# Patient Record
Sex: MALE | Race: WHITE | NOT HISPANIC OR LATINO | Employment: OTHER | URBAN - METROPOLITAN AREA
[De-identification: names, ages, dates, MRNs, and addresses within clinical notes are randomized per-mention and may not be internally consistent; named-entity substitution may affect disease eponyms.]

---

## 2018-10-18 ENCOUNTER — TRANSCRIBE ORDERS (OUTPATIENT)
Dept: ADMINISTRATIVE | Facility: HOSPITAL | Age: 73
End: 2018-10-18

## 2018-10-18 DIAGNOSIS — M86.172 ACUTE OSTEOMYELITIS OF LEFT ANKLE OR FOOT (HCC): Primary | ICD-10-CM

## 2018-10-19 ENCOUNTER — APPOINTMENT (OUTPATIENT)
Dept: CARDIOLOGY | Facility: HOSPITAL | Age: 73
End: 2018-10-19

## 2018-10-19 ENCOUNTER — APPOINTMENT (OUTPATIENT)
Dept: MRI IMAGING | Facility: HOSPITAL | Age: 73
End: 2018-10-19

## 2018-10-19 ENCOUNTER — HOSPITAL ENCOUNTER (INPATIENT)
Facility: HOSPITAL | Age: 73
LOS: 7 days | Discharge: HOME HEALTH | End: 2018-10-26
Attending: EMERGENCY MEDICINE | Admitting: ORTHOPAEDIC SURGERY

## 2018-10-19 ENCOUNTER — APPOINTMENT (OUTPATIENT)
Dept: GENERAL RADIOLOGY | Facility: HOSPITAL | Age: 73
End: 2018-10-19

## 2018-10-19 DIAGNOSIS — L97.524 ULCERATED, FOOT, LEFT, WITH NECROSIS OF BONE (HCC): ICD-10-CM

## 2018-10-19 DIAGNOSIS — L03.116 CELLULITIS OF LEFT FOOT: Primary | ICD-10-CM

## 2018-10-19 DIAGNOSIS — I73.9 PERIPHERAL VASCULAR DISEASE (HCC): ICD-10-CM

## 2018-10-19 DIAGNOSIS — Z74.09 IMPAIRED FUNCTIONAL MOBILITY, BALANCE, GAIT, AND ENDURANCE: ICD-10-CM

## 2018-10-19 PROBLEM — D72.829 LEUKOCYTOSIS: Status: ACTIVE | Noted: 2018-10-19

## 2018-10-19 PROBLEM — N17.9 AKI (ACUTE KIDNEY INJURY): Status: ACTIVE | Noted: 2018-10-19

## 2018-10-19 PROBLEM — I10 ESSENTIAL HYPERTENSION: Status: ACTIVE | Noted: 2018-10-19

## 2018-10-19 LAB
ALBUMIN SERPL-MCNC: 4.58 G/DL (ref 3.2–4.8)
ALBUMIN/GLOB SERPL: 2.3 G/DL (ref 1.5–2.5)
ALP SERPL-CCNC: 78 U/L (ref 25–100)
ALT SERPL W P-5'-P-CCNC: 43 U/L (ref 7–40)
ANION GAP SERPL CALCULATED.3IONS-SCNC: 11 MMOL/L (ref 3–11)
AST SERPL-CCNC: 39 U/L (ref 0–33)
BASOPHILS # BLD AUTO: 0.04 10*3/MM3 (ref 0–0.2)
BASOPHILS NFR BLD AUTO: 0.2 % (ref 0–1)
BILIRUB SERPL-MCNC: 1.2 MG/DL (ref 0.3–1.2)
BNP SERPL-MCNC: 25 PG/ML (ref 0–100)
BUN BLD-MCNC: 27 MG/DL (ref 9–23)
BUN/CREAT SERPL: 16.7 (ref 7–25)
CALCIUM SPEC-SCNC: 9.2 MG/DL (ref 8.7–10.4)
CHLORIDE SERPL-SCNC: 101 MMOL/L (ref 99–109)
CK SERPL-CCNC: 142 U/L (ref 26–174)
CO2 SERPL-SCNC: 24 MMOL/L (ref 20–31)
CREAT BLD-MCNC: 1.62 MG/DL (ref 0.6–1.3)
CRP SERPL-MCNC: 12.16 MG/DL (ref 0–1)
D-LACTATE SERPL-SCNC: 1.5 MMOL/L (ref 0.5–2)
DEPRECATED RDW RBC AUTO: 41.2 FL (ref 37–54)
EOSINOPHIL # BLD AUTO: 0.02 10*3/MM3 (ref 0–0.3)
EOSINOPHIL NFR BLD AUTO: 0.1 % (ref 0–3)
ERYTHROCYTE [DISTWIDTH] IN BLOOD BY AUTOMATED COUNT: 12.5 % (ref 11.3–14.5)
GFR SERPL CREATININE-BSD FRML MDRD: 42 ML/MIN/1.73
GLOBULIN UR ELPH-MCNC: 2 GM/DL
GLUCOSE BLD-MCNC: 103 MG/DL (ref 70–100)
HCT VFR BLD AUTO: 41.5 % (ref 38.9–50.9)
HGB BLD-MCNC: 14.1 G/DL (ref 13.1–17.5)
IMM GRANULOCYTES # BLD: 0.05 10*3/MM3 (ref 0–0.03)
IMM GRANULOCYTES NFR BLD: 0.3 % (ref 0–0.6)
LYMPHOCYTES # BLD AUTO: 1.8 10*3/MM3 (ref 0.6–4.8)
LYMPHOCYTES NFR BLD AUTO: 10.5 % (ref 24–44)
MCH RBC QN AUTO: 31 PG (ref 27–31)
MCHC RBC AUTO-ENTMCNC: 34 G/DL (ref 32–36)
MCV RBC AUTO: 91.2 FL (ref 80–99)
MONOCYTES # BLD AUTO: 1.76 10*3/MM3 (ref 0–1)
MONOCYTES NFR BLD AUTO: 10.3 % (ref 0–12)
NEUTROPHILS # BLD AUTO: 13.51 10*3/MM3 (ref 1.5–8.3)
NEUTROPHILS NFR BLD AUTO: 78.9 % (ref 41–71)
PLATELET # BLD AUTO: 209 10*3/MM3 (ref 150–450)
PMV BLD AUTO: 9.9 FL (ref 6–12)
POTASSIUM BLD-SCNC: 5.6 MMOL/L (ref 3.5–5.5)
PROCALCITONIN SERPL-MCNC: 0.27 NG/ML
PROT SERPL-MCNC: 6.6 G/DL (ref 5.7–8.2)
RBC # BLD AUTO: 4.55 10*6/MM3 (ref 4.2–5.76)
SODIUM BLD-SCNC: 136 MMOL/L (ref 132–146)
TROPONIN I SERPL-MCNC: 0 NG/ML (ref 0–0.07)
WBC NRBC COR # BLD: 17.13 10*3/MM3 (ref 3.5–10.8)

## 2018-10-19 PROCEDURE — 80053 COMPREHEN METABOLIC PANEL: CPT | Performed by: EMERGENCY MEDICINE

## 2018-10-19 PROCEDURE — 25010000002 DAPTOMYCIN PER 1 MG: Performed by: PHYSICIAN ASSISTANT

## 2018-10-19 PROCEDURE — 83605 ASSAY OF LACTIC ACID: CPT | Performed by: EMERGENCY MEDICINE

## 2018-10-19 PROCEDURE — 71045 X-RAY EXAM CHEST 1 VIEW: CPT

## 2018-10-19 PROCEDURE — 86140 C-REACTIVE PROTEIN: CPT | Performed by: EMERGENCY MEDICINE

## 2018-10-19 PROCEDURE — A9270 NON-COVERED ITEM OR SERVICE: HCPCS | Performed by: INTERNAL MEDICINE

## 2018-10-19 PROCEDURE — 84484 ASSAY OF TROPONIN QUANT: CPT

## 2018-10-19 PROCEDURE — 87070 CULTURE OTHR SPECIMN AEROBIC: CPT | Performed by: EMERGENCY MEDICINE

## 2018-10-19 PROCEDURE — 73718 MRI LOWER EXTREMITY W/O DYE: CPT

## 2018-10-19 PROCEDURE — 63710000001 ATORVASTATIN 20 MG TABLET: Performed by: INTERNAL MEDICINE

## 2018-10-19 PROCEDURE — 99223 1ST HOSP IP/OBS HIGH 75: CPT | Performed by: INTERNAL MEDICINE

## 2018-10-19 PROCEDURE — 63710000001 ASPIRIN 81 MG TABLET DELAYED-RELEASE: Performed by: INTERNAL MEDICINE

## 2018-10-19 PROCEDURE — 87040 BLOOD CULTURE FOR BACTERIA: CPT | Performed by: EMERGENCY MEDICINE

## 2018-10-19 PROCEDURE — 82550 ASSAY OF CK (CPK): CPT | Performed by: INTERNAL MEDICINE

## 2018-10-19 PROCEDURE — 25010000002 PIPERACILLIN SOD-TAZOBACTAM PER 1 G: Performed by: INTERNAL MEDICINE

## 2018-10-19 PROCEDURE — 99284 EMERGENCY DEPT VISIT MOD MDM: CPT

## 2018-10-19 PROCEDURE — 87147 CULTURE TYPE IMMUNOLOGIC: CPT | Performed by: EMERGENCY MEDICINE

## 2018-10-19 PROCEDURE — 93005 ELECTROCARDIOGRAM TRACING: CPT | Performed by: EMERGENCY MEDICINE

## 2018-10-19 PROCEDURE — 85025 COMPLETE CBC W/AUTO DIFF WBC: CPT | Performed by: EMERGENCY MEDICINE

## 2018-10-19 PROCEDURE — 93925 LOWER EXTREMITY STUDY: CPT

## 2018-10-19 PROCEDURE — 87186 SC STD MICRODIL/AGAR DIL: CPT | Performed by: EMERGENCY MEDICINE

## 2018-10-19 PROCEDURE — 83880 ASSAY OF NATRIURETIC PEPTIDE: CPT | Performed by: EMERGENCY MEDICINE

## 2018-10-19 PROCEDURE — 87077 CULTURE AEROBIC IDENTIFY: CPT | Performed by: EMERGENCY MEDICINE

## 2018-10-19 PROCEDURE — 87205 SMEAR GRAM STAIN: CPT | Performed by: EMERGENCY MEDICINE

## 2018-10-19 PROCEDURE — 84145 PROCALCITONIN (PCT): CPT | Performed by: INTERNAL MEDICINE

## 2018-10-19 RX ORDER — LISINOPRIL 10 MG/1
10 TABLET ORAL DAILY
COMMUNITY
End: 2018-10-26 | Stop reason: HOSPADM

## 2018-10-19 RX ORDER — SODIUM CHLORIDE 9 MG/ML
100 INJECTION, SOLUTION INTRAVENOUS CONTINUOUS
Status: DISCONTINUED | OUTPATIENT
Start: 2018-10-19 | End: 2018-10-25

## 2018-10-19 RX ORDER — ATORVASTATIN CALCIUM 20 MG/1
20 TABLET, FILM COATED ORAL DAILY
COMMUNITY
End: 2018-11-05 | Stop reason: SDUPTHER

## 2018-10-19 RX ORDER — ASPIRIN 81 MG/1
81 TABLET ORAL DAILY
Status: DISCONTINUED | OUTPATIENT
Start: 2018-10-19 | End: 2018-10-26 | Stop reason: HOSPADM

## 2018-10-19 RX ORDER — SODIUM CHLORIDE 0.9 % (FLUSH) 0.9 %
10 SYRINGE (ML) INJECTION AS NEEDED
Status: DISCONTINUED | OUTPATIENT
Start: 2018-10-19 | End: 2018-10-26 | Stop reason: HOSPADM

## 2018-10-19 RX ORDER — ATORVASTATIN CALCIUM 20 MG/1
20 TABLET, FILM COATED ORAL DAILY
Status: DISCONTINUED | OUTPATIENT
Start: 2018-10-19 | End: 2018-10-26 | Stop reason: HOSPADM

## 2018-10-19 RX ORDER — ASPIRIN 81 MG/1
81 TABLET ORAL DAILY
COMMUNITY
End: 2019-12-09

## 2018-10-19 RX ORDER — SODIUM CHLORIDE 0.9 % (FLUSH) 0.9 %
3 SYRINGE (ML) INJECTION EVERY 12 HOURS SCHEDULED
Status: DISCONTINUED | OUTPATIENT
Start: 2018-10-19 | End: 2018-10-26 | Stop reason: HOSPADM

## 2018-10-19 RX ORDER — QUETIAPINE FUMARATE 25 MG/1
12.5 TABLET, FILM COATED ORAL NIGHTLY
Status: COMPLETED | OUTPATIENT
Start: 2018-10-20 | End: 2018-10-21

## 2018-10-19 RX ORDER — SODIUM CHLORIDE 0.9 % (FLUSH) 0.9 %
3-10 SYRINGE (ML) INJECTION AS NEEDED
Status: DISCONTINUED | OUTPATIENT
Start: 2018-10-19 | End: 2018-10-26 | Stop reason: HOSPADM

## 2018-10-19 RX ADMIN — Medication 3 ML: at 23:55

## 2018-10-19 RX ADMIN — ASPIRIN 81 MG: 81 TABLET, COATED ORAL at 17:35

## 2018-10-19 RX ADMIN — TAZOBACTAM SODIUM AND PIPERACILLIN SODIUM 3.38 G: 375; 3 INJECTION, SOLUTION INTRAVENOUS at 17:28

## 2018-10-19 RX ADMIN — SODIUM CHLORIDE 100 ML/HR: 9 INJECTION, SOLUTION INTRAVENOUS at 17:28

## 2018-10-19 RX ADMIN — DAPTOMYCIN 600 MG: 500 INJECTION, POWDER, LYOPHILIZED, FOR SOLUTION INTRAVENOUS at 17:27

## 2018-10-19 RX ADMIN — QUETIAPINE FUMARATE 12.5 MG: 25 TABLET ORAL at 23:51

## 2018-10-19 RX ADMIN — TAZOBACTAM SODIUM AND PIPERACILLIN SODIUM 3.38 G: 375; 3 INJECTION, SOLUTION INTRAVENOUS at 23:52

## 2018-10-19 RX ADMIN — ATORVASTATIN CALCIUM 20 MG: 20 TABLET, FILM COATED ORAL at 17:35

## 2018-10-20 LAB
ALBUMIN SERPL-MCNC: 4.25 G/DL (ref 3.2–4.8)
ALBUMIN/GLOB SERPL: 1.8 G/DL (ref 1.5–2.5)
ALP SERPL-CCNC: 70 U/L (ref 25–100)
ALT SERPL W P-5'-P-CCNC: 43 U/L (ref 7–40)
ANION GAP SERPL CALCULATED.3IONS-SCNC: 10 MMOL/L (ref 3–11)
AST SERPL-CCNC: 36 U/L (ref 0–33)
BASOPHILS # BLD AUTO: 0.05 10*3/MM3 (ref 0–0.2)
BASOPHILS NFR BLD AUTO: 0.3 % (ref 0–1)
BH CV GRAFT BRACHIAL PRESSURE RIGHT: 138 MMHG
BH CV LEA LEFT ANT TIBIAL A DISTAL PSV: 183 CM/S
BH CV LEA LEFT ANT TIBIAL A MID PSV: 205 CM/S
BH CV LEA LEFT ANT TIBIAL A PROX PSV: 217 CM/S
BH CV LEA LEFT CFA PROX PSV: 156 CM/S
BH CV LEA LEFT DFA PROX PSV: 104 CM/S
BH CV LEA LEFT DPA PRESSURE: 154 MMHG
BH CV LEA LEFT PERONEAL  MID PSV: 55 CM/S
BH CV LEA LEFT POPITEAL A  DISTAL PSV: 137 CM/S
BH CV LEA LEFT POPITEAL A  PROX PSV: 122 CM/S
BH CV LEA LEFT PTA DISTAL PSV: 169 CM/S
BH CV LEA LEFT PTA MID PSV: 136 CM/S
BH CV LEA LEFT PTA PRESSURE: 162 MMHG
BH CV LEA LEFT PTA PROX PSV: 123 CM/S
BH CV LEA LEFT SFA DISTAL PSV: 139 CM/S
BH CV LEA LEFT SFA MID PSV: 141 CM/S
BH CV LEA LEFT SFA PROX PSV: 129 CM/S
BH CV LEA RIGHT ANT TIBIAL A DISTAL PSV: 79.8 CM/S
BH CV LEA RIGHT ANT TIBIAL A MID PSV: 78.6 CM/S
BH CV LEA RIGHT ANT TIBIAL A PROX PSV: 92.4 CM/S
BH CV LEA RIGHT CFA PROX PSV: 132 CM/S
BH CV LEA RIGHT DFA PROX PSV: 94.3 CM/S
BH CV LEA RIGHT DPA PRESSURE: 144 MMHG
BH CV LEA RIGHT PERONEAL  MID PSV: 67.8 CM/S
BH CV LEA RIGHT POPITEAL A  DISTAL PSV: 67.3 CM/S
BH CV LEA RIGHT POPITEAL A  PROX PSV: 83.6 CM/S
BH CV LEA RIGHT PTA DISTAL PSV: 61.9 CM/S
BH CV LEA RIGHT PTA MID PSV: 79.1 CM/S
BH CV LEA RIGHT PTA PRESSURE: 162 MMHG
BH CV LEA RIGHT PTA PROX PSV: 69.7 CM/S
BH CV LEA RIGHT SFA DISTAL PSV: 81.1 CM/S
BH CV LEA RIGHT SFA MID PSV: 110 CM/S
BH CV LEA RIGHT SFA PROX PSV: 120 CM/S
BH CV LOWER ARTERIAL LEFT ABI RATIO: 1.17
BH CV LOWER ARTERIAL RIGHT ABI RATIO: 1.17
BILIRUB SERPL-MCNC: 1.1 MG/DL (ref 0.3–1.2)
BUN BLD-MCNC: 25 MG/DL (ref 9–23)
BUN/CREAT SERPL: 16.8 (ref 7–25)
CALCIUM SPEC-SCNC: 9.2 MG/DL (ref 8.7–10.4)
CHLORIDE SERPL-SCNC: 102 MMOL/L (ref 99–109)
CK SERPL-CCNC: 139 U/L (ref 26–174)
CO2 SERPL-SCNC: 23 MMOL/L (ref 20–31)
CREAT BLD-MCNC: 1.49 MG/DL (ref 0.6–1.3)
CRP SERPL-MCNC: 15.09 MG/DL (ref 0–1)
D-LACTATE SERPL-SCNC: 1.5 MMOL/L (ref 0.5–2)
DEPRECATED RDW RBC AUTO: 43.1 FL (ref 37–54)
EOSINOPHIL # BLD AUTO: 0.12 10*3/MM3 (ref 0–0.3)
EOSINOPHIL NFR BLD AUTO: 0.8 % (ref 0–3)
ERYTHROCYTE [DISTWIDTH] IN BLOOD BY AUTOMATED COUNT: 12.4 % (ref 11.3–14.5)
ERYTHROCYTE [SEDIMENTATION RATE] IN BLOOD: 33 MM/HR (ref 0–20)
GFR SERPL CREATININE-BSD FRML MDRD: 46 ML/MIN/1.73
GLOBULIN UR ELPH-MCNC: 2.4 GM/DL
GLUCOSE BLD-MCNC: 96 MG/DL (ref 70–100)
HCT VFR BLD AUTO: 40.9 % (ref 38.9–50.9)
HGB BLD-MCNC: 13.6 G/DL (ref 13.1–17.5)
IMM GRANULOCYTES # BLD: 0.05 10*3/MM3 (ref 0–0.03)
IMM GRANULOCYTES NFR BLD: 0.3 % (ref 0–0.6)
LYMPHOCYTES # BLD AUTO: 2.78 10*3/MM3 (ref 0.6–4.8)
LYMPHOCYTES NFR BLD AUTO: 18.2 % (ref 24–44)
MCH RBC QN AUTO: 31.3 PG (ref 27–31)
MCHC RBC AUTO-ENTMCNC: 33.3 G/DL (ref 32–36)
MCV RBC AUTO: 94.2 FL (ref 80–99)
MONOCYTES # BLD AUTO: 1.69 10*3/MM3 (ref 0–1)
MONOCYTES NFR BLD AUTO: 11.1 % (ref 0–12)
NEUTROPHILS # BLD AUTO: 10.62 10*3/MM3 (ref 1.5–8.3)
NEUTROPHILS NFR BLD AUTO: 69.6 % (ref 41–71)
PLATELET # BLD AUTO: 182 10*3/MM3 (ref 150–450)
PMV BLD AUTO: 9.8 FL (ref 6–12)
POTASSIUM BLD-SCNC: 4.3 MMOL/L (ref 3.5–5.5)
PROCALCITONIN SERPL-MCNC: 0.3 NG/ML
PROT SERPL-MCNC: 6.6 G/DL (ref 5.7–8.2)
RBC # BLD AUTO: 4.34 10*6/MM3 (ref 4.2–5.76)
SODIUM BLD-SCNC: 135 MMOL/L (ref 132–146)
WBC NRBC COR # BLD: 15.26 10*3/MM3 (ref 3.5–10.8)

## 2018-10-20 PROCEDURE — 82550 ASSAY OF CK (CPK): CPT | Performed by: INTERNAL MEDICINE

## 2018-10-20 PROCEDURE — A9270 NON-COVERED ITEM OR SERVICE: HCPCS | Performed by: INTERNAL MEDICINE

## 2018-10-20 PROCEDURE — 99232 SBSQ HOSP IP/OBS MODERATE 35: CPT | Performed by: HOSPITALIST

## 2018-10-20 PROCEDURE — 86140 C-REACTIVE PROTEIN: CPT | Performed by: PHYSICIAN ASSISTANT

## 2018-10-20 PROCEDURE — 63710000001 ATORVASTATIN 20 MG TABLET: Performed by: INTERNAL MEDICINE

## 2018-10-20 PROCEDURE — 63710000001 QUETIAPINE 25 MG TABLET: Performed by: INTERNAL MEDICINE

## 2018-10-20 PROCEDURE — 63710000001 ACETAMINOPHEN 325 MG TABLET: Performed by: NURSE PRACTITIONER

## 2018-10-20 PROCEDURE — 63710000001 ASPIRIN 81 MG TABLET DELAYED-RELEASE: Performed by: INTERNAL MEDICINE

## 2018-10-20 PROCEDURE — 25010000002 DAPTOMYCIN PER 1 MG: Performed by: PHYSICIAN ASSISTANT

## 2018-10-20 PROCEDURE — 85652 RBC SED RATE AUTOMATED: CPT | Performed by: PHYSICIAN ASSISTANT

## 2018-10-20 PROCEDURE — 84145 PROCALCITONIN (PCT): CPT | Performed by: INTERNAL MEDICINE

## 2018-10-20 PROCEDURE — 85025 COMPLETE CBC W/AUTO DIFF WBC: CPT | Performed by: INTERNAL MEDICINE

## 2018-10-20 PROCEDURE — 83605 ASSAY OF LACTIC ACID: CPT | Performed by: INTERNAL MEDICINE

## 2018-10-20 PROCEDURE — A9270 NON-COVERED ITEM OR SERVICE: HCPCS | Performed by: NURSE PRACTITIONER

## 2018-10-20 PROCEDURE — 80053 COMPREHEN METABOLIC PANEL: CPT | Performed by: INTERNAL MEDICINE

## 2018-10-20 PROCEDURE — 25010000002 PIPERACILLIN SOD-TAZOBACTAM PER 1 G: Performed by: INTERNAL MEDICINE

## 2018-10-20 RX ORDER — ACETAMINOPHEN 325 MG/1
650 TABLET ORAL EVERY 6 HOURS PRN
Status: DISCONTINUED | OUTPATIENT
Start: 2018-10-20 | End: 2018-10-26 | Stop reason: HOSPADM

## 2018-10-20 RX ORDER — ACETAMINOPHEN 650 MG
TABLET, EXTENDED RELEASE ORAL DAILY
Status: DISCONTINUED | OUTPATIENT
Start: 2018-10-20 | End: 2018-10-26 | Stop reason: HOSPADM

## 2018-10-20 RX ORDER — LISINOPRIL 5 MG/1
2.5 TABLET ORAL
Status: DISCONTINUED | OUTPATIENT
Start: 2018-10-21 | End: 2018-10-24

## 2018-10-20 RX ADMIN — Medication 3 ML: at 08:41

## 2018-10-20 RX ADMIN — DAPTOMYCIN 600 MG: 500 INJECTION, POWDER, LYOPHILIZED, FOR SOLUTION INTRAVENOUS at 16:06

## 2018-10-20 RX ADMIN — TAZOBACTAM SODIUM AND PIPERACILLIN SODIUM 3.38 G: 375; 3 INJECTION, SOLUTION INTRAVENOUS at 16:45

## 2018-10-20 RX ADMIN — TAZOBACTAM SODIUM AND PIPERACILLIN SODIUM 3.38 G: 375; 3 INJECTION, SOLUTION INTRAVENOUS at 08:35

## 2018-10-20 RX ADMIN — QUETIAPINE FUMARATE 12.5 MG: 25 TABLET ORAL at 20:38

## 2018-10-20 RX ADMIN — ASPIRIN 81 MG: 81 TABLET, COATED ORAL at 08:39

## 2018-10-20 RX ADMIN — Medication 3 ML: at 20:38

## 2018-10-20 RX ADMIN — ACETAMINOPHEN 650 MG: 325 TABLET ORAL at 00:35

## 2018-10-20 RX ADMIN — SODIUM CHLORIDE 100 ML/HR: 9 INJECTION, SOLUTION INTRAVENOUS at 16:58

## 2018-10-20 RX ADMIN — ATORVASTATIN CALCIUM 20 MG: 20 TABLET, FILM COATED ORAL at 08:39

## 2018-10-20 RX ADMIN — SODIUM CHLORIDE 100 ML/HR: 9 INJECTION, SOLUTION INTRAVENOUS at 06:43

## 2018-10-21 LAB — HBA1C MFR BLD: 6 % (ref 4.8–5.6)

## 2018-10-21 PROCEDURE — 83036 HEMOGLOBIN GLYCOSYLATED A1C: CPT | Performed by: HOSPITALIST

## 2018-10-21 PROCEDURE — 97116 GAIT TRAINING THERAPY: CPT

## 2018-10-21 PROCEDURE — 25010000002 ENOXAPARIN PER 10 MG: Performed by: INTERNAL MEDICINE

## 2018-10-21 PROCEDURE — 97161 PT EVAL LOW COMPLEX 20 MIN: CPT

## 2018-10-21 PROCEDURE — 99232 SBSQ HOSP IP/OBS MODERATE 35: CPT | Performed by: HOSPITALIST

## 2018-10-21 PROCEDURE — 97597 DBRDMT OPN WND 1ST 20 CM/<: CPT

## 2018-10-21 PROCEDURE — A9270 NON-COVERED ITEM OR SERVICE: HCPCS | Performed by: HOSPITALIST

## 2018-10-21 PROCEDURE — 25010000002 PIPERACILLIN SOD-TAZOBACTAM PER 1 G: Performed by: INTERNAL MEDICINE

## 2018-10-21 PROCEDURE — A9270 NON-COVERED ITEM OR SERVICE: HCPCS | Performed by: INTERNAL MEDICINE

## 2018-10-21 PROCEDURE — 63710000001 ATORVASTATIN 20 MG TABLET: Performed by: INTERNAL MEDICINE

## 2018-10-21 PROCEDURE — 25010000002 DAPTOMYCIN PER 1 MG: Performed by: PHYSICIAN ASSISTANT

## 2018-10-21 PROCEDURE — 63710000001 ASPIRIN 81 MG TABLET DELAYED-RELEASE: Performed by: INTERNAL MEDICINE

## 2018-10-21 PROCEDURE — 63710000001 LISINOPRIL 5 MG TABLET: Performed by: HOSPITALIST

## 2018-10-21 PROCEDURE — 63710000001 QUETIAPINE 25 MG TABLET: Performed by: INTERNAL MEDICINE

## 2018-10-21 RX ADMIN — ENOXAPARIN SODIUM 40 MG: 40 INJECTION SUBCUTANEOUS at 21:20

## 2018-10-21 RX ADMIN — ASPIRIN 81 MG: 81 TABLET, COATED ORAL at 08:22

## 2018-10-21 RX ADMIN — LISINOPRIL 2.5 MG: 5 TABLET ORAL at 08:22

## 2018-10-21 RX ADMIN — QUETIAPINE FUMARATE 12.5 MG: 25 TABLET ORAL at 21:20

## 2018-10-21 RX ADMIN — ATORVASTATIN CALCIUM 20 MG: 20 TABLET, FILM COATED ORAL at 08:22

## 2018-10-21 RX ADMIN — TAZOBACTAM SODIUM AND PIPERACILLIN SODIUM 3.38 G: 375; 3 INJECTION, SOLUTION INTRAVENOUS at 16:35

## 2018-10-21 RX ADMIN — Medication 3 ML: at 08:21

## 2018-10-21 RX ADMIN — DAPTOMYCIN 600 MG: 500 INJECTION, POWDER, LYOPHILIZED, FOR SOLUTION INTRAVENOUS at 16:00

## 2018-10-21 RX ADMIN — TAZOBACTAM SODIUM AND PIPERACILLIN SODIUM 3.38 G: 375; 3 INJECTION, SOLUTION INTRAVENOUS at 00:00

## 2018-10-21 RX ADMIN — SODIUM CHLORIDE 100 ML/HR: 9 INJECTION, SOLUTION INTRAVENOUS at 05:37

## 2018-10-21 RX ADMIN — TAZOBACTAM SODIUM AND PIPERACILLIN SODIUM 3.38 G: 375; 3 INJECTION, SOLUTION INTRAVENOUS at 08:21

## 2018-10-22 ENCOUNTER — ANESTHESIA EVENT (OUTPATIENT)
Dept: PERIOP | Facility: HOSPITAL | Age: 73
End: 2018-10-22

## 2018-10-22 ENCOUNTER — APPOINTMENT (OUTPATIENT)
Dept: MRI IMAGING | Facility: HOSPITAL | Age: 73
End: 2018-10-22

## 2018-10-22 ENCOUNTER — APPOINTMENT (OUTPATIENT)
Dept: GENERAL RADIOLOGY | Facility: HOSPITAL | Age: 73
End: 2018-10-22

## 2018-10-22 LAB
ALBUMIN SERPL-MCNC: 3.63 G/DL (ref 3.2–4.8)
ALBUMIN/GLOB SERPL: 1.9 G/DL (ref 1.5–2.5)
ALP SERPL-CCNC: 96 U/L (ref 25–100)
ALT SERPL W P-5'-P-CCNC: 59 U/L (ref 7–40)
ANION GAP SERPL CALCULATED.3IONS-SCNC: 10 MMOL/L (ref 3–11)
AST SERPL-CCNC: 48 U/L (ref 0–33)
BASOPHILS # BLD AUTO: 0.05 10*3/MM3 (ref 0–0.2)
BASOPHILS NFR BLD AUTO: 0.4 % (ref 0–1)
BILIRUB SERPL-MCNC: 1 MG/DL (ref 0.3–1.2)
BUN BLD-MCNC: 17 MG/DL (ref 9–23)
BUN/CREAT SERPL: 13.1 (ref 7–25)
CALCIUM SPEC-SCNC: 8.1 MG/DL (ref 8.7–10.4)
CHLORIDE SERPL-SCNC: 103 MMOL/L (ref 99–109)
CK SERPL-CCNC: 139 U/L (ref 26–174)
CO2 SERPL-SCNC: 24 MMOL/L (ref 20–31)
CREAT BLD-MCNC: 1.3 MG/DL (ref 0.6–1.3)
DEPRECATED RDW RBC AUTO: 42 FL (ref 37–54)
EOSINOPHIL # BLD AUTO: 0.29 10*3/MM3 (ref 0–0.3)
EOSINOPHIL NFR BLD AUTO: 2.3 % (ref 0–3)
ERYTHROCYTE [DISTWIDTH] IN BLOOD BY AUTOMATED COUNT: 12.5 % (ref 11.3–14.5)
GFR SERPL CREATININE-BSD FRML MDRD: 54 ML/MIN/1.73
GLOBULIN UR ELPH-MCNC: 1.9 GM/DL
GLUCOSE BLD-MCNC: 117 MG/DL (ref 70–100)
HCT VFR BLD AUTO: 35.3 % (ref 38.9–50.9)
HGB BLD-MCNC: 11.7 G/DL (ref 13.1–17.5)
IMM GRANULOCYTES # BLD: 0.04 10*3/MM3 (ref 0–0.03)
IMM GRANULOCYTES NFR BLD: 0.3 % (ref 0–0.6)
LYMPHOCYTES # BLD AUTO: 1.61 10*3/MM3 (ref 0.6–4.8)
LYMPHOCYTES NFR BLD AUTO: 12.7 % (ref 24–44)
MCH RBC QN AUTO: 30.5 PG (ref 27–31)
MCHC RBC AUTO-ENTMCNC: 33.1 G/DL (ref 32–36)
MCV RBC AUTO: 92.2 FL (ref 80–99)
MONOCYTES # BLD AUTO: 1.27 10*3/MM3 (ref 0–1)
MONOCYTES NFR BLD AUTO: 10 % (ref 0–12)
NEUTROPHILS # BLD AUTO: 9.44 10*3/MM3 (ref 1.5–8.3)
NEUTROPHILS NFR BLD AUTO: 74.6 % (ref 41–71)
NRBC BLD MANUAL-RTO: 0 /100 WBC (ref 0–0)
PLATELET # BLD AUTO: 196 10*3/MM3 (ref 150–450)
PMV BLD AUTO: 9.3 FL (ref 6–12)
POTASSIUM BLD-SCNC: 4.1 MMOL/L (ref 3.5–5.5)
PROT SERPL-MCNC: 5.5 G/DL (ref 5.7–8.2)
RBC # BLD AUTO: 3.83 10*6/MM3 (ref 4.2–5.76)
SODIUM BLD-SCNC: 137 MMOL/L (ref 132–146)
WBC NRBC COR # BLD: 12.66 10*3/MM3 (ref 3.5–10.8)

## 2018-10-22 PROCEDURE — 25010000002 ENOXAPARIN PER 10 MG: Performed by: INTERNAL MEDICINE

## 2018-10-22 PROCEDURE — 99233 SBSQ HOSP IP/OBS HIGH 50: CPT | Performed by: HOSPITALIST

## 2018-10-22 PROCEDURE — 99222 1ST HOSP IP/OBS MODERATE 55: CPT | Performed by: ORTHOPAEDIC SURGERY

## 2018-10-22 PROCEDURE — 80053 COMPREHEN METABOLIC PANEL: CPT | Performed by: INTERNAL MEDICINE

## 2018-10-22 PROCEDURE — A9270 NON-COVERED ITEM OR SERVICE: HCPCS | Performed by: HOSPITALIST

## 2018-10-22 PROCEDURE — 82550 ASSAY OF CK (CPK): CPT | Performed by: INTERNAL MEDICINE

## 2018-10-22 PROCEDURE — 85025 COMPLETE CBC W/AUTO DIFF WBC: CPT | Performed by: INTERNAL MEDICINE

## 2018-10-22 PROCEDURE — 73630 X-RAY EXAM OF FOOT: CPT

## 2018-10-22 PROCEDURE — 63710000001 ASPIRIN 81 MG TABLET DELAYED-RELEASE: Performed by: INTERNAL MEDICINE

## 2018-10-22 PROCEDURE — 63710000001 LISINOPRIL 5 MG TABLET: Performed by: HOSPITALIST

## 2018-10-22 PROCEDURE — 25010000002 PIPERACILLIN SOD-TAZOBACTAM PER 1 G: Performed by: INTERNAL MEDICINE

## 2018-10-22 PROCEDURE — A9270 NON-COVERED ITEM OR SERVICE: HCPCS | Performed by: INTERNAL MEDICINE

## 2018-10-22 PROCEDURE — 97116 GAIT TRAINING THERAPY: CPT

## 2018-10-22 PROCEDURE — 63710000001 ATORVASTATIN 20 MG TABLET: Performed by: INTERNAL MEDICINE

## 2018-10-22 RX ORDER — QUETIAPINE FUMARATE 25 MG/1
12.5 TABLET, FILM COATED ORAL NIGHTLY
Status: DISCONTINUED | OUTPATIENT
Start: 2018-10-22 | End: 2018-10-26 | Stop reason: HOSPADM

## 2018-10-22 RX ADMIN — SODIUM CHLORIDE 100 ML/HR: 9 INJECTION, SOLUTION INTRAVENOUS at 00:11

## 2018-10-22 RX ADMIN — Medication 3 ML: at 20:02

## 2018-10-22 RX ADMIN — TAZOBACTAM SODIUM AND PIPERACILLIN SODIUM 3.38 G: 375; 3 INJECTION, SOLUTION INTRAVENOUS at 16:24

## 2018-10-22 RX ADMIN — TAZOBACTAM SODIUM AND PIPERACILLIN SODIUM 3.38 G: 375; 3 INJECTION, SOLUTION INTRAVENOUS at 08:15

## 2018-10-22 RX ADMIN — ENOXAPARIN SODIUM 40 MG: 40 INJECTION SUBCUTANEOUS at 20:02

## 2018-10-22 RX ADMIN — TAZOBACTAM SODIUM AND PIPERACILLIN SODIUM 3.38 G: 375; 3 INJECTION, SOLUTION INTRAVENOUS at 00:12

## 2018-10-22 RX ADMIN — LISINOPRIL 2.5 MG: 5 TABLET ORAL at 08:16

## 2018-10-22 RX ADMIN — ASPIRIN 81 MG: 81 TABLET, COATED ORAL at 08:15

## 2018-10-22 RX ADMIN — ATORVASTATIN CALCIUM 20 MG: 20 TABLET, FILM COATED ORAL at 08:15

## 2018-10-22 RX ADMIN — QUETIAPINE FUMARATE 12.5 MG: 25 TABLET ORAL at 22:07

## 2018-10-22 RX ADMIN — Medication 3 ML: at 08:20

## 2018-10-23 ENCOUNTER — APPOINTMENT (OUTPATIENT)
Dept: GENERAL RADIOLOGY | Facility: HOSPITAL | Age: 73
End: 2018-10-23

## 2018-10-23 ENCOUNTER — ANESTHESIA (OUTPATIENT)
Dept: PERIOP | Facility: HOSPITAL | Age: 73
End: 2018-10-23

## 2018-10-23 ENCOUNTER — APPOINTMENT (OUTPATIENT)
Dept: CARDIOLOGY | Facility: HOSPITAL | Age: 73
End: 2018-10-23

## 2018-10-23 ENCOUNTER — APPOINTMENT (OUTPATIENT)
Dept: CARDIOLOGY | Facility: HOSPITAL | Age: 73
End: 2018-10-23
Attending: ORTHOPAEDIC SURGERY

## 2018-10-23 LAB
ALBUMIN SERPL-MCNC: 3.96 G/DL (ref 3.2–4.8)
ALBUMIN/GLOB SERPL: 1.6 G/DL (ref 1.5–2.5)
ALP SERPL-CCNC: 124 U/L (ref 25–100)
ALT SERPL W P-5'-P-CCNC: 83 U/L (ref 7–40)
ANION GAP SERPL CALCULATED.3IONS-SCNC: 9 MMOL/L (ref 3–11)
AST SERPL-CCNC: 59 U/L (ref 0–33)
BACTERIA SPEC AEROBE CULT: ABNORMAL
BILIRUB SERPL-MCNC: 0.8 MG/DL (ref 0.3–1.2)
BUN BLD-MCNC: 15 MG/DL (ref 9–23)
BUN/CREAT SERPL: 12.4 (ref 7–25)
CALCIUM SPEC-SCNC: 8.9 MG/DL (ref 8.7–10.4)
CHLORIDE SERPL-SCNC: 106 MMOL/L (ref 99–109)
CO2 SERPL-SCNC: 23 MMOL/L (ref 20–31)
CREAT BLD-MCNC: 1.21 MG/DL (ref 0.6–1.3)
GFR SERPL CREATININE-BSD FRML MDRD: 59 ML/MIN/1.73
GLOBULIN UR ELPH-MCNC: 2.5 GM/DL
GLUCOSE BLD-MCNC: 109 MG/DL (ref 70–100)
GRAM STN SPEC: ABNORMAL
POTASSIUM BLD-SCNC: 4.1 MMOL/L (ref 3.5–5.5)
PROT SERPL-MCNC: 6.5 G/DL (ref 5.7–8.2)
SODIUM BLD-SCNC: 138 MMOL/L (ref 132–146)
TSH SERPL DL<=0.05 MIU/L-ACNC: 2.38 MIU/ML (ref 0.35–5.35)

## 2018-10-23 PROCEDURE — 76000 FLUOROSCOPY <1 HR PHYS/QHP: CPT

## 2018-10-23 PROCEDURE — 80053 COMPREHEN METABOLIC PANEL: CPT | Performed by: INTERNAL MEDICINE

## 2018-10-23 PROCEDURE — 25010000002 DIGOXIN PER 500 MCG: Performed by: INTERNAL MEDICINE

## 2018-10-23 PROCEDURE — 87206 SMEAR FLUORESCENT/ACID STAI: CPT | Performed by: SURGERY

## 2018-10-23 PROCEDURE — 28003 TREATMENT OF FOOT INFECTION: CPT | Performed by: ORTHOPAEDIC SURGERY

## 2018-10-23 PROCEDURE — 93005 ELECTROCARDIOGRAM TRACING: CPT | Performed by: NURSE PRACTITIONER

## 2018-10-23 PROCEDURE — 25010000002 AMIODARONE IN DEXTROSE 5% 150-4.21 MG/100ML-% SOLUTION: Performed by: INTERNAL MEDICINE

## 2018-10-23 PROCEDURE — 87075 CULTR BACTERIA EXCEPT BLOOD: CPT | Performed by: SURGERY

## 2018-10-23 PROCEDURE — 25010000002 MIDAZOLAM PER 1 MG: Performed by: NURSE ANESTHETIST, CERTIFIED REGISTERED

## 2018-10-23 PROCEDURE — 88304 TISSUE EXAM BY PATHOLOGIST: CPT | Performed by: SURGERY

## 2018-10-23 PROCEDURE — 87176 TISSUE HOMOGENIZATION CULTR: CPT | Performed by: SURGERY

## 2018-10-23 PROCEDURE — 71045 X-RAY EXAM CHEST 1 VIEW: CPT

## 2018-10-23 PROCEDURE — 99222 1ST HOSP IP/OBS MODERATE 55: CPT | Performed by: INTERNAL MEDICINE

## 2018-10-23 PROCEDURE — 87070 CULTURE OTHR SPECIMN AEROBIC: CPT | Performed by: SURGERY

## 2018-10-23 PROCEDURE — 25010000002 SULFUR HEXAFLUORIDE MICROSPH 60.7-25 MG RECONSTITUTED SUSPENSION: Performed by: INTERNAL MEDICINE

## 2018-10-23 PROCEDURE — 25010000002 AMIODARONE IN DEXTROSE 5% 360-4.14 MG/200ML-% SOLUTION: Performed by: INTERNAL MEDICINE

## 2018-10-23 PROCEDURE — 0QBP0ZZ EXCISION OF LEFT METATARSAL, OPEN APPROACH: ICD-10-PCS | Performed by: ORTHOPAEDIC SURGERY

## 2018-10-23 PROCEDURE — 87205 SMEAR GRAM STAIN: CPT | Performed by: SURGERY

## 2018-10-23 PROCEDURE — 25010000002 PROPOFOL 1000 MG/ML EMULSION: Performed by: NURSE ANESTHETIST, CERTIFIED REGISTERED

## 2018-10-23 PROCEDURE — 93010 ELECTROCARDIOGRAM REPORT: CPT | Performed by: INTERNAL MEDICINE

## 2018-10-23 PROCEDURE — 87116 MYCOBACTERIA CULTURE: CPT | Performed by: SURGERY

## 2018-10-23 PROCEDURE — 87102 FUNGUS ISOLATION CULTURE: CPT | Performed by: SURGERY

## 2018-10-23 PROCEDURE — C1751 CATH, INF, PER/CENT/MIDLINE: HCPCS | Performed by: SURGERY

## 2018-10-23 PROCEDURE — 05H633Z INSERTION OF INFUSION DEVICE INTO LEFT SUBCLAVIAN VEIN, PERCUTANEOUS APPROACH: ICD-10-PCS | Performed by: SURGERY

## 2018-10-23 PROCEDURE — 25010000002 PIPERACILLIN SOD-TAZOBACTAM PER 1 G: Performed by: INTERNAL MEDICINE

## 2018-10-23 PROCEDURE — B517ZZA FLUOROSCOPY OF LEFT SUBCLAVIAN VEIN, GUIDANCE: ICD-10-PCS | Performed by: SURGERY

## 2018-10-23 PROCEDURE — 84443 ASSAY THYROID STIM HORMONE: CPT | Performed by: NURSE PRACTITIONER

## 2018-10-23 PROCEDURE — 25010000002 PHENYLEPHRINE PER 1 ML: Performed by: NURSE ANESTHETIST, CERTIFIED REGISTERED

## 2018-10-23 PROCEDURE — 93306 TTE W/DOPPLER COMPLETE: CPT | Performed by: INTERNAL MEDICINE

## 2018-10-23 PROCEDURE — 88311 DECALCIFY TISSUE: CPT | Performed by: SURGERY

## 2018-10-23 PROCEDURE — 25010000002 PROPOFOL 10 MG/ML EMULSION: Performed by: NURSE ANESTHETIST, CERTIFIED REGISTERED

## 2018-10-23 PROCEDURE — 25010000002 ENOXAPARIN PER 10 MG

## 2018-10-23 PROCEDURE — 25010000002 FENTANYL CITRATE (PF) 100 MCG/2ML SOLUTION: Performed by: NURSE ANESTHETIST, CERTIFIED REGISTERED

## 2018-10-23 PROCEDURE — 99233 SBSQ HOSP IP/OBS HIGH 50: CPT | Performed by: NURSE PRACTITIONER

## 2018-10-23 PROCEDURE — 93306 TTE W/DOPPLER COMPLETE: CPT

## 2018-10-23 RX ORDER — OXYCODONE HYDROCHLORIDE AND ACETAMINOPHEN 5; 325 MG/1; MG/1
2 TABLET ORAL EVERY 4 HOURS PRN
Status: DISCONTINUED | OUTPATIENT
Start: 2018-10-23 | End: 2018-10-26 | Stop reason: HOSPADM

## 2018-10-23 RX ORDER — DIGOXIN 0.25 MG/ML
250 INJECTION INTRAMUSCULAR; INTRAVENOUS ONCE
Status: COMPLETED | OUTPATIENT
Start: 2018-10-23 | End: 2018-10-23

## 2018-10-23 RX ORDER — PROMETHAZINE HYDROCHLORIDE 25 MG/1
25 SUPPOSITORY RECTAL ONCE AS NEEDED
Status: DISCONTINUED | OUTPATIENT
Start: 2018-10-23 | End: 2018-10-23

## 2018-10-23 RX ORDER — ESMOLOL HYDROCHLORIDE 10 MG/ML
50-300 INJECTION, SOLUTION INTRAVENOUS
Status: DISCONTINUED | OUTPATIENT
Start: 2018-10-23 | End: 2018-10-23

## 2018-10-23 RX ORDER — SODIUM CHLORIDE 0.9 % (FLUSH) 0.9 %
10 SYRINGE (ML) INJECTION EVERY 12 HOURS SCHEDULED
Status: DISCONTINUED | OUTPATIENT
Start: 2018-10-23 | End: 2018-10-26 | Stop reason: HOSPADM

## 2018-10-23 RX ORDER — FENTANYL CITRATE 50 UG/ML
INJECTION, SOLUTION INTRAMUSCULAR; INTRAVENOUS AS NEEDED
Status: DISCONTINUED | OUTPATIENT
Start: 2018-10-23 | End: 2018-10-23 | Stop reason: SURG

## 2018-10-23 RX ORDER — SODIUM CHLORIDE 0.9 % (FLUSH) 0.9 %
20 SYRINGE (ML) INJECTION AS NEEDED
Status: DISCONTINUED | OUTPATIENT
Start: 2018-10-23 | End: 2018-10-26 | Stop reason: HOSPADM

## 2018-10-23 RX ORDER — LIDOCAINE HYDROCHLORIDE 10 MG/ML
0.5 INJECTION, SOLUTION EPIDURAL; INFILTRATION; INTRACAUDAL; PERINEURAL ONCE AS NEEDED
Status: DISCONTINUED | OUTPATIENT
Start: 2018-10-23 | End: 2018-10-23 | Stop reason: HOSPADM

## 2018-10-23 RX ORDER — ESMOLOL HYDROCHLORIDE 10 MG/ML
500 INJECTION INTRAVENOUS ONCE
Status: COMPLETED | OUTPATIENT
Start: 2018-10-23 | End: 2018-10-23

## 2018-10-23 RX ORDER — LIDOCAINE HYDROCHLORIDE 10 MG/ML
INJECTION, SOLUTION EPIDURAL; INFILTRATION; INTRACAUDAL; PERINEURAL AS NEEDED
Status: DISCONTINUED | OUTPATIENT
Start: 2018-10-23 | End: 2018-10-23 | Stop reason: SURG

## 2018-10-23 RX ORDER — HYDROCODONE BITARTRATE AND ACETAMINOPHEN 7.5; 325 MG/1; MG/1
2 TABLET ORAL EVERY 4 HOURS PRN
Status: DISCONTINUED | OUTPATIENT
Start: 2018-10-23 | End: 2018-10-26 | Stop reason: HOSPADM

## 2018-10-23 RX ORDER — SODIUM CHLORIDE 0.9 % (FLUSH) 0.9 %
1-10 SYRINGE (ML) INJECTION AS NEEDED
Status: DISCONTINUED | OUTPATIENT
Start: 2018-10-23 | End: 2018-10-23 | Stop reason: HOSPADM

## 2018-10-23 RX ORDER — DIPHENOXYLATE HYDROCHLORIDE AND ATROPINE SULFATE 2.5; .025 MG/1; MG/1
1 TABLET ORAL DAILY
Status: DISCONTINUED | OUTPATIENT
Start: 2018-10-23 | End: 2018-10-26 | Stop reason: HOSPADM

## 2018-10-23 RX ORDER — BUPIVACAINE HYDROCHLORIDE AND EPINEPHRINE 5; 5 MG/ML; UG/ML
INJECTION, SOLUTION PERINEURAL AS NEEDED
Status: DISCONTINUED | OUTPATIENT
Start: 2018-10-23 | End: 2018-10-23 | Stop reason: HOSPADM

## 2018-10-23 RX ORDER — PROPOFOL 10 MG/ML
VIAL (ML) INTRAVENOUS AS NEEDED
Status: DISCONTINUED | OUTPATIENT
Start: 2018-10-23 | End: 2018-10-23 | Stop reason: SURG

## 2018-10-23 RX ORDER — SODIUM CHLORIDE 0.9 % (FLUSH) 0.9 %
3 SYRINGE (ML) INJECTION EVERY 12 HOURS SCHEDULED
Status: DISCONTINUED | OUTPATIENT
Start: 2018-10-23 | End: 2018-10-23 | Stop reason: HOSPADM

## 2018-10-23 RX ORDER — PROMETHAZINE HYDROCHLORIDE 25 MG/1
25 TABLET ORAL ONCE AS NEEDED
Status: DISCONTINUED | OUTPATIENT
Start: 2018-10-23 | End: 2018-10-23

## 2018-10-23 RX ORDER — MIDAZOLAM HYDROCHLORIDE 1 MG/ML
INJECTION INTRAMUSCULAR; INTRAVENOUS AS NEEDED
Status: DISCONTINUED | OUTPATIENT
Start: 2018-10-23 | End: 2018-10-23 | Stop reason: SURG

## 2018-10-23 RX ORDER — SODIUM CHLORIDE 0.9 % (FLUSH) 0.9 %
10 SYRINGE (ML) INJECTION AS NEEDED
Status: DISCONTINUED | OUTPATIENT
Start: 2018-10-23 | End: 2018-10-26 | Stop reason: HOSPADM

## 2018-10-23 RX ORDER — SODIUM CHLORIDE 9 MG/ML
INJECTION, SOLUTION INTRAVENOUS CONTINUOUS PRN
Status: DISCONTINUED | OUTPATIENT
Start: 2018-10-23 | End: 2018-10-23 | Stop reason: SURG

## 2018-10-23 RX ORDER — DIGOXIN 0.25 MG/ML
250 INJECTION INTRAMUSCULAR; INTRAVENOUS
Status: DISCONTINUED | OUTPATIENT
Start: 2018-10-23 | End: 2018-10-23

## 2018-10-23 RX ORDER — SODIUM CHLORIDE, SODIUM LACTATE, POTASSIUM CHLORIDE, CALCIUM CHLORIDE 600; 310; 30; 20 MG/100ML; MG/100ML; MG/100ML; MG/100ML
9 INJECTION, SOLUTION INTRAVENOUS CONTINUOUS PRN
Status: DISCONTINUED | OUTPATIENT
Start: 2018-10-23 | End: 2018-10-23 | Stop reason: HOSPADM

## 2018-10-23 RX ORDER — PROMETHAZINE HYDROCHLORIDE 25 MG/ML
6.25 INJECTION, SOLUTION INTRAMUSCULAR; INTRAVENOUS ONCE AS NEEDED
Status: DISCONTINUED | OUTPATIENT
Start: 2018-10-23 | End: 2018-10-23

## 2018-10-23 RX ORDER — FENTANYL CITRATE 50 UG/ML
50 INJECTION, SOLUTION INTRAMUSCULAR; INTRAVENOUS
Status: DISCONTINUED | OUTPATIENT
Start: 2018-10-23 | End: 2018-10-23

## 2018-10-23 RX ORDER — HYDROMORPHONE HYDROCHLORIDE 1 MG/ML
0.5 INJECTION, SOLUTION INTRAMUSCULAR; INTRAVENOUS; SUBCUTANEOUS
Status: DISCONTINUED | OUTPATIENT
Start: 2018-10-23 | End: 2018-10-23

## 2018-10-23 RX ORDER — HYDROCODONE BITARTRATE AND ACETAMINOPHEN 7.5; 325 MG/1; MG/1
1 TABLET ORAL EVERY 4 HOURS PRN
Status: DISCONTINUED | OUTPATIENT
Start: 2018-10-23 | End: 2018-10-26 | Stop reason: HOSPADM

## 2018-10-23 RX ORDER — FAMOTIDINE 20 MG/1
20 TABLET, FILM COATED ORAL
Status: DISCONTINUED | OUTPATIENT
Start: 2018-10-23 | End: 2018-10-23 | Stop reason: HOSPADM

## 2018-10-23 RX ORDER — OXYCODONE HYDROCHLORIDE AND ACETAMINOPHEN 5; 325 MG/1; MG/1
1 TABLET ORAL EVERY 4 HOURS PRN
Status: DISCONTINUED | OUTPATIENT
Start: 2018-10-23 | End: 2018-10-26 | Stop reason: HOSPADM

## 2018-10-23 RX ADMIN — TAZOBACTAM SODIUM AND PIPERACILLIN SODIUM 3.38 G: 375; 3 INJECTION, SOLUTION INTRAVENOUS at 07:58

## 2018-10-23 RX ADMIN — FENTANYL CITRATE 100 MCG: 50 INJECTION, SOLUTION INTRAMUSCULAR; INTRAVENOUS at 14:01

## 2018-10-23 RX ADMIN — PROPOFOL 25 MG: 10 INJECTION, EMULSION INTRAVENOUS at 14:01

## 2018-10-23 RX ADMIN — TAZOBACTAM SODIUM AND PIPERACILLIN SODIUM 3.38 G: 375; 3 INJECTION, SOLUTION INTRAVENOUS at 23:37

## 2018-10-23 RX ADMIN — PHENYLEPHRINE HYDROCHLORIDE 40 MCG: 10 INJECTION INTRAVENOUS at 14:21

## 2018-10-23 RX ADMIN — SODIUM CHLORIDE: 9 INJECTION, SOLUTION INTRAVENOUS at 13:51

## 2018-10-23 RX ADMIN — QUETIAPINE FUMARATE 12.5 MG: 25 TABLET ORAL at 21:41

## 2018-10-23 RX ADMIN — PHENYLEPHRINE HYDROCHLORIDE 80 MCG: 10 INJECTION INTRAVENOUS at 14:36

## 2018-10-23 RX ADMIN — ASPIRIN 81 MG: 81 TABLET, COATED ORAL at 08:03

## 2018-10-23 RX ADMIN — PHENYLEPHRINE HYDROCHLORIDE 160 MCG: 10 INJECTION INTRAVENOUS at 14:44

## 2018-10-23 RX ADMIN — LIDOCAINE HYDROCHLORIDE 50 MG: 10 INJECTION, SOLUTION EPIDURAL; INFILTRATION; INTRACAUDAL; PERINEURAL at 14:01

## 2018-10-23 RX ADMIN — TAZOBACTAM SODIUM AND PIPERACILLIN SODIUM 3.38 G: 375; 3 INJECTION, SOLUTION INTRAVENOUS at 15:56

## 2018-10-23 RX ADMIN — PHENYLEPHRINE HYDROCHLORIDE 80 MCG: 10 INJECTION INTRAVENOUS at 14:41

## 2018-10-23 RX ADMIN — TAZOBACTAM SODIUM AND PIPERACILLIN SODIUM 3.38 G: 375; 3 INJECTION, SOLUTION INTRAVENOUS at 00:38

## 2018-10-23 RX ADMIN — AMIODARONE HYDROCHLORIDE 1 MG/MIN: 1.8 INJECTION, SOLUTION INTRAVENOUS at 11:54

## 2018-10-23 RX ADMIN — Medication 10 ML: at 20:17

## 2018-10-23 RX ADMIN — SULFUR HEXAFLUORIDE 2 ML: KIT at 17:45

## 2018-10-23 RX ADMIN — PROPOFOL 100 MCG/KG/MIN: 10 INJECTION, EMULSION INTRAVENOUS at 14:01

## 2018-10-23 RX ADMIN — Medication 10 ML: at 20:18

## 2018-10-23 RX ADMIN — Medication 3 ML: at 20:18

## 2018-10-23 RX ADMIN — ENOXAPARIN SODIUM 100 MG: 100 INJECTION SUBCUTANEOUS at 18:28

## 2018-10-23 RX ADMIN — AMIODARONE HYDROCHLORIDE 1 MG/MIN: 1.8 INJECTION, SOLUTION INTRAVENOUS at 13:51

## 2018-10-23 RX ADMIN — AMIODARONE HYDROCHLORIDE 1 MG/MIN: 1.8 INJECTION, SOLUTION INTRAVENOUS at 17:31

## 2018-10-23 RX ADMIN — ATORVASTATIN CALCIUM 20 MG: 20 TABLET, FILM COATED ORAL at 08:03

## 2018-10-23 RX ADMIN — PROPOFOL 25 MG: 10 INJECTION, EMULSION INTRAVENOUS at 14:31

## 2018-10-23 RX ADMIN — ESMOLOL HYDROCHLORIDE 50 MCG/KG/MIN: 10 INJECTION INTRAVENOUS at 03:02

## 2018-10-23 RX ADMIN — DIGOXIN 250 MCG: 0.25 INJECTION INTRAMUSCULAR; INTRAVENOUS at 12:49

## 2018-10-23 RX ADMIN — ESMOLOL HYDROCHLORIDE 50 MCG/KG/MIN: 10 INJECTION INTRAVENOUS at 07:58

## 2018-10-23 RX ADMIN — ESMOLOL HYDROCHLORIDE 50.5 MG: 10 INJECTION, SOLUTION INTRAVENOUS at 03:08

## 2018-10-23 RX ADMIN — PHENYLEPHRINE HYDROCHLORIDE 80 MCG: 10 INJECTION INTRAVENOUS at 14:26

## 2018-10-23 RX ADMIN — MIDAZOLAM HYDROCHLORIDE 2 MG: 1 INJECTION, SOLUTION INTRAMUSCULAR; INTRAVENOUS at 14:01

## 2018-10-23 RX ADMIN — ENOXAPARIN SODIUM 100 MG: 100 INJECTION SUBCUTANEOUS at 05:31

## 2018-10-23 RX ADMIN — LISINOPRIL 2.5 MG: 5 TABLET ORAL at 08:03

## 2018-10-23 RX ADMIN — ESMOLOL HYDROCHLORIDE 50 MCG/KG/MIN: 10 INJECTION INTRAVENOUS at 10:37

## 2018-10-23 RX ADMIN — Medication 1 TABLET: at 18:28

## 2018-10-23 RX ADMIN — AMIODARONE HYDROCHLORIDE 150 MG: 1.5 INJECTION, SOLUTION INTRAVENOUS at 11:23

## 2018-10-23 NOTE — ANESTHESIA PREPROCEDURE EVALUATION
Anesthesia Evaluation     Patient summary reviewed and Nursing notes reviewed   NPO Solid Status: > 8 hours  NPO Liquid Status: > 8 hours           Airway   Mallampati: II  TM distance: >3 FB  Neck ROM: full  Possible difficult intubation  Dental - normal exam     Pulmonary    (+) decreased breath sounds,   (-) not a smoker  Cardiovascular     ECG reviewed  Rhythm: irregular  Rate: abnormal    (+) hypertension, dysrhythmias Atrial Fib, hyperlipidemia,     ROS comment: Atrial fibrillation with rapid ventricular response  Nonspecific ST abnormality    Neuro/Psych  GI/Hepatic/Renal/Endo    (+)   renal disease ARF,     Musculoskeletal         ROS comment: Foot cellulitis   Abdominal   (+) obese,     Abdomen: soft.   Substance History      OB/GYN          Other   (+) arthritis     ROS/Med Hx Other: Drive through as on amiodarone infusion   new onset AFib                Anesthesia Plan    ASA 3     general     intravenous induction   Anesthetic plan, all risks, benefits, and alternatives have been provided, discussed and informed consent has been obtained with: patient.    Plan discussed with CRNA.

## 2018-10-23 NOTE — ANESTHESIA POSTPROCEDURE EVALUATION
Patient: Carlos Campa    Procedure Summary     Date:  10/23/18 Room / Location:   [facility] /  [facility]    Anesthesia Start:  1351 Anesthesia Stop:  1507    Procedures:       GROSHONG CATHETER PLACEMENT (N/A )      INCISION AND DRAINAGE LEFT FOOT (Left ) Diagnosis:      Surgeon:  Marquez Simons MD; Idania Osborn MD Provider:  Óscar Nazraio MD    Anesthesia Type:  general ASA Status:  3          Anesthesia Type: general  Last vitals  BP   92/74 (10/23/18 1130)   Temp   98.1 °F (36.7 °C) (10/23/18 1130)   Pulse   (!) 121 (10/23/18 1100)   Resp   18 (10/23/18 1130)     SpO2   96 % (10/23/18 0745)     Post Anesthesia Care and Evaluation    Patient location during evaluation: PACU  Patient participation: complete - patient participated  Level of consciousness: awake and alert  Pain score: 0  Pain management: adequate  Airway patency: patent  Anesthetic complications: No anesthetic complications  PONV Status: none  Cardiovascular status: hemodynamically stable and acceptable  Respiratory status: nonlabored ventilation, acceptable and nasal cannula  Hydration status: acceptable

## 2018-10-24 LAB
ALBUMIN SERPL-MCNC: 3.32 G/DL (ref 3.2–4.8)
ALBUMIN/GLOB SERPL: 1.9 G/DL (ref 1.5–2.5)
ALP SERPL-CCNC: 111 U/L (ref 25–100)
ALT SERPL W P-5'-P-CCNC: 55 U/L (ref 7–40)
ANION GAP SERPL CALCULATED.3IONS-SCNC: 5 MMOL/L (ref 3–11)
AST SERPL-CCNC: 39 U/L (ref 0–33)
BACTERIA SPEC AEROBE CULT: NORMAL
BACTERIA SPEC AEROBE CULT: NORMAL
BASOPHILS # BLD AUTO: 0.07 10*3/MM3 (ref 0–0.2)
BASOPHILS NFR BLD AUTO: 0.6 % (ref 0–1)
BH CV ECHO MEAS - AO ROOT AREA (BSA CORRECTED): 1.2
BH CV ECHO MEAS - AO ROOT AREA: 5.3 CM^2
BH CV ECHO MEAS - AO ROOT DIAM: 2.6 CM
BH CV ECHO MEAS - BSA(HAYCOCK): 2.3 M^2
BH CV ECHO MEAS - BSA: 2.2 M^2
BH CV ECHO MEAS - BZI_BMI: 30.2 KILOGRAMS/M^2
BH CV ECHO MEAS - BZI_METRIC_HEIGHT: 182.9 CM
BH CV ECHO MEAS - BZI_METRIC_WEIGHT: 101.2 KG
BH CV ECHO MEAS - EDV(CUBED): 91.9 ML
BH CV ECHO MEAS - EDV(TEICH): 93 ML
BH CV ECHO MEAS - EF(CUBED): 78.4 %
BH CV ECHO MEAS - EF(TEICH): 70.8 %
BH CV ECHO MEAS - ESV(CUBED): 19.8 ML
BH CV ECHO MEAS - ESV(TEICH): 27.2 ML
BH CV ECHO MEAS - FS: 40 %
BH CV ECHO MEAS - IVS/LVPW: 1
BH CV ECHO MEAS - IVSD: 1.1 CM
BH CV ECHO MEAS - LA DIMENSION: 3.7 CM
BH CV ECHO MEAS - LA/AO: 1.4
BH CV ECHO MEAS - LV MASS(C)D: 172.4 GRAMS
BH CV ECHO MEAS - LV MASS(C)DI: 77.3 GRAMS/M^2
BH CV ECHO MEAS - LVIDD: 4.5 CM
BH CV ECHO MEAS - LVIDS: 2.7 CM
BH CV ECHO MEAS - LVPWD: 1.1 CM
BH CV ECHO MEAS - RAP SYSTOLE: 8 MMHG
BH CV ECHO MEAS - RVSP: 26 MMHG
BH CV ECHO MEAS - SI(CUBED): 32.3 ML/M^2
BH CV ECHO MEAS - SI(TEICH): 29.5 ML/M^2
BH CV ECHO MEAS - SV(CUBED): 72 ML
BH CV ECHO MEAS - SV(TEICH): 65.9 ML
BH CV ECHO MEAS - TAPSE (>1.6): 1.7 CM2
BH CV ECHO MEAS - TR MAX PG: 18 MMHG
BH CV ECHO MEAS - TR MAX VEL: 210.2 CM/SEC
BH CV VAS BP RIGHT ARM: NORMAL MMHG
BH CV XLRA - RV BASE: 3.6 CM
BH CV XLRA - RV LENGTH: 6.7 CM
BH CV XLRA - RV MID: 2.9 CM
BH CV XLRA - TDI S': 15.1 CM/SEC
BILIRUB SERPL-MCNC: 0.4 MG/DL (ref 0.3–1.2)
BUN BLD-MCNC: 12 MG/DL (ref 9–23)
BUN/CREAT SERPL: 10.5 (ref 7–25)
CALCIUM SPEC-SCNC: 7.7 MG/DL (ref 8.7–10.4)
CHLORIDE SERPL-SCNC: 106 MMOL/L (ref 99–109)
CO2 SERPL-SCNC: 26 MMOL/L (ref 20–31)
CREAT BLD-MCNC: 1.14 MG/DL (ref 0.6–1.3)
DEPRECATED RDW RBC AUTO: 42.1 FL (ref 37–54)
EOSINOPHIL # BLD AUTO: 0.23 10*3/MM3 (ref 0–0.3)
EOSINOPHIL NFR BLD AUTO: 2.1 % (ref 0–3)
ERYTHROCYTE [DISTWIDTH] IN BLOOD BY AUTOMATED COUNT: 12.4 % (ref 11.3–14.5)
GFR SERPL CREATININE-BSD FRML MDRD: 63 ML/MIN/1.73
GLOBULIN UR ELPH-MCNC: 1.8 GM/DL
GLUCOSE BLD-MCNC: 111 MG/DL (ref 70–100)
HCT VFR BLD AUTO: 34.3 % (ref 38.9–50.9)
HGB BLD-MCNC: 11.4 G/DL (ref 13.1–17.5)
IMM GRANULOCYTES # BLD: 0.05 10*3/MM3 (ref 0–0.03)
IMM GRANULOCYTES NFR BLD: 0.5 % (ref 0–0.6)
LYMPHOCYTES # BLD AUTO: 2.3 10*3/MM3 (ref 0.6–4.8)
LYMPHOCYTES NFR BLD AUTO: 21 % (ref 24–44)
MAXIMAL PREDICTED HEART RATE: 146 BPM
MCH RBC QN AUTO: 30.5 PG (ref 27–31)
MCHC RBC AUTO-ENTMCNC: 33.2 G/DL (ref 32–36)
MCV RBC AUTO: 91.7 FL (ref 80–99)
MONOCYTES # BLD AUTO: 1.33 10*3/MM3 (ref 0–1)
MONOCYTES NFR BLD AUTO: 12.1 % (ref 0–12)
NEUTROPHILS # BLD AUTO: 7.03 10*3/MM3 (ref 1.5–8.3)
NEUTROPHILS NFR BLD AUTO: 64.2 % (ref 41–71)
NRBC BLD MANUAL-RTO: 0 /100 WBC (ref 0–0)
PLATELET # BLD AUTO: 260 10*3/MM3 (ref 150–450)
PMV BLD AUTO: 9.2 FL (ref 6–12)
POTASSIUM BLD-SCNC: 4 MMOL/L (ref 3.5–5.5)
PROT SERPL-MCNC: 5.1 G/DL (ref 5.7–8.2)
RBC # BLD AUTO: 3.74 10*6/MM3 (ref 4.2–5.76)
SODIUM BLD-SCNC: 137 MMOL/L (ref 132–146)
STRESS TARGET HR: 124 BPM
WBC NRBC COR # BLD: 10.96 10*3/MM3 (ref 3.5–10.8)

## 2018-10-24 PROCEDURE — 97164 PT RE-EVAL EST PLAN CARE: CPT

## 2018-10-24 PROCEDURE — 99232 SBSQ HOSP IP/OBS MODERATE 35: CPT | Performed by: INTERNAL MEDICINE

## 2018-10-24 PROCEDURE — 97597 DBRDMT OPN WND 1ST 20 CM/<: CPT

## 2018-10-24 PROCEDURE — 97605 NEG PRS WND THER DME<=50SQCM: CPT

## 2018-10-24 PROCEDURE — 85025 COMPLETE CBC W/AUTO DIFF WBC: CPT | Performed by: NURSE PRACTITIONER

## 2018-10-24 PROCEDURE — 25010000002 PIPERACILLIN SOD-TAZOBACTAM PER 1 G: Performed by: INTERNAL MEDICINE

## 2018-10-24 PROCEDURE — 80053 COMPREHEN METABOLIC PANEL: CPT | Performed by: NURSE PRACTITIONER

## 2018-10-24 PROCEDURE — 25010000002 AMIODARONE IN DEXTROSE 5% 150-4.21 MG/100ML-% SOLUTION: Performed by: INTERNAL MEDICINE

## 2018-10-24 PROCEDURE — 25010000002 AMIODARONE IN DEXTROSE 5% 360-4.14 MG/200ML-% SOLUTION

## 2018-10-24 PROCEDURE — 99024 POSTOP FOLLOW-UP VISIT: CPT | Performed by: ORTHOPAEDIC SURGERY

## 2018-10-24 PROCEDURE — 99233 SBSQ HOSP IP/OBS HIGH 50: CPT | Performed by: INTERNAL MEDICINE

## 2018-10-24 PROCEDURE — 25010000002 ENOXAPARIN PER 10 MG

## 2018-10-24 RX ORDER — MUPIROCIN CALCIUM 20 MG/G
1 CREAM TOPICAL EVERY 12 HOURS SCHEDULED
Status: DISCONTINUED | OUTPATIENT
Start: 2018-10-24 | End: 2018-10-26 | Stop reason: HOSPADM

## 2018-10-24 RX ADMIN — Medication 10 ML: at 08:30

## 2018-10-24 RX ADMIN — TAZOBACTAM SODIUM AND PIPERACILLIN SODIUM 3.38 G: 375; 3 INJECTION, SOLUTION INTRAVENOUS at 16:18

## 2018-10-24 RX ADMIN — METOPROLOL TARTRATE 12.5 MG: 25 TABLET, FILM COATED ORAL at 08:01

## 2018-10-24 RX ADMIN — AMIODARONE HYDROCHLORIDE 1 MG/MIN: 1.8 INJECTION, SOLUTION INTRAVENOUS at 07:23

## 2018-10-24 RX ADMIN — AMIODARONE HYDROCHLORIDE 150 MG: 1.5 INJECTION, SOLUTION INTRAVENOUS at 08:29

## 2018-10-24 RX ADMIN — ENOXAPARIN SODIUM 100 MG: 100 INJECTION SUBCUTANEOUS at 05:36

## 2018-10-24 RX ADMIN — QUETIAPINE FUMARATE 12.5 MG: 25 TABLET ORAL at 21:23

## 2018-10-24 RX ADMIN — ENOXAPARIN SODIUM 100 MG: 100 INJECTION SUBCUTANEOUS at 17:32

## 2018-10-24 RX ADMIN — TAZOBACTAM SODIUM AND PIPERACILLIN SODIUM 3.38 G: 375; 3 INJECTION, SOLUTION INTRAVENOUS at 08:02

## 2018-10-24 RX ADMIN — Medication 3 ML: at 08:31

## 2018-10-24 RX ADMIN — AMIODARONE HYDROCHLORIDE 1 MG/MIN: 1.8 INJECTION, SOLUTION INTRAVENOUS at 10:48

## 2018-10-24 RX ADMIN — TAZOBACTAM SODIUM AND PIPERACILLIN SODIUM 3.38 G: 375; 3 INJECTION, SOLUTION INTRAVENOUS at 23:56

## 2018-10-24 RX ADMIN — ATORVASTATIN CALCIUM 20 MG: 20 TABLET, FILM COATED ORAL at 08:00

## 2018-10-24 RX ADMIN — SODIUM CHLORIDE 100 ML/HR: 9 INJECTION, SOLUTION INTRAVENOUS at 23:56

## 2018-10-24 RX ADMIN — ASPIRIN 81 MG: 81 TABLET, COATED ORAL at 08:01

## 2018-10-24 RX ADMIN — METOPROLOL TARTRATE 12.5 MG: 25 TABLET, FILM COATED ORAL at 21:23

## 2018-10-24 RX ADMIN — AMIODARONE HYDROCHLORIDE 1 MG/MIN: 1.8 INJECTION, SOLUTION INTRAVENOUS at 17:46

## 2018-10-24 RX ADMIN — AMIODARONE HYDROCHLORIDE 1 MG/MIN: 1.8 INJECTION, SOLUTION INTRAVENOUS at 23:57

## 2018-10-24 RX ADMIN — SODIUM CHLORIDE 100 ML/HR: 9 INJECTION, SOLUTION INTRAVENOUS at 08:04

## 2018-10-24 RX ADMIN — Medication 1 TABLET: at 08:02

## 2018-10-25 PROBLEM — I48.0 PAROXYSMAL ATRIAL FIBRILLATION: Status: ACTIVE | Noted: 2018-10-25

## 2018-10-25 PROBLEM — I48.91 ATRIAL FIBRILLATION WITH RVR: Status: ACTIVE | Noted: 2018-10-25

## 2018-10-25 LAB
ALBUMIN SERPL-MCNC: 3.59 G/DL (ref 3.2–4.8)
ALBUMIN/GLOB SERPL: 1.5 G/DL (ref 1.5–2.5)
ALP SERPL-CCNC: 105 U/L (ref 25–100)
ALT SERPL W P-5'-P-CCNC: 50 U/L (ref 7–40)
ANION GAP SERPL CALCULATED.3IONS-SCNC: 8 MMOL/L (ref 3–11)
AST SERPL-CCNC: 34 U/L (ref 0–33)
BASOPHILS # BLD AUTO: 0.08 10*3/MM3 (ref 0–0.2)
BASOPHILS NFR BLD AUTO: 0.8 % (ref 0–1)
BILIRUB SERPL-MCNC: 0.4 MG/DL (ref 0.3–1.2)
BUN BLD-MCNC: 12 MG/DL (ref 9–23)
BUN/CREAT SERPL: 10.8 (ref 7–25)
CALCIUM SPEC-SCNC: 8.3 MG/DL (ref 8.7–10.4)
CHLORIDE SERPL-SCNC: 106 MMOL/L (ref 99–109)
CK SERPL-CCNC: 126 U/L (ref 26–174)
CO2 SERPL-SCNC: 20 MMOL/L (ref 20–31)
CREAT BLD-MCNC: 1.11 MG/DL (ref 0.6–1.3)
CYTO UR: NORMAL
DEPRECATED RDW RBC AUTO: 42.8 FL (ref 37–54)
EOSINOPHIL # BLD AUTO: 0.26 10*3/MM3 (ref 0–0.3)
EOSINOPHIL NFR BLD AUTO: 2.4 % (ref 0–3)
ERYTHROCYTE [DISTWIDTH] IN BLOOD BY AUTOMATED COUNT: 12.5 % (ref 11.3–14.5)
GFR SERPL CREATININE-BSD FRML MDRD: 65 ML/MIN/1.73
GLOBULIN UR ELPH-MCNC: 2.4 GM/DL
GLUCOSE BLD-MCNC: 107 MG/DL (ref 70–100)
HCT VFR BLD AUTO: 34.9 % (ref 38.9–50.9)
HGB BLD-MCNC: 11.7 G/DL (ref 13.1–17.5)
IMM GRANULOCYTES # BLD: 0.07 10*3/MM3 (ref 0–0.03)
IMM GRANULOCYTES NFR BLD: 0.7 % (ref 0–0.6)
LAB AP CASE REPORT: NORMAL
LAB AP CLINICAL INFORMATION: NORMAL
LYMPHOCYTES # BLD AUTO: 2.13 10*3/MM3 (ref 0.6–4.8)
LYMPHOCYTES NFR BLD AUTO: 20 % (ref 24–44)
MCH RBC QN AUTO: 31.3 PG (ref 27–31)
MCHC RBC AUTO-ENTMCNC: 33.5 G/DL (ref 32–36)
MCV RBC AUTO: 93.3 FL (ref 80–99)
MONOCYTES # BLD AUTO: 1.1 10*3/MM3 (ref 0–1)
MONOCYTES NFR BLD AUTO: 10.3 % (ref 0–12)
NEUTROPHILS # BLD AUTO: 7.01 10*3/MM3 (ref 1.5–8.3)
NEUTROPHILS NFR BLD AUTO: 65.8 % (ref 41–71)
PATH REPORT.FINAL DX SPEC: NORMAL
PATH REPORT.GROSS SPEC: NORMAL
PLATELET # BLD AUTO: 269 10*3/MM3 (ref 150–450)
PMV BLD AUTO: 9.3 FL (ref 6–12)
POTASSIUM BLD-SCNC: 3.7 MMOL/L (ref 3.5–5.5)
PROT SERPL-MCNC: 6 G/DL (ref 5.7–8.2)
RBC # BLD AUTO: 3.74 10*6/MM3 (ref 4.2–5.76)
SODIUM BLD-SCNC: 134 MMOL/L (ref 132–146)
WBC NRBC COR # BLD: 10.65 10*3/MM3 (ref 3.5–10.8)

## 2018-10-25 PROCEDURE — 97110 THERAPEUTIC EXERCISES: CPT

## 2018-10-25 PROCEDURE — 25010000002 AMIODARONE IN DEXTROSE 5% 360-4.14 MG/200ML-% SOLUTION

## 2018-10-25 PROCEDURE — 97116 GAIT TRAINING THERAPY: CPT

## 2018-10-25 PROCEDURE — 97605 NEG PRS WND THER DME<=50SQCM: CPT

## 2018-10-25 PROCEDURE — 25010000002 PIPERACILLIN SOD-TAZOBACTAM PER 1 G: Performed by: INTERNAL MEDICINE

## 2018-10-25 PROCEDURE — 25010000002 ENOXAPARIN PER 10 MG

## 2018-10-25 PROCEDURE — 80053 COMPREHEN METABOLIC PANEL: CPT | Performed by: INTERNAL MEDICINE

## 2018-10-25 PROCEDURE — 97164 PT RE-EVAL EST PLAN CARE: CPT

## 2018-10-25 PROCEDURE — 99232 SBSQ HOSP IP/OBS MODERATE 35: CPT | Performed by: INTERNAL MEDICINE

## 2018-10-25 PROCEDURE — 99024 POSTOP FOLLOW-UP VISIT: CPT | Performed by: ORTHOPAEDIC SURGERY

## 2018-10-25 PROCEDURE — 82550 ASSAY OF CK (CPK): CPT | Performed by: INTERNAL MEDICINE

## 2018-10-25 PROCEDURE — 99232 SBSQ HOSP IP/OBS MODERATE 35: CPT | Performed by: PHYSICIAN ASSISTANT

## 2018-10-25 PROCEDURE — 85025 COMPLETE CBC W/AUTO DIFF WBC: CPT | Performed by: INTERNAL MEDICINE

## 2018-10-25 RX ADMIN — METOPROLOL TARTRATE 25 MG: 25 TABLET ORAL at 20:52

## 2018-10-25 RX ADMIN — RIVAROXABAN 20 MG: 20 TABLET, FILM COATED ORAL at 17:29

## 2018-10-25 RX ADMIN — METOPROLOL TARTRATE 25 MG: 25 TABLET ORAL at 08:30

## 2018-10-25 RX ADMIN — TAZOBACTAM SODIUM AND PIPERACILLIN SODIUM 3.38 G: 375; 3 INJECTION, SOLUTION INTRAVENOUS at 17:29

## 2018-10-25 RX ADMIN — Medication 10 ML: at 20:53

## 2018-10-25 RX ADMIN — Medication 10 ML: at 08:33

## 2018-10-25 RX ADMIN — Medication 1 TABLET: at 08:29

## 2018-10-25 RX ADMIN — ENOXAPARIN SODIUM 100 MG: 100 INJECTION SUBCUTANEOUS at 05:42

## 2018-10-25 RX ADMIN — QUETIAPINE FUMARATE 12.5 MG: 25 TABLET ORAL at 20:53

## 2018-10-25 RX ADMIN — Medication 3 ML: at 08:30

## 2018-10-25 RX ADMIN — AMIODARONE HYDROCHLORIDE 1 MG/MIN: 1.8 INJECTION, SOLUTION INTRAVENOUS at 06:21

## 2018-10-25 RX ADMIN — ASPIRIN 81 MG: 81 TABLET, COATED ORAL at 08:29

## 2018-10-25 RX ADMIN — ATORVASTATIN CALCIUM 20 MG: 20 TABLET, FILM COATED ORAL at 08:30

## 2018-10-25 RX ADMIN — Medication 3 ML: at 20:54

## 2018-10-25 RX ADMIN — TAZOBACTAM SODIUM AND PIPERACILLIN SODIUM 3.38 G: 375; 3 INJECTION, SOLUTION INTRAVENOUS at 08:30

## 2018-10-26 VITALS
OXYGEN SATURATION: 97 % | WEIGHT: 223 LBS | BODY MASS INDEX: 30.2 KG/M2 | HEART RATE: 57 BPM | RESPIRATION RATE: 18 BRPM | SYSTOLIC BLOOD PRESSURE: 135 MMHG | DIASTOLIC BLOOD PRESSURE: 64 MMHG | HEIGHT: 72 IN | TEMPERATURE: 97.6 F

## 2018-10-26 PROBLEM — L08.9 LEFT FOOT INFECTION: Status: ACTIVE | Noted: 2018-10-26

## 2018-10-26 PROBLEM — R74.01 ELEVATED TRANSAMINASE LEVEL: Status: ACTIVE | Noted: 2018-10-26

## 2018-10-26 PROBLEM — G62.9 PERIPHERAL NEUROPATHY: Status: ACTIVE | Noted: 2018-10-26

## 2018-10-26 PROBLEM — N18.2 CKD (CHRONIC KIDNEY DISEASE) STAGE 2, GFR 60-89 ML/MIN: Status: ACTIVE | Noted: 2018-10-26

## 2018-10-26 PROBLEM — L97.419 SKIN ULCER OF RIGHT HEEL: Status: ACTIVE | Noted: 2018-10-26

## 2018-10-26 PROBLEM — M86.9 OSTEOMYELITIS OF LEFT FOOT: Status: ACTIVE | Noted: 2018-10-26

## 2018-10-26 PROBLEM — Z79.01 CHRONIC ANTICOAGULATION: Status: ACTIVE | Noted: 2018-10-26

## 2018-10-26 PROCEDURE — 97605 NEG PRS WND THER DME<=50SQCM: CPT

## 2018-10-26 PROCEDURE — 99239 HOSP IP/OBS DSCHRG MGMT >30: CPT | Performed by: PHYSICIAN ASSISTANT

## 2018-10-26 PROCEDURE — 99232 SBSQ HOSP IP/OBS MODERATE 35: CPT | Performed by: INTERNAL MEDICINE

## 2018-10-26 PROCEDURE — 25010000002 PIPERACILLIN SOD-TAZOBACTAM PER 1 G: Performed by: INTERNAL MEDICINE

## 2018-10-26 PROCEDURE — 97597 DBRDMT OPN WND 1ST 20 CM/<: CPT

## 2018-10-26 RX ORDER — DIPHENOXYLATE HYDROCHLORIDE AND ATROPINE SULFATE 2.5; .025 MG/1; MG/1
1 TABLET ORAL DAILY
Qty: 90 TABLET | Refills: 3 | Status: SHIPPED | OUTPATIENT
Start: 2018-10-27 | End: 2019-12-09

## 2018-10-26 RX ORDER — MUPIROCIN CALCIUM 20 MG/G
CREAM TOPICAL
Qty: 30 G | Refills: 11 | Status: SHIPPED | OUTPATIENT
Start: 2018-10-26 | End: 2019-03-01 | Stop reason: SDUPTHER

## 2018-10-26 RX ADMIN — Medication 10 ML: at 08:20

## 2018-10-26 RX ADMIN — TAZOBACTAM SODIUM AND PIPERACILLIN SODIUM 3.38 G: 375; 3 INJECTION, SOLUTION INTRAVENOUS at 08:17

## 2018-10-26 RX ADMIN — ASPIRIN 81 MG: 81 TABLET, COATED ORAL at 08:17

## 2018-10-26 RX ADMIN — METOPROLOL TARTRATE 25 MG: 25 TABLET ORAL at 08:17

## 2018-10-26 RX ADMIN — Medication 20 ML: at 08:20

## 2018-10-26 RX ADMIN — TAZOBACTAM SODIUM AND PIPERACILLIN SODIUM 3.38 G: 375; 3 INJECTION, SOLUTION INTRAVENOUS at 00:07

## 2018-10-26 RX ADMIN — POVIDONE-IODINE: 10 SOLUTION TOPICAL at 09:30

## 2018-10-26 RX ADMIN — Medication 10 ML: at 08:21

## 2018-10-26 RX ADMIN — MUPIROCIN 1 APPLICATION: 2 CREAM TOPICAL at 08:17

## 2018-10-26 RX ADMIN — ATORVASTATIN CALCIUM 20 MG: 20 TABLET, FILM COATED ORAL at 08:17

## 2018-10-26 RX ADMIN — Medication 3 ML: at 08:18

## 2018-10-26 RX ADMIN — Medication 1 TABLET: at 08:17

## 2018-10-27 ENCOUNTER — READMISSION MANAGEMENT (OUTPATIENT)
Dept: CALL CENTER | Facility: HOSPITAL | Age: 73
End: 2018-10-27

## 2018-10-27 LAB
BACTERIA SPEC AEROBE CULT: NORMAL
GRAM STN SPEC: NORMAL
GRAM STN SPEC: NORMAL

## 2018-10-27 NOTE — OUTREACH NOTE
Prep Survey      Responses   Facility patient discharged from?  [facility]   Is patient eligible?  Yes   Discharge diagnosis  Left Foot Cellulitis and A-fib   Does the patient have one of the following disease processes/diagnoses(primary or secondary)?  Other   Does the patient have Home health ordered?  Yes   What is the Home health agency?   [facility]   Is there a DME ordered?  Yes   What DME was ordered?  Wound Vac and IV antibiotic   Prep survey completed?  Yes          Nolan Ward RN

## 2018-10-29 ENCOUNTER — READMISSION MANAGEMENT (OUTPATIENT)
Dept: CALL CENTER | Facility: HOSPITAL | Age: 73
End: 2018-10-29

## 2018-10-29 NOTE — OUTREACH NOTE
Medical Week 1 Survey      Responses   Facility patient discharged from?  [facility]   Does the patient have one of the following disease processes/diagnoses(primary or secondary)?  Other   Is there a successful TCM telephone encounter documented?  No   Week 1 attempt successful?  Yes   Call start time  1645   Call end time  1649   Discharge diagnosis  Left Foot Cellulitis and A-fib   Is patient permission given to speak with other caregiver?  Yes   List who call center can speak with  Dariana Phan   Meds reviewed with patient/caregiver?  Yes   Is the patient having any side effects they believe may be caused by any medication additions or changes?  No   Does the patient have all medications ordered at discharge?  Yes   Is the patient taking all medications as directed (includes completed medication regime)?  Yes   Does the patient have a primary care provider?   Yes   Does the patient have an appointment with their PCP within 7 days of discharge?  Yes   Has the patient kept scheduled appointments due by today?  Yes   What is the Home health agency?   [facility]   Has home health visited the patient within 72 hours of discharge?  Yes   What DME was ordered?  Wound Vac and IV antibiotic   Did the patient receive a copy of their discharge instructions?  Yes   Nursing interventions  Reviewed instructions with patient   Is the patient/caregiver able to teach back signs and symptoms related to disease process for when to call PCP?  Yes   Is the patient/caregiver able to teach back signs and symptoms related to disease process for when to call 911?  Yes   Is the patient/caregiver able to teach back the hierarchy of who to call/visit for symptoms/problems? PCP, Specialist, Home health nurse, Urgent Care, ED, 911  Yes   Week 1 call completed?  Yes   Wrap up additional comments  HH seeing him for IV Zosyn therapy. HR is regular. Cellulitis improving.           Henrietta Schreiber RN

## 2018-10-30 LAB — BACTERIA SPEC ANAEROBE CULT: NORMAL

## 2018-10-31 ENCOUNTER — LAB REQUISITION (OUTPATIENT)
Dept: LAB | Facility: HOSPITAL | Age: 73
End: 2018-10-31

## 2018-10-31 DIAGNOSIS — L03.116 CELLULITIS OF LEFT LOWER EXTREMITY: ICD-10-CM

## 2018-10-31 LAB
ALBUMIN SERPL-MCNC: 4.02 G/DL (ref 3.2–4.8)
ALBUMIN/GLOB SERPL: 1.8 G/DL (ref 1.5–2.5)
ALP SERPL-CCNC: 87 U/L (ref 25–100)
ALT SERPL W P-5'-P-CCNC: 34 U/L (ref 7–40)
ANION GAP SERPL CALCULATED.3IONS-SCNC: 12 MMOL/L (ref 3–11)
AST SERPL-CCNC: 28 U/L (ref 0–33)
BASOPHILS # BLD AUTO: 0.12 10*3/MM3 (ref 0–0.2)
BASOPHILS NFR BLD AUTO: 1.2 % (ref 0–1)
BILIRUB SERPL-MCNC: 0.4 MG/DL (ref 0.3–1.2)
BUN BLD-MCNC: 23 MG/DL (ref 9–23)
BUN/CREAT SERPL: 16.1 (ref 7–25)
CALCIUM SPEC-SCNC: 9 MG/DL (ref 8.7–10.4)
CHLORIDE SERPL-SCNC: 103 MMOL/L (ref 99–109)
CO2 SERPL-SCNC: 24 MMOL/L (ref 20–31)
CREAT BLD-MCNC: 1.43 MG/DL (ref 0.6–1.3)
CRP SERPL-MCNC: 0.96 MG/DL (ref 0–1)
DEPRECATED RDW RBC AUTO: 45.3 FL (ref 37–54)
EOSINOPHIL # BLD AUTO: 0.39 10*3/MM3 (ref 0–0.3)
EOSINOPHIL NFR BLD AUTO: 3.8 % (ref 0–3)
ERYTHROCYTE [DISTWIDTH] IN BLOOD BY AUTOMATED COUNT: 12.9 % (ref 11.3–14.5)
GFR SERPL CREATININE-BSD FRML MDRD: 48 ML/MIN/1.73
GLOBULIN UR ELPH-MCNC: 2.3 GM/DL
GLUCOSE BLD-MCNC: 75 MG/DL (ref 70–100)
HCT VFR BLD AUTO: 38.7 % (ref 38.9–50.9)
HGB BLD-MCNC: 12.1 G/DL (ref 13.1–17.5)
IMM GRANULOCYTES # BLD: 0.22 10*3/MM3 (ref 0–0.03)
IMM GRANULOCYTES NFR BLD: 2.1 % (ref 0–0.6)
LYMPHOCYTES # BLD AUTO: 2.89 10*3/MM3 (ref 0.6–4.8)
LYMPHOCYTES NFR BLD AUTO: 28 % (ref 24–44)
MCH RBC QN AUTO: 30.3 PG (ref 27–31)
MCHC RBC AUTO-ENTMCNC: 31.3 G/DL (ref 32–36)
MCV RBC AUTO: 97 FL (ref 80–99)
MONOCYTES # BLD AUTO: 0.72 10*3/MM3 (ref 0–1)
MONOCYTES NFR BLD AUTO: 7 % (ref 0–12)
NEUTROPHILS # BLD AUTO: 5.98 10*3/MM3 (ref 1.5–8.3)
NEUTROPHILS NFR BLD AUTO: 57.9 % (ref 41–71)
PLATELET # BLD AUTO: 471 10*3/MM3 (ref 150–450)
PMV BLD AUTO: 10.2 FL (ref 6–12)
POTASSIUM BLD-SCNC: 4.6 MMOL/L (ref 3.5–5.5)
PROT SERPL-MCNC: 6.3 G/DL (ref 5.7–8.2)
RBC # BLD AUTO: 3.99 10*6/MM3 (ref 4.2–5.76)
SODIUM BLD-SCNC: 139 MMOL/L (ref 132–146)
WBC NRBC COR # BLD: 10.32 10*3/MM3 (ref 3.5–10.8)

## 2018-10-31 PROCEDURE — 86140 C-REACTIVE PROTEIN: CPT | Performed by: INTERNAL MEDICINE

## 2018-10-31 PROCEDURE — 85025 COMPLETE CBC W/AUTO DIFF WBC: CPT | Performed by: INTERNAL MEDICINE

## 2018-10-31 PROCEDURE — 80053 COMPREHEN METABOLIC PANEL: CPT | Performed by: INTERNAL MEDICINE

## 2018-11-05 ENCOUNTER — OFFICE VISIT (OUTPATIENT)
Dept: ORTHOPEDIC SURGERY | Facility: CLINIC | Age: 73
End: 2018-11-05

## 2018-11-05 DIAGNOSIS — G60.9 IDIOPATHIC PERIPHERAL NEUROPATHY: ICD-10-CM

## 2018-11-05 DIAGNOSIS — M86.372 CHRONIC MULTIFOCAL OSTEOMYELITIS OF LEFT FOOT (HCC): Primary | ICD-10-CM

## 2018-11-05 PROCEDURE — 99024 POSTOP FOLLOW-UP VISIT: CPT | Performed by: ORTHOPAEDIC SURGERY

## 2018-11-05 RX ORDER — NEOMYCIN SULFATE, POLYMYXIN B SULFATE AND DEXAMETHASONE 3.5; 10000; 1 MG/ML; [USP'U]/ML; MG/ML
SUSPENSION/ DROPS OPHTHALMIC
COMMUNITY
Start: 2018-08-02 | End: 2019-12-09

## 2018-11-05 RX ORDER — ATORVASTATIN CALCIUM 80 MG/1
80 TABLET, FILM COATED ORAL NIGHTLY
Status: ON HOLD | COMMUNITY
Start: 2018-08-10 | End: 2020-12-10 | Stop reason: SDUPTHER

## 2018-11-05 RX ORDER — EPINEPHRINE 0.3 MG/.3ML
INJECTION SUBCUTANEOUS AS NEEDED
COMMUNITY
Start: 2018-10-22 | End: 2020-01-06

## 2018-11-05 RX ORDER — LISINOPRIL AND HYDROCHLOROTHIAZIDE 20; 12.5 MG/1; MG/1
1 TABLET ORAL DAILY
COMMUNITY
Start: 2018-10-04

## 2018-11-05 RX ORDER — SODIUM CHLORIDE 9 MG/ML
INJECTION, SOLUTION INTRAVENOUS
COMMUNITY
Start: 2018-10-22 | End: 2019-12-09

## 2018-11-05 NOTE — PROGRESS NOTES
Post-op (13 days s/p INCISION & DRAINAGE LEFT FOOT; PARTIAL RESECTION 1ST METATARSAL; EXCISION OF SESAMOIDS LEFT FOOT - 10/23/18)      Carlos Campa is 2 weeks status post extensive I&D left foot with excision of bone, 10/23/18. He reports no fever, chills.  He reports pain is well controlled.  They have been taking lovenox for DVT prophylaxis.  They have been non weight bearing.      Past Surgical History:   Procedure Laterality Date   • APPENDECTOMY     • INCISION AND DRAINAGE LEG Left 10/23/2018    Procedure: INCISION AND DRAINAGE LEFT FOOT;  Surgeon: Idania Osborn MD;  Location:  [facility];  Service: Orthopedics   • TOE AMPUTATION     • TONSILLECTOMY     • VENOUS ACCESS DEVICE (PORT) INSERTION N/A 10/23/2018    Procedure: GROSHONG CATHETER PLACEMENT;  Surgeon: Marquez Simons MD;  Location:  [facility];  Service: General       There were no vitals taken for this visit.       large left foot wound looks better than I expected, there is a fair bit of granulation tissue, no new necrosis, no erythema, much less swelling than in the hospital.  The posterior heel ulcer is also improved, it's about the size of a dime, there is no erythema    none    Assessment and Plan:   1. Chronic multifocal osteomyelitis of left foot (CMS/HCC)  The wound looks better than I expected, the wound VAC is helping.  He needs to remain absolutely nonweightbearing, he is at high risk for below-knee amputation, continue the wound VAC.  We put a wet to dry dressing on so he could get home and home health can apply the wound VAC.  I will see him in 3-4 weeks, strict nonweightbearing is incredibly important he is to return sooner if anything gets worse.  This will take many months to heal- if it will heal.  Nonweightbearing x-ray of the foot next visit  2. Idiopathic peripheral neuropathy  Dense neuropathy, he did have a slightly elevated hemoglobin A1c in the hospital

## 2018-11-07 ENCOUNTER — TELEPHONE (OUTPATIENT)
Dept: ORTHOPEDIC SURGERY | Facility: CLINIC | Age: 73
End: 2018-11-07

## 2018-11-09 ENCOUNTER — APPOINTMENT (OUTPATIENT)
Dept: GENERAL RADIOLOGY | Facility: HOSPITAL | Age: 73
End: 2018-11-09

## 2018-11-09 ENCOUNTER — READMISSION MANAGEMENT (OUTPATIENT)
Dept: CALL CENTER | Facility: HOSPITAL | Age: 73
End: 2018-11-09

## 2018-11-09 ENCOUNTER — HOSPITAL ENCOUNTER (EMERGENCY)
Facility: HOSPITAL | Age: 73
Discharge: HOME | End: 2018-11-09
Attending: EMERGENCY MEDICINE | Admitting: EMERGENCY MEDICINE

## 2018-11-09 VITALS
BODY MASS INDEX: 29.8 KG/M2 | DIASTOLIC BLOOD PRESSURE: 72 MMHG | WEIGHT: 220 LBS | HEART RATE: 65 BPM | RESPIRATION RATE: 16 BRPM | SYSTOLIC BLOOD PRESSURE: 165 MMHG | HEIGHT: 72 IN | OXYGEN SATURATION: 99 % | TEMPERATURE: 98.1 F

## 2018-11-09 DIAGNOSIS — T82.598A MECHANICAL COMPLICATION OF VENOUS CATHETER, INITIAL ENCOUNTER (HCC): Primary | ICD-10-CM

## 2018-11-09 PROCEDURE — 99283 EMERGENCY DEPT VISIT LOW MDM: CPT

## 2018-11-09 PROCEDURE — 71045 X-RAY EXAM CHEST 1 VIEW: CPT

## 2018-11-09 NOTE — OUTREACH NOTE
"Medical Week 2 Survey      Responses   Facility patient discharged from?  [place]   Does the patient have one of the following disease processes/diagnoses(primary or secondary)?  Other   Week 2 attempt successful?  Yes   Call start time  1213   Discharge diagnosis  Left Foot Cellulitis and A-fib   Call end time  1218   List who call center can speak with  Emilie, Wife   Person spoke with today (if not patient) and relationship  wife and Pt   Meds reviewed with patient/caregiver?  Yes   Is the patient taking all medications as directed (includes completed medication regime)?  Yes   Has the patient kept scheduled appointments due by today?  Yes   What is the Home health agency?   [facility]   Home health comments  HH still coming   What DME was ordered?  Wound Vac and IV antibiotic   What is the patient's perception of their health status since discharge?  Improving   Additional teach back comments  Pt says \"everything is going smooth\". He uses scooter to get around. no pain. Still has wound vac on.   Week 2 Call Completed?  Yes          Carol Reaves RN  "

## 2018-11-09 NOTE — ED PROVIDER NOTES
Subjective   Carlos Campa is a 73 y.o.male who presents to the ED because of a complication with his port. Mr. Campa is a patient of Dr. Young and recently had a Goshon port in place in his left chest to treat a left foot wound. He receives IV Zosyn at 0600, 1400, and 2200 every day. Today, he was in the bathroom and bent over and caught his catheter on something. He accidentally ripped part of his catheter from his left chest. He clamped it shut shortly after doing this. He talked to a home health nurse and Dr. Young advised him to go to the ED and seek hospital admission. He is on Xarelto. There are no other acute complaints at this time.         History provided by:  Patient  Illness   Location:  Left chest  Quality:  Goshon port pulled out   Severity:  Mild  Onset quality:  Sudden  Duration: Today.  Timing:  Constant  Progression:  Unchanged  Chronicity:  New  Context:  Caught port on something and ripped it out  Relieved by:  None tried  Worsened by:  Nothing  Ineffective treatments:  None tried.       Review of Systems   Musculoskeletal:        Broken catheter.    All other systems reviewed and are negative.      Past Medical History:   Diagnosis Date   • Acute kidney failure (CMS/HCC)    • Hyperlipidemia    • Hypertension    • Renal disorder        Allergies   Allergen Reactions   • Sulfa Antibiotics Unknown (See Comments)     Pt was told as child he had an allergy.  Has not taken and doesn't know the response       Past Surgical History:   Procedure Laterality Date   • APPENDECTOMY     • INCISION AND DRAINAGE LEG Left 10/23/2018    Procedure: INCISION AND DRAINAGE LEFT FOOT;  Surgeon: Idania Osborn MD;  Location:  [facility];  Service: Orthopedics   • TOE AMPUTATION     • TONSILLECTOMY     • VENOUS ACCESS DEVICE (PORT) INSERTION N/A 10/23/2018    Procedure: GROSHONG CATHETER PLACEMENT;  Surgeon: Marquez Simons MD;  Location:  [facility];  Service: General       History reviewed. No  pertinent family history.    Social History     Social History   • Marital status:      Social History Main Topics   • Smoking status: Never Smoker   • Alcohol use Yes      Comment: drinks a fifth a week   • Drug use: No     Other Topics Concern   • Not on file         Objective   Physical Exam   Constitutional: He is oriented to person, place, and time. He appears well-developed and well-nourished.   HENT:   Head: Normocephalic and atraumatic.   Eyes: Conjunctivae are normal. No scleral icterus.   Neck: Neck supple.   Cardiovascular: Normal rate.    Pulmonary/Chest: Effort normal.   Musculoskeletal: Normal range of motion.   Neurological: He is alert and oriented to person, place, and time.   Skin: Skin is warm and dry.   Left upper chest Groshong site appears intact, sutures are intact and do not appear stretched.  Catheter coming out is fractured at the end, several inches from the chest.   Psychiatric: He has a normal mood and affect. His behavior is normal. Thought content normal.   Nursing note and vitals reviewed.      Procedures         ED Course     I spoke with Dr Bains, who came in and repaired the existing Groshong catheter using supplies from a new kit.  CXR confirms line not dislodged internally.  Pt and family happy.                MDM  Number of Diagnoses or Management Options  Mechanical complication of venous catheter, initial encounter (CMS/[facility]):      Amount and/or Complexity of Data Reviewed  Discuss the patient with other providers: yes        Final diagnoses:   Mechanical complication of venous catheter, initial encounter (CMS/[facility])       Documentation assistance provided by brian Real.  Information recorded by the brian was done at my direction and has been verified and validated by me.     Neto Real  11/09/18 1929       Neto Real  11/09/18 1930       Surjit Kaplan MD  11/09/18 1957

## 2018-11-10 NOTE — PROGRESS NOTES
"Patient Name:  Carlos Campa  YOB: 1945  [id number]    Surgery Progress Note    Date of visit: 11/9/2018    Subjective   Subjective: Mr. Paniagua returns to the ER today after his recent discharge from the hospital.  He has a Groshong in the left subclavian vein for IV antibiotics administration.  He had it caught on something at home and fractured the tube.  He was able to apply a clamp to the remaining portion of the skin and presented to the emergency department.  He otherwise is feeling well.  No acute complaints.         Objective     Objective:     BP (!) 183/79 (BP Location: Left arm, Patient Position: Sitting)   Pulse 64   Temp 98.1 °F (36.7 °C) (Oral)   Resp 18   Ht 182.9 cm (72\")   Wt 99.8 kg (220 lb)   SpO2 99%   BMI 29.84 kg/m²   No intake or output data in the 24 hours ending 11/09/18 1908    CV:  Rhythm  regular and rate regular   L:  Clear  to auscultation bilaterally. Groshong still sutured to the skin, it is fractured approximately 15 cm from the skin entrance.  Abd:  Bowel sounds positive , soft,   Ext:  No cyanosis, clubbing, edema    Labs that are back at this time have been reviewed.  Chest x-ray obtained the ER was personally reviewed by me.  The Groshong catheter tips appropriately positioned in the superior vena cava near the atrium.       Assessment/Plan     Assessment/ Plan:    Fractured Groshong catheter - with the tip appropriately positioned, and an adequate length of Groshong catheter remaining, we will repair the Groshong catheter at the bedside today.          Antonio Bains MD  11/9/2018  7:08 PM      "

## 2018-11-10 NOTE — OP NOTE
OPERATIVE NOTE    Patient Name:  Carlos Campa  YOB: 1945  [id number]    Date of Surgery:  11/9/2018      PREOPERATIVE DIAGNOSIS: Osteomyelitis      POSTOPERATIVE DIAGNOSIS: Same        PROCEDURE PERFORMED: Repair of Groshong catheter       SURGEON: Antonio Bains MD       ASSISTANT: None        SPECIMENS: None        ANESTHESIA: None       FINDINGS:   1.  Previously fractured Groshong catheter revised and repaired at the bedside       INDICATIONS: The patient is a 73 y.o. male who presented with fracture of his Groshong catheter.  He presents for repair.  The risks and benefits were discussed length the patient and his family, including the possible risk of infection, and he agreed to proceed.       DESCRIPTION OF PROCEDURE:      After obtaining verbal consent, the patient remained in the ED bay.  He was placed in supine position.  The left anterior chest as well as the remaining Groshong catheter were prepped and draped in standard sterile fashion.  An appropriate transection site proximal to the fracture was chosen on the Groshong catheter, approximately 10 cm distal to its entrance into the skin.  It was transected at this location.  Using a new Groshong kit, the access port was connected to the catheter in standard fashion.  It mary lou blood easily, and was flushed with sterile saline.  It was then dressed in standard sterile fashion.  There were no immediate complications, and all sponge and needle counts were correct ×2 at the completion of the procedure.          Antonio Bains MD  11/9/2018  7:10 PM      Please note that portions of this note were completed with a voice recognition program. Efforts were made to edit the dictations, but occasionally words are mistranscribed.

## 2018-11-16 ENCOUNTER — READMISSION MANAGEMENT (OUTPATIENT)
Dept: CALL CENTER | Facility: HOSPITAL | Age: 73
End: 2018-11-16

## 2018-11-16 NOTE — OUTREACH NOTE
Medical Week 3 Survey      Responses   Facility patient discharged from?  [facility]   Does the patient have one of the following disease processes/diagnoses(primary or secondary)?  Other   Week 3 attempt successful?  Yes   Call start time  1206   Call end time  1208   Person spoke with today (if not patient) and relationship  wife and Pt   Meds reviewed with patient/caregiver?  Yes   Does the patient have all medications ordered at discharge?  Yes   Is the patient taking all medications as directed (includes completed medication regime)?  Yes   Has the patient kept scheduled appointments due by today?  Yes   Psychosocial issues?  No   What is the patient's perception of their health status since discharge?  Improving   Week 3 Call Completed?  Yes          Kaylen Scott RN

## 2018-11-21 ENCOUNTER — TELEPHONE (OUTPATIENT)
Dept: ORTHOPEDIC SURGERY | Facility: CLINIC | Age: 73
End: 2018-11-21

## 2018-11-21 NOTE — TELEPHONE ENCOUNTER
I called Megan and told her this, She said she thinks he still has a little ways to go with the wound vac so she will keep watching it and if it needs to be removed before the 3rd she will do the wet to dry dressings from that point on.  Fiorella

## 2018-11-21 NOTE — TELEPHONE ENCOUNTER
Dr. Osborn  I called and spoke with Megan, she said granulation is coming along well, he is not scheduled to see you until 12/3/18 which is 2 weeks away. it is 6 cm long by 3 wide and is filling in nicely but she wanted to know if you wanted him to come in before the 3rd, she wasn't for sure if it still needed to be on that long. Please advise thank you!  Fiorella

## 2018-11-26 ENCOUNTER — TELEPHONE (OUTPATIENT)
Dept: ORTHOPEDIC SURGERY | Facility: CLINIC | Age: 73
End: 2018-11-26

## 2018-11-26 NOTE — TELEPHONE ENCOUNTER
Megan from  home care is calling to let Dr. Osborn know that she found a new blister on his L 4th toe on the posterior side. It is opened and draining slight amounts of fluid. She covered it with a foam pad to catch the drainage. She will check it again on Wednesday this week.  She can be reached at [phone].

## 2018-11-27 NOTE — TELEPHONE ENCOUNTER
Called Megan back and let her know that Dr Osborn was fine with her treatment of this at this time. I also told her to make sure to let us know if anything progresses.     Silvia

## 2018-12-03 ENCOUNTER — OFFICE VISIT (OUTPATIENT)
Dept: ORTHOPEDIC SURGERY | Facility: CLINIC | Age: 73
End: 2018-12-03

## 2018-12-03 DIAGNOSIS — Z47.89 AFTERCARE FOLLOWING SURGERY OF THE MUSCULOSKELETAL SYSTEM: Primary | ICD-10-CM

## 2018-12-03 DIAGNOSIS — G62.9 NEUROPATHY: ICD-10-CM

## 2018-12-03 DIAGNOSIS — M86.372 CHRONIC MULTIFOCAL OSTEOMYELITIS OF LEFT FOOT (HCC): ICD-10-CM

## 2018-12-03 PROCEDURE — 99024 POSTOP FOLLOW-UP VISIT: CPT | Performed by: ORTHOPAEDIC SURGERY

## 2018-12-03 NOTE — PROGRESS NOTES
Post-op Follow-up (4 week f/u; 6 weeks s/p INCISION & DRAINAGE LEFT FOOT; PARTIAL RESECTION 1ST METATARSAL; EXCISION OF SESAMOIDS LEFT FOOT - 10/23/18)      Carlos Campa is 6 weeks status post extensive I&D left foot, resect partial first metatarsal excision of sesamoids, 10/23/18. He reports no fever, chills.  He reports pain is well controlled.  They have been taking aspirin for DVT prophylaxis.  They have been non weight bearing.      Past Surgical History:   Procedure Laterality Date   • APPENDECTOMY     • TOE AMPUTATION     • TONSILLECTOMY         There were no vitals taken for this visit.     Left foot wound remains quite large, but it is much more shallow, the granulation tissue has come up to the level of the skin, no exposed bone.  His home health nurse had called about an ulcer on the fourth toe, it turns out it was some dried skin, it looks like it's healed there.  No significant erythema around the large medial left foot wound.  The areas that had suture are healed.      ordered and reviewed x-rays today    Assessment and Plan:   1.  Chronic multifocal osteomyelitis of left foot (CMS/HCC)  The wound is improving steadily, again better than I expected.  He asked me if he could walk on it about 5% of the day.  He has an elevated shoe at home.  I very strongly recommended that he not do that.  I explained that one single step sets healing back 24 hours.  Any pressure on the wound kills tissue, and will significantly increase the likelihood that he will have a below-knee amputation.  Now that the wound is shallow, we can discontinue the wound VAC and go to twice daily wet-to-dry dressings.  I will see him in 3-4 weeks or sooner with any problems.  - XR Foot 2 View Left    2. Neuropathy  Very dense neuropathy

## 2018-12-04 ENCOUNTER — TELEPHONE (OUTPATIENT)
Dept: ORTHOPEDIC SURGERY | Facility: CLINIC | Age: 73
End: 2018-12-04

## 2018-12-04 LAB
FUNGUS WND CULT: NORMAL
MYCOBACTERIUM SPEC CULT: NORMAL
NIGHT BLUE STAIN TISS: NORMAL

## 2018-12-04 NOTE — TELEPHONE ENCOUNTER
IZZY FROM  HOME CARE CALLED TO LET DR MCCARTNEY KNOW THAT THEY PLAN ON DISCHARGING HIM NEXT WEEK. HE IS ABLE TO DO WET TO DRY 2X A DAY FOR HIS WOUND CARE AT THIS TIME. HE IS NOT HOME BOUND - GOING OUT WITH FRIENDS, BUT NOT PUTTING WEIGHT ON HIS FOOT AT THIS TIME.     FOR ANY QUESTIONS, PLEASE CALL [phone].

## 2018-12-10 ENCOUNTER — TELEPHONE (OUTPATIENT)
Dept: ORTHOPEDIC SURGERY | Facility: CLINIC | Age: 73
End: 2018-12-10

## 2018-12-31 ENCOUNTER — OFFICE VISIT (OUTPATIENT)
Dept: ORTHOPEDIC SURGERY | Facility: CLINIC | Age: 73
End: 2018-12-31

## 2018-12-31 DIAGNOSIS — M86.372 CHRONIC MULTIFOCAL OSTEOMYELITIS OF LEFT FOOT (HCC): Primary | ICD-10-CM

## 2018-12-31 DIAGNOSIS — G62.9 NEUROPATHY: ICD-10-CM

## 2018-12-31 PROCEDURE — 99024 POSTOP FOLLOW-UP VISIT: CPT | Performed by: ORTHOPAEDIC SURGERY

## 2018-12-31 NOTE — PROGRESS NOTES
"Post-op (4 weeks- s/p INCISION & DRAINAGE LEFT FOOT; PARTIAL RESECTION 1ST METATARSAL; EXCISION OF SESAMOIDS LEFT FOOT - 10/23/18)      Carlos Campa is 9 weeks status post I&D , partial resect left 1st metatarsal, 10/23/18. He reports no fever, chills.  He reports pain is well controlled.  They have been taking aspirin for DVT prophylaxis.  They have been non weight bearing.      Past Surgical History:   Procedure Laterality Date   • APPENDECTOMY     • INCISION AND DRAINAGE LEG Left 10/23/2018    Procedure: INCISION AND DRAINAGE LEFT FOOT;  Surgeon: Idania Osborn MD;  Location:  [facility];  Service: Orthopedics   • TOE AMPUTATION     • TONSILLECTOMY     • VENOUS ACCESS DEVICE (PORT) INSERTION N/A 10/23/2018    Procedure: GROSHONG CATHETER PLACEMENT;  Surgeon: Marquez Simons MD;  Location:  [facility];  Service: General       There were no vitals taken for this visit.        No erythema, no drainage, no sign of infection, normal post op swelling. No change in dense neuropathy                none    Assessment and Plan:   1. Chronic multifocal osteomyelitis of left foot (CMS/HCC)  Slowly improving, actually doing better than I expected.  He has been compliant.  We discussed neuropathic foot care- he is wearing a thin, non-supportive loafer on the other foot, he should wear an athletic shoe or \"diabetic\" shoe.  The left foot will need a custom orthotic and diabetic or athletic shoe when it heals.  I removed some of the loose eschar.  I will see him in 4 weeks, continue wet to dry and STRICT NON-WT BEARING    2. Neuropathy  No change          "

## 2019-01-30 ENCOUNTER — OFFICE VISIT (OUTPATIENT)
Dept: ORTHOPEDIC SURGERY | Facility: CLINIC | Age: 74
End: 2019-01-30

## 2019-01-30 VITALS — HEART RATE: 60 BPM | OXYGEN SATURATION: 100 % | HEIGHT: 72 IN | WEIGHT: 220 LBS | BODY MASS INDEX: 29.8 KG/M2

## 2019-01-30 DIAGNOSIS — Z47.89 AFTERCARE FOLLOWING SURGERY OF THE MUSCULOSKELETAL SYSTEM: ICD-10-CM

## 2019-01-30 DIAGNOSIS — G62.9 NEUROPATHY: Primary | ICD-10-CM

## 2019-01-30 PROCEDURE — 99212 OFFICE O/P EST SF 10 MIN: CPT | Performed by: ORTHOPAEDIC SURGERY

## 2019-01-30 NOTE — PATIENT INSTRUCTIONS
"Education     Neuropathic Foot Care    SKIN  · Remove shoes and socks and look at the feet every morning and night.         · Blisters - clean it with peroxide or alcohol.  DO NOT put a shoe back on until it is healed.  DO NOT WALK ON THE FOOT.  If it is red or looks infected, call your doctor.    · Callus - sand calluses daily, it works better on dry skin.  A PED EGG is the best tool, or file or a pumice stone (available at [facility]) using a back and forth motion over the callus.    · Ingrown nail - soak it in soapy water and call your doctor.    · Ulcers or sores on the foot - DO NOT PUT A SHOE ON, DO NOT WALK ON THE FOOT, go to the doctor who looks at your feet.    · Moisturize the feet every night.  An easy method: The foot with a thin layer of Vaseline or Bag Mitchell (you can find this at [employer], any feed store) and then a sock    NAILS  · Trim or file your nails straight across and leave them a little long.  If you have thick fungal nails, a nail  tool or dremel tool works well.    · If you have an ingrown nail with reddened skin, soak it (as mentioned above and call your doctor).    SHOES  · Keep your shoes in good repair and wear any special inserts or diabetic shoes as prescribed.    · If you have a problem with a special diabetic shoe and/or insert, such as rubbing, go back to where you got the diabetic shoe/insert as soon as possible.    · If you are in \"regular\" shoes, make sure they fit.  Shoes with soft, padded soles, rounded toes, and good support are best.    THINGS NOT TO DO  · DO NOT go barefoot    · DO NOT soak feet in very hot water.    · DO NOT soak feet in anything other than water with soap or Epsom Salts (NO bleach, turpentine, gasoline, etc.).          "

## 2019-01-30 NOTE — PROGRESS NOTES
ESTABLISHED PATIENT    Patient: Carlos Campa  : 1945    Primary Care Provider: Marcus Cheng MD    Requesting Provider: As above    Follow-up (4 week - s/p INCISION & DRAINAGE LEFT FOOT; PARTIAL RESECTION 1ST METATARSAL; EXCISION OF SESAMOIDS LEFT FOOT - 10/23/18)      History    Chief Complaint: Neuropathic foot ulcer    History of Present Illness: He is now 12 weeks status post irrigation debridement with resection first metatarsal on sesamoids left foot for severe infection, 10/23/18.  He is doing wet to dry dressings on the residual ulcer.  It is healing slowly.  No signs of infection.  He reports he has been good about being nonweightbearing.  He is anxious for this to heal so he can get back to doing the things he wants to do.    Current Outpatient Medications on File Prior to Visit   Medication Sig Dispense Refill   • aspirin 81 MG EC tablet Take 81 mg by mouth Daily.     • atorvastatin (LIPITOR) 80 MG tablet Take 80 mg by mouth.     • EPINEPHrine (EPIPEN) 0.3 MG/0.3ML solution auto-injector injection      • lisinopril-hydrochlorothiazide (PRINZIDE,ZESTORETIC) 20-12.5 MG per tablet Take 1 tablet by mouth.     • metoprolol tartrate (LOPRESSOR) 25 MG tablet Take 1 tablet by mouth Every 12 (Twelve) Hours. 60 tablet 11   • multivitamin (THERAGRAN) tablet tablet Take 1 tablet by mouth Daily. 90 tablet 3   • mupirocin (BACTROBAN) 2 % cream Apply to left heel ulcer once per day with telfa, gauze and ace - and whenever wound vac is changed 30 g 11   • neomycin-polymyxin-dexamethasone (MAXITROL) 3.5-86997-2.1 ophthalmic suspension      • rivaroxaban (XARELTO) 20 MG tablet Take 1 tablet by mouth Daily With Dinner. 30 tablet 11   • sodium chloride 0.9 % solution        No current facility-administered medications on file prior to visit.       Allergies   Allergen Reactions   • Sulfa Antibiotics Unknown (See Comments)     Pt was told as child he had an allergy.  Has not taken and doesn't know the  response      Past Medical History:   Diagnosis Date   • Acute kidney failure (CMS/HCC)    • Hyperlipidemia    • Hypertension    • Renal disorder      Past Surgical History:   Procedure Laterality Date   • APPENDECTOMY     • INCISION AND DRAINAGE LEG Left 10/23/2018    Procedure: INCISION AND DRAINAGE LEFT FOOT;  Surgeon: Idania Osborn MD;  Location: [facility];  Service: Orthopedics   • TOE AMPUTATION     • TONSILLECTOMY     • VENOUS ACCESS DEVICE (PORT) INSERTION N/A 10/23/2018    Procedure: GROSHONG CATHETER PLACEMENT;  Surgeon: Marquez Smions MD;  Location: [facility] OR;  Service: General     History reviewed. No pertinent family history.   Social History     Socioeconomic History   • Marital status:      Spouse name: Not on file   • Number of children: Not on file   • Years of education: Not on file   • Highest education level: Not on file   Social Needs   • Financial resource strain: Not on file   • Food insecurity - worry: Not on file   • Food insecurity - inability: Not on file   • Transportation needs - medical: Not on file   • Transportation needs - non-medical: Not on file   Occupational History   • Not on file   Tobacco Use   • Smoking status: Never Smoker   Substance and Sexual Activity   • Alcohol use: Yes     Comment: drinks a fifth a week   • Drug use: No   • Sexual activity: Not on file   Other Topics Concern   • Not on file   Social History Narrative   • Not on file        Review of Systems   Constitutional: Negative.    HENT: Negative.    Eyes: Negative.    Respiratory: Negative.    Cardiovascular: Negative.    Gastrointestinal: Negative.    Endocrine: Negative.    Genitourinary: Negative.    Musculoskeletal: Positive for arthralgias.   Skin: Negative.    Allergic/Immunologic: Negative.    Neurological: Positive for numbness (feet).   Hematological: Negative.    Psychiatric/Behavioral: Negative.        The following portions of the patient's history were reviewed and updated as  "appropriate: allergies, current medications, past family history, past medical history, past social history, past surgical history and problem list.    Physical Exam:   Pulse 60   Ht 182.9 cm (72.01\")   Wt 99.8 kg (220 lb)   SpO2 100%   BMI 29.83 kg/m²   GENERAL: Body habitus: overweight    Lower extremity edema: Left: none; Right: none    Gait: Using a scooter     Mental Status:  awake and alert; oriented to person, place, and time  MSK:          Foot: Left:  Continuing to heal, slowly but making progress.  No signs of infection. it is an irregular 3 cm long by half centimeter wide area of granulation tissue on the medial aspect of the residual foot            NEURO Sensation:  globally diminished,       Medical Decision Making    Data Review:   none    Assessment/Plan/Diagnosis/Treatment Options:   1. Aftercare following surgery of the musculoskeletal system  He is continuing to heal, although slowly.  I encouraged him to continue to be compliant with nonweightbearing, and reminded him that one step sets healing back 24 hours.  We also discussed what he will and will not be able to do if and when this heals.  I explained that he is going to need a diabetic shoe with custom insert for ever.  He was quite disappointed with this, he likes to wear \"nice shoes\", but I advised him that if he does not wear a shoe with custom insert, I can guarantee that he will get another wound problem and end up with an amputation below the knee.  The foot is absolutely not an over-the-counter foot, it cannot go in an over-the-counter shoe.  He also is a farmer, and wants to wear boots.  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  He is going to have to be absolutely diligent about protecting this foot, in order to avoid a below-knee amputation.  I explained this to him very frankly.  He is " "going to need to look at the foot several times a day, he is going to need to take care of any calluses, and if he does get an ulcer on the foot, he must be absolutely nonweightbearing.  His neuropathy is dense, he continued to ignore and infection until it was significant gangrene.  He reports that \"I really didn't want to hear this\" but I explained that it's absolutely imperative that he pay attention to what I'm telling him, and that he practice diligent neuropathic foot care.  He is at very high risk to end up with a below-knee amputation.  He will return in 4 weeks to see MsEunice Zacarias, he will continue wet to dry dressings.  If the wound is healed at that time, he may be given a prescription for diabetic shoes and inserts.(Diabetic being used as same as neuropathic) I will then see him in 6-8 weeks after he has obtained the shoes and started to wear them, he is not to simply put them on and wear them all day we discussed this in detail.  He needs to wean into the shoe.    2. Neuropathy  Dense neuropathy high risk for amputation                            "

## 2019-03-01 ENCOUNTER — OFFICE VISIT (OUTPATIENT)
Dept: ORTHOPEDIC SURGERY | Facility: CLINIC | Age: 74
End: 2019-03-01

## 2019-03-01 VITALS — BODY MASS INDEX: 30.28 KG/M2 | HEART RATE: 75 BPM | WEIGHT: 223.55 LBS | OXYGEN SATURATION: 98 % | HEIGHT: 72 IN

## 2019-03-01 DIAGNOSIS — L98.491 NEUROPATHIC ULCER, LIMITED TO BREAKDOWN OF SKIN (HCC): ICD-10-CM

## 2019-03-01 DIAGNOSIS — Z47.89 AFTERCARE FOLLOWING SURGERY OF THE MUSCULOSKELETAL SYSTEM: ICD-10-CM

## 2019-03-01 DIAGNOSIS — G62.9 NEUROPATHY: Primary | ICD-10-CM

## 2019-03-01 PROCEDURE — 99214 OFFICE O/P EST MOD 30 MIN: CPT | Performed by: PHYSICIAN ASSISTANT

## 2019-03-01 RX ORDER — MUPIROCIN CALCIUM 20 MG/G
CREAM TOPICAL
Qty: 30 G | Refills: 11 | Status: SHIPPED | OUTPATIENT
Start: 2019-03-01 | End: 2019-12-09

## 2019-03-01 NOTE — PROGRESS NOTES
[facility] [place] Orthopaedic Surgery Clinic Note    Subjective     Patient: Carlos Campa  : 1945    Primary Care Provider: Marcus Cheng MD    Requesting Provider: As above    Follow-up (4 week f/u INCISION & DRAINAGE LEFT FOOT; PARTIAL RESECTION 1ST METATARSAL; EXCISION OF SESAMOIDS LEFT FOOT - 10/23/18)      History    Chief Complaint: Follow-up I&D left foot    History of Present Illness: This is a very pleasant patient of Dr. Magana, new to me, returning for routine follow-up of I&D of left foot and partial resection of first metatarsal at 5 well as excision of sesamoids in 2018.  Patient was to be nonweightbearing until his return today.  He comes in today in a regular shoe.  He reports he put himself back in a regular shoe approximately 5 days ago because the wound was completely healed.    Current Outpatient Medications on File Prior to Visit   Medication Sig Dispense Refill   • aspirin 81 MG EC tablet Take 81 mg by mouth Daily.     • atorvastatin (LIPITOR) 80 MG tablet Take 80 mg by mouth.     • EPINEPHrine (EPIPEN) 0.3 MG/0.3ML solution auto-injector injection      • lisinopril-hydrochlorothiazide (PRINZIDE,ZESTORETIC) 20-12.5 MG per tablet Take 1 tablet by mouth.     • metoprolol tartrate (LOPRESSOR) 25 MG tablet Take 1 tablet by mouth Every 12 (Twelve) Hours. 60 tablet 11   • multivitamin (THERAGRAN) tablet tablet Take 1 tablet by mouth Daily. 90 tablet 3   • neomycin-polymyxin-dexamethasone (MAXITROL) 3.5-31620-9.1 ophthalmic suspension      • rivaroxaban (XARELTO) 20 MG tablet Take 1 tablet by mouth Daily With Dinner. 30 tablet 11   • sodium chloride 0.9 % solution      • [DISCONTINUED] mupirocin (BACTROBAN) 2 % cream Apply to left heel ulcer once per day with telfa, gauze and ace - and whenever wound vac is changed 30 g 11     No current facility-administered medications on file prior to visit.       Allergies   Allergen Reactions   • Sulfa Antibiotics Unknown (See  Comments)     Pt was told as child he had an allergy.  Has not taken and doesn't know the response      Past Medical History:   Diagnosis Date   • Acute kidney failure (CMS/HCC)    • Hyperlipidemia    • Hypertension    • Renal disorder      Past Surgical History:   Procedure Laterality Date   • APPENDECTOMY     • INCISION AND DRAINAGE LEG Left 10/23/2018    Procedure: INCISION AND DRAINAGE LEFT FOOT;  Surgeon: Idania Osborn MD;  Location:  [facility] OR;  Service: Orthopedics   • TOE AMPUTATION     • TONSILLECTOMY     • VENOUS ACCESS DEVICE (PORT) INSERTION N/A 10/23/2018    Procedure: GROSHONG CATHETER PLACEMENT;  Surgeon: Marquez Simons MD;  Location:  [facility] OR;  Service: General     History reviewed. No pertinent family history.   Social History     Socioeconomic History   • Marital status:      Spouse name: Not on file   • Number of children: Not on file   • Years of education: Not on file   • Highest education level: Not on file   Social Needs   • Financial resource strain: Not on file   • Food insecurity - worry: Not on file   • Food insecurity - inability: Not on file   • Transportation needs - medical: Not on file   • Transportation needs - non-medical: Not on file   Occupational History   • Not on file   Tobacco Use   • Smoking status: Never Smoker   Substance and Sexual Activity   • Alcohol use: Yes     Comment: drinks a fifth a week   • Drug use: No   • Sexual activity: Not on file   Other Topics Concern   • Not on file   Social History Narrative   • Not on file        Review of Systems   Constitutional: Negative.    HENT: Negative.    Eyes: Negative.    Respiratory: Negative.    Cardiovascular: Negative.    Gastrointestinal: Negative.    Endocrine: Negative.    Genitourinary: Negative.    Musculoskeletal: Positive for joint swelling.   Skin: Negative.    Allergic/Immunologic: Negative.    Neurological: Negative.    Hematological: Negative.    Psychiatric/Behavioral: Negative.        The  "following portions of the patient's history were reviewed and updated as appropriate: allergies, current medications, past family history, past medical history, past social history, past surgical history and problem list.      Objective      Physical Exam  Pulse 75   Ht 182.9 cm (72.01\")   Wt 101 kg (223 lb 8.7 oz)   SpO2 98%   BMI 30.31 kg/m²     Body mass index is 30.31 kg/m².    Patient is well developed, well nourished and in no acute distress.  Alert and oriented x 3.    Ortho Exam  Left foot exam:  Upon return, patient has thick callused tissue over the medial wound.  Using a scalpel, I removed the dead skin and unroofed an ulcer beneath.  It is superficial measuring approximately 4 cm x 1 cm.  No purulence no erythema no other ulcers or pre-ulcerative lesions on the foot          Medical Decision Making    Data Review:   none    Assessment:  1. Neuropathy    2. Aftercare following surgery of the musculoskeletal system    3. Neuropathic ulcer, limited to breakdown of skin (CMS/[facility])        Plan:  Left neuropathic ulcer.  Unfortunately, the patient put himself back in his shoe proximally 5 days ago before returning to the office.  After unroofing the thickened skin over the wound, there is a superficial ulcer beneath.  I again reviewed with the patient that he needs to be completely nonweightbearing and use his knee walker.  We again discussed that every step adds 24-hour to the healing process.  He is at risk for amputation if this does not heal.  I have given him a prescription for Bactroban cream he should do daily dressing changes.  I will see him back in 3 weeks with repeat exam or sooner if needed.    Using a scalpel and pickups, I used sterile technique to extensively debride the ulcer and callus        Henrietta Zacarias PA-C  03/01/19  2:05 PM    "

## 2019-03-22 ENCOUNTER — OFFICE VISIT (OUTPATIENT)
Dept: ORTHOPEDIC SURGERY | Facility: CLINIC | Age: 74
End: 2019-03-22

## 2019-03-22 VITALS — HEIGHT: 72 IN | BODY MASS INDEX: 30.16 KG/M2 | OXYGEN SATURATION: 98 % | WEIGHT: 222.66 LBS | HEART RATE: 76 BPM

## 2019-03-22 DIAGNOSIS — L98.491 NEUROPATHIC ULCER, LIMITED TO BREAKDOWN OF SKIN (HCC): Primary | ICD-10-CM

## 2019-03-22 DIAGNOSIS — G62.9 NEUROPATHY: ICD-10-CM

## 2019-03-22 DIAGNOSIS — Z47.89 AFTERCARE FOLLOWING SURGERY OF THE MUSCULOSKELETAL SYSTEM: ICD-10-CM

## 2019-03-22 PROCEDURE — 99212 OFFICE O/P EST SF 10 MIN: CPT | Performed by: PHYSICIAN ASSISTANT

## 2019-03-22 NOTE — PROGRESS NOTES
[facility] [place] [facility] Surgery Clinic Note    Subjective     Patient: Carlos Campa  : 1945    Primary Care Provider: Marcus Cheng MD    Requesting Provider: As above    Follow-up (3 WEEK - /u INCISION & DRAINAGE LEFT FOOT; PARTIAL RESECTION 1ST METATARSAL; EXCISION OF SESAMOIDS LEFT FOOT - 10/23/18)      History  Chief Complaint. Left foot    History of Present Illness: Patient returns for his left foot.  When I saw him last 3/1/19 I removed thick callus revealing a superficial ulcer underneath.  I instructed him to remain nonweightbearing until he returns to see me.  He returns today in a regular shoe.  No new symptoms.    Current Outpatient Medications on File Prior to Visit   Medication Sig Dispense Refill   • aspirin 81 MG EC tablet Take 81 mg by mouth Daily.     • atorvastatin (LIPITOR) 80 MG tablet Take 80 mg by mouth.     • EPINEPHrine (EPIPEN) 0.3 MG/0.3ML solution auto-injector injection      • lisinopril-hydrochlorothiazide (PRINZIDE,ZESTORETIC) 20-12.5 MG per tablet Take 1 tablet by mouth.     • metoprolol tartrate (LOPRESSOR) 25 MG tablet Take 1 tablet by mouth Every 12 (Twelve) Hours. 60 tablet 11   • multivitamin (THERAGRAN) tablet tablet Take 1 tablet by mouth Daily. 90 tablet 3   • mupirocin (BACTROBAN) 2 % cream Apply to left heel ulcer once per day with telfa, gauze and ace - and whenever wound vac is changed 30 g 11   • neomycin-polymyxin-dexamethasone (MAXITROL) 3.5-52285-4.1 ophthalmic suspension      • rivaroxaban (XARELTO) 20 MG tablet Take 1 tablet by mouth Daily With Dinner. 30 tablet 11   • sodium chloride 0.9 % solution        No current facility-administered medications on file prior to visit.       Allergies   Allergen Reactions   • Sulfa Antibiotics Unknown (See Comments)     Pt was told as child he had an allergy.  Has not taken and doesn't know the response      Past Medical History:   Diagnosis Date   • Acute kidney failure (CMS/HCC)    • Hyperlipidemia    •  "Hypertension    • Renal disorder      Past Surgical History:   Procedure Laterality Date   • APPENDECTOMY     • INCISION AND DRAINAGE LEG Left 10/23/2018    Procedure: INCISION AND DRAINAGE LEFT FOOT;  Surgeon: Idania Osborn MD;  Location: [facility] OR;  Service: Orthopedics   • TOE AMPUTATION     • TONSILLECTOMY     • VENOUS ACCESS DEVICE (PORT) INSERTION N/A 10/23/2018    Procedure: GROSHONG CATHETER PLACEMENT;  Surgeon: Marquez Simons MD;  Location: [facility] OR;  Service: General     History reviewed. No pertinent family history.   Social History     Socioeconomic History   • Marital status:      Spouse name: Not on file   • Number of children: Not on file   • Years of education: Not on file   • Highest education level: Not on file   Tobacco Use   • Smoking status: Never Smoker   Substance and Sexual Activity   • Alcohol use: Yes     Comment: drinks a fifth a week   • Drug use: No        Review of Systems   Constitutional: Negative.    HENT: Negative.    Eyes: Negative.    Respiratory: Negative.    Cardiovascular: Negative.    Gastrointestinal: Negative.    Endocrine: Negative.    Genitourinary: Negative.    Musculoskeletal: Positive for arthralgias.   Skin: Negative.    Allergic/Immunologic: Negative.    Neurological: Negative.    Hematological: Negative.    Psychiatric/Behavioral: Negative.        The following portions of the patient's history were reviewed and updated as appropriate: allergies, current medications, past family history, past medical history, past social history, past surgical history and problem list.      Objective      Physical Exam  Pulse 76   Ht 182.9 cm (72.01\")   Wt 101 kg (222 lb 10.6 oz)   SpO2 98%   BMI 30.19 kg/m²     Body mass index is 30.19 kg/m².    Patient is well developed, well nourished and in no acute distress.  Alert and oriented x 3.    Ortho Exam  Left foot exam:  Wound has thick eschar as it did at prior visit.  No warmth or erythema.  No change in " dense neuropathy.    Medical Decision Making    Data Review:   none    Assessment:  1. Neuropathic ulcer, limited to breakdown of skin (CMS/HCC)    2. Neuropathy    3. Aftercare following surgery of the musculoskeletal system        Plan:  Neuropathic ulcer.  Patient returns today in a regular shoe and I told him to be nonweightbearing until he return to see me.  There is again thick dead skin over the wound.  I did not debride it today.  I have given him a prescription for diabetic shoes and inserts.  I will have him return in the second week of April on a Wednesday or Friday while I am here with Dr. Osborn.  He will return sooner if needed.      Henrietta Zacarias PA-C  03/22/19  10:49 AM

## 2019-04-12 ENCOUNTER — OFFICE VISIT (OUTPATIENT)
Dept: ORTHOPEDIC SURGERY | Facility: CLINIC | Age: 74
End: 2019-04-12

## 2019-04-12 VITALS — BODY MASS INDEX: 30.16 KG/M2 | OXYGEN SATURATION: 99 % | HEART RATE: 76 BPM | WEIGHT: 222.66 LBS | HEIGHT: 72 IN

## 2019-04-12 DIAGNOSIS — L98.491 NEUROPATHIC ULCER, LIMITED TO BREAKDOWN OF SKIN (HCC): Primary | ICD-10-CM

## 2019-04-12 PROCEDURE — 99213 OFFICE O/P EST LOW 20 MIN: CPT | Performed by: PHYSICIAN ASSISTANT

## 2019-04-12 NOTE — PROGRESS NOTES
[place] Orthopaedic Surgery Clinic Note    Subjective     Patient: Carlos Campa  : 1945    Primary Care Provider: Marcus Cheng MD    Requesting Provider: As above    Follow-up (3 WEEK FOLLOW UP - 6 MONTHS STATUS POST INCISION & DRAINAGE LEFT FOOT; PARTIAL RESECTION 1ST METATARSAL; EXCISION OF SESAMOIDS LEFT FOOT - 10/23/18)      History    Chief Complaint: f/up left foot ulcer    History of Present Illness: Patient returns for his left foot ulcer.  He has been wearing a shoe.  He is s/p I&D left foot in 10/18 with LTD.    Current Outpatient Medications on File Prior to Visit   Medication Sig Dispense Refill   • aspirin 81 MG EC tablet Take 81 mg by mouth Daily.     • atorvastatin (LIPITOR) 80 MG tablet Take 80 mg by mouth.     • EPINEPHrine (EPIPEN) 0.3 MG/0.3ML solution auto-injector injection      • lisinopril-hydrochlorothiazide (PRINZIDE,ZESTORETIC) 20-12.5 MG per tablet Take 1 tablet by mouth.     • metoprolol tartrate (LOPRESSOR) 25 MG tablet Take 1 tablet by mouth Every 12 (Twelve) Hours. 60 tablet 11   • multivitamin (THERAGRAN) tablet tablet Take 1 tablet by mouth Daily. 90 tablet 3   • mupirocin (BACTROBAN) 2 % cream Apply to left heel ulcer once per day with telfa, gauze and ace - and whenever wound vac is changed 30 g 11   • neomycin-polymyxin-dexamethasone (MAXITROL) 3.5-35310-2.1 ophthalmic suspension      • rivaroxaban (XARELTO) 20 MG tablet Take 1 tablet by mouth Daily With Dinner. 30 tablet 11   • sodium chloride 0.9 % solution        No current facility-administered medications on file prior to visit.       Allergies   Allergen Reactions   • Sulfa Antibiotics Unknown (See Comments)     Pt was told as child he had an allergy.  Has not taken and doesn't know the response      Past Medical History:   Diagnosis Date   • Acute kidney failure (CMS/HCC)    • Hyperlipidemia    • Hypertension    • Renal disorder      Past Surgical History:   Procedure Laterality Date   •  "APPENDECTOMY     • INCISION AND DRAINAGE LEG Left 10/23/2018    Procedure: INCISION AND DRAINAGE LEFT FOOT;  Surgeon: Idania Osborn MD;  Location: [facility] OR;  Service: Orthopedics   • TOE AMPUTATION     • TONSILLECTOMY     • VENOUS ACCESS DEVICE (PORT) INSERTION N/A 10/23/2018    Procedure: GROSHONG CATHETER PLACEMENT;  Surgeon: Marquez Simons MD;  Location: [facility] OR;  Service: General     History reviewed. No pertinent family history.   Social History     Socioeconomic History   • Marital status:      Spouse name: Not on file   • Number of children: Not on file   • Years of education: Not on file   • Highest education level: Not on file   Tobacco Use   • Smoking status: Never Smoker   Substance and Sexual Activity   • Alcohol use: Yes     Comment: drinks a fifth a week   • Drug use: No        Review of Systems   Constitutional: Negative.    HENT: Negative.    Eyes: Negative.    Respiratory: Negative.    Cardiovascular: Negative.    Gastrointestinal: Negative.    Endocrine: Negative.    Genitourinary: Negative.    Musculoskeletal: Negative.         INCISION & DRAINAGE LEFT FOOT; PARTIAL RESECTION 1ST METATARSAL; EXCISION OF SESAMOIDS LEFT FOOT    Skin: Negative.    Allergic/Immunologic: Negative.    Neurological: Negative.    Hematological: Negative.    Psychiatric/Behavioral: Negative.        The following portions of the patient's history were reviewed and updated as appropriate: allergies, current medications, past family history, past medical history, past social history, past surgical history and problem list.      Objective      Physical Exam  Pulse 76   Ht 182.9 cm (72.01\")   Wt 101 kg (222 lb 10.6 oz)   SpO2 99%   BMI 30.19 kg/m²     Body mass index is 30.19 kg/m².    Patient is well developed, well nourished and in no acute distress.  Alert and oriented x 3.    Ortho Exam  Left foot exam:  Medial aspect of foot with superficial ulcer after sharp debridement of the callus.  No sign " of infection.  No other ulcers or pre-ulcerous lesions.  No change in neuropathy      Medical Decision Making    Data Review:   none    Assessment:  1. Neuropathic ulcer, limited to breakdown of skin (CMS/[facility])        Plan:  Left foot neuropathic ulcer.  Patient has been weightbearing in a shoe.  He has a superificial ulcer under the callus.  The callus was debrided using a scalpel.  We again discussed and emphasized the importance of complete NWB.  If this ulcer continues, he is putting himself at increased risk for amputation.  Plan today is that he must stay NWB with a knee walker.  I have given him a prescription for bactroban ointment as he states the cream is too expensive.  He will return in 3 to 4 weeks or sooner if needed.      Henrietta Zacarias PA-C  04/12/19  11:33 AM

## 2019-05-08 ENCOUNTER — OFFICE VISIT (OUTPATIENT)
Dept: ORTHOPEDIC SURGERY | Facility: CLINIC | Age: 74
End: 2019-05-08

## 2019-05-08 VITALS — BODY MASS INDEX: 30.16 KG/M2 | HEIGHT: 72 IN | WEIGHT: 222.66 LBS | OXYGEN SATURATION: 98 % | HEART RATE: 79 BPM

## 2019-05-08 DIAGNOSIS — M24.575 CONTRACTURE OF JOINT OF FOOT, LEFT: ICD-10-CM

## 2019-05-08 DIAGNOSIS — G62.9 NEUROPATHY: Primary | ICD-10-CM

## 2019-05-08 PROCEDURE — 99212 OFFICE O/P EST SF 10 MIN: CPT | Performed by: ORTHOPAEDIC SURGERY

## 2019-05-08 NOTE — PROGRESS NOTES
ESTABLISHED PATIENT    Patient: Carlos Campa  : 1945    Primary Care Provider: Marcus Cheng MD    Requesting Provider: As above    Follow-up (3.5 WEEK FOLLOW UP - 7 MONTHS STATUS POST INCISION & DRAINAGE LEFT FOOT; PARTIAL RESECTION 1ST METATARSAL; EXCISION OF SESAMOIDS LEFT FOOT - 10/23/18))      History    Chief Complaint: Here for follow-up neuropathic foot status post partial resection, 10/23/2018    History of Present Illness: He returns with the wound finally healed on the left foot.  He has his custom orthotic, it fits well.  He notices that the second toe tends to rub in his shoe, I showed him how to use a silicone sleeve to protect it.  He needs to be very careful with this foot, he has a significant chance of needing a below-knee amputation or further surgery if he gets more breakdown    Current Outpatient Medications on File Prior to Visit   Medication Sig Dispense Refill   • aspirin 81 MG EC tablet Take 81 mg by mouth Daily.     • atorvastatin (LIPITOR) 80 MG tablet Take 80 mg by mouth.     • EPINEPHrine (EPIPEN) 0.3 MG/0.3ML solution auto-injector injection      • lisinopril-hydrochlorothiazide (PRINZIDE,ZESTORETIC) 20-12.5 MG per tablet Take 1 tablet by mouth.     • metoprolol tartrate (LOPRESSOR) 25 MG tablet Take 1 tablet by mouth Every 12 (Twelve) Hours. 60 tablet 11   • multivitamin (THERAGRAN) tablet tablet Take 1 tablet by mouth Daily. 90 tablet 3   • mupirocin (BACTROBAN) 2 % cream Apply to left heel ulcer once per day with telfa, gauze and ace - and whenever wound vac is changed 30 g 11   • mupirocin (BACTROBAN) 2 % ointment Apply  topically to the appropriate area as directed 3 (Three) Times a Day. 30 g 2   • neomycin-polymyxin-dexamethasone (MAXITROL) 3.5-43754-6.1 ophthalmic suspension      • rivaroxaban (XARELTO) 20 MG tablet Take 1 tablet by mouth Daily With Dinner. 30 tablet 11   • sodium chloride 0.9 % solution        No current facility-administered medications on  "file prior to visit.       Allergies   Allergen Reactions   • Sulfa Antibiotics Unknown (See Comments)     Pt was told as child he had an allergy.  Has not taken and doesn't know the response      Past Medical History:   Diagnosis Date   • Acute kidney failure (CMS/HCC)    • Hyperlipidemia    • Hypertension    • Renal disorder      Past Surgical History:   Procedure Laterality Date   • APPENDECTOMY     • INCISION AND DRAINAGE LEG Left 10/23/2018    Procedure: INCISION AND DRAINAGE LEFT FOOT;  Surgeon: Idania Osborn MD;  Location:  [facility];  Service: Orthopedics   • TOE AMPUTATION     • TONSILLECTOMY     • VENOUS ACCESS DEVICE (PORT) INSERTION N/A 10/23/2018    Procedure: GROSHONG CATHETER PLACEMENT;  Surgeon: Marquez Simons MD;  Location:  [facility];  Service: General     History reviewed. No pertinent family history.   Social History     Socioeconomic History   • Marital status:      Spouse name: Not on file   • Number of children: Not on file   • Years of education: Not on file   • Highest education level: Not on file   Tobacco Use   • Smoking status: Never Smoker   Substance and Sexual Activity   • Alcohol use: Yes     Comment: drinks a fifth a week   • Drug use: No        Review of Systems   Constitutional: Negative.    HENT: Negative.    Eyes: Negative.    Respiratory: Negative.    Cardiovascular: Negative.    Gastrointestinal: Negative.    Endocrine: Negative.    Genitourinary: Negative.    Musculoskeletal: Positive for arthralgias.   Skin: Negative.    Allergic/Immunologic: Negative.    Neurological: Negative.    Hematological: Negative.    Psychiatric/Behavioral: Negative.        The following portions of the patient's history were reviewed and updated as appropriate: allergies, current medications, past family history, past medical history, past social history, past surgical history and problem list.    Physical Exam:   Pulse 79   Ht 182.9 cm (72.01\")   Wt 101 kg (222 lb 10.6 oz)   " SpO2 98%   BMI 30.19 kg/m²   GENERAL: Body habitus: overweight    Lower extremity edema: Left: trace; Right: trace    Gait: normal and Surprisingly normal with the partial foot amputation when he has a shoe on     Mental Status:  awake and alert; oriented to person, place, and time  MSK:  Tibia:  Right:  non tender; Left:  non tender        Ankle:  Right: non tender; Left:  non tender        Foot:   Left:  Leave incision is finally fully healed.  No change in dense neuropathy.  No signs of new Charcot.  No pre-ulcerous lesions but I am concerned about the second hammertoe.  It is rigid, he tends to get some pressure on both the tip of the toe and the PIP joint.  I showed him how to use a silicone sleeve to prevent breakdown, there is some thickened scar tissue on the incision, he should keep Bactroban on that until it softens    NEURO Sensation:  globally diminished,       Medical Decision Making    Data Review:   none    Assessment/Plan/Diagnosis/Treatment Options:   1. Neuropathy  He has now had 2 episodes of infection requiring surgery on the left foot.  He is at very high risk for further problems.  We discussed neuropathic foot care again in great detail.  I will be happy to see him anytime    2. Contracture of joint of foot, left  As above I showed him how to use a silicone sleeve

## 2019-09-03 ENCOUNTER — OFFICE VISIT (OUTPATIENT)
Dept: ORTHOPEDIC SURGERY | Facility: CLINIC | Age: 74
End: 2019-09-03

## 2019-09-03 VITALS — WEIGHT: 220 LBS | BODY MASS INDEX: 29.8 KG/M2 | HEART RATE: 87 BPM | OXYGEN SATURATION: 98 % | HEIGHT: 72 IN

## 2019-09-03 DIAGNOSIS — L98.491 NEUROPATHIC ULCER, LIMITED TO BREAKDOWN OF SKIN (HCC): ICD-10-CM

## 2019-09-03 DIAGNOSIS — M24.575 CONTRACTURE OF JOINT OF FOOT, LEFT: Primary | ICD-10-CM

## 2019-09-03 PROCEDURE — 99213 OFFICE O/P EST LOW 20 MIN: CPT | Performed by: PHYSICIAN ASSISTANT

## 2019-09-03 NOTE — PROGRESS NOTES
[facility] [place] [facility] [place]    Subjective     Patient: Carlos Campa  : 1945    Primary Care Provider: Marcus Cheng MD    Requesting Provider: As above    Follow-up (( 4 month FOLLOW UP - 11 MONTHS STATUS POST INCISION & DRAINAGE LEFT FOOT; PARTIAL RESECTION 1ST METATARSAL; EXCISION OF SESAMOIDS LEFT FOOT - 10/23/18)))      History    Chief Complaint: Left toe ulcer    History of Present Illness: Patient returns today with a new ulcer.  He has a superficial ulcer at the PIP joint of his second left toe.  He reports he is always had a callus there but noticed that this scab had come off and revealing an ulcer yesterday.  He reports increased redness in that toe as well.  No drainage, no warmth no fever chills.  He is continued wearing his regular tennis shoe.    Current Outpatient Medications on File Prior to Visit   Medication Sig Dispense Refill   • aspirin 81 MG EC tablet Take 81 mg by mouth Daily.     • atorvastatin (LIPITOR) 80 MG tablet Take 80 mg by mouth.     • EPINEPHrine (EPIPEN) 0.3 MG/0.3ML solution auto-injector injection      • lisinopril-hydrochlorothiazide (PRINZIDE,ZESTORETIC) 20-12.5 MG per tablet Take 1 tablet by mouth.     • metoprolol tartrate (LOPRESSOR) 25 MG tablet Take 1 tablet by mouth Every 12 (Twelve) Hours. 60 tablet 11   • multivitamin (THERAGRAN) tablet tablet Take 1 tablet by mouth Daily. 90 tablet 3   • mupirocin (BACTROBAN) 2 % cream Apply to left heel ulcer once per day with telfa, gauze and ace - and whenever wound vac is changed 30 g 11   • mupirocin (BACTROBAN) 2 % ointment Apply  topically to the appropriate area as directed 3 (Three) Times a Day. 30 g 2   • neomycin-polymyxin-dexamethasone (MAXITROL) 3.5-58769-0.1 ophthalmic suspension      • rivaroxaban (XARELTO) 20 MG tablet Take 1 tablet by mouth Daily With Dinner. 30 tablet 11   • sodium chloride 0.9 % solution        No current facility-administered medications on file prior to visit.  "      Allergies   Allergen Reactions   • Sulfa Antibiotics Unknown (See Comments)     Pt was told as child he had an allergy.  Has not taken and doesn't know the response      Past Medical History:   Diagnosis Date   • Acute kidney failure (CMS/HCC)    • Hyperlipidemia    • Hypertension    • Renal disorder      Past Surgical History:   Procedure Laterality Date   • APPENDECTOMY     • INCISION AND DRAINAGE LEG Left 10/23/2018    Procedure: INCISION AND DRAINAGE LEFT FOOT;  Surgeon: Idania Osborn MD;  Location:  [facility];  Service: Orthopedics   • TOE AMPUTATION     • TONSILLECTOMY     • VENOUS ACCESS DEVICE (PORT) INSERTION N/A 10/23/2018    Procedure: GROSHONG CATHETER PLACEMENT;  Surgeon: Marquez Simons MD;  Location:  [facility];  Service: General     History reviewed. No pertinent family history.   Social History     Socioeconomic History   • Marital status:      Spouse name: Not on file   • Number of children: Not on file   • Years of education: Not on file   • Highest education level: Not on file   Tobacco Use   • Smoking status: Never Smoker   Substance and Sexual Activity   • Alcohol use: Yes     Comment: drinks a fifth a week   • Drug use: No        Review of Systems   Constitutional: Negative.    HENT: Negative.    Eyes: Negative.    Respiratory: Negative.    Cardiovascular: Negative.    Gastrointestinal: Negative.    Endocrine: Negative.    Genitourinary: Negative.    Musculoskeletal: Positive for arthralgias.   Skin: Negative.    Allergic/Immunologic: Negative.    Neurological: Negative.    Hematological: Negative.    Psychiatric/Behavioral: Negative.        The following portions of the patient's history were reviewed and updated as appropriate: allergies, current medications, past family history, past medical history, past social history, past surgical history and problem list.      Objective      Physical Exam  Pulse 87   Ht 182.9 cm (72.01\")   Wt 99.8 kg (220 lb)   SpO2 98%   BMI " 29.83 kg/m²      Body mass index is 29.83 kg/m².    Patient is well developed, well nourished and in no acute distress.  Alert and oriented x 3.    Ortho Exam  V:  Dorsalis Pedis:   Left:2+    Posterior Tibial:Left:2+  MSK:  Tibia:   Left:  non tender      Ankle:   Left:  non tender      Foot:   Left:  superficial ulcer at the dorsal PIPJ of the 2nd claw toe.  Mild erythema that completely resolves with elevation. Medial incision is well healed.  No change in dense neuropathy.        Medical Decision Making    Data Review:   none    Assessment:  1. Contracture of joint of foot, left    2. Neuropathic ulcer, limited to breakdown of skin (CMS/[facility])        Plan:  Left foot neuropathic ulcer.  Patient has a superficial ulcer on his left second toe.  I do not see any evidence of infection.  The redness completely resolves with elevation of the foot.  Recommendation today is that he go into a postop shoe that we will cut out so that is not hitting the toe.  I told him he needs to have no pressure over the top of that toe in order for it to heal.  Any increased pressure on the toe will delay healing significantly.  I will see him back in 3 weeks to see how he is progressed or sooner if needed.      Henrietta Zacarias PA-C  09/04/19  11:55 AM

## 2019-09-24 ENCOUNTER — OFFICE VISIT (OUTPATIENT)
Dept: ORTHOPEDIC SURGERY | Facility: CLINIC | Age: 74
End: 2019-09-24

## 2019-09-24 VITALS — BODY MASS INDEX: 29.8 KG/M2 | WEIGHT: 220.02 LBS | HEART RATE: 77 BPM | OXYGEN SATURATION: 97 % | HEIGHT: 72 IN

## 2019-09-24 DIAGNOSIS — L98.491 NEUROPATHIC ULCER, LIMITED TO BREAKDOWN OF SKIN (HCC): Primary | ICD-10-CM

## 2019-09-24 PROCEDURE — 99212 OFFICE O/P EST SF 10 MIN: CPT | Performed by: PHYSICIAN ASSISTANT

## 2019-09-24 NOTE — PROGRESS NOTES
[facility] [place] [facility] [place]    Subjective     Patient: Carlos Campa  : 1945    Primary Care Provider: Marcus Cheng MD    Requesting Provider: As above    Follow-up ((( 3 week FOLLOW UP -  STATUS POST INCISION & DRAINAGE LEFT FOOT; PARTIAL RESECTION 1ST METATARSAL; EXCISION OF SESAMOIDS LEFT FOOT - 10/23/18))))      History    Chief Complaint: Follow-up left toe neuropathic ulcer    History of Present Illness: Patient returns for follow-up of his neuropathic ulcer on the dorsum of his left toe.  He has been in a postop shoe as instructed.  He has been using a silicone sleeve over the toe which he believes is actually keeping it too moist.  No drainage no new symptoms.    Current Outpatient Medications on File Prior to Visit   Medication Sig Dispense Refill   • aspirin 81 MG EC tablet Take 81 mg by mouth Daily.     • atorvastatin (LIPITOR) 80 MG tablet Take 80 mg by mouth.     • EPINEPHrine (EPIPEN) 0.3 MG/0.3ML solution auto-injector injection      • lisinopril-hydrochlorothiazide (PRINZIDE,ZESTORETIC) 20-12.5 MG per tablet Take 1 tablet by mouth.     • metoprolol tartrate (LOPRESSOR) 25 MG tablet Take 1 tablet by mouth Every 12 (Twelve) Hours. 60 tablet 11   • multivitamin (THERAGRAN) tablet tablet Take 1 tablet by mouth Daily. 90 tablet 3   • mupirocin (BACTROBAN) 2 % cream Apply to left heel ulcer once per day with telfa, gauze and ace - and whenever wound vac is changed 30 g 11   • mupirocin (BACTROBAN) 2 % ointment Apply  topically to the appropriate area as directed 3 (Three) Times a Day. 30 g 2   • neomycin-polymyxin-dexamethasone (MAXITROL) 3.5-44612-7.1 ophthalmic suspension      • rivaroxaban (XARELTO) 20 MG tablet Take 1 tablet by mouth Daily With Dinner. 30 tablet 11   • sodium chloride 0.9 % solution        No current facility-administered medications on file prior to visit.       Allergies   Allergen Reactions   • Sulfa Antibiotics Unknown (See Comments)     Pt  "was told as child he had an allergy.  Has not taken and doesn't know the response      Past Medical History:   Diagnosis Date   • Acute kidney failure (CMS/HCC)    • Hyperlipidemia    • Hypertension    • Renal disorder      Past Surgical History:   Procedure Laterality Date   • APPENDECTOMY     • INCISION AND DRAINAGE LEG Left 10/23/2018    Procedure: INCISION AND DRAINAGE LEFT FOOT;  Surgeon: Idania Osborn MD;  Location:  [facility];  Service: Orthopedics   • TOE AMPUTATION     • TONSILLECTOMY     • VENOUS ACCESS DEVICE (PORT) INSERTION N/A 10/23/2018    Procedure: GROSHONG CATHETER PLACEMENT;  Surgeon: Marquez Simons MD;  Location:  [facility];  Service: General     History reviewed. No pertinent family history.   Social History     Socioeconomic History   • Marital status:      Spouse name: Not on file   • Number of children: Not on file   • Years of education: Not on file   • Highest education level: Not on file   Tobacco Use   • Smoking status: Never Smoker   • Smokeless tobacco: Never Used   Substance and Sexual Activity   • Alcohol use: Yes     Comment: drinks a fifth a week   • Drug use: No        Review of Systems   Constitutional: Negative.    HENT: Negative.    Eyes: Negative.    Respiratory: Negative.    Cardiovascular: Negative.    Gastrointestinal: Negative.    Endocrine: Negative.    Genitourinary: Negative.    Musculoskeletal: Positive for arthralgias.   Skin: Negative.    Allergic/Immunologic: Negative.    Neurological: Negative.    Hematological: Negative.    Psychiatric/Behavioral: Negative.        The following portions of the patient's history were reviewed and updated as appropriate: allergies, current medications, past family history, past medical history, past social history, past surgical history and problem list.      Objective      Physical Exam  Pulse 77   Ht 182.9 cm (72.01\")   Wt 99.8 kg (220 lb 0.3 oz)   SpO2 97%   BMI 29.83 kg/m²     Body mass index is 29.83 " kg/m².    Patient is well developed, well nourished and in no acute distress.  Alert and oriented x 3.    Ortho Exam  Left foot exam:  Superficial ulcer on the PIPJ of the 2nd toe.  No sign of infection, no drainage.  No other ulcers or pre-ulcerous lesions.  No change in neuropathy    Medical Decision Making    Data Review:   none    Assessment:  1. Neuropathic ulcer, limited to breakdown of skin (CMS/HCC)        Plan:  Left 2nd toe neuropathic ulcer.  The ulcer is slowing healing.  Patient will continue with the post op shoe.  He will keep the ulcer covered with a band-aid and no longer use the silicone sleeve until it is completely healed.      Henrietta Zacarias PA-C  09/24/19  1:13 PM

## 2019-10-15 ENCOUNTER — OFFICE VISIT (OUTPATIENT)
Dept: ORTHOPEDIC SURGERY | Facility: CLINIC | Age: 74
End: 2019-10-15

## 2019-10-15 VITALS — BODY MASS INDEX: 29.8 KG/M2 | OXYGEN SATURATION: 99 % | HEART RATE: 80 BPM | WEIGHT: 220.02 LBS | HEIGHT: 72 IN

## 2019-10-15 DIAGNOSIS — L98.491 NEUROPATHIC ULCER, LIMITED TO BREAKDOWN OF SKIN (HCC): Primary | ICD-10-CM

## 2019-10-15 PROCEDURE — 99212 OFFICE O/P EST SF 10 MIN: CPT | Performed by: PHYSICIAN ASSISTANT

## 2019-10-15 NOTE — PROGRESS NOTES
[facility] [place] [facility] [place]    Subjective     Patient: Carlos Campa  : 1945    Primary Care Provider: Marcus Cheng MD    Requesting Provider: As above    Follow-up (3 week f/u; Left 2nd toe neuropathic ulcer)      History    Chief Complaint: Follow-up left neuropathic ulcer    History of Present Illness: Patient returns today follow-up of his left neuropathic ulcer on his second toe.  The ulcer has healed so he took himself out of the postop shoe and into regular shoe.  He try the silicone sleeves but they tend to roll down so he is just used in the Band-Aid over the PIP joint.  No new symptoms.    Current Outpatient Medications on File Prior to Visit   Medication Sig Dispense Refill   • aspirin 81 MG EC tablet Take 81 mg by mouth Daily.     • atorvastatin (LIPITOR) 80 MG tablet Take 80 mg by mouth.     • EPINEPHrine (EPIPEN) 0.3 MG/0.3ML solution auto-injector injection      • lisinopril-hydrochlorothiazide (PRINZIDE,ZESTORETIC) 20-12.5 MG per tablet Take 1 tablet by mouth.     • metoprolol tartrate (LOPRESSOR) 25 MG tablet Take 1 tablet by mouth Every 12 (Twelve) Hours. 60 tablet 11   • multivitamin (THERAGRAN) tablet tablet Take 1 tablet by mouth Daily. 90 tablet 3   • mupirocin (BACTROBAN) 2 % cream Apply to left heel ulcer once per day with telfa, gauze and ace - and whenever wound vac is changed 30 g 11   • mupirocin (BACTROBAN) 2 % ointment Apply  topically to the appropriate area as directed 3 (Three) Times a Day. 30 g 2   • neomycin-polymyxin-dexamethasone (MAXITROL) 3.5-59342-1.1 ophthalmic suspension      • rivaroxaban (XARELTO) 20 MG tablet Take 1 tablet by mouth Daily With Dinner. 30 tablet 11   • sodium chloride 0.9 % solution        No current facility-administered medications on file prior to visit.       Allergies   Allergen Reactions   • Sulfa Antibiotics Unknown (See Comments)     Pt was told as child he had an allergy.  Has not taken and doesn't know the  "response      Past Medical History:   Diagnosis Date   • Acute kidney failure (CMS/HCC)    • Hyperlipidemia    • Hypertension    • Renal disorder      Past Surgical History:   Procedure Laterality Date   • APPENDECTOMY     • INCISION AND DRAINAGE LEG Left 10/23/2018    Procedure: INCISION AND DRAINAGE LEFT FOOT;  Surgeon: Idania Osborn MD;  Location: [facility];  Service: Orthopedics   • TOE AMPUTATION     • TONSILLECTOMY     • VENOUS ACCESS DEVICE (PORT) INSERTION N/A 10/23/2018    Procedure: GROSHONG CATHETER PLACEMENT;  Surgeon: Marquez Simons MD;  Location: [facility] beds at the [facility] OR;  Service: General     History reviewed. No pertinent family history.   Social History     Socioeconomic History   • Marital status:      Spouse name: Not on file   • Number of children: Not on file   • Years of education: Not on file   • Highest education level: Not on file   Tobacco Use   • Smoking status: Never Smoker   • Smokeless tobacco: Never Used   Substance and Sexual Activity   • Alcohol use: Yes     Comment: drinks a fifth a week   • Drug use: No        Review of Systems   Constitutional: Negative.    HENT: Negative.    Eyes: Negative.    Respiratory: Negative.    Cardiovascular: Negative.    Gastrointestinal: Negative.    Endocrine: Negative.    Genitourinary: Negative.    Musculoskeletal: Negative.         Patient is not having any pain in the 2nd toe   Skin: Negative.    Allergic/Immunologic: Negative.    Neurological: Negative.    Hematological: Negative.    Psychiatric/Behavioral: Negative.        The following portions of the patient's history were reviewed and updated as appropriate: allergies, current medications, past family history, past medical history, past social history, past surgical history and problem list.      Objective      Physical Exam  Pulse 80   Ht 182.9 cm (72.01\")   Wt 99.8 kg (220 lb 0.3 oz)   SpO2 99%   BMI 29.83 kg/m²     Body mass index is 29.83 kg/m².    Patient is well developed, well " nourished and in no acute distress.  Alert and oriented x 3.    Ortho Exam  Left foot exam:  Ulcer healed over the PIP joint of the second claw toe.  No other ulcers or pre-ulcerous lesions.    Medical Decision Making    Data Review:   none    Assessment:  1. Neuropathic ulcer, limited to breakdown of skin (CMS/[facility])        Plan:  Left second toe neuropathic ulcer healed.  I reviewed with the patient the importance of checking his feet daily.  He must continue to keep that toe covered as well in a regular shoe to avoid breakdown again.  Should he continue to have recurrent ulcers at that joint, he may want to consider surgical treatment.  He will return to see us as needed.      Henrietta Zacarias PA-C  10/15/19  11:51 AM

## 2019-12-06 PROBLEM — R06.09 DOE (DYSPNEA ON EXERTION): Status: ACTIVE | Noted: 2019-12-06

## 2019-12-06 NOTE — PROGRESS NOTES
OFFICE VISIT  NOTE  [facility] CARDIOLOGY      Name: Carlos Campa    Date: 2019  MRN:  [id number]  :  1945      REFERRING/PRIMARY PROVIDER:  No ref. provider found    Chief Complaint   Patient presents with   • Atrial Fibrillation       HPI: Carlos Campa is a 74 y.o. male who presents today for new consultation for atrial fibrillation.  Patient has associated history of hypertension.  He was diagnosed with paroxysmal atrial fibrillation in 2018 during hospital stay for a foot infection.  He was started on metoprolol and Xarelto at that time.  He was managed with a rate control strategy at that time.  It is recent PCP visit 2019 with Dr. Cheng, he was noted to be in A. fib.  He reports good overall health, no shortness of breath or chest pain.  He is back to working out 2 to 3 days/week with no exertional symptoms.  He denies palpitations.  No previous history of easy bleeding or internal bleeding.  He Tolerated Xarelto in 2018 without difficulty.    Past Medical History:   Diagnosis Date   • Acute kidney failure (CMS/HCC)    • Hyperlipidemia    • Hypertension    • Renal disorder        Past Surgical History:   Procedure Laterality Date   • APPENDECTOMY     • INCISION AND DRAINAGE LEG Left 10/23/2018    Procedure: INCISION AND DRAINAGE LEFT FOOT;  Surgeon: Idania Osborn MD;  Location: [facility];  Service: Orthopedics   • TOE AMPUTATION     • TONSILLECTOMY     • VENOUS ACCESS DEVICE (PORT) INSERTION N/A 10/23/2018    Procedure: GROSHONG CATHETER PLACEMENT;  Surgeon: Marquez Simons MD;  Location: [facility];  Service: General       Social History     Socioeconomic History   • Marital status:      Spouse name: Not on file   • Number of children: Not on file   • Years of education: Not on file   • Highest education level: Not on file   Tobacco Use   • Smoking status: Never Smoker   • Smokeless tobacco: Never Used   Substance and Sexual  "Activity   • Alcohol use: Yes     Comment: drinks a fifth a week   • Drug use: No       History reviewed. No pertinent family history.     ROS:   Constitutional no fever,  no weight loss   Skin no rash, no subcutaneous nodules   Otolaryngeal no difficulty swallowing   Cardiovascular See HPI   Pulmonary no cough, no sputum production   Gastrointestinal no constipation, no diarrhea   Genitourinary no dysuria, no hematuria   Hematologic no easy bruisability, no abnormal bleeding   Musculoskeletal no muscle pain   Neurologic no dizziness, no falls         Allergies   Allergen Reactions   • Sulfa Antibiotics Unknown (See Comments)     Pt was told as child he had an allergy.  Has not taken and doesn't know the response         Current Outpatient Medications:   •  atorvastatin (LIPITOR) 80 MG tablet, Take 80 mg by mouth., Disp: , Rfl:   •  EPINEPHrine (EPIPEN) 0.3 MG/0.3ML solution auto-injector injection, As Needed., Disp: , Rfl:   •  lisinopril-hydrochlorothiazide (PRINZIDE,ZESTORETIC) 20-12.5 MG per tablet, Take 1 tablet by mouth., Disp: , Rfl:   •  apixaban (ELIQUIS) 5 MG tablet tablet, Take 1 tablet by mouth 2 (Two) Times a Day., Disp: 180 tablet, Rfl: 3  •  metoprolol tartrate (LOPRESSOR) 25 MG tablet, Take 0.5 tablets by mouth 2 (Two) Times a Day., Disp: 90 tablet, Rfl: 3    Vitals:    12/09/19 1420   BP: 140/72   BP Location: Right arm   Patient Position: Sitting   Pulse: 116   SpO2: 98%   Weight: 90.7 kg (200 lb)   Height: 182.9 cm (72\")     Body mass index is 27.12 kg/m².    PHYSICAL EXAM:    General Appearance:   · well developed  · well nourished  HENT:   · oropharynx moist  · lips not cyanotic  Neck:  · thyroid not enlarged  · supple  Respiratory:  · no respiratory distress  · normal breath sounds  · no rales  Cardiovascular:  · no jugular venous distention  · Irregular irregular rhythm  · apical impulse normal  · S1 normal, S2 normal  · no S3, no S4   · no murmur  · no rub, no thrill  · carotid pulses normal; " no bruit  · pedal pulses normal  · lower extremity edema: none    Gastrointestinal:   · bowel sounds normal  · non-tender  · no hepatomegaly, no splenomegaly  Musculoskeletal:  · no clubbing of fingers.   · normocephalic, head atraumatic  Skin:   · warm, dry  Psychiatric:  · judgement and insight appropriate  · normal mood and affect    RESULTS:     ECG 12 Lead  Date/Time: 12/9/2019 3:40 PM  Performed by: Hang Anderson MD  Authorized by: Hang Anderson MD   Comparison: compared with previous ECG from 10/1/2018  Similar to previous ECG  Rhythm: atrial fibrillation  Rate: tachycardic  BPM: 114  QRS axis: normal    Clinical impression: abnormal EKG            Results for orders placed during the hospital encounter of 10/19/18   Adult Transthoracic Echo Complete W/ Cont if Necessary Per Protocol    Narrative · Mild tricuspid valve regurgitation is present.  · Calculated right ventricular systolic pressure from tricuspid   regurgitation is 26 mmHg.  · Mild mitral valve regurgitation is present  · Lumason contrast shows normal overall wall motion and systolic function   with an EF 70%            Labs:  Lab Results   Component Value Date    AST 28 10/31/2018    ALT 34 10/31/2018     Lab Results   Component Value Date    HGBA1C 6.00 (H) 10/21/2018     No components found for: CREATINININE  eGFR Non  Amer   Date Value Ref Range Status   10/31/2018 48 (L) >60 mL/min/1.73 Final   10/25/2018 65 >60 mL/min/1.73 Final   10/24/2018 63 >60 mL/min/1.73 Final         ASSESSMENT:  Problem List Items Addressed This Visit        Cardiovascular and Mediastinum    Essential hypertension    Relevant Medications    metoprolol tartrate (LOPRESSOR) 25 MG tablet    Atrial fibrillation with RVR (CMS/HCC)    Overview     12/05/19 EKG   · Atrial fibrillation with RVR   · Rate 123         Relevant Medications    metoprolol tartrate (LOPRESSOR) 25 MG tablet       Respiratory    MARTINEZ (dyspnea on exertion)    Overview     10/23/18  Echo  · Mild tricuspid valve regurgitation is present.  · EF 70%  · Calculated right ventricular systolic pressure from tricuspid regurgitation is 26 mmHg.  · Mild mitral valve regurgitation is present             Other Visit Diagnoses     Paroxysmal atrial fibrillation (CMS/HCC)    -  Primary    Relevant Medications    metoprolol tartrate (LOPRESSOR) 25 MG tablet    Other Relevant Orders    ECG 12 Lead          PLAN:    1.  Paroxysmal atrial fibrillation:  Patient is currently asymptomatic, will pursue rate control strategy  Start metoprolol 12.5 mg twice daily  Chads vasc score is 3, I recommend starting Eliquis 5 mg twice daily  After discussing the risk benefits and alternatives the patient is agreeable and will start this medication.    2.  Hypertension:  Currently well controlled on lisinopril/HCTZ  Long-term goal blood pressure less than 140/90.    3.  Hyperlipidemia:  Currently tolerating atorvastatin 80 mg daily  Continue for now.    Return to clinic in 3 months.    Thank you for the opportunity to share in the care of your patient; please do not hesitate to call me with any questions.     Hang Anderson MD, [facility]  Office: [phone] [address] [place] [address]

## 2019-12-09 ENCOUNTER — OFFICE VISIT (OUTPATIENT)
Dept: CARDIOLOGY | Facility: CLINIC | Age: 74
End: 2019-12-09

## 2019-12-09 VITALS
HEART RATE: 116 BPM | HEIGHT: 72 IN | BODY MASS INDEX: 27.09 KG/M2 | SYSTOLIC BLOOD PRESSURE: 140 MMHG | WEIGHT: 200 LBS | DIASTOLIC BLOOD PRESSURE: 72 MMHG | OXYGEN SATURATION: 98 %

## 2019-12-09 DIAGNOSIS — I48.0 PAROXYSMAL ATRIAL FIBRILLATION (HCC): Primary | ICD-10-CM

## 2019-12-09 DIAGNOSIS — I10 ESSENTIAL HYPERTENSION: ICD-10-CM

## 2019-12-09 DIAGNOSIS — R06.09 DOE (DYSPNEA ON EXERTION): ICD-10-CM

## 2019-12-09 DIAGNOSIS — I48.91 ATRIAL FIBRILLATION WITH RVR (HCC): ICD-10-CM

## 2019-12-09 PROCEDURE — 93000 ELECTROCARDIOGRAM COMPLETE: CPT | Performed by: INTERNAL MEDICINE

## 2019-12-09 PROCEDURE — 99204 OFFICE O/P NEW MOD 45 MIN: CPT | Performed by: INTERNAL MEDICINE

## 2020-01-03 ENCOUNTER — TELEPHONE (OUTPATIENT)
Dept: ORTHOPEDIC SURGERY | Facility: CLINIC | Age: 75
End: 2020-01-03

## 2020-01-03 PROBLEM — L98.491: Status: ACTIVE | Noted: 2020-01-03

## 2020-01-03 RX ORDER — CEPHALEXIN 500 MG/1
500 CAPSULE ORAL 4 TIMES DAILY
Qty: 40 CAPSULE | Refills: 0 | Status: SHIPPED | OUTPATIENT
Start: 2020-01-03 | End: 2020-01-13

## 2020-01-03 NOTE — TELEPHONE ENCOUNTER
I called him, he states his hammertoe has a place on the top and it keeps getting worse. He looked at it this morning and it looks like the wound is getting deeper. He doesn't think it is infected he had a boot that we had cut so it didn't rub on it, he is using that but it isn't getting better. He is going to send me a picture which I will show Dr. Osborn.   Fiorella

## 2020-01-03 NOTE — TELEPHONE ENCOUNTER
IS WORRIED ABOUT HAVING HIS LEFT FOOT THAT HAD SURGERY ON AND HAS TURNED INTO A HAMMER TOE GETTING INFECTED AND WANTS A CALL BACK TO DISCUSS HIS SYMPTOMS. REQUESTED TO SPEAK TO FILIPPO

## 2020-01-06 ENCOUNTER — TELEPHONE (OUTPATIENT)
Dept: CARDIOLOGY | Facility: CLINIC | Age: 75
End: 2020-01-06

## 2020-01-06 ENCOUNTER — APPOINTMENT (OUTPATIENT)
Dept: PREADMISSION TESTING | Facility: HOSPITAL | Age: 75
End: 2020-01-06

## 2020-01-06 ENCOUNTER — OFFICE VISIT (OUTPATIENT)
Dept: ORTHOPEDIC SURGERY | Facility: CLINIC | Age: 75
End: 2020-01-06

## 2020-01-06 VITALS — HEART RATE: 84 BPM | BODY MASS INDEX: 27.08 KG/M2 | HEIGHT: 72 IN | OXYGEN SATURATION: 96 % | WEIGHT: 199.96 LBS

## 2020-01-06 VITALS — WEIGHT: 222.22 LBS | BODY MASS INDEX: 30.1 KG/M2 | HEIGHT: 72 IN

## 2020-01-06 DIAGNOSIS — M86.9 OSTEOMYELITIS OF SECOND TOE OF LEFT FOOT (HCC): ICD-10-CM

## 2020-01-06 DIAGNOSIS — L98.491 NEUROPATHIC ULCER, LIMITED TO BREAKDOWN OF SKIN (HCC): ICD-10-CM

## 2020-01-06 DIAGNOSIS — M86.372 CHRONIC MULTIFOCAL OSTEOMYELITIS OF LEFT FOOT (HCC): ICD-10-CM

## 2020-01-06 DIAGNOSIS — M79.672 LEFT FOOT PAIN: Primary | ICD-10-CM

## 2020-01-06 DIAGNOSIS — L98.491 NEUROPATHIC ULCER, LIMITED TO BREAKDOWN OF SKIN (HCC): Primary | ICD-10-CM

## 2020-01-06 DIAGNOSIS — G62.9 NEUROPATHY: ICD-10-CM

## 2020-01-06 LAB
ANION GAP SERPL CALCULATED.3IONS-SCNC: 12 MMOL/L (ref 5–15)
BASOPHILS # BLD AUTO: 0.11 10*3/MM3 (ref 0–0.2)
BASOPHILS NFR BLD AUTO: 1.2 % (ref 0–1.5)
BUN BLD-MCNC: 25 MG/DL (ref 8–23)
BUN/CREAT SERPL: 22.3 (ref 7–25)
CALCIUM SPEC-SCNC: 10 MG/DL (ref 8.6–10.5)
CHLORIDE SERPL-SCNC: 104 MMOL/L (ref 98–107)
CO2 SERPL-SCNC: 25 MMOL/L (ref 22–29)
CREAT BLD-MCNC: 1.12 MG/DL (ref 0.76–1.27)
CRP SERPL-MCNC: 0.43 MG/DL (ref 0–0.5)
DEPRECATED RDW RBC AUTO: 41.7 FL (ref 37–54)
EOSINOPHIL # BLD AUTO: 0.13 10*3/MM3 (ref 0–0.4)
EOSINOPHIL NFR BLD AUTO: 1.4 % (ref 0.3–6.2)
ERYTHROCYTE [DISTWIDTH] IN BLOOD BY AUTOMATED COUNT: 12 % (ref 12.3–15.4)
ERYTHROCYTE [SEDIMENTATION RATE] IN BLOOD: 20 MM/HR (ref 0–20)
GFR SERPL CREATININE-BSD FRML MDRD: 64 ML/MIN/1.73
GLUCOSE BLD-MCNC: 113 MG/DL (ref 65–99)
HBA1C MFR BLD: 6.1 % (ref 4.8–5.6)
HCT VFR BLD AUTO: 46.7 % (ref 37.5–51)
HGB BLD-MCNC: 15.5 G/DL (ref 13–17.7)
IMM GRANULOCYTES # BLD AUTO: 0.03 10*3/MM3 (ref 0–0.05)
IMM GRANULOCYTES NFR BLD AUTO: 0.3 % (ref 0–0.5)
LYMPHOCYTES # BLD AUTO: 2.24 10*3/MM3 (ref 0.7–3.1)
LYMPHOCYTES NFR BLD AUTO: 24.5 % (ref 19.6–45.3)
MCH RBC QN AUTO: 31.4 PG (ref 26.6–33)
MCHC RBC AUTO-ENTMCNC: 33.2 G/DL (ref 31.5–35.7)
MCV RBC AUTO: 94.7 FL (ref 79–97)
MONOCYTES # BLD AUTO: 0.85 10*3/MM3 (ref 0.1–0.9)
MONOCYTES NFR BLD AUTO: 9.3 % (ref 5–12)
NEUTROPHILS # BLD AUTO: 5.77 10*3/MM3 (ref 1.7–7)
NEUTROPHILS NFR BLD AUTO: 63.3 % (ref 42.7–76)
NRBC BLD AUTO-RTO: 0 /100 WBC (ref 0–0.2)
PLATELET # BLD AUTO: 213 10*3/MM3 (ref 140–450)
PMV BLD AUTO: 9.3 FL (ref 6–12)
POTASSIUM BLD-SCNC: 5.1 MMOL/L (ref 3.5–5.2)
RBC # BLD AUTO: 4.93 10*6/MM3 (ref 4.14–5.8)
SODIUM BLD-SCNC: 141 MMOL/L (ref 136–145)
WBC NRBC COR # BLD: 9.13 10*3/MM3 (ref 3.4–10.8)

## 2020-01-06 PROCEDURE — 99214 OFFICE O/P EST MOD 30 MIN: CPT | Performed by: ORTHOPAEDIC SURGERY

## 2020-01-06 PROCEDURE — 80048 BASIC METABOLIC PNL TOTAL CA: CPT | Performed by: ORTHOPAEDIC SURGERY

## 2020-01-06 PROCEDURE — 85652 RBC SED RATE AUTOMATED: CPT | Performed by: ORTHOPAEDIC SURGERY

## 2020-01-06 PROCEDURE — 36415 COLL VENOUS BLD VENIPUNCTURE: CPT

## 2020-01-06 PROCEDURE — 86140 C-REACTIVE PROTEIN: CPT | Performed by: ORTHOPAEDIC SURGERY

## 2020-01-06 PROCEDURE — 85025 COMPLETE CBC W/AUTO DIFF WBC: CPT | Performed by: ORTHOPAEDIC SURGERY

## 2020-01-06 PROCEDURE — 83036 HEMOGLOBIN GLYCOSYLATED A1C: CPT | Performed by: ORTHOPAEDIC SURGERY

## 2020-01-06 RX ORDER — OXYCODONE HYDROCHLORIDE AND ACETAMINOPHEN 5; 325 MG/1; MG/1
1-2 TABLET ORAL EVERY 6 HOURS PRN
Qty: 60 TABLET | Refills: 0 | Status: SHIPPED | OUTPATIENT
Start: 2020-01-06 | End: 2020-01-07 | Stop reason: HOSPADM

## 2020-01-06 RX ORDER — HYDROCODONE BITARTRATE AND ACETAMINOPHEN 7.5; 325 MG/1; MG/1
1-2 TABLET ORAL EVERY 6 HOURS PRN
Qty: 60 TABLET | Refills: 0 | Status: SHIPPED | OUTPATIENT
Start: 2020-01-06 | End: 2020-11-25

## 2020-01-06 RX ORDER — ONDANSETRON 4 MG/1
4 TABLET, FILM COATED ORAL EVERY 6 HOURS PRN
Qty: 30 TABLET | Refills: 0 | Status: SHIPPED | OUTPATIENT
Start: 2020-01-06 | End: 2020-11-25

## 2020-01-06 RX ORDER — DOXYCYCLINE HYCLATE 100 MG
100 TABLET ORAL 2 TIMES DAILY
Qty: 10 TABLET | Refills: 0 | Status: SHIPPED | OUTPATIENT
Start: 2020-01-06 | End: 2020-01-11

## 2020-01-06 NOTE — TELEPHONE ENCOUNTER
Yes he can proceed with the procedure, okay to hold Eliquis temporarily.  Patient should take metoprolol morning of surgery.

## 2020-01-06 NOTE — PROGRESS NOTES
"ESTABLISHED PATIENT    Patient: Carlos Campa  : 1945    Primary Care Provider: Marcus Cheng MD    Requesting Provider: As above    Follow-up of the Left Foot (Ulcer on toe)      History    Chief Complaint: Left second toe ulcer    History of Present Illness: Mr. Paniagua called the office on Friday saying that he had an ulcer on his second toe on the left foot, he had been \"doctoring it\".  He advised our staff that he did not think it was infected, but he texted a picture and the toe definitely was infected.  We called in Keflex and I brought him in today to evaluated in person.  He has exposed bone at the PIP joint of the second toe.  He has not had any fever nor chills.  He does not really monitor his glucose.  He has dense neuropathy.  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  I debrided it, remove the sesamoids and distal three quarters of the metatarsal.  He had a Groshong catheter and IV antibiotics.  He required a wound VAC to ultimately heal.  It was very slow to heal but ultimately did.  He was seen in 2019 by Ms. Zacarias for an ulcer on the second toe, she very correctly advised him to be out of his shoe when the ulcer healed.  He went back to wearing a shoe and has been \"trying to take care of the foot since then\".  Unfortunately he now has exposed bone in the toe, and the x-rays show about 50% destruction of the middle phalanx of the toe.  The erythema has improved over the weekend taking Keflex.    Current Outpatient Medications on File Prior to Visit   Medication Sig Dispense Refill   • apixaban (ELIQUIS) 5 MG tablet tablet Take 1 tablet by mouth 2 (Two) Times a Day. (Patient taking differently: Take 5 mg by mouth 2 (Two) Times a Day. ON HOLD AS OF 2020 AM BEING THE LAST DOSE.  DR. EARL NOTIFIED AND AWAITING APPROVAL) 180 tablet 3   • atorvastatin (LIPITOR) " 80 MG tablet Take 80 mg by mouth Every Night.     • cephalexin (KEFLEX) 500 MG capsule Take 1 capsule by mouth 4 (Four) Times a Day for 10 days. 40 capsule 0   • lisinopril-hydrochlorothiazide (PRINZIDE,ZESTORETIC) 20-12.5 MG per tablet Take 1 tablet by mouth Daily.     • metoprolol tartrate (LOPRESSOR) 25 MG tablet Take 0.5 tablets by mouth 2 (Two) Times a Day. 90 tablet 3   • [DISCONTINUED] EPINEPHrine (EPIPEN) 0.3 MG/0.3ML solution auto-injector injection As Needed.       No current facility-administered medications on file prior to visit.       Allergies   Allergen Reactions   • Sulfa Antibiotics Unknown (See Comments)     Pt was told as child he had an allergy.  Has not taken and doesn't know the response      Past Medical History:   Diagnosis Date   • A-fib (CMS/[facility])    • Acute kidney failure (CMS/[facility])    • Elevated cholesterol    • Hard of hearing    • Hyperlipidemia    • Hypertension    • Renal disorder    • Wears hearing aid in both ears    • Wears reading eyeglasses      Past Surgical History:   Procedure Laterality Date   • APPENDECTOMY     • COLONOSCOPY  2006   • INCISION AND DRAINAGE LEG Left 10/23/2018    Procedure: INCISION AND DRAINAGE LEFT FOOT;  Surgeon: Idania Osborn MD;  Location: [facility] OR;  Service: Orthopedics   • TOE AMPUTATION     • TONSILLECTOMY     • VENOUS ACCESS DEVICE (PORT) INSERTION N/A 10/23/2018    Procedure: GROSHONG CATHETER PLACEMENT;  Surgeon: Marquez Simons MD;  Location: [facility] OR;  Service: General   • VENOUS ACCESS DEVICE (PORT) REMOVAL       History reviewed. No pertinent family history.   Social History     Socioeconomic History   • Marital status:      Spouse name: Not on file   • Number of children: Not on file   • Years of education: Not on file   • Highest education level: Not on file   Tobacco Use   • Smoking status: Never Smoker   • Smokeless tobacco: Never Used   Substance and Sexual Activity   • Alcohol use: Yes     Alcohol/week: 3.0 - 4.0  "standard drinks     Types: 3 - 4 Shots of liquor per week     Comment: drinks a fifth a week DRINKS 3-5 DRINKS DAILY    • Drug use: No   • Sexual activity: Defer        Review of Systems   Constitutional: Negative.    HENT: Negative.    Eyes: Negative.    Respiratory: Negative.    Cardiovascular: Negative.    Gastrointestinal: Negative.    Endocrine: Negative.    Genitourinary: Negative.    Musculoskeletal: Positive for arthralgias.   Skin: Negative.    Allergic/Immunologic: Negative.    Neurological: Negative.    Hematological: Negative.    Psychiatric/Behavioral: Negative.        The following portions of the patient's history were reviewed and updated as appropriate: allergies, current medications, past family history, past medical history, past social history, past surgical history and problem list.    Physical Exam:   Pulse 84   Ht 182.9 cm (72.01\")   Wt 90.7 kg (199 lb 15.3 oz)   SpO2 96%   BMI 27.11 kg/m²   GENERAL: Body habitus: overweight    Lower extremity edema: Left: trace; Right: trace    Gait: normal     Mental Status:  awake and alert; oriented to person, place, and time  MSK:  Tibia:  Right:  non tender; Left:  non tender        Ankle:  Right: non tender; Left:  non tender        Foot:  Right:  non tender and No ulcers; Left:  Second toe has exposed bone, the toe is enlarged, faint erythema, improved compared to the picture he texted on Friday               NEURO Sensation:  absent    Medical Decision Making    Data Review:   ordered and reviewed x-rays today    Assessment/Plan/Diagnosis/Treatment Options:   1. Osteomyelitis of second toe of left foot (CMS/HCC)  He has exposed bone in the second toe.  This is clearly osteomyelitis both clinically and radiographically.  I recommend that we amputate the toe.  It is unclear how long it is been open and this infected but I suspect quite a while.  The radiographs show more than half of the middle phalanx is eroded, that does not happen overnight.  He " asked me how he would walk with a toe amputation, frankly I do not think it will be much different than now because he has such dense neuropathy he is already walking on a partial foot amputation with a dislocated second toe.  If anything he might walk a little bit better not having to worry about that toe rubbing and getting infected.  However I very stringently cautioned him that he is at significant risk to go on to a below-knee amputation.  Statistically given his multiple problems over the past several years he has a greater than 50% risk of below-knee amputation in the span of 2 years.  He must do better foot care, he must pay more attention to his feet and he has to except that he is not going to be able to do all of the activities that he did previously.  He reports he finds this very difficult to except.  I do not know any other way to convey to him how to take care of his feet other than the multiple times I explained that very simply in the past.  I am very afraid he is going to go on to higher amputation.  We can do this as an outpatient under local with sedation.  I do not think he needs long-term IV antibiotics because we will be removing the entire source.  I briefly discussed this with infectious disease who agree.  We will send him out on 5 days of doxycycline and 7 to 10 days of Keflex.  I recommend we do this tomorrow so that he does not have progression of infection.  We discussed the risks including but not limited to: death, infection, neurovascular damage, strokes, heart attacks, blood clots, chronic pain, deformity, stiffness, need for  further surgery, higher amputation, etc.  Questions asked and answered in detail.          2. Neuropathy  Very dense neuropathy high risk for wounds and amputation

## 2020-01-06 NOTE — H&P (VIEW-ONLY)
"ESTABLISHED PATIENT    Patient: Carlos Campa  : 1945    Primary Care Provider: Marcus Cheng MD    Requesting Provider: As above    Follow-up of the Left Foot (Ulcer on toe)      History    Chief Complaint: Left second toe ulcer    History of Present Illness: Mr. Paniagua called the office on Friday saying that he had an ulcer on his second toe on the left foot, he had been \"doctoring it\".  He advised our staff that he did not think it was infected, but he texted a picture and the toe definitely was infected.  We called in Keflex and I brought him in today to evaluated in person.  He has exposed bone at the PIP joint of the second toe.  He has not had any fever nor chills.  He does not really monitor his glucose.  He has dense neuropathy.  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  I debrided it, remove the sesamoids and distal three quarters of the metatarsal.  He had a Groshong catheter and IV antibiotics.  He required a wound VAC to ultimately heal.  It was very slow to heal but ultimately did.  He was seen in 2019 by Ms. Zacarias for an ulcer on the second toe, she very correctly advised him to be out of his shoe when the ulcer healed.  He went back to wearing a shoe and has been \"trying to take care of the foot since then\".  Unfortunately he now has exposed bone in the toe, and the x-rays show about 50% destruction of the middle phalanx of the toe.  The erythema has improved over the weekend taking Keflex.    Current Outpatient Medications on File Prior to Visit   Medication Sig Dispense Refill   • apixaban (ELIQUIS) 5 MG tablet tablet Take 1 tablet by mouth 2 (Two) Times a Day. (Patient taking differently: Take 5 mg by mouth 2 (Two) Times a Day. ON HOLD AS OF 2020 AM BEING THE LAST DOSE.  DR. EARL NOTIFIED AND AWAITING APPROVAL) 180 tablet 3   • atorvastatin (LIPITOR) " 80 MG tablet Take 80 mg by mouth Every Night.     • cephalexin (KEFLEX) 500 MG capsule Take 1 capsule by mouth 4 (Four) Times a Day for 10 days. 40 capsule 0   • lisinopril-hydrochlorothiazide (PRINZIDE,ZESTORETIC) 20-12.5 MG per tablet Take 1 tablet by mouth Daily.     • metoprolol tartrate (LOPRESSOR) 25 MG tablet Take 0.5 tablets by mouth 2 (Two) Times a Day. 90 tablet 3   • [DISCONTINUED] EPINEPHrine (EPIPEN) 0.3 MG/0.3ML solution auto-injector injection As Needed.       No current facility-administered medications on file prior to visit.       Allergies   Allergen Reactions   • Sulfa Antibiotics Unknown (See Comments)     Pt was told as child he had an allergy.  Has not taken and doesn't know the response      Past Medical History:   Diagnosis Date   • A-fib (CMS/[facility])    • Acute kidney failure (CMS/[facility])    • Elevated cholesterol    • Hard of hearing    • Hyperlipidemia    • Hypertension    • Renal disorder    • Wears hearing aid in both ears    • Wears reading eyeglasses      Past Surgical History:   Procedure Laterality Date   • APPENDECTOMY     • COLONOSCOPY  2006   • INCISION AND DRAINAGE LEG Left 10/23/2018    Procedure: INCISION AND DRAINAGE LEFT FOOT;  Surgeon: Idania Osborn MD;  [facility]  Service: Orthopedics   • TOE AMPUTATION     • TONSILLECTOMY     • VENOUS ACCESS DEVICE (PORT) INSERTION N/A 10/23/2018    Procedure: GROSHONG CATHETER PLACEMENT;  Surgeon: Marquez Simons MD;  [facility];  Service: General   • VENOUS ACCESS DEVICE (PORT) REMOVAL       History reviewed. No pertinent family history.   Social History     Socioeconomic History   • Marital status:      Spouse name: Not on file   • Number of children: Not on file   • Years of education: Not on file   • Highest education level: Not on file   Tobacco Use   • Smoking status: Never Smoker   • Smokeless tobacco: Never Used   Substance and Sexual Activity   • Alcohol use: Yes     Alcohol/week: 3.0 - 4.0  "standard drinks     Types: 3 - 4 Shots of liquor per week     Comment: drinks a fifth a week DRINKS 3-5 DRINKS DAILY    • Drug use: No   • Sexual activity: Defer        Review of Systems   Constitutional: Negative.    HENT: Negative.    Eyes: Negative.    Respiratory: Negative.    Cardiovascular: Negative.    Gastrointestinal: Negative.    Endocrine: Negative.    Genitourinary: Negative.    Musculoskeletal: Positive for arthralgias.   Skin: Negative.    Allergic/Immunologic: Negative.    Neurological: Negative.    Hematological: Negative.    Psychiatric/Behavioral: Negative.        The following portions of the patient's history were reviewed and updated as appropriate: allergies, current medications, past family history, past medical history, past social history, past surgical history and problem list.    Physical Exam:   Pulse 84   Ht 182.9 cm (72.01\")   Wt 90.7 kg (199 lb 15.3 oz)   SpO2 96%   BMI 27.11 kg/m²   GENERAL: Body habitus: overweight    Lower extremity edema: Left: trace; Right: trace    Gait: normal     Mental Status:  awake and alert; oriented to person, place, and time  MSK:  Tibia:  Right:  non tender; Left:  non tender        Ankle:  Right: non tender; Left:  non tender        Foot:  Right:  non tender and No ulcers; Left:  Second toe has exposed bone, the toe is enlarged, faint erythema, improved compared to the picture he texted on Friday               NEURO Sensation:  absent    Medical Decision Making    Data Review:   ordered and reviewed x-rays today    Assessment/Plan/Diagnosis/Treatment Options:   1. Osteomyelitis of second toe of left foot (CMS/HCC)  He has exposed bone in the second toe.  This is clearly osteomyelitis both clinically and radiographically.  I recommend that we amputate the toe.  It is unclear how long it is been open and this infected but I suspect quite a while.  The radiographs show more than half of the middle phalanx is eroded, that does not happen overnight.  He " asked me how he would walk with a toe amputation, frankly I do not think it will be much different than now because he has such dense neuropathy he is already walking on a partial foot amputation with a dislocated second toe.  If anything he might walk a little bit better not having to worry about that toe rubbing and getting infected.  However I very stringently cautioned him that he is at significant risk to go on to a below-knee amputation.  Statistically given his multiple problems over the past several years he has a greater than 50% risk of below-knee amputation in the span of 2 years.  He must do better foot care, he must pay more attention to his feet and he has to except that he is not going to be able to do all of the activities that he did previously.  He reports he finds this very difficult to except.  I do not know any other way to convey to him how to take care of his feet other than the multiple times I explained that very simply in the past.  I am very afraid he is going to go on to higher amputation.  We can do this as an outpatient under local with sedation.  I do not think he needs long-term IV antibiotics because we will be removing the entire source.  I briefly discussed this with infectious disease who agree.  We will send him out on 5 days of doxycycline and 7 to 10 days of Keflex.  I recommend we do this tomorrow so that he does not have progression of infection.  We discussed the risks including but not limited to: death, infection, neurovascular damage, strokes, heart attacks, blood clots, chronic pain, deformity, stiffness, need for  further surgery, higher amputation, etc.  Questions asked and answered in detail.          2. Neuropathy  Very dense neuropathy high risk for wounds and amputation

## 2020-01-06 NOTE — TELEPHONE ENCOUNTER
PIOTR is requesting cardiac clearance for an upcoming left second toe amputation with Dr. Osborn scheduled tomorrow. He did take Eliquis this AM. Is he OK to clear and hold Eliquis this evening and tomorrow?

## 2020-01-07 ENCOUNTER — HOSPITAL ENCOUNTER (OUTPATIENT)
Facility: HOSPITAL | Age: 75
Setting detail: SURGERY ADMIT
Discharge: HOME | End: 2020-01-07
Attending: ORTHOPAEDIC SURGERY | Admitting: ORTHOPAEDIC SURGERY

## 2020-01-07 ENCOUNTER — ANESTHESIA EVENT (OUTPATIENT)
Dept: PERIOP | Facility: HOSPITAL | Age: 75
End: 2020-01-07

## 2020-01-07 ENCOUNTER — ANESTHESIA (OUTPATIENT)
Dept: PERIOP | Facility: HOSPITAL | Age: 75
End: 2020-01-07

## 2020-01-07 VITALS
TEMPERATURE: 98.1 F | RESPIRATION RATE: 16 BRPM | SYSTOLIC BLOOD PRESSURE: 129 MMHG | HEART RATE: 108 BPM | DIASTOLIC BLOOD PRESSURE: 119 MMHG | OXYGEN SATURATION: 93 %

## 2020-01-07 DIAGNOSIS — L98.491 NEUROPATHIC ULCER, LIMITED TO BREAKDOWN OF SKIN (HCC): ICD-10-CM

## 2020-01-07 PROCEDURE — 87147 CULTURE TYPE IMMUNOLOGIC: CPT | Performed by: ORTHOPAEDIC SURGERY

## 2020-01-07 PROCEDURE — 28820 AMPUTATION OF TOE: CPT | Performed by: ORTHOPAEDIC SURGERY

## 2020-01-07 PROCEDURE — 87176 TISSUE HOMOGENIZATION CULTR: CPT | Performed by: ORTHOPAEDIC SURGERY

## 2020-01-07 PROCEDURE — 87206 SMEAR FLUORESCENT/ACID STAI: CPT | Performed by: ORTHOPAEDIC SURGERY

## 2020-01-07 PROCEDURE — 87102 FUNGUS ISOLATION CULTURE: CPT | Performed by: ORTHOPAEDIC SURGERY

## 2020-01-07 PROCEDURE — 87075 CULTR BACTERIA EXCEPT BLOOD: CPT | Performed by: ORTHOPAEDIC SURGERY

## 2020-01-07 PROCEDURE — 87116 MYCOBACTERIA CULTURE: CPT | Performed by: ORTHOPAEDIC SURGERY

## 2020-01-07 PROCEDURE — 25010000002 PROPOFOL 10 MG/ML EMULSION: Performed by: NURSE ANESTHETIST, CERTIFIED REGISTERED

## 2020-01-07 PROCEDURE — 25010000002 VANCOMYCIN: Performed by: ORTHOPAEDIC SURGERY

## 2020-01-07 PROCEDURE — 88305 TISSUE EXAM BY PATHOLOGIST: CPT | Performed by: ORTHOPAEDIC SURGERY

## 2020-01-07 PROCEDURE — 87186 SC STD MICRODIL/AGAR DIL: CPT | Performed by: ORTHOPAEDIC SURGERY

## 2020-01-07 PROCEDURE — 88311 DECALCIFY TISSUE: CPT | Performed by: ORTHOPAEDIC SURGERY

## 2020-01-07 PROCEDURE — 25010000002 PHENYLEPHRINE PER 1 ML: Performed by: NURSE ANESTHETIST, CERTIFIED REGISTERED

## 2020-01-07 PROCEDURE — 87070 CULTURE OTHR SPECIMN AEROBIC: CPT | Performed by: ORTHOPAEDIC SURGERY

## 2020-01-07 PROCEDURE — 87205 SMEAR GRAM STAIN: CPT | Performed by: ORTHOPAEDIC SURGERY

## 2020-01-07 RX ORDER — MAGNESIUM HYDROXIDE 1200 MG/15ML
LIQUID ORAL AS NEEDED
Status: DISCONTINUED | OUTPATIENT
Start: 2020-01-07 | End: 2020-01-07 | Stop reason: HOSPADM

## 2020-01-07 RX ORDER — PROPOFOL 10 MG/ML
VIAL (ML) INTRAVENOUS AS NEEDED
Status: DISCONTINUED | OUTPATIENT
Start: 2020-01-07 | End: 2020-01-07 | Stop reason: SURG

## 2020-01-07 RX ORDER — FENTANYL CITRATE 50 UG/ML
50 INJECTION, SOLUTION INTRAMUSCULAR; INTRAVENOUS
Status: DISCONTINUED | OUTPATIENT
Start: 2020-01-07 | End: 2020-01-07 | Stop reason: HOSPADM

## 2020-01-07 RX ORDER — BACITRACIN 50000 [IU]/1
INJECTION, POWDER, FOR SOLUTION INTRAMUSCULAR AS NEEDED
Status: DISCONTINUED | OUTPATIENT
Start: 2020-01-07 | End: 2020-01-07 | Stop reason: HOSPADM

## 2020-01-07 RX ORDER — FAMOTIDINE 10 MG/ML
20 INJECTION, SOLUTION INTRAVENOUS ONCE
Status: CANCELLED | OUTPATIENT
Start: 2020-01-07 | End: 2020-01-07

## 2020-01-07 RX ORDER — HYDROMORPHONE HYDROCHLORIDE 1 MG/ML
0.5 INJECTION, SOLUTION INTRAMUSCULAR; INTRAVENOUS; SUBCUTANEOUS
Status: DISCONTINUED | OUTPATIENT
Start: 2020-01-07 | End: 2020-01-07 | Stop reason: HOSPADM

## 2020-01-07 RX ORDER — SODIUM CHLORIDE 0.9 % (FLUSH) 0.9 %
10 SYRINGE (ML) INJECTION EVERY 12 HOURS SCHEDULED
Status: DISCONTINUED | OUTPATIENT
Start: 2020-01-07 | End: 2020-01-07 | Stop reason: HOSPADM

## 2020-01-07 RX ORDER — FAMOTIDINE 20 MG/1
20 TABLET, FILM COATED ORAL ONCE
Status: COMPLETED | OUTPATIENT
Start: 2020-01-07 | End: 2020-01-07

## 2020-01-07 RX ORDER — SODIUM CHLORIDE 0.9 % (FLUSH) 0.9 %
10 SYRINGE (ML) INJECTION AS NEEDED
Status: DISCONTINUED | OUTPATIENT
Start: 2020-01-07 | End: 2020-01-07 | Stop reason: HOSPADM

## 2020-01-07 RX ORDER — SODIUM CHLORIDE, SODIUM LACTATE, POTASSIUM CHLORIDE, CALCIUM CHLORIDE 600; 310; 30; 20 MG/100ML; MG/100ML; MG/100ML; MG/100ML
9 INJECTION, SOLUTION INTRAVENOUS CONTINUOUS
Status: DISCONTINUED | OUTPATIENT
Start: 2020-01-07 | End: 2020-01-07 | Stop reason: HOSPADM

## 2020-01-07 RX ORDER — LIDOCAINE HYDROCHLORIDE 10 MG/ML
0.5 INJECTION, SOLUTION EPIDURAL; INFILTRATION; INTRACAUDAL; PERINEURAL ONCE AS NEEDED
Status: COMPLETED | OUTPATIENT
Start: 2020-01-07 | End: 2020-01-07

## 2020-01-07 RX ADMIN — VANCOMYCIN HYDROCHLORIDE 1500 MG: 10 INJECTION, POWDER, LYOPHILIZED, FOR SOLUTION INTRAVENOUS at 08:41

## 2020-01-07 RX ADMIN — PROPOFOL 10 MG: 10 INJECTION, EMULSION INTRAVENOUS at 09:19

## 2020-01-07 RX ADMIN — PROPOFOL 100 MCG/KG/MIN: 10 INJECTION, EMULSION INTRAVENOUS at 09:18

## 2020-01-07 RX ADMIN — LIDOCAINE HYDROCHLORIDE 0.3 ML: 10 INJECTION, SOLUTION EPIDURAL; INFILTRATION; INTRACAUDAL; PERINEURAL at 08:08

## 2020-01-07 RX ADMIN — FAMOTIDINE 20 MG: 20 TABLET ORAL at 08:08

## 2020-01-07 RX ADMIN — SODIUM CHLORIDE, POTASSIUM CHLORIDE, SODIUM LACTATE AND CALCIUM CHLORIDE 9 ML/HR: 600; 310; 30; 20 INJECTION, SOLUTION INTRAVENOUS at 08:08

## 2020-01-07 RX ADMIN — PHENYLEPHRINE HYDROCHLORIDE 100 MCG: 10 INJECTION INTRAVENOUS at 09:36

## 2020-01-07 NOTE — INTERVAL H&P NOTE
Pre-Op H&P (See Recent Office Note Attached for Full H&P)      Review of Systems:  General ROS:  no fever, chills, rashes, No change since last office visit  Cardiovascular ROS: no chest pain or dyspnea on exertion.  New diagnosis of afib 2 weeks ago.  Seen by Dr. Earl, see epic note  Respiratory ROS: no cough, shortness of breath, or wheezing    Meds:    No current facility-administered medications on file prior to encounter.      Current Outpatient Medications on File Prior to Encounter   Medication Sig Dispense Refill   • apixaban (ELIQUIS) 5 MG tablet tablet Take 1 tablet by mouth 2 (Two) Times a Day. (Patient taking differently: Take 5 mg by mouth 2 (Two) Times a Day. ON HOLD AS OF 1-6-2020 AM BEING THE LAST DOSE.  DR. EARL NOTIFIED AND AWAITING APPROVAL) 180 tablet 3   • atorvastatin (LIPITOR) 80 MG tablet Take 80 mg by mouth Every Night.     • cephalexin (KEFLEX) 500 MG capsule Take 1 capsule by mouth 4 (Four) Times a Day for 10 days. 40 capsule 0   • lisinopril-hydrochlorothiazide (PRINZIDE,ZESTORETIC) 20-12.5 MG per tablet Take 1 tablet by mouth Daily.     • metoprolol tartrate (LOPRESSOR) 25 MG tablet Take 0.5 tablets by mouth 2 (Two) Times a Day. 90 tablet 3       Vital Signs:  /97 (BP Location: Right arm, Patient Position: Lying)   Pulse 100   Temp 96.8 °F (36 °C) (Tympanic)   Resp 14   SpO2 98%     Physical Exam:    CV:  S1S2 irregular rate and rhythm, no murmur               Resp:  Clear to auscultation; respirations regular, even and unlabored    Results Review:     Lab Results   Component Value Date    WBC 9.13 01/06/2020    HGB 15.5 01/06/2020    HCT 46.7 01/06/2020    MCV 94.7 01/06/2020     01/06/2020    NEUTROABS 5.77 01/06/2020    GLUCOSE 113 (H) 01/06/2020    BUN 25 (H) 01/06/2020    CREATININE 1.12 01/06/2020    EGFRIFNONA 64 01/06/2020     01/06/2020    K 5.1 01/06/2020     01/06/2020    CO2 25.0 01/06/2020    PHOS 2.5 10/19/2015    CALCIUM 10.0 01/06/2020     ALBUMIN 4.02 10/31/2018    AST 28 10/31/2018    ALT 34 10/31/2018    BILITOT 0.4 10/31/2018        I reviewed the patient's new clinical results.    Cancer Staging (if applicable)  Cancer Patient: __ yes _x_no __unknown; If yes, clinical stage T:__ N:__M:__, stage group or __N/A    Assessment/Plan:    1. Osteomyelitis of second toe of left foot (CMS/HCC)  He has exposed bone in the second toe.  This is clearly osteomyelitis both clinically and radiographically.  I recommend that we amputate the toe.  It is unclear how long it is been open and this infected but I suspect quite a while.  The radiographs show more than half of the middle phalanx is eroded, that does not happen overnight.  He asked me how he would walk with a toe amputation, frankly I do not think it will be much different than now because he has such dense neuropathy he is already walking on a partial foot amputation with a dislocated second toe.  If anything he might walk a little bit better not having to worry about that toe rubbing and getting infected.  However I very stringently cautioned him that he is at significant risk to go on to a below-knee amputation.  Statistically given his multiple problems over the past several years he has a greater than 50% risk of below-knee amputation in the span of 2 years.  He must do better foot care, he must pay more attention to his feet and he has to except that he is not going to be able to do all of the activities that he did previously.  He reports he finds this very difficult to except.  I do not know any other way to convey to him how to take care of his feet other than the multiple times I explained that very simply in the past.  I am very afraid he is going to go on to higher amputation.  We can do this as an outpatient under local with sedation.  I do not think he needs long-term IV antibiotics because we will be removing the entire source.  I briefly discussed this with infectious disease who agree.   We will send him out on 5 days of doxycycline and 7 to 10 days of Keflex.  I recommend we do this today so that he does not have progression of infection.  We discussed the risks including but not limited to: death, infection, neurovascular damage, strokes, heart attacks, blood clots, chronic pain, deformity, stiffness, need for  further surgery, higher amputation, etc.  Questions asked and answered in detail.      Esther Yoder, APRN  1/7/2020   8:46 AM

## 2020-01-07 NOTE — ANESTHESIA POSTPROCEDURE EVALUATION
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) [L98.491])    Surgeon:  Idania Osborn MD Provider:  Anthony Callejas MD    Anesthesia Type:  general ASA Status:  3          Anesthesia Type: general    Vitals  Vitals Value Taken Time   BP 98/56 1/7/2020 10:01 AM   Temp 98.1 °F (36.7 °C) 1/7/2020 10:01 AM   Pulse 97 1/7/2020 10:01 AM   Resp 17 1/7/2020 10:01 AM   SpO2 98 % 1/7/2020 10:01 AM           Post Anesthesia Care and Evaluation    Patient location during evaluation: PACU  Patient participation: complete - patient participated  Level of consciousness: awake and alert  Pain score: 0  Pain management: adequate  Airway patency: patent  Anesthetic complications: No anesthetic complications  PONV Status: none  Cardiovascular status: hemodynamically stable and acceptable  Respiratory status: nonlabored ventilation, acceptable and nasal cannula  Hydration status: acceptable

## 2020-01-07 NOTE — OP NOTE
"Operative Report    01/07/20  9:53 AM    Preoperative diagnosis: Chronic osteomyelitis with exposed bone at the PIP joint and erosion of the middle phalanx left second toe    Postoperative diagnosis: Same    Anesthesia: Digital block with sedation    Surgeon: Idania Osborn M.D.      Operative procedure: Amputate left second toe at metatarsal phalangeal joint    Operative indications: This is a 74-year-old gentleman with severe neuropathy status post first a left great toe amputation and then a left first ray amputation for severe infections.  He has had an ulcer on the dorsal aspect of the left second toe.  The toe is chronically dislocated.  The ulcer had healed in the past but broke down again, the patient reports that he \"doctored it\" on his own.  He came into the office yesterday with the middle phalanx sticking out of the ulcer on the dorsal aspect of the PIP joint.  The x-ray shows more than 50% destruction of the middle phalanx by infection.  He had called about this ulcer on Friday 3 days ago, he send us a picture where the toe was bright red and obviously infected.  He reported that he did not think it was infected.  We started him on oral Keflex and I saw him in the office yesterday.  The erythema was much improved, but the bone was exposed and sticking out of the ulcer on the toe.  I recommended amputation.  The goal is to remove the infected deformed toe to prevent spread of the infection to the remainder of the foot.  Given his dense neuropathy and 2 prior partial amputations and now this amputation, he has a greater than 50% risk of going on to below-knee amputation in the span of 2 years.  He has very dense neuropathy and has been  noncompliant with caring for his feet.    Operative procedure: The patient was taken to the operating room where he was given sedation and antibiotics.  The first timeout was called and I placed a digital block using 10 cc of half percent ropivacaine and 1% lidocaine.  The " left leg was then prepped and draped in the usual sterile fashion.  The appropriate timeout was called.  I made a fishmouth shaped incision over the proximal phalanx.  This was carried down through soft tissue sharply.  Hemostasis was obtained with electrocautery, the nerves were placed on stretch transected sharply and allowed to retract proximally.  The toe was removed through the metatarsal phalangeal joint.  It was opened on the back table and deep cultures were obtained per routine.  Those instruments were not reused.  The wound was then irrigated copiously with antibiotic solution using pulsatile lavage.  It was closed with nylon.  Incision was dressed sterilely.  he was placed in a postop shoe.  he was awakened and transferred to recovery room stable condition.  Postop plan will be discharge home on oral antibiotics (keflex and doxycycline).  Weightbearing on the heel in the postop shoe.      Estimated blood loss: 5cc    Specimens: cultures and permanant    Drains: none    Complications: none    Idania Osborn MD  01/07/20  9:53 AM

## 2020-01-07 NOTE — ANESTHESIA PREPROCEDURE EVALUATION
Anesthesia Evaluation                  Airway   Mallampati: II  TM distance: >3 FB  Neck ROM: full  Anterior and Possible difficult intubation  Dental - normal exam     Pulmonary - normal exam   (+) shortness of breath,   Cardiovascular - normal exam    (+) hypertension, dysrhythmias Atrial Fib, MARTINEZ, hyperlipidemia,       Neuro/Psych  GI/Hepatic/Renal/Endo    (+) obesity,   renal disease ARF and CRI,     Musculoskeletal     Abdominal  - normal exam    Bowel sounds: normal.   Substance History      OB/GYN          Other          Other Comment: HEARING LOSS                Anesthesia Plan    ASA 3     general   (PROPOFOL)  intravenous induction     Anesthetic plan, all risks, benefits, and alternatives have been provided, discussed and informed consent has been obtained with: patient.    Plan discussed with CRNA.

## 2020-01-07 NOTE — PATIENT INSTRUCTIONS
"Education    Diabetic Foot Care / Neuropathic Foot Care    SKIN  · Remove shoes and socks and look at the feet every morning and night.         · Blisters - clean it with peroxide or alcohol.  DO NOT put a shoe back on until it is healed.  DO NOT WALK ON THE FOOT.  If it is red or looks infected, call your doctor.    · Callus - sand calluses daily, it works better on dry skin.  A PED EGG is the best tool, or file or a pumice stone ([facility]) using a back and forth motion over the callus.    · Ingrown nail - soak it in soapy water and call your doctor.    · Ulcers or sores on the foot - DO NOT PUT A SHOE ON, DO NOT WALK ON THE FOOT, go to the doctor who looks at your feet.    · Moisturize the feet every night.  An easy method: The foot with a thin layer of Vaseline or Bag [place] (you can find this at [employer], any feed store) and then a sock    NAILS  · Trim or file your nails straight across and leave them a little long.  If you have thick fungal nails, a nail  tool or dremel tool works well.    · If you have an ingrown nail with reddened skin, soak it (as mentioned above and call your doctor).    SHOES  · Keep your shoes in good repair and wear any special inserts or diabetic shoes as prescribed.    · If you have a problem with a special diabetic shoe and/or insert, such as rubbing, go back to where you got the diabetic shoe/insert as soon as possible.    · If you are in \"regular\" shoes, make sure they fit.  Shoes with soft, padded soles, rounded toes, and good support are best.    THINGS NOT TO DO  · DO NOT go barefoot    · DO NOT soak feet in very hot water.    · DO NOT soak feet in anything other than water with soap or Epsom Salts (NO bleach, turpentine, gasoline, etc.).          "

## 2020-01-07 NOTE — BRIEF OP NOTE
Orthopedics amputate left 2nd toe  Brief Op Note    Carlos Campa  1/7/2020    Pre-op Diagnosis:   Exposed bone, chronic osteomyelitis left 2nd toe    Post-op Diagnosis:  same       Post-Op Diagnosis Codes:     * Neuropathic ulcer, limited to breakdown of skin (CMS/[facility]) [L98.491]    Procedure(s):  amputate left 2nd toe    Surgeon(s):  Idania Osborn MD    Anesthesia:  General with Block    Staff:   Circulator: Tad Moyer RN  Scrub Person: Jade Li    Estimated Blood Loss:5cc      Specimens: cultures and permanent      Drains:  none    Complications:  None    Tourniquet:: none    Dressing:soft    Disposition:rr stable    Idania Osborn MD     Date: 1/7/2020  Time: 9:52 AM

## 2020-01-07 NOTE — DISCHARGE INSTRUCTIONS
1. Wt bear on heel in post op shoe  2. Elevate operated foot over heart  3. Change the dressing every 4 days, thin layer Bactroban cream (in chart) gauze and ace bandage, do not get the foot wet.    4. Call the office if any problems: [phone]  5. Take the Zofran with the pain meds if you have nausea/vomiting  6. Take elequis to help prevent blood clots

## 2020-01-08 LAB
CYTO UR: NORMAL
LAB AP CASE REPORT: NORMAL
LAB AP CLINICAL INFORMATION: NORMAL
PATH REPORT.FINAL DX SPEC: NORMAL
PATH REPORT.GROSS SPEC: NORMAL

## 2020-01-10 LAB
BACTERIA SPEC AEROBE CULT: ABNORMAL
GRAM STN SPEC: ABNORMAL
GRAM STN SPEC: ABNORMAL

## 2020-01-12 LAB — BACTERIA SPEC ANAEROBE CULT: NORMAL

## 2020-01-22 ENCOUNTER — OFFICE VISIT (OUTPATIENT)
Dept: ORTHOPEDIC SURGERY | Facility: CLINIC | Age: 75
End: 2020-01-22

## 2020-01-22 DIAGNOSIS — M86.9 OSTEOMYELITIS OF SECOND TOE OF LEFT FOOT (HCC): Primary | ICD-10-CM

## 2020-01-22 DIAGNOSIS — G62.9 NEUROPATHY: ICD-10-CM

## 2020-01-22 PROCEDURE — 99024 POSTOP FOLLOW-UP VISIT: CPT | Performed by: ORTHOPAEDIC SURGERY

## 2020-01-22 NOTE — PROGRESS NOTES
Post-op (2 weeks post Amputate left second toe at metatarsal phalangeal joint 1/7/20)      Carlos Campa is 2 weeks status post amputate left 2nd toe for osteomyelitis, 1/7/2020. He reports no fever, chills.  He reports pain is not present, due to the dense neuropathy.  They have been taking Eliquis for DVT prophylaxis.  They have been weight bearing on heel in post op shoe.      Past Surgical History:   Procedure Laterality Date   • AMPUTATION DIGIT Left 1/7/2020    Procedure: AMPUTATION LEFT 2ND TOE;  Surgeon: Idania Osborn MD;  Location:  [facility] OR;  Service: Orthopedics   • APPENDECTOMY     • COLONOSCOPY  2006   • INCISION AND DRAINAGE LEG Left 10/23/2018    Procedure: INCISION AND DRAINAGE LEFT FOOT;  Surgeon: Idania Osborn MD;  Location:  [facility] OR;  Service: Orthopedics   • TOE AMPUTATION     • TONSILLECTOMY     • VENOUS ACCESS DEVICE (PORT) INSERTION N/A 10/23/2018    Procedure: GROSHONG CATHETER PLACEMENT;  Surgeon: Marquez Simons MD;  Location:  [facility] OR;  Service: General   • VENOUS ACCESS DEVICE (PORT) REMOVAL         There were no vitals taken for this visit.       He has been up and around too much, the foot is swollen, but no erythema, there is one small 4mm area of granulation tissue dorsally, no change in dense neuropatht, left foot    reviewed prior lab results- path showed osteomyelitis, cultures grew a fairly sensitive staph a    Assessment and Plan:   1. Osteomyelitis of second toe of left foot (CMS/HCC)  He is doing too much , walking too much.  He does not remember me telling him to stay off the foot and do limited activity, although I told him this pre and post op.  No sign of infection but it is too swollen.  He is at very high risk for higher amputation, and I cannot think of any other way to convey this to him.  I have explained this in the bluntest and simplest terms.  His foot is very fragile, and he must be diligent in following instructions in order to have a  chance of keeping it.  We removed every other suture, re-dressed. VERY LIMITED ACTIVITY, elevate it, dressing change every few days.  I will see him in 10-14 days to likely remove other sutures.      2. Neuropathy  No change

## 2020-02-03 ENCOUNTER — OFFICE VISIT (OUTPATIENT)
Dept: ORTHOPEDIC SURGERY | Facility: CLINIC | Age: 75
End: 2020-02-03

## 2020-02-03 DIAGNOSIS — M86.9 OSTEOMYELITIS OF SECOND TOE OF LEFT FOOT (HCC): Primary | ICD-10-CM

## 2020-02-03 PROCEDURE — 99024 POSTOP FOLLOW-UP VISIT: CPT | Performed by: ORTHOPAEDIC SURGERY

## 2020-02-03 NOTE — PROGRESS NOTES
Post-op (1 week recheck (3 weeks post Amputate left second toe at metatarsal phalangeal joint 1/7/20))      Carlos Campa is 3 weeks status post amputate left second toe at [facility], 1/7/2020. He reports no fever, chills.  He reports pain is well controlled.  They have been taking aspirin for DVT prophylaxis.  They have been weight bearing on heel in post op shoe.      Past Surgical History:   Procedure Laterality Date   • AMPUTATION DIGIT Left 1/7/2020    Procedure: AMPUTATION LEFT 2ND TOE;  Surgeon: Idania Osborn MD;  Location:  [facility] OR;  Service: Orthopedics   • APPENDECTOMY     • COLONOSCOPY  2006   • INCISION AND DRAINAGE LEG Left 10/23/2018    Procedure: INCISION AND DRAINAGE LEFT FOOT;  Surgeon: Idania Osborn MD;  Location:  [facility] OR;  Service: Orthopedics   • TOE AMPUTATION     • TONSILLECTOMY     • VENOUS ACCESS DEVICE (PORT) INSERTION N/A 10/23/2018    Procedure: GROSHONG CATHETER PLACEMENT;  Surgeon: Marquez Simons MD;  Location:  [facility] OR;  Service: General   • VENOUS ACCESS DEVICE (PORT) REMOVAL         There were no vitals taken for this visit.        Good alignment, no erythema, no drainage, no sign of infection, normal post op swelling left foot second amputation site, first amputation site is well-healed    none    Assessment and Plan:   1. Osteomyelitis of second toe of left foot (CMS/HCC)  Slowly healing, we remove the remaining sutures.  He may take a shower but not soak it.  He must stay in the postop shoe.  It is not healed enough to go into a shoe.  He will definitely need new orthotic.  He will return to see Ms. Zacarias in 3 to 4 weeks.  If he is fully healed he may go into a shoe with a new orthotic and return as needed.  We again discussed neuropathic foot care.

## 2020-02-03 NOTE — PATIENT INSTRUCTIONS
"Education    Diabetic Foot Care / Neuropathic Foot Care    SKIN  · Remove shoes and socks and look at the feet every morning and night.         · Blisters - clean it with peroxide or alcohol.  DO NOT put a shoe back on until it is healed.  DO NOT WALK ON THE FOOT.  If it is red or looks infected, call your doctor.    · Callus - sand calluses daily, it works better on dry skin.  A PED EGG is the best tool, or file or a pumice stone (available [facility]) using a back and forth motion over the callus.    · Ingrown nail - soak it in soapy water and call your doctor.    · Ulcers or sores on the foot - DO NOT PUT A SHOE ON, DO NOT WALK ON THE FOOT, go to the doctor who looks at your feet.    · Moisturize the feet every night.  An easy method: The foot with a thin layer of Vaseline or Bag [place] (you can find this at [employer], any feed store) and then a sock    NAILS  · Trim or file your nails straight across and leave them a little long.  If you have thick fungal nails, a nail  tool or dremel tool works well.    · If you have an ingrown nail with reddened skin, soak it (as mentioned above and call your doctor).    SHOES  · Keep your shoes in good repair and wear any special inserts or diabetic shoes as prescribed.    · If you have a problem with a special diabetic shoe and/or insert, such as rubbing, go back to where you got the diabetic shoe/insert as soon as possible.    · If you are in \"regular\" shoes, make sure they fit.  Shoes with soft, padded soles, rounded toes, and good support are best.    THINGS NOT TO DO  · DO NOT go barefoot    · DO NOT soak feet in very hot water.    · DO NOT soak feet in anything other than water with soap or Epsom Salts (NO bleach, turpentine, gasoline, etc.).          "

## 2020-02-18 LAB
FUNGUS WND CULT: NORMAL
MYCOBACTERIUM SPEC CULT: NORMAL
NIGHT BLUE STAIN TISS: NORMAL

## 2020-03-05 PROBLEM — E78.5 HYPERLIPIDEMIA LDL GOAL <100: Status: ACTIVE | Noted: 2020-03-05

## 2020-03-05 PROBLEM — I48.19 PERSISTENT ATRIAL FIBRILLATION: Status: ACTIVE | Noted: 2020-03-05

## 2020-03-05 PROBLEM — I48.91 A-FIB: Status: ACTIVE | Noted: 2020-03-05

## 2020-11-11 ENCOUNTER — OFFICE VISIT (OUTPATIENT)
Dept: ORTHOPEDIC SURGERY | Facility: CLINIC | Age: 75
End: 2020-11-11

## 2020-11-11 VITALS — WEIGHT: 210.4 LBS | HEIGHT: 72 IN | BODY MASS INDEX: 28.5 KG/M2 | OXYGEN SATURATION: 99 % | HEART RATE: 99 BPM

## 2020-11-11 DIAGNOSIS — L98.499 NEUROPATHIC ULCER, UNSPECIFIED ULCER STAGE (HCC): Primary | ICD-10-CM

## 2020-11-11 PROCEDURE — 87186 SC STD MICRODIL/AGAR DIL: CPT | Performed by: PHYSICIAN ASSISTANT

## 2020-11-11 PROCEDURE — 87075 CULTR BACTERIA EXCEPT BLOOD: CPT | Performed by: PHYSICIAN ASSISTANT

## 2020-11-11 PROCEDURE — 87070 CULTURE OTHR SPECIMN AEROBIC: CPT | Performed by: PHYSICIAN ASSISTANT

## 2020-11-11 PROCEDURE — 99214 OFFICE O/P EST MOD 30 MIN: CPT | Performed by: PHYSICIAN ASSISTANT

## 2020-11-11 PROCEDURE — 87205 SMEAR GRAM STAIN: CPT | Performed by: PHYSICIAN ASSISTANT

## 2020-11-11 PROCEDURE — 87147 CULTURE TYPE IMMUNOLOGIC: CPT | Performed by: PHYSICIAN ASSISTANT

## 2020-11-11 RX ORDER — LEVOFLOXACIN 500 MG/1
500 TABLET, FILM COATED ORAL DAILY
Qty: 14 TABLET | Refills: 0 | Status: SHIPPED | OUTPATIENT
Start: 2020-11-11 | End: 2020-11-23 | Stop reason: SDUPTHER

## 2020-11-12 ENCOUNTER — TELEPHONE (OUTPATIENT)
Dept: ORTHOPEDIC SURGERY | Facility: CLINIC | Age: 75
End: 2020-11-12

## 2020-11-12 NOTE — TELEPHONE ENCOUNTER
PATIENT CALLED REQUESTING BACTROBAN OINTMENT TO BE SENT TO [facility] IN [place]. PATIENT CAN BE REACHED [phone] WITH ANY QUESTIONS.

## 2020-11-14 LAB
BACTERIA SPEC AEROBE CULT: ABNORMAL
BACTERIA SPEC AEROBE CULT: ABNORMAL
GRAM STN SPEC: ABNORMAL

## 2020-11-16 ENCOUNTER — HOSPITAL ENCOUNTER (OUTPATIENT)
Dept: MRI IMAGING | Facility: HOSPITAL | Age: 75
Discharge: HOME | End: 2020-11-16
Admitting: PHYSICIAN ASSISTANT

## 2020-11-16 DIAGNOSIS — L98.499 NEUROPATHIC ULCER, UNSPECIFIED ULCER STAGE (HCC): ICD-10-CM

## 2020-11-16 LAB — BACTERIA SPEC ANAEROBE CULT: NORMAL

## 2020-11-16 PROCEDURE — 73718 MRI LOWER EXTREMITY W/O DYE: CPT

## 2020-11-18 ENCOUNTER — TRANSCRIBE ORDERS (OUTPATIENT)
Dept: CARDIOLOGY | Facility: HOSPITAL | Age: 75
End: 2020-11-18

## 2020-11-18 ENCOUNTER — HOSPITAL ENCOUNTER (OUTPATIENT)
Dept: CARDIOLOGY | Facility: HOSPITAL | Age: 75
Discharge: HOME | End: 2020-11-18
Admitting: INTERNAL MEDICINE

## 2020-11-18 ENCOUNTER — OFFICE VISIT (OUTPATIENT)
Dept: ORTHOPEDIC SURGERY | Facility: CLINIC | Age: 75
End: 2020-11-18

## 2020-11-18 VITALS — OXYGEN SATURATION: 99 % | HEIGHT: 72 IN | BODY MASS INDEX: 29.12 KG/M2 | HEART RATE: 77 BPM | WEIGHT: 215 LBS

## 2020-11-18 DIAGNOSIS — M86.172 ACUTE OSTEOMYELITIS OF LEFT ANKLE OR FOOT (HCC): Primary | ICD-10-CM

## 2020-11-18 DIAGNOSIS — M86.172 OSTEOMYELITIS OF ANKLE OR FOOT, LEFT, ACUTE (HCC): Primary | ICD-10-CM

## 2020-11-18 DIAGNOSIS — M86.172 ACUTE OSTEOMYELITIS OF LEFT ANKLE OR FOOT (HCC): ICD-10-CM

## 2020-11-18 LAB
QT INTERVAL: 326 MS
QTC INTERVAL: 468 MS

## 2020-11-18 PROCEDURE — 93005 ELECTROCARDIOGRAM TRACING: CPT | Performed by: INTERNAL MEDICINE

## 2020-11-18 PROCEDURE — 99213 OFFICE O/P EST LOW 20 MIN: CPT | Performed by: PHYSICIAN ASSISTANT

## 2020-11-18 PROCEDURE — 93010 ELECTROCARDIOGRAM REPORT: CPT | Performed by: INTERNAL MEDICINE

## 2020-11-18 NOTE — PROGRESS NOTES
[facility] [facility] Note    Subjective     Patient: Carlos Culver  : 1945    Primary Care Provider: Marcus Cheng MD    Requesting Provider: As above    Follow-up of the Left Foot (1 week f/u, Post MRI 20)      History    Chief Complaint: f/up left foot MRI    History of Present Illness: Patient returns today for f/up left foot MRI.  He states he has been compliant with the half shoe.  He is taking the antibiotics as instructed.  Denies any fever/chills.  Patient was hunting this weekend and was climbing up into deer stands.    Current Outpatient Medications on File Prior to Visit   Medication Sig Dispense Refill   • apixaban (ELIQUIS) 5 MG tablet tablet Take 1 tablet by mouth 2 (Two) Times a Day. (Patient taking differently: Take 5 mg by mouth 2 (Two) Times a Day. ON HOLD AS OF 2020 AM BEING THE LAST DOSE.  DR. EARL NOTIFIED AND AWAITING APPROVAL) 180 tablet 3   • atorvastatin (LIPITOR) 80 MG tablet Take 80 mg by mouth Every Night.     • HYDROcodone-acetaminophen (NORCO) 7.5-325 MG per tablet Take 1-2 tablets by mouth Every 6 (Six) Hours As Needed for Moderate Pain  (as needed for post surgical pain). 60 tablet 0   • levoFLOXacin (Levaquin) 500 MG tablet Take 1 tablet by mouth Daily. 14 tablet 0   • lisinopril-hydrochlorothiazide (PRINZIDE,ZESTORETIC) 20-12.5 MG per tablet Take 1 tablet by mouth Daily.     • metoprolol tartrate (LOPRESSOR) 25 MG tablet Take 0.5 tablets by mouth 2 (Two) Times a Day. 90 tablet 3   • mupirocin (Bactroban) 2 % ointment Apply  topically to the appropriate area as directed Daily. 30 g 0   • ondansetron (ZOFRAN) 4 MG tablet Take 1 tablet by mouth Every 6 (Six) Hours As Needed for Nausea or Vomiting. 30 tablet 0     Current Facility-Administered Medications on File Prior to Visit   Medication Dose Route Frequency Provider Last Rate Last Admin   • mupirocin (BACTROBAN) 2 % ointment   Topical Q12H Henrietta Zacarias PA-C           Allergies   Allergen Reactions   • Sulfa Antibiotics Unknown (See Comments)     Pt was told as child he had an allergy.  Has not taken and doesn't know the response      Past Medical History:   Diagnosis Date   • A-fib (CMS/[facility])    • Acute kidney failure (CMS/[facility])    • Elevated cholesterol    • Hard of hearing    • Hyperlipidemia    • Hypertension    • Renal disorder    • Wears hearing aid in both ears    • Wears reading eyeglasses      Past Surgical History:   Procedure Laterality Date   • AMPUTATION DIGIT Left 1/7/2020    Procedure: AMPUTATION LEFT 2ND TOE;  Surgeon: Idania Osborn MD;  Location:  [facility] OR;  Service: Orthopedics   • APPENDECTOMY     • COLONOSCOPY  2006   • INCISION AND DRAINAGE LEG Left 10/23/2018    Procedure: INCISION AND DRAINAGE LEFT FOOT;  Surgeon: Idania Osborn MD;  Location:  [facility] OR;  Service: Orthopedics   • TOE AMPUTATION     • TONSILLECTOMY     • VENOUS ACCESS DEVICE (PORT) INSERTION N/A 10/23/2018    Procedure: GROSHONG CATHETER PLACEMENT;  Surgeon: Marquez Simons MD;  Location:  [facility] OR;  Service: General   • VENOUS ACCESS DEVICE (PORT) REMOVAL       History reviewed. No pertinent family history.   Social History     Socioeconomic History   • Marital status:      Spouse name: Not on file   • Number of children: Not on file   • Years of education: Not on file   • Highest education level: Not on file   Tobacco Use   • Smoking status: Never Smoker   • Smokeless tobacco: Never Used   Substance and Sexual Activity   • Alcohol use: Yes     Alcohol/week: 3.0 - 4.0 standard drinks     Types: 3 - 4 Shots of liquor per week     Comment: drinks a fifth a week DRINKS 3-5 DRINKS DAILY    • Drug use: No   • Sexual activity: Defer        Review of Systems   Constitutional: Negative.    HENT: Negative.    Eyes: Negative.    Respiratory: Negative.    Cardiovascular: Negative.    Gastrointestinal: Negative.    Endocrine: Negative.    Genitourinary: Negative.   "  Musculoskeletal: Positive for arthralgias.   Skin: Negative.    Allergic/Immunologic: Negative.    Neurological: Negative.    Hematological: Negative.    Psychiatric/Behavioral: Negative.        The following portions of the patient's history were reviewed and updated as appropriate: allergies, current medications, past family history, past medical history, past social history, past surgical history and problem list.      Objective      Physical Exam  Pulse 77   Ht 182.9 cm (72.01\")   Wt 97.5 kg (215 lb)   SpO2 99%   BMI 29.15 kg/m²     Body mass index is 29.15 kg/m².    Patient is well developed, well nourished and in no acute distress.  Alert and oriented x 3.    Ortho Exam  Left foot exam: Ulcer under the 2nd and 3rd MT head.  No active drainage. Erythema over the dorsum of the foot.  No change in dense neuropathy.  No other ulcers or pre-ulcerous lesions    Medical Decision Making    Data Review:   reviewed radiology images    Assessment:  1. Osteomyelitis of ankle or foot, left, acute (CMS/[facility])        Plan:  Left foot osteomyelitis.  We reviewed MRI from 11/16/2020 and clinical findings with the patient.  MRI shows osteomyelitis involving the 2nd MT.  We had a long discussion with the patient regarding further treatment.  This would best be treated with a transmetatarsal amputation followed by IV antibiotics.  Recovery would be NWB for 3 months or more to allow for the incisions to heal.  We told him multiple times that he is at risk for amputation.   We discussed either 2nd opinion with Dr. Lynn at  and/or getting him into see Dr. Young at [facility] to discuss other options.  His prior course of antibiotics caused kidney injury so he is concerned to do IV abx again.  He would like to see if there are options for treating this without surgery. Either way, the patient was told he will need IV antibiotics.  Plan today is to have him see Dr. Houser at 12:30.  We have dressed the foot with a Bactroban " dressing and he will continue with the half-shoe.  We will plan for further treatment after he see Dr. Young today.    Patient history, diagnosis and treatment plan discussed with Dr. Osborn.    I was present and examined and had a long discussion with the patient and Ms. Zacarias.  The risk of not treating this is a below-knee amputation.  I explained why I would do a transmetatarsal amputation rather than amputate the second ray.  I think if I just remove the second metatarsal it would leave him with an even more precarious foot.  The transmetatarsal amputation is not perfect and he will still need to be diligent with foot care.  He reported that he does not remember ever discussing foot care.  I have discussed this with him numerous times over the past 2 years and printed out the instructions multiple times.  He is very high risk to go on to future amputations because of his desire tostay so active and not taking care of his foot.  MD Henrietta Bernal PA-C  11/18/20  15:03 EST

## 2020-11-18 NOTE — PATIENT INSTRUCTIONS
"Education    Diabetic Foot Care / Neuropathic Foot Care    SKIN  · Remove shoes and socks and look at the feet every morning and night.         · Blisters - clean it with peroxide or alcohol.  DO NOT put a shoe back on until it is healed.  DO NOT WALK ON THE FOOT.  If it is red or looks infected, call your doctor.    · Callus - sand calluses daily, it works better on dry skin.  A PED EGG is the best tool, or file or a pumice stone (available at [facility]) using a back and forth motion over the callus.    · Ingrown nail - soak it in soapy water and call your doctor.    · Ulcers or sores on the foot - DO NOT PUT A SHOE ON, DO NOT WALK ON THE FOOT, go to the doctor who looks at your feet.    · Moisturize the feet every night.  An easy method: The foot with a thin layer of Vaseline or Bag Arcadia (you can find this at [employer], any feed store) and then a sock    NAILS  · Trim or file your nails straight across and leave them a little long.  If you have thick fungal nails, a nail  tool or dremel tool works well.    · If you have an ingrown nail with reddened skin, soak it (as mentioned above and call your doctor).    SHOES  · Keep your shoes in good repair and wear any special inserts or diabetic shoes as prescribed.    · If you have a problem with a special diabetic shoe and/or insert, such as rubbing, go back to where you got the diabetic shoe/insert as soon as possible.    · If you are in \"regular\" shoes, make sure they fit.  Shoes with soft, padded soles, rounded toes, and good support are best.    THINGS NOT TO DO  · DO NOT go barefoot    · DO NOT soak feet in very hot water.    · DO NOT soak feet in anything other than water with soap or Epsom Salts (NO bleach, turpentine, gasoline, etc.).          "

## 2020-11-23 ENCOUNTER — TELEPHONE (OUTPATIENT)
Dept: ORTHOPEDIC SURGERY | Facility: CLINIC | Age: 75
End: 2020-11-23

## 2020-11-23 ENCOUNTER — PREP FOR SURGERY (OUTPATIENT)
Dept: OTHER | Facility: HOSPITAL | Age: 75
End: 2020-11-23

## 2020-11-23 DIAGNOSIS — M86.372 CHRONIC MULTIFOCAL OSTEOMYELITIS OF LEFT FOOT (HCC): Primary | ICD-10-CM

## 2020-11-23 RX ORDER — LEVOFLOXACIN 500 MG/1
500 TABLET, FILM COATED ORAL DAILY
Qty: 14 TABLET | Refills: 0 | Status: SHIPPED | OUTPATIENT
Start: 2020-11-23 | End: 2020-11-23 | Stop reason: SDUPTHER

## 2020-11-23 RX ORDER — LEVOFLOXACIN 500 MG/1
500 TABLET, FILM COATED ORAL DAILY
Qty: 14 TABLET | Refills: 0 | Status: SHIPPED | OUTPATIENT
Start: 2020-11-23 | End: 2020-11-25

## 2020-11-23 NOTE — TELEPHONE ENCOUNTER
Henrietta,   Dr. Osborn replied..... Ok to send refill on meds, bring him in wed to see henrietta and we can schedule surgery for 12/1 ..... I will call patient to let him know that you will submit the refill .I will get him scheduled to see you. I called patient and he will be here on Wednesday to see you .

## 2020-11-23 NOTE — TELEPHONE ENCOUNTER
PATIENT CALLED REQUESTING A REFILL ON LEVOFLOXACIN 500 MG. PATIENT ALSO STATED HE IS READY TO SCHEDULE LEFT FOOT SURGERY. [facility] [place]. PATIENT WOULD LIKE A CALL BACK [phone].

## 2020-11-25 ENCOUNTER — APPOINTMENT (OUTPATIENT)
Dept: PREADMISSION TESTING | Facility: HOSPITAL | Age: 75
End: 2020-11-25

## 2020-11-25 ENCOUNTER — OFFICE VISIT (OUTPATIENT)
Dept: ORTHOPEDIC SURGERY | Facility: CLINIC | Age: 75
End: 2020-11-25

## 2020-11-25 VITALS — OXYGEN SATURATION: 99 % | HEIGHT: 72 IN | BODY MASS INDEX: 29.12 KG/M2 | HEART RATE: 71 BPM | WEIGHT: 215 LBS

## 2020-11-25 DIAGNOSIS — M86.372 CHRONIC MULTIFOCAL OSTEOMYELITIS OF LEFT FOOT (HCC): ICD-10-CM

## 2020-11-25 DIAGNOSIS — M86.172 OSTEOMYELITIS OF ANKLE OR FOOT, LEFT, ACUTE (HCC): Primary | ICD-10-CM

## 2020-11-25 LAB
ANION GAP SERPL CALCULATED.3IONS-SCNC: 12 MMOL/L (ref 5–15)
BASOPHILS # BLD AUTO: 0.09 10*3/MM3 (ref 0–0.2)
BASOPHILS NFR BLD AUTO: 0.9 % (ref 0–1.5)
BUN SERPL-MCNC: 28 MG/DL (ref 8–23)
BUN/CREAT SERPL: 20.6 (ref 7–25)
CALCIUM SPEC-SCNC: 10.2 MG/DL (ref 8.6–10.5)
CHLORIDE SERPL-SCNC: 100 MMOL/L (ref 98–107)
CO2 SERPL-SCNC: 26 MMOL/L (ref 22–29)
CREAT SERPL-MCNC: 1.36 MG/DL (ref 0.76–1.27)
CRP SERPL-MCNC: 0.23 MG/DL (ref 0–0.5)
DEPRECATED RDW RBC AUTO: 43 FL (ref 37–54)
EOSINOPHIL # BLD AUTO: 1.08 10*3/MM3 (ref 0–0.4)
EOSINOPHIL NFR BLD AUTO: 10.4 % (ref 0.3–6.2)
ERYTHROCYTE [DISTWIDTH] IN BLOOD BY AUTOMATED COUNT: 12.5 % (ref 12.3–15.4)
ERYTHROCYTE [SEDIMENTATION RATE] IN BLOOD: 8 MM/HR (ref 0–20)
GFR SERPL CREATININE-BSD FRML MDRD: 51 ML/MIN/1.73
GLUCOSE SERPL-MCNC: 104 MG/DL (ref 65–99)
HBA1C MFR BLD: 5.8 % (ref 4.8–5.6)
HCT VFR BLD AUTO: 48.1 % (ref 37.5–51)
HGB BLD-MCNC: 16.3 G/DL (ref 13–17.7)
IMM GRANULOCYTES # BLD AUTO: 0.05 10*3/MM3 (ref 0–0.05)
IMM GRANULOCYTES NFR BLD AUTO: 0.5 % (ref 0–0.5)
LYMPHOCYTES # BLD AUTO: 2.32 10*3/MM3 (ref 0.7–3.1)
LYMPHOCYTES NFR BLD AUTO: 22.4 % (ref 19.6–45.3)
MCH RBC QN AUTO: 31.7 PG (ref 26.6–33)
MCHC RBC AUTO-ENTMCNC: 33.9 G/DL (ref 31.5–35.7)
MCV RBC AUTO: 93.4 FL (ref 79–97)
MONOCYTES # BLD AUTO: 0.94 10*3/MM3 (ref 0.1–0.9)
MONOCYTES NFR BLD AUTO: 9.1 % (ref 5–12)
NEUTROPHILS NFR BLD AUTO: 5.87 10*3/MM3 (ref 1.7–7)
NEUTROPHILS NFR BLD AUTO: 56.7 % (ref 42.7–76)
NRBC BLD AUTO-RTO: 0 /100 WBC (ref 0–0.2)
PLATELET # BLD AUTO: 175 10*3/MM3 (ref 140–450)
PMV BLD AUTO: 9.4 FL (ref 6–12)
POTASSIUM SERPL-SCNC: 4.7 MMOL/L (ref 3.5–5.2)
RBC # BLD AUTO: 5.15 10*6/MM3 (ref 4.14–5.8)
SODIUM SERPL-SCNC: 138 MMOL/L (ref 136–145)
WBC # BLD AUTO: 10.35 10*3/MM3 (ref 3.4–10.8)

## 2020-11-25 PROCEDURE — 83036 HEMOGLOBIN GLYCOSYLATED A1C: CPT

## 2020-11-25 PROCEDURE — 99214 OFFICE O/P EST MOD 30 MIN: CPT | Performed by: PHYSICIAN ASSISTANT

## 2020-11-25 PROCEDURE — 80048 BASIC METABOLIC PNL TOTAL CA: CPT

## 2020-11-25 PROCEDURE — 36415 COLL VENOUS BLD VENIPUNCTURE: CPT

## 2020-11-25 PROCEDURE — 86140 C-REACTIVE PROTEIN: CPT

## 2020-11-25 PROCEDURE — 85652 RBC SED RATE AUTOMATED: CPT

## 2020-11-25 PROCEDURE — 85025 COMPLETE CBC W/AUTO DIFF WBC: CPT

## 2020-11-25 NOTE — PROGRESS NOTES
[facility] [place] [place] [facility] [place]    Subjective     Patient: Carlos Culver  : 1945    Primary Care Provider: Marcus Cheng MD    Requesting Provider: As above    Follow-up (1 weeks follow up for Osteomyelitis of ankle or foot, left, acute)      History    Chief Complaint: Follow-up left foot    History of Present Illness: Patient returns today for follow-up of his left foot osteomyelitis and neuropathic ulcer.  He had a visit with Dr. Young and has decided to proceed with transmet amputation with Dr. Osborn.  He denies any fever chills or constitutional symptoms.  He has been in a half shoe as instructed.  No new symptoms.    Current Outpatient Medications on File Prior to Visit   Medication Sig Dispense Refill   • apixaban (ELIQUIS) 5 MG tablet tablet Take 1 tablet by mouth 2 (Two) Times a Day. (Patient taking differently: Take 5 mg by mouth 2 (Two) Times a Day. ON HOLD AS OF 2020 AM BEING THE LAST DOSE.  DR. EARL NOTIFIED AND AWAITING APPROVAL) 180 tablet 3   • atorvastatin (LIPITOR) 80 MG tablet Take 80 mg by mouth Every Night.     • levoFLOXacin (Levaquin) 500 MG tablet Take 1 tablet by mouth Daily. 14 tablet 0   • lisinopril-hydrochlorothiazide (PRINZIDE,ZESTORETIC) 20-12.5 MG per tablet Take 1 tablet by mouth Daily.     • metoprolol tartrate (LOPRESSOR) 25 MG tablet Take 0.5 tablets by mouth 2 (Two) Times a Day. 90 tablet 3   • mupirocin (Bactroban) 2 % ointment Apply  topically to the appropriate area as directed Daily. 30 g 0   • HYDROcodone-acetaminophen (NORCO) 7.5-325 MG per tablet Take 1-2 tablets by mouth Every 6 (Six) Hours As Needed for Moderate Pain  (as needed for post surgical pain). 60 tablet 0   • ondansetron (ZOFRAN) 4 MG tablet Take 1 tablet by mouth Every 6 (Six) Hours As Needed for Nausea or Vomiting. 30 tablet 0     Current Facility-Administered Medications on File Prior to Visit   Medication Dose Route Frequency Provider Last Rate Last Admin   •  mupirocin (BACTROBAN) 2 % ointment   Topical Q12H Henrietta Zacarias PA-C          Allergies   Allergen Reactions   • Sulfa Antibiotics Unknown (See Comments)     Pt was told as child he had an allergy.  Has not taken and doesn't know the response      Past Medical History:   Diagnosis Date   • A-fib (CMS/[facility])    • Acute kidney failure (CMS/[facility])    • Elevated cholesterol    • Hard of hearing    • Hyperlipidemia    • Hypertension    • Renal disorder    • Wears hearing aid in both ears    • Wears reading eyeglasses      Past Surgical History:   Procedure Laterality Date   • AMPUTATION DIGIT Left 1/7/2020    Procedure: AMPUTATION LEFT 2ND TOE;  Surgeon: Idania Osborn MD;  Location:  [facility] OR;  Service: Orthopedics   • APPENDECTOMY     • COLONOSCOPY  2006   • INCISION AND DRAINAGE LEG Left 10/23/2018    Procedure: INCISION AND DRAINAGE LEFT FOOT;  Surgeon: Idania Osborn MD;  Location:  [facility] OR;  Service: Orthopedics   • TOE AMPUTATION     • TONSILLECTOMY     • VENOUS ACCESS DEVICE (PORT) INSERTION N/A 10/23/2018    Procedure: GROSHONG CATHETER PLACEMENT;  Surgeon: Marquez Simons MD;  Location:  [facility] OR;  Service: General   • VENOUS ACCESS DEVICE (PORT) REMOVAL       No family history on file.   Social History     Socioeconomic History   • Marital status:      Spouse name: Not on file   • Number of children: Not on file   • Years of education: Not on file   • Highest education level: Not on file   Tobacco Use   • Smoking status: Never Smoker   • Smokeless tobacco: Never Used   Substance and Sexual Activity   • Alcohol use: Yes     Alcohol/week: 3.0 - 4.0 standard drinks     Types: 3 - 4 Shots of liquor per week     Comment: drinks a fifth a week DRINKS 3-5 DRINKS DAILY    • Drug use: No   • Sexual activity: Defer        Review of Systems   Constitutional: Negative.    HENT: Negative.    Eyes: Negative.    Respiratory: Negative.    Cardiovascular: Negative.    Gastrointestinal: Negative.   "  Endocrine: Negative.    Genitourinary: Negative.    Musculoskeletal: Positive for arthralgias.   Skin: Negative.    Allergic/Immunologic: Negative.    Neurological: Negative.    Hematological: Negative.    Psychiatric/Behavioral: Negative.        The following portions of the patient's history were reviewed and updated as appropriate: allergies, current medications, past family history, past medical history, past social history, past surgical history and problem list.      Objective      Physical Exam  Pulse 71   Ht 182.9 cm (72.01\")   Wt 97.5 kg (215 lb)   SpO2 99%   BMI 29.15 kg/m²     Body mass index is 29.15 kg/m².    Patient is well developed, well nourished and in no acute distress.  Alert and oriented x 3.    Ortho Exam  Left foot exam:  Ulcer under the 3rd MT head with swelling.  No drainage.  Necrotic tissue along the edges of the ulcer no change in dense neuropathy.  Medical Decision Making    Data Review:   none    Assessment:  1. Osteomyelitis of ankle or foot, left, acute (CMS/[facility])        Plan:  Left foot osteomyelitis.  Patient has been on oral Levaquin.  He has been using a half shoe as instructed.  He had multiple questions which were answered.  We explained to him that he must have IV antibiotics.  He understands where the amputation will be done on the foot.  Hopefully, Dr. Osborn will be able to close the incision and he will not need a wound VAC.  He will have home health following.  Plan today is to get him scheduled for left transmetatarsal amputation at [facility] on Tuesday, December first with Dr. Osborn.    Patient history, diagnosis and treatment plan discussed with Dr. Osborn.     ADDENDUM: I discussed the plan with the patient and Ms. Zacarias, I was present with the exam.  We had a lengthy discussion about the rationale for surgery.  My concern with simply resecting the second metatarsal is that it would leave him with a partial foot that has only 3 metatarsal and " toes.  He was unable to take care of the foot when he had only the great toe amputated.  He has been unable to take care of the foot now that the great toe and second toe are absent.  If I remove the second metatarsal it will give him a thin partial foot and I do not think he will be able to take care of it, and I think we will be back to this point with another ulceration and another osteomyelitic metatarsal.  I think the most sensible procedure is a transmetatarsal amputation.  He is understandably worried about having to have the antibiotics, but the only way to avoid that would be to completely remove all focus of infection and that would involve a below-knee amputation.  Understandably we would like to prevent or delay a below-knee amputation.  Therefore after lengthy discussion he understands the need for the postop antibiotics.  We also again discussed treating this only with antibiotics, I do not think it would be successful.  I think it would increase the likelihood of needing surgery with a second 6 weeks of antibiotics.  We again discussed neuropathic foot care.  We also discussed obtaining a second opinion.  I am more than happy to facilitate this for him if he wishes.  We discussed the risks including but not limited to: death, infection, neurovascular damage, strokes, heart attacks, blood clots, chronic pain, deformity, stiffness, need for  further surgery,  amputation, etc.  Questions asked and answered in detail.  JANY Osborn MD}      Henrietta Zacarias PA-C  11/25/20  14:32 EST

## 2020-11-29 ENCOUNTER — APPOINTMENT (OUTPATIENT)
Dept: PREADMISSION TESTING | Facility: HOSPITAL | Age: 75
End: 2020-11-29

## 2020-11-29 PROCEDURE — U0004 COV-19 TEST NON-CDC HGH THRU: HCPCS

## 2020-11-29 PROCEDURE — C9803 HOPD COVID-19 SPEC COLLECT: HCPCS

## 2020-11-30 ENCOUNTER — APPOINTMENT (OUTPATIENT)
Dept: PREADMISSION TESTING | Facility: HOSPITAL | Age: 75
End: 2020-11-30

## 2020-11-30 DIAGNOSIS — M86.172 OSTEOMYELITIS OF ANKLE OR FOOT, LEFT, ACUTE (HCC): Primary | ICD-10-CM

## 2020-11-30 LAB — SARS-COV-2 RNA RESP QL NAA+PROBE: NOT DETECTED

## 2020-11-30 RX ORDER — FAMOTIDINE 10 MG/ML
20 INJECTION, SOLUTION INTRAVENOUS ONCE
Status: CANCELLED | OUTPATIENT
Start: 2020-11-30 | End: 2020-11-30

## 2020-11-30 RX ORDER — HYDROCODONE BITARTRATE AND ACETAMINOPHEN 7.5; 325 MG/1; MG/1
1-2 TABLET ORAL EVERY 6 HOURS PRN
Qty: 25 TABLET | Refills: 0 | Status: SHIPPED | OUTPATIENT
Start: 2020-11-30 | End: 2021-01-11

## 2020-11-30 RX ORDER — OXYCODONE HYDROCHLORIDE AND ACETAMINOPHEN 5; 325 MG/1; MG/1
1-2 TABLET ORAL EVERY 6 HOURS PRN
Qty: 25 TABLET | Refills: 0 | Status: SHIPPED | OUTPATIENT
Start: 2020-11-30 | End: 2021-01-11

## 2020-11-30 RX ORDER — ONDANSETRON 4 MG/1
4 TABLET, FILM COATED ORAL EVERY 6 HOURS PRN
Qty: 30 TABLET | Refills: 0 | Status: SHIPPED | OUTPATIENT
Start: 2020-11-30 | End: 2021-01-11

## 2020-12-01 ENCOUNTER — APPOINTMENT (OUTPATIENT)
Dept: CARDIOLOGY | Facility: HOSPITAL | Age: 75
End: 2020-12-01

## 2020-12-01 ENCOUNTER — HOSPITAL ENCOUNTER (OUTPATIENT)
Facility: HOSPITAL | Age: 75
Setting detail: SURGERY ADMIT
Discharge: HOME | End: 2020-12-01
Attending: ORTHOPAEDIC SURGERY | Admitting: ORTHOPAEDIC SURGERY

## 2020-12-01 VITALS
WEIGHT: 215 LBS | TEMPERATURE: 96.9 F | RESPIRATION RATE: 14 BRPM | HEART RATE: 83 BPM | HEIGHT: 72 IN | DIASTOLIC BLOOD PRESSURE: 66 MMHG | OXYGEN SATURATION: 99 % | BODY MASS INDEX: 29.12 KG/M2 | SYSTOLIC BLOOD PRESSURE: 122 MMHG

## 2020-12-01 DIAGNOSIS — M86.372 CHRONIC MULTIFOCAL OSTEOMYELITIS OF LEFT FOOT (HCC): Primary | ICD-10-CM

## 2020-12-01 DIAGNOSIS — I48.91 ATRIAL FIBRILLATION WITH RVR (HCC): ICD-10-CM

## 2020-12-01 LAB
BH CV ECHO MEAS - AO MAX PG (FULL): 2.3 MMHG
BH CV ECHO MEAS - AO MAX PG: 4.4 MMHG
BH CV ECHO MEAS - AO MEAN PG (FULL): 1 MMHG
BH CV ECHO MEAS - AO MEAN PG: 2.1 MMHG
BH CV ECHO MEAS - AO ROOT AREA (BSA CORRECTED): 1.4
BH CV ECHO MEAS - AO ROOT AREA: 7 CM^2
BH CV ECHO MEAS - AO ROOT DIAM: 3 CM
BH CV ECHO MEAS - AO V2 MAX: 104.7 CM/SEC
BH CV ECHO MEAS - AO V2 MEAN: 68.2 CM/SEC
BH CV ECHO MEAS - AO V2 VTI: 19 CM
BH CV ECHO MEAS - ASC AORTA: 3.7 CM
BH CV ECHO MEAS - AVA(I,A): 2.5 CM^2
BH CV ECHO MEAS - AVA(I,D): 2.5 CM^2
BH CV ECHO MEAS - AVA(V,A): 2.3 CM^2
BH CV ECHO MEAS - AVA(V,D): 2.3 CM^2
BH CV ECHO MEAS - BSA(HAYCOCK): 2.2 M^2
BH CV ECHO MEAS - BSA: 2.2 M^2
BH CV ECHO MEAS - BZI_BMI: 29.2 KILOGRAMS/M^2
BH CV ECHO MEAS - BZI_METRIC_HEIGHT: 182.9 CM
BH CV ECHO MEAS - BZI_METRIC_WEIGHT: 97.5 KG
BH CV ECHO MEAS - EDV(CUBED): 107.9 ML
BH CV ECHO MEAS - EDV(MOD-SP2): 116 ML
BH CV ECHO MEAS - EDV(MOD-SP4): 114 ML
BH CV ECHO MEAS - EDV(TEICH): 105.5 ML
BH CV ECHO MEAS - EF(CUBED): 57.5 %
BH CV ECHO MEAS - EF(MOD-BP): 52 %
BH CV ECHO MEAS - EF(MOD-SP2): 45.7 %
BH CV ECHO MEAS - EF(MOD-SP4): 51.8 %
BH CV ECHO MEAS - EF(TEICH): 49.1 %
BH CV ECHO MEAS - ESV(CUBED): 45.8 ML
BH CV ECHO MEAS - ESV(MOD-SP2): 63 ML
BH CV ECHO MEAS - ESV(MOD-SP4): 55 ML
BH CV ECHO MEAS - ESV(TEICH): 53.7 ML
BH CV ECHO MEAS - FS: 24.8 %
BH CV ECHO MEAS - IVS/LVPW: 0.94
BH CV ECHO MEAS - IVSD: 1 CM
BH CV ECHO MEAS - LA DIMENSION: 4.2 CM
BH CV ECHO MEAS - LA/AO: 1.4
BH CV ECHO MEAS - LAD MAJOR: 6.7 CM
BH CV ECHO MEAS - LAT PEAK E' VEL: 7.7 CM/SEC
BH CV ECHO MEAS - LATERAL E/E' RATIO: 12.1
BH CV ECHO MEAS - LV DIASTOLIC VOL/BSA (35-75): 51.9 ML/M^2
BH CV ECHO MEAS - LV MASS(C)D: 184.7 GRAMS
BH CV ECHO MEAS - LV MASS(C)DI: 84.1 GRAMS/M^2
BH CV ECHO MEAS - LV MAX PG: 2.1 MMHG
BH CV ECHO MEAS - LV MEAN PG: 1.1 MMHG
BH CV ECHO MEAS - LV SYSTOLIC VOL/BSA (12-30): 25 ML/M^2
BH CV ECHO MEAS - LV V1 MAX: 72.6 CM/SEC
BH CV ECHO MEAS - LV V1 MEAN: 47.5 CM/SEC
BH CV ECHO MEAS - LV V1 VTI: 14.4 CM
BH CV ECHO MEAS - LVIDD: 4.8 CM
BH CV ECHO MEAS - LVIDS: 3.6 CM
BH CV ECHO MEAS - LVLD AP2: 8.1 CM
BH CV ECHO MEAS - LVLD AP4: 8.1 CM
BH CV ECHO MEAS - LVLS AP2: 7.6 CM
BH CV ECHO MEAS - LVLS AP4: 6.7 CM
BH CV ECHO MEAS - LVOT AREA (M): 3.1 CM^2
BH CV ECHO MEAS - LVOT AREA: 3.3 CM^2
BH CV ECHO MEAS - LVOT DIAM: 2 CM
BH CV ECHO MEAS - LVPWD: 1.1 CM
BH CV ECHO MEAS - MED PEAK E' VEL: 10.9 CM/SEC
BH CV ECHO MEAS - MEDIAL E/E' RATIO: 8.6
BH CV ECHO MEAS - MR MAX PG: 97 MMHG
BH CV ECHO MEAS - MR MAX VEL: 491.2 CM/SEC
BH CV ECHO MEAS - MR MEAN PG: 72.8 MMHG
BH CV ECHO MEAS - MR MEAN VEL: 404 CM/SEC
BH CV ECHO MEAS - MR VTI: 150.8 CM
BH CV ECHO MEAS - MV DEC SLOPE: 312 CM/SEC^2
BH CV ECHO MEAS - MV DEC TIME: 0.14 SEC
BH CV ECHO MEAS - MV E MAX VEL: 94.8 CM/SEC
BH CV ECHO MEAS - MV P1/2T MAX VEL: 86.9 CM/SEC
BH CV ECHO MEAS - MV P1/2T: 81.6 MSEC
BH CV ECHO MEAS - MVA P1/2T LCG: 2.5 CM^2
BH CV ECHO MEAS - MVA(P1/2T): 2.7 CM^2
BH CV ECHO MEAS - PA ACC SLOPE: 1185 CM/SEC^2
BH CV ECHO MEAS - PA ACC TIME: 0.08 SEC
BH CV ECHO MEAS - PA MAX PG: 4.7 MMHG
BH CV ECHO MEAS - PA PR(ACCEL): 43.3 MMHG
BH CV ECHO MEAS - PA V2 MAX: 108.6 CM/SEC
BH CV ECHO MEAS - RAP SYSTOLE: 15 MMHG
BH CV ECHO MEAS - RVSP: 41 MMHG
BH CV ECHO MEAS - SI(AO): 60.2 ML/M^2
BH CV ECHO MEAS - SI(CUBED): 28.3 ML/M^2
BH CV ECHO MEAS - SI(LVOT): 21.4 ML/M^2
BH CV ECHO MEAS - SI(MOD-SP2): 24.1 ML/M^2
BH CV ECHO MEAS - SI(MOD-SP4): 26.9 ML/M^2
BH CV ECHO MEAS - SI(TEICH): 23.6 ML/M^2
BH CV ECHO MEAS - SV(AO): 132.2 ML
BH CV ECHO MEAS - SV(CUBED): 62.1 ML
BH CV ECHO MEAS - SV(LVOT): 47.1 ML
BH CV ECHO MEAS - SV(MOD-SP2): 53 ML
BH CV ECHO MEAS - SV(MOD-SP4): 59 ML
BH CV ECHO MEAS - SV(TEICH): 51.8 ML
BH CV ECHO MEAS - TAPSE (>1.6): 1.5 CM
BH CV ECHO MEAS - TR MAX PG: 26 MMHG
BH CV ECHO MEAS - TR MAX VEL: 257 CM/SEC
BH CV ECHO MEASUREMENTS AVERAGE E/E' RATIO: 10.19
BH CV VAS BP LEFT ARM: NORMAL MMHG
BH CV XLRA - RV BASE: 6 CM
BH CV XLRA - RV LENGTH: 7.4 CM
BH CV XLRA - RV MID: 4 CM
BH CV XLRA - TDI S': 6.3 CM/SEC
LEFT ATRIUM VOLUME INDEX: 49.2 ML/M^2
LEFT ATRIUM VOLUME: 108 ML
POTASSIUM SERPL-SCNC: 4.8 MMOL/L (ref 3.5–5.2)

## 2020-12-01 PROCEDURE — 93306 TTE W/DOPPLER COMPLETE: CPT

## 2020-12-01 PROCEDURE — 99222 1ST HOSP IP/OBS MODERATE 55: CPT | Performed by: INTERNAL MEDICINE

## 2020-12-01 PROCEDURE — S0260 H&P FOR SURGERY: HCPCS | Performed by: ORTHOPAEDIC SURGERY

## 2020-12-01 PROCEDURE — 84132 ASSAY OF SERUM POTASSIUM: CPT | Performed by: ANESTHESIOLOGY

## 2020-12-01 PROCEDURE — 25010000002 SULFUR HEXAFLUORIDE MICROSPH 60.7-25 MG RECONSTITUTED SUSPENSION: Performed by: ANESTHESIOLOGY

## 2020-12-01 PROCEDURE — 93306 TTE W/DOPPLER COMPLETE: CPT | Performed by: INTERNAL MEDICINE

## 2020-12-01 PROCEDURE — G0463 HOSPITAL OUTPT CLINIC VISIT: HCPCS | Performed by: ORTHOPAEDIC SURGERY

## 2020-12-01 RX ORDER — FAMOTIDINE 20 MG/1
20 TABLET, FILM COATED ORAL ONCE
Status: COMPLETED | OUTPATIENT
Start: 2020-12-01 | End: 2020-12-01

## 2020-12-01 RX ORDER — SODIUM CHLORIDE 0.9 % (FLUSH) 0.9 %
10 SYRINGE (ML) INJECTION AS NEEDED
Status: DISCONTINUED | OUTPATIENT
Start: 2020-12-01 | End: 2020-12-01 | Stop reason: HOSPADM

## 2020-12-01 RX ORDER — LIDOCAINE HYDROCHLORIDE 10 MG/ML
0.5 INJECTION, SOLUTION EPIDURAL; INFILTRATION; INTRACAUDAL; PERINEURAL ONCE AS NEEDED
Status: COMPLETED | OUTPATIENT
Start: 2020-12-01 | End: 2020-12-01

## 2020-12-01 RX ORDER — SODIUM CHLORIDE, SODIUM LACTATE, POTASSIUM CHLORIDE, CALCIUM CHLORIDE 600; 310; 30; 20 MG/100ML; MG/100ML; MG/100ML; MG/100ML
9 INJECTION, SOLUTION INTRAVENOUS CONTINUOUS
Status: DISCONTINUED | OUTPATIENT
Start: 2020-12-01 | End: 2020-12-01

## 2020-12-01 RX ORDER — SODIUM CHLORIDE 0.9 % (FLUSH) 0.9 %
10 SYRINGE (ML) INJECTION EVERY 12 HOURS SCHEDULED
Status: DISCONTINUED | OUTPATIENT
Start: 2020-12-01 | End: 2020-12-01 | Stop reason: HOSPADM

## 2020-12-01 RX ORDER — SODIUM CHLORIDE 9 MG/ML
9 INJECTION, SOLUTION INTRAVENOUS CONTINUOUS
Status: DISCONTINUED | OUTPATIENT
Start: 2020-12-01 | End: 2020-12-01 | Stop reason: HOSPADM

## 2020-12-01 RX ADMIN — SODIUM CHLORIDE 9 ML/HR: 9 INJECTION, SOLUTION INTRAVENOUS at 08:14

## 2020-12-01 RX ADMIN — SULFUR HEXAFLUORIDE 2 ML: KIT at 10:24

## 2020-12-01 RX ADMIN — FAMOTIDINE 20 MG: 20 TABLET, FILM COATED ORAL at 08:11

## 2020-12-01 RX ADMIN — LIDOCAINE HYDROCHLORIDE 0.2 ML: 10 INJECTION, SOLUTION EPIDURAL; INFILTRATION; INTRACAUDAL; PERINEURAL at 08:12

## 2020-12-01 RX ADMIN — METOPROLOL TARTRATE 25 MG: 25 TABLET ORAL at 09:44

## 2020-12-01 NOTE — H&P (VIEW-ONLY)
Pre-Op H&P  Carlos Culver  [id number]  1945      Chief complaint: Left foot osteomyelitis      Subjective:  Patient is a 75 y.o.male presents for scheduled surgery by Dr. Osborn.  He anticipates a left transmetatarsal amptuation today.  He has been followed by Dr. Osborn, but was unfortunately unable to heal his wound despite antibiotic therapy.  Antibiotics managed by Dr. Young.    Of note, has history of hypertension, hyperlipidemia and atrial fibrillation.  He reports being noncompliant with his Lopressor.  He denies seeing a cardiologist.  He denies shortness of breath, palpitations, chest pain or lightheaded/dizziness.  Will have echocardiogram prior to surgery.    Review of Systems:  Constitutional-- No fever, chills or sweats. No fatigue.  CV-- No chest pain, palpitation or syncope.  + HTN, HLD, A. fib  Resp-- No SOB, cough, hemoptysis  Skin--No rashes or lesions      Allergies:   Allergies   Allergen Reactions   • Sulfa Antibiotics Unknown (See Comments)     Pt was told as child he had an allergy.  Has not taken and doesn't know the response         Home Meds:  Facility-Administered Medications Prior to Admission   Medication Dose Route Frequency Provider Last Rate Last Admin   • mupirocin (BACTROBAN) 2 % ointment   Topical Q12H Henrietta Zacarias PA-C         Medications Prior to Admission   Medication Sig Dispense Refill Last Dose   • atorvastatin (LIPITOR) 80 MG tablet Take 80 mg by mouth Every Night.   11/30/2020 at 0800   • lisinopril-hydrochlorothiazide (PRINZIDE,ZESTORETIC) 20-12.5 MG per tablet Take 1 tablet by mouth Daily.   11/30/2020 at 0800   • HYDROcodone-acetaminophen (NORCO) 7.5-325 MG per tablet Take 1-2 tablets by mouth Every 6 (Six) Hours As Needed for Moderate Pain  (as needed for pain). 25 tablet 0    • metoprolol tartrate (LOPRESSOR) 25 MG tablet Take 0.5 tablets by mouth 2 (Two) Times a Day. (Patient taking differently: Take 25 mg by mouth Daily.) 90 tablet 3    •  mupirocin (Bactroban) 2 % ointment Apply  topically to the appropriate area as directed Daily. 30 g 0    • ondansetron (Zofran) 4 MG tablet Take 1 tablet by mouth Every 6 (Six) Hours As Needed for Nausea or Vomiting. 30 tablet 0    • oxyCODONE-acetaminophen (Percocet) 5-325 MG per tablet Take 1-2 tablets by mouth Every 6 (Six) Hours As Needed for Severe Pain  (as needed for severe pain). 25 tablet 0          PMH:   Past Medical History:   Diagnosis Date   • A-fib (CMS/[facility])    • Acute kidney failure (CMS/[facility])    • Elevated cholesterol    • Hard of hearing    • Hyperlipidemia    • Hypertension    • Renal disorder    • Wears hearing aid in both ears    • Wears reading eyeglasses      PSH:    Past Surgical History:   Procedure Laterality Date   • AMPUTATION DIGIT Left 1/7/2020    Procedure: AMPUTATION LEFT 2ND TOE;  Surgeon: Idania Osborn MD;  Location:  [facility];  Service: Orthopedics   • APPENDECTOMY     • COLONOSCOPY  2006   • INCISION AND DRAINAGE LEG Left 10/23/2018    Procedure: INCISION AND DRAINAGE LEFT FOOT;  Surgeon: Idania Osborn MD;  Location:  [facility];  Service: Orthopedics   • TOE AMPUTATION     • TONSILLECTOMY     • VENOUS ACCESS DEVICE (PORT) INSERTION N/A 10/23/2018    Procedure: GROSHONG CATHETER PLACEMENT;  Surgeon: Marquez Simons MD;  Location:  [facility];  Service: General   • VENOUS ACCESS DEVICE (PORT) REMOVAL         Immunization History:  Influenza: 2020  Pneumococcal: Yes  Tetanus: No      Social History:   Tobacco:   Social History     Tobacco Use   Smoking Status Never Smoker   Smokeless Tobacco Never Used      Alcohol:     Social History     Substance and Sexual Activity   Alcohol Use Yes   • Alcohol/week: 3.0 - 4.0 standard drinks   • Types: 3 - 4 Shots of liquor per week    Comment: drinks a fifth a week DRINKS 3-5 DRINKS DAILY          Physical Exam:/94 (BP Location: Right arm, Patient Position: Lying)   Pulse 120   Temp 96.9 °F (36.1 °C) (Temporal)   Resp 14    "Ht 182.9 cm (72.01\")   Wt 97.5 kg (215 lb)   SpO2 99%   BMI 29.15 kg/m²       General Appearance:    Alert, cooperative, no distress, appears stated age   Head:    Normocephalic, without obvious abnormality, atraumatic   Lungs:     Clear to auscultation bilaterally, respirations unlabored    Heart:   Regular rate and rhythm, S1 and S2 normal, no murmur, rub    or gallop    Abdomen:    Soft without tenderness   Breast Exam:    deferred   Genitalia:    deferred   Extremities:   Extremities normal, atraumatic, no cyanosis or edema   Skin:   Skin color, texture, turgor normal, no rashes or lesions   Neurologic:   Grossly intact     Results Review:     LABS:  Lab Results   Component Value Date    WBC 10.35 11/25/2020    HGB 16.3 11/25/2020    HCT 48.1 11/25/2020    MCV 93.4 11/25/2020     11/25/2020    NEUTROABS 5.87 11/25/2020    GLUCOSE 104 (H) 11/25/2020    BUN 28 (H) 11/25/2020    CREATININE 1.36 (H) 11/25/2020    EGFRIFNONA 51 (L) 11/25/2020     11/25/2020    K 4.8 12/01/2020     11/25/2020    CO2 26.0 11/25/2020    PHOS 2.5 10/19/2015    CALCIUM 10.2 11/25/2020    ALBUMIN 4.02 10/31/2018    AST 28 10/31/2018    ALT 34 10/31/2018    BILITOT 0.4 10/31/2018     SARS-CoV-2 ANKITA   Not Detected Not Detected        RADIOLOGY:  Study Result    EXAMINATION: MRI FOOT LEFT WO CONTRAST- 11/16/2020     INDICATION: L98.499-Non-pressure chronic ulcer of skin of other sites  with unspecified severity      TECHNIQUE: Multiplanar MRI of the foot without intravenous contrast     COMPARISON: NONE     FINDINGS: Status post first ray transmetatarsal amputation along with  amputation of the second ray at the MTP joint with expected postsurgical  changes. Soft tissue edema within the plantar surface of the foot  greatest medially with area of ulceration and concern overlying the  second and third metatarsal heads which have irregularity involving the  second metatarsal head distal plantar margin series 9 image 7 and " series  5 image 18 of low T1 signal and T2 hyperintense signal with adjacent  edema and fluid signal concerning for osteomyelitis given irregularity  of heterogeneous signal involvement of low T1 and T2 hyperintense signal  with confluent edema however no focal fluid collection to suggest  definitive abscess. Osseous structures of the more lateral foot  unremarkable for acute osseous findings otherwise with minimal edema.  More proximal osseous structures in the hindfoot and midfoot  unremarkable with grossly unremarkable tendon findings.     IMPRESSION:  Post surgical changes as detailed above with abnormal  cortical irregularity involving the plantar surface second metatarsal  head adjacent to the amputation site of the second ray at the MTP joint  concerning for osteomyelitis with adjacent confluent edema and overlying  plantar surface soft tissue ulceration. No definitive abscess is  identified.       I reviewed the patient's new clinical results.    Cancer Staging (if applicable)  Cancer Patient: __ yes __no __unknown; If yes, clinical stage T:__ N:__M:__, stage group or __N/A      Impression: Chronic multifocal osteomyelitis of the left foot      Plan: left transmetatarsal amptuation      Silvia ROBIN Loyola   12/1/2020   08:44 EST           Addendum: In preop the patient was found to have atrial fibrillation with a rapid heart rate.  He reported to Dr. Moncada of anesthesia that he had been noncompliant with his anticoagulation for over a year, he has been noncompliant with his cardiac medications.  Because of the increased risk cardiology will be consulted today and we will delay the surgery for 1 week.  This increases the risk of spread of infection but at a relatively low level- the risk of cardiac event, death or stroke are higher.  However, on examining him in pre-op I found that he is developing pressure areas from the half shoe.  He must stop using the half shoe and use his scooter, be absolutely non-wt  bearing on the foot.  If he develops full thickness wounds his only option will be a below knee amputation. I discussed this very fully with him.   JANY Osborn MD

## 2020-12-01 NOTE — CONSULTS
"[facility] Consult Note      Referring Provider: Idania Osborn MD  Primary Provider:  Marcus Cheng MD  Reason for Consultation: EKG changes; Afib; hypertension    Problem list:    1. Persistent atrial fibrillation  1. Diagnosed 2018 during admission for foot infection.   2. Started on metoprolol, 2018.  3. Echocardiogram 10/19/18: EF 70%, Mild TR, RVSP 26 mmHg, mild MR.   4. Incidentally noted to be in Afib during PCP visit, 12/6/2019.  5. CHADSVASC = 3, prescribed Eliquis 12/9/2019.   2. Prediabetes  3. Hypertension  4. Hyperlipidemia  5. Bilateral hearing loss  1. Wears hearing aids, bilaterally  6. Osteomyelitis, left lower extremity  1. S/p amputation first transmetatarsal amputation  7. Peripheral vascular disease- data deficit    Allergies:  Sulfa antibiotics    Home/Current Medications:      Current Facility-Administered Medications:   •  sodium chloride 0.9 % flush 10 mL, 10 mL, Intravenous, Q12H, Soni Pacheco MD  •  sodium chloride 0.9 % flush 10 mL, 10 mL, Intravenous, PRN, Soni Pacheco MD  •  sodium chloride 0.9 % infusion, 9 mL/hr, Intravenous, Continuous, Soni Pacheco MD, Last Rate: 9 mL/hr at 12/01/20 0814, 9 mL/hr at 12/01/20 0814      History of present illness:  Mr. Paniagua is a 74 y/o male with the above noted pmhx who presented to [facility] today for left lower extremity amputation. He was noted to be in Afib with RVR by tele this AM. He reported non compliance with home medications. Therefore the decision was made to cancel surgery and consult cardiology for PAF, HTN and EKG changes. .     He was diagnosed with Afib in 2018. It sounds like he has chronic Afib. He is asymptomatic. He was evaluated by Dr. Anderson December 2019 at which time he was prescribed NOAC but decided not to take it in favor of \"drinking alcohol instead\". He also decided not to take prescribed metoprolol- stating that he does not have HTN or \"fast heart rates\". He denies CP, SOB, PND, " "orthopnea, CAESAR, palpitations.       Social History:  Social History     Socioeconomic History   • Marital status:      Spouse name: Not on file   • Number of children: Not on file   • Years of education: Not on file   • Highest education level: Not on file   Tobacco Use   • Smoking status: Never Smoker   • Smokeless tobacco: Never Used   Substance and Sexual Activity   • Alcohol use: Yes     Alcohol/week: 3.0 - 4.0 standard drinks     Types: 3 - 4 Shots of liquor per week     Comment: drinks a fifth a week DRINKS 3-5 DRINKS DAILY    • Drug use: No   • Sexual activity: Defer       Family History:  History reviewed. No pertinent family history.    Review of Systems  Pertinent positives are listed in the HPI.  All other systems reviewed are negative.     Objective     Vital Sign Min/Max for last 24 hours  Temp  Min: 96.9 °F (36.1 °C)  Max: 96.9 °F (36.1 °C)   BP  Min: 117/94  Max: 117/94   Pulse  Min: 120  Max: 120   Resp  Min: 14  Max: 14   SpO2  Min: 99 %  Max: 99 %   No data recorded   Weight  Min: 97.5 kg (215 lb)  Max: 97.5 kg (215 lb)     Flowsheet Rows      First Filed Value   Admission Height  182.9 cm (72.01\") Documented at 12/01/2020 0800   Admission Weight  97.5 kg (215 lb) Documented at 12/01/2020 0800          Physical Exam:  GENERAL: This is a well-developed, well-nourished, male who is in no acute distress. Alert and oriented x3. Normal mood and affect.   SKIN: Pink and warm without rash or abnormality noted.   HEENT: Head is normocephalic and atraumatic. Pupils are equal and reactive to light bilaterally. Mucous membranes are pink and moist.   NECK: Supple without lymphadenopathy or thyromegaly. There is no jugular venous distention at 30°.  LUNGS: Clear to auscultation bilaterally without wheezing, rhonchi, or rales noted.   CARDIOVASCULAR: The heart has an irregularly irregular rhythm, tachycardic, normal S1 and S2. There is no murmur, gallop, rub, or click appreciated. The PMI is nondisplaced. " Carotid upstrokes are 2+ and symmetrical without bruits.   ABDOMEN: Soft and nondistended with positive bowel sounds x4. The patient denies tenderness of palpitation.   MUSCULOSKELETAL: There are no obvious bony abnormalities. Normal range of tenderness to palpation.   NEUROLOGICAL: Nonfocal.   PERIPHERAL VASCULAR: Femoral pulses are 2+ and symmetrical without bruits. Posterior tibial and dorsalis pedis pulses are 2+ and symmetrical. There is no peripheral edema.      EKG:    Tele: Afib 100-140's    Labs:      Lab Results   Component Value Date    WBC 10.35 11/25/2020    HGB 16.3 11/25/2020    HCT 48.1 11/25/2020    MCV 93.4 11/25/2020     11/25/2020     Lab Results   Component Value Date    GLUCOSE 104 (H) 11/25/2020    BUN 28 (H) 11/25/2020    CREATININE 1.36 (H) 11/25/2020    EGFRIFNONA 51 (L) 11/25/2020    BCR 20.6 11/25/2020    K 4.8 12/01/2020    CO2 26.0 11/25/2020    CALCIUM 10.2 11/25/2020    ALBUMIN 4.02 10/31/2018    AST 28 10/31/2018    ALT 34 10/31/2018     Lab Results   Component Value Date    CKTOTAL 126 10/25/2018    CKMB 0 10/19/2015     No results found for: INR, PROTIME  No results found for: CHOL, CHLPL, TRIG, HDL, LDL, LDLDIRECT     Ejection Fraction:      Results Review:  I reviewed the patients new clinical results.      Assessment:    1) Persistent atrial fibrillation  - Poorly rate controlled- non compliant with metoprolol.   - CHADSVASC = 3  Preliminary bedside echocardiogram images reviewed, EF 50-55%.    2) HTN     3) Left lower extremity ulceration, osteomyelitis   - Planned partial amputation of left foot.     Plan:  - Echocardiogram being performed at bedside. Will notify patient of results.   - Will send Rx for Eliquis and metoprolol 25 mg tid.   Discussed importance of decreased alcohol intake, less than 1-2 drinks per day.  - Outpatient follow up with Dr. Anderson in 3 weeks.  - With no known history of CAD and no anginal complaints, patient would be low risk (0.5% 30-day risk  of MI, CVA, death) for surgery from a cardiovascular standpoint. Can hold Eliquis for 48 hours prior to surgery and resume per surgeons recs.   - Will give metoprolol 25 mg po now and if HR sustaining </=110 bpm, may be discharged home.       I discussed the patients findings and my recommendations with patient.    Scribed for Hang Anderson MD by PORFIRIO Muhammad, APRN. 12/1/2020  09:03 EST    I,Hang Anderson M.D., personally performed the services described in this documentation as scribed by the above named individual in my presence, and it is both accurate and complete.    Hang Anderson MD, [facility]  12/01/20  14:41 EST

## 2020-12-01 NOTE — H&P
Pre-Op H&P  Carlos Culver  [id number]  1945      Chief complaint: Left foot osteomyelitis      Subjective:  Patient is a 75 y.o.male presents for scheduled surgery by Dr. Osborn.  He anticipates a left transmetatarsal amptuation today.  He has been followed by Dr. Osborn, but was unfortunately unable to heal his wound despite antibiotic therapy.  Antibiotics managed by Dr. Young.    Of note, has history of hypertension, hyperlipidemia and atrial fibrillation.  He reports being noncompliant with his Lopressor.  He denies seeing a cardiologist.  He denies shortness of breath, palpitations, chest pain or lightheaded/dizziness.  Will have echocardiogram prior to surgery.    Review of Systems:  Constitutional-- No fever, chills or sweats. No fatigue.  CV-- No chest pain, palpitation or syncope.  + HTN, HLD, A. fib  Resp-- No SOB, cough, hemoptysis  Skin--No rashes or lesions      Allergies:   Allergies   Allergen Reactions   • Sulfa Antibiotics Unknown (See Comments)     Pt was told as child he had an allergy.  Has not taken and doesn't know the response         Home Meds:  Facility-Administered Medications Prior to Admission   Medication Dose Route Frequency Provider Last Rate Last Admin   • mupirocin (BACTROBAN) 2 % ointment   Topical Q12H Henrietta Zacarias PA-C         Medications Prior to Admission   Medication Sig Dispense Refill Last Dose   • atorvastatin (LIPITOR) 80 MG tablet Take 80 mg by mouth Every Night.   11/30/2020 at 0800   • lisinopril-hydrochlorothiazide (PRINZIDE,ZESTORETIC) 20-12.5 MG per tablet Take 1 tablet by mouth Daily.   11/30/2020 at 0800   • HYDROcodone-acetaminophen (NORCO) 7.5-325 MG per tablet Take 1-2 tablets by mouth Every 6 (Six) Hours As Needed for Moderate Pain  (as needed for pain). 25 tablet 0    • metoprolol tartrate (LOPRESSOR) 25 MG tablet Take 0.5 tablets by mouth 2 (Two) Times a Day. (Patient taking differently: Take 25 mg by mouth Daily.) 90 tablet 3    •  mupirocin (Bactroban) 2 % ointment Apply  topically to the appropriate area as directed Daily. 30 g 0    • ondansetron (Zofran) 4 MG tablet Take 1 tablet by mouth Every 6 (Six) Hours As Needed for Nausea or Vomiting. 30 tablet 0    • oxyCODONE-acetaminophen (Percocet) 5-325 MG per tablet Take 1-2 tablets by mouth Every 6 (Six) Hours As Needed for Severe Pain  (as needed for severe pain). 25 tablet 0          PMH:   Past Medical History:   Diagnosis Date   • A-fib (CMS/[facility])    • Acute kidney failure (CMS/[facility])    • Elevated cholesterol    • Hard of hearing    • Hyperlipidemia    • Hypertension    • Renal disorder    • Wears hearing aid in both ears    • Wears reading eyeglasses      PSH:    Past Surgical History:   Procedure Laterality Date   • AMPUTATION DIGIT Left 1/7/2020    Procedure: AMPUTATION LEFT 2ND TOE;  Surgeon: Idania Osborn MD;  Location:  [facility];  Service: Orthopedics   • APPENDECTOMY     • COLONOSCOPY  2006   • INCISION AND DRAINAGE LEG Left 10/23/2018    Procedure: INCISION AND DRAINAGE LEFT FOOT;  Surgeon: Idania Osborn MD;  Location:  [facility];  Service: Orthopedics   • TOE AMPUTATION     • TONSILLECTOMY     • VENOUS ACCESS DEVICE (PORT) INSERTION N/A 10/23/2018    Procedure: GROSHONG CATHETER PLACEMENT;  Surgeon: Marquez Simons MD;  Location:  [facility];  Service: General   • VENOUS ACCESS DEVICE (PORT) REMOVAL         Immunization History:  Influenza: 2020  Pneumococcal: Yes  Tetanus: No      Social History:   Tobacco:   Social History     Tobacco Use   Smoking Status Never Smoker   Smokeless Tobacco Never Used      Alcohol:     Social History     Substance and Sexual Activity   Alcohol Use Yes   • Alcohol/week: 3.0 - 4.0 standard drinks   • Types: 3 - 4 Shots of liquor per week    Comment: drinks a fifth a week DRINKS 3-5 DRINKS DAILY          Physical Exam:/94 (BP Location: Right arm, Patient Position: Lying)   Pulse 120   Temp 96.9 °F (36.1 °C) (Temporal)   Resp 14    "Ht 182.9 cm (72.01\")   Wt 97.5 kg (215 lb)   SpO2 99%   BMI 29.15 kg/m²       General Appearance:    Alert, cooperative, no distress, appears stated age   Head:    Normocephalic, without obvious abnormality, atraumatic   Lungs:     Clear to auscultation bilaterally, respirations unlabored    Heart:   Regular rate and rhythm, S1 and S2 normal, no murmur, rub    or gallop    Abdomen:    Soft without tenderness   Breast Exam:    deferred   Genitalia:    deferred   Extremities:   Extremities normal, atraumatic, no cyanosis or edema   Skin:   Skin color, texture, turgor normal, no rashes or lesions   Neurologic:   Grossly intact     Results Review:     LABS:  Lab Results   Component Value Date    WBC 10.35 11/25/2020    HGB 16.3 11/25/2020    HCT 48.1 11/25/2020    MCV 93.4 11/25/2020     11/25/2020    NEUTROABS 5.87 11/25/2020    GLUCOSE 104 (H) 11/25/2020    BUN 28 (H) 11/25/2020    CREATININE 1.36 (H) 11/25/2020    EGFRIFNONA 51 (L) 11/25/2020     11/25/2020    K 4.8 12/01/2020     11/25/2020    CO2 26.0 11/25/2020    PHOS 2.5 10/19/2015    CALCIUM 10.2 11/25/2020    ALBUMIN 4.02 10/31/2018    AST 28 10/31/2018    ALT 34 10/31/2018    BILITOT 0.4 10/31/2018     SARS-CoV-2 ANKITA   Not Detected Not Detected        RADIOLOGY:  Study Result    EXAMINATION: MRI FOOT LEFT WO CONTRAST- 11/16/2020     INDICATION: L98.499-Non-pressure chronic ulcer of skin of other sites  with unspecified severity      TECHNIQUE: Multiplanar MRI of the foot without intravenous contrast     COMPARISON: NONE     FINDINGS: Status post first ray transmetatarsal amputation along with  amputation of the second ray at the MTP joint with expected postsurgical  changes. Soft tissue edema within the plantar surface of the foot  greatest medially with area of ulceration and concern overlying the  second and third metatarsal heads which have irregularity involving the  second metatarsal head distal plantar margin series 9 image 7 and " series  5 image 18 of low T1 signal and T2 hyperintense signal with adjacent  edema and fluid signal concerning for osteomyelitis given irregularity  of heterogeneous signal involvement of low T1 and T2 hyperintense signal  with confluent edema however no focal fluid collection to suggest  definitive abscess. Osseous structures of the more lateral foot  unremarkable for acute osseous findings otherwise with minimal edema.  More proximal osseous structures in the hindfoot and midfoot  unremarkable with grossly unremarkable tendon findings.     IMPRESSION:  Post surgical changes as detailed above with abnormal  cortical irregularity involving the plantar surface second metatarsal  head adjacent to the amputation site of the second ray at the MTP joint  concerning for osteomyelitis with adjacent confluent edema and overlying  plantar surface soft tissue ulceration. No definitive abscess is  identified.       I reviewed the patient's new clinical results.    Cancer Staging (if applicable)  Cancer Patient: __ yes __no __unknown; If yes, clinical stage T:__ N:__M:__, stage group or __N/A      Impression: Chronic multifocal osteomyelitis of the left foot      Plan: left transmetatarsal amptuation      Silvia ROBIN Loyola   12/1/2020   08:44 EST           Addendum: In preop the patient was found to have atrial fibrillation with a rapid heart rate.  He reported to Dr. Moncada of anesthesia that he had been noncompliant with his anticoagulation for over a year, he has been noncompliant with his cardiac medications.  Because of the increased risk cardiology will be consulted today and we will delay the surgery for 1 week.  This increases the risk of spread of infection but at a relatively low level- the risk of cardiac event, death or stroke are higher.  However, on examining him in pre-op I found that he is developing pressure areas from the half shoe.  He must stop using the half shoe and use his scooter, be absolutely non-wt  bearing on the foot.  If he develops full thickness wounds his only option will be a below knee amputation. I discussed this very fully with him.   JANY Osborn MD

## 2020-12-06 ENCOUNTER — APPOINTMENT (OUTPATIENT)
Dept: PREADMISSION TESTING | Facility: HOSPITAL | Age: 75
End: 2020-12-06

## 2020-12-06 PROCEDURE — U0004 COV-19 TEST NON-CDC HGH THRU: HCPCS

## 2020-12-06 PROCEDURE — C9803 HOPD COVID-19 SPEC COLLECT: HCPCS

## 2020-12-07 LAB — SARS-COV-2 RNA RESP QL NAA+PROBE: NOT DETECTED

## 2020-12-08 ENCOUNTER — HOSPITAL ENCOUNTER (INPATIENT)
Facility: HOSPITAL | Age: 75
LOS: 2 days | Discharge: HOME | End: 2020-12-10
Attending: ORTHOPAEDIC SURGERY | Admitting: ORTHOPAEDIC SURGERY

## 2020-12-08 ENCOUNTER — ANESTHESIA EVENT (OUTPATIENT)
Dept: PERIOP | Facility: HOSPITAL | Age: 75
End: 2020-12-08

## 2020-12-08 ENCOUNTER — ANESTHESIA (OUTPATIENT)
Dept: PERIOP | Facility: HOSPITAL | Age: 75
End: 2020-12-08

## 2020-12-08 DIAGNOSIS — Z89.432 S/P TRANSMETATARSAL AMPUTATION OF FOOT, LEFT (HCC): Primary | ICD-10-CM

## 2020-12-08 DIAGNOSIS — I48.91 ATRIAL FIBRILLATION WITH RVR (HCC): ICD-10-CM

## 2020-12-08 DIAGNOSIS — M86.372 CHRONIC MULTIFOCAL OSTEOMYELITIS OF LEFT FOOT (HCC): ICD-10-CM

## 2020-12-08 LAB
CK SERPL-CCNC: 108 U/L (ref 20–200)
POTASSIUM SERPL-SCNC: 4.4 MMOL/L (ref 3.5–5.2)

## 2020-12-08 PROCEDURE — 0Y6N0ZB DETACHMENT AT LEFT FOOT, PARTIAL 2ND RAY, OPEN APPROACH: ICD-10-PCS | Performed by: ORTHOPAEDIC SURGERY

## 2020-12-08 PROCEDURE — 25010000002 PROPOFOL 10 MG/ML EMULSION: Performed by: NURSE ANESTHETIST, CERTIFIED REGISTERED

## 2020-12-08 PROCEDURE — 25010000002 VANCOMYCIN 10 G RECONSTITUTED SOLUTION: Performed by: ORTHOPAEDIC SURGERY

## 2020-12-08 PROCEDURE — 87070 CULTURE OTHR SPECIMN AEROBIC: CPT | Performed by: ORTHOPAEDIC SURGERY

## 2020-12-08 PROCEDURE — 97116 GAIT TRAINING THERAPY: CPT

## 2020-12-08 PROCEDURE — 25010000002 DEXAMETHASONE PER 1 MG: Performed by: NURSE ANESTHETIST, CERTIFIED REGISTERED

## 2020-12-08 PROCEDURE — 88305 TISSUE EXAM BY PATHOLOGIST: CPT | Performed by: ORTHOPAEDIC SURGERY

## 2020-12-08 PROCEDURE — 25010000002 CEFTRIAXONE PER 250 MG: Performed by: ORTHOPAEDIC SURGERY

## 2020-12-08 PROCEDURE — 25010000002 PHENYLEPHRINE PER 1 ML: Performed by: NURSE ANESTHETIST, CERTIFIED REGISTERED

## 2020-12-08 PROCEDURE — 25010000002 ONDANSETRON PER 1 MG: Performed by: NURSE ANESTHETIST, CERTIFIED REGISTERED

## 2020-12-08 PROCEDURE — 0Y6N0ZD DETACHMENT AT LEFT FOOT, PARTIAL 4TH RAY, OPEN APPROACH: ICD-10-PCS | Performed by: ORTHOPAEDIC SURGERY

## 2020-12-08 PROCEDURE — 87116 MYCOBACTERIA CULTURE: CPT | Performed by: ORTHOPAEDIC SURGERY

## 2020-12-08 PROCEDURE — 63710000001 DIPHENHYDRAMINE PER 50 MG: Performed by: ORTHOPAEDIC SURGERY

## 2020-12-08 PROCEDURE — 87206 SMEAR FLUORESCENT/ACID STAI: CPT | Performed by: ORTHOPAEDIC SURGERY

## 2020-12-08 PROCEDURE — 87102 FUNGUS ISOLATION CULTURE: CPT | Performed by: ORTHOPAEDIC SURGERY

## 2020-12-08 PROCEDURE — 0Y6N0ZF DETACHMENT AT LEFT FOOT, PARTIAL 5TH RAY, OPEN APPROACH: ICD-10-PCS | Performed by: ORTHOPAEDIC SURGERY

## 2020-12-08 PROCEDURE — 25010000002 CEFEPIME PER 500 MG: Performed by: INTERNAL MEDICINE

## 2020-12-08 PROCEDURE — 97161 PT EVAL LOW COMPLEX 20 MIN: CPT

## 2020-12-08 PROCEDURE — 82550 ASSAY OF CK (CPK): CPT | Performed by: INTERNAL MEDICINE

## 2020-12-08 PROCEDURE — 88311 DECALCIFY TISSUE: CPT | Performed by: ORTHOPAEDIC SURGERY

## 2020-12-08 PROCEDURE — 87205 SMEAR GRAM STAIN: CPT | Performed by: ORTHOPAEDIC SURGERY

## 2020-12-08 PROCEDURE — 87176 TISSUE HOMOGENIZATION CULTR: CPT | Performed by: ORTHOPAEDIC SURGERY

## 2020-12-08 PROCEDURE — 25010000002 FENTANYL CITRATE (PF) 100 MCG/2ML SOLUTION: Performed by: NURSE ANESTHETIST, CERTIFIED REGISTERED

## 2020-12-08 PROCEDURE — 0Y6N0ZC DETACHMENT AT LEFT FOOT, PARTIAL 3RD RAY, OPEN APPROACH: ICD-10-PCS | Performed by: ORTHOPAEDIC SURGERY

## 2020-12-08 PROCEDURE — 25010000002 DAPTOMYCIN PER 1 MG: Performed by: INTERNAL MEDICINE

## 2020-12-08 PROCEDURE — 84132 ASSAY OF SERUM POTASSIUM: CPT | Performed by: ANESTHESIOLOGY

## 2020-12-08 PROCEDURE — 87075 CULTR BACTERIA EXCEPT BLOOD: CPT | Performed by: ORTHOPAEDIC SURGERY

## 2020-12-08 PROCEDURE — 28805 AMPUTATION THRU METATARSAL: CPT | Performed by: ORTHOPAEDIC SURGERY

## 2020-12-08 PROCEDURE — 0Y6N0Z9 DETACHMENT AT LEFT FOOT, PARTIAL 1ST RAY, OPEN APPROACH: ICD-10-PCS | Performed by: ORTHOPAEDIC SURGERY

## 2020-12-08 RX ORDER — SODIUM CHLORIDE 9 MG/ML
9 INJECTION, SOLUTION INTRAVENOUS ONCE
Status: COMPLETED | OUTPATIENT
Start: 2020-12-08 | End: 2020-12-08

## 2020-12-08 RX ORDER — OXYCODONE HYDROCHLORIDE AND ACETAMINOPHEN 5; 325 MG/1; MG/1
1 TABLET ORAL EVERY 4 HOURS PRN
Status: DISCONTINUED | OUTPATIENT
Start: 2020-12-08 | End: 2020-12-10 | Stop reason: HOSPADM

## 2020-12-08 RX ORDER — FENTANYL CITRATE 50 UG/ML
50 INJECTION, SOLUTION INTRAMUSCULAR; INTRAVENOUS
Status: DISCONTINUED | OUTPATIENT
Start: 2020-12-08 | End: 2020-12-08 | Stop reason: HOSPADM

## 2020-12-08 RX ORDER — DIPHENHYDRAMINE HCL 25 MG
25 CAPSULE ORAL NIGHTLY PRN
Status: DISCONTINUED | OUTPATIENT
Start: 2020-12-08 | End: 2020-12-10 | Stop reason: HOSPADM

## 2020-12-08 RX ORDER — ATORVASTATIN CALCIUM 40 MG/1
80 TABLET, FILM COATED ORAL NIGHTLY
Status: DISCONTINUED | OUTPATIENT
Start: 2020-12-08 | End: 2020-12-10 | Stop reason: HOSPADM

## 2020-12-08 RX ORDER — PROMETHAZINE HYDROCHLORIDE 12.5 MG/1
12.5 TABLET ORAL EVERY 4 HOURS PRN
Status: DISCONTINUED | OUTPATIENT
Start: 2020-12-08 | End: 2020-12-10 | Stop reason: HOSPADM

## 2020-12-08 RX ORDER — ONDANSETRON 2 MG/ML
INJECTION INTRAMUSCULAR; INTRAVENOUS AS NEEDED
Status: DISCONTINUED | OUTPATIENT
Start: 2020-12-08 | End: 2020-12-08 | Stop reason: SURG

## 2020-12-08 RX ORDER — LIDOCAINE HYDROCHLORIDE 10 MG/ML
INJECTION, SOLUTION EPIDURAL; INFILTRATION; INTRACAUDAL; PERINEURAL AS NEEDED
Status: DISCONTINUED | OUTPATIENT
Start: 2020-12-08 | End: 2020-12-08 | Stop reason: SURG

## 2020-12-08 RX ORDER — ONDANSETRON 2 MG/ML
4 INJECTION INTRAMUSCULAR; INTRAVENOUS ONCE AS NEEDED
Status: DISCONTINUED | OUTPATIENT
Start: 2020-12-08 | End: 2020-12-08 | Stop reason: HOSPADM

## 2020-12-08 RX ORDER — NALOXONE HCL 0.4 MG/ML
0.4 VIAL (ML) INJECTION
Status: DISCONTINUED | OUTPATIENT
Start: 2020-12-08 | End: 2020-12-10 | Stop reason: HOSPADM

## 2020-12-08 RX ORDER — MAGNESIUM HYDROXIDE 1200 MG/15ML
LIQUID ORAL AS NEEDED
Status: DISCONTINUED | OUTPATIENT
Start: 2020-12-08 | End: 2020-12-08 | Stop reason: HOSPADM

## 2020-12-08 RX ORDER — DEXAMETHASONE SODIUM PHOSPHATE 4 MG/ML
INJECTION, SOLUTION INTRA-ARTICULAR; INTRALESIONAL; INTRAMUSCULAR; INTRAVENOUS; SOFT TISSUE AS NEEDED
Status: DISCONTINUED | OUTPATIENT
Start: 2020-12-08 | End: 2020-12-08 | Stop reason: SURG

## 2020-12-08 RX ORDER — HYDROCODONE BITARTRATE AND ACETAMINOPHEN 7.5; 325 MG/1; MG/1
1 TABLET ORAL EVERY 4 HOURS PRN
Status: DISCONTINUED | OUTPATIENT
Start: 2020-12-08 | End: 2020-12-10 | Stop reason: HOSPADM

## 2020-12-08 RX ORDER — SODIUM CHLORIDE 0.9 % (FLUSH) 0.9 %
10 SYRINGE (ML) INJECTION AS NEEDED
Status: DISCONTINUED | OUTPATIENT
Start: 2020-12-08 | End: 2020-12-08 | Stop reason: HOSPADM

## 2020-12-08 RX ORDER — NICOTINE POLACRILEX 4 MG
15 LOZENGE BUCCAL
Status: DISCONTINUED | OUTPATIENT
Start: 2020-12-08 | End: 2020-12-10 | Stop reason: HOSPADM

## 2020-12-08 RX ORDER — DEXTROSE, SODIUM CHLORIDE, AND POTASSIUM CHLORIDE 5; .45; .15 G/100ML; G/100ML; G/100ML
50 INJECTION INTRAVENOUS CONTINUOUS
Status: DISCONTINUED | OUTPATIENT
Start: 2020-12-08 | End: 2020-12-10 | Stop reason: HOSPADM

## 2020-12-08 RX ORDER — LABETALOL HYDROCHLORIDE 5 MG/ML
10 INJECTION, SOLUTION INTRAVENOUS EVERY 4 HOURS PRN
Status: DISCONTINUED | OUTPATIENT
Start: 2020-12-08 | End: 2020-12-10 | Stop reason: HOSPADM

## 2020-12-08 RX ORDER — SODIUM CHLORIDE, SODIUM LACTATE, POTASSIUM CHLORIDE, CALCIUM CHLORIDE 600; 310; 30; 20 MG/100ML; MG/100ML; MG/100ML; MG/100ML
9 INJECTION, SOLUTION INTRAVENOUS CONTINUOUS
Status: DISCONTINUED | OUTPATIENT
Start: 2020-12-08 | End: 2020-12-08

## 2020-12-08 RX ORDER — DIPHENOXYLATE HYDROCHLORIDE AND ATROPINE SULFATE 2.5; .025 MG/1; MG/1
1 TABLET ORAL DAILY
Status: DISCONTINUED | OUTPATIENT
Start: 2020-12-08 | End: 2020-12-10 | Stop reason: HOSPADM

## 2020-12-08 RX ORDER — FOLIC ACID 1 MG/1
1 TABLET ORAL DAILY
Status: DISCONTINUED | OUTPATIENT
Start: 2020-12-09 | End: 2020-12-10 | Stop reason: HOSPADM

## 2020-12-08 RX ORDER — FENTANYL CITRATE 50 UG/ML
INJECTION, SOLUTION INTRAMUSCULAR; INTRAVENOUS AS NEEDED
Status: DISCONTINUED | OUTPATIENT
Start: 2020-12-08 | End: 2020-12-08 | Stop reason: SURG

## 2020-12-08 RX ORDER — BISACODYL 10 MG
10 SUPPOSITORY, RECTAL RECTAL DAILY PRN
Status: DISCONTINUED | OUTPATIENT
Start: 2020-12-08 | End: 2020-12-10 | Stop reason: HOSPADM

## 2020-12-08 RX ORDER — HYDROCODONE BITARTRATE AND ACETAMINOPHEN 7.5; 325 MG/1; MG/1
2 TABLET ORAL EVERY 4 HOURS PRN
Status: DISCONTINUED | OUTPATIENT
Start: 2020-12-08 | End: 2020-12-10 | Stop reason: HOSPADM

## 2020-12-08 RX ORDER — SODIUM CHLORIDE 0.9 % (FLUSH) 0.9 %
10 SYRINGE (ML) INJECTION EVERY 12 HOURS SCHEDULED
Status: DISCONTINUED | OUTPATIENT
Start: 2020-12-08 | End: 2020-12-08 | Stop reason: HOSPADM

## 2020-12-08 RX ORDER — FAMOTIDINE 20 MG/1
20 TABLET, FILM COATED ORAL ONCE
Status: COMPLETED | OUTPATIENT
Start: 2020-12-08 | End: 2020-12-08

## 2020-12-08 RX ORDER — ONDANSETRON 2 MG/ML
4 INJECTION INTRAMUSCULAR; INTRAVENOUS EVERY 6 HOURS PRN
Status: DISCONTINUED | OUTPATIENT
Start: 2020-12-08 | End: 2020-12-10 | Stop reason: HOSPADM

## 2020-12-08 RX ORDER — LIDOCAINE HYDROCHLORIDE 10 MG/ML
0.5 INJECTION, SOLUTION EPIDURAL; INFILTRATION; INTRACAUDAL; PERINEURAL ONCE AS NEEDED
Status: COMPLETED | OUTPATIENT
Start: 2020-12-08 | End: 2020-12-08

## 2020-12-08 RX ORDER — HYDROMORPHONE HYDROCHLORIDE 1 MG/ML
0.5 INJECTION, SOLUTION INTRAMUSCULAR; INTRAVENOUS; SUBCUTANEOUS
Status: DISCONTINUED | OUTPATIENT
Start: 2020-12-08 | End: 2020-12-08 | Stop reason: HOSPADM

## 2020-12-08 RX ORDER — PROPOFOL 10 MG/ML
VIAL (ML) INTRAVENOUS AS NEEDED
Status: DISCONTINUED | OUTPATIENT
Start: 2020-12-08 | End: 2020-12-08 | Stop reason: SURG

## 2020-12-08 RX ORDER — OXYCODONE HYDROCHLORIDE AND ACETAMINOPHEN 5; 325 MG/1; MG/1
2 TABLET ORAL EVERY 4 HOURS PRN
Status: DISCONTINUED | OUTPATIENT
Start: 2020-12-08 | End: 2020-12-10 | Stop reason: HOSPADM

## 2020-12-08 RX ORDER — DEXTROSE MONOHYDRATE 25 G/50ML
25 INJECTION, SOLUTION INTRAVENOUS
Status: DISCONTINUED | OUTPATIENT
Start: 2020-12-08 | End: 2020-12-10 | Stop reason: HOSPADM

## 2020-12-08 RX ORDER — PROMETHAZINE HYDROCHLORIDE 12.5 MG/1
12.5 SUPPOSITORY RECTAL EVERY 4 HOURS PRN
Status: DISCONTINUED | OUTPATIENT
Start: 2020-12-08 | End: 2020-12-10 | Stop reason: HOSPADM

## 2020-12-08 RX ORDER — THIAMINE MONONITRATE (VIT B1) 100 MG
100 TABLET ORAL DAILY
Status: DISCONTINUED | OUTPATIENT
Start: 2020-12-09 | End: 2020-12-10 | Stop reason: HOSPADM

## 2020-12-08 RX ORDER — BACITRACIN 50000 [IU]/1
INJECTION, POWDER, FOR SOLUTION INTRAMUSCULAR AS NEEDED
Status: DISCONTINUED | OUTPATIENT
Start: 2020-12-08 | End: 2020-12-08 | Stop reason: HOSPADM

## 2020-12-08 RX ORDER — SODIUM CHLORIDE 9 MG/ML
INJECTION, SOLUTION INTRAVENOUS CONTINUOUS PRN
Status: DISCONTINUED | OUTPATIENT
Start: 2020-12-08 | End: 2020-12-08 | Stop reason: SURG

## 2020-12-08 RX ORDER — FAMOTIDINE 10 MG/ML
20 INJECTION, SOLUTION INTRAVENOUS ONCE
Status: CANCELLED | OUTPATIENT
Start: 2020-12-08 | End: 2020-12-08

## 2020-12-08 RX ORDER — DIPHENHYDRAMINE HYDROCHLORIDE 50 MG/ML
25 INJECTION INTRAMUSCULAR; INTRAVENOUS NIGHTLY PRN
Status: DISCONTINUED | OUTPATIENT
Start: 2020-12-08 | End: 2020-12-10 | Stop reason: HOSPADM

## 2020-12-08 RX ADMIN — FAMOTIDINE 20 MG: 20 TABLET, FILM COATED ORAL at 11:08

## 2020-12-08 RX ADMIN — FENTANYL CITRATE 50 MCG: 50 INJECTION, SOLUTION INTRAMUSCULAR; INTRAVENOUS at 11:38

## 2020-12-08 RX ADMIN — PHENYLEPHRINE HYDROCHLORIDE 100 MCG: 10 INJECTION INTRAVENOUS at 12:14

## 2020-12-08 RX ADMIN — LIDOCAINE HYDROCHLORIDE 0.2 ML: 10 INJECTION, SOLUTION EPIDURAL; INFILTRATION; INTRACAUDAL; PERINEURAL at 11:10

## 2020-12-08 RX ADMIN — SODIUM CHLORIDE: 9 INJECTION, SOLUTION INTRAVENOUS at 12:18

## 2020-12-08 RX ADMIN — SODIUM CHLORIDE, POTASSIUM CHLORIDE, SODIUM LACTATE AND CALCIUM CHLORIDE 9 ML/HR: 600; 310; 30; 20 INJECTION, SOLUTION INTRAVENOUS at 11:10

## 2020-12-08 RX ADMIN — METOPROLOL TARTRATE 25 MG: 25 TABLET, FILM COATED ORAL at 16:15

## 2020-12-08 RX ADMIN — FENTANYL CITRATE 50 MCG: 50 INJECTION, SOLUTION INTRAMUSCULAR; INTRAVENOUS at 11:52

## 2020-12-08 RX ADMIN — MULTIVITAMIN TABLET 1 TABLET: TABLET at 18:31

## 2020-12-08 RX ADMIN — DEXAMETHASONE SODIUM PHOSPHATE 4 MG: 4 INJECTION, SOLUTION INTRAMUSCULAR; INTRAVENOUS at 11:51

## 2020-12-08 RX ADMIN — CEFTRIAXONE 1 G: 1 INJECTION, POWDER, FOR SOLUTION INTRAMUSCULAR; INTRAVENOUS at 11:51

## 2020-12-08 RX ADMIN — POTASSIUM CHLORIDE, DEXTROSE MONOHYDRATE AND SODIUM CHLORIDE 50 ML/HR: 150; 5; 450 INJECTION, SOLUTION INTRAVENOUS at 16:28

## 2020-12-08 RX ADMIN — ONDANSETRON 4 MG: 2 INJECTION INTRAMUSCULAR; INTRAVENOUS at 11:53

## 2020-12-08 RX ADMIN — DAPTOMYCIN 513.6 MG: 500 INJECTION, POWDER, LYOPHILIZED, FOR SOLUTION INTRAVENOUS at 18:30

## 2020-12-08 RX ADMIN — VANCOMYCIN HYDROCHLORIDE 1500 MG: 100 INJECTION, POWDER, LYOPHILIZED, FOR SOLUTION INTRAVENOUS at 13:09

## 2020-12-08 RX ADMIN — DIPHENHYDRAMINE HYDROCHLORIDE 25 MG: 25 CAPSULE ORAL at 23:14

## 2020-12-08 RX ADMIN — SODIUM CHLORIDE: 9 INJECTION, SOLUTION INTRAVENOUS at 11:33

## 2020-12-08 RX ADMIN — METOPROLOL TARTRATE 25 MG: 25 TABLET, FILM COATED ORAL at 21:27

## 2020-12-08 RX ADMIN — SODIUM CHLORIDE 9 ML/HR: 9 INJECTION, SOLUTION INTRAVENOUS at 11:10

## 2020-12-08 RX ADMIN — CEFEPIME HYDROCHLORIDE 2 G: 2 INJECTION, POWDER, FOR SOLUTION INTRAVENOUS at 18:31

## 2020-12-08 RX ADMIN — LIDOCAINE HYDROCHLORIDE 100 MG: 10 INJECTION, SOLUTION EPIDURAL; INFILTRATION; INTRACAUDAL; PERINEURAL at 11:38

## 2020-12-08 RX ADMIN — EPHEDRINE SULFATE 20 MG: 50 INJECTION INTRAMUSCULAR; INTRAVENOUS; SUBCUTANEOUS at 11:43

## 2020-12-08 RX ADMIN — ATORVASTATIN CALCIUM 80 MG: 40 TABLET, FILM COATED ORAL at 21:27

## 2020-12-08 RX ADMIN — PHENYLEPHRINE HYDROCHLORIDE 100 MCG: 10 INJECTION INTRAVENOUS at 12:09

## 2020-12-08 RX ADMIN — PROPOFOL 200 MG: 10 INJECTION, EMULSION INTRAVENOUS at 11:38

## 2020-12-08 NOTE — OP NOTE
Operative Report    12/08/20  12:20 EST    Preoperative diagnosis: Osteomyelitis left foot second metatarsal with severe deformity, multiple diabetic pressure ulcers    Postoperative diagnosis: Same    Anesthesia: General with blocks for postop pain control    Surgeon: Idania Osborn M.D.    Assistant: Shayna GRANADOS, present for the entire procedure including prepping, draping, retraction, closure, dressing.    Operative procedure: Left transmetatarsal amputation    Operative indications: This is a 75-year-old gentleman with severe neuropathy with severe left foot deformity status post several procedures.  He has been very noncompliant with neuropathic foot care and nonweightbearing.  He has had a great toe amputated, first metatarsal amputated, second toe amputated.  He has developed an ulcer under the second metatarsal head that goes down to bone, MRI shows a large erosion in the second metatarsal head consistent with osteomyelitis.  After many extensive discussions of treatment the patient decided to proceed with a transmetatarsal amputation.  In the past several weeks however he has been on the foot so much in his half shoe that he has created necrotic wounds on the dorsal and lateral plantar aspect of the foot.  This is despite instructions to be absolutely nonweightbearing.  Surgery was planned for 1 week ago but had to be delayed because he had atrial fibrillation.  Treatment including anticoagulation and medication for rate control had been recommended approximately a year ago, but the patient chose not to take the medication because it would interfere with his ability to drink alcohol.  Last week he was placed on medication for rate control and anticoagulation.  His rate is currently controlled enough today to proceed with surgery.  He is at very high risk for below-knee amputation.  There is a significant chance this transmetatarsal amputation will not heal.  At the time of surgery I removed much of  the necrotic tissue on the lateral foot.  However the edges although there is some bleeding, they are not entirely normal.  He will have to be absolutely nonweightbearing to heal this to avoid a below-knee amputation.  Antibiotics were given after cultures were obtained.    Operative procedure: The patient was taken to the operating room where general anesthesia was induced without difficulty.  Antibiotics were held until we obtain deep cultures.  The left leg was prepped and draped in the usual sterile fashion with a well-padded tourniquet on the thigh.  The appropriate timeout was called, the leg was elevated and the tourniquet inflated to 350 mmHg tourniquet time was 8 minutes.  I made a fishmouth shaped incision beginning at the lateral fifth metatarsal extending distally and then across the dorsum of the foot and medially.  Care is taken to create flaps with as much soft tissue as possibly available that was viable.  I also ellipsed out the large plantar ulcer.  The bones were dissected subperiosteally.  There was clearly osteomyelitis in the second metatarsal head.  There was an abscess around this.  Cultures were obtained.  The metatarsals were transected in a stairstep fashion with the second longer than the third than the fourth and the fifth.  I also had to remove the lateral aspect of the fifth metatarsal in order to obtain closure after debriding necrotic tissue.  As much necrotic tissue was debrided from the plantar lateral ulceration as possible.  The tourniquet was released, and all of the wound was carefully inspected.  The edges had sluggish bleeding.  The incision was then irrigated copiously with antibiotic solution using pulsatile lavage.  The incision was closed loosely with nylon.  It was dressed sterilely.  He was awakened extubated and transferred to recovery room in stable condition.  Postop plan will be admission for elevation and IV antibiotics.  He must be absolutely  nonweightbearing.    Estimated blood loss: 20 cc    Specimens: Cultures and permanent    Drains: None    Complications: None    Idania Osborn MD  12/08/20  12:20 EST

## 2020-12-08 NOTE — THERAPY EVALUATION
Patient Name: Carlos Culver  : 1945    MRN: [id number]                              Today's Date: 2020       Admit Date: 2020    Visit Dx:     ICD-10-CM ICD-9-CM   1. Chronic multifocal osteomyelitis of left foot (CMS/HCC)  M86.372 730.17   2. Atrial fibrillation with RVR (CMS/[facility])  I48.91 427.31     Patient Active Problem List   Diagnosis   • Cellulitis of left foot   • GUCCI (acute kidney injury) (CMS/[facility])   • Essential hypertension   • Leukocytosis   • Atrial fibrillation with RVR (CMS/HCC)   • Osteomyelitis of left foot (CMS/HCC)   • Chronic anticoagulation   • Neuropathy   • Elevated transaminase level   • CKD (chronic kidney disease) stage 2, GFR 60-89 ml/min   • Skin ulcer of right heel (CMS/HCC)   • Chronic multifocal osteomyelitis of left foot (CMS/HCC)   • Contracture of joint of foot, left   • MARTINEZ (dyspnea on exertion)   • Neuropathic ulcer, limited to breakdown of skin (CMS/[facility])   • Hyperlipidemia LDL goal <100   • A-fib (CMS/[facility])     Past Medical History:   Diagnosis Date   • A-fib (CMS/[facility])    • Acute kidney failure (CMS/[facility])    • Elevated cholesterol    • Hard of hearing    • Hyperlipidemia    • Hypertension    • Renal disorder    • Wears hearing aid in both ears    • Wears reading eyeglasses      Past Surgical History:   Procedure Laterality Date   • AMPUTATION DIGIT Left 2020    Procedure: AMPUTATION LEFT 2ND TOE;  Surgeon: Idania Osborn MD;  Location:  [facility];  Service: Orthopedics   • APPENDECTOMY     • COLONOSCOPY  2006   • INCISION AND DRAINAGE LEG Left 10/23/2018    Procedure: INCISION AND DRAINAGE LEFT FOOT;  Surgeon: Idania Osborn MD;  Location:  [facility] OR;  Service: Orthopedics   • TOE AMPUTATION     • TONSILLECTOMY     • VENOUS ACCESS DEVICE (PORT) INSERTION N/A 10/23/2018    Procedure: GROSHONG CATHETER PLACEMENT;  Surgeon: Marquez Simons MD;  Location:  [facility] OR;  Service: General   • VENOUS ACCESS DEVICE (PORT) REMOVAL       General Information      Row Name 12/08/20 1505          Physical Therapy Time and Intention    Document Type  evaluation  -LUCIUS     Mode of Treatment  individual therapy;physical therapy  -     Row Name 12/08/20 1505          General Information    Patient Profile Reviewed  yes  -LUCIUS     Prior Level of Function  independent:;bed mobility;all household mobility;min assist:;ADL's;transfer  -LUCIUS     Existing Precautions/Restrictions  (S) fall;non-weight bearing Strict NWB L LE  -LUCIUS     Barriers to Rehab  none identified  -     Row Name 12/08/20 1505          Living Environment    Lives With  spouse  -     Row Name 12/08/20 1505          Home Main Entrance    Number of Stairs, Main Entrance  none  -LUCIUS     Row Name 12/08/20 1505          Stairs Within Home, Primary    Stairs, Within Home, Primary  does not need to navigate initially  -     Row Name 12/08/20 1505          Cognition    Orientation Status (Cognition)  oriented x 4  -     Row Name 12/08/20 1505          Safety Issues, Functional Mobility    Safety Issues Affecting Function (Mobility)  safety precautions follow-through/compliance;safety precaution awareness  -LUCIUS     Impairments Affecting Function (Mobility)  endurance/activity tolerance;strength  -LUCIUS       User Key  (r) = Recorded By, (t) = Taken By, (c) = Cosigned By    Initials Name Provider Type    LUCIUS Keenan Felder PT Physical Therapist        Mobility     Row Name 12/08/20 1505          Bed Mobility    Bed Mobility  scooting/bridging;supine-sit  -LUCIUS     Scooting/Bridging [place] (Bed Mobility)  supervision;verbal cues  -LUCIUS     Supine-Sit [place] (Bed Mobility)  supervision;verbal cues  -LUCIUS     Assistive Device (Bed Mobility)  bed rails;head of bed elevated  -LUCIUS     Comment (Bed Mobility)  Verbal cues for LE sequencing off of EOB and use of UEs to push off of bed to sit  -LUCIUS     Row Name 12/08/20 1505          Transfers    Comment (Transfers)  Verbal cues for safe hand placement during standing/sitting and  maintaining strict NWB on L LE; pt assisted to bathroom requiring CGA for toilet transfer  -     Row Name 12/08/20 1505          Sit-Stand Transfer    Sit-Stand [place] (Transfers)  verbal cues;contact guard;2 person assist  -LUCIUS     Assistive Device (Sit-Stand Transfers)  walker, front-wheeled  -LUCIUS     Row Name 12/08/20 1505          Gait/Stairs (Locomotion)    [place] Level (Gait)  verbal cues;contact guard;2 person assist  -LUCIUS     Assistive Device (Gait)  walker, front-wheeled  -LUCIUS     Distance in Feet (Gait)  15 feet  -LUCIUS     Deviations/Abnormal Patterns (Gait)  gait speed decreased;stride length decreased;david decreased  -LUCIUS     Bilateral Gait Deviations  forward flexed posture  -LUCIUS     [place] Level (Stairs)  not tested  -LUCIUS     Comment (Gait/Stairs)  Pt ambulated with swing to pattern and decreased speed. Verbal cues for maintaining upright posture, body within walker, increase R LE step length, and WB through UEs. Pt able to maintain NWB through L LE. Gait limited by fatigue.  -     Row Name 12/08/20 1505          Mobility    Extremity Weight-bearing Status  left lower extremity  -     Left Lower Extremity (Weight-bearing Status)  (S) non weight-bearing (NWB)  -       User Key  (r) = Recorded By, (t) = Taken By, (c) = Cosigned By    Initials Name Provider Type    Keenan Mtoa PT Physical Therapist        Obj/Interventions     Row Name 12/08/20 1505          Range of Motion Comprehensive    General Range of Motion  lower extremity range of motion deficits identified  -     Comment, General Range of Motion  R LE AROM WFL; L foot wrapped  -     Row Name 12/08/20 1505          Strength Comprehensive (MMT)    General Manual Muscle Testing (MMT) Assessment  lower extremity strength deficits identified  -     Comment, General Manual Muscle Testing (MMT) Assessment  R LE functionally 4+/5; NWB on L LE  -     Row Name 12/08/20 1505          Sensory Assessment (Somatosensory)     Sensory Assessment (Somatosensory)  LE sensation intact  -LUCIUS       User Key  (r) = Recorded By, (t) = Taken By, (c) = Cosigned By    Initials Name Provider Type    Keenan Mota, PT Physical Therapist        Goals/Plan     Row Name 12/08/20 1505          Bed Mobility Goal 1 (PT)    Activity/Assistive Device (Bed Mobility Goal 1, PT)  sit to supine/supine to sit  -LUCIUS     [place] Level/Cues Needed (Bed Mobility Goal 1, PT)  modified independence  -LUCIUS     Time Frame (Bed Mobility Goal 1, PT)  long term goal (LTG);5 days  -LUCIUS     Row Name 12/08/20 1505          Transfer Goal 1 (PT)    Activity/Assistive Device (Transfer Goal 1, PT)  sit-to-stand/stand-to-sit;walker, rolling  -LUCIUS     [place] Level/Cues Needed (Transfer Goal 1, PT)  modified independence  -LUCIUS     Time Frame (Transfer Goal 1, PT)  long term goal (LTG);5 days  -LUCIUS     Row Name 12/08/20 1505          Gait Training Goal 1 (PT)    Activity/Assistive Device (Gait Training Goal 1, PT)  gait (walking locomotion);other (see comments) knee scooter  -LUCIUS     [place] Level (Gait Training Goal 1, PT)  contact guard assist  -LUCIUS     Distance (Gait Training Goal 1, PT)  150 feet  -LUCIUS     Time Frame (Gait Training Goal 1, PT)  long term goal (LTG);5 days  -LUCIUS       User Key  (r) = Recorded By, (t) = Taken By, (c) = Cosigned By    Initials Name Provider Type    Keenan Mota, PT Physical Therapist        Clinical Impression     Row Name 12/08/20 1509          Pain    Additional Documentation  Pain Scale: Numbers Pre/Post-Treatment (Group)  -LUCIUS     Row Name 12/08/20 1505          Pain Scale: Numbers Pre/Post-Treatment    Pretreatment Pain Rating  0/10 - no pain  -LUCIUS     Posttreatment Pain Rating  0/10 - no pain  -LUCIUS     Row Name 12/08/20 1505          Therapy Assessment/Plan (PT)    Patient/Family Therapy Goals Statement (PT)  To return home  -LUCIUS     Rehab Potential (PT)  good, to achieve stated therapy goals  -LUCIUS     Criteria for Skilled Interventions Met  (PT)  yes;meets criteria;skilled treatment is necessary  -LUCIUS     Row Name 12/08/20 1505          Positioning and Restraints    Pre-Treatment Position  in bed  -LUCIUS     Post Treatment Position  chair  -LUCIUS     In Chair  notified nsg;reclined;with nsg;legs elevated;call light within reach;encouraged to call for assist;exit alarm on  -       User Key  (r) = Recorded By, (t) = Taken By, (c) = Cosigned By    Initials Name Provider Type    Keenan Mota, DORINDA Physical Therapist        Outcome Measures     Row Name 12/08/20 1505          How much help from another person do you currently need...    Turning from your back to your side while in flat bed without using bedrails?  4  -LUCIUS     Moving from lying on back to sitting on the side of a flat bed without bedrails?  4  -LUCIUS     Moving to and from a bed to a chair (including a wheelchair)?  3  -LUCIUS     Standing up from a chair using your arms (e.g., wheelchair, bedside chair)?  3  -LUCIUS     Climbing 3-5 steps with a railing?  1  -LUCIUS     To walk in hospital room?  3  -LUCIUS     AM-PAC 6 Clicks Score (PT)  18  -     Row Name 12/08/20 1505          Functional Assessment    Outcome Measure Options  AM-PAC 6 Clicks Basic Mobility (PT)  -       User Key  (r) = Recorded By, (t) = Taken By, (c) = Cosigned By    Initials Name Provider Type    Keenan Mota, DORINDA Physical Therapist        Physical Therapy Education                 Title: PT OT SLP Therapies (Done)     Topic: Physical Therapy (Done)     Point: Mobility training (Done)     Learning Progress Summary           Patient Acceptance, E,D, VU by LUCIUS at 12/8/2020 1505    Comment: Educated on safe sequencing with bed mobility, ambulatory/toilet transfers, and gait training. Reviewed strict NWB order for L LE.                   Point: Home exercise program (Done)     Learning Progress Summary           Patient Acceptance, E,D, VU by LUCIUS at 12/8/2020 1505    Comment: Educated on safe sequencing with bed mobility, ambulatory/toilet  transfers, and gait training. Reviewed strict NWB order for L LE.                   Point: Body mechanics (Done)     Learning Progress Summary           Patient Acceptance, E,D, VU by  at 12/8/2020 1505    Comment: Educated on safe sequencing with bed mobility, ambulatory/toilet transfers, and gait training. Reviewed strict NWB order for L LE.                   Point: Precautions (Done)     Learning Progress Summary           Patient Acceptance, E,D, VU by  at 12/8/2020 1505    Comment: Educated on safe sequencing with bed mobility, ambulatory/toilet transfers, and gait training. Reviewed strict NWB order for L LE.                               User Key     Initials Effective Dates Name Provider Type Discipline     09/10/19 -  Keenan Felder, PT Physical Therapist PT              PT Recommendation and Plan  Planned Therapy Interventions (PT): balance training, bed mobility training, gait training, home exercise program, patient/family education, strengthening, stair training, ROM (range of motion)  Plan of Care Reviewed With: patient  Progress: improving  Outcome Summary: PT eval complete. Pt ambulated 15 feet using RW and CGA x2 for safety. Pt able to maintaining NWB on L LE, with gait limited by fatigue. Bed mobility performed with supervision, STS and toilet transfers with CGA x2. Reviewed strict NWB order placed by Dr. Osborn. Pt bleeding through dressing at surgical site, RN notified and present. Recommend pt d/c home with assist when appropriate.     Time Calculation:   PT Charges     Row Name 12/08/20 1505             Time Calculation    Start Time  1505  -      PT Received On  12/08/20  -      PT Goal Re-Cert Due Date  12/18/20  -         Time Calculation- PT    Total Timed Code Minutes- PT  14 minute(s)  -         Timed Charges    61904 - Gait Training Minutes   8  -      90612 - PT Therapeutic Activity Minutes  6  -LUCIUS        User Key  (r) = Recorded By, (t) = Taken By, (c) = Cosigned By     Initials Name Provider Type    LUCIUS Keenan Felder, PT Physical Therapist        Therapy Charges for Today     Code Description Service Date Service Provider Modifiers Qty    98294525536 HC GAIT TRAINING EA 15 MIN 12/8/2020 Keenan Felder, PT GP 1    82881500307 HC PT EVAL LOW COMPLEXITY 3 12/8/2020 Keenan Felder, PT GP 1    03090568588 HC PT THER SUPP EA 15 MIN 12/8/2020 Keenan Felder, PT GP 2          PT G-Codes  Outcome Measure Options: AM-PAC 6 Clicks Basic Mobility (PT)  AM-PAC 6 Clicks Score (PT): 18    Keenan Felder, PT  12/8/2020

## 2020-12-08 NOTE — CONSULTS
INFECTIOUS DISEASE CONSULT/INITIAL HOSPITAL VISIT    Carlos Culver  1945  [id number]    Date of consult: 12/8/2020    Admit date: 12/8/2020    Requesting Provider: Idania Osborn MD  Evaluating physician: Jeffery Young MD  Reason for Consultation: Left second toe osteomyelitis, metatarsal  Chief Complaint: Above      Subjective   History of present illness:  Patient is a 75 y.o.  Yr old male with peripheral neuropathy, chronic kidney disease stage II, atrial fibrillation, hyperlipidemia, hard of hearing, essential hypertension, previous toe amputation, who developed a new left second plantar metatarsal ulcer with positive culture for MSSA with persistence.  MRI on 11/11/2020 was consistent with left second metatarsal osteomyelitis.  The patient has had a difficult time with compliance and nonweightbearing on his left foot.  His ulcer worsened.  He was admitted to [facility] on 12/8/2020 and underwent left second metatarsal resection by Dr. Idania Osborn.  The patient has no other localizing signs or symptoms of infection.  I was consulted on 12/8/2020 for further evaluation and treatment.    Past Medical History:   Diagnosis Date   • A-fib (CMS/[facility])    • Acute kidney failure (CMS/[facility])    • Elevated cholesterol    • Hard of hearing    • Hyperlipidemia    • Hypertension    • Renal disorder    • Wears hearing aid in both ears    • Wears reading eyeglasses        Past Surgical History:   Procedure Laterality Date   • AMPUTATION DIGIT Left 1/7/2020    Procedure: AMPUTATION LEFT 2ND TOE;  Surgeon: Idania Osborn MD;  Location: [facility] OR;  Service: Orthopedics   • APPENDECTOMY     • COLONOSCOPY  2006   • INCISION AND DRAINAGE LEG Left 10/23/2018    Procedure: INCISION AND DRAINAGE LEFT FOOT;  Surgeon: Idania Osborn MD;  Location: [facility];  Service: Orthopedics   • TOE AMPUTATION     • TONSILLECTOMY     • VENOUS ACCESS DEVICE (PORT) INSERTION N/A 10/23/2018    Procedure: GROSHONG  CATHETER PLACEMENT;  Surgeon: Marquez Simons MD;  Location: [facility];  Service: General   • VENOUS ACCESS DEVICE (PORT) REMOVAL         Pediatric History   Patient Parents   • Not on file     Other Topics Concern   • Not on file   Social History Narrative   • Not on file   Father  of unknown cancer, mother  of old age and cardiac disease.  He is retired from the [employer] business , 3 children.    family history is not on file.    Allergies   Allergen Reactions   • Sulfa Antibiotics Unknown (See Comments)     Pt was told as child he had an allergy.  Has not taken and doesn't know the response         There is no immunization history on file for this patient.    Medication:    Current Facility-Administered Medications:   •  atorvastatin (LIPITOR) tablet 80 mg, 80 mg, Oral, Nightly, Jose Antonio Coleman MD  •  bisacodyl (DULCOLAX) suppository 10 mg, 10 mg, Rectal, Daily PRN, Idania Osborn MD  •  dextrose 5 % and sodium chloride 0.45 % with KCl 20 mEq/L infusion, 50 mL/hr, Intravenous, Continuous, Idania Osborn MD  •  diphenhydrAMINE (BENADRYL) capsule 25 mg, 25 mg, Oral, Nightly PRN **OR** diphenhydrAMINE (BENADRYL) injection 25 mg, 25 mg, Intravenous, Nightly PRN, Idania Osborn MD  •  [START ON 2020] enoxaparin (LOVENOX) syringe 40 mg, 40 mg, Subcutaneous, Daily, Idania Osborn MD  •  HYDROcodone-acetaminophen (NORCO) 7.5-325 MG per tablet 1 tablet, 1 tablet, Oral, Q4H PRN, Idania Osborn MD  •  HYDROcodone-acetaminophen (NORCO) 7.5-325 MG per tablet 2 tablet, 2 tablet, Oral, Q4H PRN, Idania Osborn MD  •  HYDROmorphone (DILAUDID) injection 1 mg, 1 mg, Intravenous, Q2H PRN **AND** naloxone (NARCAN) injection 0.4 mg, 0.4 mg, Intravenous, Q5 Min PRN, Idania Osborn MD  •  labetalol (NORMODYNE,TRANDATE) injection 10 mg, 10 mg, Intravenous, Q4H PRN, Jose Antonio Coleman MD  •  [START ON 2020] lisinopril (PRINIVIL,ZESTRIL) 20 mg, hydroCHLOROthiazide (HYDRODIURIL) 12.5 mg for  ZESTORETIC 20-12.5, , Oral, Q24H, Jose Antonio Coleman MD  •  magnesium hydroxide (MILK OF MAGNESIA) suspension 2400 mg/10mL 10 mL, 10 mL, Oral, Daily PRN, Idania Osborn MD  •  metoprolol tartrate (LOPRESSOR) tablet 25 mg, 25 mg, Oral, TID, Jose Antonio Coleman MD  •  multivitamin (THERAGRAN) tablet 1 tablet, 1 tablet, Oral, Daily, Idania Osborn MD  •  ondansetron (ZOFRAN) injection 4 mg, 4 mg, Intravenous, Q6H PRN, Idania Osborn MD  •  oxyCODONE-acetaminophen (PERCOCET) 5-325 MG per tablet 1 tablet, 1 tablet, Oral, Q4H PRN, Idania Osborn MD  •  oxyCODONE-acetaminophen (PERCOCET) 5-325 MG per tablet 2 tablet, 2 tablet, Oral, Q4H PRN, Idania Osborn MD  •  promethazine (PHENERGAN) tablet 12.5 mg, 12.5 mg, Oral, Q4H PRN **OR** promethazine (PHENERGAN) suppository 12.5 mg, 12.5 mg, Rectal, Q4H PRN, Idania Osborn MD  •  promethazine (PHENERGAN) tablet 12.5 mg, 12.5 mg, Oral, Q4H PRN, Idania Osborn MD  •  sodium chloride 0.9 % bolus 500 mL, 500 mL, Intravenous, TID PRN, Jose Antonio Coleman MD    Please refer to the medical record for a full medication list    Review of Systems:    Constitutional-- No Fever, chills or sweats.  Appetite good, and no malaise. No fatigue.  HEENT-- No new vision, hearing or throat complaints.  No epistaxis or oral sores.  Denies odynophagia or dysphagia.  No odynophagia or dysphagia. No headache, photophobia or neck stiffness.  CV-- No chest pain, palpitation or syncope  Resp-- No SOB/cough/Hemoptysis  GI- No nausea, vomiting, or diarrhea.  No hematochezia, melena, or hematemesis. Denies jaundice or chronic liver disease.  -- No dysuria, hematuria, or flank pain.  Denies hesitancy, urgency or flank pain.  Lymph- no swollen lymph nodes in neck/axilla or groin.   Heme- No active bruising or bleeding; no Hx of DVT or PE.  MS-- no swelling or pain in the bones or joints of arms/legs.  No new back pain.  Neuro-- No acute focal weakness or numbness in the arms or legs.  No  seizures.  Skin--No rashes or lesions, except as per HPI    Physical Exam:   Vital Signs   Temp:  [97.2 °F (36.2 °C)-97.5 °F (36.4 °C)] 97.2 °F (36.2 °C)  Heart Rate:  [65-85] 65  Resp:  [16-18] 18  BP: (107-126)/(76-94) 110/76    Temp  Min: 97.2 °F (36.2 °C)  Max: 97.5 °F (36.4 °C)  BP  Min: 107/93  Max: 126/78  Pulse  Min: 65  Max: 85  Resp  Min: 16  Max: 18  SpO2  Min: 90 %  Max: 100 %    Blood pressure 110/76, pulse 65, temperature 97.2 °F (36.2 °C), resp. rate 18, SpO2 100 %.  GENERAL: Awake and alert, in minimal distress. Appears older than stated age.  HEENT:  Normocephalic, atraumatic.  Oropharynx without thrush. Dentition in good repair. No cervical adenopathy. No neck masses.  Ears externally normal, Nose externally normal.  EYES: PERRL. No conjunctival injection. No icterus. EOM full.  LYMPHATICS: No lymphadenopathy of the neck or axillary or inguinal regions.   HEART: No murmur, gallop, or pericardial friction rub. Reg rate rhythm, No JVD at 45 degrees.  LUNGS: Clear to auscultation and percussion. No respiratory distress, no use of accessory muscles.  ABDOMEN: Soft, nontender, nondistended. No appreciable HSM.  Bowel sounds normal.  Obese.  GENITAL: No external lesions, breasts without masses, back straight, no CVAT, rectal external without lesions.   SKIN: Warm and dry without cutaneous eruptions.  No nodules.   Left foot surgical dressing in place.  PSYCHIATRIC: Mental status lucid. No confusion.  EXT:  No cellulitic change.  NEURO: Oriented to name, CN 2 to 12 intact, DTR 1 + and symmetric, sensory intact to LT upper and lower extremitiy, motor 5/5 upper and lower extremity, cerebellar and gait not tested.      Results Review:   I reviewed the patient's new clinical results.  I reviewed the patient's new imaging results and agree with the interpretation.  I reviewed the patient's other test results and agree with the interpretation        Results from last 7 days   Lab Units 12/08/20  1124    POTASSIUM mmol/L 4.4   Laboratories reviewed sodium 141, potassium 4.2, BUN 18, creatinine 1.2, alkaline phosphatase 80, ALT 40, AST 34, bilirubin 0.6, WBC 7.4, hemoglobin 13 with recommendations made for with normal differential, CRP 0.3, ESR 47, hemoglobin A1c 6.0.  Laboratories from approximately 11/26/2018.  11/25/2020 labs sodium 138, potassium 4.7, creatinine 1.36, BUN 28, hemoglobin A1c 5.80, CRP 0.23, ESR 8, WBC 10.3, hemoglobin 16, platelets 175.                      Estimated Creatinine Clearance: 55.9 mL/min (A) (by C-G formula based on SCr of 1.36 mg/dL (H)).    Microbiology:  Microbiology Results Abnormal     Procedure Component Value - Date/Time    Tissue / Bone Culture - Tissue, Toe, Left [[id number]] Collected: 12/08/20 1208    Lab Status: Preliminary result Specimen: Tissue from Toe, Left Updated: 12/08/20 1506     Gram Stain No WBCs or organisms seen          Radiology:  Imaging Results (Last 72 Hours)     ** No results found for the last 72 hours. **          IMPRESSION:     1.  Left second acute on chronic metatarsal osteomyelitis by MRI 11/11/2020.  Some noncompliance with continued ambulation on his plantar ulcer.  Status post left second metatarsal amputation on 12/8/2020.  Usual organisms include Staphylococcus, Streptococcus, Pseudomonas.  Partially antibiotic modified.   2.  Peripheral neuropathy related to his development of osteomyelitis.  3.  Chronic kidney disease stage II/III, most recent GFR 51.  4.  Obesity, BMI 29.15.  5.  Noncompliance.    RECOMMENDATIONS:    1.  Diagnostically, continue to follow patient's physical exam, CBC, CMP, CRP, CPK, and cultures obtained at the time of surgery on 12/8/2020.  2.  Therapeutically, consider using daptomycin 700 mg IV daily, cefepime 2 g IV every 12 hours pending further culture data.  Duration atelectasis until 1/12/2021 and reassess.  3.  Nonweightbearing status on foot.  4.  Long-term venous access.  Groshong catheter may be preferable  given his renal function.  I discussed this with Dr. Antonio Bains to provide service.    I discussed the patients findings and my recommendations with patient, nursing staff and consulting provider    Thank you for asking me to see Carlos Culver.  Our group would be pleased to follow this patient over the course of their hospitalization and assist with outpatient antimicrobial therapy, as indicated.  Further recommendations depend on the results of the cultures and clinical course.    Case management orders: Please arrange for home antibiotics with daptomycin 700 mg IV daily, cefepime 2 g IV every 12 hours until 1/12/2021 for 6 weeks, treatment of left foot osteomyelitis.  Check CBC, CMP, CRP, C. difficile, antibiotics.  Fax orders to [phone], call [phone] with final arrangements.  Arrange for follow-up with me in 1 week post discharge.    Jeffery Young MD  12/8/2020

## 2020-12-08 NOTE — ANESTHESIA PROCEDURE NOTES
Airway  Urgency: elective    Date/Time: 12/8/2020 11:39 AM  Airway not difficult    General Information and Staff    Patient location during procedure: OR  CRNA: Kacy Lemus CRNA    Indications and Patient Condition  Indications for airway management: airway protection    Preoxygenated: yes  Mask difficulty assessment: 1 - vent by mask    Final Airway Details  Final airway type: supraglottic airway      Successful airway: I-gel  Size 4    Number of attempts at approach: 1  Assessment: lips, teeth, and gum same as pre-op    Additional Comments  LMA placed without difficulty, ventilation with assist, equal breath sounds and symmetric chest rise and fall

## 2020-12-08 NOTE — INTERVAL H&P NOTE
[facility] Pre-op    Full history and physical note from office is attached.    VS: /93 HR 73 RR 18 T 97.5 Sat 98% RA    Of note, he has cardiac clearance per Dr. Anderson.    Immunizations:  Influenza:  2020  Pneumococcal:  Yes  Tetanus:  No    LAB Results:  Lab Results   Component Value Date    WBC 10.35 11/25/2020    HGB 16.3 11/25/2020    HCT 48.1 11/25/2020    MCV 93.4 11/25/2020     11/25/2020    NEUTROABS 5.87 11/25/2020    GLUCOSE 104 (H) 11/25/2020    BUN 28 (H) 11/25/2020    CREATININE 1.36 (H) 11/25/2020    EGFRIFNONA 51 (L) 11/25/2020     11/25/2020    K 4.8 12/01/2020     11/25/2020    CO2 26.0 11/25/2020    PHOS 2.5 10/19/2015    CALCIUM 10.2 11/25/2020    ALBUMIN 4.02 10/31/2018    AST 28 10/31/2018    ALT 34 10/31/2018    BILITOT 0.4 10/31/2018     Interpretation Summary  12/1/2020:  · Left ventricular ejection fraction appears to be 51 - 55%. Left ventricular systolic function is low normal.  · Left ventricular wall thickness is consistent with mild concentric hypertrophy.  · Left atrial volume is moderately increased.  · The right atrial cavity is moderate to severely dilated.  · The right ventricular cavity is mildly dilated.  · Severe tricuspid valve regurgitation is present.  · Estimated right ventricular systolic pressure from tricuspid regurgitation is mildly elevated (35-45 mmHg). Calculated right ventricular systolic pressure from tricuspid regurgitation is 41 mmHg.  · Mild to moderate mitral regurgitation.       Cancer Staging (if applicable)  Cancer Patient: __ yes __no __unknown__N/A; If yes, clinical stage T:__ N:__M:__, stage group or __N/A      Impression: Chronic multifocal osteomyelitis of the left foot      Plan: left transmetatarsal amptuation      ROBIN Walters   12/8/2020   10:48 EST

## 2020-12-08 NOTE — CONSULTS
Patient Name:  Carlos Culver  YOB: 1945  [id number]       Patient Care Team:  Marcus Cheng MD as PCP - General  Marcus Cheng MD as PCP - Family Medicine  Jeffery Young MD as Consulting Physician (Infectious Diseases)  Hang Anderson MD as Consulting Physician (Cardiology)      General Surgery Consult Note     Date of Consultation: 12/08/20    Consulting Physician - Idania Osborn MD    Reason for Consult - long-term central venous access, unable to obtain PICC line    Subjective     I have been asked to see  Carlos Culver , a 75 y.o. male in consultation for Groshong catheter.     Mr. Carlos Paniagua is a 75-year-old man past medical history significant for osteomyelitis status post multiple debridements and amputations of his left foot, most recently on 12/8/2020 he had a left transmetatarsal amputation.  His past medical history is also significant for A. 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  General surgery was consulted for a Groshong catheter.  Patient has had a tunneled catheter in the past.  He denies fevers, chills, nausea, vomiting, chest pain, shortness of breath,        Allergy:   Allergies   Allergen Reactions   • Sulfa Antibiotics Unknown (See Comments)     Pt was told as child he had an allergy.  Has not taken and doesn't know the response       Medications:  atorvastatin, 80 mg, Oral, Nightly  cefepime, 2 g, Intravenous, Once  [START ON 12/9/2020] cefepime, 2 g, Intravenous, Q12H  DAPTOmycin, 6 mg/kg (Adjusted), Intravenous, Q24H  [START ON 12/9/2020] enoxaparin, 40 mg, Subcutaneous, Daily  [START ON 12/9/2020] lisinopril-hydroCHLOROthiazide (ZESTORETIC) 20-12.5 mg combo dose, , Oral, Q24H  metoprolol tartrate, 25 mg, Oral, TID  multivitamin, 1 tablet, Oral, Daily      dextrose 5 % and sodium chloride 0.45 % with KCl  20 mEq/L, 50 mL/hr, Last Rate: 50 mL/hr (20 0618)      No current facility-administered medications on file prior to encounter.      Current Outpatient Medications on File Prior to Encounter   Medication Sig   • lisinopril-hydrochlorothiazide (PRINZIDE,ZESTORETIC) 20-12.5 MG per tablet Take 1 tablet by mouth Daily.   • metoprolol tartrate (LOPRESSOR) 25 MG tablet Take 1 tablet by mouth 3 (Three) Times a Day.   • apixaban (Eliquis) 5 MG tablet tablet Take 1 tablet by mouth 2 (Two) Times a Day.   • atorvastatin (LIPITOR) 80 MG tablet Take 80 mg by mouth Every Night.   • HYDROcodone-acetaminophen (NORCO) 7.5-325 MG per tablet Take 1-2 tablets by mouth Every 6 (Six) Hours As Needed for Moderate Pain  (as needed for pain).   • mupirocin (Bactroban) 2 % ointment Apply  topically to the appropriate area as directed Daily.   • ondansetron (Zofran) 4 MG tablet Take 1 tablet by mouth Every 6 (Six) Hours As Needed for Nausea or Vomiting.   • oxyCODONE-acetaminophen (Percocet) 5-325 MG per tablet Take 1-2 tablets by mouth Every 6 (Six) Hours As Needed for Severe Pain  (as needed for severe pain).       PMHx:   Past Medical History:   Diagnosis Date   • A-fib (CMS/[facility])    • Acute kidney failure (CMS/[facility])    • Elevated cholesterol    • Hard of hearing    • Hyperlipidemia    • Hypertension    • Renal disorder    • Wears hearing aid in both ears    • Wears reading eyeglasses        Past Surgical History:  Transmetatarsal amputation of the left foot 2020.     Family History: Father was  of lung cancer mother  of coronary artery disease complications     Social History: Pt lives in [facility].    Tobacco use: None     EtOH use : 2-3 drinks of whiskey/vodka per day   Illicit drug use: None      Review of Systems   Pertinent items are noted in HPI     Constitutional: No fevers, chills or malaise   Eyes: Denies visual changes    Cardiovascular: Denies chest pain, palpitations   Pulmonary: Denies cough or  shortness of breath   Abdominal/ GI: See HPI    Genitourinary: Denies dysuria or hematuria   Musculoskeletal: Denies any but chronic joint aches, pains or deformities   Psychiatric: No recent mood changes   Neurologic: No paresthesias or loss of function          Objective     Physical Exam:      Vital Signs  /76 (BP Location: Right arm, Patient Position: Lying)   Pulse 64   Temp 97.8 °F (36.6 °C) (Oral)   Resp 18   SpO2 96%     Intake/Output Summary (Last 24 hours) at 12/8/2020 1751  Last data filed at 12/8/2020 1708  Gross per 24 hour   Intake 2540 ml   Output 20 ml   Net 2520 ml         Physical Exam:    Head: Normocephalic, atraumatic.   Eyes: Pupils equal, round, react to light and accommodation.   Mouth: Oral mucosa without lesions,   Neck: No masses, lymphadenopathy or carotid bruits bilaterally   CV: Rhythm irregular and rate   Lungs: Clear  to auscultation bilaterally   Abdomen: Bowel sounds present , soft, nontender,no peritonitis  Groin : No obvious hernias bilaterally   Extremities:  No cyanosis, clubbing or edema of the right leg, left leg wrapped in Ace  Lymphatics: No abnormal lymphadenopathy appreciated   Neurologic: No gross deficits, alert and oriented x3      Results Review: I have personally reviewed all of the lab and imaging results available at this time. Most recently patient has normal potassium, creatinine is 1.36 BUN is 28 this is improved from previous creatinines in the past.  Patient has no leukocytosis hemoglobin is 16.3       Assessment/Plan     Assessment and Plan:  Mr. Carlos Paniagua is a 75-year-old man with a past medical history significant for osteomyelitis for which general surgery was consulted for long-term central venous access for antibiotic therapy.  Patient is appropriate for Groshong catheter placement and we will add the patient onto the operating room schedule for tomorrow.  Please make patient n.p.o. after midnight, maintenance IV fluids, continue to hold  therapeutic anticoagulation and obtain consent.  Patient understands the risk benefits and alternatives including bleeding infection damage to surrounding structures, risk of pneumothorax depending on technique of venous cannulation.  He is agreeable to proceed to the operating room.        S/P transmetatarsal amputation of foot, left (CMS/HCC)    Essential hypertension    Atrial fibrillation with RVR (CMS/HCC)    Osteomyelitis of left foot (CMS/HCC)    Chronic anticoagulation    CKD (chronic kidney disease) stage 2, GFR 60-89 ml/min    Chronic multifocal osteomyelitis of left foot (CMS/HCC)    Neuropathic ulcer, limited to breakdown of skin (CMS/HCC)    Hyperlipidemia LDL goal <100    A-fib (CMS/HCC)              I discussed the patient's findings and my recommendations with the patient and/or family, as well as the primary team.  I have discussed the patient's history and physical exam with Dr. Antonio Bains MD.    Hang Jones MD  12/08/20  17:51 EST      I have personally performed the key portions of the history and physical exam, and I have edited the above note to better reflect my complete history and physical exam.    Agree with above.  Pleasant gentleman with a history of neuropathic foot ulcers managed nonoperatively for as long as possible, who recently underwent left transmetatarsal amputation.  In discussion with Dr. Young, he will need long-term IV access, and is inappropriate for PICC line placement due to his chronic renal insufficiency.    CV:  Rhythm  regular and rate regular   L:  Clear  to auscultation bilaterally   Abd:  Bowel sounds positive , soft,   Ext:  No cyanosis, clubbing, edema. Dressings on L foot c/d/i    Labs: Labs reviewed       Assessment/ Plan:    We will plan for Groshong placement tomorrow.  He has had prior central lines, and understands completely the risks and benefits as I explained to him.    Problem List Items Addressed This Visit        Cardiovascular and  Mediastinum    Atrial fibrillation with RVR (CMS/HCC)    Relevant Orders    Anaerobic Culture - Tissue, Toe, Left    Fungus Culture - Tissue, Toe, Left    Tissue / Bone Culture - Tissue, Toe, Left (Completed)    Tissue Pathology Exam    AFB Culture - Tissue, Toe, Left       Musculoskeletal and Integument    Chronic multifocal osteomyelitis of left foot (CMS/HCC)    Relevant Orders    Anaerobic Culture - Tissue, Toe, Left    Fungus Culture - Tissue, Toe, Left    Tissue / Bone Culture - Tissue, Toe, Left (Completed)    Tissue Pathology Exam    AFB Culture - Tissue, Toe, Left              Antonio Bains MD  18:13 EST

## 2020-12-08 NOTE — PROGRESS NOTES
Orthopedic Foot/Ankle Progress Note    Subjective     Post-Op:Day of Surgery  Procedure(s):  left transmetatarsal amputation  Laterality:Left      Systemic or Specific Complaints: sleepy, no pain  Objective     Vital signs in last 24 hours:  Temp:  [97.2 °F (36.2 °C)-97.5 °F (36.4 °C)] 97.2 °F (36.2 °C)  Heart Rate:  [65-85] 65  Resp:  [16-18] 18  BP: (107-126)/(76-94) 110/76    Neurovascular: no change in dense neurpathy               Wound: left foot dressing dry    Data Review  Lab Results (last 24 hours)     Procedure Component Value Units Date/Time    Anaerobic Culture - Tissue, Toe, Left [[id number]] Collected: 12/08/20 1208    Specimen: Tissue from Toe, Left Updated: 12/08/20 1347    Tissue / Bone Culture - Tissue, Toe, Left [[id number]] Collected: 12/08/20 1208    Specimen: Tissue from Toe, Left Updated: 12/08/20 1347    Fungus Culture - Tissue, Toe, Left [[id number]] Collected: 12/08/20 1208    Specimen: Tissue from Toe, Left Updated: 12/08/20 1347    AFB Culture - Tissue, Toe, Left [[id number]] Collected: 12/08/20 1208    Specimen: Tissue from Toe, Left Updated: 12/08/20 1347    Tissue Pathology Exam [[id number]] Collected: 12/08/20 1219    Specimen: Tissue from Toe, Left Updated: 12/08/20 1340    Potassium [[id number]]  (Normal) Collected: 12/08/20 1126    Specimen: Blood Updated: 12/08/20 1136     Potassium 4.4 mmol/L      Comment: Slight hemolysis detected by analyzer. Results may be affected.               Assessment/Plan     Status post- left trans-met amputation  -high risk for BKA  -must be absolutely NON WEIGHT BEARING and completely compliant to try to avoid BKA           Idania Osborn MD    Date: 12/8/2020  Time: 14:06 EST

## 2020-12-08 NOTE — ANESTHESIA POSTPROCEDURE EVALUATION
Patient: Carlos Culver    Procedure Summary     Date: 12/08/20 Room / Location:  [facility]  /  [facility]    Anesthesia Start: 1133 Anesthesia Stop: 1310    Procedure: left transmetatarsal amputation (Left Foot) Diagnosis:       Chronic multifocal osteomyelitis of left foot (CMS/HCC)      Atrial fibrillation with RVR (CMS/HCC)      (Chronic multifocal osteomyelitis of left foot (CMS/HCC) [M86.372])      (Atrial fibrillation with RVR (CMS/HCC) [I48.91])    Surgeon: Idania Osbron MD Provider: Mao Lew MD    Anesthesia Type: general ASA Status: 3          Anesthesia Type: general    Vitals  Vitals Value Taken Time   /78 12/08/20 1310   Temp 97.4 °F (36.3 °C) 12/08/20 1310   Pulse 73 12/08/20 1311   Resp 16 12/08/20 1310   SpO2 93 % 12/08/20 1311   Vitals shown include unvalidated device data.        Post Anesthesia Care and Evaluation    Patient location during evaluation: PACU  Patient participation: complete - patient participated  Level of consciousness: awake and alert  Pain management: adequate  Airway patency: patent  Anesthetic complications: No anesthetic complications  PONV Status: none  Cardiovascular status: acceptable  Respiratory status: acceptable  Hydration status: acceptable

## 2020-12-08 NOTE — H&P
Patient Name: Carlos Culver  MRN: [id number]  : 1945  DOS: 2020    Attending: Idania Fernández MD    Primary Care Provider: Marcus Cheng MD      Chief complaint: Left foot osteomyelitis, multiple diabetic pressure ulcers    Subjective   Patient is a pleasant 75 y.o. male presented for scheduled surgery with Dr. Fernández.  He has previously had toe amputations on the left side.    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  He has been very noncompliant with neuropathic foot care and nonweightbearing.  He has had a great toe amputated, first metatarsal amputated, second toe amputated.  He has developed an ulcer under the second metatarsal head that goes down to bone, MRI shows a large erosion in the second metatarsal head consistent with osteomyelitis.  After many extensive discussions of treatment the patient decided to proceed with a transmetatarsal amputation.  In the past several weeks however he has been on the foot so much in his half shoe that he has created necrotic wounds on the dorsal and lateral plantar aspect of the foot.  This is despite instructions to be absolutely nonweightbearing.  Surgery was planned for 1 week ago but had to be delayed because he had atrial fibrillation.  Treatment including anticoagulation and medication for rate control had been recommended approximately a year ago, but the patient chose not to take the medication because it would interfere with his ability to drink alcohol.  Last week he was placed on medication for rate control and anticoagulation.  His rate is currently controlled enough today to proceed with surgery.  He is at very high risk for below-knee amputation.  There is a significant chance this transmetatarsal amputation will not heal.  At the time of surgery I removed much of the necrotic tissue on the lateral foot.  However the edges although there is  some bleeding, they are not entirely normal.  He will have to be absolutely nonweightbearing to heal this to avoid a below-knee amputation.  Antibiotics were given after cultures were obtained.)    Patient underwent left foot transmetatarsal amputation under general anesthesia, tolerated surgery well, was admitted for further management.  Seen in his room postoperatively, doing fairly well, good pain control, no complains of nausea, vomiting, or shortness of breath.    As noted above he is on beta-blocker for rate control, is on a DOAC for anticoagulation, this was stopped and expectation of surgery.    He has been drinking alcohol daily since the pandemic.  Last alcoholic drink was night before last.      Allergies   Allergen Reactions   • Sulfa Antibiotics Unknown (See Comments)     Pt was told as child he had an allergy.  Has not taken and doesn't know the response       Meds:  Facility-Administered Medications Prior to Admission   Medication Dose Route Frequency Provider Last Rate Last Admin   • mupirocin (BACTROBAN) 2 % ointment   Topical Q12H Henrietta Zacarias PA-C         Medications Prior to Admission   Medication Sig Dispense Refill Last Dose   • lisinopril-hydrochlorothiazide (PRINZIDE,ZESTORETIC) 20-12.5 MG per tablet Take 1 tablet by mouth Daily.   12/7/2020 at 0700   • metoprolol tartrate (LOPRESSOR) 25 MG tablet Take 1 tablet by mouth 3 (Three) Times a Day. 90 tablet 11 12/8/2020 at 0700   • apixaban (Eliquis) 5 MG tablet tablet Take 1 tablet by mouth 2 (Two) Times a Day. 60 tablet 11 12/6/2020 at 1600   • atorvastatin (LIPITOR) 80 MG tablet Take 80 mg by mouth Every Night.   12/6/2020   • HYDROcodone-acetaminophen (NORCO) 7.5-325 MG per tablet Take 1-2 tablets by mouth Every 6 (Six) Hours As Needed for Moderate Pain  (as needed for pain). 25 tablet 0    • mupirocin (Bactroban) 2 % ointment Apply  topically to the appropriate area as directed Daily. 30 g 0    • ondansetron (Zofran) 4 MG tablet Take  1 tablet by mouth Every 6 (Six) Hours As Needed for Nausea or Vomiting. 30 tablet 0    • oxyCODONE-acetaminophen (Percocet) 5-325 MG per tablet Take 1-2 tablets by mouth Every 6 (Six) Hours As Needed for Severe Pain  (as needed for severe pain). 25 tablet 0          Past Medical History:   Diagnosis Date   • A-fib (CMS/HCC)    • Acute kidney failure (CMS/[facility])    • Elevated cholesterol    • Hard of hearing    • Hyperlipidemia    • Hypertension    • Renal disorder    • Wears hearing aid in both ears    • Wears reading eyeglasses      Past Surgical History:   Procedure Laterality Date   • AMPUTATION DIGIT Left 1/7/2020    Procedure: AMPUTATION LEFT 2ND TOE;  Surgeon: Idania Osborn MD;  Location:  [facility];  Service: Orthopedics   • APPENDECTOMY     • COLONOSCOPY  2006   • INCISION AND DRAINAGE LEG Left 10/23/2018    Procedure: INCISION AND DRAINAGE LEFT FOOT;  Surgeon: Idania Osborn MD;  Location:  [facility];  Service: Orthopedics   • TOE AMPUTATION     • TONSILLECTOMY     • VENOUS ACCESS DEVICE (PORT) INSERTION N/A 10/23/2018    Procedure: GROSHONG CATHETER PLACEMENT;  Surgeon: Marquez Simons MD;  Location:  [facility];  Service: General   • VENOUS ACCESS DEVICE (PORT) REMOVAL       History reviewed. No pertinent family history.  Social History     Tobacco Use   • Smoking status: Never Smoker   • Smokeless tobacco: Never Used   Substance Use Topics   • Alcohol use: Yes     Alcohol/week: 3.0 - 4.0 standard drinks     Types: 3 - 4 Shots of liquor per week     Comment: drinks a fifth a week DRINKS 3-5 DRINKS DAILY    • Drug use: No   .  Retired.  Has grown children.    Review of Systems  Pertinent items are noted in HPI, all other systems reviewed and negative    Vital Signs  /76 (BP Location: Right arm, Patient Position: Lying)   Pulse 64   Temp 97.8 °F (36.6 °C) (Oral)   Resp 18   SpO2 96%     Physical Exam:    General Appearance:    Alert, cooperative, in no acute distress   Head:     Normocephalic, without obvious abnormality, atraumatic   Eyes:            Lids and lashes normal, conjunctivae and sclerae normal, no   icterus, no pallor, corneas clear,    Ears:    Ears appear intact with no abnormalities noted   Throat:   No oral lesions, no thrush, oral mucosa moist   Neck:   No adenopathy, supple, trachea midline, no thyromegaly         Lungs:     Clear to auscultation,respirations regular, even and                   unlabored    Heart:    Regular rhythm and normal rate, normal S1 and S2, 2/6 murmur, no gallop   Abdomen:     Normal bowel sounds, no masses, no organomegaly, soft        non-tender, non-distended, no guarding, no rebound                 tenderness   Genitalia:    Deferred   Extremities:  Left foot postop dressing intact.  Bloody drainage under the heel..   Pulses:   Pulses palpable and equal bilaterally   Skin:   No bleeding, bruising or rash   Neurologic:   Cranial nerves 2 - 12 grossly intact,       I reviewed the patient's new clinical results.             Invalid input(s): NEUTOPHILPCT,  EOSPCT  Results from last 7 days   Lab Units 12/08/20  1126   POTASSIUM mmol/L 4.4     Lab Results   Component Value Date    HGBA1C 5.80 (H) 11/25/2020     Results for SHANA GONZALEZ (MRN [id number]) as of 12/8/2020 17:21   Ref. Range 11/25/2020 14:44   Glucose Latest Ref Range: 65 - 99 mg/dL 104 (H)   Sodium Latest Ref Range: 136 - 145 mmol/L 138   Potassium Latest Ref Range: 3.5 - 5.2 mmol/L 4.7   CO2 Latest Ref Range: 22.0 - 29.0 mmol/L 26.0   Chloride Latest Ref Range: 98 - 107 mmol/L 100   Anion Gap Latest Ref Range: 5.0 - 15.0 mmol/L 12.0   Creatinine Latest Ref Range: 0.76 - 1.27 mg/dL 1.36 (H)   BUN Latest Ref Range: 8 - 23 mg/dL 28 (H)   BUN/Creatinine Ratio Latest Ref Range: 7.0 - 25.0  20.6   Calcium Latest Ref Range: 8.6 - 10.5 mg/dL 10.2   eGFR Non  Am Latest Ref Range: >60 mL/min/1.73 51 (L)   Hemoglobin A1C Latest Ref Range: 4.80 - 5.60 % 5.80 (H)   C-Reactive  Protein Latest Ref Range: 0.00 - 0.50 mg/dL 0.23   WBC Latest Ref Range: 3.40 - 10.80 10*3/mm3 10.35   RBC Latest Ref Range: 4.14 - 5.80 10*6/mm3 5.15   Hemoglobin Latest Ref Range: 13.0 - 17.7 g/dL 16.3   Hematocrit Latest Ref Range: 37.5 - 51.0 % 48.1   RDW Latest Ref Range: 12.3 - 15.4 % 12.5   MCV Latest Ref Range: 79.0 - 97.0 fL 93.4   MCH Latest Ref Range: 26.6 - 33.0 pg 31.7   MCHC Latest Ref Range: 31.5 - 35.7 g/dL 33.9   MPV Latest Ref Range: 6.0 - 12.0 fL 9.4   Platelets Latest Ref Range: 140 - 450 10*3/mm3 175         Assessment and Plan:       S/P transmetatarsal amputation of foot, left (CMS/HCC)    Essential hypertension    Atrial fibrillation with RVR (CMS/HCC)    Osteomyelitis of left foot (CMS/HCC)    Chronic anticoagulation    CKD (chronic kidney disease) stage 2, GFR 60-89 ml/min    Chronic multifocal osteomyelitis of left foot (CMS/HCC)    Neuropathic ulcer, 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 PT/OT, nonweightbearing left lower extremity.  2. Pain control-prns   3. IS-encourage  4. DVT proph- mechanicals, subcutaneous Lovenox tomorrow to be changed to Eliquis if okay with Dr. Fernández.  5. Bowel regimen  6. Resume home medications as appropriate  7. Monitor post-op labs  8. DC planning      - Hypertension:  Resume home medications as appropriate, formulary substitution when indicated.  Holding parameters.  Prn medications for elevated blood pressure.    -Atrial fibrillation:  Continue beta-blocker.  Resume Eliquis when okay with surgery.    -Osteomyelitis:  Dr. Young was consulted.  He will oversee patient's antibiotic regimen.  Groshong is planned for tomorrow by Dr. Bains.    -CKD:  Monitor renal function closely, maintain adequate blood pressure, avoid nephrotoxic agents.    -Daily alcohol:  Watch for alcohol withdrawal signs and symptoms/CIWA protocol.  Thiamine, folate, and multivitamin daily.    Discussed  with patient postop management plan, expressed understanding and agreement.    Dragon disclaimer:  Part of this encounter note is an electronic transcription/translation of spoken language to printed text. The electronic translation of spoken language may permit erroneous, or at times, nonsensical words or phrases to be inadvertently transcribed; Although I have reviewed the note for such errors, some may still exist.    Jose Antonio Coleman MD  12/08/20  17:23 EST

## 2020-12-08 NOTE — ANESTHESIA PREPROCEDURE EVALUATION
" Anesthesia Evaluation     Patient summary reviewed and Nursing notes reviewed                Airway   Mallampati: II  TM distance: >3 FB  Neck ROM: full  No difficulty expected  Dental - normal exam     Pulmonary - negative pulmonary ROS and normal exam   Cardiovascular     Rhythm: irregular  Rate: normal    (+) hypertension, dysrhythmias (poor medical compliance with a fib; procedure cancelled 12/1; seen by cardiology and started on rate control) Atrial Fib, hyperlipidemia,     ROS comment: Echo 12/1/20: EF 50-55%, severe TR (RVSP 41), mild-mod MR    \"low risk from cardiac standpoint\"    Neuro/Psych- negative ROS  GI/Hepatic/Renal/Endo    (+)   renal disease CRI,     Musculoskeletal (-) negative ROS        ROS comment: Osteomyelitis left foot  Abdominal  - normal exam    Bowel sounds: normal.   Substance History - negative use     OB/GYN negative ob/gyn ROS         Other                        Anesthesia Plan    ASA 3     general     intravenous induction     Anesthetic plan, all risks, benefits, and alternatives have been provided, discussed and informed consent has been obtained with: patient.    Plan discussed with CRNA.      "

## 2020-12-08 NOTE — DISCHARGE INSTRUCTIONS
1. ABSOLUTELY NO WEIGHT ON LEFT FOOT  2. Elevate operated foot over heart  3. Change the dressing every 4 days, thin layer Bactroban cream (in chart) gauze and ace bandage, do not get the foot wet.    4. Call the office if any problems: [phone]  5. Take the Zofran with the pain meds if you have nausea/vomiting  6. Take ELEQUIS to help prevent blood clots

## 2020-12-08 NOTE — BRIEF OP NOTE
Orthopedics left transmetatarsal amputation  Brief Op Note    Carlos Culver  12/8/2020    Pre-op Diagnosis:   Chronic multifocal osteomyelitis of left foot (CMS/HCC) [M86.372]  Atrial fibrillation with RVR (CMS/HCC) [I48.91]  Osteomyelitis 2nd metatarsal, severe foot deformity, pressure ulcers    Post-op Diagnosis:  same       Post-Op Diagnosis Codes:     * Chronic multifocal osteomyelitis of left foot (CMS/HCC) [M86.372]     * Atrial fibrillation with RVR (CMS/HCC) [I48.91]    Procedure(s):  left transmetatarsal amputation    Surgeon(s):  Idania Osborn MD    Circulator: Laine Bergman RN  Scrub Person: My Porter  Assistant: Shayna Angela PA-C     Assistant: Shayna Angela PA-C  was responsible for performing the following activities: Retraction, Suction, Irrigation, Suturing, Closing and Placing Dressing and their skilled assistance was necessary for the success of this case.    Anesthesia:  General with Block    Staff:   Circulator: Laine Bergman RN  Scrub Person: My Porter  Assistant: Shayna Angela PA-C    Estimated Blood Loss:20cc      Specimens: culture and permanent      Drains:  none    Complications:  None    Tourniquet:: 8min    Dressing:soft    Disposition:rr stable    Idania Osborn MD     Date: 12/8/2020  Time: 12:18 EST

## 2020-12-09 ENCOUNTER — APPOINTMENT (OUTPATIENT)
Dept: GENERAL RADIOLOGY | Facility: HOSPITAL | Age: 75
End: 2020-12-09

## 2020-12-09 ENCOUNTER — ANESTHESIA EVENT (OUTPATIENT)
Dept: PERIOP | Facility: HOSPITAL | Age: 75
End: 2020-12-09

## 2020-12-09 ENCOUNTER — ANESTHESIA (OUTPATIENT)
Dept: PERIOP | Facility: HOSPITAL | Age: 75
End: 2020-12-09

## 2020-12-09 PROBLEM — G89.18 ACUTE POSTOPERATIVE PAIN: Status: ACTIVE | Noted: 2020-12-09

## 2020-12-09 PROBLEM — D72.829 LEUKOCYTOSIS: Status: ACTIVE | Noted: 2020-12-09

## 2020-12-09 LAB
ALBUMIN SERPL-MCNC: 3.7 G/DL (ref 3.5–5.2)
ALBUMIN/GLOB SERPL: 1.4 G/DL
ALP SERPL-CCNC: 67 U/L (ref 39–117)
ALT SERPL W P-5'-P-CCNC: 25 U/L (ref 1–41)
ANION GAP SERPL CALCULATED.3IONS-SCNC: 9 MMOL/L (ref 5–15)
APTT PPP: 27.2 SECONDS (ref 24–37)
AST SERPL-CCNC: 20 U/L (ref 1–40)
BASOPHILS # BLD AUTO: 0.03 10*3/MM3 (ref 0–0.2)
BASOPHILS NFR BLD AUTO: 0.2 % (ref 0–1.5)
BILIRUB SERPL-MCNC: 0.7 MG/DL (ref 0–1.2)
BUN SERPL-MCNC: 14 MG/DL (ref 8–23)
BUN/CREAT SERPL: 14 (ref 7–25)
CALCIUM SPEC-SCNC: 8.8 MG/DL (ref 8.6–10.5)
CHLORIDE SERPL-SCNC: 106 MMOL/L (ref 98–107)
CK SERPL-CCNC: 76 U/L (ref 20–200)
CO2 SERPL-SCNC: 24 MMOL/L (ref 22–29)
CREAT SERPL-MCNC: 1 MG/DL (ref 0.76–1.27)
D-LACTATE SERPL-SCNC: 1.5 MMOL/L (ref 0.5–2)
DEPRECATED RDW RBC AUTO: 41.6 FL (ref 37–54)
EOSINOPHIL # BLD AUTO: 0.2 10*3/MM3 (ref 0–0.4)
EOSINOPHIL NFR BLD AUTO: 1.1 % (ref 0.3–6.2)
ERYTHROCYTE [DISTWIDTH] IN BLOOD BY AUTOMATED COUNT: 12 % (ref 12.3–15.4)
GFR SERPL CREATININE-BSD FRML MDRD: 73 ML/MIN/1.73
GLOBULIN UR ELPH-MCNC: 2.6 GM/DL
GLUCOSE BLDC GLUCOMTR-MCNC: 123 MG/DL (ref 70–130)
GLUCOSE BLDC GLUCOMTR-MCNC: 127 MG/DL (ref 70–130)
GLUCOSE BLDC GLUCOMTR-MCNC: 83 MG/DL (ref 70–130)
GLUCOSE BLDC GLUCOMTR-MCNC: 99 MG/DL (ref 70–130)
GLUCOSE SERPL-MCNC: 103 MG/DL (ref 65–99)
HCT VFR BLD AUTO: 40.9 % (ref 37.5–51)
HGB BLD-MCNC: 13.5 G/DL (ref 13–17.7)
IMM GRANULOCYTES # BLD AUTO: 0.15 10*3/MM3 (ref 0–0.05)
IMM GRANULOCYTES NFR BLD AUTO: 0.8 % (ref 0–0.5)
INR PPP: 1.14 (ref 0.85–1.16)
LYMPHOCYTES # BLD AUTO: 1.56 10*3/MM3 (ref 0.7–3.1)
LYMPHOCYTES NFR BLD AUTO: 8.4 % (ref 19.6–45.3)
MCH RBC QN AUTO: 31.3 PG (ref 26.6–33)
MCHC RBC AUTO-ENTMCNC: 33 G/DL (ref 31.5–35.7)
MCV RBC AUTO: 94.7 FL (ref 79–97)
MONOCYTES # BLD AUTO: 1.21 10*3/MM3 (ref 0.1–0.9)
MONOCYTES NFR BLD AUTO: 6.5 % (ref 5–12)
NEUTROPHILS NFR BLD AUTO: 15.42 10*3/MM3 (ref 1.7–7)
NEUTROPHILS NFR BLD AUTO: 83 % (ref 42.7–76)
NRBC BLD AUTO-RTO: 0 /100 WBC (ref 0–0.2)
PLATELET # BLD AUTO: 162 10*3/MM3 (ref 140–450)
PMV BLD AUTO: 10.4 FL (ref 6–12)
POTASSIUM SERPL-SCNC: 4.6 MMOL/L (ref 3.5–5.2)
PROT SERPL-MCNC: 6.3 G/DL (ref 6–8.5)
PROTHROMBIN TIME: 14.3 SECONDS (ref 11.5–14)
RBC # BLD AUTO: 4.32 10*6/MM3 (ref 4.14–5.8)
SODIUM SERPL-SCNC: 139 MMOL/L (ref 136–145)
WBC # BLD AUTO: 18.57 10*3/MM3 (ref 3.4–10.8)

## 2020-12-09 PROCEDURE — 0JH63XZ INSERTION OF TUNNELED VASCULAR ACCESS DEVICE INTO CHEST SUBCUTANEOUS TISSUE AND FASCIA, PERCUTANEOUS APPROACH: ICD-10-PCS | Performed by: SURGERY

## 2020-12-09 PROCEDURE — 85730 THROMBOPLASTIN TIME PARTIAL: CPT | Performed by: SURGERY

## 2020-12-09 PROCEDURE — 97116 GAIT TRAINING THERAPY: CPT

## 2020-12-09 PROCEDURE — 82962 GLUCOSE BLOOD TEST: CPT

## 2020-12-09 PROCEDURE — 80053 COMPREHEN METABOLIC PANEL: CPT | Performed by: INTERNAL MEDICINE

## 2020-12-09 PROCEDURE — 83605 ASSAY OF LACTIC ACID: CPT | Performed by: INTERNAL MEDICINE

## 2020-12-09 PROCEDURE — 25010000002 PROPOFOL 10 MG/ML EMULSION: Performed by: NURSE ANESTHETIST, CERTIFIED REGISTERED

## 2020-12-09 PROCEDURE — 85610 PROTHROMBIN TIME: CPT | Performed by: SURGERY

## 2020-12-09 PROCEDURE — 25010000002 HEPARIN (PORCINE) PER 1000 UNITS: Performed by: SURGERY

## 2020-12-09 PROCEDURE — 63710000001 DIPHENHYDRAMINE PER 50 MG: Performed by: SURGERY

## 2020-12-09 PROCEDURE — 25010000002 DAPTOMYCIN PER 1 MG: Performed by: SURGERY

## 2020-12-09 PROCEDURE — B518ZZA FLUOROSCOPY OF SUPERIOR VENA CAVA, GUIDANCE: ICD-10-PCS | Performed by: SURGERY

## 2020-12-09 PROCEDURE — C1751 CATH, INF, PER/CENT/MIDLINE: HCPCS | Performed by: SURGERY

## 2020-12-09 PROCEDURE — 25010000002 CEFEPIME PER 500 MG: Performed by: INTERNAL MEDICINE

## 2020-12-09 PROCEDURE — 25010000002 CEFEPIME PER 500 MG: Performed by: SURGERY

## 2020-12-09 PROCEDURE — 71045 X-RAY EXAM CHEST 1 VIEW: CPT

## 2020-12-09 PROCEDURE — 99024 POSTOP FOLLOW-UP VISIT: CPT | Performed by: ORTHOPAEDIC SURGERY

## 2020-12-09 PROCEDURE — 25010000002 FENTANYL CITRATE (PF) 100 MCG/2ML SOLUTION: Performed by: NURSE ANESTHETIST, CERTIFIED REGISTERED

## 2020-12-09 PROCEDURE — 76000 FLUOROSCOPY <1 HR PHYS/QHP: CPT

## 2020-12-09 PROCEDURE — 02HV33Z INSERTION OF INFUSION DEVICE INTO SUPERIOR VENA CAVA, PERCUTANEOUS APPROACH: ICD-10-PCS | Performed by: SURGERY

## 2020-12-09 PROCEDURE — 82550 ASSAY OF CK (CPK): CPT | Performed by: INTERNAL MEDICINE

## 2020-12-09 PROCEDURE — 85025 COMPLETE CBC W/AUTO DIFF WBC: CPT | Performed by: INTERNAL MEDICINE

## 2020-12-09 DEVICE — GROSHONG 8F SINGLE-LUMEN CV CATHETER WITH SURECUFF TISSUE INGROWTH CUFF INTERMEDIATE TRAY
Type: IMPLANTABLE DEVICE | Site: NECK | Status: FUNCTIONAL
Brand: GROSHONG

## 2020-12-09 RX ORDER — ONDANSETRON 2 MG/ML
4 INJECTION INTRAMUSCULAR; INTRAVENOUS ONCE AS NEEDED
Status: DISCONTINUED | OUTPATIENT
Start: 2020-12-09 | End: 2020-12-09 | Stop reason: HOSPADM

## 2020-12-09 RX ORDER — SODIUM CHLORIDE 0.9 % (FLUSH) 0.9 %
10 SYRINGE (ML) INJECTION AS NEEDED
Status: DISCONTINUED | OUTPATIENT
Start: 2020-12-09 | End: 2020-12-09 | Stop reason: HOSPADM

## 2020-12-09 RX ORDER — SODIUM CHLORIDE 0.9 % (FLUSH) 0.9 %
10 SYRINGE (ML) INJECTION EVERY 12 HOURS SCHEDULED
Status: DISCONTINUED | OUTPATIENT
Start: 2020-12-09 | End: 2020-12-09 | Stop reason: HOSPADM

## 2020-12-09 RX ORDER — ACETAMINOPHEN 500 MG
1000 TABLET ORAL EVERY 6 HOURS PRN
Status: DISCONTINUED | OUTPATIENT
Start: 2020-12-09 | End: 2020-12-10 | Stop reason: HOSPADM

## 2020-12-09 RX ORDER — FAMOTIDINE 10 MG/ML
20 INJECTION, SOLUTION INTRAVENOUS ONCE
Status: CANCELLED | OUTPATIENT
Start: 2020-12-09 | End: 2020-12-09

## 2020-12-09 RX ORDER — LIDOCAINE HYDROCHLORIDE 10 MG/ML
INJECTION, SOLUTION EPIDURAL; INFILTRATION; INTRACAUDAL; PERINEURAL AS NEEDED
Status: DISCONTINUED | OUTPATIENT
Start: 2020-12-09 | End: 2020-12-09 | Stop reason: SURG

## 2020-12-09 RX ORDER — FAMOTIDINE 20 MG/1
20 TABLET, FILM COATED ORAL ONCE
Status: COMPLETED | OUTPATIENT
Start: 2020-12-09 | End: 2020-12-09

## 2020-12-09 RX ORDER — IPRATROPIUM BROMIDE AND ALBUTEROL SULFATE 2.5; .5 MG/3ML; MG/3ML
3 SOLUTION RESPIRATORY (INHALATION) ONCE AS NEEDED
Status: DISCONTINUED | OUTPATIENT
Start: 2020-12-09 | End: 2020-12-09 | Stop reason: HOSPADM

## 2020-12-09 RX ORDER — FENTANYL CITRATE 50 UG/ML
INJECTION, SOLUTION INTRAMUSCULAR; INTRAVENOUS AS NEEDED
Status: DISCONTINUED | OUTPATIENT
Start: 2020-12-09 | End: 2020-12-09 | Stop reason: SURG

## 2020-12-09 RX ORDER — BUPIVACAINE HYDROCHLORIDE AND EPINEPHRINE 2.5; 5 MG/ML; UG/ML
INJECTION, SOLUTION EPIDURAL; INFILTRATION; INTRACAUDAL; PERINEURAL AS NEEDED
Status: DISCONTINUED | OUTPATIENT
Start: 2020-12-09 | End: 2020-12-09 | Stop reason: HOSPADM

## 2020-12-09 RX ORDER — SODIUM CHLORIDE, SODIUM LACTATE, POTASSIUM CHLORIDE, CALCIUM CHLORIDE 600; 310; 30; 20 MG/100ML; MG/100ML; MG/100ML; MG/100ML
9 INJECTION, SOLUTION INTRAVENOUS CONTINUOUS
Status: DISCONTINUED | OUTPATIENT
Start: 2020-12-09 | End: 2020-12-10 | Stop reason: HOSPADM

## 2020-12-09 RX ORDER — LIDOCAINE HYDROCHLORIDE 10 MG/ML
0.5 INJECTION, SOLUTION EPIDURAL; INFILTRATION; INTRACAUDAL; PERINEURAL ONCE AS NEEDED
Status: CANCELLED | OUTPATIENT
Start: 2020-12-09

## 2020-12-09 RX ADMIN — ACETAMINOPHEN 1000 MG: 500 TABLET ORAL at 22:52

## 2020-12-09 RX ADMIN — METOPROLOL TARTRATE 25 MG: 25 TABLET, FILM COATED ORAL at 20:47

## 2020-12-09 RX ADMIN — LIDOCAINE HYDROCHLORIDE 50 MG: 10 INJECTION, SOLUTION EPIDURAL; INFILTRATION; INTRACAUDAL; PERINEURAL at 15:01

## 2020-12-09 RX ADMIN — HYDROCHLOROTHIAZIDE: 12.5 CAPSULE ORAL at 08:19

## 2020-12-09 RX ADMIN — FOLIC ACID 1 MG: 1 TABLET ORAL at 08:20

## 2020-12-09 RX ADMIN — DAPTOMYCIN 513.6 MG: 500 INJECTION, POWDER, LYOPHILIZED, FOR SOLUTION INTRAVENOUS at 16:21

## 2020-12-09 RX ADMIN — THIAMINE HCL TAB 100 MG 100 MG: 100 TAB at 08:19

## 2020-12-09 RX ADMIN — SODIUM CHLORIDE, POTASSIUM CHLORIDE, SODIUM LACTATE AND CALCIUM CHLORIDE 9 ML/HR: 600; 310; 30; 20 INJECTION, SOLUTION INTRAVENOUS at 13:02

## 2020-12-09 RX ADMIN — CEFEPIME HYDROCHLORIDE 2 G: 2 INJECTION, POWDER, FOR SOLUTION INTRAVENOUS at 05:40

## 2020-12-09 RX ADMIN — CEFEPIME HYDROCHLORIDE 2 G: 2 INJECTION, POWDER, FOR SOLUTION INTRAVENOUS at 17:35

## 2020-12-09 RX ADMIN — PROPOFOL 150 MCG/KG/MIN: 10 INJECTION, EMULSION INTRAVENOUS at 15:01

## 2020-12-09 RX ADMIN — FENTANYL CITRATE 100 MCG: 50 INJECTION, SOLUTION INTRAMUSCULAR; INTRAVENOUS at 15:01

## 2020-12-09 RX ADMIN — MULTIVITAMIN TABLET 1 TABLET: TABLET at 08:19

## 2020-12-09 RX ADMIN — FAMOTIDINE 20 MG: 20 TABLET, FILM COATED ORAL at 13:06

## 2020-12-09 RX ADMIN — METOPROLOL TARTRATE 25 MG: 25 TABLET, FILM COATED ORAL at 08:20

## 2020-12-09 RX ADMIN — METOPROLOL TARTRATE 25 MG: 25 TABLET, FILM COATED ORAL at 16:21

## 2020-12-09 RX ADMIN — ATORVASTATIN CALCIUM 80 MG: 40 TABLET, FILM COATED ORAL at 20:47

## 2020-12-09 RX ADMIN — SODIUM CHLORIDE, PRESERVATIVE FREE 10 ML: 5 INJECTION INTRAVENOUS at 13:02

## 2020-12-09 RX ADMIN — POTASSIUM CHLORIDE, DEXTROSE MONOHYDRATE AND SODIUM CHLORIDE 50 ML/HR: 150; 5; 450 INJECTION, SOLUTION INTRAVENOUS at 05:40

## 2020-12-09 RX ADMIN — DIPHENHYDRAMINE HYDROCHLORIDE 25 MG: 25 CAPSULE ORAL at 22:12

## 2020-12-09 NOTE — PROGRESS NOTES
Patient Name:  Carlos Culver  YOB: 1945  [id number]    Surgery Progress Note    Date of visit: 12/9/2020    Subjective   Subjective: Feels OK, but slept poorly last night.         Objective     Objective:     /64 (BP Location: Left arm, Patient Position: Lying)   Pulse 80   Temp 97.9 °F (36.6 °C) (Oral)   Resp 15   SpO2 97%     Intake/Output Summary (Last 24 hours) at 12/9/2020 0733  Last data filed at 12/9/2020 0540  Gross per 24 hour   Intake 3638 ml   Output 1620 ml   Net 2018 ml       CV:  Rhythm  regular and rate regular   L:  Clear  to auscultation bilaterally   Abd:  Bowel sounds positive , soft,   Ext:  No cyanosis, clubbing, edema    Recent labs that are back at this time have been reviewed.        Assessment/Plan     Assessment/ Plan:    Hospital Problem List     * (Principal) S/P transmetatarsal amputation of foot, left (CMS/HCC)- For groshong placement today.      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  · Lumason contrast shows normal overall wall motion and systolic function with an EF 70%                   Antonio Bains MD  12/9/2020  07:33 EST

## 2020-12-09 NOTE — PROGRESS NOTES
IM progress note      Carlos Culver  [id number]  1945     LOS: 1 day     Attending: Idania Osborn MD    Primary Care Provider: Marcus Cheng MD      Chief Complaint/Reason for visit:  Left foot osteomyelitis, multiple diabetic pressure ulcers    Subjective   Doing fairly well. Pain well controlled. Denies f/c/n/v/sob/cp.    Objective     Vital Signs  Visit Vitals  /79 (BP Location: Left arm, Patient Position: Lying)   Pulse 74   Temp 97.8 °F (36.6 °C) (Oral)   Resp 16   SpO2 97%     Temp (24hrs), Av.7 °F (36.5 °C), Min:97.2 °F (36.2 °C), Max:98.1 °F (36.7 °C)      Nutrition: NPO for groshong placement today    Respiratory: RA    Physical Therapy: PT eval complete. Pt ambulated 15 feet using RW and CGA x2 for safety. Pt able to maintaining NWB on L LE, with gait limited by fatigue. Bed mobility performed with supervision, STS and toilet transfers with CGA x2. Reviewed strict NWB order placed by Dr. Osborn. Pt bleeding through dressing at surgical site, RN notified and present. Recommend pt d/c home with assist when appropriate.    Physical Exam:     General Appearance:    Alert, cooperative, in no acute distress   Head:    Normocephalic, without obvious abnormality, atraumatic    Lungs:     Normal effort, symmetric chest rise, no crepitus, clear to      auscultation bilaterally             Heart:    Regular rhythm and normal rate, normal S1 and S2   Abdomen:     Normal bowel sounds, no masses, no organomegaly, soft        non-tender, non-distended, no guarding, no rebound                tenderness   Extremities:   Left foot surgical dressing CDI.    Pulses:   Pulses palpable and equal bilaterally   Skin:   No bleeding, bruising or rash   Neurologic:    Cranial nerves 2 - 12 grossly intact     Results Review:     I reviewed the patient's new clinical results.   Results from last 7 days   Lab Units 20  0930   WBC 10*3/mm3 18.57*   HEMOGLOBIN g/dL 13.5   HEMATOCRIT % 40.9    PLATELETS 10*3/mm3 162     Results from last 7 days   Lab Units 12/08/20  1126   POTASSIUM mmol/L 4.4     I reviewed the patient's new imaging including images and reports.    All medications reviewed.   atorvastatin, 80 mg, Oral, Nightly  cefepime, 2 g, Intravenous, Q12H  DAPTOmycin, 6 mg/kg (Adjusted), Intravenous, Q24H  enoxaparin, 40 mg, Subcutaneous, Daily  folic acid, 1 mg, Oral, Daily  insulin lispro, 0-7 Units, Subcutaneous, TID AC  lisinopril-hydroCHLOROthiazide (ZESTORETIC) 20-12.5 mg combo dose, , Oral, Q24H  metoprolol tartrate, 25 mg, Oral, TID  multivitamin, 1 tablet, Oral, Daily  thiamine, 100 mg, Oral, Daily        Assessment/Plan     S/P transmetatarsal amputation of foot, left (CMS/HCC)    Essential hypertension    Atrial fibrillation with RVR (CMS/HCC)    Osteomyelitis of left foot (CMS/HCC)    Chronic anticoagulation    CKD (chronic kidney disease) stage 2, GFR 60-89 ml/min    Chronic multifocal osteomyelitis of left foot (CMS/HCC)    Neuropathic ulcer, 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 PT/OT- NWB LLE  2. Pain control-prns   3. IS-encouraged  4. DVT proph- mechs. Resume Lovenox after groshong placement. Resume eliquis when ok with Dr. Fernández  5. Bowel regimen  6. Monitor post-op labs  7. DC planning for home when abx arrangements complete    OR today per Dr. Bains for groshong placement.    - Hypertension:  Resume home medications as appropriate, formulary substitution when indicated.  Holding parameters.  Prn medications for elevated blood pressure.     -Atrial fibrillation:  Continue beta-blocker.  Resume Eliquis when okay with surgery.     -Osteomyelitis:  Dr. Young was consulted.  He will oversee patient's antibiotic regimen.       -CKD:  Monitor renal function closely, maintain adequate blood pressure, avoid nephrotoxic agents.     -Daily alcohol:  Watch for alcohol withdrawal signs and symptoms/CIWA  protocol.  Thiamine, folate, and multivitamin daily.      Silvia Loyola, ROBIN  12/09/20  11:11 EST

## 2020-12-09 NOTE — PLAN OF CARE
Goal Outcome Evaluation:  Plan of Care Reviewed With: patient  Progress: improving     Patient is alert and oriented X 4. VSS. Denied pain. Voiding spontaneously. Slept intermittently throughout the night . Currently NPO for procedure later today.

## 2020-12-09 NOTE — ANESTHESIA PREPROCEDURE EVALUATION
Anesthesia Evaluation                  Airway   Mallampati: II  TM distance: >3 FB  Neck ROM: full  Possible difficult intubation  Dental      Pulmonary    (+) shortness of breath,   (-) COPD, asthma, sleep apnea, not a smoker  Cardiovascular     (+) hypertension, dysrhythmias Atrial Fib, MARTINEZ, hyperlipidemia,   (-) past MI, angina, cardiac stents      Neuro/Psych  (-) seizures, CVA  GI/Hepatic/Renal/Endo    (+) obesity,   renal disease CRI,   (-) liver disease, diabetes, no thyroid disorder    Musculoskeletal     Abdominal    Substance History      OB/GYN          Other        ROS/Med Hx Other: Hard of hearing   Several foot procedures recently                   Anesthesia Plan    ASA 3     MAC and general   (PFL)  intravenous induction     Anesthetic plan, all risks, benefits, and alternatives have been provided, discussed and informed consent has been obtained with: patient.    Plan discussed with CRNA.

## 2020-12-09 NOTE — OP NOTE
atient Name:  Carlos Culver  YOB: 1945  [id number]    12/9/2020      PREOPERATIVE DIAGNOSIS: Osteomyelitis      POSTOPERATIVE DIAGNOSIS: Same       PROCEDURE PERFORMED: Right internal jugular Groshong placement with fluoroscopy       SURGEON: Antonio Bains MD        SPECIMENS: Chau Jones MD       ANESTHESIA: Local       FINDINGS:   1.  8 [place] single-lumen Groshong placed in the right internal jugular vein       INDICATIONS: The patient is a 75 y.o. male who presented with osteomyelitis. They are in need of central venous access. The risks and benefits of central line placement were discussed at length with the patient and their family, and they agreed to proceed.       DESCRIPTION OF PROCEDURE:      After obtaining informed consent, the patient was taken to the operating room and were placed in the Trendelenburg position.  After appropriate DVT and antibiotic prophylaxis they were sedated.  The neck and chest were prepped and draped in standard sterile fashion and after.  The skin with local anesthetic a 22-gauge needle was advanced without difficulty into the right internal jugular vein.  This was removed and an 18-gauge needle advanced with difficulty into the vein.  A guidewire was placed through the needle to the level of the cavoatrial junction.  This was confirmed with fluoroscopy. The needle was removed over the guidewire.    At this point, an appropriate site for Groshong placement was determined on the right chest.  The area around the wire was infiltrated with local anesthetic, a transverse 7 mm skin incision was made.  Under fluoroscopic guidance, a dilator introducer was advanced over the wire to the level of the cavoatrial junction.  The dilator and the wire were removed and through the introducer the catheter was placed, with the tip at the cavoatrial junction.  The introducer was then removed. After infiltrating with local anesthetic, a tunneler was then used to bring the  catheter out to the right chest at the previously marked portion.  The tunneler was then removed from the catheter, and the catheter and applied. The Groshong was sewn to the skin in standard fashion.    The Groshong was accessed, mary lou blood easily, was flushed with heparinized saline.  Fluoroscopy was again performed, confirming excellent placement of the catheter, with no kinking in the neck.     The skin over the neck stick site was then closed  using absorbable suture, and the skin over the neck stick site was also covered using absorbable suture.  The incisions were dressed in standard sterile fashion and covered with dry sterile dressings.  There were no  immediate complications.    All sponge and needle counts were correct times two at the completion of the procedure.  A postprocedure chest x-ray is pending at the time of this dictation.        Antonio Bains MD  12/9/2020  15:30 EST

## 2020-12-09 NOTE — PLAN OF CARE
Problem: Adult Inpatient Plan of Care  Goal: Plan of Care Review  Recent Flowsheet Documentation  Taken 12/9/2020 1110 by Cee Viveros, PT  Progress: improving  Plan of Care Reviewed With: patient  Outcome Summary: Patient ambulated 350 feet with rolling knee walker with CGA for safety, limited by fatigue. Patient modified independent with bed mobility. Required cues for NWB on L LE with t/f. Will continue to progress as able.   Goal Outcome Evaluation:  Plan of Care Reviewed With: patient  Progress: improving  Outcome Summary: Patient ambulated 350 feet with rolling knee walker with CGA for safety, limited by fatigue. Patient modified independent with bed mobility. Required cues for NWB on L LE with t/f. Will continue to progress as able.

## 2020-12-09 NOTE — PROGRESS NOTES
"Patient Name:  Carlos Culver  YOB: 1945  [id number]    Surgery Post - Operative Note    Date of visit: 12/9/2020    Subjective   Subjective: Feels OK, minimal pain       Objective     Objective:    /69   Pulse 78   Temp 97.9 °F (36.6 °C) (Oral)   Resp 16   Ht 182.9 cm (72\")   Wt 97.5 kg (215 lb)   SpO2 96%   BMI 29.16 kg/m²     CV:  Rate  regular and rhythm  regular  L:  Clear  to auscultation bilaterally . Line c/d/i  ABD:  Soft, nontender  EXT:  No cyanosis, clubbing or edema    CXR shows excellent line placement without PTX       Assessment/Plan     Assessment/ Plan: Doing well after Groshong placement. Will sign off. Please call with questions. He does not need follow up with me until such time as the line is ready for removal.    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  · Lumason contrast shows normal overall wall motion and systolic function with an EF 70%         Leukocytosis, likely reactive    Acute postoperative pain              Antonio Bains MD  12/9/2020  18:00 EST    "

## 2020-12-09 NOTE — BRIEF OP NOTE
INSERTION VENOUS ACCESS DEVICE  Progress Note    Carlos Culver  12/9/2020    Pre-op Diagnosis:   Osteomyelitis       Post-Op Diagnosis Codes:  Same    Procedure/CPT® Codes:        Procedure(s):  GROSHONG INSERTION    Surgeon(s):  Antonio Bains MD    Anesthesia: Monitored Anesthesia Care    Staff:   Circulator: Ivoen Sena RN  Radiology Technologist: Lizzy Fountain RT  Scrub Person: Hilda Melvin; Sarah Figueroa RN         Estimated Blood Loss: minimal    Urine Voided: * No values recorded between 12/9/2020  2:52 PM and 12/9/2020  3:29 PM *    Specimens:                None          Drains: * No LDAs found *    Findings: 8 Citizen of [place] single-lumen Groshong placed in the right internal jugular vein without difficulty    Complications: None          Antonio Bains MD     Date: 12/9/2020  Time: 15:29 EST

## 2020-12-09 NOTE — PROGRESS NOTES
Orthopedic  Progress Note    Subjective     Post-Operative Day: 1 Day Post-Op  Procedure(s):  TRANSMETATARSAL AMPUTATION LEFT  Laterality:Left      Systemic or Specific Complaints: no pain  Objective     Vital signs in last 24 hours:  Temp:  [97.2 °F (36.2 °C)-98.1 °F (36.7 °C)] 97.9 °F (36.6 °C)  Heart Rate:  [64-85] 75  Resp:  [15-18] 16  BP: (105-128)/(64-94) 117/79    Neurovascular: left foot dressing dry, no change in denseneuropathy               Wound: left foot dressing dry    Data Review  Lab Results (last 24 hours)     Procedure Component Value Units Date/Time    CK [[id number]]  (Normal) Collected: 12/09/20 0930    Specimen: Blood Updated: 12/09/20 1154     Creatine Kinase 76 U/L     Comprehensive Metabolic Panel [[id number]]  (Abnormal) Collected: 12/09/20 0930    Specimen: Blood Updated: 12/09/20 1154     Glucose 103 mg/dL      BUN 14 mg/dL      Creatinine 1.00 mg/dL      Sodium 139 mmol/L      Potassium 4.6 mmol/L      Comment: Slight hemolysis detected by analyzer. Results may be affected.        Chloride 106 mmol/L      CO2 24.0 mmol/L      Calcium 8.8 mg/dL      Total Protein 6.3 g/dL      Albumin 3.70 g/dL      ALT (SGPT) 25 U/L      AST (SGOT) 20 U/L      Alkaline Phosphatase 67 U/L      Total Bilirubin 0.7 mg/dL      eGFR Non African Amer 73 mL/min/1.73      Globulin 2.6 gm/dL      A/G Ratio 1.4 g/dL      BUN/Creatinine Ratio 14.0     Anion Gap 9.0 mmol/L     Narrative:      GFR Normal >60  Chronic Kidney Disease <60  Kidney Failure <15      POC Glucose Once [[id number]]  (Normal) Collected: 12/09/20 1128    Specimen: Blood Updated: 12/09/20 1133     Glucose 123 mg/dL     Lactic Acid, Plasma [[id number]]  (Normal) Collected: 12/09/20 0930    Specimen: Blood Updated: 12/09/20 1132     Lactate 1.5 mmol/L      Comment: Falsely depressed results may occur on samples drawn from patients receiving N-Acetylcysteine (NAC) or Metamizole.       AFB Culture - Tissue, Toe, Left [[id number]] Collected: 12/08/20  1208    Specimen: Tissue from Toe, Left Updated: 12/09/20 1117     AFB Stain No acid fast bacilli seen on concentrated smear    aPTT [[id number]]  (Normal) Collected: 12/09/20 0930    Specimen: Blood Updated: 12/09/20 1055     PTT 27.2 seconds     Narrative:      PTT = The equivalent PTT values for the therapeutic range of heparin levels at 0.3 to 0.5 U/ml are 55 to 70 seconds.    Protime-INR [[id number]]  (Abnormal) Collected: 12/09/20 0930    Specimen: Blood Updated: 12/09/20 1055     Protime 14.3 Seconds      INR 1.14    CBC & Differential [[id number]]  (Abnormal) Collected: 12/09/20 0930    Specimen: Blood Updated: 12/09/20 1039    Narrative:      The following orders were created for panel order CBC & Differential.  Procedure                               Abnormality         Status                     ---------                               -----------         ------                     CBC Auto Differential[[id number]]        Abnormal            Final result                 Please view results for these tests on the individual orders.    CBC Auto Differential [[id number]]  (Abnormal) Collected: 12/09/20 0930    Specimen: Blood Updated: 12/09/20 1039     WBC 18.57 10*3/mm3      RBC 4.32 10*6/mm3      Hemoglobin 13.5 g/dL      Hematocrit 40.9 %      MCV 94.7 fL      MCH 31.3 pg      MCHC 33.0 g/dL      RDW 12.0 %      RDW-SD 41.6 fl      MPV 10.4 fL      Platelets 162 10*3/mm3      Neutrophil % 83.0 %      Lymphocyte % 8.4 %      Monocyte % 6.5 %      Eosinophil % 1.1 %      Basophil % 0.2 %      Immature Grans % 0.8 %      Neutrophils, Absolute 15.42 10*3/mm3      Lymphocytes, Absolute 1.56 10*3/mm3      Monocytes, Absolute 1.21 10*3/mm3      Eosinophils, Absolute 0.20 10*3/mm3      Basophils, Absolute 0.03 10*3/mm3      Immature Grans, Absolute 0.15 10*3/mm3      nRBC 0.0 /100 WBC     POC Glucose Once [[id number]]  (Normal) Collected: 12/09/20 0734    Specimen: Blood Updated: 12/09/20 0736     Glucose 83 mg/dL      Tissue / Bone Culture - Tissue, Toe, Left [[id number]] Collected: 12/08/20 1208    Specimen: Tissue from Toe, Left Updated: 12/09/20 0610     Tissue Culture No growth     Gram Stain No WBCs or organisms seen    CK [[id number]]  (Normal) Collected: 12/08/20 1126    Specimen: Blood Updated: 12/08/20 1759     Creatine Kinase 108 U/L     Anaerobic Culture - Tissue, Toe, Left [[id number]] Collected: 12/08/20 1208    Specimen: Tissue from Toe, Left Updated: 12/08/20 1347    Fungus Culture - Tissue, Toe, Left [[id number]] Collected: 12/08/20 1208    Specimen: Tissue from Toe, Left Updated: 12/08/20 1347    Tissue Pathology Exam [[id number]] Collected: 12/08/20 1219    Specimen: Tissue from Toe, Left Updated: 12/08/20 1340        Microbiology Results (last 10 days)     Procedure Component Value - Date/Time    Tissue / Bone Culture - Tissue, Toe, Left [[id number]] Collected: 12/08/20 1208    Lab Status: Preliminary result Specimen: Tissue from Toe, Left Updated: 12/09/20 0610     Tissue Culture No growth     Gram Stain No WBCs or organisms seen    AFB Culture - Tissue, Toe, Left [[id number]] Collected: 12/08/20 1208    Lab Status: Preliminary result Specimen: Tissue from Toe, Left Updated: 12/09/20 1117     AFB Stain No acid fast bacilli seen on concentrated smear    COVID PRE-OP / PRE-PROCEDURE SCREENING ORDER (NO ISOLATION) - Swab, Nasopharynx [[id number]] Collected: 12/06/20 1442    Lab Status: Final result Specimen: Swab from Nasopharynx Updated: 12/07/20 1155    Narrative:      The following orders were created for panel order COVID PRE-OP / PRE-PROCEDURE SCREENING ORDER (NO ISOLATION) - Swab, Nasopharynx.  Procedure                               Abnormality         Status                     ---------                               -----------         ------                     COVID-19 PCR, [facility]...[[id number]]                      Final result                 Please view results for these tests on the individual orders.     COVID-19 PCR, [facility], NP SWAB IN [facility]AL TRANSPORT MEDIA 24-30 HR TAT - Swab, Nasopharynx [[id number]] Collected: 12/06/20 1442    Lab Status: Final result Specimen: Swab from Nasopharynx Updated: 12/07/20 1155     SARS-CoV-2 ANKITA Not Detected    COVID PRE-OP / PRE-PROCEDURE SCREENING ORDER (NO ISOLATION) - Swab, Nasopharynx [[id number]] Collected: 11/29/20 1241    Lab Status: Final result Specimen: Swab from Nasopharynx Updated: 11/30/20 1404    Narrative:      The following orders were created for panel order COVID PRE-OP / PRE-PROCEDURE SCREENING ORDER (NO ISOLATION) - Swab, Nasopharynx.  Procedure                               Abnormality         Status                     ---------                               -----------         ------                     COVID-19 PCR, [facility]...[[id number]]                      Final result                 Please view results for these tests on the individual orders.    COVID-19 PCR, [facility], NP SWAB IN [facility] VIRAL TRANSPORT MEDIA 24-30 HR TAT - Swab, Nasopharynx [[id number]] Collected: 11/29/20 1241    Lab Status: Final result Specimen: Swab from Nasopharynx Updated: 11/30/20 1404     SARS-CoV-2 ANKITA Not Detected              Assessment/Plan     Status post- left trans met amp  -very high risk for BKA- the necrotic wounds he developed over the past several weeks from walking on the foot are very concerning as to whether this trans met will heal.  He is very active and wants to stay active (he has expressed this since I first saw him) but he has had difficulty complying with relatively short term low activity to allow for healing with long term gain.  I have discussed this multiple times over the past 2 year.    -long discussion about compliance, risk of BKA, the things he can do to prevent BKA  -Groshong today  - I will change dressing tomorrow         Idania Osborn MD  Date: 12/9/2020  Time: 12:18 EST

## 2020-12-09 NOTE — PROGRESS NOTES
INFECTIOUS DISEASE Progress Note    Carlos Culver  1945  [id number]    Consult: 12/8/20  Admit date: 12/8/2020    Requesting Provider: Idania Osborn MD  Evaluating physician: Jeffery Young MD  Reason for Consultation: Left second toe osteomyelitis, metatarsal  Chief Complaint: Above      Subjective   History of present illness:  Patient is a 75 y.o.  Yr old male with peripheral neuropathy, chronic kidney disease stage II, atrial fibrillation, hyperlipidemia, hard of hearing, essential hypertension, previous toe amputation, who developed a new left second plantar metatarsal ulcer with positive culture for MSSA with persistence.  MRI on 11/11/2020 was consistent with left second metatarsal osteomyelitis.  The patient has had a difficult time with compliance and nonweightbearing on his left foot.  His ulcer worsened.  He was admitted to [facility] on 12/8/2020 and underwent left second metatarsal resection by Dr. Idania Osborn.  The patient has no other localizing signs or symptoms of infection.  I was consulted on 12/8/2020 for further evaluation and treatment.    12/9/2020 history reviewed.  Tolerating daptomycin and cefepime.  Awaits Groshong catheter.  Duration of antibiotics until 1/12/2021 for left second metatarsal osteomyelitis.  Status post resection 12/8/2020.  Cultures negative to date.    Past Medical History:   Diagnosis Date   • A-fib (CMS/[facility])    • Acute kidney failure (CMS/[facility])    • Elevated cholesterol    • Hard of hearing    • Hyperlipidemia    • Hypertension    • Renal disorder    • Wears hearing aid in both ears    • Wears reading eyeglasses        Past Surgical History:   Procedure Laterality Date   • AMPUTATION DIGIT Left 1/7/2020    Procedure: AMPUTATION LEFT 2ND TOE;  Surgeon: Idania Osborn MD;  Location: [facility];  Service: Orthopedics   • AMPUTATION DIGIT Left 12/8/2020    Procedure: TRANSMETATARSAL AMPUTATION LEFT;  Surgeon: Idania Osborn MD;  Location:   [facility];  Service: Orthopedics;  Laterality: Left;   • APPENDECTOMY     • COLONOSCOPY     • INCISION AND DRAINAGE LEG Left 10/23/2018    Procedure: INCISION AND DRAINAGE LEFT FOOT;  Surgeon: Idania Osborn MD;  Location:  [facility] OR;  Service: Orthopedics   • TOE AMPUTATION     • TONSILLECTOMY     • VENOUS ACCESS DEVICE (PORT) INSERTION N/A 10/23/2018    Procedure: GROSHONG CATHETER PLACEMENT;  Surgeon: Marquez Simons MD;  Location: [facility] of [facility] OR;  Service: General   • VENOUS ACCESS DEVICE (PORT) REMOVAL         Pediatric History   Patient Parents   • Not on file     Other Topics Concern   • Not on file   Social History Narrative   • Not on file   Father  of unknown cancer, mother  of old age and cardiac disease.  He is retired from the [employer] business , 3 children.    family history is not on file.    Allergies   Allergen Reactions   • Sulfa Antibiotics Unknown (See Comments)     Pt was told as child he had an allergy.  Has not taken and doesn't know the response         There is no immunization history on file for this patient.    Medication:    Current Facility-Administered Medications:   •  atorvastatin (LIPITOR) tablet 80 mg, 80 mg, Oral, Nightly, Jose Antonio Coleman MD, 80 mg at 20 2127  •  bisacodyl (DULCOLAX) suppository 10 mg, 10 mg, Rectal, Daily PRN, Idania Osborn MD  •  cefepime (MAXIPIME) 2 g/100 mL 0.9% NS (mbp), 2 g, Intravenous, Q12H, Jeffery Young MD, 2 g at 20 0540  •  DAPTOmycin (CUBICIN) 500 mg/50 mL in sodium chloride, 6 mg/kg (Adjusted), Intravenous, Q24H, Jeffery Young MD, 513.6 mg at 20 1830  •  dextrose (D50W) 25 g/ 50mL Intravenous Solution 25 g, 25 g, Intravenous, Q15 Min PRN, Jose Antonio Coleman MD  •  dextrose (GLUTOSE) oral gel 15 g, 15 g, Oral, Q15 Min PRN, Jose Antonio Coleman MD  •  dextrose 5 % and sodium chloride 0.45 % with KCl 20 mEq/L infusion, 50 mL/hr, Intravenous, Continuous, DeGnore, Idania T, MD, Last Rate: 50 mL/hr  at 12/09/20 0540, 50 mL/hr at 12/09/20 0540  •  diphenhydrAMINE (BENADRYL) capsule 25 mg, 25 mg, Oral, Nightly PRN, 25 mg at 12/08/20 2314 **OR** diphenhydrAMINE (BENADRYL) injection 25 mg, 25 mg, Intravenous, Nightly PRN, Idania Osborn MD  •  enoxaparin (LOVENOX) syringe 40 mg, 40 mg, Subcutaneous, Daily, Idania Osborn MD  •  folic acid (FOLVITE) tablet 1 mg, 1 mg, Oral, Daily, Jose Antonio Coleman MD, 1 mg at 12/09/20 0820  •  glucagon (human recombinant) (GLUCAGEN DIAGNOSTIC) injection 1 mg, 1 mg, Subcutaneous, Q15 Min PRN, Jose Antonio Coleman MD  •  HYDROcodone-acetaminophen (NORCO) 7.5-325 MG per tablet 1 tablet, 1 tablet, Oral, Q4H PRN, Idania Osborn MD  •  HYDROcodone-acetaminophen (NORCO) 7.5-325 MG per tablet 2 tablet, 2 tablet, Oral, Q4H PRN, Idania Osborn MD  •  HYDROmorphone (DILAUDID) injection 1 mg, 1 mg, Intravenous, Q2H PRN **AND** naloxone (NARCAN) injection 0.4 mg, 0.4 mg, Intravenous, Q5 Min PRN, Idania Osborn MD  •  insulin lispro (humaLOG) injection 0-7 Units, 0-7 Units, Subcutaneous, TID AC, Jose Antonio Coleman MD  •  labetalol (NORMODYNE,TRANDATE) injection 10 mg, 10 mg, Intravenous, Q4H PRN, Jose Antonio Coleman MD  •  lisinopril (PRINIVIL,ZESTRIL) 20 mg, hydroCHLOROthiazide (HYDRODIURIL) 12.5 mg for ZESTORETIC 20-12.5, , Oral, Q24H, Jose Antonio Coleman MD, Given at 12/09/20 0819  •  magnesium hydroxide (MILK OF MAGNESIA) suspension 2400 mg/10mL 10 mL, 10 mL, Oral, Daily PRN, Idania Osborn MD  •  metoprolol tartrate (LOPRESSOR) tablet 25 mg, 25 mg, Oral, TID, Jose Antonio Coleman MD, 25 mg at 12/09/20 0820  •  multivitamin (THERAGRAN) tablet 1 tablet, 1 tablet, Oral, Daily, Idania Osborn MD, 1 tablet at 12/09/20 0819  •  ondansetron (ZOFRAN) injection 4 mg, 4 mg, Intravenous, Q6H PRN, Idania Osborn MD  •  oxyCODONE-acetaminophen (PERCOCET) 5-325 MG per tablet 1 tablet, 1 tablet, Oral, Q4H PRN, dIania Osborn MD  •  oxyCODONE-acetaminophen (PERCOCET) 5-325 MG per  tablet 2 tablet, 2 tablet, Oral, Q4H PRN, Idania Osborn MD  •  promethazine (PHENERGAN) tablet 12.5 mg, 12.5 mg, Oral, Q4H PRN **OR** promethazine (PHENERGAN) suppository 12.5 mg, 12.5 mg, Rectal, Q4H PRN, Idania Osborn MD  •  promethazine (PHENERGAN) tablet 12.5 mg, 12.5 mg, Oral, Q4H PRN, Idania Osborn MD  •  sodium chloride 0.9 % bolus 500 mL, 500 mL, Intravenous, TID PRN, Jose Antonio Coleman MD  •  thiamine (VITAMIN B-1) tablet 100 mg, 100 mg, Oral, Daily, Jose Antonio Coleman MD, 100 mg at 12/09/20 0819    Please refer to the medical record for a full medication list    Review of Systems:    Constitutional-- No Fever, chills or sweats.  Appetite good, and no malaise. No fatigue.  HEENT-- No new vision, hearing or throat complaints.  No epistaxis or oral sores.  Denies odynophagia or dysphagia.  No odynophagia or dysphagia. No headache, photophobia or neck stiffness.  CV-- No chest pain, palpitation or syncope  Resp-- No SOB/cough/Hemoptysis  GI- No nausea, vomiting, or diarrhea.  No hematochezia, melena, or hematemesis. Denies jaundice or chronic liver disease.  -- No dysuria, hematuria, or flank pain.  Denies hesitancy, urgency or flank pain.  Lymph- no swollen lymph nodes in neck/axilla or groin.   Heme- No active bruising or bleeding; no Hx of DVT or PE.  MS-- no swelling or pain in the bones or joints of arms/legs.  No new back pain.  Neuro-- No acute focal weakness or numbness in the arms or legs.  No seizures.  Skin--No rashes or lesions, except as per HPI, no nodules    Physical Exam:   Vital Signs   Temp:  [97.2 °F (36.2 °C)-98.1 °F (36.7 °C)] 97.8 °F (36.6 °C)  Heart Rate:  [64-85] 74  Resp:  [15-18] 16  BP: (105-128)/(64-94) 117/79    Temp  Min: 97.2 °F (36.2 °C)  Max: 98.1 °F (36.7 °C)  BP  Min: 105/75  Max: 128/90  Pulse  Min: 64  Max: 85  Resp  Min: 15  Max: 18  SpO2  Min: 90 %  Max: 100 %    Blood pressure 117/79, pulse 74, temperature 97.8 °F (36.6 °C), temperature source Oral, resp.  rate 16, SpO2 97 %.  GENERAL: Awake and alert, in minor distress. Appears older than stated age.  HEENT:  Normocephalic, atraumatic.  Oropharynx without thrush. Dentition in good repair. No cervical adenopathy. No neck masses.  Ears externally normal, Nose externally normal.  EYES:No conjunctival injection. No icterus. EOM full.  LYMPHATICS: No lymphadenopathy of the neck or axillary or inguinal regions.   HEART: No murmur, gallop, or pericardial friction rub. Reg rate rhythm, No JVD at 45 degrees.  LUNGS: Clear to auscultation and percussion. No respiratory distress, no use of accessory muscles.  ABDOMEN: Soft, nontender, nondistended. No appreciable HSM.  Bowel sounds normal.  Obese.  GENITAL: No external lesions, breasts without masses, back straight, no CVAT, rectal external without lesions.   SKIN: Warm and dry without cutaneous eruptions.  No nodules.   Left foot surgical dressing in place.  PSYCHIATRIC: Mental status lucid. No confusion.  EXT:  No cellulitic change.  NEURO: Oriented to name, nonfocal    Results Review:   I reviewed the patient's new clinical results.  I reviewed the patient's new imaging results and agree with the interpretation.  I reviewed the patient's other test results and agree with the interpretation        Results from last 7 days   Lab Units 12/08/20  1126   POTASSIUM mmol/L 4.4   Laboratories reviewed sodium 141, potassium 4.2, BUN 18, creatinine 1.2, alkaline phosphatase 80, ALT 40, AST 34, bilirubin 0.6, WBC 7.4, hemoglobin 13 with recommendations made for with normal differential, CRP 0.3, ESR 47, hemoglobin A1c 6.0.  Laboratories from approximately 11/26/2018.  11/25/2020 labs sodium 138, potassium 4.7, creatinine 1.36, BUN 28, hemoglobin A1c 5.80, CRP 0.23, ESR 8, WBC 10.3, hemoglobin 16, platelets 175.                      Estimated Creatinine Clearance: 55.9 mL/min (A) (by C-G formula based on SCr of 1.36 mg/dL (H)).    Microbiology:  Microbiology Results Abnormal      Procedure Component Value - Date/Time    Tissue / Bone Culture - Tissue, Toe, Left [[id number]] Collected: 12/08/20 1208    Lab Status: Preliminary result Specimen: Tissue from Toe, Left Updated: 12/09/20 0610     Tissue Culture No growth     Gram Stain No WBCs or organisms seen          Radiology:  Imaging Results (Last 72 Hours)     ** No results found for the last 72 hours. **          IMPRESSION:     1.  Left second acute on chronic metatarsal osteomyelitis by MRI 11/11/2020.  Some noncompliance with continued ambulation on his plantar ulcer.  Status post left second metatarsal amputation on 12/8/2020.  Usual organisms include Staphylococcus, Streptococcus, Pseudomonas.  Partially antibiotic modified.   2.  Peripheral neuropathy related to his development of osteomyelitis.  3.  Chronic kidney disease stage 3, most recent GFR 51.  4.  Obesity, BMI 29.15.  5.  Noncompliance.    Plan:    1.  Diagnostically, continue to follow patient's physical exam, CBC, CMP, CRP, CPK, and cultures obtained at the time of surgery on 12/8/2020.  2.  Therapeutically, continue using daptomycin 700 mg IV daily, cefepime 2 g IV every 12 hours pending further culture data.  Duration antibiotics until 1/12/2021 and reassess.  3.  Nonweightbearing status on foot.  4.  Long-term venous access.  Groshong catheter may be preferable given his renal function.  I discussed this with Dr. Antonio Bains to provide Groshong, scheduled today.    I discussed the patients findings and my recommendations with patient, nursing staff and consulting provider    Our group would be pleased to follow this patient over the course of their hospitalization and assist with outpatient antimicrobial therapy, as indicated.  Further recommendations depend on the results of the cultures and clinical course.  Side effects discussed with the patient.    Case management orders: Please arrange for home antibiotics with daptomycin 700 mg IV daily, cefepime 2 g IV every 12  hours until 1/12/2021 for 6 weeks, treatment of left foot osteomyelitis.  Check CBC, CMP, CRP, C. difficile, antibiotics.  Fax orders to [phone], call [phone] with final arrangements.  Arrange for follow-up with me in 1 week post discharge.    Jeffery Young MD  12/9/2020

## 2020-12-09 NOTE — THERAPY TREATMENT NOTE
Patient Name: Carlos Culver  : 1945    MRN: [id number]                              Today's Date: 2020       Admit Date: 2020    Visit Dx:     ICD-10-CM ICD-9-CM   1. Chronic multifocal osteomyelitis of left foot (CMS/Formerly [facility])  M86.372 730.17   2. Atrial fibrillation with RVR (CMS/Formerly [facility])  I48.91 427.31     Patient Active Problem List   Diagnosis   • Cellulitis of left foot   • GUCCI (acute kidney injury) (CMS/Formerly [facility])   • Essential hypertension   • Leukocytosis   • Atrial fibrillation with RVR (CMS/Formerly [facility])   • Osteomyelitis of left foot (CMS/Formerly [facility])   • Chronic anticoagulation   • Neuropathy   • Elevated transaminase level   • CKD (chronic kidney disease) stage 2, GFR 60-89 ml/min   • Skin ulcer of right heel (CMS/Formerly [facility])   • Chronic multifocal osteomyelitis of left foot (CMS/Formerly [facility])   • Contracture of joint of foot, left   • MARTINEZ (dyspnea on exertion)   • Neuropathic ulcer, limited to breakdown of skin (CMS/Formerly [facility])   • Hyperlipidemia LDL goal <100   • A-fib (CMS/Formerly [facility])   • S/P transmetatarsal amputation of foot, left (CMS/Formerly [facility])   • Leukocytosis, likely reactive   • Acute postoperative pain     Past Medical History:   Diagnosis Date   • A-fib (CMS/Formerly [facility])    • Acute kidney failure (CMS/Formerly [facility])    • Elevated cholesterol    • Hard of hearing    • Hyperlipidemia    • Hypertension    • Renal disorder    • Wears hearing aid in both ears    • Wears reading eyeglasses      Past Surgical History:   Procedure Laterality Date   • AMPUTATION DIGIT Left 2020    Procedure: AMPUTATION LEFT 2ND TOE;  Surgeon: Idania Osborn MD;  Location:  [facility];  Service: Orthopedics   • AMPUTATION DIGIT Left 2020    Procedure: TRANSMETATARSAL AMPUTATION LEFT;  Surgeon: Idania Osborn MD;  Location:  [facility];  Service: Orthopedics;  Laterality: Left;   • APPENDECTOMY     • COLONOSCOPY     • INCISION AND DRAINAGE LEG Left 10/23/2018    Procedure: INCISION AND DRAINAGE LEFT FOOT;  Surgeon: Idania Osborn MD;  Location:  [facility];   Service: Orthopedics   • TOE AMPUTATION     • TONSILLECTOMY     • VENOUS ACCESS DEVICE (PORT) INSERTION N/A 10/23/2018    Procedure: GROSHONG CATHETER PLACEMENT;  Surgeon: Marquez Simons MD;  Location: [facility];  Service: General   • VENOUS ACCESS DEVICE (PORT) REMOVAL       General Information     Row Name 12/09/20 1110          Physical Therapy Time and Intention    Document Type  therapy note (daily note)  -LR     Mode of Treatment  physical therapy;individual therapy  -LR     Row Name 12/09/20 1110          General Information    Patient Profile Reviewed  yes  -LR     Existing Precautions/Restrictions  (S) fall;non-weight bearing;left strict NWB L LE  -LR     Barriers to Rehab  none identified  -LR     Row Name 12/09/20 1110          Cognition    Orientation Status (Cognition)  oriented x 4  -LR     Row Name 12/09/20 1110          Safety Issues, Functional Mobility    Safety Issues Affecting Function (Mobility)  other (see comments) none  -LR     Impairments Affecting Function (Mobility)  other (see comments) none  -LR       User Key  (r) = Recorded By, (t) = Taken By, (c) = Cosigned By    Initials Name Provider Type    LR Cee Viveros, PT Physical Therapist        Mobility     Row Name 12/09/20 1110          Bed Mobility    Bed Mobility  supine-sit;sit-supine  -LR     Supine-Sit [place] (Bed Mobility)  verbal cues;modified independence  -LR     Sit-Supine [place] (Bed Mobility)  verbal cues;modified independence  -LR     Assistive Device (Bed Mobility)  head of bed elevated;bed rails  -LR     Comment (Bed Mobility)  Verbal cues for correct sequencing to t/f in and out of bed. No assist required. Denied dizziness upon sitting up.  -LR     Row Name 12/09/20 1110          Transfers    Comment (Transfers)  Verbal cues for correct hand placement with t/f and correct technique for t/f on and off rolling knee walker. Verbal cues to lock brakes on rolling knee walker prior to t/f for  safety. Verbal cues required for NWB on L LE during t/f.  -LR     Row Name 12/09/20 1110          Sit-Stand Transfer    Sit-Stand [place] (Transfers)  verbal cues;standby assist  -LR     Assistive Device (Sit-Stand Transfers)  walker, knee scooter  -LR     Row Name 12/09/20 1110          Gait/Stairs (Locomotion)    [place] Level (Gait)  verbal cues;contact guard  -LR     Assistive Device (Gait)  walker, knee scooter  -LR     Distance in Feet (Gait)  350  -LR     Deviations/Abnormal Patterns (Gait)  bilateral deviations;david decreased;gait speed decreased;stride length decreased  -LR     Comment (Gait/Stairs)  Patient ambulate with swing through gait pattern on rolling knee walker. No LOB or unsteadiness noted. Demonstrated correct and safe technique for turns. Gait limited by fatigue. Patient reports he has ramp access to home and therefore does not have to climb any stairs.  -LR     Row Name 12/09/20 1110          Mobility    Extremity Weight-bearing Status  left lower extremity  -LR     Left Lower Extremity (Weight-bearing Status)  (S) non weight-bearing (NWB)  -LR       User Key  (r) = Recorded By, (t) = Taken By, (c) = Cosigned By    Initials Name Provider Type    LR Cee Viveros, PT Physical Therapist        Obj/Interventions     Row Name 12/09/20 1110          Balance    Balance Assessment  sitting static balance;sitting dynamic balance;standing static balance;standing dynamic balance  -LR     Static Sitting Balance  WFL;unsupported;sitting, edge of bed  -LR     Dynamic Sitting Balance  WFL;unsupported;sitting, edge of bed  -LR     Static Standing Balance  WFL;standing;unsupported  -LR     Dynamic Standing Balance  WFL;unsupported;standing  -LR       User Key  (r) = Recorded By, (t) = Taken By, (c) = Cosigned By    Initials Name Provider Type    Cee Chong, PT Physical Therapist        Goals/Plan     Row Name 12/09/20 1110          Bed Mobility Goal 1 (PT)     Activity/Assistive Device (Bed Mobility Goal 1, PT)  sit to supine/supine to sit  -LR     [place] Level/Cues Needed (Bed Mobility Goal 1, PT)  independent  -LR     Time Frame (Bed Mobility Goal 1, PT)  long term goal (LTG);5 days  -LR     Progress/Outcomes (Bed Mobility Goal 1, PT)  goal met;good progress toward goal;goal revised this date;goal ongoing  -LR     Row Name 12/09/20 1110          Transfer Goal 1 (PT)    Activity/Assistive Device (Transfer Goal 1, PT)  sit-to-stand/stand-to-sit;walker, rolling;other (see comments) rolling knee walker  -LR     [place] Level/Cues Needed (Transfer Goal 1, PT)  modified independence  -LR     Time Frame (Transfer Goal 1, PT)  long term goal (LTG);5 days  -LR     Progress/Outcome (Transfer Goal 1, PT)  good progress toward goal;goal ongoing  -LR     Row Name 12/09/20 1110          Gait Training Goal 1 (PT)    Activity/Assistive Device (Gait Training Goal 1, PT)  gait (walking locomotion);other (see comments) rolling knee walker  -LR     [place] Level (Gait Training Goal 1, PT)  modified independence  -LR     Distance (Gait Training Goal 1, PT)  500 feet  -LR     Time Frame (Gait Training Goal 1, PT)  long term goal (LTG);5 days  -LR     Progress/Outcome (Gait Training Goal 1, PT)  goal met;goal revised this date;good progress toward goal;goal ongoing  -LR       User Key  (r) = Recorded By, (t) = Taken By, (c) = Cosigned By    Initials Name Provider Type    LR Cee Viveros, PT Physical Therapist        Clinical Impression     Row Name 12/09/20 1110          Pain    Additional Documentation  Pain Scale: Numbers Pre/Post-Treatment (Group)  -LR     Row Name 12/09/20 1110          Pain Scale: Numbers Pre/Post-Treatment    Pretreatment Pain Rating  0/10 - no pain  -LR     Posttreatment Pain Rating  0/10 - no pain  -LR     Pain Location - Side  Left  -LR     Pain Location  foot  -LR     Pain Intervention(s)  Ambulation/increased activity;Repositioned;Elevated   -LR     Row Name 12/09/20 1110          Plan of Care Review    Plan of Care Reviewed With  patient  -LR     Progress  improving  -LR     Outcome Summary  Patient ambulated 350 feet with rolling knee walker with CGA for safety, limited by fatigue. Patient modified independent with bed mobility. Required cues for NWB on L LE with t/f. Will continue to progress as able.  -LR     Row Name 12/09/20 1110          Therapy Assessment/Plan (PT)    Rehab Potential (PT)  good, to achieve stated therapy goals  -LR     Criteria for Skilled Interventions Met (PT)  yes;meets criteria;skilled treatment is necessary  -LR     Row Name 12/09/20 1110          Positioning and Restraints    Pre-Treatment Position  in bed  -LR     Post Treatment Position  bed  -LR     In Bed  notified nsg;supine;call light within reach;encouraged to call for assist;exit alarm on;side rails up x2;LLE elevated  -LR       User Key  (r) = Recorded By, (t) = Taken By, (c) = Cosigned By    Initials Name Provider Type    Cee Chong, PT Physical Therapist        Outcome Measures     Row Name 12/09/20 1110          How much help from another person do you currently need...    Turning from your back to your side while in flat bed without using bedrails?  4  -LR     Moving from lying on back to sitting on the side of a flat bed without bedrails?  4  -LR     Moving to and from a bed to a chair (including a wheelchair)?  3  -LR     Standing up from a chair using your arms (e.g., wheelchair, bedside chair)?  3  -LR     Climbing 3-5 steps with a railing?  1  -LR     To walk in hospital room?  3  -LR     AM-PAC 6 Clicks Score (PT)  18  -LR     Row Name 12/09/20 1110          Functional Assessment    Outcome Measure Options  AM-PAC 6 Clicks Basic Mobility (PT)  -LR       User Key  (r) = Recorded By, (t) = Taken By, (c) = Cosigned By    Initials Name Provider Type    Cee Chong, PT Physical Therapist        Physical Therapy Education                  Title: PT OT SLP Therapies (Done)     Topic: Physical Therapy (Done)     Point: Mobility training (Done)     Learning Progress Summary           Patient Acceptance, E,D, VU,NR by LR at 12/9/2020 1110    Comment: Educated on strict NWB status of L LE, correct supine<->sit t/f technique, correct sit<->stand t/f technique, correct gait mechanics, safety with mobility, and progression of POC.    Acceptance, E,D, VU by LUCIUS at 12/8/2020 1505    Comment: Educated on safe sequencing with bed mobility, ambulatory/toilet transfers, and gait training. Reviewed strict NWB order for L LE.                   Point: Home exercise program (Done)     Learning Progress Summary           Patient Acceptance, E,D, VU,NR by LR at 12/9/2020 1110    Comment: Educated on strict NWB status of L LE, correct supine<->sit t/f technique, correct sit<->stand t/f technique, correct gait mechanics, safety with mobility, and progression of POC.    Acceptance, E,D, VU by LUCIUS at 12/8/2020 1505    Comment: Educated on safe sequencing with bed mobility, ambulatory/toilet transfers, and gait training. Reviewed strict NWB order for L LE.                   Point: Body mechanics (Done)     Learning Progress Summary           Patient Acceptance, E,D, VU,NR by LR at 12/9/2020 1110    Comment: Educated on strict NWB status of L LE, correct supine<->sit t/f technique, correct sit<->stand t/f technique, correct gait mechanics, safety with mobility, and progression of POC.    Acceptance, E,D, VU by LUCIUS at 12/8/2020 1505    Comment: Educated on safe sequencing with bed mobility, ambulatory/toilet transfers, and gait training. Reviewed strict NWB order for L LE.                   Point: Precautions (Done)     Learning Progress Summary           Patient Acceptance, E,D, VU,NR by LR at 12/9/2020 1110    Comment: Educated on strict NWB status of L LE, correct supine<->sit t/f technique, correct sit<->stand t/f technique, correct gait mechanics, safety with  mobility, and progression of POC.    Acceptance, E,D, VU by LUCIUS at 12/8/2020 1505    Comment: Educated on safe sequencing with bed mobility, ambulatory/toilet transfers, and gait training. Reviewed strict NWB order for L LE.                               User Key     Initials Effective Dates Name Provider Type Discipline    LR 06/19/15 -  Cee Viveros, PT Physical Therapist PT    LUCIUS 09/10/19 -  Keenan Felder, PT Physical Therapist PT              PT Recommendation and Plan     Plan of Care Reviewed With: patient  Progress: improving  Outcome Summary: Patient ambulated 350 feet with rolling knee walker with CGA for safety, limited by fatigue. Patient modified independent with bed mobility. Required cues for NWB on L LE with t/f. Will continue to progress as able.     Time Calculation:   PT Charges     Row Name 12/09/20 1110             Time Calculation    Start Time  1110  -LR      PT Received On  12/09/20  -LR      PT Goal Re-Cert Due Date  12/18/20  -LR         Time Calculation- PT    Total Timed Code Minutes- PT  10 minute(s)  -LR         Timed Charges    80919 - Gait Training Minutes   10  -LR        User Key  (r) = Recorded By, (t) = Taken By, (c) = Cosigned By    Initials Name Provider Type    LR Cee Viveros, PT Physical Therapist        Therapy Charges for Today     Code Description Service Date Service Provider Modifiers Qty    69377723778 HC GAIT TRAINING EA 15 MIN 12/9/2020 Cee Viveros, PT GP 1          PT G-Codes  Outcome Measure Options: AM-PAC 6 Clicks Basic Mobility (PT)  AM-PAC 6 Clicks Score (PT): 18    Cee Viveros, PT  12/9/2020

## 2020-12-09 NOTE — ANESTHESIA POSTPROCEDURE EVALUATION
Patient: Carlos Culver    Procedure Summary     Date: 12/09/20 Room / Location:  [facility] 04 /  [facility]    Anesthesia Start: 1452 Anesthesia Stop: 1537    Procedure: GROSHONG INSERTION (N/A ) Diagnosis:     Surgeon: Antonio Bains MD Provider: Nolan Allred MD    Anesthesia Type: MAC, general ASA Status: 3          Anesthesia Type: MAC, general    Vitals  Vitals Value Taken Time   /64 12/09/20 1536   Temp 97.8 °F (36.6 °C) 12/09/20 1536   Pulse 77 12/09/20 1537   Resp 16 12/09/20 1536   SpO2 95 % 12/09/20 1537   Vitals shown include unvalidated device data.        Post Anesthesia Care and Evaluation    Patient location during evaluation: PACU  Patient participation: complete - patient participated  Level of consciousness: awake and alert  Pain management: adequate  Airway patency: patent  Anesthetic complications: No anesthetic complications  PONV Status: none  Cardiovascular status: acceptable  Respiratory status: acceptable  Hydration status: acceptable

## 2020-12-09 NOTE — PROGRESS NOTES
Discharge Planning Assessment  [facility]     Patient Name: Carlos Culver  MRN: [id number]  Today's Date: 12/9/2020    Admit Date: 12/8/2020    Discharge Needs Assessment     Row Name 12/09/20 1429       Living Environment    Lives With  spouse    Current Living Arrangements  home/apartment/condo    Provides Primary Care For  no one    Family Caregiver if Needed  spouse    Able to Return to Prior Arrangements  yes       Transition Planning    Patient/Family Anticipates Transition to  home    Transportation Anticipated  family or friend will provide       Discharge Needs Assessment    Equipment Currently Used at Home  walker, rolling;other (see comments) knee scooter        Discharge Plan     Row Name 12/09/20 1431       Plan    Plan  Home    Patient/Family in Agreement with Plan  yes    Plan Comments  Spoke w/ patient at bedside. He lives w/ his wife in a one story in [facility] [place] he was independent w/ ADLs and used a knee scooter for mobility assist. He also has a RW. Plan is for patient to return home on IV abx. Spoke w/ Horacio at [facility] and he is running the cost for the medication to be infused at home. Plan pending final recs per ID and cost of meds. CM following.    Final Discharge Disposition Code  01 - home or self-care        Continued Care and Services - Admitted Since 12/8/2020    Coordination has not been started for this encounter.         Demographic Summary     Row Name 12/09/20 1429       General Information    Arrived From  home        Functional Status     Row Name 12/09/20 1429       Functional Status    Usual Activity Tolerance  moderate    Current Activity Tolerance  moderate        Psychosocial    No documentation.       Abuse/Neglect    No documentation.       Legal    No documentation.       Substance Abuse    No documentation.       Patient Forms    No documentation.           Maame Serra RN

## 2020-12-10 VITALS
RESPIRATION RATE: 16 BRPM | SYSTOLIC BLOOD PRESSURE: 133 MMHG | DIASTOLIC BLOOD PRESSURE: 84 MMHG | BODY MASS INDEX: 29.12 KG/M2 | OXYGEN SATURATION: 97 % | TEMPERATURE: 97.9 F | HEART RATE: 91 BPM | WEIGHT: 215 LBS | HEIGHT: 72 IN

## 2020-12-10 LAB
ALBUMIN SERPL-MCNC: 3.5 G/DL (ref 3.5–5.2)
ALBUMIN/GLOB SERPL: 1.3 G/DL
ALP SERPL-CCNC: 67 U/L (ref 39–117)
ALT SERPL W P-5'-P-CCNC: 28 U/L (ref 1–41)
ANION GAP SERPL CALCULATED.3IONS-SCNC: 10 MMOL/L (ref 5–15)
AST SERPL-CCNC: 24 U/L (ref 1–40)
BASOPHILS # BLD AUTO: 0.08 10*3/MM3 (ref 0–0.2)
BASOPHILS NFR BLD AUTO: 0.5 % (ref 0–1.5)
BILIRUB SERPL-MCNC: 0.6 MG/DL (ref 0–1.2)
BUN SERPL-MCNC: 16 MG/DL (ref 8–23)
BUN/CREAT SERPL: 16.8 (ref 7–25)
CALCIUM SPEC-SCNC: 8.9 MG/DL (ref 8.6–10.5)
CHLORIDE SERPL-SCNC: 105 MMOL/L (ref 98–107)
CK SERPL-CCNC: 78 U/L (ref 20–200)
CO2 SERPL-SCNC: 21 MMOL/L (ref 22–29)
CREAT SERPL-MCNC: 0.95 MG/DL (ref 0.76–1.27)
D-LACTATE SERPL-SCNC: 1.6 MMOL/L (ref 0.5–2)
DEPRECATED RDW RBC AUTO: 45 FL (ref 37–54)
EOSINOPHIL # BLD AUTO: 0.11 10*3/MM3 (ref 0–0.4)
EOSINOPHIL NFR BLD AUTO: 0.7 % (ref 0.3–6.2)
ERYTHROCYTE [DISTWIDTH] IN BLOOD BY AUTOMATED COUNT: 12.5 % (ref 12.3–15.4)
GFR SERPL CREATININE-BSD FRML MDRD: 77 ML/MIN/1.73
GLOBULIN UR ELPH-MCNC: 2.8 GM/DL
GLUCOSE BLDC GLUCOMTR-MCNC: 107 MG/DL (ref 70–130)
GLUCOSE BLDC GLUCOMTR-MCNC: 91 MG/DL (ref 70–130)
GLUCOSE SERPL-MCNC: 96 MG/DL (ref 65–99)
HCT VFR BLD AUTO: 43 % (ref 37.5–51)
HGB BLD-MCNC: 13.7 G/DL (ref 13–17.7)
IMM GRANULOCYTES # BLD AUTO: 0.06 10*3/MM3 (ref 0–0.05)
IMM GRANULOCYTES NFR BLD AUTO: 0.4 % (ref 0–0.5)
LYMPHOCYTES # BLD AUTO: 3.13 10*3/MM3 (ref 0.7–3.1)
LYMPHOCYTES NFR BLD AUTO: 20.6 % (ref 19.6–45.3)
MCH RBC QN AUTO: 31 PG (ref 26.6–33)
MCHC RBC AUTO-ENTMCNC: 31.9 G/DL (ref 31.5–35.7)
MCV RBC AUTO: 97.3 FL (ref 79–97)
MONOCYTES # BLD AUTO: 1.21 10*3/MM3 (ref 0.1–0.9)
MONOCYTES NFR BLD AUTO: 8 % (ref 5–12)
NEUTROPHILS NFR BLD AUTO: 10.6 10*3/MM3 (ref 1.7–7)
NEUTROPHILS NFR BLD AUTO: 69.8 % (ref 42.7–76)
NRBC BLD AUTO-RTO: 0 /100 WBC (ref 0–0.2)
PLATELET # BLD AUTO: 145 10*3/MM3 (ref 140–450)
PMV BLD AUTO: 10.1 FL (ref 6–12)
POTASSIUM SERPL-SCNC: 4.2 MMOL/L (ref 3.5–5.2)
PROT SERPL-MCNC: 6.3 G/DL (ref 6–8.5)
RBC # BLD AUTO: 4.42 10*6/MM3 (ref 4.14–5.8)
SODIUM SERPL-SCNC: 136 MMOL/L (ref 136–145)
WBC # BLD AUTO: 15.19 10*3/MM3 (ref 3.4–10.8)

## 2020-12-10 PROCEDURE — 94799 UNLISTED PULMONARY SVC/PX: CPT

## 2020-12-10 PROCEDURE — 80053 COMPREHEN METABOLIC PANEL: CPT | Performed by: SURGERY

## 2020-12-10 PROCEDURE — 82550 ASSAY OF CK (CPK): CPT | Performed by: SURGERY

## 2020-12-10 PROCEDURE — 25010000002 CEFEPIME PER 500 MG: Performed by: SURGERY

## 2020-12-10 PROCEDURE — 85025 COMPLETE CBC W/AUTO DIFF WBC: CPT | Performed by: SURGERY

## 2020-12-10 PROCEDURE — 25010000002 ENOXAPARIN PER 10 MG: Performed by: SURGERY

## 2020-12-10 PROCEDURE — 99024 POSTOP FOLLOW-UP VISIT: CPT | Performed by: ORTHOPAEDIC SURGERY

## 2020-12-10 PROCEDURE — 82962 GLUCOSE BLOOD TEST: CPT

## 2020-12-10 PROCEDURE — 97530 THERAPEUTIC ACTIVITIES: CPT

## 2020-12-10 PROCEDURE — 25010000002 ERTAPENEM PER 500 MG: Performed by: INTERNAL MEDICINE

## 2020-12-10 PROCEDURE — 83605 ASSAY OF LACTIC ACID: CPT | Performed by: SURGERY

## 2020-12-10 RX ORDER — DOCUSATE SODIUM 100 MG/1
100 CAPSULE, LIQUID FILLED ORAL 2 TIMES DAILY
Qty: 40 CAPSULE | Refills: 0 | Status: SHIPPED | OUTPATIENT
Start: 2020-12-10 | End: 2021-01-11

## 2020-12-10 RX ORDER — ATORVASTATIN CALCIUM 80 MG/1
80 TABLET, FILM COATED ORAL NIGHTLY
Start: 2020-12-10 | End: 2022-03-14 | Stop reason: SDUPTHER

## 2020-12-10 RX ADMIN — ENOXAPARIN SODIUM 40 MG: 40 INJECTION SUBCUTANEOUS at 08:08

## 2020-12-10 RX ADMIN — HYDROCHLOROTHIAZIDE: 12.5 CAPSULE ORAL at 08:06

## 2020-12-10 RX ADMIN — FOLIC ACID 1 MG: 1 TABLET ORAL at 08:07

## 2020-12-10 RX ADMIN — ERTAPENEM SODIUM 1 G: 1 INJECTION, POWDER, LYOPHILIZED, FOR SOLUTION INTRAMUSCULAR; INTRAVENOUS at 12:12

## 2020-12-10 RX ADMIN — METOPROLOL TARTRATE 25 MG: 25 TABLET, FILM COATED ORAL at 08:07

## 2020-12-10 RX ADMIN — THIAMINE HCL TAB 100 MG 100 MG: 100 TAB at 08:07

## 2020-12-10 RX ADMIN — MULTIVITAMIN TABLET 1 TABLET: TABLET at 08:07

## 2020-12-10 RX ADMIN — CEFEPIME HYDROCHLORIDE 2 G: 2 INJECTION, POWDER, FOR SOLUTION INTRAVENOUS at 06:03

## 2020-12-10 NOTE — PROGRESS NOTES
Orthopedic  Progress Note    Subjective     Post-Operative Day: 1 Day Post-Op  Procedure(s):  GROSHONG INSERTION  Laterality:N/A      Systemic or Specific Complaints: wants to go home  Objective     Vital signs in last 24 hours:  Temp:  [97.5 °F (36.4 °C)-98.6 °F (37 °C)] 97.7 °F (36.5 °C)  Heart Rate:  [58-90] 65  Resp:  [14-20] 15  BP: ()/(60-90) 129/90    Neurovascular: no change in dense neuropathy               Wound: dressing changed left foot, no change in the worrisome areas laterally, no erythema, old blood on dressing, no purulence    Data Review  Lab Results (last 24 hours)     Procedure Component Value Units Date/Time    Comprehensive Metabolic Panel [[id number]]  (Abnormal) Collected: 12/10/20 0823    Specimen: Blood Updated: 12/10/20 1010     Glucose 96 mg/dL      BUN 16 mg/dL      Creatinine 0.95 mg/dL      Sodium 136 mmol/L      Potassium 4.2 mmol/L      Comment: Slight hemolysis detected by analyzer. Results may be affected.        Chloride 105 mmol/L      CO2 21.0 mmol/L      Calcium 8.9 mg/dL      Total Protein 6.3 g/dL      Albumin 3.50 g/dL      ALT (SGPT) 28 U/L      AST (SGOT) 24 U/L      Alkaline Phosphatase 67 U/L      Total Bilirubin 0.6 mg/dL      eGFR Non African Amer 77 mL/min/1.73      Globulin 2.8 gm/dL      A/G Ratio 1.3 g/dL      BUN/Creatinine Ratio 16.8     Anion Gap 10.0 mmol/L     Narrative:      GFR Normal >60  Chronic Kidney Disease <60  Kidney Failure <15      CK [[id number]]  (Normal) Collected: 12/10/20 0823    Specimen: Blood Updated: 12/10/20 1007     Creatine Kinase 78 U/L     Lactic Acid, Plasma [[id number]]  (Normal) Collected: 12/10/20 0823    Specimen: Blood Updated: 12/10/20 0957     Lactate 1.6 mmol/L      Comment: Falsely depressed results may occur on samples drawn from patients receiving N-Acetylcysteine (NAC) or Metamizole.       CBC & Differential [[id number]]  (Abnormal) Collected: 12/10/20 0823    Specimen: Blood Updated: 12/10/20 0907    Narrative:       The following orders were created for panel order CBC & Differential.  Procedure                               Abnormality         Status                     ---------                               -----------         ------                     CBC Auto Differential[[id number]]        Abnormal            Final result                 Please view results for these tests on the individual orders.    CBC Auto Differential [[id number]]  (Abnormal) Collected: 12/10/20 0823    Specimen: Blood Updated: 12/10/20 0907     WBC 15.19 10*3/mm3      RBC 4.42 10*6/mm3      Hemoglobin 13.7 g/dL      Hematocrit 43.0 %      MCV 97.3 fL      MCH 31.0 pg      MCHC 31.9 g/dL      RDW 12.5 %      RDW-SD 45.0 fl      MPV 10.1 fL      Platelets 145 10*3/mm3      Neutrophil % 69.8 %      Lymphocyte % 20.6 %      Monocyte % 8.0 %      Eosinophil % 0.7 %      Basophil % 0.5 %      Immature Grans % 0.4 %      Neutrophils, Absolute 10.60 10*3/mm3      Lymphocytes, Absolute 3.13 10*3/mm3      Monocytes, Absolute 1.21 10*3/mm3      Eosinophils, Absolute 0.11 10*3/mm3      Basophils, Absolute 0.08 10*3/mm3      Immature Grans, Absolute 0.06 10*3/mm3      nRBC 0.0 /100 WBC     POC Glucose Once [[id number]]  (Normal) Collected: 12/10/20 0746    Specimen: Blood Updated: 12/10/20 0748     Glucose 91 mg/dL     Tissue / Bone Culture - Tissue, Toe, Left [[id number]] Collected: 12/08/20 1208    Specimen: Tissue from Toe, Left Updated: 12/10/20 0608     Tissue Culture No growth at 2 days     Gram Stain No WBCs or organisms seen    POC Glucose Once [[id number]]  (Normal) Collected: 12/09/20 2026    Specimen: Blood Updated: 12/09/20 2027     Glucose 99 mg/dL     POC Glucose Once [[id number]]  (Normal) Collected: 12/09/20 1613    Specimen: Blood Updated: 12/09/20 1617     Glucose 127 mg/dL     CK [[id number]]  (Normal) Collected: 12/09/20 0930    Specimen: Blood Updated: 12/09/20 1154     Creatine Kinase 76 U/L     Comprehensive Metabolic Panel [[id number]]   (Abnormal) Collected: 12/09/20 0930    Specimen: Blood Updated: 12/09/20 1154     Glucose 103 mg/dL      BUN 14 mg/dL      Creatinine 1.00 mg/dL      Sodium 139 mmol/L      Potassium 4.6 mmol/L      Comment: Slight hemolysis detected by analyzer. Results may be affected.        Chloride 106 mmol/L      CO2 24.0 mmol/L      Calcium 8.8 mg/dL      Total Protein 6.3 g/dL      Albumin 3.70 g/dL      ALT (SGPT) 25 U/L      AST (SGOT) 20 U/L      Alkaline Phosphatase 67 U/L      Total Bilirubin 0.7 mg/dL      eGFR Non African Amer 73 mL/min/1.73      Globulin 2.6 gm/dL      A/G Ratio 1.4 g/dL      BUN/Creatinine Ratio 14.0     Anion Gap 9.0 mmol/L     Narrative:      GFR Normal >60  Chronic Kidney Disease <60  Kidney Failure <15      POC Glucose Once [[id number]]  (Normal) Collected: 12/09/20 1128    Specimen: Blood Updated: 12/09/20 1133     Glucose 123 mg/dL     Lactic Acid, Plasma [[id number]]  (Normal) Collected: 12/09/20 0930    Specimen: Blood Updated: 12/09/20 1132     Lactate 1.5 mmol/L      Comment: Falsely depressed results may occur on samples drawn from patients receiving N-Acetylcysteine (NAC) or Metamizole.           Microbiology Results (last 10 days)     Procedure Component Value - Date/Time    Tissue / Bone Culture - Tissue, Toe, Left [[id number]] Collected: 12/08/20 1208    Lab Status: Preliminary result Specimen: Tissue from Toe, Left Updated: 12/10/20 0608     Tissue Culture No growth at 2 days     Gram Stain No WBCs or organisms seen    AFB Culture - Tissue, Toe, Left [[id number]] Collected: 12/08/20 1208    Lab Status: Preliminary result Specimen: Tissue from Toe, Left Updated: 12/09/20 1117     AFB Stain No acid fast bacilli seen on concentrated smear    COVID PRE-OP / PRE-PROCEDURE SCREENING ORDER (NO ISOLATION) - Swab, Nasopharynx [[id number]] Collected: 12/06/20 1442    Lab Status: Final result Specimen: Swab from Nasopharynx Updated: 12/07/20 1155    Narrative:      The following orders were created  for panel order COVID PRE-OP / PRE-PROCEDURE SCREENING ORDER (NO ISOLATION) - Swab, Nasopharynx.  Procedure                               Abnormality         Status                     ---------                               -----------         ------                     COVID-19 PCR, [facility]...[[id number]]                      Final result                 Please view results for these tests on the individual orders.    COVID-19 PCR, [facility], NP SWAB IN [facility] VIRAL TRANSPORT MEDIA 24-30 HR TAT - Swab, Nasopharynx [[id number]] Collected: 12/06/20 1442    Lab Status: Final result Specimen: Swab from Nasopharynx Updated: 12/07/20 1155     SARS-CoV-2 ANKITA Not Detected              Assessment/Plan     Status post- left trans-met amputation  -high risk for BKA, needs to be absolutely non-wt bearing  -ok to go home if antibiotics are arranged  -long and blunt discussion with patient about what he needs to do         Idania Osborn MD  Date: 12/10/2020  Time: 11:26 EST

## 2020-12-10 NOTE — PROGRESS NOTES
Continued Stay Note  [facility]     Patient Name: Carlos Culver  MRN: [id number]  Today's Date: 12/10/2020    Admit Date: 12/8/2020    Discharge Plan     Row Name 12/10/20 1429       Plan    Plan  Home w/ outpatient services    Patient/Family in Agreement with Plan  yes    Plan Comments  CM notified to arrange HH for wound care. Unfortunately patient does not qualify for HH since he will be receiving daily outpatient infusions. A referral has been made to [facility] at the [address]. They will contact patient with the date and time of his first appt. Their contact info has been added to the AVS. Patient's first infusion at [facility] will be tomorrow at 0845, and his f/u w/ Dr. Young will be Wed, 12/16, at 1330. Both appts have been added to the AVS. No other needs noted. CM following.    Final Discharge Disposition Code  01 - home or self-care        Discharge Codes    No documentation.       Expected Discharge Date and Time     Expected Discharge Date Expected Discharge Time    Dec 10, 2020             Maame Serra RN

## 2020-12-10 NOTE — PLAN OF CARE
Goal Outcome Evaluation:  Plan of Care Reviewed With: patient  Progress: improving  Outcome Summary: Vitals WNL. Pt reports only baseline neuropathy in bilateral feet. He denies pain. Pt maintains non-weight bearing status and keeps his left foot elevated while in the bed or chair. He tolerates ambulation in room with assist X 1 and a walker. Voids spontaneously. This afternoon after ambulating to the bathroom, a copious amount of drainage was noted on his dressing. Dr. Fernández was notified. She instructed for the incision to be cleaned with peroxide and for the dressing to be changed. No further drainage noted. IV antibiotic administered via groshong. Pt instructed in proper care prior to D/C home. Groshong dressing was changed this AM. No SSI required per order parameters.CIWA 0 this shift. Pt was D/C'd home with his first appointment for outpatient infusion scheduled and with contact information for [facility] PT wound care.

## 2020-12-10 NOTE — PLAN OF CARE
Goal Outcome Evaluation:  Plan of Care Reviewed With: patient  Progress: improving     Patient is alert and oriented . Slept throughout the night. Pain controlled with PO meds. Possible d/c later today.

## 2020-12-10 NOTE — DISCHARGE INSTR - ACTIVITY
Do NOT bear any weight on your left foot.     Keep your left foot elevated.     [facility] physical therapy will provide your dressing changes. They should contact you to set up your first appointment. If you do not hear from them, please call them at [phone].

## 2020-12-10 NOTE — THERAPY TREATMENT NOTE
Patient Name: Carlos Culver  : 1945    MRN: [id number]                              Today's Date: 12/10/2020       Admit Date: 2020    Visit Dx:     ICD-10-CM ICD-9-CM   1. Chronic multifocal osteomyelitis of left foot (CMS/[facility])  M86.372 730.17   2. Atrial fibrillation with RVR (CMS/[facility])  I48.91 427.31     Patient Active Problem List   Diagnosis   • Cellulitis of left foot   • GUCCI (acute kidney injury) (CMS/[facility])   • Essential hypertension   • Leukocytosis   • Atrial fibrillation with RVR (CMS/[facility])   • Osteomyelitis of left foot (CMS/[facility])   • Chronic anticoagulation   • Neuropathy   • Elevated transaminase level   • CKD (chronic kidney disease) stage 2, GFR 60-89 ml/min   • Skin ulcer of right heel (CMS/[facility])   • Chronic multifocal osteomyelitis of left foot (CMS/[facility])   • Contracture of joint of foot, left   • MARTINEZ (dyspnea on exertion)   • Neuropathic ulcer, limited to breakdown of skin (CMS/[facility])   • Hyperlipidemia LDL goal <100   • A-fib (CMS/[facility])   • S/P transmetatarsal amputation of foot, left (CMS/[facility])   • Leukocytosis, likely reactive   • Acute postoperative pain     Past Medical History:   Diagnosis Date   • A-fib (CMS/[facility])    • Acute kidney failure (CMS/[facility])    • Elevated cholesterol    • Hard of hearing    • Hyperlipidemia    • Hypertension    • Renal disorder    • Wears hearing aid in both ears    • Wears reading eyeglasses      Past Surgical History:   Procedure Laterality Date   • AMPUTATION DIGIT Left 2020    Procedure: AMPUTATION LEFT 2ND TOE;  Surgeon: Idania Osborn MD;  Location:  [facility];  Service: Orthopedics   • AMPUTATION DIGIT Left 2020    Procedure: TRANSMETATARSAL AMPUTATION LEFT;  Surgeon: Idania Osborn MD;  Location:  [facility];  Service: Orthopedics;  Laterality: Left;   • APPENDECTOMY     • COLONOSCOPY     • INCISION AND DRAINAGE LEG Left 10/23/2018    Procedure: INCISION AND DRAINAGE LEFT FOOT;  Surgeon: Idania Osborn MD;  Location:  [facility];   Service: Orthopedics   • TOE AMPUTATION     • TONSILLECTOMY     • VENOUS ACCESS DEVICE (PORT) INSERTION N/A 10/23/2018    Procedure: GROSHONG CATHETER PLACEMENT;  Surgeon: Marquez Simons MD;  Location:  [facility];  Service: General   • VENOUS ACCESS DEVICE (PORT) INSERTION N/A 12/9/2020    Procedure: GROSHONG INSERTION;  Surgeon: Antonio Bains MD;  Location:  [facility];  Service: General;  Laterality: N/A;   • VENOUS ACCESS DEVICE (PORT) REMOVAL       General Information     [facility] Name 12/10/20 [facility]0          Physical Therapy Time and Intention    Document Type  therapy note (daily note)  -     Mode of Treatment  physical therapy  -[facility] Name 12/10/20 1120          General Information    Patient Profile Reviewed  yes  -     Existing Precautions/Restrictions  (S) fall;non-weight bearing;left  -[facility] Name 12/10/20 1120          Cognition    Orientation Status (Cognition)  oriented x 4  -[facility] Name 12/10/20 1120          Safety Issues, Functional Mobility    Safety Issues Affecting Function (Mobility)  sequencing abilities  -       User Key  (r) = Recorded By, (t) = Taken By, (c) = Cosigned By    Initials Name Provider Type     Jose Armando Bell PT Physical Therapist        Mobility     Row Name 12/10/20 1120          Bed Mobility    Bed Mobility  supine-sit;sit-supine  -     Scooting/Bridging [place] (Bed Mobility)  supervision;verbal cues  [facility]     Supine-Sit [place] (Bed Mobility)  verbal cues;modified independence  -     Sit-Supine [place] (Bed Mobility)  verbal cues;modified independence  -     Assistive Device (Bed Mobility)  head of bed elevated;bed rails  -     Comment (Bed Mobility)  Verbal cues provided for efficient sequencing of transfers out of bed.  -     Row Name 12/10/20 1120          Transfers    Comment (Transfers)  Verbal cues for hand placement onto RW for sit <> stand transfer.  -[facility] Name 12/10/20 1120          Sit-Stand Transfer     Sit-Stand [place] (Transfers)  verbal cues;standby assist  -     Assistive Device (Sit-Stand Transfers)  walker, knee scooter  -     Row Name 12/10/20 1120          Gait/Stairs (Locomotion)    [place] Level (Gait)  verbal cues;contact guard  -     Assistive Device (Gait)  walker, front-wheeled  -     Distance in Feet (Gait)  30  -     Deviations/Abnormal Patterns (Gait)  bilateral deviations;david decreased;gait speed decreased;stride length decreased  -     Bilateral Gait Deviations  forward flexed posture  -     [place] Level (Stairs)  not tested  -     Comment (Gait/Stairs)  Pt ambulated with RW and swing to pattern with good safety awareness throughout. Verbal cues provided for positioning of RW to improve safety awareness.  -     Row Name 12/10/20 1120          Mobility    Extremity Weight-bearing Status  left lower extremity  -     Left Lower Extremity (Weight-bearing Status)  (S) non weight-bearing (NWB)  -       User Key  (r) = Recorded By, (t) = Taken By, (c) = Cosigned By    Initials Name Provider Type     Jose Armando Bell, PT Physical Therapist        Obj/Interventions     Row Name 12/10/20 1124          Balance    Balance Assessment  sitting static balance;sitting dynamic balance;standing static balance;standing dynamic balance  -     Static Sitting Balance  WFL;unsupported;sitting, edge of bed  -     Dynamic Sitting Balance  WFL;unsupported;sitting, edge of bed  -     Static Standing Balance  WFL;supported;standing  -     Dynamic Standing Balance  WFL;standing;supported  -     Balance Interventions  sitting;standing;sit to stand;supported;static;dynamic;minimal challenge;weight shifting activity;single limb stance  -     Comment, Balance  Challenged pt's balance with single leg balance with use of RW and verbal cues for positioning and maintaining NWB status on L LE. Good balance throughout and no LOB noted.  -       User Key  (r) = Recorded By, (t) =  Taken By, (c) = Cosigned By    Initials Name Provider Type     Jose Armando Bell, PT Physical Therapist        Goals/Plan    No documentation.       Clinical Impression     [facility] Name 12/10/20 1125          Pain    Additional Documentation  Pain Scale: Numbers Pre/Post-Treatment (Group)  -[facility] Name 12/10/20 1125          Pain Scale: Numbers Pre/Post-Treatment    Pretreatment Pain Rating  0/10 - no pain  -     Posttreatment Pain Rating  0/10 - no pain  -     Pain Intervention(s)  Repositioned;Ambulation/increased activity  -[facility] Name 12/10/20 1125          Plan of Care Review    Plan of Care Reviewed With  patient  -     Progress  improving  -     Outcome Summary  Pt is able to perform bed mobility with supervision and ambulated with RW for 30 feet. Good ability to maintain NWB status on L LE during sit <> stand transfers and during gait.  -[facility] Name 12/10/20 1125          Therapy Assessment/Plan (PT)    Rehab Potential (PT)  good, to achieve stated therapy goals  -     Criteria for Skilled Interventions Met (PT)  yes;meets criteria;skilled treatment is necessary  -[facility] Name 12/10/20 1125          Positioning and Restraints    Pre-Treatment Position  in bed  -     Post Treatment Position  bed  -     In Bed  supine;call light within reach;encouraged to call for assist;exit alarm on;with other staff  -       User Key  (r) = Recorded By, (t) = Taken By, (c) = Cosigned By    Initials Name Provider Type     Jose Armando Bell, PT Physical Therapist        Outcome Measures     [facility] Name 12/10/20 1128          How much help from another person do you currently need...    Turning from your back to your side while in flat bed without using bedrails?  4  -[facility]     Moving from lying on back to sitting on the side of a flat bed without bedrails?  4  -[facility]     Moving to and from a bed to a chair (including a wheelchair)?  3  -[facility]     Standing up from a chair using your arms (e.g., wheelchair, bedside  chair)?  3  -     Climbing 3-5 steps with a railing?  1  -     To walk in hospital room?  3  -     AM-PAC 6 Clicks Score (PT)  18  -     Row Name 12/10/20 1128          Functional Assessment    Outcome Measure Options  AM-PAC 6 Clicks Basic Mobility (PT)  -       User Key  (r) = Recorded By, (t) = Taken By, (c) = Cosigned By    Initials Name Provider Type    Jose Armando Patel, PT Physical Therapist        Physical Therapy Education                 Title: PT OT SLP Therapies (Done)     Topic: Physical Therapy (Done)     Point: Mobility training (Done)     Learning Progress Summary           Patient Acceptance, E, VU by  at 12/10/2020 1128    Comment: edu on safety awareness and maintaing NWB status on L LE    Acceptance, E,D, VU,NR by LR at 12/9/2020 1110    Comment: Educated on strict NWB status of L LE, correct supine<->sit t/f technique, correct sit<->stand t/f technique, correct gait mechanics, safety with mobility, and progression of POC.    Acceptance, E,D, VU by LUCIUS at 12/8/2020 1505    Comment: Educated on safe sequencing with bed mobility, ambulatory/toilet transfers, and gait training. Reviewed strict NWB order for L LE.                   Point: Home exercise program (Done)     Learning Progress Summary           Patient Acceptance, E, VU by  at 12/10/2020 1128    Comment: edu on safety awareness and maintaing NWB status on L LE    Acceptance, E,D, VU,NR by LR at 12/9/2020 1110    Comment: Educated on strict NWB status of L LE, correct supine<->sit t/f technique, correct sit<->stand t/f technique, correct gait mechanics, safety with mobility, and progression of POC.    Acceptance, E,D, VU by LUCIUS at 12/8/2020 1505    Comment: Educated on safe sequencing with bed mobility, ambulatory/toilet transfers, and gait training. Reviewed strict NWB order for L LE.                   Point: Body mechanics (Done)     Learning Progress Summary           Patient Acceptance, E, VU by  at 12/10/2020 1128     Comment: edu on safety awareness and maintaing NWB status on L LE    Acceptance, E,D, VU,NR by LR at 12/9/2020 1110    Comment: Educated on strict NWB status of L LE, correct supine<->sit t/f technique, correct sit<->stand t/f technique, correct gait mechanics, safety with mobility, and progression of POC.    Acceptance, E,D, VU by LUCIUS at 12/8/2020 1505    Comment: Educated on safe sequencing with bed mobility, ambulatory/toilet transfers, and gait training. Reviewed strict NWB order for L LE.                   Point: Precautions (Done)     Learning Progress Summary           Patient Acceptance, E, VU by  at 12/10/2020 1128    Comment: edu on safety awareness and maintaing NWB status on L LE    Acceptance, E,D, VU,NR by LR at 12/9/2020 1110    Comment: Educated on strict NWB status of L LE, correct supine<->sit t/f technique, correct sit<->stand t/f technique, correct gait mechanics, safety with mobility, and progression of POC.    Acceptance, E,D, VU by LUCIUS at 12/8/2020 1505    Comment: Educated on safe sequencing with bed mobility, ambulatory/toilet transfers, and gait training. Reviewed strict NWB order for L LE.                               User Key     Initials Effective Dates Name Provider Type Discipline     06/19/15 -  Cee Viveros, PT Physical Therapist PT     09/10/19 -  Keenan Felder, PT Physical Therapist PT     09/22/20 -  Jose Armando Bell, PT Physical Therapist PT              PT Recommendation and Plan     Plan of Care Reviewed With: patient  Progress: improving  Outcome Summary: Pt is able to perform bed mobility with supervision and ambulated with RW for 30 feet. Good ability to maintain NWB status on L LE during sit <> stand transfers and during gait.     Time Calculation:   PT Charges     Row Name 12/10/20 1130             Time Calculation    Start Time  1105  -      PT Received On  12/10/20  -      PT Goal Re-Cert Due Date  12/18/20  -         Time Calculation- PT    Total Timed  Code Minutes- PT  10 minute(s)  -KAVON         Timed Charges    29369 - PT Therapeutic Activity Minutes  10  -        User Key  (r) = Recorded By, (t) = Taken By, (c) = Cosigned By    Initials Name Provider Type    Jose Armando Patel, PT Physical Therapist        Therapy Charges for Today     Code Description Service Date Service Provider Modifiers Qty    [id number] HC PT THERAPEUTIC ACT EA 15 MIN 12/10/2020 Jose Armando Bell, PT GP 1          PT G-Codes  Outcome Measure Options: AM-PAC 6 Clicks Basic Mobility (PT)  AM-PAC 6 Clicks Score (PT): 18    Jose Armando Bell PT  12/10/2020

## 2020-12-10 NOTE — DISCHARGE SUMMARY
Patient Name: Carlos Culver  MRN: [id number]  : 1945  DOS: 12/10/2020    Attending: Idania Fernández MD    Primary Care Provider: Marcus Cheng MD    Date of Admission:.2020  9:57 AM    Date of Discharge:  12/10/2020    Discharge Diagnosis:     S/P transmetatarsal amputation of foot, left (CMS/HCC)    Essential hypertension    Atrial fibrillation with RVR (CMS/HCC)    Osteomyelitis of left foot (CMS/HCC)    Chronic anticoagulation    CKD (chronic kidney disease) stage 2, GFR 60-89 ml/min    Chronic multifocal osteomyelitis of left foot (CMS/HCC)    Neuropathic ulcer, limited to breakdown of skin (CMS/HCC)    Hyperlipidemia LDL goal <100    A-fib (CMS/HCC)    Leukocytosis, likely reactive    Acute postoperative pain    Consults: [facility], Dr.Daniel Young,   Surgery consult: Antonio Bains MD  Hospital Course    At admit:  Patient is a pleasant 75 y.o. male presented for scheduled surgery with Dr. Fernández.  He has previously had toe amputations on the left side.     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  He has been very noncompliant with neuropathic foot care and nonweightbearing.  He has had a great toe amputated, first metatarsal amputated, second toe amputated.  He has developed an ulcer under the second metatarsal head that goes down to bone, MRI shows a large erosion in the second metatarsal head consistent with osteomyelitis.  After many extensive discussions of treatment the patient decided to proceed with a transmetatarsal amputation.  In the past several weeks however he has been on the foot so much in his half shoe that he has created necrotic wounds on the dorsal and lateral plantar aspect of the foot.  This is despite instructions to be absolutely nonweightbearing.  Surgery was planned for 1 week ago but had to be delayed because he had atrial fibrillation.  Treatment including  anticoagulation and medication for rate control had been recommended approximately a year ago, but the patient chose not to take the medication because it would interfere with his ability to drink alcohol.  Last week he was placed on medication for rate control and anticoagulation.  His rate is currently controlled enough today to proceed with surgery.  He is at very high risk for below-knee amputation.  There is a significant chance this transmetatarsal amputation will not heal.  At the time of surgery I removed much of the necrotic tissue on the lateral foot.  However the edges although there is some bleeding, they are not entirely normal.  He will have to be absolutely nonweightbearing to heal this to avoid a below-knee amputation.  Antibiotics were given after cultures were obtained.)     Patient underwent left foot transmetatarsal amputation under general anesthesia, tolerated surgery well, was admitted for further management.  Seen in his room postoperatively, doing fairly well, good pain control, no complains of nausea, vomiting, or shortness of breath.     As noted above he is on beta-blocker for rate control, is on a DOAC for anticoagulation, this was stopped and expectation of surgery.     He has been drinking alcohol daily since the pandemic.  Last alcoholic drink was night before last.     After admit:    Patient was provided pain medications as needed for pain control .    Adjustments were made to pain medications to optimize postop pain management. Risks and benefits of opiate medications discussed with patient.    He was seen by PT and OT and has progressed well over his stay.  Patient was placed on antibiotics under the direction of Dr. Jeffery Young.  Plans for further antibiotics per his orders: (Case management orders: Please arrange for home antibiotics with daptomycin 700 mg IV daily, ertapenem 1 g IV daily until 1/12/2021 for 6 weeks, treatment of left foot osteomyelitis.  Check CBC, CMP,  CRP, C. difficile, antibiotics.  Fax orders to [phone], call [phone] with final arrangements.  Arrange for follow-up with me in 1 week post discharge.)      He used an IS for atelectasis prophylaxis and Eliquis was resumed along with mechanicals for DVT prophylaxis.    Home medications were resumed as appropriate, and labs were monitored and remained fairly stable.   CIWA screening was ordered, no issues with alcohol withdrawal.    With the progress he has made, patricia Gil is ready for DC home today.         Discussed with patient regarding plan and he shows understanding and agreement.       Procedures Performed  Procedure(s): By Dr. Antonio TATE INSERTION     By Dr. Idania Fernández    Operative procedure: Left transmetatarsal amputation      Pertinent Test Results:    I reviewed the patient's new clinical results.   Results from last 7 days   Lab Units 12/10/20  0823 12/09/20  0930   WBC 10*3/mm3 15.19* 18.57*   HEMOGLOBIN g/dL 13.7 13.5   HEMATOCRIT % 43.0 40.9   PLATELETS 10*3/mm3 145 162     Results from last 7 days   Lab Units 12/10/20  0823 12/09/20  0930 12/08/20  1126   SODIUM mmol/L 136 139  --    POTASSIUM mmol/L 4.2 4.6 4.4   CHLORIDE mmol/L 105 106  --    CO2 mmol/L 21.0* 24.0  --    BUN mg/dL 16 14  --    CREATININE mg/dL 0.95 1.00  --    CALCIUM mg/dL 8.9 8.8  --    BILIRUBIN mg/dL 0.6 0.7  --    ALK PHOS U/L 67 67  --    ALT (SGPT) U/L 28 25  --    AST (SGOT) U/L 24 20  --    GLUCOSE mg/dL 96 103*  --      I reviewed the patient's new imaging including images and reports.      Physical therapy  Taken 12/10/2020 1125 by Jose Armando Bell PT  Progress: improving  Plan of Care Reviewed With: patient  Outcome Summary: Pt is able to perform bed mobility with supervision and ambulated with RW for 30 feet. Good ability to maintain NWB status on L LE during sit <> stand transfers and during gait. Recommend d/c home with assist.  Discharge Assessment:      Visit Vitals  /62 (BP Location:  "Left arm, Patient Position: Lying)   Pulse 75   Temp 97.7 °F (36.5 °C) (Oral)   Resp 16   Ht 182.9 cm (72\")   Wt 97.5 kg (215 lb)   SpO2 96%   BMI 29.16 kg/m²     Temp (24hrs), Av.9 °F (36.6 °C), Min:97.5 °F (36.4 °C), Max:98.6 °F (37 °C)      General Appearance:    Alert, cooperative, in no acute distress   Lungs:     Clear to auscultation,respirations regular, even and                   unlabored    Heart:    Irregular rhythm and normal rate, normal S1 and S2   Abdomen:     Normal bowel sounds, no masses, no organomegaly, soft        non-tender, non-distended, no guarding, no rebound                 tenderness   Extremities:  Dressing left foot clean dry and intact   Pulses:   Pulses palpable and equal bilaterally   Skin:   No bleeding, bruising or rash            Discharge Disposition: Home        Discharge Medications      New Medications      Instructions Start Date   daptomycin  IVPB  Commonly known as: CUBICIN   500 mg, Intravenous, Every 24 Hours      docusate sodium 100 MG capsule  Commonly known as: Colace   100 mg, Oral, 2 Times Daily      ertapenem 1 gm/100ml solution IV  Commonly known as: INVanz   1 g, Intravenous, Every 24 Hours   Start Date: 2020        Changes to Medications      Instructions Start Date   atorvastatin 80 MG tablet  Commonly known as: LIPITOR  What changed: additional instructions   80 mg, Oral, Nightly, Hold while on Daptomycin         Continue These Medications      Instructions Start Date   apixaban 5 MG tablet tablet  Commonly known as: Eliquis   5 mg, Oral, 2 Times Daily      HYDROcodone-acetaminophen 7.5-325 MG per tablet  Commonly known as: NORCO   1-2 tablets, Oral, Every 6 Hours PRN      lisinopril-hydrochlorothiazide 20-12.5 MG per tablet  Commonly known as: PRINZIDE,ZESTORETIC   1 tablet, Oral, Daily      metoprolol tartrate 25 MG tablet  Commonly known as: LOPRESSOR   25 mg, Oral, 3 Times Daily      ondansetron 4 MG tablet  Commonly known as: Zofran   4 " mg, Oral, Every 6 Hours PRN      oxyCODONE-acetaminophen 5-325 MG per tablet  Commonly known as: Percocet   1-2 tablets, Oral, Every 6 Hours PRN         Stop These Medications    mupirocin 2 % ointment  Commonly known as: Bactroban            Discharge Diet:   Diet Instructions     You may resume a regular diet.                Activity at Discharge:   Activity Instructions     Do NOT bear any weight on your left foot.     Keep your left foot elevated.                Follow-up Appointments  [facility], Jeffery Young MD  Orthopedic surgery, Idania Santamaria MD.    Discharge took over 30 min      Dragon disclaimer:  Part of this encounter note is an electronic transcription/translation of spoken language to printed text. The electronic translation of spoken language may permit erroneous, or at times, nonsensical words or phrases to be inadvertently transcribed; Although I have reviewed the note for such errors, some may still exist.       Jose Antonio Coleman MD  12/10/20  12:32 EST    CC.  Casey Gacria MD.

## 2020-12-10 NOTE — PLAN OF CARE
Problem: Adult Inpatient Plan of Care  Goal: Plan of Care Review  Recent Flowsheet Documentation  Taken 12/10/2020 1125 by Jose Armando Bell PT  Progress: improving  Plan of Care Reviewed With: patient  Outcome Summary: Pt is able to perform bed mobility with supervision and ambulated with RW for 30 feet. Good ability to maintain NWB status on L LE during sit <> stand transfers and during gait. Recommend d/c home with assist.

## 2020-12-10 NOTE — PROGRESS NOTES
Continued Stay Note  [facility]     Patient Name: Carlos Culver  MRN: [id number]  Today's Date: 12/10/2020    Admit Date: 12/8/2020    Discharge Plan     Row Name 12/10/20 1058       Plan    Plan  Home w/ outpatient abx    Patient/Family in Agreement with Plan  yes    Plan Comments  Per Horacio w/ [facility], the copay cost for the first 7 days of  home infusion, at the current prescribed IV abx, is $1350. D/w patient and he is unable to cover that cost. Spoke w/ Aziza at [facility]. One of patient's abx is every 12 hours. That antibiotic will be changed and will be once a day. The cost for infusing in the office is covered a hundred precent by patient's insurance and will be $0. Patient is agreeable to infusing daily at [facility]. He will receive weekly line care and labs at [facility]. Patient denies any other needs at this time. CM will update w/ appt times per ID recs once dc is finalized. CM following.    Final Discharge Disposition Code  01 - home or self-care        Discharge Codes    No documentation.             Maame Serra RN

## 2020-12-10 NOTE — PROGRESS NOTES
INFECTIOUS DISEASE Progress Note    Carlos Culver  1945  [id number]    Consult: 12/8/20  Admit date: 12/8/2020    Requesting Provider: dIania Osborn MD  Evaluating physician: Jeffery Young MD  Reason for Consultation: Left second toe osteomyelitis, metatarsal  Chief Complaint: Above      Subjective   History of present illness:  Patient is a 75 y.o.  Yr old male with peripheral neuropathy, chronic kidney disease stage II, atrial fibrillation, hyperlipidemia, hard of hearing, essential hypertension, previous toe amputation, who developed a new left second plantar metatarsal ulcer with positive culture for MSSA with persistence.  MRI on 11/11/2020 was consistent with left second metatarsal osteomyelitis.  The patient has had a difficult time with compliance and nonweightbearing on his left foot.  His ulcer worsened.  He was admitted to [facility] on 12/8/2020 and underwent left second metatarsal resection by Dr. Idania Osborn.  The patient has no other localizing signs or symptoms of infection.  I was consulted on 12/8/2020 for further evaluation and treatment.    12/9/2020 history reviewed.  Tolerating daptomycin and cefepime.  Awaits Groshong catheter.  Duration of antibiotics until 1/12/2021 for left second metatarsal osteomyelitis.  Status post resection 12/8/2020.  Cultures negative to date.    12/10/20 hx rev.  On dapto and cefepime, sp Groshon 12/9, duration abx till 1/12/21.  SP left 2nd MT resection 12/8/20.  Changing to dapto and ertapenem so can get in the office since unable to afford at home.    Past Medical History:   Diagnosis Date   • A-fib (CMS/HCC)    • Acute kidney failure (CMS/HCC)    • Elevated cholesterol    • Hard of hearing    • Hyperlipidemia    • Hypertension    • Renal disorder    • Wears hearing aid in both ears    • Wears reading eyeglasses        Past Surgical History:   Procedure Laterality Date   • AMPUTATION DIGIT Left 1/7/2020    Procedure: AMPUTATION  LEFT 2ND TOE;  Surgeon: Idania Osborn MD;  Location:  [facility];  Service: Orthopedics   • AMPUTATION DIGIT Left 2020    Procedure: TRANSMETATARSAL AMPUTATION LEFT;  Surgeon: Idania Osborn MD;  Location:  [facility];  Service: Orthopedics;  Laterality: Left;   • APPENDECTOMY     • COLONOSCOPY  2006   • INCISION AND DRAINAGE LEG Left 10/23/2018    Procedure: INCISION AND DRAINAGE LEFT FOOT;  Surgeon: Idania Osbonr MD;  Location:  [facility];  Service: Orthopedics   • TOE AMPUTATION     • TONSILLECTOMY     • VENOUS ACCESS DEVICE (PORT) INSERTION N/A 10/23/2018    Procedure: GROSHONG CATHETER PLACEMENT;  Surgeon: Marquez Simons MD;  Location:  [facility];  Service: General   • VENOUS ACCESS DEVICE (PORT) INSERTION N/A 2020    Procedure: GROSHONG INSERTION;  Surgeon: Antonio Bains MD;  Location:  [facility];  Service: General;  Laterality: N/A;   • VENOUS ACCESS DEVICE (PORT) REMOVAL         Pediatric History   Patient Parents   • Not on file     Other Topics Concern   • Not on file   Social History Narrative   • Not on file   Father  of unknown cancer, mother  of old age and cardiac disease.  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    family history is not on file.    Allergies   Allergen Reactions   • Sulfa Antibiotics Unknown (See Comments)     Pt was told as child he had an allergy.  Has not taken and doesn't know the response         There is no immunization history on file for this patient.    Medication:    Current Facility-Administered Medications:   •  acetaminophen (TYLENOL) tablet 1,000 mg, 1,000 mg, Oral, Q6H PRN, Jose Antonio Coleman MD, 1,000 mg at 20  •  atorvastatin (LIPITOR) tablet 80 mg, 80 mg, Oral, Nightly, Antonio Bains MD, 80 mg at 20  •  bisacodyl (DULCOLAX) suppository 10 mg, 10 mg, Rectal, Daily PRN, Antonio Bains MD  •  cefepime (MAXIPIME) 2 g/100 mL 0.9% NS (mbp), 2 g, Intravenous, Q12H, Antonio Bains MD, 2  g at 12/10/20 0603  •  DAPTOmycin (CUBICIN) 500 mg/50 mL in sodium chloride, 6 mg/kg (Adjusted), Intravenous, Q24H, Antonio Bains MD, 513.6 mg at 12/09/20 1621  •  dextrose (D50W) 25 g/ 50mL Intravenous Solution 25 g, 25 g, Intravenous, Q15 Min PRN, Antonio Bains MD  •  dextrose (GLUTOSE) oral gel 15 g, 15 g, Oral, Q15 Min PRN, Antonio Bians MD  •  dextrose 5 % and sodium chloride 0.45 % with KCl 20 mEq/L infusion, 50 mL/hr, Intravenous, Continuous, Antonio Bains MD, Last Rate: 50 mL/hr at 12/09/20 0540, 50 mL/hr at 12/09/20 0540  •  diphenhydrAMINE (BENADRYL) capsule 25 mg, 25 mg, Oral, Nightly PRN, 25 mg at 12/09/20 2212 **OR** diphenhydrAMINE (BENADRYL) injection 25 mg, 25 mg, Intravenous, Nightly PRN, Antonio Bains MD  •  enoxaparin (LOVENOX) syringe 40 mg, 40 mg, Subcutaneous, Daily, Antonio Bains MD, 40 mg at 12/10/20 0808  •  folic acid (FOLVITE) tablet 1 mg, 1 mg, Oral, Daily, Antonio Bains MD, 1 mg at 12/10/20 0807  •  glucagon (human recombinant) (GLUCAGEN DIAGNOSTIC) injection 1 mg, 1 mg, Subcutaneous, Q15 Min PRN, Antonio Bains MD  •  HYDROcodone-acetaminophen (NORCO) 7.5-325 MG per tablet 1 tablet, 1 tablet, Oral, Q4H PRN, Antonio Bains MD  •  HYDROcodone-acetaminophen (NORCO) 7.5-325 MG per tablet 2 tablet, 2 tablet, Oral, Q4H PRN, Antonio Bains MD  •  HYDROmorphone (DILAUDID) injection 1 mg, 1 mg, Intravenous, Q2H PRN **AND** naloxone (NARCAN) injection 0.4 mg, 0.4 mg, Intravenous, Q5 Min PRN, Antonio Bains MD  •  insulin lispro (humaLOG) injection 0-7 Units, 0-7 Units, Subcutaneous, TID AC, Antonio Bains MD  •  labetalol (NORMODYNE,TRANDATE) injection 10 mg, 10 mg, Intravenous, Q4H PRN, Antonio Bains MD  •  lactated ringers infusion, 9 mL/hr, Intravenous, Continuous, Antonio Bains MD, Last Rate: 9 mL/hr at 12/09/20 1302, 9 mL/hr at 12/09/20 1302  •  lisinopril (PRINIVIL,ZESTRIL) 20 mg, hydroCHLOROthiazide (HYDRODIURIL) 12.5 mg for  ZESTORETIC 20-12.5, , Oral, Q24H, Antonio Bains MD, Given at 12/10/20 0806  •  magnesium hydroxide (MILK OF MAGNESIA) suspension 2400 mg/10mL 10 mL, 10 mL, Oral, Daily PRN, Antonio Bains MD  •  metoprolol tartrate (LOPRESSOR) tablet 25 mg, 25 mg, Oral, TID, Antonio Bains MD, 25 mg at 12/10/20 0807  •  multivitamin (THERAGRAN) tablet 1 tablet, 1 tablet, Oral, Daily, Antonio Bains MD, 1 tablet at 12/10/20 0807  •  mupirocin (BACTROBAN) 2 % ointment, , Topical, Once, Antonio Bains MD  •  ondansetron (ZOFRAN) injection 4 mg, 4 mg, Intravenous, Q6H PRN, Antonio Bains MD  •  oxyCODONE-acetaminophen (PERCOCET) 5-325 MG per tablet 1 tablet, 1 tablet, Oral, Q4H PRN, Antonio Bains MD  •  oxyCODONE-acetaminophen (PERCOCET) 5-325 MG per tablet 2 tablet, 2 tablet, Oral, Q4H PRN, Antonio Bains MD  •  promethazine (PHENERGAN) tablet 12.5 mg, 12.5 mg, Oral, Q4H PRN **OR** promethazine (PHENERGAN) suppository 12.5 mg, 12.5 mg, Rectal, Q4H PRN, Antonio Bains MD  •  promethazine (PHENERGAN) tablet 12.5 mg, 12.5 mg, Oral, Q4H PRN, Antonio Bains MD  •  sodium chloride 0.9 % bolus 500 mL, 500 mL, Intravenous, TID PRN, Antonio Bains MD  •  thiamine (VITAMIN B-1) tablet 100 mg, 100 mg, Oral, Daily, Antonio Bains MD, 100 mg at 12/10/20 0807    Please refer to the medical record for a full medication list    Review of Systems:    Constitutional-- No Fever, chills or sweats.  Appetite good, and no malaise. No fatigue.  HEENT-- No new vision, hearing or throat complaints.  No epistaxis or oral sores.  Denies odynophagia or dysphagia.  No odynophagia or dysphagia. No headache, photophobia or neck stiffness.  CV-- No chest pain, palpitation or syncope  Resp-- No SOB/cough/Hemoptysis  GI- No nausea, vomiting, or diarrhea.  No hematochezia, melena, or hematemesis. Denies jaundice or chronic liver disease.  -- No dysuria, hematuria, or flank pain.  Denies hesitancy, urgency or flank  "pain.  Lymph- no swollen lymph nodes in neck/axilla or groin.   Heme- No active bruising or bleeding; no Hx of DVT or PE.  MS-- no swelling or pain in the bones or joints of arms/legs.  No new back pain.  Neuro-- No acute focal weakness or numbness in the arms or legs.  No seizures.  Skin--No rashes or lesions, except as per HPI    Physical Exam:   Vital Signs   Temp:  [97.5 °F (36.4 °C)-98.6 °F (37 °C)] 97.7 °F (36.5 °C)  Heart Rate:  [58-90] 65  Resp:  [14-20] 15  BP: ()/(60-90) 129/90    Temp  Min: 97.5 °F (36.4 °C)  Max: 98.6 °F (37 °C)  BP  Min: 95/60  Max: 129/90  Pulse  Min: 58  Max: 90  Resp  Min: 14  Max: 20  SpO2  Min: 94 %  Max: 97 %    Blood pressure 129/90, pulse 65, temperature 97.7 °F (36.5 °C), temperature source Oral, resp. rate 15, height 182.9 cm (72\"), weight 97.5 kg (215 lb), SpO2 97 %.  GENERAL: Awake and alert, in minor distress. Appears older than stated age.  HEENT:  Normocephalic, atraumatic.  Oropharynx without thrush. Dentition in good repair. No cervical adenopathy. No neck masses.  Ears externally normal, Nose externally normal.  EYES:No conjunctival injection. No icterus. EOM full.  LYMPHATICS: No lymphadenopathy of the neck or axillary or inguinal regions.   HEART: No murmur, gallop, or pericardial friction rub. Reg rate rhythm, No JVD at 45 degrees.  LUNGS: Clear to auscultation and percussion. No respiratory distress, no use of accessory muscles.  ABDOMEN: Soft, nontender, nondistended. No appreciable HSM.  Bowel sounds normal.  Obese.  SKIN: Warm and dry without cutaneous eruptions.  No nodules.   Left foot surgical dressing in place.  PSYCHIATRIC: Mental status lucid. No confusion.  EXT:  No cellulitic change.  NEURO: Oriented to name, nonfocal    Results Review:   I reviewed the patient's new clinical results.  I reviewed the patient's new imaging results and agree with the interpretation.  I reviewed the patient's other test results and agree with the " interpretation    Results from last 7 days   Lab Units 12/10/20  0823 12/09/20  0930   WBC 10*3/mm3 15.19* 18.57*   HEMOGLOBIN g/dL 13.7 13.5   HEMATOCRIT % 43.0 40.9   PLATELETS 10*3/mm3 145 162     Results from last 7 days   Lab Units 12/10/20  0823   SODIUM mmol/L 136   POTASSIUM mmol/L 4.2   CHLORIDE mmol/L 105   CO2 mmol/L 21.0*   BUN mg/dL 16   CREATININE mg/dL 0.95   GLUCOSE mg/dL 96   CALCIUM mg/dL 8.9     Laboratories reviewed sodium 141, potassium 4.2, BUN 18, creatinine 1.2, alkaline phosphatase 80, ALT 40, AST 34, bilirubin 0.6, WBC 7.4, hemoglobin 13 with recommendations made for with normal differential, CRP 0.3, ESR 47, hemoglobin A1c 6.0.  Laboratories from approximately 11/26/2018.  11/25/2020 labs sodium 138, potassium 4.7, creatinine 1.36, BUN 28, hemoglobin A1c 5.80, CRP 0.23, ESR 8, WBC 10.3, hemoglobin 16, platelets 175.    Results from last 7 days   Lab Units 12/10/20  0823   ALK PHOS U/L 67   BILIRUBIN mg/dL 0.6   ALT (SGPT) U/L 28   AST (SGOT) U/L 24                 Results from last 7 days   Lab Units 12/10/20  0823   LACTATE mmol/L 1.6     Estimated Creatinine Clearance: 80.1 mL/min (by C-G formula based on SCr of 0.95 mg/dL).    Microbiology:  Microbiology Results Abnormal     Procedure Component Value - Date/Time    Tissue / Bone Culture - Tissue, Toe, Left [[id number]] Collected: 12/08/20 1208    Lab Status: Preliminary result Specimen: Tissue from Toe, Left Updated: 12/10/20 0608     Tissue Culture No growth at 2 days     Gram Stain No WBCs or organisms seen    AFB Culture - Tissue, Toe, Left [[id number]] Collected: 12/08/20 1208    Lab Status: Preliminary result Specimen: Tissue from Toe, Left Updated: 12/09/20 1117     AFB Stain No acid fast bacilli seen on concentrated smear          Radiology:  Imaging Results (Last 72 Hours)     Procedure Component Value Units Date/Time    XR Chest 1 View [[id number]] Collected: 12/10/20 0814     Updated: 12/10/20 0818    Narrative:      EXAMINATION:  XR CHEST 1 VW-      INDICATION: post line placement; M86.372-Chronic multifocal  osteomyelitis, left ankle and foot; I48.91-Unspecified atrial  fibrillation      COMPARISON: 11/9/2018     FINDINGS: A right-sided central line terminates in the SVC. No distinct  pneumothorax. No focal airspace consolidation. No significant pleural  effusion. Normal heart and mediastinal contours.       Impression:      A right-sided central line terminates in the SVC. No  distinct pneumothorax. No focal airspace consolidation. No significant  pleural effusion. Normal heart and mediastinal contours.     This report was finalized on 12/10/2020 8:15 AM by Germain Pereira.       FL C Arm During Surgery [[id number]] Collected: 12/09/20 1632     Updated: 12/09/20 1635    Narrative:      EXAMINATION: FL C ARM DURING SURGERY-      INDICATION: Groshong; M86.372-Chronic multifocal osteomyelitis, left  ankle and foot; I48.91-Unspecified atrial fibrillation      COMPARISON: NONE     FINDINGS: 12 seconds of fluoroscopy time provided with 1 imaged saved  during Groshong placement.       Impression:      12 seconds of fluoroscopy time provided with 1 imaged saved  during Groshong placement.     This report was finalized on 12/9/2020 4:32 PM by Germain Pereira.             IMPRESSION:     1.  Left second acute on chronic metatarsal osteomyelitis by MRI 11/11/2020.  Some noncompliance with continued ambulation on his plantar ulcer.  Status post left second metatarsal amputation on 12/8/2020.  Usual organisms include Staphylococcus, Streptococcus, Pseudomonas.  Partially antibiotic modified.   2.  Peripheral neuropathy related to his development of osteomyelitis.  3.  Chronic kidney disease stage 3, most recent GFR 51.  4.  Obesity, BMI 29.15.  5.  Noncompliance.    Plan:    1.  Diagnostically, continue to follow patient's physical exam, CBC, CMP, CRP, CPK, and cultures obtained at the time of surgery on 12/8/2020.  2.  Therapeutically, continue using  daptomycin 700 mg IV daily, change cefepime to ertapenem because of insurance issues.  Duration antibiotics until 1/12/2021 and reassess.  3.  Nonweightbearing status on foot.  4.  Long-term venous access.  Groshong catheter may be preferable given his renal function.  I discussed this with Dr. Antonio Bains to provide Groshong, scheduled today.    I discussed the patients findings and my recommendations with patient, nursing staff and consulting provider    Our group would be pleased to follow this patient over the course of their hospitalization and assist with outpatient antimicrobial therapy, as indicated.  Further recommendations depend on the results of the cultures and clinical course.  Side effects discussed with the patient.    Case management orders: Please arrange for home antibiotics with daptomycin 700 mg IV daily, ertapenem 1 g IV daily until 1/12/2021 for 6 weeks, treatment of left foot osteomyelitis.  Check CBC, CMP, CRP, C. difficile, antibiotics.  Fax orders to [phone], call [phone] with final arrangements.  Arrange for follow-up with me in 1 week post discharge.    Jeffery Young MD  12/10/2020

## 2020-12-11 LAB
BACTERIA SPEC AEROBE CULT: NORMAL
GRAM STN SPEC: NORMAL

## 2020-12-13 LAB — BACTERIA SPEC ANAEROBE CULT: NORMAL

## 2020-12-16 ENCOUNTER — TELEPHONE (OUTPATIENT)
Dept: ORTHOPEDIC SURGERY | Facility: CLINIC | Age: 75
End: 2020-12-16

## 2020-12-16 ENCOUNTER — HOSPITAL ENCOUNTER (OUTPATIENT)
Dept: PHYSICAL THERAPY | Facility: HOSPITAL | Age: 75
Setting detail: THERAPIES SERIES
Discharge: HOME | End: 2020-12-16

## 2020-12-16 DIAGNOSIS — T81.89XA NON-HEALING SURGICAL WOUND, INITIAL ENCOUNTER: Primary | ICD-10-CM

## 2020-12-16 PROCEDURE — 97161 PT EVAL LOW COMPLEX 20 MIN: CPT

## 2020-12-16 PROCEDURE — 97597 DBRDMT OPN WND 1ST 20 CM/<: CPT

## 2020-12-16 NOTE — THERAPY EVALUATION
Outpatient Rehabilitation - Wound/Debridement Initial Eval   [facility]     Patient Name: Carlos Culver  : 1945  MRN: [id number]  Today's Date: 2020              Medial      Plantar      Lateral      Admit Date: 2020    Visit Dx:    ICD-10-CM ICD-9-CM   1. Non-healing surgical wound, initial encounter  T81.89XA 998.83       Patient Active Problem List   Diagnosis   • Cellulitis of left foot   • GUCCI (acute kidney injury) (CMS/[facility])   • Essential hypertension   • Leukocytosis   • Atrial fibrillation with RVR (CMS/[facility])   • Osteomyelitis of left foot (CMS/[facility])   • Chronic anticoagulation   • Neuropathy   • Elevated transaminase level   • CKD (chronic kidney disease) stage 2, GFR 60-89 ml/min   • Skin ulcer of right heel (CMS/[facility])   • Chronic multifocal osteomyelitis of left foot (CMS/[facility])   • Contracture of joint of foot, left   • MARTINEZ (dyspnea on exertion)   • Neuropathic ulcer, limited to breakdown of skin (CMS/[facility])   • Hyperlipidemia LDL goal <100   • A-fib (CMS/[facility])   • S/P transmetatarsal amputation of foot, left (CMS/[facility])   • Leukocytosis, likely reactive   • Acute postoperative pain        Past Medical History:   Diagnosis Date   • A-fib (CMS/[facility])    • Acute kidney failure (CMS/[facility])    • Elevated cholesterol    • Hard of hearing    • Hyperlipidemia    • Hypertension    • Renal disorder    • Wears hearing aid in both ears    • Wears reading eyeglasses         Past Surgical History:   Procedure Laterality Date   • AMPUTATION DIGIT Left 2020    Procedure: AMPUTATION LEFT 2ND TOE;  Surgeon: Idania Osborn MD;  Location:  [facility];  Service: Orthopedics   • AMPUTATION DIGIT Left 2020    Procedure: TRANSMETATARSAL AMPUTATION LEFT;  Surgeon: Idania Osborn MD;  Location:  [facility];  Service: Orthopedics;  Laterality: Left;   • APPENDECTOMY     • COLONOSCOPY     • INCISION AND DRAINAGE LEG Left 10/23/2018    Procedure: INCISION AND DRAINAGE LEFT FOOT;  Surgeon: Idania Osborn  MD JAIME;  Location:  [facility];  Service: Orthopedics   • TOE AMPUTATION     • TONSILLECTOMY     • VENOUS ACCESS DEVICE (PORT) INSERTION N/A 10/23/2018    Procedure: GROSHONG CATHETER PLACEMENT;  Surgeon: Marquez Simons MD;  Location:  [facility];  Service: General   • VENOUS ACCESS DEVICE (PORT) INSERTION N/A 12/9/2020    Procedure: GROSHONG INSERTION;  Surgeon: Antonio Bains MD;  Location:  [facility];  Service: General;  Laterality: N/A;   • VENOUS ACCESS DEVICE (PORT) REMOVAL         Patient History     Row Name 12/16/20 6372             History    Chief Complaint  Ulcer, wound or other skin conditions  -      Brief Description of Current Complaint  Pt is s/p transmetatarsal amputation to the L foot. Pt presents for wound care follow-up. Pt is currently receiving IV abx and does not qualify for home health care.   -      Patient/Caregiver Goals  Heal wound  -      Patient's Rating of General Health  Good  -      Occupation/sports/leisure activities  Retired  -      Patient seeing anyone else for problem(s)?  Dr. Osborn, orthopedist. [facility] for abx/infection management.  -      How has patient tried to help current problem?  Pt has been attempting to keep the area clean and dressed.  -      Related/Recent Hospitalizations  Yes  -         Pain     Pain Location  Foot  -      Pain at Present  0  -      Pain at Best  0  -      Pain at Worst  0  -      Pain Comments  neuropathy  -         Services    Are you currently receiving Home Health services  No  -      Do you plan to receive Home Health services in the near future  No  -         Daily Activities    Primary Language  English  -      How does patient learn best?  Listening;Demonstration  -      Teaching needs identified  Management of Condition  -      Patient is concerned about/has problems with  Other (comment) wound mgmt  -      Does patient have problems with the following?  None  -      Barriers to learning   None  -      Pt Participated in POC and Goals  Yes  -         Safety    Are you being hurt, hit, or frightened by anyone at home or in your life?  No  -MC      Are you being neglected by a caregiver  No  -MC        User Key  (r) = Recorded By, (t) = Taken By, (c) = Cosigned By    Initials Name Provider Type    Melisa Lr PT Physical Therapist          EVALUATION  PT Ortho     Row Name 12/16/20 1430       Subjective Pain    Able to rate subjective pain?  yes  -MC    Pre-Treatment Pain Level  0  -MC    Post-Treatment Pain Level  0  -MC       Transfers    Sit-Stand [place] (Transfers)  modified independence  -MC    Stand-Sit [place] (Transfers)  modified independence  -    Maintains Weight-bearing Status (Transfers)  verbal cues to maintain  -MC    Comment (Transfers)  long sitting on stretcher for tx. Cues to avoid LLE WB with transfers.  -       Gait/Stairs (Locomotion)    [place] Level (Gait)  modified independence  -    Assistive Device (Gait)  walker, knee scooter  -      User Key  (r) = Recorded By, (t) = Taken By, (c) = Cosigned By    Initials Name Provider Type    Melisa Lr PT Physical Therapist        LDA Wound     Row Name 12/16/20 1430             Wound 12/08/20 1200 Left anterior foot Incision    Wound - Properties Group Placement Date: 12/08/20  -CS Placement Time: 1200  -CS Present on Hospital Admission: N  -CS Side: Left  -CS Orientation: anterior  -CS Location: foot  -CS Primary Wound Type: Incision  -CS    Wound Image  Images linked: 3  -MC      Dressing Appearance  intact;moist drainage temp dressing from [facility]IDC  -      Closure  Sutures  -      Base  moist;pink only pinpoint areas visible, remainder of incision intact  -      Periwound  moist;pink;redness;macerated;pale white some maceration medially  -      Periwound Temperature  warm  -      Periwound Skin Turgor  firm  -      Edges  irregular;jagged  -      Drainage  Characteristics/Odor  serosanguineous  -      Drainage Amount  small  -MC      Care, Wound  cleansed with;wound cleanser;debrided  -      Dressing Care  dressing applied;gauze;other (see comments) 4x4s, kerlix, ace wrap  -MC      Periwound Care  cleansed with pH balanced cleanser;dry periwound area maintained  -MC      Retired Wound - Properties Group Date first assessed: 12/08/20  -CS Time first assessed: 1200  -CS Present on Hospital Admission: N  -CS Side: Left  -CS Location: foot  -CS Primary Wound Type: Incision  -CS      User Key  (r) = Recorded By, (t) = Taken By, (c) = Cosigned By    Initials Name Provider Type    Melisa Lr, PT Physical Therapist    Laine Aldridge RN Registered Nurse            WOUND DEBRIDEMENT  Total area of Debridement: 10 cm2  Debridement Site 1  Location- Site 1: L foot incision  Selective Debridement- Site 1: Wound Surface <20cmsq  Instruments- Site 1: tweezers, scissors  Excised Tissue Description- Site 1: maximum, other (comment), scant, slough(max crust, dried blood, lifted callus around incision)  Bleeding- Site 1: scant, held pressure, 1 minute             Therapy Education     Row Name 12/16/20 8008             Therapy Education    Education Details  Change dressing every 2-3 days with theraworx foam and gauze to clean, bactroban per Dr. Osborn's referral, 4x4s, kerlix, and ace wrap. Reiterated importance of STRICT NWB to the L foot to promote healing. Even putting weight through the foot for transfers will delay healing. Pt reports he does not currently have bactroban. PT will contact MD office to ask about bactroban Rx.  -MC      Given  Symptoms/condition management;Bandaging/dressing change  -      Program  New  -MC      How Provided  Verbal;Demonstration;Written  -MC      Provided to  Patient  -MC      Level of Understanding  Teach back education performed;Verbalized  -MC        User Key  (r) = Recorded By, (t) = Taken By, (c) = Cosigned By     Initials Name Provider Type    Melisa Lr PT Physical Therapist          Recommendation and Plan  PT Assessment/Plan     Row Name 12/16/20 1430          PT Assessment    Functional Limitations  Impaired gait;Impaired locomotion;Decreased safety during functional activities;Limitations in functional capacity and performance;Other (comment) wound mgmt  -     Impairments  Integumentary integrity  -     Assessment Comments  Pt presents with incision to the L foot s/p transmetatarsal amputation. Pt with extensive crusting, lifted calluses, and scant slough that required debridement today. Pt is independent with home dressing changes, but does not currently have the prescription abx ointment specified in the MD order. Called MD office to clarify potential need for new Rx. As pt is independent with home dressing changes and does not appear to require additional debridement, pt likely does not require skilled services. However, pt will only be tentatively d/c today, as he is high risk for skin breakdown and complications that may require skilled services.  -     Patient/caregiver participated in establishment of treatment plan and goals  Yes  -     Patient would benefit from skilled therapy intervention  Yes  -        PT Plan    PT Frequency  Other (comment) prn within 30 days  -     Planned CPT's?  PT EVAL LOW COMPLEXITY: 04083;PT SELF CARE/MGMT/TRAIN 15 MIN: 77066;PT NONSELECT DEBRIDE 15 MIN: 19056;PT MARILYN DEBRIDE OPEN WOUND UP TO 20 CM: 62231;PT NLFU MIST: 65942;PT THER SUPP EA 15 MIN  -     Physical Therapy Interventions (Optional Details)  wound care;patient/family education  -     PT Plan Comments  tentative d/c  -       User Key  (r) = Recorded By, (t) = Taken By, (c) = Cosigned By    Initials Name Provider Type    Melisa Lr PT Physical Therapist            Goals  PT OP Goals     Row Name 12/16/20 1430          Time Calculation    PT Goal Re-Cert Due Date  03/16/21  -        User Key  (r) = Recorded By, (t) = Taken By, (c) = Cosigned By    Initials Name Provider Type     Melisa Dolan, PT Physical Therapist          Time Calculation: Start Time: 1430  Therapy Charges for Today     Code Description Service Date Service Provider Modifiers Qty    [id number]  PT EVAL LOW COMPLEXITY 4 12/16/2020 Melisa Dolan, PT GP 1    [id number] HC MARILYN DEBRIDE OPEN WOUND UP TO 20CM 12/16/2020 Melisa Dolan, PT GP 1                Melisa Dolan, PT  12/16/2020

## 2020-12-17 NOTE — TELEPHONE ENCOUNTER
Dr. Osborn's response:       I sent the bactroban and Please remind the patient to be absolutely non-wt bearing.       
Dr. Osborn,    Mr. Culver's Therapist called and stated he is very independent with his dressing changes but never got any bactroban to put on it. Does he need it? Also, therapist says he does not need to really be seen by them he is doing good. She said she was putting some pictures in his chart that she took today if you want to look at them. Please advise about bactroban, pharmacy is correct in the system.  Fiorella  
I called patient and told him what Dr. Fernández said and that she sent in the script to his pharmacy. He will call with any further questions he may have.  Fiorella  
PHYSICAL THERAPIST CALLED AND WANTED TO KNOW CLARIFICATION ABOUT A PRESCRIPTION. SHE MAY BE REACHED -[phone].  
Acute metabolic encephalopathy

## 2020-12-23 ENCOUNTER — OFFICE VISIT (OUTPATIENT)
Dept: ORTHOPEDIC SURGERY | Facility: CLINIC | Age: 75
End: 2020-12-23

## 2020-12-23 DIAGNOSIS — M86.172 OSTEOMYELITIS OF ANKLE OR FOOT, LEFT, ACUTE (HCC): Primary | ICD-10-CM

## 2020-12-23 PROCEDURE — 99024 POSTOP FOLLOW-UP VISIT: CPT | Performed by: ORTHOPAEDIC SURGERY

## 2020-12-23 NOTE — PROGRESS NOTES
Post-op (2 weeks s/p (L) transmetatarsal amputation 12/09/2020)      Carlos Culver is 2 weeks status post left transmet amputation. He reports no fever, chills.  He reports pain is well controlled.  They have been taking Eliquis for DVT prophylaxis.  They have been non weight bearing.      Past Surgical History:   Procedure Laterality Date   • AMPUTATION DIGIT Left 1/7/2020    Procedure: AMPUTATION LEFT 2ND TOE;  Surgeon: Idania Osborn MD;  Location:  [facility];  Service: Orthopedics   • AMPUTATION DIGIT Left 12/8/2020    Procedure: TRANSMETATARSAL AMPUTATION LEFT;  Surgeon: Idania Osborn MD;  Location:  [facility];  Service: Orthopedics;  Laterality: Left;   • APPENDECTOMY     • COLONOSCOPY  2006   • INCISION AND DRAINAGE LEG Left 10/23/2018    Procedure: INCISION AND DRAINAGE LEFT FOOT;  Surgeon: Idania Osborn MD;  Location:  [facility];  Service: Orthopedics   • TOE AMPUTATION     • TONSILLECTOMY     • VENOUS ACCESS DEVICE (PORT) INSERTION N/A 10/23/2018    Procedure: GROSHONG CATHETER PLACEMENT;  Surgeon: Marquez Simons MD;  Location:  [facility];  Service: General   • VENOUS ACCESS DEVICE (PORT) INSERTION N/A 12/9/2020    Procedure: GROSHONG INSERTION;  Surgeon: Antonio Bains MD;  Location:  [facility];  Service: General;  Laterality: N/A;   • VENOUS ACCESS DEVICE (PORT) REMOVAL         There were no vitals taken for this visit.       He still has some bloody oozing from the left transmet, due to being anticoagulated, no obvious signs of infection, normal swelling, the lateral necrosis that he had from walking previously he is stable    none    Assessment and Plan:   1. Osteomyelitis of ankle or foot, left, acute (CMS/HCC)  Continue strict nonweightbearing, continue IV antibiotics, he needs to elevate more.  Continue dressing changes.  I will see him in 2 weeks for repeat exam, definitely not time to remove stitches as yet          Idania Osborn MD

## 2020-12-28 ENCOUNTER — LAB (OUTPATIENT)
Dept: LAB | Facility: HOSPITAL | Age: 75
End: 2020-12-28

## 2020-12-28 ENCOUNTER — TRANSCRIBE ORDERS (OUTPATIENT)
Dept: LAB | Facility: HOSPITAL | Age: 75
End: 2020-12-28

## 2020-12-28 DIAGNOSIS — T87.44 INFECTION OF LEFT BELOW KNEE AMPUTATION (HCC): ICD-10-CM

## 2020-12-28 DIAGNOSIS — L03.032 ABSCESS OF FIFTH TOENAIL OF LEFT FOOT: ICD-10-CM

## 2020-12-28 DIAGNOSIS — M86.672 CHRONIC OSTEOMYELITIS OF HINDFOOT, LEFT (HCC): ICD-10-CM

## 2020-12-28 DIAGNOSIS — T87.44 INFECTION OF LEFT BELOW KNEE AMPUTATION (HCC): Primary | ICD-10-CM

## 2020-12-28 DIAGNOSIS — M86.172 ACUTE OSTEOMYELITIS OF LEFT ANKLE OR FOOT (HCC): ICD-10-CM

## 2020-12-28 DIAGNOSIS — L03.116 CELLULITIS OF LEFT FOOT: ICD-10-CM

## 2020-12-28 LAB
ALBUMIN SERPL-MCNC: 3.8 G/DL (ref 3.5–5.2)
ALBUMIN/GLOB SERPL: 1.1 G/DL
ALP SERPL-CCNC: 129 U/L (ref 39–117)
ALT SERPL W P-5'-P-CCNC: 32 U/L (ref 1–41)
ANION GAP SERPL CALCULATED.3IONS-SCNC: 10 MMOL/L (ref 5–15)
AST SERPL-CCNC: 29 U/L (ref 1–40)
BASOPHILS # BLD AUTO: 0.17 10*3/MM3 (ref 0–0.2)
BASOPHILS NFR BLD AUTO: 1.5 % (ref 0–1.5)
BILIRUB SERPL-MCNC: 0.5 MG/DL (ref 0–1.2)
BUN SERPL-MCNC: 18 MG/DL (ref 8–23)
BUN/CREAT SERPL: 15.4 (ref 7–25)
CALCIUM SPEC-SCNC: 9.4 MG/DL (ref 8.6–10.5)
CHLORIDE SERPL-SCNC: 104 MMOL/L (ref 98–107)
CK SERPL-CCNC: 78 U/L (ref 20–200)
CO2 SERPL-SCNC: 26 MMOL/L (ref 22–29)
CREAT SERPL-MCNC: 1.17 MG/DL (ref 0.76–1.27)
CRP SERPL-MCNC: 0.97 MG/DL (ref 0–0.5)
DEPRECATED RDW RBC AUTO: 42.9 FL (ref 37–54)
EOSINOPHIL # BLD AUTO: 0.87 10*3/MM3 (ref 0–0.4)
EOSINOPHIL NFR BLD AUTO: 7.8 % (ref 0.3–6.2)
ERYTHROCYTE [DISTWIDTH] IN BLOOD BY AUTOMATED COUNT: 12.5 % (ref 12.3–15.4)
ERYTHROCYTE [SEDIMENTATION RATE] IN BLOOD: 36 MM/HR (ref 0–20)
GFR SERPL CREATININE-BSD FRML MDRD: 61 ML/MIN/1.73
GLOBULIN UR ELPH-MCNC: 3.5 GM/DL
GLUCOSE SERPL-MCNC: 90 MG/DL (ref 65–99)
HCT VFR BLD AUTO: 44.3 % (ref 37.5–51)
HGB BLD-MCNC: 14.4 G/DL (ref 13–17.7)
IMM GRANULOCYTES # BLD AUTO: 0.05 10*3/MM3 (ref 0–0.05)
IMM GRANULOCYTES NFR BLD AUTO: 0.5 % (ref 0–0.5)
LYMPHOCYTES # BLD AUTO: 2.82 10*3/MM3 (ref 0.7–3.1)
LYMPHOCYTES NFR BLD AUTO: 25.4 % (ref 19.6–45.3)
MCH RBC QN AUTO: 30.8 PG (ref 26.6–33)
MCHC RBC AUTO-ENTMCNC: 32.5 G/DL (ref 31.5–35.7)
MCV RBC AUTO: 94.9 FL (ref 79–97)
MONOCYTES # BLD AUTO: 0.94 10*3/MM3 (ref 0.1–0.9)
MONOCYTES NFR BLD AUTO: 8.5 % (ref 5–12)
NEUTROPHILS NFR BLD AUTO: 56.3 % (ref 42.7–76)
NEUTROPHILS NFR BLD AUTO: 6.25 10*3/MM3 (ref 1.7–7)
NRBC BLD AUTO-RTO: 0 /100 WBC (ref 0–0.2)
PLATELET # BLD AUTO: 256 10*3/MM3 (ref 140–450)
PMV BLD AUTO: 9.4 FL (ref 6–12)
POTASSIUM SERPL-SCNC: 5.3 MMOL/L (ref 3.5–5.2)
PROT SERPL-MCNC: 7.3 G/DL (ref 6–8.5)
RBC # BLD AUTO: 4.67 10*6/MM3 (ref 4.14–5.8)
SODIUM SERPL-SCNC: 140 MMOL/L (ref 136–145)
WBC # BLD AUTO: 11.1 10*3/MM3 (ref 3.4–10.8)

## 2020-12-28 PROCEDURE — 86140 C-REACTIVE PROTEIN: CPT

## 2020-12-28 PROCEDURE — 85652 RBC SED RATE AUTOMATED: CPT

## 2020-12-28 PROCEDURE — 36415 COLL VENOUS BLD VENIPUNCTURE: CPT

## 2020-12-28 PROCEDURE — 82550 ASSAY OF CK (CPK): CPT

## 2020-12-28 PROCEDURE — 80053 COMPREHEN METABOLIC PANEL: CPT

## 2020-12-28 PROCEDURE — 85025 COMPLETE CBC W/AUTO DIFF WBC: CPT

## 2021-01-06 ENCOUNTER — OFFICE VISIT (OUTPATIENT)
Dept: ORTHOPEDIC SURGERY | Facility: CLINIC | Age: 76
End: 2021-01-06

## 2021-01-06 DIAGNOSIS — Z47.89 AFTERCARE FOLLOWING SURGERY OF THE MUSCULOSKELETAL SYSTEM: Primary | ICD-10-CM

## 2021-01-06 PROCEDURE — 99024 POSTOP FOLLOW-UP VISIT: CPT | Performed by: ORTHOPAEDIC SURGERY

## 2021-01-06 NOTE — PROGRESS NOTES
Post-op Follow-up (2 week follow up - 4 weeks s/p (L) transmetatarsal amputation 12/09/2020)      Carlos Culver is 4 weeks status post left transmet amputation, 12/9/2020. He reports no fever, chills.  He reports pain is well controlled.  They have been taking Eliquis for DVT prophylaxis.  They have been non weight bearing.      Past Surgical History:   Procedure Laterality Date   • AMPUTATION DIGIT Left 1/7/2020    Procedure: AMPUTATION LEFT 2ND TOE;  Surgeon: Idania Osborn MD;  Location:  [facility];  Service: Orthopedics   • AMPUTATION DIGIT Left 12/8/2020    Procedure: TRANSMETATARSAL AMPUTATION LEFT;  Surgeon: Idania Osborn MD;  Location:  [facility];  Service: Orthopedics;  Laterality: Left;   • APPENDECTOMY     • COLONOSCOPY  2006   • INCISION AND DRAINAGE LEG Left 10/23/2018    Procedure: INCISION AND DRAINAGE LEFT FOOT;  Surgeon: Idania Osborn MD;  Location:  [facility];  Service: Orthopedics   • TOE AMPUTATION     • TONSILLECTOMY     • VENOUS ACCESS DEVICE (PORT) INSERTION N/A 10/23/2018    Procedure: GROSHONG CATHETER PLACEMENT;  Surgeon: Marquez Simons MD;  Location:  [facility];  Service: General   • VENOUS ACCESS DEVICE (PORT) INSERTION N/A 12/9/2020    Procedure: GROSHONG INSERTION;  Surgeon: Antonio Bains MD;  Location:  [facility];  Service: General;  Laterality: N/A;   • VENOUS ACCESS DEVICE (PORT) REMOVAL         There were no vitals taken for this visit.        left trans met incision is healing very slowly, swelling is less than last visit, no drainage, no erthtyema, no change in the necrosis laterally,  Some of the eschar along the incision is loose and I removed the loose areas and some of the sutures in the medial side which is a little more healed    none    Assessment and Plan:   1. Aftercare following surgery of the musculoskeletal system  As above, I debrided the loose eschars, removed some sutures.  He remains very high risk for BKA.  One bright aspect is he has much  less swelling today, is improving in his elevation.  He MUST be non-wt bearing.  Return 2 weeks or sooner if any problems.  Continue Bactroban dressing daily          Idania Osborn MD

## 2021-01-08 NOTE — PROGRESS NOTES
OFFICE VISIT  NOTE  [facility] CARDIOLOGY      Name: Carlos Culver    Date: 2021  MRN:  [id number]  :  1945      REFERRING/PRIMARY PROVIDER:  Marcus Cheng MD    Chief Complaint   Patient presents with   • PAF       HPI: Carlos Culver is a 75 y.o. male who presents today for hospital follow-up for PAF.  Associated history of excess alcohol use, hypertension, hyperlipidemia, osteomyelitis left foot status post transmetatarsal amputation 2020 by Dr. Fernández.  Originally diagnosed in , prescribed metoprolol and Xarelto but he did not take blood thinners due to concomitant alcohol use.  Patient seen prior to surgery 2020, A. fib with RVR, surgery canceled, started on metoprolol and Eliquis.  Successful surgery later in December, denies palpitation shortness of breath or chest pain.  Heart rates upper 50s to low 60s, he is taking metoprolol twice daily and Eliquis twice daily with no bleeding complications.  Drinks 2 alcoholic beverages daily.    Past Medical History:   Diagnosis Date   • A-fib (CMS/[facility])    • Acute kidney failure (CMS/[facility])    • Elevated cholesterol    • Hard of hearing    • Hyperlipidemia    • Hypertension    • Renal disorder    • Wears hearing aid in both ears    • Wears reading eyeglasses        Past Surgical History:   Procedure Laterality Date   • AMPUTATION DIGIT Left 2020    Procedure: AMPUTATION LEFT 2ND TOE;  Surgeon: Idania Fernández MD;  Location: [facility];  Service: Orthopedics   • AMPUTATION DIGIT Left 2020    Procedure: TRANSMETATARSAL AMPUTATION LEFT;  Surgeon: Idania Fernández MD;  Location: [facility] OR;  Service: Orthopedics;  Laterality: Left;   • APPENDECTOMY     • COLONOSCOPY     • INCISION AND DRAINAGE LEG Left 10/23/2018    Procedure: INCISION AND DRAINAGE LEFT FOOT;  Surgeon: Idania Fernández MD;  Location: [facility] OR;  Service: Orthopedics   • TOE AMPUTATION     • TONSILLECTOMY     • VENOUS ACCESS  DEVICE (PORT) INSERTION N/A 10/23/2018    Procedure: GROSHONG CATHETER PLACEMENT;  Surgeon: Marquez Simons MD;  Location:  [facility];  Service: General   • VENOUS ACCESS DEVICE (PORT) INSERTION N/A 12/9/2020    Procedure: GROSHONG INSERTION;  Surgeon: Antonio Bains MD;  Location:  [facility];  Service: General;  Laterality: N/A;   • VENOUS ACCESS DEVICE (PORT) REMOVAL         Social History     Socioeconomic History   • Marital status:      Spouse name: Not on file   • Number of children: Not on file   • Years of education: Not on file   • Highest education level: Not on file   Tobacco Use   • Smoking status: Never Smoker   • Smokeless tobacco: Never Used   Substance and Sexual Activity   • Alcohol use: Yes     Alcohol/week: 3.0 - 4.0 standard drinks     Types: 3 - 4 Shots of liquor per week     Comment: drinks a fifth a week DRINKS 3-5 DRINKS DAILY    • Drug use: No   • Sexual activity: Defer       History reviewed. No pertinent family history.     ROS:   Constitutional no fever,  no weight loss   Skin no rash, no subcutaneous nodules   Otolaryngeal no difficulty swallowing   Cardiovascular See HPI   Pulmonary no cough, no sputum production   Gastrointestinal no constipation, no diarrhea   Genitourinary no dysuria, no hematuria   Hematologic no easy bruisability, no abnormal bleeding   Musculoskeletal no muscle pain   Neurologic no dizziness, no falls         Allergies   Allergen Reactions   • Sulfa Antibiotics Unknown (See Comments)     Pt was told as child he had an allergy.  Has not taken and doesn't know the response         Current Outpatient Medications:   •  apixaban (Eliquis) 5 MG tablet tablet, Take 1 tablet by mouth Every 12 (Twelve) Hours., Disp: 180 tablet, Rfl: 3  •  lisinopril-hydrochlorothiazide (PRINZIDE,ZESTORETIC) 20-12.5 MG per tablet, Take 1 tablet by mouth Daily., Disp: , Rfl:   •  metoprolol tartrate (LOPRESSOR) 25 MG tablet, Take 1 tablet by mouth 2 (Two) Times a Day.,  "Disp: 180 tablet, Rfl: 3  •  mupirocin (Bactroban) 2 % ointment, Apply  topically to the appropriate area as directed Every 4 (Four) Days. Apply to incision every 4 days with dressing change, Disp: 30 g, Rfl: 5  •  atorvastatin (LIPITOR) 80 MG tablet, Take 1 tablet by mouth Every Night. Hold while on Daptomycin, Disp: , Rfl:     Vitals:    01/11/21 1125   BP: 108/68   BP Location: Left arm   Patient Position: Sitting   Pulse: 76   Temp: 96.4 °F (35.8 °C)   SpO2: 98%   Weight: 95.3 kg (210 lb)   Height: 182.9 cm (72\")     Body mass index is 28.48 kg/m².    PHYSICAL EXAM:    General Appearance:   · well developed  · well nourished  HENT:   · oropharynx moist  · lips not cyanotic  Neck:  · thyroid not enlarged  · supple  Respiratory:  · no respiratory distress  · normal breath sounds  · no rales  Cardiovascular:  · no jugular venous distention  · regular rhythm  · apical impulse normal  · S1 normal, S2 normal  · no S3, no S4   · no murmur  · no rub, no thrill  · carotid pulses normal; no bruit  · pedal pulses normal  · lower extremity edema: none    Gastrointestinal:   · bowel sounds normal  · non-tender  · no hepatomegaly, no splenomegaly  Musculoskeletal:  · no clubbing of fingers.   · normocephalic, head atraumatic  Skin:   · warm, dry  Psychiatric:  · judgement and insight appropriate  · normal mood and affect    RESULTS:   Procedures    Results for orders placed during the hospital encounter of 12/01/20   Adult Transthoracic Echo Complete W/ Cont if Necessary Per Protocol    Narrative · Left ventricular ejection fraction appears to be 51 - 55%. Left   ventricular systolic function is low normal.  · Left ventricular wall thickness is consistent with mild concentric   hypertrophy.  · Left atrial volume is moderately increased.  · The right atrial cavity is moderate to severely dilated.  · The right ventricular cavity is mildly dilated.  · Severe tricuspid valve regurgitation is present.  · Estimated right " ventricular systolic pressure from tricuspid   regurgitation is mildly elevated (35-45 mmHg). Calculated right   ventricular systolic pressure from tricuspid regurgitation is 41 mmHg.  · Mild to moderate mitral regurgitation.            Labs:  Lab Results   Component Value Date    AST 29 12/28/2020    ALT 32 12/28/2020     Lab Results   Component Value Date    HGBA1C 5.80 (H) 11/25/2020     No components found for: CREATINININE  eGFR Non  Amer   Date Value Ref Range Status   12/28/2020 61 >60 mL/min/1.73 Final   12/10/2020 77 >60 mL/min/1.73 Final   12/09/2020 73 >60 mL/min/1.73 Final         ASSESSMENT:  Problem List Items Addressed This Visit        Other    Essential hypertension    Relevant Medications    metoprolol tartrate (LOPRESSOR) 25 MG tablet    Hyperlipidemia LDL goal <100    A-fib (CMS/HCC) - Primary    Overview     10/23/18 Echo  · Mild tricuspid valve regurgitation is present.  · Lumason contrast shows normal overall wall motion and systolic function with an EF 70%         Relevant Medications    metoprolol tartrate (LOPRESSOR) 25 MG tablet          PLAN:    1.  Persistent atrial fibrillation:  I discussed possibility of rhythm versus rate control, echo shows moderate left atrial margin with EF of 50-55% with moderate to severe TR, moderate elevated RVSP.  Given somewhat low likelihood of maintaining sinus rhythm with concomitant alcohol use, he agrees for rate control at this time.    New prescription for metoprolol 25 mg twice daily sent  Prescription for Eliquis 5 mg twice daily sent    Decrease alcohol    2.  Hypertension:  Low normal at this time  If less than 100 systolic consistently or symptomatic, discontinue Zestoretic    3.  Osteomyelitis left foot:  Per surgery notes, at high risk of left AKA in the future  Status post left transmetatarsal foot amputation 12/8/2020    Return to clinic in 6 months, or sooner as needed.    Thank you for the opportunity to share in the care of your  patient; please do not hesitate to call me with any questions.     Hang Anderson MD, [facility]  Office: [phone] [address] [place] [address]    01/11/21

## 2021-01-11 ENCOUNTER — TRANSCRIBE ORDERS (OUTPATIENT)
Dept: LAB | Facility: HOSPITAL | Age: 76
End: 2021-01-11

## 2021-01-11 ENCOUNTER — OFFICE VISIT (OUTPATIENT)
Dept: CARDIOLOGY | Facility: CLINIC | Age: 76
End: 2021-01-11

## 2021-01-11 ENCOUNTER — LAB (OUTPATIENT)
Dept: LAB | Facility: HOSPITAL | Age: 76
End: 2021-01-11

## 2021-01-11 VITALS
SYSTOLIC BLOOD PRESSURE: 108 MMHG | HEART RATE: 76 BPM | WEIGHT: 210 LBS | HEIGHT: 72 IN | DIASTOLIC BLOOD PRESSURE: 68 MMHG | BODY MASS INDEX: 28.44 KG/M2 | OXYGEN SATURATION: 98 % | TEMPERATURE: 96.4 F

## 2021-01-11 DIAGNOSIS — L03.116 CELLULITIS OF LEFT FOOT: ICD-10-CM

## 2021-01-11 DIAGNOSIS — M86.672 CHRONIC OSTEOMYELITIS OF HINDFOOT, LEFT (HCC): ICD-10-CM

## 2021-01-11 DIAGNOSIS — I48.0 PAROXYSMAL ATRIAL FIBRILLATION (HCC): Primary | ICD-10-CM

## 2021-01-11 DIAGNOSIS — M86.172 ACUTE OSTEOMYELITIS OF LEFT ANKLE OR FOOT (HCC): ICD-10-CM

## 2021-01-11 DIAGNOSIS — T87.44 INFECTION OF LEFT BELOW KNEE AMPUTATION (HCC): Primary | ICD-10-CM

## 2021-01-11 DIAGNOSIS — L03.032 ABSCESS OF FIFTH TOENAIL OF LEFT FOOT: ICD-10-CM

## 2021-01-11 DIAGNOSIS — E78.5 HYPERLIPIDEMIA LDL GOAL <100: ICD-10-CM

## 2021-01-11 DIAGNOSIS — T87.44 INFECTION OF LEFT BELOW KNEE AMPUTATION (HCC): ICD-10-CM

## 2021-01-11 DIAGNOSIS — I10 ESSENTIAL HYPERTENSION: ICD-10-CM

## 2021-01-11 LAB
ALBUMIN SERPL-MCNC: 4.1 G/DL (ref 3.5–5.2)
ALBUMIN/GLOB SERPL: 1.1 G/DL
ALP SERPL-CCNC: 132 U/L (ref 39–117)
ALT SERPL W P-5'-P-CCNC: 24 U/L (ref 1–41)
ANION GAP SERPL CALCULATED.3IONS-SCNC: 9 MMOL/L (ref 5–15)
AST SERPL-CCNC: 27 U/L (ref 1–40)
BASOPHILS # BLD AUTO: 0.12 10*3/MM3 (ref 0–0.2)
BASOPHILS NFR BLD AUTO: 1.2 % (ref 0–1.5)
BILIRUB SERPL-MCNC: 0.5 MG/DL (ref 0–1.2)
BUN SERPL-MCNC: 22 MG/DL (ref 8–23)
BUN/CREAT SERPL: 18.5 (ref 7–25)
CALCIUM SPEC-SCNC: 10.1 MG/DL (ref 8.6–10.5)
CHLORIDE SERPL-SCNC: 100 MMOL/L (ref 98–107)
CK SERPL-CCNC: 88 U/L (ref 20–200)
CO2 SERPL-SCNC: 29 MMOL/L (ref 22–29)
CREAT SERPL-MCNC: 1.19 MG/DL (ref 0.76–1.27)
CRP SERPL-MCNC: 1.02 MG/DL (ref 0–0.5)
DEPRECATED RDW RBC AUTO: 44.5 FL (ref 37–54)
EOSINOPHIL # BLD AUTO: 0.52 10*3/MM3 (ref 0–0.4)
EOSINOPHIL NFR BLD AUTO: 5.1 % (ref 0.3–6.2)
ERYTHROCYTE [DISTWIDTH] IN BLOOD BY AUTOMATED COUNT: 13.2 % (ref 12.3–15.4)
ERYTHROCYTE [SEDIMENTATION RATE] IN BLOOD: 16 MM/HR (ref 0–20)
GFR SERPL CREATININE-BSD FRML MDRD: 59 ML/MIN/1.73
GLOBULIN UR ELPH-MCNC: 3.6 GM/DL
GLUCOSE SERPL-MCNC: 89 MG/DL (ref 65–99)
HCT VFR BLD AUTO: 45.7 % (ref 37.5–51)
HGB BLD-MCNC: 15 G/DL (ref 13–17.7)
IMM GRANULOCYTES # BLD AUTO: 0.07 10*3/MM3 (ref 0–0.05)
IMM GRANULOCYTES NFR BLD AUTO: 0.7 % (ref 0–0.5)
LYMPHOCYTES # BLD AUTO: 2.88 10*3/MM3 (ref 0.7–3.1)
LYMPHOCYTES NFR BLD AUTO: 28.4 % (ref 19.6–45.3)
MCH RBC QN AUTO: 30.7 PG (ref 26.6–33)
MCHC RBC AUTO-ENTMCNC: 32.8 G/DL (ref 31.5–35.7)
MCV RBC AUTO: 93.5 FL (ref 79–97)
MONOCYTES # BLD AUTO: 1.02 10*3/MM3 (ref 0.1–0.9)
MONOCYTES NFR BLD AUTO: 10.1 % (ref 5–12)
NEUTROPHILS NFR BLD AUTO: 5.53 10*3/MM3 (ref 1.7–7)
NEUTROPHILS NFR BLD AUTO: 54.5 % (ref 42.7–76)
NRBC BLD AUTO-RTO: 0 /100 WBC (ref 0–0.2)
PLATELET # BLD AUTO: 195 10*3/MM3 (ref 140–450)
PMV BLD AUTO: 9.5 FL (ref 6–12)
POTASSIUM SERPL-SCNC: 5 MMOL/L (ref 3.5–5.2)
PROT SERPL-MCNC: 7.7 G/DL (ref 6–8.5)
RBC # BLD AUTO: 4.89 10*6/MM3 (ref 4.14–5.8)
SODIUM SERPL-SCNC: 138 MMOL/L (ref 136–145)
WBC # BLD AUTO: 10.14 10*3/MM3 (ref 3.4–10.8)

## 2021-01-11 PROCEDURE — 80053 COMPREHEN METABOLIC PANEL: CPT

## 2021-01-11 PROCEDURE — 82550 ASSAY OF CK (CPK): CPT

## 2021-01-11 PROCEDURE — 86140 C-REACTIVE PROTEIN: CPT

## 2021-01-11 PROCEDURE — 85652 RBC SED RATE AUTOMATED: CPT

## 2021-01-11 PROCEDURE — 85025 COMPLETE CBC W/AUTO DIFF WBC: CPT

## 2021-01-11 PROCEDURE — 99214 OFFICE O/P EST MOD 30 MIN: CPT | Performed by: INTERNAL MEDICINE

## 2021-01-11 PROCEDURE — 36415 COLL VENOUS BLD VENIPUNCTURE: CPT

## 2021-01-18 ENCOUNTER — TRANSCRIBE ORDERS (OUTPATIENT)
Dept: LAB | Facility: HOSPITAL | Age: 76
End: 2021-01-18

## 2021-01-18 ENCOUNTER — DOCUMENTATION (OUTPATIENT)
Dept: PHYSICAL THERAPY | Facility: HOSPITAL | Age: 76
End: 2021-01-18

## 2021-01-18 ENCOUNTER — LAB (OUTPATIENT)
Dept: LAB | Facility: HOSPITAL | Age: 76
End: 2021-01-18

## 2021-01-18 DIAGNOSIS — L03.032 ABSCESS OF FIFTH TOENAIL OF LEFT FOOT: ICD-10-CM

## 2021-01-18 DIAGNOSIS — M86.672 CHRONIC OSTEOMYELITIS OF HINDFOOT, LEFT (HCC): ICD-10-CM

## 2021-01-18 DIAGNOSIS — T87.44 INFECTION OF LEFT BELOW KNEE AMPUTATION (HCC): ICD-10-CM

## 2021-01-18 DIAGNOSIS — M86.172 ACUTE OSTEOMYELITIS OF LEFT ANKLE OR FOOT (HCC): ICD-10-CM

## 2021-01-18 DIAGNOSIS — L03.116 CELLULITIS OF LEFT FOOT: ICD-10-CM

## 2021-01-18 DIAGNOSIS — T87.44 INFECTION OF LEFT BELOW KNEE AMPUTATION (HCC): Primary | ICD-10-CM

## 2021-01-18 LAB
ALBUMIN SERPL-MCNC: 3.9 G/DL (ref 3.5–5.2)
ALBUMIN/GLOB SERPL: 1.1 G/DL
ALP SERPL-CCNC: 119 U/L (ref 39–117)
ALT SERPL W P-5'-P-CCNC: 19 U/L (ref 1–41)
ANION GAP SERPL CALCULATED.3IONS-SCNC: 11 MMOL/L (ref 5–15)
AST SERPL-CCNC: 23 U/L (ref 1–40)
BASOPHILS # BLD AUTO: 0.14 10*3/MM3 (ref 0–0.2)
BASOPHILS NFR BLD AUTO: 1.2 % (ref 0–1.5)
BILIRUB SERPL-MCNC: 0.5 MG/DL (ref 0–1.2)
BUN SERPL-MCNC: 20 MG/DL (ref 8–23)
BUN/CREAT SERPL: 16.5 (ref 7–25)
CALCIUM SPEC-SCNC: 9.6 MG/DL (ref 8.6–10.5)
CHLORIDE SERPL-SCNC: 103 MMOL/L (ref 98–107)
CK SERPL-CCNC: 92 U/L (ref 20–200)
CO2 SERPL-SCNC: 27 MMOL/L (ref 22–29)
CREAT SERPL-MCNC: 1.21 MG/DL (ref 0.76–1.27)
CRP SERPL-MCNC: 1.18 MG/DL (ref 0–0.5)
DEPRECATED RDW RBC AUTO: 46.3 FL (ref 37–54)
EOSINOPHIL # BLD AUTO: 0.37 10*3/MM3 (ref 0–0.4)
EOSINOPHIL NFR BLD AUTO: 3.3 % (ref 0.3–6.2)
ERYTHROCYTE [DISTWIDTH] IN BLOOD BY AUTOMATED COUNT: 13.5 % (ref 12.3–15.4)
ERYTHROCYTE [SEDIMENTATION RATE] IN BLOOD: 30 MM/HR (ref 0–20)
GFR SERPL CREATININE-BSD FRML MDRD: 58 ML/MIN/1.73
GLOBULIN UR ELPH-MCNC: 3.5 GM/DL
GLUCOSE SERPL-MCNC: 94 MG/DL (ref 65–99)
HCT VFR BLD AUTO: 46.5 % (ref 37.5–51)
HGB BLD-MCNC: 15.1 G/DL (ref 13–17.7)
IMM GRANULOCYTES # BLD AUTO: 0.06 10*3/MM3 (ref 0–0.05)
IMM GRANULOCYTES NFR BLD AUTO: 0.5 % (ref 0–0.5)
LYMPHOCYTES # BLD AUTO: 2.38 10*3/MM3 (ref 0.7–3.1)
LYMPHOCYTES NFR BLD AUTO: 21 % (ref 19.6–45.3)
MCH RBC QN AUTO: 30.8 PG (ref 26.6–33)
MCHC RBC AUTO-ENTMCNC: 32.5 G/DL (ref 31.5–35.7)
MCV RBC AUTO: 94.7 FL (ref 79–97)
MONOCYTES # BLD AUTO: 1 10*3/MM3 (ref 0.1–0.9)
MONOCYTES NFR BLD AUTO: 8.8 % (ref 5–12)
NEUTROPHILS NFR BLD AUTO: 65.2 % (ref 42.7–76)
NEUTROPHILS NFR BLD AUTO: 7.36 10*3/MM3 (ref 1.7–7)
NRBC BLD AUTO-RTO: 0 /100 WBC (ref 0–0.2)
PLATELET # BLD AUTO: 215 10*3/MM3 (ref 140–450)
PMV BLD AUTO: 9.4 FL (ref 6–12)
POTASSIUM SERPL-SCNC: 4.3 MMOL/L (ref 3.5–5.2)
PROT SERPL-MCNC: 7.4 G/DL (ref 6–8.5)
RBC # BLD AUTO: 4.91 10*6/MM3 (ref 4.14–5.8)
SODIUM SERPL-SCNC: 141 MMOL/L (ref 136–145)
WBC # BLD AUTO: 11.31 10*3/MM3 (ref 3.4–10.8)

## 2021-01-18 PROCEDURE — 82550 ASSAY OF CK (CPK): CPT

## 2021-01-18 PROCEDURE — 85652 RBC SED RATE AUTOMATED: CPT

## 2021-01-18 PROCEDURE — 85025 COMPLETE CBC W/AUTO DIFF WBC: CPT

## 2021-01-18 PROCEDURE — 80053 COMPREHEN METABOLIC PANEL: CPT

## 2021-01-18 PROCEDURE — 86140 C-REACTIVE PROTEIN: CPT

## 2021-01-18 PROCEDURE — 36415 COLL VENOUS BLD VENIPUNCTURE: CPT

## 2021-01-18 NOTE — THERAPY DISCHARGE NOTE
Outpatient Rehabilitation   Discharge Summary       Patient Name: Carlos Culver  : 1945  MRN: [id number]  Today's Date: 2021                 Admit Date: (Not on file)    Visit Dx:  No diagnosis found.    Patient Active Problem List   Diagnosis   • Cellulitis of left foot   • GUCCI (acute kidney injury) (CMS/[facility])   • Essential hypertension   • Leukocytosis   • Atrial fibrillation with RVR (CMS/[facility])   • Osteomyelitis of left foot (CMS/[facility])   • Chronic anticoagulation   • Neuropathy   • Elevated transaminase level   • CKD (chronic kidney disease) stage 2, GFR 60-89 ml/min   • Skin ulcer of right heel (CMS/[facility])   • Chronic multifocal osteomyelitis of left foot (CMS/[facility])   • Contracture of joint of foot, left   • MARTINEZ (dyspnea on exertion)   • Neuropathic ulcer, limited to breakdown of skin (CMS/[facility])   • Hyperlipidemia LDL goal <100   • A-fib (CMS/[facility])   • S/P transmetatarsal amputation of foot, left (CMS/[facility])   • Leukocytosis, likely reactive   • Acute postoperative pain        Past Medical History:   Diagnosis Date   • A-fib (CMS/[facility])    • Acute kidney failure (CMS/[facility])    • Elevated cholesterol    • Hard of hearing    • Hyperlipidemia    • Hypertension    • Renal disorder    • Wears hearing aid in both ears    • Wears reading eyeglasses         Past Surgical History:   Procedure Laterality Date   • AMPUTATION DIGIT Left 2020    Procedure: AMPUTATION LEFT 2ND TOE;  Surgeon: Idania Osborn MD;  Location:  [facility] OR;  Service: Orthopedics   • AMPUTATION DIGIT Left 2020    Procedure: TRANSMETATARSAL AMPUTATION LEFT;  Surgeon: Idania Osborn MD;  Location:  [facility];  Service: Orthopedics;  Laterality: Left;   • APPENDECTOMY     • COLONOSCOPY     • INCISION AND DRAINAGE LEG Left 10/23/2018    Procedure: INCISION AND DRAINAGE LEFT FOOT;  Surgeon: Idania Osborn MD;  Location:  [facility];  Service: Orthopedics   • TOE AMPUTATION     • TONSILLECTOMY     • VENOUS ACCESS DEVICE (PORT)  INSERTION N/A 10/23/2018    Procedure: GROSHONG CATHETER PLACEMENT;  Surgeon: Marquez Simons MD;  Location:  [facility];  Service: General   • VENOUS ACCESS DEVICE (PORT) INSERTION N/A 12/9/2020    Procedure: GROSHONG INSERTION;  Surgeon: Antonio Bains MD;  Location:  [facility];  Service: General;  Laterality: N/A;   • VENOUS ACCESS DEVICE (PORT) REMOVAL             EVALUATION              WOUND DEBRIDEMENT                       Recommendation and Plan      Goals      Time Calculation:                OP Discharge Summary     Row Name 01/18/21 1141             OP PT Discharge Summary    Date of Discharge  01/18/21  -MP      Reason for Discharge  other (comment) no follow up for 30 days.  -MP      Discharge Destination  Unknown  -MP        User Key  (r) = Recorded By, (t) = Taken By, (c) = Cosigned By    Initials Name Provider Type    Ralf Villeda, PT Physical Therapist          Ralf Jaramillo, DORINDA  1/18/2021

## 2021-01-19 LAB
FUNGUS WND CULT: NORMAL
MYCOBACTERIUM SPEC CULT: NORMAL
NIGHT BLUE STAIN TISS: NORMAL

## 2021-01-20 ENCOUNTER — OFFICE VISIT (OUTPATIENT)
Dept: ORTHOPEDIC SURGERY | Facility: CLINIC | Age: 76
End: 2021-01-20

## 2021-01-20 DIAGNOSIS — Z47.89 AFTERCARE FOLLOWING SURGERY OF THE MUSCULOSKELETAL SYSTEM: Primary | ICD-10-CM

## 2021-01-20 PROCEDURE — 99024 POSTOP FOLLOW-UP VISIT: CPT | Performed by: ORTHOPAEDIC SURGERY

## 2021-01-20 NOTE — PROGRESS NOTES
Follow-up (2 week follow up (6 weeks s/p (L) transmetatarsal amputation 12/09/2020))      Carlos Culver is 6 weeks status post left trans-met amp, 12/9/2020. He reports no fever, chills.  He reports pain is well controlled.  They have been taking aspirin for DVT prophylaxis.  He is supposed to be nonweightbearing, but he has been using a half shoe.  He has been strapping it so tightly he has a preulcerous lesion over his anterior ankle    Past Surgical History:   Procedure Laterality Date   • AMPUTATION DIGIT Left 1/7/2020    Procedure: AMPUTATION LEFT 2ND TOE;  Surgeon: Idania Osborn MD;  Location:  [facility];  Service: Orthopedics   • AMPUTATION DIGIT Left 12/8/2020    Procedure: TRANSMETATARSAL AMPUTATION LEFT;  Surgeon: Idania Osborn MD;  Location:  [facility] OR;  Service: Orthopedics;  Laterality: Left;   • APPENDECTOMY     • COLONOSCOPY  2006   • INCISION AND DRAINAGE LEG Left 10/23/2018    Procedure: INCISION AND DRAINAGE LEFT FOOT;  Surgeon: Idania Osborn MD;  Location:  [facility] OR;  Service: Orthopedics   • TOE AMPUTATION     • TONSILLECTOMY     • VENOUS ACCESS DEVICE (PORT) INSERTION N/A 10/23/2018    Procedure: GROSHONG CATHETER PLACEMENT;  Surgeon: Marquez Simons MD;  Location:  [facility] OR;  Service: General   • VENOUS ACCESS DEVICE (PORT) INSERTION N/A 12/9/2020    Procedure: GROSHONG INSERTION;  Surgeon: Antonio Bains MD;  Location:  [facility];  Service: General;  Laterality: N/A;   • VENOUS ACCESS DEVICE (PORT) REMOVAL         There were no vitals taken for this visit.       Very slowly healing transplant amputation, eschars all along the incision edges, not ready to have the sutures removed, no drainage, no signs of infection, all the eschars are densely adherent, I removed a few small edges that are loose.  Preulcerous lesion over the anterior tibial tendon from strapping the half shoe too tightly    phone conversation with physician- discussed with Dr Hector Moser and  Plan:   1. Aftercare following surgery of the musculoskeletal system  Unfortunately he is continuing to be noncompliant.  I absolutely positively told him very bluntly that he must not to put the foot on the floor, not in the half shoe, not at all.  He is at very high risk for a below-knee amputation. He has trouble seeing the foot, so I took pictures of the foot for him on his personal phone so that he could see my concerns regarding the necrotic edges of the incisions. Dressing change every 4-5 days, the most important part of treatment to decrease the risk of higher amputation is to be ABSOLUTELY NON-WEIGHT BEARING          Idania Osborn MD

## 2021-01-25 ENCOUNTER — LAB (OUTPATIENT)
Dept: LAB | Facility: HOSPITAL | Age: 76
End: 2021-01-25

## 2021-01-25 ENCOUNTER — TRANSCRIBE ORDERS (OUTPATIENT)
Dept: LAB | Facility: HOSPITAL | Age: 76
End: 2021-01-25

## 2021-01-25 DIAGNOSIS — L03.032 CELLULITIS OF TOE, LEFT: ICD-10-CM

## 2021-01-25 DIAGNOSIS — T87.44 INFECTION OF AMPUTATION STUMP OF LEFT LOWER EXTREMITY (HCC): ICD-10-CM

## 2021-01-25 DIAGNOSIS — M86.672 CHRONIC OSTEOMYELITIS OF LEFT FOOT (HCC): Primary | ICD-10-CM

## 2021-01-25 DIAGNOSIS — L03.116 CELLULITIS OF LEFT FOOT: ICD-10-CM

## 2021-01-25 LAB
ALBUMIN SERPL-MCNC: 4 G/DL (ref 3.5–5.2)
ALBUMIN/GLOB SERPL: 1.1 G/DL
ALP SERPL-CCNC: 121 U/L (ref 39–117)
ALT SERPL W P-5'-P-CCNC: 17 U/L (ref 1–41)
ANION GAP SERPL CALCULATED.3IONS-SCNC: 11 MMOL/L (ref 5–15)
AST SERPL-CCNC: 25 U/L (ref 1–40)
BASOPHILS # BLD AUTO: 0.13 10*3/MM3 (ref 0–0.2)
BASOPHILS NFR BLD AUTO: 1.3 % (ref 0–1.5)
BILIRUB SERPL-MCNC: 0.6 MG/DL (ref 0–1.2)
BUN SERPL-MCNC: 24 MG/DL (ref 8–23)
BUN/CREAT SERPL: 19.4 (ref 7–25)
CALCIUM SPEC-SCNC: 9.9 MG/DL (ref 8.6–10.5)
CHLORIDE SERPL-SCNC: 100 MMOL/L (ref 98–107)
CK SERPL-CCNC: 108 U/L (ref 20–200)
CO2 SERPL-SCNC: 28 MMOL/L (ref 22–29)
CREAT SERPL-MCNC: 1.24 MG/DL (ref 0.76–1.27)
CRP SERPL-MCNC: 1.11 MG/DL (ref 0–0.5)
DEPRECATED RDW RBC AUTO: 45.1 FL (ref 37–54)
EOSINOPHIL # BLD AUTO: 0.37 10*3/MM3 (ref 0–0.4)
EOSINOPHIL NFR BLD AUTO: 3.6 % (ref 0.3–6.2)
ERYTHROCYTE [DISTWIDTH] IN BLOOD BY AUTOMATED COUNT: 13.1 % (ref 12.3–15.4)
ERYTHROCYTE [SEDIMENTATION RATE] IN BLOOD: 27 MM/HR (ref 0–20)
GFR SERPL CREATININE-BSD FRML MDRD: 57 ML/MIN/1.73
GLOBULIN UR ELPH-MCNC: 3.5 GM/DL
GLUCOSE SERPL-MCNC: 99 MG/DL (ref 65–99)
HCT VFR BLD AUTO: 46.3 % (ref 37.5–51)
HGB BLD-MCNC: 15.3 G/DL (ref 13–17.7)
IMM GRANULOCYTES # BLD AUTO: 0.05 10*3/MM3 (ref 0–0.05)
IMM GRANULOCYTES NFR BLD AUTO: 0.5 % (ref 0–0.5)
LYMPHOCYTES # BLD AUTO: 2.96 10*3/MM3 (ref 0.7–3.1)
LYMPHOCYTES NFR BLD AUTO: 28.7 % (ref 19.6–45.3)
MCH RBC QN AUTO: 31 PG (ref 26.6–33)
MCHC RBC AUTO-ENTMCNC: 33 G/DL (ref 31.5–35.7)
MCV RBC AUTO: 93.7 FL (ref 79–97)
MONOCYTES # BLD AUTO: 1.03 10*3/MM3 (ref 0.1–0.9)
MONOCYTES NFR BLD AUTO: 10 % (ref 5–12)
NEUTROPHILS NFR BLD AUTO: 5.78 10*3/MM3 (ref 1.7–7)
NEUTROPHILS NFR BLD AUTO: 55.9 % (ref 42.7–76)
NRBC BLD AUTO-RTO: 0 /100 WBC (ref 0–0.2)
PLATELET # BLD AUTO: 243 10*3/MM3 (ref 140–450)
PMV BLD AUTO: 9.4 FL (ref 6–12)
POTASSIUM SERPL-SCNC: 5 MMOL/L (ref 3.5–5.2)
PROT SERPL-MCNC: 7.5 G/DL (ref 6–8.5)
RBC # BLD AUTO: 4.94 10*6/MM3 (ref 4.14–5.8)
SODIUM SERPL-SCNC: 139 MMOL/L (ref 136–145)
WBC # BLD AUTO: 10.32 10*3/MM3 (ref 3.4–10.8)

## 2021-01-25 PROCEDURE — 85025 COMPLETE CBC W/AUTO DIFF WBC: CPT | Performed by: INTERNAL MEDICINE

## 2021-01-25 PROCEDURE — 85652 RBC SED RATE AUTOMATED: CPT | Performed by: INTERNAL MEDICINE

## 2021-01-25 PROCEDURE — 86140 C-REACTIVE PROTEIN: CPT | Performed by: INTERNAL MEDICINE

## 2021-01-25 PROCEDURE — 36415 COLL VENOUS BLD VENIPUNCTURE: CPT | Performed by: INTERNAL MEDICINE

## 2021-01-25 PROCEDURE — 80053 COMPREHEN METABOLIC PANEL: CPT | Performed by: INTERNAL MEDICINE

## 2021-01-25 PROCEDURE — 82550 ASSAY OF CK (CPK): CPT | Performed by: INTERNAL MEDICINE

## 2021-02-03 ENCOUNTER — OFFICE VISIT (OUTPATIENT)
Dept: ORTHOPEDIC SURGERY | Facility: CLINIC | Age: 76
End: 2021-02-03

## 2021-02-03 DIAGNOSIS — Z47.89 AFTERCARE FOLLOWING SURGERY OF THE MUSCULOSKELETAL SYSTEM: Primary | ICD-10-CM

## 2021-02-03 PROCEDURE — 99024 POSTOP FOLLOW-UP VISIT: CPT | Performed by: ORTHOPAEDIC SURGERY

## 2021-02-03 RX ORDER — DOXYCYCLINE 100 MG/1
CAPSULE ORAL
COMMUNITY
Start: 2021-01-25 | End: 2022-03-14

## 2021-02-03 NOTE — PROGRESS NOTES
Follow-up (2 week follow up; 8 weeks s/p (L) transmetatarsal amputation 12/09/2020)      Carlos Culver is 8 weeks status post left transmet amputation, 12/9/2020. He reports no fever, chills. He is healing very slowly.  He reports that he has not been putting weight on the foot, but I think it is hanging down too much, and I think he is getting too much pressure on the lateral aspect, laying on the left side.  I advised him absolutely not to lay on that side of the foot    Past Surgical History:   Procedure Laterality Date   • AMPUTATION DIGIT Left 1/7/2020    Procedure: AMPUTATION LEFT 2ND TOE;  Surgeon: Idania Osborn MD;  Location:  [facility];  Service: Orthopedics   • AMPUTATION DIGIT Left 12/8/2020    Procedure: TRANSMETATARSAL AMPUTATION LEFT;  Surgeon: Idania Osborn MD;  Location:  [facility];  Service: Orthopedics;  Laterality: Left;   • APPENDECTOMY     • COLONOSCOPY  2006   • FOOT SURGERY Left 12/09/2020    (L) transmetatarsal amputation 12/09/2020 - Dr. Idania Osborn   • INCISION AND DRAINAGE LEG Left 10/23/2018    Procedure: INCISION AND DRAINAGE LEFT FOOT;  Surgeon: Idania Osborn MD;  Location:  [facility];  Service: Orthopedics   • TOE AMPUTATION     • TONSILLECTOMY     • VENOUS ACCESS DEVICE (PORT) INSERTION N/A 10/23/2018    Procedure: GROSHONG CATHETER PLACEMENT;  Surgeon: Marquez Simons MD;  Location:  [facility];  Service: General   • VENOUS ACCESS DEVICE (PORT) INSERTION N/A 12/9/2020    Procedure: GROSHONG INSERTION;  Surgeon: Antonio Bains MD;  Location:  [facility];  Service: General;  Laterality: N/A;   • VENOUS ACCESS DEVICE (PORT) REMOVAL         There were no vitals taken for this visit.       Left foot: I debrided a lot of the necrotic tissue that was loose.  There is no active infection that I can see, but the wounds are not healed.  They are superficial, but my concern is he is going to end up with a below-knee amputation            none    Assessment and Plan:   1.  Aftercare following surgery of the musculoskeletal system  Healing very slowly.  I debrided necrotic tissue that was loose.  I am very concerned he is going to end up with a below-knee amputation.  As above we talked about not lying on the left side.  Continue Bactroban dressing.  I will see him in 2 weeks.  Absolutely nonweightbearing.          Idania Osborn MD

## 2021-02-15 ENCOUNTER — OFFICE VISIT (OUTPATIENT)
Dept: ORTHOPEDIC SURGERY | Facility: CLINIC | Age: 76
End: 2021-02-15

## 2021-02-15 VITALS — WEIGHT: 210 LBS | BODY MASS INDEX: 28.44 KG/M2 | HEIGHT: 72 IN

## 2021-02-15 DIAGNOSIS — Z47.89 AFTERCARE FOLLOWING SURGERY OF THE MUSCULOSKELETAL SYSTEM: Primary | ICD-10-CM

## 2021-02-15 PROCEDURE — 99024 POSTOP FOLLOW-UP VISIT: CPT | Performed by: ORTHOPAEDIC SURGERY

## 2021-02-15 NOTE — PROGRESS NOTES
"Post-op Follow-up ((2 week follow up; 10 weeks s/p (L) transmetatarsal amputation 12/09/2020)      Carlos Culver is 10 weeks status post left trans met amputation, 12/9/2020. He reports no fever, chills.  He reports pain is well controlled.  He is wearing a post op shoe on the left foot- he is NOT supposed to have anything that can put pressure on the foot.  He reports he is trying to avoid sleeping on the left side.    Past Surgical History:   Procedure Laterality Date   • AMPUTATION DIGIT Left 1/7/2020    Procedure: AMPUTATION LEFT 2ND TOE;  Surgeon: Idania Osborn MD;  Location:  [facility];  Service: Orthopedics   • AMPUTATION DIGIT Left 12/8/2020    Procedure: TRANSMETATARSAL AMPUTATION LEFT;  Surgeon: Idania Osborn MD;  Location:  [facility];  Service: Orthopedics;  Laterality: Left;   • APPENDECTOMY     • COLONOSCOPY  2006   • FOOT SURGERY Left 12/09/2020    (L) transmetatarsal amputation 12/09/2020 - Dr. Idania Osborn   • INCISION AND DRAINAGE LEG Left 10/23/2018    Procedure: INCISION AND DRAINAGE LEFT FOOT;  Surgeon: Idania Osborn MD;  Location:  [facility];  Service: Orthopedics   • TOE AMPUTATION     • TONSILLECTOMY     • VENOUS ACCESS DEVICE (PORT) INSERTION N/A 10/23/2018    Procedure: GROSHONG CATHETER PLACEMENT;  Surgeon: Marquez Simons MD;  Location:  [facility];  Service: General   • VENOUS ACCESS DEVICE (PORT) INSERTION N/A 12/9/2020    Procedure: GROSHONG INSERTION;  Surgeon: Antonio Bains MD;  Location:  [facility];  Service: General;  Laterality: N/A;   • VENOUS ACCESS DEVICE (PORT) REMOVAL         Ht 182.9 cm (72.01\")   Wt 95.3 kg (210 lb)   BMI 28.47 kg/m²      Unfortunately, the lateral necrotic area is worse, there is a new dark area of skin that indicates pressure, no sign of infection, I debrided the necrotic tissue, no bone exposed- left foot        Assessment and Plan:   1. Aftercare following surgery of the musculoskeletal system  As above, he should have nothing " on the foot but the dressing- we again discussed pressure points/neuropathy, he is very high risk to go on to a BKA.  I took a picture of the wound on his camera for him.  I will see him 2 weeks.  Discussed with Dr Lm Osborn MD

## 2021-03-03 ENCOUNTER — OFFICE VISIT (OUTPATIENT)
Dept: ORTHOPEDIC SURGERY | Facility: CLINIC | Age: 76
End: 2021-03-03

## 2021-03-03 ENCOUNTER — TELEPHONE (OUTPATIENT)
Dept: ORTHOPEDIC SURGERY | Facility: CLINIC | Age: 76
End: 2021-03-03

## 2021-03-03 DIAGNOSIS — Z47.89 AFTERCARE FOLLOWING SURGERY OF THE MUSCULOSKELETAL SYSTEM: Primary | ICD-10-CM

## 2021-03-03 DIAGNOSIS — T81.89XD NON-HEALING SURGICAL WOUND, SUBSEQUENT ENCOUNTER: Primary | ICD-10-CM

## 2021-03-03 PROCEDURE — 99024 POSTOP FOLLOW-UP VISIT: CPT | Performed by: ORTHOPAEDIC SURGERY

## 2021-03-03 RX ORDER — BECAPLERMIN 0.01 %
GEL (GRAM) TOPICAL EVERY MORNING
Qty: 30 G | Refills: 5 | Status: SHIPPED | OUTPATIENT
Start: 2021-03-03 | End: 2021-03-03

## 2021-03-03 RX ORDER — BECAPLERMIN 0.01 %
GEL (GRAM) TOPICAL
Qty: 30 G | Refills: 5 | Status: SHIPPED | OUTPATIENT
Start: 2021-03-03 | End: 2022-03-14

## 2021-03-03 NOTE — TELEPHONE ENCOUNTER
Patient called stating that provider gave patient rx for Regranex but patient's cost is $1,000, he googled different things cheaper. They suggested Iodosorb. Patient wanted to know if he could try that. Patient would like a call back at [phone]

## 2021-03-03 NOTE — PROGRESS NOTES
"Post-op Follow-up (2 week follow up; 12 weeks s/p (L) transmetatarsal amputation 12/09/2020)      Carlos Culver is 3 months status post left trans met amp, 12/9/2020. He reports no fever, chills.  He notes that he has been putting weight on the foot, \"as little as possible\".  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  He does the same thing at home.  And sometimes he puts weight on it taking care of his wife.  I advised him that he absolutely must be nonweightbearing.  I reminded him not to lay on his left side.  I very strongly told him that he is going to end up with a below-knee amputation.  The wound is worse.  It clearly is getting too much pressure.    Past Surgical History:   Procedure Laterality Date   • AMPUTATION DIGIT Left 1/7/2020    Procedure: AMPUTATION LEFT 2ND TOE;  Surgeon: Idania Osborn MD;  Location:  [facility];  Service: Orthopedics   • AMPUTATION DIGIT Left 12/8/2020    Procedure: TRANSMETATARSAL AMPUTATION LEFT;  Surgeon: Idania Osborn MD;  Location:  [facility];  Service: Orthopedics;  Laterality: Left;   • APPENDECTOMY     • COLONOSCOPY  2006   • FOOT SURGERY Left 12/09/2020    (L) transmetatarsal amputation 12/09/2020 - Dr. Idania Osborn   • INCISION AND DRAINAGE LEG Left 10/23/2018    Procedure: INCISION AND DRAINAGE LEFT FOOT;  Surgeon: Idania Osborn MD;  Location:  [facility];  Service: Orthopedics   • TOE AMPUTATION     • TONSILLECTOMY     • VENOUS ACCESS DEVICE (PORT) INSERTION N/A 10/23/2018    Procedure: GROSHONG CATHETER PLACEMENT;  Surgeon: Marquez Simons MD;  Location:  [facility];  Service: General   • VENOUS ACCESS DEVICE (PORT) INSERTION N/A 12/9/2020    Procedure: GROSHONG INSERTION;  Surgeon: Antonio Bains MD;  " Location: [facility] OR;  Service: General;  Laterality: N/A;   • VENOUS ACCESS DEVICE (PORT) REMOVAL         There were no vitals taken for this visit.               Assessment and Plan:   1. Aftercare following surgery of the musculoskeletal system  Unfortunately his wound is even worse.  There is more necrosis.  He is putting too much weight on it.  There is no signs of infection, however I definitely think he is going to end up with a below-knee amputation.  I discussed this very frankly with him.  I do not know how else to impress upon him that he has to be completely nonweightbearing.  He needs somebody to drive him, he needs help from his family.  He asked me if there were any other medications to use.  We can try reGranix, but again the most important thing is pressure on the foot.  We discussed having him go to wound care but he had not been impressed with his initial visits there before I saw him as a patient.  We talked about another opinion at , he declined.  He asked about skin grafting I explained it will not work in this area, because pressure will breakdown the graft.  He reports he does not want a below-knee amputation, and we once again discussed an extremely clear wording how to prevent that.  I reminded him not to lay on his left side, and again he should absolutely not be putting the foot on the ground.  I will see him in 3 to 4 weeks or sooner if anything is worse.  I am very pessimistic about the likelihood of salvaging this foot.          Idania Osborn MD

## 2021-03-04 NOTE — TELEPHONE ENCOUNTER
Dr. Osborn,    I called him and told him about the no go for the other medication. I offered him a wound care referral or the [facility] referral. He would like to try [facility].  shona

## 2021-03-11 ENCOUNTER — TELEPHONE (OUTPATIENT)
Dept: ORTHOPEDIC SURGERY | Facility: CLINIC | Age: 76
End: 2021-03-11

## 2021-03-11 NOTE — TELEPHONE ENCOUNTER
I called [facility] back, they did not have him on file yet. I spoke with Jud, she made him an appt for Monday at 2:00pm with Dr Jones. I called patient and informed him of the appt. He understood.     Silvia

## 2021-03-11 NOTE — TELEPHONE ENCOUNTER
Called patient and gave him information concerning appointment tomorrow at  [facility]. I gave him the number and address, and let him know that their office should be reaching out to him today to confirm appointment. He understood.     Silvia

## 2021-03-11 NOTE — TELEPHONE ENCOUNTER
Pt would like to speak with Silvia In regards to an appt with .  practice is stating that they don't have an appt scheduled but he stated that Silvia stated he was supposed to have one tomorrow. Please advise.

## 2021-07-14 ENCOUNTER — TELEPHONE (OUTPATIENT)
Dept: CARDIOLOGY | Facility: CLINIC | Age: 76
End: 2021-07-14

## 2021-07-14 NOTE — TELEPHONE ENCOUNTER
"Pt calling today to request a sooner appointment for increased frequent dizzy episode with low BP. He reports chronic dizziness in the past but has noticed the frequency of these episodes increase over the last 1 to 2 months. He reports BP during dizzy spells is around 75/45 HR 70's- baseline BP stays around 110//80. 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 Pt denies chest pain/tightness, SOB, palpitations, and swelling. He states he is \"not drinking too much caffeine\" but doesn't keep track of water intake. The pt considered dehydration. He has recently started doxycycline 100 mg BID for a foot infection prescribed by Dr. Jones at . He has been taking this for 1.5 months. He does report some loose, more frequent bowel movements but says it is not diarrhea and he is eating a probiotic yogurt. He is still taking all medications as prescribed in Epic. Thoughts?  "

## 2021-07-14 NOTE — TELEPHONE ENCOUNTER
Likely related to dehydration, increase water intake at least 90 to 100 ounces water daily especially in light of loose stools.  Okay to add on for Friday or Monday clinic

## 2021-07-15 NOTE — TELEPHONE ENCOUNTER
DELLAM for pt with RDS recommendations above. Will await pt return call if he would like to schedule an apt.

## 2021-09-01 ENCOUNTER — LAB (OUTPATIENT)
Dept: LAB | Facility: HOSPITAL | Age: 76
End: 2021-09-01

## 2021-09-01 ENCOUNTER — TRANSCRIBE ORDERS (OUTPATIENT)
Dept: LAB | Facility: HOSPITAL | Age: 76
End: 2021-09-01

## 2021-09-01 DIAGNOSIS — T87.44 INFECTION OF LEFT BELOW KNEE AMPUTATION (HCC): ICD-10-CM

## 2021-09-01 DIAGNOSIS — R94.4 NONSPECIFIC ABNORMAL RESULTS OF KIDNEY FUNCTION STUDY: Primary | ICD-10-CM

## 2021-09-01 DIAGNOSIS — M86.172 ACUTE OSTEOMYELITIS OF LEFT ANKLE OR FOOT (HCC): ICD-10-CM

## 2021-09-01 DIAGNOSIS — R94.4 NONSPECIFIC ABNORMAL RESULTS OF KIDNEY FUNCTION STUDY: ICD-10-CM

## 2021-09-01 DIAGNOSIS — E66.09 EXOGENOUS OBESITY: ICD-10-CM

## 2021-09-01 PROCEDURE — 87186 SC STD MICRODIL/AGAR DIL: CPT

## 2021-09-01 PROCEDURE — 87077 CULTURE AEROBIC IDENTIFY: CPT

## 2021-09-01 PROCEDURE — 87070 CULTURE OTHR SPECIMN AEROBIC: CPT

## 2021-09-01 PROCEDURE — 87205 SMEAR GRAM STAIN: CPT

## 2021-09-03 LAB
BACTERIA SPEC AEROBE CULT: ABNORMAL
GRAM STN SPEC: ABNORMAL

## 2021-09-13 ENCOUNTER — LAB (OUTPATIENT)
Dept: LAB | Facility: HOSPITAL | Age: 76
End: 2021-09-13

## 2021-09-13 ENCOUNTER — TRANSCRIBE ORDERS (OUTPATIENT)
Dept: LAB | Facility: HOSPITAL | Age: 76
End: 2021-09-13

## 2021-09-13 ENCOUNTER — TRANSCRIBE ORDERS (OUTPATIENT)
Dept: ADMINISTRATIVE | Facility: HOSPITAL | Age: 76
End: 2021-09-13

## 2021-09-13 DIAGNOSIS — M86.172 ACUTE OSTEOMYELITIS OF LEFT FOOT (HCC): Primary | ICD-10-CM

## 2021-09-13 DIAGNOSIS — G62.9 PERIPHERAL NERVE DISORDER: ICD-10-CM

## 2021-09-13 DIAGNOSIS — G62.9 PERIPHERAL NERVE DISORDER: Primary | ICD-10-CM

## 2021-09-13 LAB
ALBUMIN SERPL-MCNC: 3.9 G/DL (ref 3.5–5.2)
ALBUMIN/GLOB SERPL: 1.2 G/DL
ALP SERPL-CCNC: 112 U/L (ref 39–117)
ALT SERPL W P-5'-P-CCNC: 18 U/L (ref 1–41)
ANION GAP SERPL CALCULATED.3IONS-SCNC: 12 MMOL/L (ref 5–15)
AST SERPL-CCNC: 26 U/L (ref 1–40)
BILIRUB SERPL-MCNC: 0.6 MG/DL (ref 0–1.2)
BUN SERPL-MCNC: 21 MG/DL (ref 8–23)
BUN/CREAT SERPL: 15.1 (ref 7–25)
CALCIUM SPEC-SCNC: 9.9 MG/DL (ref 8.6–10.5)
CHLORIDE SERPL-SCNC: 103 MMOL/L (ref 98–107)
CO2 SERPL-SCNC: 24 MMOL/L (ref 22–29)
CREAT SERPL-MCNC: 1.39 MG/DL (ref 0.76–1.27)
CRP SERPL-MCNC: 3.55 MG/DL (ref 0–0.5)
DEPRECATED RDW RBC AUTO: 43.2 FL (ref 37–54)
ERYTHROCYTE [DISTWIDTH] IN BLOOD BY AUTOMATED COUNT: 12.4 % (ref 12.3–15.4)
ERYTHROCYTE [SEDIMENTATION RATE] IN BLOOD: 33 MM/HR (ref 0–20)
GFR SERPL CREATININE-BSD FRML MDRD: 50 ML/MIN/1.73
GLOBULIN UR ELPH-MCNC: 3.3 GM/DL
GLUCOSE SERPL-MCNC: 136 MG/DL (ref 65–99)
HCT VFR BLD AUTO: 44.3 % (ref 37.5–51)
HGB BLD-MCNC: 15.1 G/DL (ref 13–17.7)
MCH RBC QN AUTO: 32.1 PG (ref 26.6–33)
MCHC RBC AUTO-ENTMCNC: 34.1 G/DL (ref 31.5–35.7)
MCV RBC AUTO: 94.3 FL (ref 79–97)
PLATELET # BLD AUTO: 197 10*3/MM3 (ref 140–450)
PMV BLD AUTO: 9.2 FL (ref 6–12)
POTASSIUM SERPL-SCNC: 4.7 MMOL/L (ref 3.5–5.2)
PROT SERPL-MCNC: 7.2 G/DL (ref 6–8.5)
RBC # BLD AUTO: 4.7 10*6/MM3 (ref 4.14–5.8)
SODIUM SERPL-SCNC: 139 MMOL/L (ref 136–145)
WBC # BLD AUTO: 10.45 10*3/MM3 (ref 3.4–10.8)

## 2021-09-13 PROCEDURE — 80053 COMPREHEN METABOLIC PANEL: CPT | Performed by: INTERNAL MEDICINE

## 2021-09-13 PROCEDURE — 86140 C-REACTIVE PROTEIN: CPT

## 2021-09-13 PROCEDURE — 85652 RBC SED RATE AUTOMATED: CPT | Performed by: INTERNAL MEDICINE

## 2021-09-13 PROCEDURE — 36415 COLL VENOUS BLD VENIPUNCTURE: CPT | Performed by: INTERNAL MEDICINE

## 2021-09-13 PROCEDURE — 85027 COMPLETE CBC AUTOMATED: CPT

## 2021-09-14 ENCOUNTER — HOSPITAL ENCOUNTER (OUTPATIENT)
Dept: GENERAL RADIOLOGY | Facility: HOSPITAL | Age: 76
Discharge: HOME | End: 2021-09-14

## 2021-09-14 ENCOUNTER — HOSPITAL ENCOUNTER (OUTPATIENT)
Dept: INFUSION THERAPY | Facility: HOSPITAL | Age: 76
Discharge: HOME | End: 2021-09-14

## 2021-09-14 VITALS
HEART RATE: 73 BPM | OXYGEN SATURATION: 98 % | SYSTOLIC BLOOD PRESSURE: 92 MMHG | DIASTOLIC BLOOD PRESSURE: 60 MMHG | RESPIRATION RATE: 18 BRPM | TEMPERATURE: 96.8 F

## 2021-09-14 DIAGNOSIS — M86.172 ACUTE OSTEOMYELITIS OF LEFT FOOT (HCC): ICD-10-CM

## 2021-09-14 PROCEDURE — C1894 INTRO/SHEATH, NON-LASER: HCPCS

## 2021-09-14 PROCEDURE — 71045 X-RAY EXAM CHEST 1 VIEW: CPT

## 2021-09-14 PROCEDURE — C1751 CATH, INF, PER/CENT/MIDLINE: HCPCS

## 2021-09-14 RX ORDER — SODIUM CHLORIDE 9 MG/ML
9 INJECTION, SOLUTION INTRAVENOUS CONTINUOUS
Status: DISCONTINUED | OUTPATIENT
Start: 2021-09-14 | End: 2021-09-16 | Stop reason: HOSPADM

## 2021-09-14 RX ORDER — SODIUM CHLORIDE 0.9 % (FLUSH) 0.9 %
10 SYRINGE (ML) INJECTION EVERY 12 HOURS SCHEDULED
Status: DISCONTINUED | OUTPATIENT
Start: 2021-09-14 | End: 2021-09-16 | Stop reason: HOSPADM

## 2021-09-14 RX ORDER — SODIUM CHLORIDE 0.9 % (FLUSH) 0.9 %
20 SYRINGE (ML) INJECTION AS NEEDED
Status: DISCONTINUED | OUTPATIENT
Start: 2021-09-14 | End: 2021-09-16 | Stop reason: HOSPADM

## 2021-09-14 RX ORDER — SODIUM CHLORIDE 0.9 % (FLUSH) 0.9 %
10 SYRINGE (ML) INJECTION AS NEEDED
Status: DISCONTINUED | OUTPATIENT
Start: 2021-09-14 | End: 2021-09-16 | Stop reason: HOSPADM

## 2021-09-14 NOTE — PROGRESS NOTES
BETTY ULRICH PICC placed by JANY Flood RN, verified via chest xray due to a.fib.  42cm total length, 1cm exposed

## 2022-01-03 RX ORDER — APIXABAN 5 MG/1
TABLET, FILM COATED ORAL
Qty: 180 TABLET | Refills: 0 | Status: SHIPPED | OUTPATIENT
Start: 2022-01-03 | End: 2022-03-14 | Stop reason: SDUPTHER

## 2022-03-11 NOTE — PROGRESS NOTES
OFFICE VISIT  NOTE  [facility] CARDIOLOGY      Name: Carlos Culver    Date: 3/14/2022  MRN:  [id number]  :  1945      REFERRING/PRIMARY PROVIDER:  Marcus Cheng MD    Chief Complaint   Patient presents with   • Atrial Fibrillation       HPI: Carlos Culver is a 76 y.o. male who presents today for follow-up for PAF.  Associated history of excess alcohol use, hypertension, hyperlipidemia, osteomyelitis left foot status post transmetatarsal amputation 2020 by Dr. Fernández.  Originally diagnosed in 2018, prescribed metoprolol and Xarelto but he did not take blood thinners due to concomitant alcohol use.  Patient seen prior to surgery 2020, A. fib with RVR, surgery canceled, started on metoprolol and Eliquis.  Successful surgery later in December.  Episode of dizziness in July resolved after drinking more water.  Denies palpitations chest pain or shortness of breath.  Drinking 3-4 alcoholic beverages per day.    Past Medical History:   Diagnosis Date   • A-fib (HCC)    • Acute kidney failure (HCC)    • Elevated cholesterol    • Hard of hearing    • Hyperlipidemia    • Hypertension    • Renal disorder    • Wears hearing aid in both ears    • Wears reading eyeglasses        Past Surgical History:   Procedure Laterality Date   • AMPUTATION DIGIT Left 2020    Procedure: AMPUTATION LEFT 2ND TOE;  Surgeon: Idania Fernández MD;  Location:  [facility];  Service: Orthopedics   • AMPUTATION DIGIT Left 2020    Procedure: TRANSMETATARSAL AMPUTATION LEFT;  Surgeon: Idania Fernández MD;  Location:  [facility];  Service: Orthopedics;  Laterality: Left;   • APPENDECTOMY     • COLONOSCOPY  2006   • FOOT SURGERY Left 2020    (L) transmetatarsal amputation 2020 - Dr. Idania Fernández   • INCISION AND DRAINAGE LEG Left 10/23/2018    Procedure: INCISION AND DRAINAGE LEFT FOOT;  Surgeon: Idania Fernández MD;  Location:  [facility];  Service: Orthopedics   • TOE AMPUTATION     •  TONSILLECTOMY     • VENOUS ACCESS DEVICE (PORT) INSERTION N/A 10/23/2018    Procedure: GROSHONG CATHETER PLACEMENT;  Surgeon: Marquez Simons MD;  Location:  [facility];  Service: General   • VENOUS ACCESS DEVICE (PORT) INSERTION N/A 12/9/2020    Procedure: GROSHONG INSERTION;  Surgeon: Antonio Bains MD;  Location:  [facility];  Service: General;  Laterality: N/A;   • VENOUS ACCESS DEVICE (PORT) REMOVAL         Social History     Socioeconomic History   • Marital status:    Tobacco Use   • Smoking status: Never Smoker   • Smokeless tobacco: Never Used   Vaping Use   • Vaping Use: Never used   Substance and Sexual Activity   • Alcohol use: Yes     Alcohol/week: 3.0 - 4.0 standard drinks     Types: 3 - 4 Shots of liquor per week     Comment: drinks a fifth a week DRINKS 3-5 DRINKS DAILY    • Drug use: No   • Sexual activity: Defer       History reviewed. No pertinent family history.     ROS:   Constitutional no fever,  no weight loss   Skin no rash, no subcutaneous nodules   Otolaryngeal no difficulty swallowing   Cardiovascular See HPI   Pulmonary no cough, no sputum production   Gastrointestinal no constipation, no diarrhea   Genitourinary no dysuria, no hematuria   Hematologic no easy bruisability, no abnormal bleeding   Musculoskeletal no muscle pain   Neurologic no dizziness, no falls         Allergies   Allergen Reactions   • Sulfa Antibiotics Unknown (See Comments)     Pt was told as child he had an allergy.  Has not taken and doesn't know the response         Current Outpatient Medications:   •  apixaban (Eliquis) 5 MG tablet tablet, Take 1 tablet by mouth 2 (Two) Times a Day., Disp: 180 tablet, Rfl: 3  •  atorvastatin (LIPITOR) 80 MG tablet, Take 1 tablet by mouth Every Night. Hold while on Daptomycin, Disp: 90 tablet, Rfl: 3  •  lisinopril-hydrochlorothiazide (PRINZIDE,ZESTORETIC) 20-12.5 MG per tablet, Take 1 tablet by mouth Daily., Disp: , Rfl:   •  metoprolol tartrate (LOPRESSOR) 25 MG  "tablet, Take 1 tablet by mouth 2 (Two) Times a Day., Disp: 180 tablet, Rfl: 3    Vitals:    03/14/22 0913   BP: 112/70   BP Location: Right arm   Patient Position: Sitting   Pulse: 85   SpO2: 100%   Weight: 95.3 kg (210 lb)   Height: 182.9 cm (72\")     Body mass index is 28.48 kg/m².    PHYSICAL EXAM:    General Appearance:   · well developed  · well nourished  HENT:   · oropharynx moist  · lips not cyanotic  Neck:  · thyroid not enlarged  · supple  Respiratory:  · no respiratory distress  · normal breath sounds  · no rales  Cardiovascular:  · no jugular venous distention  · Irregular irregular rhythm  · apical impulse normal  · S1 normal, S2 normal  · no S3, no S4   · no murmur  · no rub, no thrill  · carotid pulses normal; no bruit  · pedal pulses normal  · lower extremity edema: none    Gastrointestinal:   · bowel sounds normal  · non-tender  · no hepatomegaly, no splenomegaly  Musculoskeletal:  · no clubbing of fingers.   · normocephalic, head atraumatic  Skin:   · warm, dry  Psychiatric:  · judgement and insight appropriate  · normal mood and affect    RESULTS:   Procedures    Results for orders placed during the hospital encounter of 12/01/20    Adult Transthoracic Echo Complete W/ Cont if Necessary Per Protocol    Interpretation Summary  · Left ventricular ejection fraction appears to be 51 - 55%. Left ventricular systolic function is low normal.  · Left ventricular wall thickness is consistent with mild concentric hypertrophy.  · Left atrial volume is moderately increased.  · The right atrial cavity is moderate to severely dilated.  · The right ventricular cavity is mildly dilated.  · Severe tricuspid valve regurgitation is present.  · Estimated right ventricular systolic pressure from tricuspid regurgitation is mildly elevated (35-45 mmHg). Calculated right ventricular systolic pressure from tricuspid regurgitation is 41 mmHg.  · Mild to moderate mitral regurgitation.        Labs:  Lab Results   Component " Value Date    AST 26 09/13/2021    ALT 18 09/13/2021     Lab Results   Component Value Date    HGBA1C 5.80 (H) 11/25/2020     No components found for: CREATINININE  eGFR Non  Amer   Date Value Ref Range Status   09/13/2021 50 (L) >60 mL/min/1.73 Final   01/25/2021 57 (L) >60 mL/min/1.73 Final   01/18/2021 58 (L) >60 mL/min/1.73 Final         ASSESSMENT:  Problem List Items Addressed This Visit        Cardiac and Vasculature    Essential hypertension    Relevant Medications    metoprolol tartrate (LOPRESSOR) 25 MG tablet    MARTINEZ (dyspnea on exertion)    Overview     10/23/18 Echo  · Mild tricuspid valve regurgitation is present.  · EF 70%  · Calculated right ventricular systolic pressure from tricuspid regurgitation is 26 mmHg.  · Mild mitral valve regurgitation is present             Hyperlipidemia LDL goal <100    Relevant Medications    atorvastatin (LIPITOR) 80 MG tablet    A-fib (HCC) - Primary    Overview     10/23/18 Echo  · Mild tricuspid valve regurgitation is present.  · Lumason contrast shows normal overall wall motion and systolic function with an EF 70%           Relevant Medications    metoprolol tartrate (LOPRESSOR) 25 MG tablet       Coag and Thromboembolic    Chronic anticoagulation       Genitourinary and Reproductive     CKD (chronic kidney disease) stage 2, GFR 60-89 ml/min          PLAN:    1.  Persistent atrial fibrillation:  I discussed possibility of rhythm versus rate control, echo shows moderate left atrial margin with EF of 50-55% with moderate to severe TR, moderate elevated RVSP.  Given somewhat low likelihood of maintaining sinus rhythm with concomitant alcohol use, he agrees for rate control at this time.    New prescription for metoprolol 25 mg twice daily sent  Prescription for Eliquis 5 mg twice daily sent, 1 year supply    Decrease alcohol, complete abstinence would be preferred but he will try to cut down to less than 2/day    2.  Hypertension:  Low normal at this time  If  less than 100 systolic consistently or symptomatic, discontinue Zestoretic    3.  Alcohol abuse  I recommend complete alcohol abstinence with weaning off, he will think about it, but at least we will try to cut down the 1 to 2/day    Return to clinic in 9 months, or sooner as needed.    Thank you for the opportunity to share in the care of your patient; please do not hesitate to call me with any questions.     Hang Anderson MD, [facility]  Office: [phone] [address] [place] [address]    03/14/22

## 2022-03-14 ENCOUNTER — OFFICE VISIT (OUTPATIENT)
Dept: CARDIOLOGY | Facility: CLINIC | Age: 77
End: 2022-03-14

## 2022-03-14 VITALS
SYSTOLIC BLOOD PRESSURE: 112 MMHG | HEIGHT: 72 IN | HEART RATE: 85 BPM | OXYGEN SATURATION: 100 % | WEIGHT: 210 LBS | BODY MASS INDEX: 28.44 KG/M2 | DIASTOLIC BLOOD PRESSURE: 70 MMHG

## 2022-03-14 DIAGNOSIS — I10 ESSENTIAL HYPERTENSION: ICD-10-CM

## 2022-03-14 DIAGNOSIS — I48.0 PAROXYSMAL ATRIAL FIBRILLATION: Primary | ICD-10-CM

## 2022-03-14 DIAGNOSIS — E78.5 HYPERLIPIDEMIA LDL GOAL <100: ICD-10-CM

## 2022-03-14 DIAGNOSIS — R06.09 DOE (DYSPNEA ON EXERTION): ICD-10-CM

## 2022-03-14 DIAGNOSIS — Z79.01 CHRONIC ANTICOAGULATION: ICD-10-CM

## 2022-03-14 DIAGNOSIS — N18.2 CKD (CHRONIC KIDNEY DISEASE) STAGE 2, GFR 60-89 ML/MIN: ICD-10-CM

## 2022-03-14 PROCEDURE — 99214 OFFICE O/P EST MOD 30 MIN: CPT | Performed by: INTERNAL MEDICINE

## 2022-03-14 RX ORDER — ATORVASTATIN CALCIUM 80 MG/1
80 TABLET, FILM COATED ORAL NIGHTLY
Qty: 90 TABLET | Refills: 3
Start: 2022-03-14

## 2023-01-06 PROBLEM — F10.10 ALCOHOL ABUSE: Status: ACTIVE | Noted: 2023-01-06

## 2023-01-06 NOTE — PROGRESS NOTES
OFFICE VISIT  NOTE  [facility] CARDIOLOGY [place]      Name: Carlos Culver    Date: 2023  MRN:  [id number]  :  1945      REFERRING/PRIMARY PROVIDER:  Marcus Cheng MD    Chief Complaint   Patient presents with   • PAF       HPI: Carlos Culver is a 77 y.o. male who presents today for follow-up for PAF.  Associated history of excess alcohol use, hypertension, hyperlipidemia, osteomyelitis left foot status post transmetatarsal amputation 2020 by Dr. Fernández.  Originally diagnosed in 2018, prescribed metoprolol and Xarelto but he did not take blood thinners due to concomitant alcohol use.  Patient seen prior to surgery 2020, A. fib with RVR, surgery canceled, started on metoprolol and Eliquis.  Successful surgery later in 2020.  Denies palpitations chest pain or shortness of breath.  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  Still drinking 3-4 alcoholic beverages per night.  No interest in quitting.    Past Medical History:   Diagnosis Date   • A-fib (HCC)    • Acute kidney failure (HCC)    • Elevated cholesterol    • Hard of hearing    • Hyperlipidemia    • Hypertension    • Renal disorder    • Wears hearing aid in both ears    • Wears reading eyeglasses        Past Surgical History:   Procedure Laterality Date   • AMPUTATION DIGIT Left 2020    Procedure: AMPUTATION LEFT 2ND TOE;  Surgeon: Idania Fernández MD;  Location: [facility];  Service: Orthopedics   • AMPUTATION DIGIT Left 2020    Procedure: TRANSMETATARSAL AMPUTATION LEFT;  Surgeon: Idania Fernández MD;  Location: [facility];  Service: Orthopedics;  Laterality: Left;   • APPENDECTOMY     • COLONOSCOPY  2006   • FOOT SURGERY Left 2020    (L) transmetatarsal amputation 2020 - Dr. Idania Fernández   • INCISION AND DRAINAGE LEG Left 10/23/2018    Procedure: INCISION AND DRAINAGE LEFT FOOT;  Surgeon: Idania Fernández MD;   Location:  [facility];  Service: Orthopedics   • TOE AMPUTATION     • TONSILLECTOMY     • VENOUS ACCESS DEVICE (PORT) INSERTION N/A 10/23/2018    Procedure: GROSHONG CATHETER PLACEMENT;  Surgeon: Marquez Simons MD;  Location:  [facility];  Service: General   • VENOUS ACCESS DEVICE (PORT) INSERTION N/A 12/9/2020    Procedure: GROSHONG INSERTION;  Surgeon: Antonio Bains MD;  Location:  [facility];  Service: General;  Laterality: N/A;   • VENOUS ACCESS DEVICE (PORT) REMOVAL         Social History     Socioeconomic History   • Marital status:    Tobacco Use   • Smoking status: Never   • Smokeless tobacco: Never   Vaping Use   • Vaping Use: Never used   Substance and Sexual Activity   • Alcohol use: Yes     Alcohol/week: 3.0 - 4.0 standard drinks     Types: 3 - 4 Shots of liquor per week     Comment: drinks a fifth a week DRINKS 3-5 DRINKS DAILY    • Drug use: No   • Sexual activity: Defer       History reviewed. No pertinent family history.     ROS:   Constitutional no fever,  no weight loss   Skin no rash, no subcutaneous nodules   Otolaryngeal no difficulty swallowing   Cardiovascular See HPI   Pulmonary no cough, no sputum production   Gastrointestinal no constipation, no diarrhea   Genitourinary no dysuria, no hematuria   Hematologic no easy bruisability, no abnormal bleeding   Musculoskeletal no muscle pain   Neurologic no dizziness, no falls         Allergies   Allergen Reactions   • Sulfa Antibiotics Unknown (See Comments)     Pt was told as child he had an allergy.  Has not taken and doesn't know the response         Current Outpatient Medications:   •  apixaban (Eliquis) 5 MG tablet tablet, Take 1 tablet by mouth 2 (Two) Times a Day., Disp: 180 tablet, Rfl: 3  •  atorvastatin (LIPITOR) 80 MG tablet, Take 1 tablet by mouth Every Night. Hold while on Daptomycin, Disp: 90 tablet, Rfl: 3  •  lisinopril-hydrochlorothiazide (PRINZIDE,ZESTORETIC) 20-12.5 MG per tablet, Take 1 tablet by mouth Daily.,  Disp: , Rfl:   •  metoprolol tartrate (LOPRESSOR) 25 MG tablet, Take 1 tablet by mouth 2 (Two) Times a Day., Disp: 180 tablet, Rfl: 3    Vitals:    01/09/23 1046   BP: 118/68   BP Location: Left arm   Patient Position: Sitting   Pulse: 86   SpO2: 99%   Weight: 99.8 kg (220 lb)   Height: 181.6 cm (71.5\")     Body mass index is 30.26 kg/m².    PHYSICAL EXAM:    General Appearance:   · well developed  · well nourished  HENT:   · oropharynx moist  · lips not cyanotic  Neck:  · thyroid not enlarged  · supple  Respiratory:  · no respiratory distress  · normal breath sounds  · no rales  Cardiovascular:  · no jugular venous distention  · Irregular irregular rhythm  · apical impulse normal  · S1 normal, S2 normal  · no S3, no S4   · no murmur  · no rub, no thrill  · carotid pulses normal; no bruit  · pedal pulses normal  · lower extremity edema: none    Gastrointestinal:   · bowel sounds normal  · non-tender  · no hepatomegaly, no splenomegaly  Musculoskeletal:  · no clubbing of fingers.   · normocephalic, head atraumatic  Skin:   · warm, dry  Psychiatric:  · judgement and insight appropriate  · normal mood and affect    RESULTS:   Procedures    Results for orders placed during the hospital encounter of 12/01/20    Adult Transthoracic Echo Complete W/ Cont if Necessary Per Protocol    Interpretation Summary  · Left ventricular ejection fraction appears to be 51 - 55%. Left ventricular systolic function is low normal.  · Left ventricular wall thickness is consistent with mild concentric hypertrophy.  · Left atrial volume is moderately increased.  · The right atrial cavity is moderate to severely dilated.  · The right ventricular cavity is mildly dilated.  · Severe tricuspid valve regurgitation is present.  · Estimated right ventricular systolic pressure from tricuspid regurgitation is mildly elevated (35-45 mmHg). Calculated right ventricular systolic pressure from tricuspid regurgitation is 41 mmHg.  · Mild to moderate  mitral regurgitation.        Labs:  Lab Results   Component Value Date    AST 26 09/13/2021    ALT 18 09/13/2021     Lab Results   Component Value Date    HGBA1C 5.80 (H) 11/25/2020     No components found for: CREATINININE  eGFR Non  Am   Date Value Ref Range Status   09/10/2021 >60 >60 mL/min/1.73m*2 Final     Comment:     eGFR = estimated GFR; eGFR units = mL/min/1.73 sq meters Chronic Kidney Disease is considered if eGFR <60 mL/min/1.73 sq meters Kidney failure is considered if eGFR is <15 mL/min/1.73 sq meters. eGFR assumes steady state plasma creatinine concentration; not applicable if renal function is rapidly changing or patient is on dialysis.   08/23/2021 >60 >60 mL/min/1.73m*2 Final     Comment:     eGFR = estimated GFR; eGFR units = mL/min/1.73 sq meters Chronic Kidney Disease is considered if eGFR <60 mL/min/1.73 sq meters Kidney failure is considered if eGFR is <15 mL/min/1.73 sq meters. eGFR assumes steady state plasma creatinine concentration; not applicable if renal function is rapidly changing or patient is on dialysis.     eGFR Non  Amer   Date Value Ref Range Status   09/13/2021 50 (L) >60 mL/min/1.73 Final   01/25/2021 57 (L) >60 mL/min/1.73 Final   01/18/2021 58 (L) >60 mL/min/1.73 Final         ASSESSMENT:  Problem List Items Addressed This Visit        Cardiac and Vasculature    Essential hypertension    MARTINEZ (dyspnea on exertion)    Overview     10/23/18 Echo  · Mild tricuspid valve regurgitation is present.  · EF 70%  · Calculated right ventricular systolic pressure from tricuspid regurgitation is 26 mmHg.  · Mild mitral valve regurgitation is present           Hyperlipidemia LDL goal <100    A-fib (HCC) - Primary    Overview     10/23/18 Echo  · Mild tricuspid valve regurgitation is present.  · Lumason contrast shows normal overall wall motion and systolic function with an EF 70%            Coag and Thromboembolic    Chronic anticoagulation       Genitourinary and Reproductive      CKD (chronic kidney disease) stage 2, GFR 60-89 ml/min       Mental Health    Alcohol abuse       PLAN:    1.  Persistent atrial fibrillation:  I discussed possibility of rhythm versus rate control, echo shows moderate left atrial enlargement with EF of 50-55% with moderate to severe TR, moderate elevated RVSP.  Given low likelihood of maintaining sinus rhythm with concomitant alcohol use, we will continue rate control strategy at this time.    Continue metoprolol 25 mg twice daily sent  Continue Eliquis 5 mg twice daily, I discussed the risk of bleeding due to concomitant alcohol use, he understands this risk but wishes to continue anticoagulant therapy.    Decrease alcohol, complete abstinence would be preferred    2.  Hypertension:  Low normal at this time  If less than 100 systolic consistently or symptomatic, discontinue Zestoretic    3.  Alcohol abuse  I recommend complete alcohol abstinence with weaning off, he will think about it, but at least we will try to cut down the 1 to 2/day    Return to clinic in 12 months, or sooner as needed.    Thank you for the opportunity to share in the care of your patient; please do not hesitate to call me with any questions.     Hang Anderson MD, [facility]  Office: [phone] [address] [place] [address]    01/09/23

## 2023-01-09 ENCOUNTER — OFFICE VISIT (OUTPATIENT)
Dept: CARDIOLOGY | Facility: CLINIC | Age: 78
End: 2023-01-09
Payer: MEDICARE

## 2023-01-09 VITALS
SYSTOLIC BLOOD PRESSURE: 118 MMHG | BODY MASS INDEX: 29.8 KG/M2 | DIASTOLIC BLOOD PRESSURE: 68 MMHG | OXYGEN SATURATION: 99 % | WEIGHT: 220 LBS | HEIGHT: 72 IN | HEART RATE: 86 BPM

## 2023-01-09 DIAGNOSIS — E78.5 HYPERLIPIDEMIA LDL GOAL <100: ICD-10-CM

## 2023-01-09 DIAGNOSIS — R06.09 DOE (DYSPNEA ON EXERTION): ICD-10-CM

## 2023-01-09 DIAGNOSIS — N18.2 CKD (CHRONIC KIDNEY DISEASE) STAGE 2, GFR 60-89 ML/MIN: ICD-10-CM

## 2023-01-09 DIAGNOSIS — I10 ESSENTIAL HYPERTENSION: ICD-10-CM

## 2023-01-09 DIAGNOSIS — F10.10 ALCOHOL ABUSE: ICD-10-CM

## 2023-01-09 DIAGNOSIS — I48.0 PAROXYSMAL ATRIAL FIBRILLATION: Primary | ICD-10-CM

## 2023-01-09 DIAGNOSIS — Z79.01 CHRONIC ANTICOAGULATION: ICD-10-CM

## 2023-01-09 PROCEDURE — 99214 OFFICE O/P EST MOD 30 MIN: CPT | Performed by: INTERNAL MEDICINE

## 2023-03-29 RX ORDER — APIXABAN 5 MG/1
TABLET, FILM COATED ORAL
Qty: 180 TABLET | Refills: 3 | Status: SHIPPED | OUTPATIENT
Start: 2023-03-29

## 2023-07-19 ENCOUNTER — APPOINTMENT (OUTPATIENT)
Dept: GENERAL RADIOLOGY | Facility: HOSPITAL | Age: 78
DRG: 854 | End: 2023-07-19
Payer: MEDICARE

## 2023-07-19 ENCOUNTER — APPOINTMENT (OUTPATIENT)
Dept: CT IMAGING | Facility: HOSPITAL | Age: 78
DRG: 854 | End: 2023-07-19
Payer: MEDICARE

## 2023-07-19 ENCOUNTER — HOSPITAL ENCOUNTER (INPATIENT)
Facility: HOSPITAL | Age: 78
LOS: 7 days | Discharge: NURSING, REHAB OR LONG-TERM CARE | DRG: 854 | End: 2023-07-26
Attending: EMERGENCY MEDICINE | Admitting: STUDENT IN AN ORGANIZED HEALTH CARE EDUCATION/TRAINING PROGRAM
Payer: MEDICARE

## 2023-07-19 ENCOUNTER — APPOINTMENT (OUTPATIENT)
Dept: CARDIOLOGY | Facility: HOSPITAL | Age: 78
DRG: 854 | End: 2023-07-19
Payer: MEDICARE

## 2023-07-19 ENCOUNTER — APPOINTMENT (OUTPATIENT)
Dept: MRI IMAGING | Facility: HOSPITAL | Age: 78
DRG: 854 | End: 2023-07-19
Payer: MEDICARE

## 2023-07-19 DIAGNOSIS — I10 ELEVATED BLOOD PRESSURE READING WITH DIAGNOSIS OF HYPERTENSION: ICD-10-CM

## 2023-07-19 DIAGNOSIS — G89.18 ACUTE POSTOPERATIVE PAIN: ICD-10-CM

## 2023-07-19 DIAGNOSIS — R78.81 BACTEREMIA: Primary | ICD-10-CM

## 2023-07-19 DIAGNOSIS — Z74.09 IMPAIRED FUNCTIONAL MOBILITY, BALANCE, GAIT, AND ENDURANCE: ICD-10-CM

## 2023-07-19 DIAGNOSIS — M86.172 ACUTE OSTEOMYELITIS OF LEFT ANKLE OR FOOT: ICD-10-CM

## 2023-07-19 PROBLEM — A41.9 SEPSIS: Status: ACTIVE | Noted: 2023-07-19

## 2023-07-19 PROBLEM — B95.61 STAPHYLOCOCCUS AUREUS BACTEREMIA: Status: ACTIVE | Noted: 2023-07-19

## 2023-07-19 LAB
ALBUMIN SERPL-MCNC: 2.8 G/DL (ref 3.5–5.2)
ALBUMIN/GLOB SERPL: 0.6 G/DL
ALP SERPL-CCNC: 196 U/L (ref 39–117)
ALT SERPL W P-5'-P-CCNC: 24 U/L (ref 1–41)
ANION GAP SERPL CALCULATED.3IONS-SCNC: 16 MMOL/L (ref 5–15)
AST SERPL-CCNC: 33 U/L (ref 1–40)
BASOPHILS # BLD AUTO: 0.07 10*3/MM3 (ref 0–0.2)
BASOPHILS NFR BLD AUTO: 0.4 % (ref 0–1.5)
BH CV ECHO MEAS - AO MAX PG: 10.9 MMHG
BH CV ECHO MEAS - AO MEAN PG: 6 MMHG
BH CV ECHO MEAS - AO ROOT DIAM: 3.1 CM
BH CV ECHO MEAS - AO V2 MAX: 165 CM/SEC
BH CV ECHO MEAS - AO V2 VTI: 27 CM
BH CV ECHO MEAS - AVA(I,D): 1.51 CM2
BH CV ECHO MEAS - EDV(CUBED): 97.3 ML
BH CV ECHO MEAS - EDV(MOD-SP2): 101 ML
BH CV ECHO MEAS - EDV(MOD-SP4): 81.3 ML
BH CV ECHO MEAS - EF(MOD-BP): 60 %
BH CV ECHO MEAS - EF(MOD-SP2): 57.6 %
BH CV ECHO MEAS - EF(MOD-SP4): 66.3 %
BH CV ECHO MEAS - ESV(CUBED): 35.9 ML
BH CV ECHO MEAS - ESV(MOD-SP2): 42.8 ML
BH CV ECHO MEAS - ESV(MOD-SP4): 27.4 ML
BH CV ECHO MEAS - FS: 28.3 %
BH CV ECHO MEAS - IVS/LVPW: 0.91 CM
BH CV ECHO MEAS - IVSD: 1 CM
BH CV ECHO MEAS - LA DIMENSION: 4.1 CM
BH CV ECHO MEAS - LAT PEAK E' VEL: 13.5 CM/SEC
BH CV ECHO MEAS - LV MASS(C)D: 169.9 GRAMS
BH CV ECHO MEAS - LV MAX PG: 3.1 MMHG
BH CV ECHO MEAS - LV MEAN PG: 2 MMHG
BH CV ECHO MEAS - LV V1 MAX: 88.6 CM/SEC
BH CV ECHO MEAS - LV V1 VTI: 13 CM
BH CV ECHO MEAS - LVIDD: 4.6 CM
BH CV ECHO MEAS - LVIDS: 3.3 CM
BH CV ECHO MEAS - LVOT AREA: 3.1 CM2
BH CV ECHO MEAS - LVOT DIAM: 2 CM
BH CV ECHO MEAS - LVPWD: 1.1 CM
BH CV ECHO MEAS - MED PEAK E' VEL: 11.1 CM/SEC
BH CV ECHO MEAS - MV DEC TIME: 0.18 MSEC
BH CV ECHO MEAS - MV E MAX VEL: 85.9 CM/SEC
BH CV ECHO MEAS - PA ACC TIME: 0.09 SEC
BH CV ECHO MEAS - PA V2 MAX: 148 CM/SEC
BH CV ECHO MEAS - RAP SYSTOLE: 3 MMHG
BH CV ECHO MEAS - RVSP: 43 MMHG
BH CV ECHO MEAS - SV(LVOT): 40.8 ML
BH CV ECHO MEAS - SV(MOD-SP2): 58.2 ML
BH CV ECHO MEAS - SV(MOD-SP4): 53.9 ML
BH CV ECHO MEAS - TAPSE (>1.6): 1.9 CM
BH CV ECHO MEAS - TR MAX PG: 39.6 MMHG
BH CV ECHO MEAS - TR MAX VEL: 314 CM/SEC
BH CV ECHO MEASUREMENTS AVERAGE E/E' RATIO: 6.98
BH CV ECHO SHUNT ASSESSMENT PERFORMED (HIDDEN SCRIPTING): 1
BH CV VAS BP LEFT ARM: NORMAL MMHG
BH CV XLRA - RV BASE: 3.8 CM
BH CV XLRA - RV LENGTH: 6 CM
BH CV XLRA - RV MID: 2.7 CM
BH CV XLRA - TDI S': 27.6 CM/SEC
BILIRUB SERPL-MCNC: 1.5 MG/DL (ref 0–1.2)
BILIRUB UR QL STRIP: NEGATIVE
BUN SERPL-MCNC: 23 MG/DL (ref 8–23)
BUN/CREAT SERPL: 19.5 (ref 7–25)
CALCIUM SPEC-SCNC: 10.2 MG/DL (ref 8.6–10.5)
CHLORIDE SERPL-SCNC: 100 MMOL/L (ref 98–107)
CLARITY UR: CLEAR
CO2 SERPL-SCNC: 22 MMOL/L (ref 22–29)
COLOR UR: ABNORMAL
CREAT SERPL-MCNC: 1.18 MG/DL (ref 0.76–1.27)
D-LACTATE SERPL-SCNC: 3.4 MMOL/L (ref 0.5–2)
DEPRECATED RDW RBC AUTO: 43.5 FL (ref 37–54)
EGFRCR SERPLBLD CKD-EPI 2021: 63.2 ML/MIN/1.73
EOSINOPHIL # BLD AUTO: 0.05 10*3/MM3 (ref 0–0.4)
EOSINOPHIL NFR BLD AUTO: 0.3 % (ref 0.3–6.2)
ERYTHROCYTE [DISTWIDTH] IN BLOOD BY AUTOMATED COUNT: 12.7 % (ref 12.3–15.4)
GLOBULIN UR ELPH-MCNC: 4.7 GM/DL
GLUCOSE SERPL-MCNC: 138 MG/DL (ref 65–99)
GLUCOSE UR STRIP-MCNC: NEGATIVE MG/DL
HCT VFR BLD AUTO: 39.2 % (ref 37.5–51)
HGB BLD-MCNC: 12.7 G/DL (ref 13–17.7)
HGB UR QL STRIP.AUTO: NEGATIVE
IMM GRANULOCYTES # BLD AUTO: 0.11 10*3/MM3 (ref 0–0.05)
IMM GRANULOCYTES NFR BLD AUTO: 0.7 % (ref 0–0.5)
KETONES UR QL STRIP: NEGATIVE
LEFT ATRIUM VOLUME INDEX: 42.5 ML/M2
LEUKOCYTE ESTERASE UR QL STRIP.AUTO: NEGATIVE
LIPASE SERPL-CCNC: 110 U/L (ref 13–60)
LYMPHOCYTES # BLD AUTO: 1.41 10*3/MM3 (ref 0.7–3.1)
LYMPHOCYTES NFR BLD AUTO: 8.8 % (ref 19.6–45.3)
MCH RBC QN AUTO: 30.4 PG (ref 26.6–33)
MCHC RBC AUTO-ENTMCNC: 32.4 G/DL (ref 31.5–35.7)
MCV RBC AUTO: 93.8 FL (ref 79–97)
MONOCYTES # BLD AUTO: 1.28 10*3/MM3 (ref 0.1–0.9)
MONOCYTES NFR BLD AUTO: 8 % (ref 5–12)
NEUTROPHILS NFR BLD AUTO: 13.03 10*3/MM3 (ref 1.7–7)
NEUTROPHILS NFR BLD AUTO: 81.8 % (ref 42.7–76)
NITRITE UR QL STRIP: NEGATIVE
NRBC BLD AUTO-RTO: 0 /100 WBC (ref 0–0.2)
PH UR STRIP.AUTO: 5.5 [PH] (ref 5–8)
PLATELET # BLD AUTO: 325 10*3/MM3 (ref 140–450)
PMV BLD AUTO: 9 FL (ref 6–12)
POTASSIUM SERPL-SCNC: 4.4 MMOL/L (ref 3.5–5.2)
PROCALCITONIN SERPL-MCNC: 0.75 NG/ML (ref 0–0.25)
PROT SERPL-MCNC: 7.5 G/DL (ref 6–8.5)
PROT UR QL STRIP: ABNORMAL
QT INTERVAL: 336 MS
QTC INTERVAL: 500 MS
RBC # BLD AUTO: 4.18 10*6/MM3 (ref 4.14–5.8)
SODIUM SERPL-SCNC: 138 MMOL/L (ref 136–145)
SP GR UR STRIP: 1.02 (ref 1–1.03)
UROBILINOGEN UR QL STRIP: ABNORMAL
WBC NRBC COR # BLD: 15.95 10*3/MM3 (ref 3.4–10.8)

## 2023-07-19 PROCEDURE — 73718 MRI LOWER EXTREMITY W/O DYE: CPT

## 2023-07-19 PROCEDURE — 0 DIATRIZOATE MEGLUMINE & SODIUM PER 1 ML

## 2023-07-19 PROCEDURE — 0 DIATRIZOATE MEGLUMINE & SODIUM PER 1 ML: Performed by: INTERNAL MEDICINE

## 2023-07-19 PROCEDURE — 74176 CT ABD & PELVIS W/O CONTRAST: CPT

## 2023-07-19 PROCEDURE — 93306 TTE W/DOPPLER COMPLETE: CPT

## 2023-07-19 PROCEDURE — 99285 EMERGENCY DEPT VISIT HI MDM: CPT

## 2023-07-19 PROCEDURE — 83690 ASSAY OF LIPASE: CPT | Performed by: EMERGENCY MEDICINE

## 2023-07-19 PROCEDURE — 71045 X-RAY EXAM CHEST 1 VIEW: CPT

## 2023-07-19 PROCEDURE — 80053 COMPREHEN METABOLIC PANEL: CPT | Performed by: EMERGENCY MEDICINE

## 2023-07-19 PROCEDURE — 25010000002 SULFUR HEXAFLUORIDE MICROSPH 60.7-25 MG RECONSTITUTED SUSPENSION: Performed by: INTERNAL MEDICINE

## 2023-07-19 PROCEDURE — 99222 1ST HOSP IP/OBS MODERATE 55: CPT | Performed by: INTERNAL MEDICINE

## 2023-07-19 PROCEDURE — 81003 URINALYSIS AUTO W/O SCOPE: CPT | Performed by: EMERGENCY MEDICINE

## 2023-07-19 PROCEDURE — 83605 ASSAY OF LACTIC ACID: CPT | Performed by: EMERGENCY MEDICINE

## 2023-07-19 PROCEDURE — 84145 PROCALCITONIN (PCT): CPT | Performed by: EMERGENCY MEDICINE

## 2023-07-19 PROCEDURE — 73630 X-RAY EXAM OF FOOT: CPT

## 2023-07-19 PROCEDURE — 87147 CULTURE TYPE IMMUNOLOGIC: CPT | Performed by: EMERGENCY MEDICINE

## 2023-07-19 PROCEDURE — 36415 COLL VENOUS BLD VENIPUNCTURE: CPT

## 2023-07-19 PROCEDURE — 93306 TTE W/DOPPLER COMPLETE: CPT | Performed by: INTERNAL MEDICINE

## 2023-07-19 PROCEDURE — 93005 ELECTROCARDIOGRAM TRACING: CPT | Performed by: EMERGENCY MEDICINE

## 2023-07-19 PROCEDURE — 85025 COMPLETE CBC W/AUTO DIFF WBC: CPT | Performed by: EMERGENCY MEDICINE

## 2023-07-19 PROCEDURE — 25010000002 ENOXAPARIN PER 10 MG: Performed by: INTERNAL MEDICINE

## 2023-07-19 PROCEDURE — 87040 BLOOD CULTURE FOR BACTERIA: CPT | Performed by: EMERGENCY MEDICINE

## 2023-07-19 RX ORDER — MORPHINE SULFATE 2 MG/ML
2 INJECTION, SOLUTION INTRAMUSCULAR; INTRAVENOUS
Status: DISCONTINUED | OUTPATIENT
Start: 2023-07-19 | End: 2023-07-26 | Stop reason: HOSPADM

## 2023-07-19 RX ORDER — NALOXONE HCL 0.4 MG/ML
0.4 VIAL (ML) INJECTION
Status: DISCONTINUED | OUTPATIENT
Start: 2023-07-19 | End: 2023-07-26 | Stop reason: HOSPADM

## 2023-07-19 RX ORDER — SODIUM CHLORIDE 0.9 % (FLUSH) 0.9 %
10 SYRINGE (ML) INJECTION AS NEEDED
Status: DISCONTINUED | OUTPATIENT
Start: 2023-07-19 | End: 2023-07-24 | Stop reason: SDUPTHER

## 2023-07-19 RX ORDER — SODIUM CHLORIDE, SODIUM LACTATE, POTASSIUM CHLORIDE, CALCIUM CHLORIDE 600; 310; 30; 20 MG/100ML; MG/100ML; MG/100ML; MG/100ML
100 INJECTION, SOLUTION INTRAVENOUS CONTINUOUS
Status: DISCONTINUED | OUTPATIENT
Start: 2023-07-19 | End: 2023-07-21 | Stop reason: SDUPTHER

## 2023-07-19 RX ORDER — SODIUM CHLORIDE 0.9 % (FLUSH) 0.9 %
10 SYRINGE (ML) INJECTION EVERY 12 HOURS SCHEDULED
Status: DISCONTINUED | OUTPATIENT
Start: 2023-07-19 | End: 2023-07-24 | Stop reason: SDUPTHER

## 2023-07-19 RX ORDER — BISACODYL 10 MG
10 SUPPOSITORY, RECTAL RECTAL DAILY PRN
Status: DISCONTINUED | OUTPATIENT
Start: 2023-07-19 | End: 2023-07-26 | Stop reason: HOSPADM

## 2023-07-19 RX ORDER — ACETAMINOPHEN 650 MG/1
650 SUPPOSITORY RECTAL EVERY 4 HOURS PRN
Status: DISCONTINUED | OUTPATIENT
Start: 2023-07-19 | End: 2023-07-21 | Stop reason: SDUPTHER

## 2023-07-19 RX ORDER — AMOXICILLIN 250 MG
2 CAPSULE ORAL 2 TIMES DAILY
Status: DISCONTINUED | OUTPATIENT
Start: 2023-07-19 | End: 2023-07-26 | Stop reason: HOSPADM

## 2023-07-19 RX ORDER — ATORVASTATIN CALCIUM 40 MG/1
80 TABLET, FILM COATED ORAL NIGHTLY
Status: DISCONTINUED | OUTPATIENT
Start: 2023-07-19 | End: 2023-07-26 | Stop reason: HOSPADM

## 2023-07-19 RX ORDER — ENOXAPARIN SODIUM 100 MG/ML
40 INJECTION SUBCUTANEOUS EVERY 24 HOURS
Status: DISCONTINUED | OUTPATIENT
Start: 2023-07-19 | End: 2023-07-26 | Stop reason: HOSPADM

## 2023-07-19 RX ORDER — ACETAMINOPHEN 160 MG/5ML
650 SOLUTION ORAL EVERY 4 HOURS PRN
Status: DISCONTINUED | OUTPATIENT
Start: 2023-07-19 | End: 2023-07-21 | Stop reason: SDUPTHER

## 2023-07-19 RX ORDER — CHOLECALCIFEROL (VITAMIN D3) 125 MCG
5 CAPSULE ORAL NIGHTLY PRN
Status: DISCONTINUED | OUTPATIENT
Start: 2023-07-19 | End: 2023-07-26 | Stop reason: HOSPADM

## 2023-07-19 RX ORDER — SODIUM CHLORIDE 9 MG/ML
40 INJECTION, SOLUTION INTRAVENOUS AS NEEDED
Status: DISCONTINUED | OUTPATIENT
Start: 2023-07-19 | End: 2023-07-26 | Stop reason: HOSPADM

## 2023-07-19 RX ORDER — CEFAZOLIN SODIUM 2 G/100ML
2000 INJECTION, SOLUTION INTRAVENOUS EVERY 8 HOURS
Status: DISCONTINUED | OUTPATIENT
Start: 2023-07-19 | End: 2023-07-26 | Stop reason: HOSPADM

## 2023-07-19 RX ORDER — HYDROCODONE BITARTRATE AND ACETAMINOPHEN 5; 325 MG/1; MG/1
1 TABLET ORAL EVERY 4 HOURS PRN
Status: DISCONTINUED | OUTPATIENT
Start: 2023-07-19 | End: 2023-07-21 | Stop reason: SDUPTHER

## 2023-07-19 RX ORDER — SODIUM CHLORIDE 0.9 % (FLUSH) 0.9 %
10 SYRINGE (ML) INJECTION AS NEEDED
Status: DISCONTINUED | OUTPATIENT
Start: 2023-07-19 | End: 2023-07-26 | Stop reason: HOSPADM

## 2023-07-19 RX ORDER — ACETAMINOPHEN 325 MG/1
650 TABLET ORAL EVERY 4 HOURS PRN
Status: DISCONTINUED | OUTPATIENT
Start: 2023-07-19 | End: 2023-07-21 | Stop reason: SDUPTHER

## 2023-07-19 RX ORDER — ONDANSETRON 4 MG/1
4 TABLET, FILM COATED ORAL EVERY 6 HOURS PRN
Status: DISCONTINUED | OUTPATIENT
Start: 2023-07-19 | End: 2023-07-26 | Stop reason: HOSPADM

## 2023-07-19 RX ORDER — ONDANSETRON 2 MG/ML
4 INJECTION INTRAMUSCULAR; INTRAVENOUS EVERY 6 HOURS PRN
Status: DISCONTINUED | OUTPATIENT
Start: 2023-07-19 | End: 2023-07-26 | Stop reason: HOSPADM

## 2023-07-19 RX ORDER — POLYETHYLENE GLYCOL 3350 17 G/17G
17 POWDER, FOR SOLUTION ORAL DAILY PRN
Status: DISCONTINUED | OUTPATIENT
Start: 2023-07-19 | End: 2023-07-26 | Stop reason: HOSPADM

## 2023-07-19 RX ORDER — BISACODYL 5 MG/1
5 TABLET, DELAYED RELEASE ORAL DAILY PRN
Status: DISCONTINUED | OUTPATIENT
Start: 2023-07-19 | End: 2023-07-26 | Stop reason: HOSPADM

## 2023-07-19 RX ADMIN — ATORVASTATIN CALCIUM 80 MG: 40 TABLET, FILM COATED ORAL at 20:34

## 2023-07-19 RX ADMIN — SULFUR HEXAFLUORIDE 3 ML: KIT at 13:25

## 2023-07-19 RX ADMIN — SODIUM CHLORIDE 1000 ML: 9 INJECTION, SOLUTION INTRAVENOUS at 16:22

## 2023-07-19 RX ADMIN — SODIUM CHLORIDE, POTASSIUM CHLORIDE, SODIUM LACTATE AND CALCIUM CHLORIDE 100 ML/HR: 600; 310; 30; 20 INJECTION, SOLUTION INTRAVENOUS at 14:12

## 2023-07-19 RX ADMIN — Medication 10 ML: at 20:40

## 2023-07-19 RX ADMIN — ENOXAPARIN SODIUM 40 MG: 100 INJECTION SUBCUTANEOUS at 14:13

## 2023-07-19 RX ADMIN — METOPROLOL TARTRATE 25 MG: 25 TABLET, FILM COATED ORAL at 14:10

## 2023-07-19 RX ADMIN — METOPROLOL TARTRATE 25 MG: 25 TABLET, FILM COATED ORAL at 20:38

## 2023-07-19 RX ADMIN — Medication 10 ML: at 13:59

## 2023-07-19 RX ADMIN — Medication 2000 MG: at 20:34

## 2023-07-19 RX ADMIN — Medication 2000 MG: at 12:37

## 2023-07-19 RX ADMIN — Medication 5 MG: at 22:10

## 2023-07-19 RX ADMIN — DIATRIZOATE MEGLUMINE AND DIATRIZOATE SODIUM 15 ML: 660; 100 LIQUID ORAL; RECTAL at 17:03

## 2023-07-19 NOTE — ED PROVIDER NOTES
Subjective   History of Present Illness  77 year old male presents to the ER today per instruction from his Infectious Disease physician Dr Cleary due to abnormal labs.  He was told he has an infection in his blood.  For the past several weeks he has been experiencing lightheadedness, chills, low grade fevers, loss of appetite and shortness of breath.  He is still experiencing these today but denies nausea, vomiting, headache, and diarrhea.  He has a history of atrial fibrillation which he is on Eliquis for but he has not taken that yesterday or today because he forgot to.  He also has not had his beta blocker for those times.  He has a history of pressure wounds on his left foot but denies any open wounds at this time.    History provided by:  Patient and medical records    Review of Systems    Past Medical History:   Diagnosis Date    A-fib     Acute kidney failure     Elevated cholesterol     Hard of hearing     Hyperlipidemia     Hypertension     Renal disorder     Wears hearing aid in both ears     Wears reading eyeglasses        Allergies   Allergen Reactions    Sulfa Antibiotics Unknown (See Comments)     Pt was told as child he had an allergy.  Has not taken and doesn't know the response       Past Surgical History:   Procedure Laterality Date    AMPUTATION DIGIT Left 1/7/2020    Procedure: AMPUTATION LEFT 2ND TOE;  Surgeon: Idania Osborn MD;  Location:  [facility];  Service: Orthopedics    AMPUTATION DIGIT Left 12/8/2020    Procedure: TRANSMETATARSAL AMPUTATION LEFT;  Surgeon: Idania Osborn MD;  Location:  [facility];  Service: Orthopedics;  Laterality: Left;    APPENDECTOMY      COLONOSCOPY  2006    FOOT SURGERY Left 12/09/2020    (L) transmetatarsal amputation 12/09/2020 - Dr. Idania Osborn    INCISION AND DRAINAGE LEG Left 10/23/2018    Procedure: INCISION AND DRAINAGE LEFT FOOT;  Surgeon: Idania Osborn MD;  Location:  [facility];  Service: Orthopedics    TOE AMPUTATION      TONSILLECTOMY      VENOUS  ACCESS DEVICE (PORT) INSERTION N/A 10/23/2018    Procedure: GROSHONG CATHETER PLACEMENT;  Surgeon: Marquez Simons MD;  Location:  [facility];  Service: General    VENOUS ACCESS DEVICE (PORT) INSERTION N/A 12/9/2020    Procedure: GROSHONG INSERTION;  Surgeon: Antonio Bains MD;  Location:  [facility];  Service: General;  Laterality: N/A;    VENOUS ACCESS DEVICE (PORT) REMOVAL         History reviewed. No pertinent family history.    Social History     Socioeconomic History    Marital status:    Tobacco Use    Smoking status: Never    Smokeless tobacco: Never   Vaping Use    Vaping Use: Never used   Substance and Sexual Activity    Alcohol use: Yes     Alcohol/week: 3.0 - 4.0 standard drinks     Types: 3 - 4 Shots of liquor per week     Comment: drinks a fifth a week DRINKS 3-5 DRINKS DAILY     Drug use: No    Sexual activity: Defer           Objective   Physical Exam  Vitals and nursing note reviewed.   HENT:      Head: Normocephalic.   Eyes:      Extraocular Movements: Extraocular movements intact.      Pupils: Pupils are equal, round, and reactive to light.   Cardiovascular:      Rate and Rhythm: Tachycardia present.      Pulses: Normal pulses.      Heart sounds: Normal heart sounds.   Pulmonary:      Breath sounds: Normal breath sounds.   Abdominal:      General: Bowel sounds are normal.      Palpations: Abdomen is soft.   Musculoskeletal:        Feet:    Feet:      Left foot:      Skin integrity: Dry skin present.      Comments: Closed and healing wound on the bottom of the left foot.  All toes amputated on left foot.  Skin:     General: Skin is warm and dry.   Neurological:      General: No focal deficit present.      Mental Status: He is alert.   Psychiatric:         Mood and Affect: Mood normal.         Behavior: Behavior normal.       Procedures           ED Course  ED Course as of 07/19/23 1734 Wed Jul 19, 2023   1008 Chart review demonstrates the patient had lab work performed yesterday  demonstrating positive inflammatory changes within the urinalysis as well as a leukocytosis with significant elevation of his ESR and CRP.  See those lab results for details.  Unfortunately, there is no note accompanying this for context.  Blood cultures are still pending. [RS]   1132 Procalcitonin(!)  Elevated procalcitonin noted. [RS]      ED Course User Index  [RS] Juan Bosch MD                                           Medical Decision Making  Problems Addressed:  Bacteremia: complicated acute illness or injury  Elevated blood pressure reading with diagnosis of hypertension: complicated acute illness or injury    Amount and/or Complexity of Data Reviewed  External Data Reviewed: labs and notes.  Labs: ordered. Decision-making details documented in ED Course.  Radiology: ordered.  ECG/medicine tests: ordered.    Risk  Prescription drug management.  Decision regarding hospitalization.        Final diagnoses:   Bacteremia   Elevated blood pressure reading with diagnosis of hypertension       ED Disposition  ED Disposition       ED Disposition   Decision to Admit    Condition   --    Comment   Level of Care: Telemetry [5]   Diagnosis: Sepsis [[id number]]   Certification: I Certify That Inpatient Hospital Services Are Medically Necessary For Greater Than 2 Midnights                 No follow-up provider specified.       Medication List      No changes were made to your prescriptions during this visit.            Juan Bosch MD  07/19/23 6851

## 2023-07-19 NOTE — H&P
"    Psychiatric Medicine Services  HISTORY AND PHYSICAL    Patient Name: Carlos Culver  : 1945  MRN: [id number]  Primary Care Physician: Marcus Cheng MD  Date of admission: 2023      Subjective   Subjective     Chief Complaint: feeling poorly     HPI:  Carlos Culver is a 77 y.o. male sent to ED at direction of Dr. Young for sepsis. Patient has history of prior left foot osteomyelitis s/p amputation. Has been dealing with ulcer on plantar aspect of left foot for about 3 months managed by Dr. Jones at [facility]. Had felt wound was healing well and by all appearances from outside that does look like the case. The patient however has felt poorly for several weeks with \"low grade fevers\", chills, poor appetite. He was seen by Dr. Young  w/ labs at that time showing marked leukocytosis. Blood cultures from that visit have already grown MSSA.    Review of Systems   Gen- No fevers, chills  CV- No chest pain, palpitations  Resp- No cough, dyspnea  GI- No N/V/D, abd pain    Personal History     Past Medical History:   Diagnosis Date    A-fib     Acute kidney failure     Elevated cholesterol     Hard of hearing     Hyperlipidemia     Hypertension     Renal disorder     Wears hearing aid in both ears     Wears reading eyeglasses              Past Surgical History:   Procedure Laterality Date    AMPUTATION DIGIT Left 2020    Procedure: AMPUTATION LEFT 2ND TOE;  Surgeon: Idania Osborn MD;  Location: [facility];  Service: Orthopedics    AMPUTATION DIGIT Left 2020    Procedure: TRANSMETATARSAL AMPUTATION LEFT;  Surgeon: Idania Osborn MD;  Location: [facility];  Service: Orthopedics;  Laterality: Left;    APPENDECTOMY      COLONOSCOPY  2006    FOOT SURGERY Left 2020    (L) transmetatarsal amputation 2020 - Dr. Idania Osborn    INCISION AND DRAINAGE LEG Left 10/23/2018    Procedure: INCISION AND DRAINAGE LEFT FOOT;  Surgeon: Idania Osborn MD;  " Location:  [facility] OR;  Service: Orthopedics    TOE AMPUTATION      TONSILLECTOMY      VENOUS ACCESS DEVICE (PORT) INSERTION N/A 10/23/2018    Procedure: GROSHONG CATHETER PLACEMENT;  Surgeon: Marquez Simons MD;  Location:  [facility] OR;  Service: General    VENOUS ACCESS DEVICE (PORT) INSERTION N/A 12/9/2020    Procedure: GROSHONG INSERTION;  Surgeon: Antonio Bains MD;  Location:  [facility] OR;  Service: General;  Laterality: N/A;    VENOUS ACCESS DEVICE (PORT) REMOVAL         Family History: family history is not on file.     Social History:  reports that he has never smoked. He has never used smokeless tobacco. He reports current alcohol use of about 3.0 - 4.0 standard drinks per week. He reports that he does not use drugs.  Social History     Social History Narrative    Not on file       Medications:  Available home medication information reviewed.  (Not in a hospital admission)      Allergies   Allergen Reactions    Sulfa Antibiotics Unknown (See Comments)     Pt was told as child he had an allergy.  Has not taken and doesn't know the response       Objective   Objective     Vital Signs:   Temp:  [98.1 °F (36.7 °C)] 98.1 °F (36.7 °C)  Heart Rate:  [98] 98  Resp:  [16] 16  BP: (159)/(77) 159/77       Physical Exam   Constitutional: Awake, alert, appears to feel poorly  Eyes: PERRLA, sclerae anicteric, no conjunctival injection  HENT: NCAT, mucous membranes moist  Neck: Supple, no thyromegaly, no lymphadenopathy, trachea midline  Respiratory: Clear to auscultation bilaterally, nonlabored respirations   Cardiovascular: Tachy, irr, no murmur  Gastrointestinal: Positive bowel sounds, soft, nontender, nondistended  Musculoskeletal: LLE 1+ edema w/ some redness, left foot w/ prior amputation noted, left plantar ulceration appears healed  Psychiatric: Appropriate affect, cooperative  Neurologic: Oriented x 3, strength symmetric in all extremities, Cranial Nerves grossly intact to confrontation, speech clear  Skin:  No rashes     Result Review:  I have personally reviewed the results from the time of this admission to 7/19/2023 11:56 EDT and agree with these findings:  []  Laboratory list / accordion  []  Microbiology  []  Radiology  []  EKG/Telemetry   []  Cardiology/Vascular   []  Pathology  []  Old records  []  Other:  Most notable findings include:         LAB RESULTS:      Lab 07/19/23  1034 07/18/23  1205   WBC 15.95* 17.82*   HEMOGLOBIN 12.7* 13.0   HEMATOCRIT 39.2 41.5   PLATELETS 325 340   NEUTROS ABS 13.03* 14.66*   IMMATURE GRANS (ABS) 0.11* 0.11*   LYMPHS ABS 1.41 1.45   MONOS ABS 1.28* 1.51*   EOS ABS 0.05 0.03   MCV 93.8 94.5   SED RATE  --  118*   CRP  --  27.88*   PROCALCITONIN 0.75*  --    LACTATE 3.4*  --          Lab 07/19/23  1034 07/18/23  1205   SODIUM 138 133*   POTASSIUM 4.4 4.7   CHLORIDE 100 98   CO2 22.0 22.0   ANION GAP 16.0* 13.0   BUN 23 19   CREATININE 1.18 1.19   EGFR 63.2 62.5   GLUCOSE 138* 107*   CALCIUM 10.2 10.0         Lab 07/19/23  1034 07/18/23  1205   TOTAL PROTEIN 7.5 7.9   ALBUMIN 2.8* 3.3*   GLOBULIN 4.7 4.6   ALT (SGPT) 24 33   AST (SGOT) 33 45*   BILIRUBIN 1.5* 1.6*   ALK PHOS 196* 236*   LIPASE 110*  --                      UA          7/18/2023    12:05 7/19/2023    09:55   Urinalysis   Squamous Epithelial Cells, UA 3-6     Specific Gravity, UA 1.025  1.017    Ketones, UA Trace  Negative    Blood, UA Negative  Negative    Leukocytes, UA Trace  Negative    Nitrite, UA Positive  Negative    RBC, UA 0-2     WBC, UA 6-12     Bacteria, UA Trace         Microbiology Results (last 10 days)       Procedure Component Value - Date/Time    Wound Culture - Wound, Foot, Left [[id number]] Collected: 07/18/23 1235    Lab Status: Preliminary result Specimen: Wound from Foot, Left Updated: 07/19/23 0806     Wound Culture Culture in progress     Gram Stain No WBCs or organisms seen    Urine Culture - Urine, Urine, Clean Catch [[id number]]  (Normal) Collected: 07/18/23 1205    Lab Status: Preliminary  result Specimen: Urine, Clean Catch Updated: 07/19/23 1019     Urine Culture No growth    Blood Culture - Blood, Arm, Right [[id number]]  (Abnormal) Collected: 07/18/23 1205    Lab Status: Preliminary result Specimen: Blood from Arm, Right Updated: 07/19/23 1122     Blood Culture Abnormal Stain     Gram Stain Aerobic Bottle Gram positive cocci in pairs and clusters      Anaerobic Bottle Gram positive cocci in pairs and clusters    Blood Culture - Blood, Arm, Left [[id number]]  (Abnormal) Collected: 07/18/23 1205    Lab Status: Preliminary result Specimen: Blood from Arm, Left Updated: 07/19/23 1123     Blood Culture Abnormal Stain     Gram Stain Aerobic Bottle Gram positive cocci in pairs and clusters      Anaerobic Bottle Gram positive cocci in pairs and clusters    Blood Culture ID, PCR - Blood, Arm, Right [[id number]]  (Abnormal) Collected: 07/18/23 1205    Lab Status: Final result Specimen: Blood from Arm, Right Updated: 07/19/23 1122     BCID, PCR Staph aureus. mecA/C and MREJ (methicillin resistance gene) NOT detected. Identification by BCID2 PCR    Narrative:      Infectious disease consultation is highly recommended to rule out distant foci of infection.            XR Chest 1 View    Result Date: 7/19/2023  XR CHEST 1 VW Date of Exam: 7/19/2023 10:16 AM EDT Indication: +blood culture Comparison: 7/19/2023 . Findings: Heart and pulmonary vessels are normal. Lung fields are normally inflated and clear. There are no effusions.     Impression: Impression: No acute process. Electronically Signed: Tina Weinberg MD  7/19/2023 10:33 AM EDT  Workstation ID: [id number]    XR Chest PA & Lateral    Result Date: 7/18/2023  XR CHEST PA AND LATERAL Date of Exam: 7/18/2023 12:47 PM EDT Indication: FATIGU&MALAIDE Comparison 9/14/2021. Findings: Heart and pulmonary vessels appear within normal limits. Lung fields appear normally inflated and clear of acute infiltrates or effusions.     Impression: Impression: No acute process.  Electronically Signed: Tina Weinberg MD  7/18/2023 1:02 PM EDT  Workstation ID: [id number]     Results for orders placed during the hospital encounter of 12/01/20    Adult Transthoracic Echo Complete W/ Cont if Necessary Per Protocol    Interpretation Summary  · Left ventricular ejection fraction appears to be 51 - 55%. Left ventricular systolic function is low normal.  · Left ventricular wall thickness is consistent with mild concentric hypertrophy.  · Left atrial volume is moderately increased.  · The right atrial cavity is moderate to severely dilated.  · The right ventricular cavity is mildly dilated.  · Severe tricuspid valve regurgitation is present.  · Estimated right ventricular systolic pressure from tricuspid regurgitation is mildly elevated (35-45 mmHg). Calculated right ventricular systolic pressure from tricuspid regurgitation is 41 mmHg.  · Mild to moderate mitral regurgitation.      Assessment & Plan   Assessment & Plan     Active Hospital Problems    Diagnosis  POA    **Sepsis [A41.9]  Yes    Staphylococcus aureus bacteremia [R78.81, B95.61]  Yes    S/P transmetatarsal amputation of foot, left [Z89.432]  Not Applicable    Neuropathy [G62.9]  Yes    CKD (chronic kidney disease) stage 2, GFR 60-89 ml/min [N18.2]  Yes    Atrial fibrillation with RVR [I48.91]  Yes     12/05/19 EKG   Atrial fibrillation with RVR   Rate 123       78 y/o male with peripheral neuropathy and hx of osteomyelitis requiring amputation of left foot here with sepsis/bacteremia.    Sepsis w/ MSSA bacteremia  Concern for left foot osteomyelitis  --ID has seen. Has ordered ancef IV. CT A/P, MRI LLE, Echo pending.  --Start IVF.  --Pain control as needed.  --Reg diet. NPO MN.    A fib rvr  --In part due to above but patient has also felt poorly and has not taken his metoprolol or Eliquis for past 48 hours. Will resume his metoprolol and start IVF. Consider addition of IV dilt if metoprolol not sufficient.  --Hold eliquis pending MRI  completion - may need operative intervention.    CKD II    DVT prophylaxis:  Lovenox    CODE STATUS:    Code Status and Medical Interventions:   Ordered at: 07/19/23 1154     Code Status (Patient has no pulse and is not breathing):    CPR (Attempt to Resuscitate)     Medical Interventions (Patient has pulse or is breathing):    Full Support     Release to patient:    Routine Release       Expected Discharge   Expected discharge date/ time has not been documented.     Henrietta Zhang II, DO  07/19/23

## 2023-07-19 NOTE — PLAN OF CARE
"Goal Outcome Evaluation:   Patient transfer from ED. BPs soft. Patient arrived on unit in Afib with RVR, patient A&O and states he \"doesn't feel well\". MD ordered po Metoprolol dose and 1L NaCl bolus, rate control varies. Scheduled for CT abdomen/pelvis with po contrast. Will continue with patient's plan of care.                      "

## 2023-07-19 NOTE — CONSULTS
[facility]    INFECTIOUS DISEASE CONSULT/INITIAL HOSPITAL VISIT    Carlos Culver  1945  [id number]    Date of consult: 7/19/2023    Admit date: 7/19/2023    Requesting Provider: No ref. provider found  Evaluating physician: Jeffery Young MD  Reason for Consultation: MSSA sepsis with bacteremia with 4 out of 4 blood cultures positive by PCR from 7/18/2023, probable left foot osteomyelitis  Chief Complaint: Above      Subjective   History of present illness:  Patient is a  77 y.o.  Yr old male with a history of peripheral neuropathy, hard of hearing, hyperlipidemia, essential hypertension, previous left transmetatarsal amputation for osteomyelitis, who developed a left plantar ulcer in April 2023 after working out on an elliptical exercise machine.  His ulceration has persisted off and on and he was treated briefly with oral antibiotics by Dr. Rajat [email or web address] at [facility].  Patient still had persistence of his left plantar wound.  Also complained of some unmeasured fevers and fatigue over the past few weeks.  He was evaluated in clinic and had blood cultures obtained and laboratories.  He had no other localizing signs or symptoms of infection.  Blood cultures drawn on 7/18 were positive by PCR for methicillin sensitive Staph aureus in 4 out of 4 bottles.  White blood cell count was 17.  Patient was directed to be admitted to [facility] on 7/19/2023.  I was consulted on 7/19/2023 for further evaluation and treatment.    Past Medical History:   Diagnosis Date    A-fib     Acute kidney failure     Elevated cholesterol     Hard of hearing     Hyperlipidemia     Hypertension     Renal disorder     Wears hearing aid in both ears     Wears reading eyeglasses        Past Surgical History:   Procedure Laterality Date    AMPUTATION DIGIT Left 1/7/2020    Procedure: AMPUTATION LEFT 2ND TOE;  Surgeon: Idania Osborn MD;  Location: [facility];  Service:  Orthopedics    AMPUTATION DIGIT Left 2020    Procedure: TRANSMETATARSAL AMPUTATION LEFT;  Surgeon: Idania Osborn MD;  Location:  [facility];  Service: Orthopedics;  Laterality: Left;    APPENDECTOMY      COLONOSCOPY  2006    FOOT SURGERY Left 2020    (L) transmetatarsal amputation 2020 - Dr. Idania Osborn    INCISION AND DRAINAGE LEG Left 10/23/2018    Procedure: INCISION AND DRAINAGE LEFT FOOT;  Surgeon: Idania Osborn MD;  Location:  [facility];  Service: Orthopedics    TOE AMPUTATION      TONSILLECTOMY      VENOUS ACCESS DEVICE (PORT) INSERTION N/A 10/23/2018    Procedure: GROSHONG CATHETER PLACEMENT;  Surgeon: Marquez Simons MD;  Location:  [facility];  Service: General    VENOUS ACCESS DEVICE (PORT) INSERTION N/A 2020    Procedure: GROSHONG INSERTION;  Surgeon: Antonio Bains MD;  Location:  [facility];  Service: General;  Laterality: N/A;    VENOUS ACCESS DEVICE (PORT) REMOVAL         Pediatric History   Patient Parents    Not on file     Other Topics Concern    Not on file   Social History Narrative    Not on file   , 3 children, no pets, 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  Father  of cancer, mother  of old age and cardiac disease, 1 brother, mother also had hypothyroidism  Allergies   Allergen Reactions    Sulfa Antibiotics Unknown (See Comments)     Pt was told as child he had an allergy.  Has not taken and doesn't know the response       Immunization History   Administered Date(s) Administered    COVID-19 (PFIZER) BIVALENT 12+YRS 10/19/2022    COVID-19 (PFIZER) Purple Cap Monovalent 2021, 2021, 10/02/2021    Covid-19 (Pfizer) Gray Cap Monovalent 2022       Medication:    Current Facility-Administered Medications:     acetaminophen (TYLENOL) tablet 650 mg, 650 mg, Oral, Q4H PRN **OR** acetaminophen (TYLENOL) 160 MG/5ML solution 650 mg, 650 mg, Oral, Q4H PRN **OR** acetaminophen (TYLENOL) suppository 650 mg, 650  mg, Rectal, Q4H PRN, Henrietta Zhang II, DO    atorvastatin (LIPITOR) tablet 80 mg, 80 mg, Oral, Nightly, Henrietta Zhang II, DO    sennosides-docusate (PERICOLACE) 8.6-50 MG per tablet 2 tablet, 2 tablet, Oral, BID **AND** polyethylene glycol (MIRALAX) packet 17 g, 17 g, Oral, Daily PRN **AND** bisacodyl (DULCOLAX) EC tablet 5 mg, 5 mg, Oral, Daily PRN **AND** bisacodyl (DULCOLAX) suppository 10 mg, 10 mg, Rectal, Daily PRN, Henrietta Zhang II, DO    Calcium Replacement - Follow Nurse / BPA Driven Protocol, , Does not apply, PRN, Henrietta Zhang II, DO    ceFAZolin in dextrose (ANCEF) IVPB solution 2,000 mg, 2,000 mg, Intravenous, Q8H, Jeffery Young MD, 2,000 mg at 07/19/23 1237    Enoxaparin Sodium (LOVENOX) syringe 40 mg, 40 mg, Subcutaneous, Q24H, Henrietta Zhang II, DO, 40 mg at 07/19/23 1413    HYDROcodone-acetaminophen (NORCO) 5-325 MG per tablet 1 tablet, 1 tablet, Oral, Q4H PRN, Henrietta Zhang II, DO    lactated ringers infusion, 100 mL/hr, Intravenous, Continuous, Henrietta Zhang II, DO, Last Rate: 100 mL/hr at 07/19/23 1412, 100 mL/hr at 07/19/23 1412    Magnesium Standard Dose Replacement - Follow Nurse / BPA Driven Protocol, , Does not apply, PRN, Henrietta Zhang II, DO    metoprolol tartrate (LOPRESSOR) tablet 25 mg, 25 mg, Oral, BID, Henrietta Zhang II, DO, 25 mg at 07/19/23 1410    morphine injection 2 mg, 2 mg, Intravenous, Q2H PRN **AND** naloxone (NARCAN) injection 0.4 mg, 0.4 mg, Intravenous, Q5 Min PRN, Henrietta Zhang II, DO    ondansetron (ZOFRAN) tablet 4 mg, 4 mg, Oral, Q6H PRN **OR** ondansetron (ZOFRAN) injection 4 mg, 4 mg, Intravenous, Q6H PRN, Henrietta Zhang II, DO    Phosphorus Replacement - Follow Nurse / BPA Driven Protocol, , Does not apply, PRN, Henrietta Zhang II, DO    Potassium Replacement - Follow Nurse / BPA Driven Protocol, , Does not apply, PRN, Henrietta Zhang II, DO    sodium chloride 0.9 % bolus 1,000 mL, 1,000 mL, Intravenous, Once, Henrietta Zhang  II, DO, Last Rate: 1,000 mL/hr at 07/19/23 1622, 1,000 mL at 07/19/23 1622    [COMPLETED] Insert Peripheral IV, , , Once **AND** sodium chloride 0.9 % flush 10 mL, 10 mL, Intravenous, PRN, Juan Bosch MD    sodium chloride 0.9 % flush 10 mL, 10 mL, Intravenous, Q12H, LeathaHenrietta II, DO, 10 mL at 07/19/23 1359    sodium chloride 0.9 % flush 10 mL, 10 mL, Intravenous, PRN, Leatha, Henrietta M II, DO    sodium chloride 0.9 % infusion 40 mL, 40 mL, Intravenous, PRN, Leatha Henrietta M II, DO    Please refer to the medical record for a full medication list    Review of Systems:    Constitutional--some low-grade fever, no chills or sweats.  Appetite good, and no malaise.  Some fatigue.  HEENT-- No new vision, hearing or throat complaints.  No epistaxis or oral sores.  Denies odynophagia or dysphagia.  No odynophagia or dysphagia. No headache, photophobia or neck stiffness.  CV-- No chest pain, palpitation or syncope  Resp-- No SOB/cough/Hemoptysis  GI- No nausea, vomiting, or diarrhea.  No hematochezia, melena, or hematemesis. Denies jaundice or chronic liver disease.  -- No dysuria, hematuria, or flank pain.  Denies hesitancy, urgency or flank pain.  Lymph- no swollen lymph nodes in neck/axilla or groin.   Heme- No active bruising or bleeding; no Hx of DVT or PE.  MS-- no swelling or pain in the bones or joints of arms/legs.  No new back pain.  Neuro-- No acute focal weakness or numbness in the arms or legs.  No seizures.  Skin--No rashes or lesions, except left foot plantar    Physical Exam:   Vital Signs   Temp:  [98.1 °F (36.7 °C)] 98.1 °F (36.7 °C)  Heart Rate:  [] 107  Resp:  [16] 16  BP: (100-159)/(77-90) 108/84    Temp  Min: 98.1 °F (36.7 °C)  Max: 98.1 °F (36.7 °C)  BP  Min: 100/82  Max: 159/77  Pulse  Min: 98  Max: 154  Resp  Min: 16  Max: 16  SpO2  Min: 94 %  Max: 99 %    Blood pressure 108/84, pulse 107, temperature 98.1 °F (36.7 °C), temperature source Oral, resp. rate 16, height 181.6 cm  "(71.5\"), weight 99.8 kg (220 lb), SpO2 97 %.  GENERAL: Awake and alert, in mild to moderate distress. Appears older than stated age.  HEENT:  Normocephalic, atraumatic.  Oropharynx without thrush. Dentition in good repair. No cervical adenopathy. No neck masses.  Ears externally normal, Nose externally normal.  EYES: PERRL. No conjunctival injection. No icterus. EOM full.  LYMPHATICS: No lymphadenopathy of the neck or axillary or inguinal regions.   HEART: No murmur, gallop, or pericardial friction rub. Reg rate rhythm, No JVD at 45 degrees.  LUNGS: Clear to auscultation and percussion. No respiratory distress, no use of accessory muscles.  ABDOMEN: Soft, nontender, nondistended. No appreciable HSM.  Bowel sounds normal.  Slightly obese.  GENITAL: No external lesions, breasts without masses, back straight, no CVAT, rectal external without lesions.   SKIN: Warm and dry without cutaneous eruptions.  No nodules.  Left foot with some plantar fourth metatarsal callus with some minimal serous drainage but no surrounding crepitus or bullae or foul smell  PSYCHIATRIC: Mental status lucid. No confusion.  EXT:  No cellulitic change.  NEURO: Oriented to name, CN 2 to 12 intact, DTR bilateral absent lower extremity, sensory decreased to LT lower extremitiy, motor 5/5 upper and lower extremity, cerebellar and gait not tested.      Results Review:   I reviewed the patient's new clinical results.  I reviewed the patient's new imaging results and agree with the interpretation.  I reviewed the patient's other test results and agree with the interpretation    Results from last 7 days   Lab Units 07/19/23  1034 07/18/23  1205   WBC 10*3/mm3 15.95* 17.82*   HEMOGLOBIN g/dL 12.7* 13.0   HEMATOCRIT % 39.2 41.5   PLATELETS 10*3/mm3 325 340     Results from last 7 days   Lab Units 07/19/23  1034   SODIUM mmol/L 138   POTASSIUM mmol/L 4.4   CHLORIDE mmol/L 100   CO2 mmol/L 22.0   BUN mg/dL 23   CREATININE mg/dL 1.18   GLUCOSE mg/dL 138* "   CALCIUM mg/dL 10.2     Results from last 7 days   Lab Units 07/19/23  1034   ALK PHOS U/L 196*   BILIRUBIN mg/dL 1.5*   ALT (SGPT) U/L 24   AST (SGOT) U/L 33     Results from last 7 days   Lab Units 07/18/23  1205   SED RATE mm/hr 118*     Results from last 7 days   Lab Units 07/18/23  1205   CRP mg/dL 27.88*         Results from last 7 days   Lab Units 07/19/23  1034   LACTATE mmol/L 3.4*     Estimated Creatinine Clearance: 62.6 mL/min (by C-G formula based on SCr of 1.18 mg/dL).  CPK          7/18/2023    12:05   Common Labsle   Creatine Kinase 35       Procalitonin Results:      Lab 07/19/23  1034   PROCALCITONIN 0.75*      Brief Urine Lab Results  (Last result in the past 365 days)        Color   Clarity   Blood   Leuk Est   Nitrite   Protein   CREAT   Urine HCG        07/19/23 0955 Orange   Clear   Negative   Negative   Negative   Trace                  No results found for: SITE, ALLENTEST, PHART, KOF2PBX, PO2ART, SZH7UME, BASEEXCESS, P4PNKRLS, HGBBG, HCTABG, OXYHEMOGLOBI, METHHGBN, CARBOXYHGB, CO2CT, BAROMETRIC, MODALITY, FIO2     Microbiology:  No results found for: ACANTHNAEG, AFBCX, BPERTUSSISCX, BLOODCX  No results found for: BCIDPCR, CXREFLEX, CSFCX, CULTURETIS  No results found for: CULTURES, HSVCX, URCX  No results found for: EYECULTURE, GCCX, HSVCULTURE, LABHSV  No results found for: LEGIONELLA, MRSACX, MUMPSCX, MYCOPLASCX  No results found for: NOCARDIACX, STOOLCX  Urine Culture   Date Value Ref Range Status   07/18/2023 No growth  Preliminary     Wound Culture   Date Value Ref Range Status   07/18/2023 Culture in progress  Preliminary        Urine Culture   Date Value Ref Range Status   07/18/2023 No growth  Preliminary     Wound Culture   Date Value Ref Range Status   07/18/2023 Culture in progress  Preliminary   (    Blood cultures 7/18/2023 reported positive in 4 out of 4 bottles by PCR for MSSA    Radiology:  Imaging Results (Last 72 Hours)       Procedure Component Value Units Date/Time    XR  Foot 3+ View Left [[id number]] Collected: 07/19/23 1442     Updated: 07/19/23 1454    Narrative:      XR FOOT 3+ VW LEFT    Date of Exam: 7/19/2023 2:09 PM EDT    Indication: om    Comparison: MRI performed the same day. Radiographs January 6, 2020    Findings:  Status post transmetatarsal amputation at the proximal metatarsals. There is diffuse soft tissue prominence, particularly at the remaining distal foot. There is a suspected wound at the lateral-distal foot. There is heterogeneous osseous lucency/erosive   change involving the base of the fourth metatarsal and distal cuboid. No significant visualized proximal fifth metatarsal is seen on this exam. There is also osseous lucency at the lateral aspect of the third metatarsal base, lateral navicular, and   lateral cuneiform. These findings correlate with suspected osteomyelitis seen on prior MRI. Oblique linear lucency at the lateral base of the third metatarsal suggests a pathologic nondisplaced fracture. No other definite acute fracture is seen. No other   definite acute osseous abnormality. There appear to be moderate degenerative changes and heterogeneous sclerosis of the proximal metatarsals and tarsal bones. There is calcific atherosclerosis of the dorsalis pedis.      Impression:      Impression:  1.Status post transmetatarsal amputation.  2.Abnormal appearance of the proximal third and fourth metatarsals, distal cuboid, lateral cuneiform, and lateral navicular suspicious form osteomyelitis as seen on prior MRI.  3.Diffuse forefoot soft tissue prominence, with suspected wound at the lateral distal foot.      Electronically Signed: Antonio Ronquillo    7/19/2023 2:51 PM EDT    Workstation ID: [id number]    MRI Foot Left Without Contrast [[id number]] Collected: 07/19/23 1326     Updated: 07/19/23 1344    Narrative:        MRI FOOT LEFT WO CONTRAST    Date of Exam: 7/19/2023 1:07 PM EDT    Indication: Foot swelling, nondiabetic, osteomyelitis suspected.      Comparison: Left foot MRI 11/16/2020, left foot radiographs 1/6/2020    Technique:  Routine multiplanar/multisequence sequence images of the left foot were obtained without contrast administration.      Findings:  From most recent available imaging of the left foot there has been interval surgical change of transmetatarsal amputation through the proximal metatarsal bases.    There is a 2.1 x 1.5 x 2.6 cm (AP X TV X CC) T1 intermediate, T2/STIR hyperintense collection in the soft tissues lateral to the distal cuboid (series 12 image 17, series 8 image 19), concerning for phlegmon or abscess. There is abnormal T1 hypointense,   T2/STIR hyperintense signal change of the cuboid, third cuneiform, fourth metatarsal base, and fifth metatarsal base compatible with osteomyelitis. There is some T2/STIR hyperintense marrow edema of the distal cuboid and lateral aspect of the navicular,   without convincing loss of the T1 hyperintense marrow signal, likely reflecting some reactive marrow edema although contiguous osteomyelitis is difficult to exclude. There is diffuse subcutaneous edema throughout the dorsal foot and ankle which may   reflect contiguous cellulitis. No ankle joint effusion.        Impression:      Impression:  2.1 x 1.5 x 2.6 cm T2/STIR hyperintense structure in the soft tissues abutting the lateral aspect of the distal cuboid, concerning for phlegmon or abscess, incompletely characterized in the absence of IV contrast.    There are marrow signal changes of the cuboid, third cuneiform, and fourth and fifth metatarsal bases which are highly suspicious for osteomyelitis. Correlate for any open wound in this area. Correlate with ESR and CRP. Marrow signal changes relating to   Charcot arthropathy cannot be entirely excluded but are considered somewhat less likely in the setting of adjacent suspected phlegmon/abscess. There is some marrow edema of the nearby distal cuboid and lateral navicular bone which may  reflect reactive   marrow edema although contiguous osteomyelitis cannot be entirely excluded.        Electronically Signed: Juan Sandrita    7/19/2023 1:41 PM EDT    Workstation ID: [id number]    XR Chest 1 View [[id number]] Collected: 07/19/23 1033     Updated: 07/19/23 1037    Narrative:      XR CHEST 1 VW    Date of Exam: 7/19/2023 10:16 AM EDT    Indication: +blood culture    Comparison: 7/19/2023 .    Findings:  Heart and pulmonary vessels are normal. Lung fields are normally inflated and clear. There are no effusions.      Impression:      Impression:  No acute process.      Electronically Signed: Tina Weinberg MD    7/19/2023 10:33 AM EDT    Workstation ID: [id number]            IMPRESSION:     1.  MSSA high-grade bacteremia in 4 out of 4 bottles drawn from 7/18/2023.  Most likely source is left foot osteomyelitis and abscess (MRI 7/19/23) although unusual to have such high-grade bacteremia.  Could have seeded an intravascular source including endocarditis.  No chronic indwelling prosthetic device.  Unlikely to be contaminant.    2.  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  High risk for underlying osteomyelitis.  Likely a function of his peripheral neuropathy and some noncompliance with nonweightbearing status.  , CRP 22.  3.  Leukocytosis, neutrophilic related to above issues.  4.  Hyponatremia.  133.  5.  Elevated LFTs with alkaline phosphatase 236, bilirubin 1.6, and AST 45.  May be related to sepsis versus other.  Biliary tract disease or liver disease?  Alcohol-related disease?  6.  Severe peripheral neuropathy, impacting all aspects of his care.  7.  Mild pyuria, 6-12 wbc on 7/18, asymptomatic.  May be related to above.    RECOMMENDATIONS:    1.  Diagnostically, continue to follow patient's physical exam, CBC, CMP, CRP, CPK, repeat blood cultures x2, transthoracic echocardiogram, MRI left foot without contrast for  evaluation of osteomyelitis, CT scan of the abdomen and pelvis with p.o. contrast, hepatitis serology.  Check EKG for QTc interval.  2.  Therapeutically, consider treatment with cefazolin 2 g IV every 8 hours for 4 to 6 weeks depending upon MRI and TTE results.  Discussed with Reynaldo in the microbiology lab.  3.  Supportive care.  4.  Ortho evaluation.    I discussed the patient's findings and my recommendations with patient and nursing staff    Thank you for asking me to see Carlos Culver.  Our group would be pleased to follow this patient over the course of their hospitalization and assist with outpatient antimicrobial therapy, as indicated.  Further recommendations depend on the results of the cultures and clinical course.  Side effects discussed.  Increased risk for adverse drug reactions, complications of IV access, readmission.  Increased risk for surgery.    Jeffery Young MD  7/19/2023

## 2023-07-19 NOTE — CASE MANAGEMENT/SOCIAL WORK
Discharge Planning Assessment  [facility]     Patient Name: Carlos Culver  MRN: [id number]  Today's Date: 7/19/2023    Admit Date: 7/19/2023    Plan: Home   Discharge Needs Assessment       Row Name 07/19/23 1201       Living Environment    People in Home spouse    Current Living Arrangements home    Primary Care Provided by self    Provides Primary Care For no one    Family Caregiver if Needed spouse    Family Caregiver Names Emilie Culver, wife    Quality of Family Relationships helpful;involved;supportive    Able to Return to Prior Arrangements yes       Transition Planning    Patient/Family Anticipates Transition to home with family    Patient/Family Anticipated Services at Transition        Discharge Needs Assessment    Equipment Currently Used at Home none    Concerns to be Addressed denies needs/concerns at this time    Discharge Coordination/Progress Lives in a house in [place] with his wife, normally independent with ADLs. Has a plethora of DME including walkers, canes and a wheelchair, however not currently using any DME.  No history of home health.  Has an advanced directive.                   Discharge Plan       Row Name 07/19/23 1202       Plan    Plan Home    Patient/Family in Agreement with Plan yes    Plan Comments I spoke with Mr Culver at bedside.  Mr Culver resides in a house in [place] with his wife, and is independent at baseline. He states that he has a cane, walker and wheelchair at home, however he is not currently using them. When asked, he states that maybe he has had home health in the past, but they were not out for long.  He does have an advanced directive. His PCP is Casey Cheng, and he gets his prescriptions filled at [facility] in [place]. At this time, there are no discharge needs.    Final Discharge Disposition Code 01 - home or self-care                  Continued Care and Services - Admitted Since 7/19/2023    Coordination has not been  started for this encounter.          Demographic Summary    No documentation.                  Functional Status       Row Name 07/19/23 1201       Functional Status    Usual Activity Tolerance good    Current Activity Tolerance good       Functional Status, IADL    Medications independent    Meal Preparation independent    Housekeeping independent    Laundry independent    Shopping independent       Mental Status    General Appearance WDL WDL                   Psychosocial    No documentation.                  Abuse/Neglect    No documentation.                  Legal    No documentation.                  Substance Abuse    No documentation.                  Patient Forms    No documentation.                     Tatyana Lindsay RN

## 2023-07-20 PROBLEM — M86.172 ACUTE OSTEOMYELITIS OF LEFT ANKLE OR FOOT: Status: ACTIVE | Noted: 2023-07-19

## 2023-07-20 LAB
ALBUMIN SERPL-MCNC: 2.5 G/DL (ref 3.5–5.2)
ALBUMIN/GLOB SERPL: 0.7 G/DL
ALP SERPL-CCNC: 149 U/L (ref 39–117)
ALT SERPL W P-5'-P-CCNC: 15 U/L (ref 1–41)
ANION GAP SERPL CALCULATED.3IONS-SCNC: 13 MMOL/L (ref 5–15)
AST SERPL-CCNC: 41 U/L (ref 1–40)
BASOPHILS # BLD AUTO: 0.06 10*3/MM3 (ref 0–0.2)
BASOPHILS NFR BLD AUTO: 0.3 % (ref 0–1.5)
BILIRUB SERPL-MCNC: 1.7 MG/DL (ref 0–1.2)
BUN SERPL-MCNC: 21 MG/DL (ref 8–23)
BUN/CREAT SERPL: 19.3 (ref 7–25)
CALCIUM SPEC-SCNC: 8.9 MG/DL (ref 8.6–10.5)
CHLORIDE SERPL-SCNC: 100 MMOL/L (ref 98–107)
CK SERPL-CCNC: 95 U/L (ref 20–200)
CO2 SERPL-SCNC: 20 MMOL/L (ref 22–29)
CREAT SERPL-MCNC: 1.09 MG/DL (ref 0.76–1.27)
D-LACTATE SERPL-SCNC: 1.4 MMOL/L (ref 0.5–2)
DEPRECATED RDW RBC AUTO: 42.9 FL (ref 37–54)
EGFRCR SERPLBLD CKD-EPI 2021: 69.5 ML/MIN/1.73
EOSINOPHIL # BLD AUTO: 0.03 10*3/MM3 (ref 0–0.4)
EOSINOPHIL NFR BLD AUTO: 0.2 % (ref 0.3–6.2)
ERYTHROCYTE [DISTWIDTH] IN BLOOD BY AUTOMATED COUNT: 13 % (ref 12.3–15.4)
FLUAV SUBTYP SPEC NAA+PROBE: NOT DETECTED
FLUBV RNA ISLT QL NAA+PROBE: NOT DETECTED
GLOBULIN UR ELPH-MCNC: 3.6 GM/DL
GLUCOSE SERPL-MCNC: 109 MG/DL (ref 65–99)
HCT VFR BLD AUTO: 31.5 % (ref 37.5–51)
HGB BLD-MCNC: 10.3 G/DL (ref 13–17.7)
IMM GRANULOCYTES # BLD AUTO: 0.15 10*3/MM3 (ref 0–0.05)
IMM GRANULOCYTES NFR BLD AUTO: 0.8 % (ref 0–0.5)
LIPASE SERPL-CCNC: 109 U/L (ref 13–60)
LYMPHOCYTES # BLD AUTO: 1.77 10*3/MM3 (ref 0.7–3.1)
LYMPHOCYTES NFR BLD AUTO: 9.7 % (ref 19.6–45.3)
MAGNESIUM SERPL-MCNC: 1.6 MG/DL (ref 1.6–2.4)
MCH RBC QN AUTO: 29.8 PG (ref 26.6–33)
MCHC RBC AUTO-ENTMCNC: 32.7 G/DL (ref 31.5–35.7)
MCV RBC AUTO: 91 FL (ref 79–97)
MONOCYTES # BLD AUTO: 1.5 10*3/MM3 (ref 0.1–0.9)
MONOCYTES NFR BLD AUTO: 8.2 % (ref 5–12)
NEUTROPHILS NFR BLD AUTO: 14.73 10*3/MM3 (ref 1.7–7)
NEUTROPHILS NFR BLD AUTO: 80.8 % (ref 42.7–76)
NRBC BLD AUTO-RTO: 0 /100 WBC (ref 0–0.2)
PLATELET # BLD AUTO: 283 10*3/MM3 (ref 140–450)
PMV BLD AUTO: 9.1 FL (ref 6–12)
POTASSIUM SERPL-SCNC: 4.4 MMOL/L (ref 3.5–5.2)
PROCALCITONIN SERPL-MCNC: 0.73 NG/ML (ref 0–0.25)
PROT SERPL-MCNC: 6.1 G/DL (ref 6–8.5)
RBC # BLD AUTO: 3.46 10*6/MM3 (ref 4.14–5.8)
SARS-COV-2 RNA RESP QL NAA+PROBE: NOT DETECTED
SODIUM SERPL-SCNC: 133 MMOL/L (ref 136–145)
WBC NRBC COR # BLD: 18.24 10*3/MM3 (ref 3.4–10.8)

## 2023-07-20 PROCEDURE — 87636 SARSCOV2 & INF A&B AMP PRB: CPT | Performed by: EMERGENCY MEDICINE

## 2023-07-20 PROCEDURE — 25010000002 ALBUMIN HUMAN 25% PER 50 ML: Performed by: NURSE PRACTITIONER

## 2023-07-20 PROCEDURE — 87040 BLOOD CULTURE FOR BACTERIA: CPT | Performed by: INTERNAL MEDICINE

## 2023-07-20 PROCEDURE — 83690 ASSAY OF LIPASE: CPT | Performed by: INTERNAL MEDICINE

## 2023-07-20 PROCEDURE — 84145 PROCALCITONIN (PCT): CPT | Performed by: INTERNAL MEDICINE

## 2023-07-20 PROCEDURE — 99222 1ST HOSP IP/OBS MODERATE 55: CPT | Performed by: ORTHOPAEDIC SURGERY

## 2023-07-20 PROCEDURE — 99221 1ST HOSP IP/OBS SF/LOW 40: CPT

## 2023-07-20 PROCEDURE — 80053 COMPREHEN METABOLIC PANEL: CPT | Performed by: INTERNAL MEDICINE

## 2023-07-20 PROCEDURE — 83605 ASSAY OF LACTIC ACID: CPT | Performed by: INTERNAL MEDICINE

## 2023-07-20 PROCEDURE — 85025 COMPLETE CBC W/AUTO DIFF WBC: CPT | Performed by: INTERNAL MEDICINE

## 2023-07-20 PROCEDURE — P9047 ALBUMIN (HUMAN), 25%, 50ML: HCPCS | Performed by: NURSE PRACTITIONER

## 2023-07-20 PROCEDURE — 83735 ASSAY OF MAGNESIUM: CPT | Performed by: NURSE PRACTITIONER

## 2023-07-20 PROCEDURE — 25010000002 ENOXAPARIN PER 10 MG: Performed by: INTERNAL MEDICINE

## 2023-07-20 PROCEDURE — 99232 SBSQ HOSP IP/OBS MODERATE 35: CPT | Performed by: STUDENT IN AN ORGANIZED HEALTH CARE EDUCATION/TRAINING PROGRAM

## 2023-07-20 PROCEDURE — 82550 ASSAY OF CK (CPK): CPT | Performed by: INTERNAL MEDICINE

## 2023-07-20 RX ORDER — FAMOTIDINE 10 MG/ML
20 INJECTION, SOLUTION INTRAVENOUS ONCE
Status: DISCONTINUED | OUTPATIENT
Start: 2023-07-20 | End: 2023-07-20

## 2023-07-20 RX ORDER — SODIUM CHLORIDE 9 MG/ML
40 INJECTION, SOLUTION INTRAVENOUS AS NEEDED
Status: DISCONTINUED | OUTPATIENT
Start: 2023-07-20 | End: 2023-07-21 | Stop reason: HOSPADM

## 2023-07-20 RX ORDER — SODIUM CHLORIDE 0.9 % (FLUSH) 0.9 %
10 SYRINGE (ML) INJECTION EVERY 12 HOURS SCHEDULED
Status: DISCONTINUED | OUTPATIENT
Start: 2023-07-20 | End: 2023-07-21 | Stop reason: HOSPADM

## 2023-07-20 RX ORDER — SODIUM CHLORIDE 0.9 % (FLUSH) 0.9 %
10 SYRINGE (ML) INJECTION AS NEEDED
Status: DISCONTINUED | OUTPATIENT
Start: 2023-07-20 | End: 2023-07-21 | Stop reason: HOSPADM

## 2023-07-20 RX ORDER — SODIUM CHLORIDE, SODIUM LACTATE, POTASSIUM CHLORIDE, CALCIUM CHLORIDE 600; 310; 30; 20 MG/100ML; MG/100ML; MG/100ML; MG/100ML
9 INJECTION, SOLUTION INTRAVENOUS CONTINUOUS
Status: DISCONTINUED | OUTPATIENT
Start: 2023-07-20 | End: 2023-07-20

## 2023-07-20 RX ORDER — LIDOCAINE HYDROCHLORIDE 10 MG/ML
0.5 INJECTION, SOLUTION EPIDURAL; INFILTRATION; INTRACAUDAL; PERINEURAL ONCE AS NEEDED
Status: DISCONTINUED | OUTPATIENT
Start: 2023-07-20 | End: 2023-07-21 | Stop reason: HOSPADM

## 2023-07-20 RX ORDER — FAMOTIDINE 20 MG/1
20 TABLET, FILM COATED ORAL ONCE
Status: DISCONTINUED | OUTPATIENT
Start: 2023-07-20 | End: 2023-07-20

## 2023-07-20 RX ORDER — FAMOTIDINE 10 MG/ML
20 INJECTION, SOLUTION INTRAVENOUS ONCE
Status: DISCONTINUED | OUTPATIENT
Start: 2023-07-21 | End: 2023-07-21 | Stop reason: HOSPADM

## 2023-07-20 RX ORDER — SODIUM CHLORIDE, SODIUM LACTATE, POTASSIUM CHLORIDE, CALCIUM CHLORIDE 600; 310; 30; 20 MG/100ML; MG/100ML; MG/100ML; MG/100ML
9 INJECTION, SOLUTION INTRAVENOUS CONTINUOUS
Status: DISCONTINUED | OUTPATIENT
Start: 2023-07-21 | End: 2023-07-21 | Stop reason: SDUPTHER

## 2023-07-20 RX ORDER — FAMOTIDINE 20 MG/1
20 TABLET, FILM COATED ORAL ONCE
Status: DISCONTINUED | OUTPATIENT
Start: 2023-07-21 | End: 2023-07-21 | Stop reason: HOSPADM

## 2023-07-20 RX ORDER — ALBUMIN (HUMAN) 12.5 G/50ML
25 SOLUTION INTRAVENOUS ONCE
Status: COMPLETED | OUTPATIENT
Start: 2023-07-20 | End: 2023-07-20

## 2023-07-20 RX ADMIN — ENOXAPARIN SODIUM 40 MG: 100 INJECTION SUBCUTANEOUS at 13:19

## 2023-07-20 RX ADMIN — Medication 2000 MG: at 20:50

## 2023-07-20 RX ADMIN — Medication 5 MG: at 22:59

## 2023-07-20 RX ADMIN — SODIUM CHLORIDE, POTASSIUM CHLORIDE, SODIUM LACTATE AND CALCIUM CHLORIDE 100 ML/HR: 600; 310; 30; 20 INJECTION, SOLUTION INTRAVENOUS at 02:47

## 2023-07-20 RX ADMIN — Medication 2000 MG: at 05:05

## 2023-07-20 RX ADMIN — METOPROLOL TARTRATE 25 MG: 25 TABLET, FILM COATED ORAL at 20:46

## 2023-07-20 RX ADMIN — Medication 10 ML: at 10:01

## 2023-07-20 RX ADMIN — SODIUM CHLORIDE, POTASSIUM CHLORIDE, SODIUM LACTATE AND CALCIUM CHLORIDE 100 ML/HR: 600; 310; 30; 20 INJECTION, SOLUTION INTRAVENOUS at 22:59

## 2023-07-20 RX ADMIN — ALBUMIN (HUMAN) 25 G: 0.25 INJECTION, SOLUTION INTRAVENOUS at 02:47

## 2023-07-20 RX ADMIN — Medication 10 ML: at 20:57

## 2023-07-20 RX ADMIN — Medication 2000 MG: at 11:26

## 2023-07-20 RX ADMIN — Medication 10 ML: at 20:47

## 2023-07-20 RX ADMIN — ATORVASTATIN CALCIUM 80 MG: 40 TABLET, FILM COATED ORAL at 20:46

## 2023-07-20 RX ADMIN — DILTIAZEM HYDROCHLORIDE 5 MG/HR: 5 INJECTION INTRAVENOUS at 02:46

## 2023-07-20 NOTE — CONSULTS
[facility] Cardiothoracic Surgery In-Patient Consult    Name:  Carlos Culver  MRN Number:  [id number]  Date of Admission:  7/19/2023  Date of Consultation: 7/20/2023    Consulting Provider:    PCP: Marcus Cheng MD  IP Care Team:  Patient Care Team:  Marcus Cheng MD as PCP - General  Marcus Cheng MD as PCP - Family Medicine  Jeffery Young MD as Consulting Physician (Infectious Diseases)  Hang Anderson MD as Consulting Physician (Cardiology)    Reason for Consultation: Left foot cellulitis     History of Present Illness:    Carlos Culver is a 77 y.o. male with a history of osteomyelitis s/p left transmetatarsal amputation done with Dr. Fernández on 12/9/2020, non-healing left plantar ulcer since 04/2023, peripheral neuropathy, HTN, HLD, Staph aureus bacteremia, A-fib-Eliquis, and CKD stage II.  Patient has been followed by Dr. Rajat Jones DPM at  for this left plantar ulceration since April of this year. He has been given oral antibiotics without improvement.  Blood cultures were positive for MRSA, CRP 27.88, procalcitonin 0.75, lactate 3.4, and WBCs were elevated at 17.82.  He was advised to come to our ER for further evaluation. He does endorse a fever at home as high as 101, chills, and malaise. He states he began an exercise regimen and noticed an ulceration developing on the plantar aspect of his foot. Does report a brownish drainage from ulceration as well as erythema and swelling.  MRI of left foot revealed 2.1 x 1.5 x 2.6 cm in the soft tissue of the lateral aspect of the distal cuboid concerning for phlegmon or abscess, there are marrow signal changes of the cuboid, third cuneiform, and fourth and fifth metatarsal bases which are highly suspicious for osteomyelitis.     Review of Systems:  Review of Systems   Constitutional:  Positive for chills and fever.   HENT: Negative.     Eyes: Negative.    Respiratory: Negative.     Cardiovascular: Negative.     Gastrointestinal: Negative.    Endocrine: Negative.    Genitourinary: Negative.    Musculoskeletal: Negative.    Skin:  Positive for color change and wound.   Allergic/Immunologic: Negative.    Neurological: Negative.    Hematological:  Bruises/bleeds easily.   Psychiatric/Behavioral: Negative.       Hospital Problems:   Sepsis    Atrial fibrillation with RVR    Neuropathy    CKD (chronic kidney disease) stage 2, GFR 60-89 ml/min    S/P transmetatarsal amputation of foot, left    Staphylococcus aureus bacteremia       Past Medical History:    Past Medical History:   Diagnosis Date    A-fib     Acute kidney failure     Elevated cholesterol     Hard of hearing     Hyperlipidemia     Hypertension     Renal disorder     Wears hearing aid in both ears     Wears reading eyeglasses        Past Surgical History:    Past Surgical History:   Procedure Laterality Date    AMPUTATION DIGIT Left 1/7/2020    Procedure: AMPUTATION LEFT 2ND TOE;  Surgeon: Idania Osborn MD;  Location:  [facility];  Service: Orthopedics    AMPUTATION DIGIT Left 12/8/2020    Procedure: TRANSMETATARSAL AMPUTATION LEFT;  Surgeon: Idania Osborn MD;  Location:  [facility];  Service: Orthopedics;  Laterality: Left;    APPENDECTOMY      COLONOSCOPY  2006    FOOT SURGERY Left 12/09/2020    (L) transmetatarsal amputation 12/09/2020 - Dr. Idania Osborn    INCISION AND DRAINAGE LEG Left 10/23/2018    Procedure: INCISION AND DRAINAGE LEFT FOOT;  Surgeon: Idania Osborn MD;  Location:  [facility];  Service: Orthopedics    TOE AMPUTATION      TONSILLECTOMY      VENOUS ACCESS DEVICE (PORT) INSERTION N/A 10/23/2018    Procedure: GROSHONG CATHETER PLACEMENT;  Surgeon: Marquez Simons MD;  Location:  [facility];  Service: General    VENOUS ACCESS DEVICE (PORT) INSERTION N/A 12/9/2020    Procedure: GROSHONG INSERTION;  Surgeon: Antonio Bains MD;  Location:  [facility];  Service: General;  Laterality: N/A;    VENOUS ACCESS DEVICE (PORT) REMOVAL          Family History:  History reviewed. No pertinent family history.    Social History:    Social History     Socioeconomic History    Marital status:    Tobacco Use    Smoking status: Never    Smokeless tobacco: Never   Vaping Use    Vaping Use: Never used   Substance and Sexual Activity    Alcohol use: Yes     Alcohol/week: 3.0 - 4.0 standard drinks     Types: 3 - 4 Shots of liquor per week     Comment: drinks a fifth a week DRINKS 3-5 DRINKS DAILY     Drug use: No    Sexual activity: Defer       Allergies:  Allergies   Allergen Reactions    Sulfa Antibiotics Unknown (See Comments)     Pt was told as child he had an allergy.  Has not taken and doesn't know the response         Physical Exam:  Vital Signs:    Temp:  [97.7 °F (36.5 °C)-98.6 °F (37 °C)] 98.5 °F (36.9 °C)  Heart Rate:  [] 69  Resp:  [16-20] 18  BP: ()/(50-90) 91/50  Body mass index is 30.26 kg/m².     Physical Exam  Vitals and nursing note reviewed.   Constitutional:       Appearance: Normal appearance.   HENT:      Head: Normocephalic.   Eyes:      Pupils: Pupils are equal, round, and reactive to light.   Cardiovascular:      Rate and Rhythm: Normal rate.      Pulses:           Dorsalis pedis pulses are detected w/ Doppler on the right side and detected w/ Doppler on the left side.        Posterior tibial pulses are detected w/ Doppler on the right side and detected w/ Doppler on the left side.      Comments: Good triphasic doppler signals bilaterally  Pulmonary:      Effort: Pulmonary effort is normal.   Abdominal:      General: Bowel sounds are normal.   Musculoskeletal:         General: Normal range of motion.      Cervical back: Normal range of motion.      Comments: Left metatarsal amputation   Skin:     General: Skin is warm.      Comments: See images below   Neurological:      General: No focal deficit present.      Mental Status: He is alert.   Psychiatric:         Mood and Affect: Mood normal.         Behavior: Behavior  normal.       Labs/Imaging/Procedures:   Results from last 7 days   Lab Units 07/20/23  0351   SODIUM mmol/L 133*   POTASSIUM mmol/L 4.4   CHLORIDE mmol/L 100   CO2 mmol/L 20.0*   BUN mg/dL 21   CREATININE mg/dL 1.09   CALCIUM mg/dL 8.9   BILIRUBIN mg/dL 1.7*   ALK PHOS U/L 149*   ALT (SGPT) U/L 15   AST (SGOT) U/L 41*   GLUCOSE mg/dL 109*     Results from last 7 days   Lab Units 07/20/23  0351 07/18/23  1205   CK TOTAL U/L 95 35     Results from last 7 days   Lab Units 07/20/23  0351 07/19/23  1034 07/18/23  1205   WBC 10*3/mm3 18.24* 15.95* 17.82*   HEMOGLOBIN g/dL 10.3* 12.7* 13.0   HEMATOCRIT % 31.5* 39.2 41.5   PLATELETS 10*3/mm3 283 325 340         Results from last 7 days   Lab Units 07/20/23  0351   MAGNESIUM mg/dL 1.6         CT Abdomen Pelvis Without Contrast    Result Date: 7/19/2023  Impression: 1. Colon contains mostly fluid. There may be a mild degree of rectal impaction, but no colonic inflammation or evidence of bowel obstruction is seen. 2. 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 3. Large, but intact-appearing duodenal diverticulum apparently incidental. 4. Moderately distended, otherwise normal-appearing bladder. Sigmoid diverticulosis without evidence of diverticulitis. Electronically Signed: Derek Grove  7/19/2023 7:11 PM EDT  Workstation ID: [id number]    XR Foot 3+ View Left    Result Date: 7/19/2023  Impression: 1.Status post transmetatarsal amputation. 2.Abnormal appearance of the proximal third and fourth metatarsals, distal cuboid, lateral cuneiform, and lateral navicular suspicious form osteomyelitis as seen on prior MRI. 3.Diffuse forefoot soft tissue prominence, with suspected wound at the lateral distal foot. Electronically Signed: Antonio Ronquillo  7/19/2023 2:51 PM EDT  Workstation ID: [id number]    XR Chest 1 View    Result Date: 7/19/2023  Impression: No acute process. Electronically Signed: Tina Weinberg MD  7/19/2023 10:33 AM EDT  Workstation ID:  [id number]    MRI Foot Left Without Contrast    Result Date: 7/19/2023  Impression: 2.1 x 1.5 x 2.6 cm T2/STIR hyperintense structure in the soft tissues abutting the lateral aspect of the distal cuboid, concerning for phlegmon or abscess, incompletely characterized in the absence of IV contrast. There are marrow signal changes of the cuboid, third cuneiform, and fourth and fifth metatarsal bases which are highly suspicious for osteomyelitis. Correlate for any open wound in this area. Correlate with ESR and CRP. Marrow signal changes relating to Charcot arthropathy cannot be entirely excluded but are considered somewhat less likely in the setting of adjacent suspected phlegmon/abscess. There is some marrow edema of the nearby distal cuboid and lateral navicular bone which may reflect reactive marrow edema although contiguous osteomyelitis cannot be entirely excluded. Electronically Signed: Juan Remy  7/19/2023 1:41 PM EDT  Workstation ID: [id number]    XR Chest PA & Lateral    Result Date: 7/18/2023  Impression: No acute process. Electronically Signed: Tina Weinberg MD  7/18/2023 1:02 PM EDT  Workstation ID: [id number]    LHC: No results found for this or any previous visit.     Echo: Results for orders placed during the hospital encounter of 07/19/23    Adult Transthoracic Echo Complete W/ Cont if Necessary Per Protocol    Interpretation Summary    Left ventricular systolic function is normal. Calculated left ventricular EF = 60%    Left ventricular diastolic function was normal.    Left atrial volume is moderately increased.    Saline test results are negative.    There is calcification of the aortic valve.    Moderate tricuspid valve regurgitation is present.    Estimated right ventricular systolic pressure from tricuspid regurgitation is mildly elevated (35-45 mmHg).     Carotid Duplex: Results for orders placed during the hospital encounter of 10/19/18    Duplex Lower Extremity Art / Grafts - Bilateral  CAR    Interpretation Summary  · The right common femoral artery demonstrates no significant stenosis.  · The right proximal deep femoral artery demonstrates no significant stenosis.  · The left common femoral artery demonstrates no significant stenosis.  · The left proximal deep femoral artery demonstrates no significant stenosis.  · The left proximal superficial femoral artery demonstrates no significant stenosis.  · The right proximal superficial femoral artery demonstrates no significant stenosis.  · The right mid superficial femoral artery demonstrates no significant stenosis.  · The left middle superficial femoral artery demonstrates no significant stenosis.  · The left distal superficial femoral artery demonstrates no significant stenosis.  · The right distal superficial femoral artery demonstrates no significant stenosis.  · The right popliteal artery demonstrates no significant stenosis.  · The right anterior tibial artery demonstrates no significant stenosis.  · The left anterior tibial artery demonstrates no significant stenosis.  · The left tibial/peroneal trunk artery demonstrates no significant stenosis.  · The right tibial peroneal trunk artery demonstrates no significant stenosis.  · The right posterior tibial artery demonstrates no significant stenosis.  · The left posterior tibial artery demonstrates no significant stenosis.  · The left peroneal artery demonstrates no significant stenosis.  · The right peroneal stenosis artery demonstrates no significant stenosis.      Assessment:    Sepsis    Atrial fibrillation with RVR    Neuropathy    CKD (chronic kidney disease) stage 2, GFR 60-89 ml/min    S/P transmetatarsal amputation of foot, left    Staphylococcus aureus bacteremia    Carlos Culver is a 77 y.o. male with a history of osteomyelitis s/p left transmetatarsal amputation done with Dr. Fernández on 12/9/2020, non-healing left plantar ulcer since 04/2023, peripheral neuropathy, HTN, HLD,  Staph aureus bacteremia, A-fib-Eliquis, and CKD stage II.  Patient has been followed by Dr. Rajta Jones DPM at  for this left plantar ulceration since April of this year. He has been given oral antibiotics without improvement.  Blood cultures were positive for MRSA, CRP 27.88, procalcitonin 0.75, lactate 3.4, and WBCs were elevated at 17.82.  He was advised to come to our ER for further evaluation. He does endorse a fever at home as high as 101, chills, and malaise. He states he began an exercise regimen and noticed an ulceration developing on the plantar aspect of his foot. Does report a brownish drainage from ulceration as well as erythema and swelling.  MRI of left foot revealed 2.1 x 1.5 x 2.6 cm in the soft tissue of the lateral aspect of the distal cuboid concerning for phlegmon or abscess, there are marrow signal changes of the cuboid, third cuneiform, and fourth and fifth metatarsal bases which are highly suspicious for osteomyelitis.     Plan:  Continue antibiotics per ID  He does not appear to have any underlying vascular disease, we will defer to ortho         ROBIN Ortega  09:14 EDT  07/20/23     Thank you for allowing us to participate in the care of your patient. Please do not hesitate to contact us with additional questions or concerns.

## 2023-07-20 NOTE — CONSULTS
Orthopaedic Consult Note  Patient Care Team:  Marcus Cheng MD as PCP - General  Marcus Cheng MD as PCP - Family Medicine  Jeffery Young MD as Consulting Physician (Infectious Diseases)  Hang Anderson MD as Consulting Physician (Cardiology)    Chief complaint  Sepsis    Subjective .     History of present illness:    Carlos Culver is a 77 y.o. male with a history of osteomyelitis s/p left transmetatarsal amputation done with Dr. Fernández on 12/9/2020, non-healing left plantar ulcer since 04/2023, peripheral neuropathy, HTN, HLD, Staph aureus bacteremia, A-fib-Eliquis, and CKD stage II.  Patient has been followed by Dr. Rajat Jones DPM at  for this left plantar ulceration since April of this year. He has been given oral antibiotics without improvement.  Blood cultures were positive for MRSA, CRP 27.88, procalcitonin 0.75, lactate 3.4, and WBCs were elevated at 17.82.  He was advised to come to our ER for further evaluation. He does endorse a fever at home as high as 101, chills, and malaise. He states he began an exercise regimen and noticed an ulceration developing on the plantar aspect of his foot. Does report a brownish drainage from ulceration as well as erythema and swelling.  MRI of left foot revealed 2.1 x 1.5 x 2.6 cm in the soft tissue of the lateral aspect of the distal cuboid concerning for phlegmon or abscess, there are marrow signal changes of the cuboid, third cuneiform, and fourth and fifth metatarsal bases which are highly suspicious for osteomyelitis.      Review of Systems:    All systems reviewed are negative except for what is stated in the HPI     History  History reviewed. No pertinent family history.  Social History     Socioeconomic History    Marital status:    Tobacco Use    Smoking status: Never    Smokeless tobacco: Never   Vaping Use    Vaping Use: Never used   Substance and Sexual Activity    Alcohol use: Yes     Alcohol/week: 3.0 - 4.0  standard drinks     Types: 3 - 4 Shots of liquor per week     Comment: drinks a fifth a week DRINKS 3-5 DRINKS DAILY     Drug use: No    Sexual activity: Defer     Past Surgical History:   Procedure Laterality Date    AMPUTATION DIGIT Left 1/7/2020    Procedure: AMPUTATION LEFT 2ND TOE;  Surgeon: Idania Osborn MD;  Location:  [facility];  Service: Orthopedics    AMPUTATION DIGIT Left 12/8/2020    Procedure: TRANSMETATARSAL AMPUTATION LEFT;  Surgeon: Idania Osborn MD;  Location:  [facility];  Service: Orthopedics;  Laterality: Left;    APPENDECTOMY      COLONOSCOPY  2006    FOOT SURGERY Left 12/09/2020    (L) transmetatarsal amputation 12/09/2020 - Dr. Idania Osborn    INCISION AND DRAINAGE LEG Left 10/23/2018    Procedure: INCISION AND DRAINAGE LEFT FOOT;  Surgeon: Idania Osborn MD;  Location:  [facility];  Service: Orthopedics    TOE AMPUTATION      TONSILLECTOMY      VENOUS ACCESS DEVICE (PORT) INSERTION N/A 10/23/2018    Procedure: GROSHONG CATHETER PLACEMENT;  Surgeon: Marquez Simons MD;  Location:  [facility];  Service: General    VENOUS ACCESS DEVICE (PORT) INSERTION N/A 12/9/2020    Procedure: GROSHONG INSERTION;  Surgeon: Antonio Bains MD;  Location:  [facility];  Service: General;  Laterality: N/A;    VENOUS ACCESS DEVICE (PORT) REMOVAL       Past Medical History:   Diagnosis Date    A-fib     Acute kidney failure     Elevated cholesterol     Hard of hearing     Hyperlipidemia     Hypertension     Renal disorder     Wears hearing aid in both ears     Wears reading eyeglasses      Allergies   Allergen Reactions    Sulfa Antibiotics Unknown (See Comments)     Pt was told as child he had an allergy.  Has not taken and doesn't know the response       Current Facility-Administered Medications:     acetaminophen (TYLENOL) tablet 650 mg, 650 mg, Oral, Q4H PRN **OR** acetaminophen (TYLENOL) 160 MG/5ML solution 650 mg, 650 mg, Oral, Q4H PRN **OR** acetaminophen (TYLENOL) suppository 650 mg, 650 mg,  Rectal, Q4H PRN, Henrietta Zhang II, DO    atorvastatin (LIPITOR) tablet 80 mg, 80 mg, Oral, Nightly, Henrietta Zhang II, DO, 80 mg at 07/19/23 2034    sennosides-docusate (PERICOLACE) 8.6-50 MG per tablet 2 tablet, 2 tablet, Oral, BID **AND** polyethylene glycol (MIRALAX) packet 17 g, 17 g, Oral, Daily PRN **AND** bisacodyl (DULCOLAX) EC tablet 5 mg, 5 mg, Oral, Daily PRN **AND** bisacodyl (DULCOLAX) suppository 10 mg, 10 mg, Rectal, Daily PRN, Henrietta Zhang II, DO    Calcium Replacement - Follow Nurse / BPA Driven Protocol, , Does not apply, PRN, Henrietta Zhang II, DO    ceFAZolin in dextrose (ANCEF) IVPB solution 2,000 mg, 2,000 mg, Intravenous, Q8H, Jeffery Young MD, 2,000 mg at 07/20/23 1126    dilTIAZem (CARDIZEM) 125 mg in sodium chloride 0.9 % 125 mL infusion, 5-15 mg/hr, Intravenous, Continuous, Concetta Espino APRN, Stopped at 07/20/23 0309    Enoxaparin Sodium (LOVENOX) syringe 40 mg, 40 mg, Subcutaneous, Q24H, Henrietta Zhang II, DO, 40 mg at 07/20/23 1319    HYDROcodone-acetaminophen (NORCO) 5-325 MG per tablet 1 tablet, 1 tablet, Oral, Q4H PRN, Henrietta Zhang II, DO    lactated ringers infusion, 100 mL/hr, Intravenous, Continuous, Henrietta Zhang II, DO, Last Rate: 100 mL/hr at 07/20/23 0247, 100 mL/hr at 07/20/23 0247    Magnesium Standard Dose Replacement - Follow Nurse / BPA Driven Protocol, , Does not apply, PRN, Henrietta Zhang II, DO    melatonin tablet 5 mg, 5 mg, Oral, Nightly PRN, Concetta Espino, APRN, 5 mg at 07/19/23 2210    metoprolol tartrate (LOPRESSOR) tablet 25 mg, 25 mg, Oral, BID, Henrietta Zhang II, DO, 25 mg at 07/19/23 2038    morphine injection 2 mg, 2 mg, Intravenous, Q2H PRN **AND** naloxone (NARCAN) injection 0.4 mg, 0.4 mg, Intravenous, Q5 Min PRN, Henrietta Zhang II, DO    ondansetron (ZOFRAN) tablet 4 mg, 4 mg, Oral, Q6H PRN **OR** ondansetron (ZOFRAN) injection 4 mg, 4 mg, Intravenous, Q6H PRN, Henrietta Zhang II, DO    Phosphorus Replacement - Follow  Nurse / BPA Driven Protocol, , Does not apply, PRN, Leatha, Henrietta M II, DO    Potassium Replacement - Follow Nurse / BPA Driven Protocol, , Does not apply, PRN, Leatha, Henrietta M II, DO    [COMPLETED] Insert Peripheral IV, , , Once **AND** sodium chloride 0.9 % flush 10 mL, 10 mL, Intravenous, PRN, Juan Bosch MD    sodium chloride 0.9 % flush 10 mL, 10 mL, Intravenous, Q12H, Leatha, Henrietta M II, DO, 10 mL at 07/20/23 1001    sodium chloride 0.9 % flush 10 mL, 10 mL, Intravenous, PRN, Leatha, Henrietta M II, DO    sodium chloride 0.9 % infusion 40 mL, 40 mL, Intravenous, PRN, Leatha, Henrietta M II, DO    Objective     Vital Signs   Temp:  [97.7 °F (36.5 °C)-98.6 °F (37 °C)] 98.5 °F (36.9 °C)  Heart Rate:  [] 69  Resp:  [16-20] 18  BP: ()/(50-70) 91/50  Body mass index is 30.26 kg/m².    Physical Exam:  HENT: No nasal drainage, external ears appear normal, mucous membranes moist.   Eyes: PERRLA, no icterus.   Neck: No visible lymphadenopathy or thyromegaly, trachea midline.   Cardiovascular: Regular rate and rhythm via peripheral pulses.   Pulmonary/Chest: No use of accessory muscle, no auditory wheezing or labored breathing.   Gastrointestinal: Abdomen was non-distended.   Neurological: No focal motor or sensory deficits except if outlined in msk section.   Skin: No skin rash, no palpable nodules.   Psychiatric: Alert and oriented x 3. Appropriate mood and affect.   Musculoskeletal: No clubbing, cyanosis, or edema, full range of motion in all extremities except as noted in the involved extremity below   Left foot with well-healed incisions from previous transmetatarsal amputation.  Small scab plantar lateral forefoot with no drainage or surrounding erythema.  Foot is warm and well-perfused.  Palpable dorsalis pedis pulse.  Patient able to actively plantarflex and dorsiflex ankle. 0/10 sites felt on monofilament testing of foot.     Labs:  Lab Results (last 24 hours)       Procedure Component Value  Units Date/Time    Blood Culture - Blood, Hand, Right [[id number]] Collected: 07/20/23 1120    Specimen: Blood from Hand, Right Updated: 07/20/23 1240    Blood Culture - Blood, Hand, Left [[id number]] Collected: 07/20/23 1110    Specimen: Blood from Hand, Left Updated: 07/20/23 1240    Blood Culture - Blood, Arm, Right [[id number]]  (Normal) Collected: 07/19/23 1122    Specimen: Blood from Arm, Right Updated: 07/20/23 1230     Blood Culture No growth at 24 hours    COVID PRE-OP / PRE-PROCEDURE SCREENING ORDER (NO ISOLATION) - Swab, Nasopharynx [[id number]]  (Normal) Collected: 07/20/23 1142    Specimen: Swab from Nasopharynx Updated: 07/20/23 1203    Narrative:      The following orders were created for panel order COVID PRE-OP / PRE-PROCEDURE SCREENING ORDER (NO ISOLATION) - Swab, Nasopharynx.  Procedure                               Abnormality         Status                     ---------                               -----------         ------                     COVID-19 and FLU A/B PCR...[[id number]]  Normal              Final result                 Please view results for these tests on the individual orders.    COVID-19 and FLU A/B PCR - Swab, Nasopharynx [[id number]]  (Normal) Collected: 07/20/23 1142    Specimen: Swab from Nasopharynx Updated: 07/20/23 1203     COVID19 Not Detected     Influenza A PCR Not Detected     Influenza B PCR Not Detected    Narrative:      Fact sheet for providers: https://www.fda.gov/media/915096/download    Fact sheet for patients: https://www.fda.gov/media/039032/download    Test performed by PCR.    Blood Culture - Blood, Arm, Left [[id number]]  (Abnormal) Collected: 07/19/23 1037    Specimen: Blood from Arm, Left Updated: 07/20/23 1021     Blood Culture Abnormal Stain     Gram Stain Aerobic Bottle Gram positive cocci in pairs and clusters      Anaerobic Bottle Gram positive cocci in pairs and clusters    Procalcitonin [[id number]]  (Abnormal) Collected: 07/20/23 0351    Specimen:  "Blood Updated: 07/20/23 0430     Procalcitonin 0.73 ng/mL     Narrative:      As a Marker for Sepsis (Non-Neonates):    1. <0.5 ng/mL represents a low risk of severe sepsis and/or septic shock.  2. >2 ng/mL represents a high risk of severe sepsis and/or septic shock.    As a Marker for Lower Respiratory Tract Infections that require antibiotic therapy:    PCT on Admission    Antibiotic Therapy       6-12 Hrs later    >0.5                Strongly Recommended  >0.25 - <0.5        Recommended   0.1 - 0.25          Discouraged              Remeasure/reassess PCT  <0.1                Strongly Discouraged     Remeasure/reassess PCT    As 28 day mortality risk marker: \"Change in Procalcitonin Result\" (>80% or <=80%) if Day 0 (or Day 1) and Day 4 values are available. Refer to http://www.E-Generatorpct-calculator.com    Change in PCT <=80%  A decrease of PCT levels below or equal to 80% defines a positive change in PCT test result representing a higher risk for 28-day all-cause mortality of patients diagnosed with severe sepsis for septic shock.    Change in PCT >80%  A decrease of PCT levels of more than 80% defines a negative change in PCT result representing a lower risk for 28-day all-cause mortality of patients diagnosed with severe sepsis or septic shock.       Magnesium [[id number]]  (Normal) Collected: 07/20/23 0351    Specimen: Blood Updated: 07/20/23 0427     Magnesium 1.6 mg/dL     Comprehensive Metabolic Panel [[id number]]  (Abnormal) Collected: 07/20/23 0351    Specimen: Blood Updated: 07/20/23 0427     Glucose 109 mg/dL      BUN 21 mg/dL      Creatinine 1.09 mg/dL      Sodium 133 mmol/L      Potassium 4.4 mmol/L      Chloride 100 mmol/L      CO2 20.0 mmol/L      Calcium 8.9 mg/dL      Total Protein 6.1 g/dL      Albumin 2.5 g/dL      ALT (SGPT) 15 U/L      AST (SGOT) 41 U/L      Alkaline Phosphatase 149 U/L      Total Bilirubin 1.7 mg/dL      Globulin 3.6 gm/dL      Comment: Calculated Result        A/G Ratio 0.7 " g/dL      BUN/Creatinine Ratio 19.3     Anion Gap 13.0 mmol/L      eGFR 69.5 mL/min/1.73     Narrative:      GFR Normal >60  Chronic Kidney Disease <60  Kidney Failure <15    The GFR formula is only valid for adults with stable renal function between ages 18 and 70.    CK [[id number]]  (Normal) Collected: 07/20/23 0351    Specimen: Blood Updated: 07/20/23 0427     Creatine Kinase 95 U/L     Lipase [[id number]]  (Abnormal) Collected: 07/20/23 0351    Specimen: Blood Updated: 07/20/23 0427     Lipase 109 U/L     Lactic Acid, Plasma [[id number]]  (Normal) Collected: 07/20/23 0351    Specimen: Blood Updated: 07/20/23 0421     Lactate 1.4 mmol/L      Comment: Falsely depressed results may occur on samples drawn from patients receiving N-Acetylcysteine (NAC) or Metamizole.       CBC & Differential [[id number]]  (Abnormal) Collected: 07/20/23 0351    Specimen: Blood Updated: 07/20/23 0410    Narrative:      The following orders were created for panel order CBC & Differential.  Procedure                               Abnormality         Status                     ---------                               -----------         ------                     CBC Auto Differential[[id number]]        Abnormal            Final result                 Please view results for these tests on the individual orders.    CBC Auto Differential [[id number]]  (Abnormal) Collected: 07/20/23 0351    Specimen: Blood Updated: 07/20/23 0410     WBC 18.24 10*3/mm3      RBC 3.46 10*6/mm3      Hemoglobin 10.3 g/dL      Hematocrit 31.5 %      MCV 91.0 fL      MCH 29.8 pg      MCHC 32.7 g/dL      RDW 13.0 %      RDW-SD 42.9 fl      MPV 9.1 fL      Platelets 283 10*3/mm3      Neutrophil % 80.8 %      Lymphocyte % 9.7 %      Monocyte % 8.2 %      Eosinophil % 0.2 %      Basophil % 0.3 %      Immature Grans % 0.8 %      Neutrophils, Absolute 14.73 10*3/mm3      Lymphocytes, Absolute 1.77 10*3/mm3      Monocytes, Absolute 1.50 10*3/mm3      Eosinophils, Absolute 0.03  10*3/mm3      Basophils, Absolute 0.06 10*3/mm3      Immature Grans, Absolute 0.15 10*3/mm3      nRBC 0.0 /100 WBC             Imaging:  MRI FOOT LEFT WO CONTRAST     Date of Exam: 7/19/2023 1:07 PM EDT     Indication: Foot swelling, nondiabetic, osteomyelitis suspected.     Comparison: Left foot MRI 11/16/2020, left foot radiographs 1/6/2020     Technique:  Routine multiplanar/multisequence sequence images of the left foot were obtained without contrast administration.        Findings:  From most recent available imaging of the left foot there has been interval surgical change of transmetatarsal amputation through the proximal metatarsal bases.     There is a 2.1 x 1.5 x 2.6 cm (AP X TV X CC) T1 intermediate, T2/STIR hyperintense collection in the soft tissues lateral to the distal cuboid (series 12 image 17, series 8 image 19), concerning for phlegmon or abscess. There is abnormal T1 hypointense,   T2/STIR hyperintense signal change of the cuboid, third cuneiform, fourth metatarsal base, and fifth metatarsal base compatible with osteomyelitis. There is some T2/STIR hyperintense marrow edema of the distal cuboid and lateral aspect of the navicular,   without convincing loss of the T1 hyperintense marrow signal, likely reflecting some reactive marrow edema although contiguous osteomyelitis is difficult to exclude. There is diffuse subcutaneous edema throughout the dorsal foot and ankle which may   reflect contiguous cellulitis. No ankle joint effusion.        IMPRESSION:  Impression:  2.1 x 1.5 x 2.6 cm T2/STIR hyperintense structure in the soft tissues abutting the lateral aspect of the distal cuboid, concerning for phlegmon or abscess, incompletely characterized in the absence of IV contrast.     There are marrow signal changes of the cuboid, third cuneiform, and fourth and fifth metatarsal bases which are highly suspicious for osteomyelitis. Correlate for any open wound in this area. Correlate with ESR and CRP.  Marrow signal changes relating to   Charcot arthropathy cannot be entirely excluded but are considered somewhat less likely in the setting of adjacent suspected phlegmon/abscess. There is some marrow edema of the nearby distal cuboid and lateral navicular bone which may reflect reactive   marrow edema although contiguous osteomyelitis cannot be entirely excluded.      Electronically Signed: Juan Remy    7/19/2023 1:41 PM EDT    Workstation ID: [id number]    07/20/23 I have personally reviewed and interpreted the images from outside facility with the documented findings, left foot abscess with possible underlying osteomyelitis    Assessment & Plan     77-year-old male with left foot abscess with possible underlying osteomyelitis      Sepsis    Atrial fibrillation with RVR    Neuropathy    CKD (chronic kidney disease) stage 2, GFR 60-89 ml/min    S/P transmetatarsal amputation of foot, left    Staphylococcus aureus bacteremia      I discussed the patient's findings and my recommendations as well as treatment options and alternatives with patient.  Surgical versus non surgical treatment options were discussed as well.  We reviewed the nature of the planned procedure including the risks, benefits and potential complications.  Understanding was expressed and the decision was made to proceed with surgery.    The risks and benefits of the procedure were discussed with the patient and/or appropriate guardian, which include but are not limited to the risk of bleeding, infection, neurovascular damage, post-operative stiffness, continued/chronic pain, need for further revision surgeries in the future, deep venous thrombosis, deformity, malunion, nonunion, hardware failure, need for further surgery, amputation, etc. We also discussed the general risks from anesthesia including death, strokes, heart attacks, blood clots, etc. We also discussed the post-operative rehabilitation, the need for physical therapy, and the overall  expected outcomes from the procedure. We also discussed what would happen if we do nothing. We also discussed the possible use of biologics including allograft. We allowed proper time and answered the patient's questions regarding the procedure. The patient expressed understanding. Knowing what the risks are and what the conservative treatment is, the patient and/or appropriate guardian elected to forgo any further conservative treatment options and proceed with the surgical intervention.     Plan will be for left foot irrigation debridement versus revision amputation in the OR tomorrow.      -Patient is to be n.p.o. at midnight.  -Hold DVT prophylaxis prior to procedure  -Rest of management per primary team      Mao Mckay MD  07/20/23  14:19 EDT

## 2023-07-20 NOTE — PLAN OF CARE
Goal Outcome Evaluation:  A&Ox4, VSS, Afib on monitor, denies pain/discomfort. Titratable Diltiazem drip ordered for tachycardia. Albumin replaced. Politely refused Covid swab stating he had one a few days prior. Resting comfortably in bed, assistive devices within reach, bed in lowest position, hourly rounding preformed. Will continue to monitor.

## 2023-07-20 NOTE — PROGRESS NOTES
[facility] Medicine Services  PROGRESS NOTE    Patient Name: Carlos Culver  : 1945  MRN: [id number]    Date of Admission: 2023  Primary Care Physician: Marcus Cheng MD    Subjective   Subjective     CC:f/u bacteremia    HPI:  Resting comfortably, no acute overnight events    ROS:  Gen- No fevers, chills  CV- No chest pain, palpitations  Resp- No cough, dyspnea  GI- No N/V/D, abd pain       Objective   Objective     Vital Signs:   Temp:  [97.7 °F (36.5 °C)-98.6 °F (37 °C)] 98.5 °F (36.9 °C)  Heart Rate:  [] 69  Resp:  [16-20] 18  BP: ()/(50-70) 91/50     Physical Exam:  Constitutional: No acute distress, awake, alert  HENT: NCAT, mucous membranes moist  Respiratory: Clear to auscultation bilaterally, respiratory effort normal   Cardiovascular: irregular, rate controlled  Gastrointestinal: Positive bowel sounds, soft, nontender, nondistended  Musculoskeletal: trace LLE ankle edema. S/p foot amputation of L  Psychiatric: Appropriate affect, cooperative  Neurologic: Oriented x 3, no focal deficits  Skin: plantar wound of L foot. L calf appears more erythematous than R      Results Reviewed:  LAB RESULTS:      Lab 23  0351 23  1034 23  1205   WBC 18.24* 15.95* 17.82*   HEMOGLOBIN 10.3* 12.7* 13.0   HEMATOCRIT 31.5* 39.2 41.5   PLATELETS 283 325 340   NEUTROS ABS 14.73* 13.03* 14.66*   IMMATURE GRANS (ABS) 0.15* 0.11* 0.11*   LYMPHS ABS 1.77 1.41 1.45   MONOS ABS 1.50* 1.28* 1.51*   EOS ABS 0.03 0.05 0.03   MCV 91.0 93.8 94.5   SED RATE  --   --  118*   CRP  --   --  27.88*   PROCALCITONIN 0.73* 0.75*  --    LACTATE 1.4 3.4*  --          Lab 23  0351 23  1034 23  1205   SODIUM 133* 138 133*   POTASSIUM 4.4 4.4 4.7   CHLORIDE 100 100 98   CO2 20.0* 22.0 22.0   ANION GAP 13.0 16.0* 13.0   BUN 21 23 19   CREATININE 1.09 1.18 1.19   EGFR 69.5 63.2 62.5   GLUCOSE 109* 138* 107*   CALCIUM 8.9 10.2 10.0   MAGNESIUM 1.6  --   --           Lab 07/20/23  0351 07/19/23  1034 07/18/23  1205   TOTAL PROTEIN 6.1 7.5 7.9   ALBUMIN 2.5* 2.8* 3.3*   GLOBULIN 3.6 4.7 4.6   ALT (SGPT) 15 24 33   AST (SGOT) 41* 33 45*   BILIRUBIN 1.7* 1.5* 1.6*   ALK PHOS 149* 196* 236*   LIPASE 109* 110*  --                      Brief Urine Lab Results  (Last result in the past 365 days)        Color   Clarity   Blood   Leuk Est   Nitrite   Protein   CREAT   Urine HCG        07/19/23 0955 Orange   Clear   Negative   Negative   Negative   Trace                   Microbiology Results Abnormal       Procedure Component Value - Date/Time    Blood Culture - Blood, Arm, Right [[id number]]  (Normal) Collected: 07/19/23 1122    Lab Status: Preliminary result Specimen: Blood from Arm, Right Updated: 07/20/23 1230     Blood Culture No growth at 24 hours    COVID PRE-OP / PRE-PROCEDURE SCREENING ORDER (NO ISOLATION) - Swab, Nasopharynx [[id number]]  (Normal) Collected: 07/20/23 1142    Lab Status: Final result Specimen: Swab from Nasopharynx Updated: 07/20/23 1203    Narrative:      The following orders were created for panel order COVID PRE-OP / PRE-PROCEDURE SCREENING ORDER (NO ISOLATION) - Swab, Nasopharynx.  Procedure                               Abnormality         Status                     ---------                               -----------         ------                     COVID-19 and FLU A/B PCR...[[id number]]  Normal              Final result                 Please view results for these tests on the individual orders.    COVID-19 and FLU A/B PCR - Swab, Nasopharynx [[id number]]  (Normal) Collected: 07/20/23 1142    Lab Status: Final result Specimen: Swab from Nasopharynx Updated: 07/20/23 1203     COVID19 Not Detected     Influenza A PCR Not Detected     Influenza B PCR Not Detected    Narrative:      Fact sheet for providers: https://www.fda.gov/media/085681/download    Fact sheet for patients: https://www.fda.gov/media/054601/download    Test performed by PCR.             Adult Transthoracic Echo Complete W/ Cont if Necessary Per Protocol    Result Date: 7/19/2023    Left ventricular systolic function is normal. Calculated left ventricular EF = 60%   Left ventricular diastolic function was normal.   Left atrial volume is moderately increased.   Saline test results are negative.   There is calcification of the aortic valve.   Moderate tricuspid valve regurgitation is present.   Estimated right ventricular systolic pressure from tricuspid regurgitation is mildly elevated (35-45 mmHg).     CT Abdomen Pelvis Without Contrast    Result Date: 7/19/2023  CT ABDOMEN PELVIS WO CONTRAST Date of Exam: 7/19/2023 6:38 PM EDT Indication: Sepsis. Comparison: No previous CT scans. Technique: Axial CT images were obtained of the abdomen and pelvis without the administration of contrast. Reconstructed coronal and sagittal images were also obtained. Automated exposure control and iterative construction methods were used. Findings: The included lower lungs appear grossly clear. There is mild diffuse fatty liver change. No significant abnormalities are noted of the spleen, gallbladder, pancreas, adrenal glands, or kidneys for non-IV contrast enhanced exam. There is a large, intact appearing duodenal diverticulum of the second-third portion of the duodenum, 6 cm in diameter. There is no visible associated inflammation or extraluminal air. Upper abdominal bowel loops are normal in caliber and normal in appearance, although the colon  is seen to contain only fluid and contrast. Regarding the lower abdomen pelvis, bladder is moderately distended, approximately 12 cm in length and appears normal. There is no formed stool except at the level of the rectum, where there may be a mild degree of impaction. No perirectal inflammation is seen. There are a few sigmoid diverticuli and somewhat more numerous descending colon diverticuli without evidence of diverticulitis. There is mild ectasia of the infrarenal  abdominal aorta to 2.8 cm. No significant abnormalities are seen of the terminal ileum or cecum. Appendix is not definitely identified. No intrapelvic inflammatory changes are seen. Bony structures appear to be intact. There is an old L1 vertebral compression deformity, with practical fusion of L1 and T12. There is moderately advanced L5-S1 degenerative disc disease.     Impression: Impression: 1. Colon contains mostly fluid. There may be a mild degree of rectal impaction, but no colonic inflammation or evidence of bowel obstruction is seen. 2. 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 3. Large, but intact-appearing duodenal diverticulum apparently incidental. 4. Moderately distended, otherwise normal-appearing bladder. Sigmoid diverticulosis without evidence of diverticulitis. Electronically Signed: Derek Grove  7/19/2023 7:11 PM EDT  Workstation ID: [id number]    XR Foot 3+ View Left    Result Date: 7/19/2023  XR FOOT 3+ VW LEFT Date of Exam: 7/19/2023 2:09 PM EDT Indication: om Comparison: MRI performed the same day. Radiographs January 6, 2020 Findings: Status post transmetatarsal amputation at the proximal metatarsals. There is diffuse soft tissue prominence, particularly at the remaining distal foot. There is a suspected wound at the lateral-distal foot. There is heterogeneous osseous lucency/erosive change involving the base of the fourth metatarsal and distal cuboid. No significant visualized proximal fifth metatarsal is seen on this exam. There is also osseous lucency at the lateral aspect of the third metatarsal base, lateral navicular, and lateral cuneiform. These findings correlate with suspected osteomyelitis seen on prior MRI. Oblique linear lucency at the lateral base of the third metatarsal suggests a pathologic nondisplaced fracture. No other definite acute fracture is seen. No other  definite acute osseous abnormality. There appear to be moderate degenerative changes  and heterogeneous sclerosis of the proximal metatarsals and tarsal bones. There is calcific atherosclerosis of the dorsalis pedis.     Impression: Impression: 1.Status post transmetatarsal amputation. 2.Abnormal appearance of the proximal third and fourth metatarsals, distal cuboid, lateral cuneiform, and lateral navicular suspicious form osteomyelitis as seen on prior MRI. 3.Diffuse forefoot soft tissue prominence, with suspected wound at the lateral distal foot. Electronically Signed: Antonio Ronquillo  7/19/2023 2:51 PM EDT  Workstation ID: [id number]    XR Chest 1 View    Result Date: 7/19/2023  XR CHEST 1 VW Date of Exam: 7/19/2023 10:16 AM EDT Indication: +blood culture Comparison: 7/19/2023 . Findings: Heart and pulmonary vessels are normal. Lung fields are normally inflated and clear. There are no effusions.     Impression: Impression: No acute process. Electronically Signed: Tina Weinberg MD  7/19/2023 10:33 AM EDT  Workstation ID: [id number]    MRI Foot Left Without Contrast    Result Date: 7/19/2023  MRI FOOT LEFT WO CONTRAST Date of Exam: 7/19/2023 1:07 PM EDT Indication: Foot swelling, nondiabetic, osteomyelitis suspected.  Comparison: Left foot MRI 11/16/2020, left foot radiographs 1/6/2020 Technique:  Routine multiplanar/multisequence sequence images of the left foot were obtained without contrast administration. Findings: From most recent available imaging of the left foot there has been interval surgical change of transmetatarsal amputation through the proximal metatarsal bases. There is a 2.1 x 1.5 x 2.6 cm (AP X TV X CC) T1 intermediate, T2/STIR hyperintense collection in the soft tissues lateral to the distal cuboid (series 12 image 17, series 8 image 19), concerning for phlegmon or abscess. There is abnormal T1 hypointense, T2/STIR hyperintense signal change of the cuboid, third cuneiform, fourth metatarsal base, and fifth metatarsal base compatible with osteomyelitis. There is some T2/STIR  hyperintense marrow edema of the distal cuboid and lateral aspect of the navicular, without convincing loss of the T1 hyperintense marrow signal, likely reflecting some reactive marrow edema although contiguous osteomyelitis is difficult to exclude. There is diffuse subcutaneous edema throughout the dorsal foot and ankle which may reflect contiguous cellulitis. No ankle joint effusion.     Impression: Impression: 2.1 x 1.5 x 2.6 cm T2/STIR hyperintense structure in the soft tissues abutting the lateral aspect of the distal cuboid, concerning for phlegmon or abscess, incompletely characterized in the absence of IV contrast. There are marrow signal changes of the cuboid, third cuneiform, and fourth and fifth metatarsal bases which are highly suspicious for osteomyelitis. Correlate for any open wound in this area. Correlate with ESR and CRP. Marrow signal changes relating to Charcot arthropathy cannot be entirely excluded but are considered somewhat less likely in the setting of adjacent suspected phlegmon/abscess. There is some marrow edema of the nearby distal cuboid and lateral navicular bone which may reflect reactive marrow edema although contiguous osteomyelitis cannot be entirely excluded. Electronically Signed: Juan Remy  7/19/2023 1:41 PM EDT  Workstation ID: [id number]     Results for orders placed during the hospital encounter of 07/19/23    Adult Transthoracic Echo Complete W/ Cont if Necessary Per Protocol    Interpretation Summary    Left ventricular systolic function is normal. Calculated left ventricular EF = 60%    Left ventricular diastolic function was normal.    Left atrial volume is moderately increased.    Saline test results are negative.    There is calcification of the aortic valve.    Moderate tricuspid valve regurgitation is present.    Estimated right ventricular systolic pressure from tricuspid regurgitation is mildly elevated (35-45 mmHg).      Current medications:  Scheduled  Meds:atorvastatin, 80 mg, Oral, Nightly  ceFAZolin, 2,000 mg, Intravenous, Q8H  enoxaparin, 40 mg, Subcutaneous, Q24H  metoprolol tartrate, 25 mg, Oral, BID  senna-docusate sodium, 2 tablet, Oral, BID  sodium chloride, 10 mL, Intravenous, Q12H      Continuous Infusions:dilTIAZem, 5-15 mg/hr, Last Rate: Stopped (07/20/23 0309)  lactated ringers, 100 mL/hr, Last Rate: 100 mL/hr (07/20/23 0247)      PRN Meds:.  acetaminophen **OR** acetaminophen **OR** acetaminophen    senna-docusate sodium **AND** polyethylene glycol **AND** bisacodyl **AND** bisacodyl    Calcium Replacement - Follow Nurse / BPA Driven Protocol    HYDROcodone-acetaminophen    Magnesium Standard Dose Replacement - Follow Nurse / BPA Driven Protocol    melatonin    Morphine **AND** naloxone    ondansetron **OR** ondansetron    Phosphorus Replacement - Follow Nurse / BPA Driven Protocol    Potassium Replacement - Follow Nurse / BPA Driven Protocol    [COMPLETED] Insert Peripheral IV **AND** sodium chloride    sodium chloride    sodium chloride    Assessment & Plan   Assessment & Plan     Active Hospital Problems    Diagnosis  POA    **Sepsis [A41.9]  Yes    Staphylococcus aureus bacteremia [R78.81, B95.61]  Yes    Acute osteomyelitis of left ankle or foot [M86.172]  Unknown    S/P transmetatarsal amputation of foot, left [Z89.432]  Not Applicable    Neuropathy [G62.9]  Yes    CKD (chronic kidney disease) stage 2, GFR 60-89 ml/min [N18.2]  Yes    Atrial fibrillation with RVR [I48.91]  Yes      Resolved Hospital Problems   No resolved problems to display.        Brief Hospital Course to date:  Carlos Culver is a 77 y.o. male with a history of peripheral neuropathy, osteomyelitis, CKD, A-fib who is presenting with sepsis/bacteremia    Sepsis w/ MSSA bacteremia  Concern for left foot osteomyelitis  -- ID following and managing antibiotics.  We will plan on getting PICC line after procedure. Echo pending w staph aureus bacteremia. Repeat cultures from  today in process  -- Reviewed MRI results, consistent with high suspicion for osteomyelitis  - Discussed with Ortho today, plans for left foot irrigation debridement versus revision amputation in OR tomorrow     A fib rvr-resolved  -- Status post IV fluids, continue beta-blocker  -- Continue holding Eliquis, will resume when okay with Ortho     CKD II       Expected Discharge Location and Transportation: home vs rehab  Expected Discharge   Expected Discharge Date: 7/24/2023; Expected Discharge Time:      DVT prophylaxis:  Medical DVT prophylaxis orders are present.          CODE STATUS:   Code Status and Medical Interventions:   Ordered at: 07/19/23 1154     Code Status (Patient has no pulse and is not breathing):    CPR (Attempt to Resuscitate)     Medical Interventions (Patient has pulse or is breathing):    Full Support     Release to patient:    Routine Release       Tianna Gallagher MD  07/20/23

## 2023-07-20 NOTE — PROGRESS NOTES
[facility]    INFECTIOUS DISEASE PROGRESS NOTE    Carlos Culver  1945  [id number]    Consult: 7/19/23    Admit date: 7/19/2023    Requesting Provider: No ref. provider found  Evaluating physician: Jeffery Young MD  Reason for Consultation: MSSA sepsis with bacteremia with 4 out of 4 blood cultures positive by PCR from 7/18/2023, probable left foot osteomyelitis, Abscess  Chief Complaint: Above      Subjective   History of present illness:  Patient is a  77 y.o.  Yr old male with a history of peripheral neuropathy, hard of hearing, hyperlipidemia, essential hypertension, previous left transmetatarsal amputation for osteomyelitis, who developed a left plantar ulcer in April 2023 after working out on an elliptical exercise machine.  His ulceration has persisted off and on and he was treated briefly with oral antibiotics by Dr. Rajat SWANSON.P.MEunice at [facility].  Patient still had persistence of his left plantar wound.  Also complained of some unmeasured fevers and fatigue over the past few weeks.  He was evaluated in clinic and had blood cultures obtained and laboratories.  He had no other localizing signs or symptoms of infection.  Blood cultures drawn on 7/18 were positive by PCR for methicillin sensitive Staph aureus in 4 out of 4 bottles.  White blood cell count was 17.  Patient was directed to be admitted to [facility] on 7/19/2023.  I was consulted on 7/19/2023 for further evaluation and treatment.    7/20/2023 history reviewed.  Awaiting orthopedic evaluation of left foot.  No high fever.  Tolerating cefazolin for MSSA sepsis.    Past Medical History:   Diagnosis Date    A-fib     Acute kidney failure     Elevated cholesterol     Hard of hearing     Hyperlipidemia     Hypertension     Renal disorder     Wears hearing aid in both ears     Wears reading eyeglasses        Past Surgical History:   Procedure Laterality Date    AMPUTATION DIGIT  Left 2020    Procedure: AMPUTATION LEFT 2ND TOE;  Surgeon: Idania Osborn MD;  Location:  [facility];  Service: Orthopedics    AMPUTATION DIGIT Left 2020    Procedure: TRANSMETATARSAL AMPUTATION LEFT;  Surgeon: Idania Osborn MD;  Location:  [facility];  Service: Orthopedics;  Laterality: Left;    APPENDECTOMY      COLONOSCOPY  2006    FOOT SURGERY Left 2020    (L) transmetatarsal amputation 2020 - Dr. Idania Osborn    INCISION AND DRAINAGE LEG Left 10/23/2018    Procedure: INCISION AND DRAINAGE LEFT FOOT;  Surgeon: Idania Osborn MD;  Location:  [facility];  Service: Orthopedics    TOE AMPUTATION      TONSILLECTOMY      VENOUS ACCESS DEVICE (PORT) INSERTION N/A 10/23/2018    Procedure: GROSHONG CATHETER PLACEMENT;  Surgeon: Marquez Simons MD;  Location:  [facility];  Service: General    VENOUS ACCESS DEVICE (PORT) INSERTION N/A 2020    Procedure: GROSHONG INSERTION;  Surgeon: Antonio Bains MD;  Location:  [facility];  Service: General;  Laterality: N/A;    VENOUS ACCESS DEVICE (PORT) REMOVAL         Pediatric History   Patient Parents    Not on file     Other Topics Concern    Not on file   Social History Narrative    Not on file   , 3 children, no pets, 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  Father  of cancer, mother  of old age and cardiac disease, 1 brother, mother also had hypothyroidism  Allergies   Allergen Reactions    Sulfa Antibiotics Unknown (See Comments)     Pt was told as child he had an allergy.  Has not taken and doesn't know the response       Immunization History   Administered Date(s) Administered    COVID-19 (PFIZER) BIVALENT 12+YRS 10/19/2022    COVID-19 (PFIZER) Purple Cap Monovalent 2021, 2021, 10/02/2021    Covid-19 (Pfizer) Gray Cap Monovalent 2022       Medication:    Current Facility-Administered Medications:     acetaminophen (TYLENOL) tablet 650 mg, 650 mg, Oral, Q4H PRN **OR** acetaminophen  (TYLENOL) 160 MG/5ML solution 650 mg, 650 mg, Oral, Q4H PRN **OR** acetaminophen (TYLENOL) suppository 650 mg, 650 mg, Rectal, Q4H PRN, Henrietta Zhang II, DO    atorvastatin (LIPITOR) tablet 80 mg, 80 mg, Oral, Nightly, Henrietta Zhang II, DO, 80 mg at 07/19/23 2034    sennosides-docusate (PERICOLACE) 8.6-50 MG per tablet 2 tablet, 2 tablet, Oral, BID **AND** polyethylene glycol (MIRALAX) packet 17 g, 17 g, Oral, Daily PRN **AND** bisacodyl (DULCOLAX) EC tablet 5 mg, 5 mg, Oral, Daily PRN **AND** bisacodyl (DULCOLAX) suppository 10 mg, 10 mg, Rectal, Daily PRN, Henrietta Zhang II, DO    Calcium Replacement - Follow Nurse / BPA Driven Protocol, , Does not apply, PRN, Henrietta Zhang II, DO    ceFAZolin in dextrose (ANCEF) IVPB solution 2,000 mg, 2,000 mg, Intravenous, Q8H, Jeffery Young MD, 2,000 mg at 07/20/23 0505    dilTIAZem (CARDIZEM) 125 mg in sodium chloride 0.9 % 125 mL infusion, 5-15 mg/hr, Intravenous, Continuous, Concetta Espino, APRN, Stopped at 07/20/23 0309    Enoxaparin Sodium (LOVENOX) syringe 40 mg, 40 mg, Subcutaneous, Q24H, Henrietta Zhang II, DO, 40 mg at 07/19/23 1413    HYDROcodone-acetaminophen (NORCO) 5-325 MG per tablet 1 tablet, 1 tablet, Oral, Q4H PRN, Henrietta Zhang II, DO    lactated ringers infusion, 100 mL/hr, Intravenous, Continuous, Henrietta Zhang II, DO, Last Rate: 100 mL/hr at 07/20/23 0247, 100 mL/hr at 07/20/23 0247    Magnesium Standard Dose Replacement - Follow Nurse / BPA Driven Protocol, , Does not apply, PRN, Henrietta Zhang II, DO    melatonin tablet 5 mg, 5 mg, Oral, Nightly PRN, Concetta Espino, APRN, 5 mg at 07/19/23 2210    metoprolol tartrate (LOPRESSOR) tablet 25 mg, 25 mg, Oral, BID, Henrietta Zhang II, DO, 25 mg at 07/19/23 2038    morphine injection 2 mg, 2 mg, Intravenous, Q2H PRN **AND** naloxone (NARCAN) injection 0.4 mg, 0.4 mg, Intravenous, Q5 Min PRN, Henrietta Zhang II, DO    ondansetron (ZOFRAN) tablet 4 mg, 4 mg, Oral, Q6H PRN **OR**  ondansetron (ZOFRAN) injection 4 mg, 4 mg, Intravenous, Q6H PRN, Leatha, Henrietta M II, DO    Phosphorus Replacement - Follow Nurse / BPA Driven Protocol, , Does not apply, PRN, Leatha, Henrietta M II, DO    Potassium Replacement - Follow Nurse / BPA Driven Protocol, , Does not apply, PRN, Leatha, Henrietta M II, DO    [COMPLETED] Insert Peripheral IV, , , Once **AND** sodium chloride 0.9 % flush 10 mL, 10 mL, Intravenous, PRN, Juan Bosch MD    sodium chloride 0.9 % flush 10 mL, 10 mL, Intravenous, Q12H, Leatha, Henrietta M II, DO, 10 mL at 07/19/23 2040    sodium chloride 0.9 % flush 10 mL, 10 mL, Intravenous, PRN, Leatha, Henrietta M II, DO    sodium chloride 0.9 % infusion 40 mL, 40 mL, Intravenous, PRN, Leatha, Henrietta M II, DO    Please refer to the medical record for a full medication list    Review of Systems:    Constitutional--some low-grade fever, no chills or sweats.  Appetite good, and no malaise.  Some fatigue.  HEENT-- No new vision, hearing or throat complaints.  No epistaxis or oral sores.  Denies odynophagia or dysphagia.  No odynophagia or dysphagia. No headache, photophobia or neck stiffness.  CV-- No chest pain, palpitation or syncope  Resp-- No SOB/cough/Hemoptysis  GI- No nausea, vomiting, or diarrhea.  No hematochezia, melena, or hematemesis. Denies jaundice or chronic liver disease.  -- No dysuria, hematuria, or flank pain.  Denies hesitancy, urgency or flank pain.  Lymph- no swollen lymph nodes in neck/axilla or groin.   Heme- No active bruising or bleeding; no Hx of DVT or PE.  MS-- no swelling or pain in the bones or joints of arms/legs.  No new back pain.  Neuro-- No acute focal weakness or numbness in the arms or legs.  No seizures.  Skin--No rashes or lesions, except left foot plantar, no nodules    Physical Exam:   Vital Signs   Temp:  [97.7 °F (36.5 °C)-98.6 °F (37 °C)] 98.5 °F (36.9 °C)  Heart Rate:  [] 69  Resp:  [16-20] 18  BP: ()/(50-90) 91/50    Temp  Min: 97.7 °F (36.5  "°C)  Max: 98.6 °F (37 °C)  BP  Min: 91/50  Max: 133/90  Pulse  Min: 69  Max: 154  Resp  Min: 16  Max: 20  SpO2  Min: 94 %  Max: 99 %    Blood pressure 91/50, pulse 69, temperature 98.5 °F (36.9 °C), temperature source Oral, resp. rate 18, height 181.6 cm (71.5\"), weight 99.8 kg (220 lb), SpO2 95 %.  GENERAL: Awake and alert, in mild distress. Appears older than stated age.  HEENT:  Normocephalic, atraumatic.  Oropharynx without thrush. Dentition in good repair. No cervical adenopathy. No neck masses.  Ears externally normal, Nose externally normal.  EYES:  No conjunctival injection. No icterus. EOM full.  LYMPHATICS: No lymphadenopathy of the neck or axillary or inguinal regions.   HEART: No murmur, gallop, or pericardial friction rub. Reg rate rhythm, No JVD at 45 degrees.  LUNGS: Clear to auscultation and percussion. No respiratory distress, no use of accessory muscles.  ABDOMEN: Soft, nontender, nondistended. No appreciable HSM.  Bowel sounds normal.  Slightly obese.  SKIN: Warm and dry without cutaneous eruptions.  No nodules.  Left foot with some plantar fourth metatarsal callus with some minimal serous drainage but no surrounding crepitus or bullae or foul smell  PSYCHIATRIC: Mental status lucid. No confusion.  EXT:  No cellulitic change.  NEURO: Oriented to name, nonfocal.      Results Review:   I reviewed the patient's new clinical results.  I reviewed the patient's new imaging results and agree with the interpretation.  I reviewed the patient's other test results and agree with the interpretation    Results from last 7 days   Lab Units 07/20/23  0351 07/19/23  1034 07/18/23  1205   WBC 10*3/mm3 18.24* 15.95* 17.82*   HEMOGLOBIN g/dL 10.3* 12.7* 13.0   HEMATOCRIT % 31.5* 39.2 41.5   PLATELETS 10*3/mm3 283 325 340       Results from last 7 days   Lab Units 07/20/23  0351   SODIUM mmol/L 133*   POTASSIUM mmol/L 4.4   CHLORIDE mmol/L 100   CO2 mmol/L 20.0*   BUN mg/dL 21   CREATININE mg/dL 1.09   GLUCOSE mg/dL " 109*   CALCIUM mg/dL 8.9       Results from last 7 days   Lab Units 07/20/23  0351   ALK PHOS U/L 149*   BILIRUBIN mg/dL 1.7*   ALT (SGPT) U/L 15   AST (SGOT) U/L 41*       Results from last 7 days   Lab Units 07/18/23  1205   SED RATE mm/hr 118*       Results from last 7 days   Lab Units 07/18/23  1205   CRP mg/dL 27.88*           Results from last 7 days   Lab Units 07/20/23  0351   LACTATE mmol/L 1.4       Estimated Creatinine Clearance: 67.8 mL/min (by C-G formula based on SCr of 1.09 mg/dL).  CPK          7/20/2023    03:51   Common Labsle   Creatine Kinase 95       Procalitonin Results:      Lab 07/20/23  0351 07/19/23  1034   PROCALCITONIN 0.73* 0.75*        Brief Urine Lab Results  (Last result in the past 365 days)        Color   Clarity   Blood   Leuk Est   Nitrite   Protein   CREAT   Urine HCG        07/19/23 0955 Orange   Clear   Negative   Negative   Negative   Trace                  No results found for: SITE, ALLENTEST, PHART, PUZ5NKP, PO2ART, SPR2ZYH, BASEEXCESS, C3NWEWJM, HGBBG, HCTABG, OXYHEMOGLOBI, METHHGBN, CARBOXYHGB, CO2CT, BAROMETRIC, MODALITY, FIO2     Microbiology:  No results found for: ACANTHNAEG, AFBCX, BPERTUSSISCX, BLOODCX  No results found for: BCIDPCR, CXREFLEX, CSFCX, CULTURETIS  No results found for: CULTURES, HSVCX, URCX  No results found for: EYECULTURE, GCCX, HSVCULTURE, LABHSV  No results found for: LEGIONELLA, MRSACX, MUMPSCX, MYCOPLASCX  No results found for: NOCARDIACX, STOOLCX  Urine Culture   Date Value Ref Range Status   07/18/2023 No growth  Preliminary     Wound Culture   Date Value Ref Range Status   07/18/2023 Culture in progress  Preliminary        Urine Culture   Date Value Ref Range Status   07/18/2023 No growth  Preliminary     Wound Culture   Date Value Ref Range Status   07/18/2023 Culture in progress  Preliminary   (    Blood cultures 7/18/2023 reported positive in 4 out of 4 bottles by PCR for MSSA    Radiology:  Imaging Results (Last 72 Hours)       Procedure  Component Value Units Date/Time    CT Abdomen Pelvis Without Contrast [[id number]] Collected: 07/19/23 1858     Updated: 07/19/23 1914    Narrative:      CT ABDOMEN PELVIS WO CONTRAST    Date of Exam: 7/19/2023 6:38 PM EDT    Indication: Sepsis.    Comparison: No previous CT scans.    Technique: Axial CT images were obtained of the abdomen and pelvis without the administration of contrast. Reconstructed coronal and sagittal images were also obtained. Automated exposure control and iterative construction methods were used.      Findings:  The included lower lungs appear grossly clear. There is mild diffuse fatty liver change. No significant abnormalities are noted of the spleen, gallbladder, pancreas, adrenal glands, or kidneys for non-IV contrast enhanced exam. There is a large, intact   appearing duodenal diverticulum of the second-third portion of the duodenum, 6 cm in diameter. There is no visible associated inflammation or extraluminal air. Upper abdominal bowel loops are normal in caliber and normal in appearance, although the colon   is seen to contain only fluid and contrast.    Regarding the lower abdomen pelvis, bladder is moderately distended, approximately 12 cm in length and appears normal. There is no formed stool except at the level of the rectum, where there may be a mild degree of impaction. No perirectal inflammation   is seen. There are a few sigmoid diverticuli and somewhat more numerous descending colon diverticuli without evidence of diverticulitis. There is mild ectasia of the infrarenal abdominal aorta to 2.8 cm. No significant abnormalities are seen of the   terminal ileum or cecum. Appendix is not definitely identified. No intrapelvic inflammatory changes are seen.    Bony structures appear to be intact. There is an old L1 vertebral compression deformity, with practical fusion of L1 and T12. There is moderately advanced L5-S1 degenerative disc disease.          Impression:       Impression:    1. Colon contains mostly fluid. There may be a mild degree of rectal impaction, but no colonic inflammation or evidence of bowel obstruction is seen.    2. 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    3. Large, but intact-appearing duodenal diverticulum apparently incidental.    4. Moderately distended, otherwise normal-appearing bladder. Sigmoid diverticulosis without evidence of diverticulitis.      Electronically Signed: Derek Grove    7/19/2023 7:11 PM EDT    Workstation ID: [id number]    XR Foot 3+ View Left [[id number]] Collected: 07/19/23 1442     Updated: 07/19/23 1454    Narrative:      XR FOOT 3+ VW LEFT    Date of Exam: 7/19/2023 2:09 PM EDT    Indication: om    Comparison: MRI performed the same day. Radiographs January 6, 2020    Findings:  Status post transmetatarsal amputation at the proximal metatarsals. There is diffuse soft tissue prominence, particularly at the remaining distal foot. There is a suspected wound at the lateral-distal foot. There is heterogeneous osseous lucency/erosive   change involving the base of the fourth metatarsal and distal cuboid. No significant visualized proximal fifth metatarsal is seen on this exam. There is also osseous lucency at the lateral aspect of the third metatarsal base, lateral navicular, and   lateral cuneiform. These findings correlate with suspected osteomyelitis seen on prior MRI. Oblique linear lucency at the lateral base of the third metatarsal suggests a pathologic nondisplaced fracture. No other definite acute fracture is seen. No other   definite acute osseous abnormality. There appear to be moderate degenerative changes and heterogeneous sclerosis of the proximal metatarsals and tarsal bones. There is calcific atherosclerosis of the dorsalis pedis.      Impression:      Impression:  1.Status post transmetatarsal amputation.  2.Abnormal appearance of the proximal third and fourth metatarsals, distal  cuboid, lateral cuneiform, and lateral navicular suspicious form osteomyelitis as seen on prior MRI.  3.Diffuse forefoot soft tissue prominence, with suspected wound at the lateral distal foot.      Electronically Signed: Antonio Shima    7/19/2023 2:51 PM EDT    Workstation ID: [id number]    MRI Foot Left Without Contrast [[id number]] Collected: 07/19/23 1326     Updated: 07/19/23 1344    Narrative:        MRI FOOT LEFT WO CONTRAST    Date of Exam: 7/19/2023 1:07 PM EDT    Indication: Foot swelling, nondiabetic, osteomyelitis suspected.     Comparison: Left foot MRI 11/16/2020, left foot radiographs 1/6/2020    Technique:  Routine multiplanar/multisequence sequence images of the left foot were obtained without contrast administration.      Findings:  From most recent available imaging of the left foot there has been interval surgical change of transmetatarsal amputation through the proximal metatarsal bases.    There is a 2.1 x 1.5 x 2.6 cm (AP X TV X CC) T1 intermediate, T2/STIR hyperintense collection in the soft tissues lateral to the distal cuboid (series 12 image 17, series 8 image 19), concerning for phlegmon or abscess. There is abnormal T1 hypointense,   T2/STIR hyperintense signal change of the cuboid, third cuneiform, fourth metatarsal base, and fifth metatarsal base compatible with osteomyelitis. There is some T2/STIR hyperintense marrow edema of the distal cuboid and lateral aspect of the navicular,   without convincing loss of the T1 hyperintense marrow signal, likely reflecting some reactive marrow edema although contiguous osteomyelitis is difficult to exclude. There is diffuse subcutaneous edema throughout the dorsal foot and ankle which may   reflect contiguous cellulitis. No ankle joint effusion.        Impression:      Impression:  2.1 x 1.5 x 2.6 cm T2/STIR hyperintense structure in the soft tissues abutting the lateral aspect of the distal cuboid, concerning for phlegmon or abscess, incompletely  characterized in the absence of IV contrast.    There are marrow signal changes of the cuboid, third cuneiform, and fourth and fifth metatarsal bases which are highly suspicious for osteomyelitis. Correlate for any open wound in this area. Correlate with ESR and CRP. Marrow signal changes relating to   Charcot arthropathy cannot be entirely excluded but are considered somewhat less likely in the setting of adjacent suspected phlegmon/abscess. There is some marrow edema of the nearby distal cuboid and lateral navicular bone which may reflect reactive   marrow edema although contiguous osteomyelitis cannot be entirely excluded.        Electronically Signed: Juan Remy    7/19/2023 1:41 PM EDT    Workstation ID: [id number]    XR Chest 1 View [[id number]] Collected: 07/19/23 1033     Updated: 07/19/23 1037    Narrative:      XR CHEST 1 VW    Date of Exam: 7/19/2023 10:16 AM EDT    Indication: +blood culture    Comparison: 7/19/2023 .    Findings:  Heart and pulmonary vessels are normal. Lung fields are normally inflated and clear. There are no effusions.      Impression:      Impression:  No acute process.      Electronically Signed: Tina Weinberg MD    7/19/2023 10:33 AM EDT    Workstation ID: [id number]            IMPRESSION:     1.  MSSA high-grade bacteremia in 4 out of 4 bottles drawn from 7/18/2023.  Most likely source is left foot osteomyelitis and abscess (MRI 7/19/23) although unusual to have such high-grade bacteremia.  Could have seeded an intravascular source including endocarditis.  No chronic indwelling prosthetic device.  Unlikely to be contaminant.  Repeat blood culture 7/19 positive in 1 out of 2 sets.  TTE 7-19 was negative for endocarditis.  EF 60%.  2.  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  High risk for underlying osteomyelitis.  Likely a function of his peripheral neuropathy and some noncompliance with nonweightbearing  status.  , CRP 22.  MRI consistent with abscess and osteomyelitis left foot.  3.  Leukocytosis, neutrophilic related to above issues.  Worse.  4.  Hyponatremia.  133.  Worse.  5.  Elevated LFTs with alkaline phosphatase 149, bilirubin 1.7, worse, and AST 41.  May be related to sepsis versus other.  Alcohol-related disease?  CT of the abdomen and pelvis consistent with that liver.  Biliary track okay.  6.  Severe peripheral neuropathy, impacting all aspects of his care.  7.  Mild pyuria, 6-12 wbc on 7/18, asymptomatic.  May be related to above.  Urine culture negative to date.  8.  Anemia, chronic disease, worse.    Plan:    1.  Diagnostically, continue to follow patient's physical exam, CBC, CMP, CRP, CPK, repeat blood cultures x2.    2.  Therapeutically, continue cefazolin 2 g IV every 8 hours for 4 to 6 weeks depending.  Discussed with Reynaldo in the microbiology lab.  3.  Supportive care.  4.  Ortho evaluation.    I discussed the patient's findings and my recommendations with patient and nursing staff    Thank you for asking me to see Carlos Culver.  Our group would be pleased to follow this patient over the course of their hospitalization and assist with outpatient antimicrobial therapy, as indicated.  Further recommendations depend on the results of the cultures and clinical course.  Side effects discussed.  Increased risk for adverse drug reactions, complications of IV access, readmission.  Increased risk for surgery.    Jeffery Young MD  7/20/2023

## 2023-07-20 NOTE — PLAN OF CARE
Goal Outcome Evaluation:   Patient alert, oriented and pleasant. VSS. Afib on tele. Stable on RA. Plan for NPO midnight for left foot debridement vs further amputation in OR. No c/o pain or discomfort. Patient up ad twin, bed in lowest position, call light within reach. Will continue to follow patient's plan of care.

## 2023-07-21 ENCOUNTER — ANESTHESIA EVENT CONVERTED (OUTPATIENT)
Dept: ANESTHESIOLOGY | Facility: HOSPITAL | Age: 78
DRG: 854 | End: 2023-07-21
Payer: MEDICARE

## 2023-07-21 LAB
ALBUMIN SERPL-MCNC: 2.5 G/DL (ref 3.5–5.2)
ALBUMIN/GLOB SERPL: 1 G/DL
ALP SERPL-CCNC: 162 U/L (ref 39–117)
ALT SERPL W P-5'-P-CCNC: 15 U/L (ref 1–41)
ANION GAP SERPL CALCULATED.3IONS-SCNC: 13 MMOL/L (ref 5–15)
AST SERPL-CCNC: 84 U/L (ref 1–40)
BACTERIA SPEC AEROBE CULT: ABNORMAL
BASOPHILS # BLD AUTO: 0.05 10*3/MM3 (ref 0–0.2)
BASOPHILS NFR BLD AUTO: 0.4 % (ref 0–1.5)
BILIRUB SERPL-MCNC: 1.6 MG/DL (ref 0–1.2)
BUN SERPL-MCNC: 15 MG/DL (ref 8–23)
BUN/CREAT SERPL: 17 (ref 7–25)
CALCIUM SPEC-SCNC: 8.3 MG/DL (ref 8.6–10.5)
CHLORIDE SERPL-SCNC: 101 MMOL/L (ref 98–107)
CO2 SERPL-SCNC: 21 MMOL/L (ref 22–29)
CREAT SERPL-MCNC: 0.88 MG/DL (ref 0.76–1.27)
D-LACTATE SERPL-SCNC: 1.1 MMOL/L (ref 0.5–2)
DEPRECATED RDW RBC AUTO: 43.2 FL (ref 37–54)
EGFRCR SERPLBLD CKD-EPI 2021: 88 ML/MIN/1.73
EOSINOPHIL # BLD AUTO: 0.06 10*3/MM3 (ref 0–0.4)
EOSINOPHIL NFR BLD AUTO: 0.4 % (ref 0.3–6.2)
ERYTHROCYTE [DISTWIDTH] IN BLOOD BY AUTOMATED COUNT: 13.1 % (ref 12.3–15.4)
GLOBULIN UR ELPH-MCNC: 2.6 GM/DL
GLUCOSE SERPL-MCNC: 123 MG/DL (ref 65–99)
GRAM STN SPEC: ABNORMAL
GRAM STN SPEC: ABNORMAL
HCT VFR BLD AUTO: 30.9 % (ref 37.5–51)
HGB BLD-MCNC: 10 G/DL (ref 13–17.7)
IMM GRANULOCYTES # BLD AUTO: 0.13 10*3/MM3 (ref 0–0.05)
IMM GRANULOCYTES NFR BLD AUTO: 1 % (ref 0–0.5)
ISOLATED FROM: ABNORMAL
LYMPHOCYTES # BLD AUTO: 1.3 10*3/MM3 (ref 0.7–3.1)
LYMPHOCYTES NFR BLD AUTO: 9.6 % (ref 19.6–45.3)
MCH RBC QN AUTO: 29.6 PG (ref 26.6–33)
MCHC RBC AUTO-ENTMCNC: 32.4 G/DL (ref 31.5–35.7)
MCV RBC AUTO: 91.4 FL (ref 79–97)
MONOCYTES # BLD AUTO: 1.18 10*3/MM3 (ref 0.1–0.9)
MONOCYTES NFR BLD AUTO: 8.8 % (ref 5–12)
NEUTROPHILS NFR BLD AUTO: 10.76 10*3/MM3 (ref 1.7–7)
NEUTROPHILS NFR BLD AUTO: 79.8 % (ref 42.7–76)
NRBC BLD AUTO-RTO: 0 /100 WBC (ref 0–0.2)
PLATELET # BLD AUTO: 267 10*3/MM3 (ref 140–450)
PMV BLD AUTO: 9.1 FL (ref 6–12)
POTASSIUM SERPL-SCNC: 4.2 MMOL/L (ref 3.5–5.2)
PROCALCITONIN SERPL-MCNC: 0.54 NG/ML (ref 0–0.25)
PROT SERPL-MCNC: 5.1 G/DL (ref 6–8.5)
RBC # BLD AUTO: 3.38 10*6/MM3 (ref 4.14–5.8)
SODIUM SERPL-SCNC: 135 MMOL/L (ref 136–145)
WBC NRBC COR # BLD: 13.48 10*3/MM3 (ref 3.4–10.8)

## 2023-07-21 PROCEDURE — 80053 COMPREHEN METABOLIC PANEL: CPT | Performed by: INTERNAL MEDICINE

## 2023-07-21 PROCEDURE — P9041 ALBUMIN (HUMAN),5%, 50ML: HCPCS

## 2023-07-21 PROCEDURE — 85025 COMPLETE CBC W/AUTO DIFF WBC: CPT | Performed by: INTERNAL MEDICINE

## 2023-07-21 PROCEDURE — 87206 SMEAR FLUORESCENT/ACID STAI: CPT | Performed by: ORTHOPAEDIC SURGERY

## 2023-07-21 PROCEDURE — 87075 CULTR BACTERIA EXCEPT BLOOD: CPT | Performed by: ORTHOPAEDIC SURGERY

## 2023-07-21 PROCEDURE — 83605 ASSAY OF LACTIC ACID: CPT | Performed by: INTERNAL MEDICINE

## 2023-07-21 PROCEDURE — 25010000002 VANCOMYCIN 1 G RECONSTITUTED SOLUTION 1 EACH VIAL: Performed by: ORTHOPAEDIC SURGERY

## 2023-07-21 PROCEDURE — 87176 TISSUE HOMOGENIZATION CULTR: CPT | Performed by: ORTHOPAEDIC SURGERY

## 2023-07-21 PROCEDURE — 87070 CULTURE OTHR SPECIMN AEROBIC: CPT | Performed by: ORTHOPAEDIC SURGERY

## 2023-07-21 PROCEDURE — 84145 PROCALCITONIN (PCT): CPT | Performed by: INTERNAL MEDICINE

## 2023-07-21 PROCEDURE — 87186 SC STD MICRODIL/AGAR DIL: CPT | Performed by: ORTHOPAEDIC SURGERY

## 2023-07-21 PROCEDURE — 0L8P0ZZ DIVISION OF LEFT LOWER LEG TENDON, OPEN APPROACH: ICD-10-PCS | Performed by: ORTHOPAEDIC SURGERY

## 2023-07-21 PROCEDURE — 87205 SMEAR GRAM STAIN: CPT | Performed by: ORTHOPAEDIC SURGERY

## 2023-07-21 PROCEDURE — 87147 CULTURE TYPE IMMUNOLOGIC: CPT | Performed by: ORTHOPAEDIC SURGERY

## 2023-07-21 PROCEDURE — 99232 SBSQ HOSP IP/OBS MODERATE 35: CPT | Performed by: STUDENT IN AN ORGANIZED HEALTH CARE EDUCATION/TRAINING PROGRAM

## 2023-07-21 PROCEDURE — 25010000002 ALBUMIN HUMAN 5% PER 50 ML

## 2023-07-21 PROCEDURE — 27606 INCISION OF ACHILLES TENDON: CPT | Performed by: ORTHOPAEDIC SURGERY

## 2023-07-21 PROCEDURE — 28800 AMPUTATION OF MIDFOOT: CPT | Performed by: ORTHOPAEDIC SURGERY

## 2023-07-21 PROCEDURE — 87116 MYCOBACTERIA CULTURE: CPT | Performed by: ORTHOPAEDIC SURGERY

## 2023-07-21 PROCEDURE — 99024 POSTOP FOLLOW-UP VISIT: CPT | Performed by: ORTHOPAEDIC SURGERY

## 2023-07-21 PROCEDURE — 87102 FUNGUS ISOLATION CULTURE: CPT | Performed by: ORTHOPAEDIC SURGERY

## 2023-07-21 PROCEDURE — 87015 SPECIMEN INFECT AGNT CONCNTJ: CPT | Performed by: ORTHOPAEDIC SURGERY

## 2023-07-21 PROCEDURE — 0Y6N0Z0 DETACHMENT AT LEFT FOOT, COMPLETE, OPEN APPROACH: ICD-10-PCS | Performed by: ORTHOPAEDIC SURGERY

## 2023-07-21 RX ORDER — SODIUM CHLORIDE 0.9 % (FLUSH) 0.9 %
10 SYRINGE (ML) INJECTION AS NEEDED
Status: DISCONTINUED | OUTPATIENT
Start: 2023-07-21 | End: 2023-07-24 | Stop reason: SDUPTHER

## 2023-07-21 RX ORDER — SODIUM CHLORIDE 9 MG/ML
40 INJECTION, SOLUTION INTRAVENOUS AS NEEDED
Status: DISCONTINUED | OUTPATIENT
Start: 2023-07-21 | End: 2023-07-26 | Stop reason: HOSPADM

## 2023-07-21 RX ORDER — HYDROCODONE BITARTRATE AND ACETAMINOPHEN 5; 325 MG/1; MG/1
1 TABLET ORAL ONCE AS NEEDED
Status: DISCONTINUED | OUTPATIENT
Start: 2023-07-21 | End: 2023-07-21 | Stop reason: HOSPADM

## 2023-07-21 RX ORDER — ONDANSETRON 2 MG/ML
4 INJECTION INTRAMUSCULAR; INTRAVENOUS ONCE AS NEEDED
Status: DISCONTINUED | OUTPATIENT
Start: 2023-07-21 | End: 2023-07-21 | Stop reason: HOSPADM

## 2023-07-21 RX ORDER — PROMETHAZINE HYDROCHLORIDE 25 MG/1
25 TABLET ORAL ONCE AS NEEDED
Status: DISCONTINUED | OUTPATIENT
Start: 2023-07-21 | End: 2023-07-21 | Stop reason: HOSPADM

## 2023-07-21 RX ORDER — HYDROMORPHONE HYDROCHLORIDE 1 MG/ML
0.5 INJECTION, SOLUTION INTRAMUSCULAR; INTRAVENOUS; SUBCUTANEOUS
Status: DISCONTINUED | OUTPATIENT
Start: 2023-07-21 | End: 2023-07-21 | Stop reason: HOSPADM

## 2023-07-21 RX ORDER — ALBUMIN, HUMAN INJ 5% 5 %
SOLUTION INTRAVENOUS
Status: COMPLETED
Start: 2023-07-21 | End: 2023-07-21

## 2023-07-21 RX ORDER — DROPERIDOL 2.5 MG/ML
0.62 INJECTION, SOLUTION INTRAMUSCULAR; INTRAVENOUS ONCE AS NEEDED
Status: DISCONTINUED | OUTPATIENT
Start: 2023-07-21 | End: 2023-07-21 | Stop reason: HOSPADM

## 2023-07-21 RX ORDER — SODIUM CHLORIDE 9 MG/ML
100 INJECTION, SOLUTION INTRAVENOUS CONTINUOUS
Status: DISCONTINUED | OUTPATIENT
Start: 2023-07-21 | End: 2023-07-24

## 2023-07-21 RX ORDER — ACETAMINOPHEN 650 MG/1
650 SUPPOSITORY RECTAL EVERY 4 HOURS PRN
Status: DISCONTINUED | OUTPATIENT
Start: 2023-07-21 | End: 2023-07-26 | Stop reason: HOSPADM

## 2023-07-21 RX ORDER — IPRATROPIUM BROMIDE AND ALBUTEROL SULFATE 2.5; .5 MG/3ML; MG/3ML
3 SOLUTION RESPIRATORY (INHALATION) ONCE AS NEEDED
Status: DISCONTINUED | OUTPATIENT
Start: 2023-07-21 | End: 2023-07-21 | Stop reason: HOSPADM

## 2023-07-21 RX ORDER — SODIUM CHLORIDE 0.9 % (FLUSH) 0.9 %
10 SYRINGE (ML) INJECTION EVERY 12 HOURS SCHEDULED
Status: DISCONTINUED | OUTPATIENT
Start: 2023-07-21 | End: 2023-07-24 | Stop reason: SDUPTHER

## 2023-07-21 RX ORDER — OXYCODONE HYDROCHLORIDE 5 MG/1
5 TABLET ORAL EVERY 4 HOURS PRN
Status: DISCONTINUED | OUTPATIENT
Start: 2023-07-21 | End: 2023-07-26 | Stop reason: HOSPADM

## 2023-07-21 RX ORDER — HYDROMORPHONE HYDROCHLORIDE 1 MG/ML
0.5 INJECTION, SOLUTION INTRAMUSCULAR; INTRAVENOUS; SUBCUTANEOUS
Status: DISCONTINUED | OUTPATIENT
Start: 2023-07-21 | End: 2023-07-26 | Stop reason: HOSPADM

## 2023-07-21 RX ORDER — NALOXONE HCL 0.4 MG/ML
0.4 VIAL (ML) INJECTION AS NEEDED
Status: DISCONTINUED | OUTPATIENT
Start: 2023-07-21 | End: 2023-07-21 | Stop reason: HOSPADM

## 2023-07-21 RX ORDER — MAGNESIUM HYDROXIDE 1200 MG/15ML
LIQUID ORAL AS NEEDED
Status: DISCONTINUED | OUTPATIENT
Start: 2023-07-21 | End: 2023-07-21 | Stop reason: HOSPADM

## 2023-07-21 RX ORDER — ACETAMINOPHEN 325 MG/1
650 TABLET ORAL EVERY 4 HOURS PRN
Status: DISCONTINUED | OUTPATIENT
Start: 2023-07-21 | End: 2023-07-26 | Stop reason: HOSPADM

## 2023-07-21 RX ORDER — DROPERIDOL 2.5 MG/ML
0.62 INJECTION, SOLUTION INTRAMUSCULAR; INTRAVENOUS
Status: DISCONTINUED | OUTPATIENT
Start: 2023-07-21 | End: 2023-07-21 | Stop reason: HOSPADM

## 2023-07-21 RX ORDER — ALBUMIN, HUMAN INJ 5% 5 %
500 SOLUTION INTRAVENOUS ONCE
Status: COMPLETED | OUTPATIENT
Start: 2023-07-21 | End: 2023-07-21

## 2023-07-21 RX ORDER — LABETALOL HYDROCHLORIDE 5 MG/ML
5 INJECTION, SOLUTION INTRAVENOUS
Status: DISCONTINUED | OUTPATIENT
Start: 2023-07-21 | End: 2023-07-21 | Stop reason: HOSPADM

## 2023-07-21 RX ORDER — NALOXONE HCL 0.4 MG/ML
0.1 VIAL (ML) INJECTION
Status: DISCONTINUED | OUTPATIENT
Start: 2023-07-21 | End: 2023-07-26 | Stop reason: HOSPADM

## 2023-07-21 RX ORDER — SODIUM CHLORIDE 0.9 % (FLUSH) 0.9 %
3-10 SYRINGE (ML) INJECTION AS NEEDED
Status: DISCONTINUED | OUTPATIENT
Start: 2023-07-21 | End: 2023-07-21 | Stop reason: HOSPADM

## 2023-07-21 RX ORDER — BISACODYL 10 MG
10 SUPPOSITORY, RECTAL RECTAL DAILY PRN
Status: DISCONTINUED | OUTPATIENT
Start: 2023-07-21 | End: 2023-07-26 | Stop reason: HOSPADM

## 2023-07-21 RX ORDER — FENTANYL CITRATE 50 UG/ML
50 INJECTION, SOLUTION INTRAMUSCULAR; INTRAVENOUS
Status: DISCONTINUED | OUTPATIENT
Start: 2023-07-21 | End: 2023-07-21 | Stop reason: HOSPADM

## 2023-07-21 RX ORDER — SODIUM CHLORIDE 9 MG/ML
40 INJECTION, SOLUTION INTRAVENOUS AS NEEDED
Status: DISCONTINUED | OUTPATIENT
Start: 2023-07-21 | End: 2023-07-21 | Stop reason: HOSPADM

## 2023-07-21 RX ORDER — SODIUM CHLORIDE 0.9 % (FLUSH) 0.9 %
3 SYRINGE (ML) INJECTION EVERY 12 HOURS SCHEDULED
Status: DISCONTINUED | OUTPATIENT
Start: 2023-07-21 | End: 2023-07-21 | Stop reason: HOSPADM

## 2023-07-21 RX ORDER — PROMETHAZINE HYDROCHLORIDE 25 MG/1
25 SUPPOSITORY RECTAL ONCE AS NEEDED
Status: DISCONTINUED | OUTPATIENT
Start: 2023-07-21 | End: 2023-07-21 | Stop reason: HOSPADM

## 2023-07-21 RX ADMIN — METOPROLOL TARTRATE 25 MG: 25 TABLET, FILM COATED ORAL at 08:26

## 2023-07-21 RX ADMIN — Medication 10 ML: at 08:26

## 2023-07-21 RX ADMIN — Medication 10 ML: at 08:27

## 2023-07-21 RX ADMIN — ALBUMIN (HUMAN) 500 ML: 12.5 INJECTION, SOLUTION INTRAVENOUS at 16:38

## 2023-07-21 RX ADMIN — METOPROLOL TARTRATE 25 MG: 25 TABLET, FILM COATED ORAL at 21:12

## 2023-07-21 RX ADMIN — Medication 2000 MG: at 21:22

## 2023-07-21 RX ADMIN — Medication 10 ML: at 21:14

## 2023-07-21 RX ADMIN — Medication 5 MG: at 21:12

## 2023-07-21 RX ADMIN — Medication 2000 MG: at 03:40

## 2023-07-21 RX ADMIN — SODIUM CHLORIDE 100 ML/HR: 9 INJECTION, SOLUTION INTRAVENOUS at 21:14

## 2023-07-21 RX ADMIN — ALBUMIN, HUMAN INJ 5% 500 ML: 5 SOLUTION at 16:38

## 2023-07-21 RX ADMIN — ATORVASTATIN CALCIUM 80 MG: 40 TABLET, FILM COATED ORAL at 21:12

## 2023-07-21 NOTE — PROGRESS NOTES
[facility]  PROGRESS NOTE    Patient Name: Carlos Culver  : 1945  MRN: [id number]    Date of Admission: 2023  Primary Care Physician: Marcus Cheng MD    Subjective   Subjective     CC:f/u bacteremia    HPI:  No acute overnight events, waiting surgery    ROS:  Gen- No fevers, chills  CV- No chest pain, palpitations  Resp- No cough, dyspnea  GI- No N/V/D, abd pain       Objective   Objective     Vital Signs:   Temp:  [97.6 °F (36.4 °C)-98.4 °F (36.9 °C)] 97.6 °F (36.4 °C)  Heart Rate:  [] 97  Resp:  [18] 18  BP: (108-156)/(68-88) 121/68     Physical Exam:  Constitutional: No acute distress, awake, alert  HENT: NCAT, mucous membranes moist  Respiratory: Clear to auscultation bilaterally, respiratory effort normal   Cardiovascular: irregular, rate controlled  Gastrointestinal: Positive bowel sounds, soft, nontender, nondistended  Musculoskeletal: trace LLE ankle edema. S/p foot amputation of L  Psychiatric: Appropriate affect, cooperative  Neurologic: Oriented x 3, no focal deficits  Skin: plantar wound of L foot. L calf appears more erythematous than R      Results Reviewed:  LAB RESULTS:      Lab 23  0443 23  0351 23  1034 23  1205   WBC 13.48* 18.24* 15.95* 17.82*   HEMOGLOBIN 10.0* 10.3* 12.7* 13.0   HEMATOCRIT 30.9* 31.5* 39.2 41.5   PLATELETS 267 283 325 340   NEUTROS ABS 10.76* 14.73* 13.03* 14.66*   IMMATURE GRANS (ABS) 0.13* 0.15* 0.11* 0.11*   LYMPHS ABS 1.30 1.77 1.41 1.45   MONOS ABS 1.18* 1.50* 1.28* 1.51*   EOS ABS 0.06 0.03 0.05 0.03   MCV 91.4 91.0 93.8 94.5   SED RATE  --   --   --  118*   CRP  --   --   --  27.88*   PROCALCITONIN 0.54* 0.73* 0.75*  --    LACTATE 1.1 1.4 3.4*  --            Lab 23  0443 23  0351 23  1034 23  1205   SODIUM 135* 133* 138 133*   POTASSIUM 4.2 4.4 4.4 4.7   CHLORIDE 101 100 100 98   CO2 21.0* 20.0* 22.0 22.0   ANION GAP 13.0 13.0 16.0* 13.0   BUN 15  21 23 19   CREATININE 0.88 1.09 1.18 1.19   EGFR 88.0 69.5 63.2 62.5   GLUCOSE 123* 109* 138* 107*   CALCIUM 8.3* 8.9 10.2 10.0   MAGNESIUM  --  1.6  --   --            Lab 07/21/23  0443 07/20/23  0351 07/19/23  1034 07/18/23  1205   TOTAL PROTEIN 5.1* 6.1 7.5 7.9   ALBUMIN 2.5* 2.5* 2.8* 3.3*   GLOBULIN 2.6 3.6 4.7 4.6   ALT (SGPT) 15 15 24 33   AST (SGOT) 84* 41* 33 45*   BILIRUBIN 1.6* 1.7* 1.5* 1.6*   ALK PHOS 162* 149* 196* 236*   LIPASE  --  109* 110*  --                        Brief Urine Lab Results  (Last result in the past 365 days)        Color   Clarity   Blood   Leuk Est   Nitrite   Protein   CREAT   Urine HCG        07/19/23 0955 Orange   Clear   Negative   Negative   Negative   Trace                   Microbiology Results Abnormal       Procedure Component Value - Date/Time    Blood Culture - Blood, Hand, Right [[id number]]  (Normal) Collected: 07/20/23 1120    Lab Status: Preliminary result Specimen: Blood from Hand, Right Updated: 07/21/23 1245     Blood Culture No growth at 24 hours    Blood Culture - Blood, Hand, Left [[id number]]  (Normal) Collected: 07/20/23 1110    Lab Status: Preliminary result Specimen: Blood from Hand, Left Updated: 07/21/23 1245     Blood Culture No growth at 24 hours    COVID PRE-OP / PRE-PROCEDURE SCREENING ORDER (NO ISOLATION) - Swab, Nasopharynx [[id number]]  (Normal) Collected: 07/20/23 1142    Lab Status: Final result Specimen: Swab from Nasopharynx Updated: 07/20/23 1203    Narrative:      The following orders were created for panel order COVID PRE-OP / PRE-PROCEDURE SCREENING ORDER (NO ISOLATION) - Swab, Nasopharynx.  Procedure                               Abnormality         Status                     ---------                               -----------         ------                     COVID-19 and FLU A/B PCR...[[id number]]  Normal              Final result                 Please view results for these tests on the individual orders.    COVID-19 and FLU A/B PCR -  Swab, Nasopharynx [[id number]]  (Normal) Collected: 07/20/23 1142    Lab Status: Final result Specimen: Swab from Nasopharynx Updated: 07/20/23 1203     COVID19 Not Detected     Influenza A PCR Not Detected     Influenza B PCR Not Detected    Narrative:      Fact sheet for providers: https://www.fda.gov/media/206950/download    Fact sheet for patients: https://www.fda.gov/media/308113/download    Test performed by PCR.            CT Abdomen Pelvis Without Contrast    Result Date: 7/19/2023  CT ABDOMEN PELVIS WO CONTRAST Date of Exam: 7/19/2023 6:38 PM EDT Indication: Sepsis. Comparison: No previous CT scans. Technique: Axial CT images were obtained of the abdomen and pelvis without the administration of contrast. Reconstructed coronal and sagittal images were also obtained. Automated exposure control and iterative construction methods were used. Findings: The included lower lungs appear grossly clear. There is mild diffuse fatty liver change. No significant abnormalities are noted of the spleen, gallbladder, pancreas, adrenal glands, or kidneys for non-IV contrast enhanced exam. There is a large, intact appearing duodenal diverticulum of the second-third portion of the duodenum, 6 cm in diameter. There is no visible associated inflammation or extraluminal air. Upper abdominal bowel loops are normal in caliber and normal in appearance, although the colon  is seen to contain only fluid and contrast. Regarding the lower abdomen pelvis, bladder is moderately distended, approximately 12 cm in length and appears normal. There is no formed stool except at the level of the rectum, where there may be a mild degree of impaction. No perirectal inflammation is seen. There are a few sigmoid diverticuli and somewhat more numerous descending colon diverticuli without evidence of diverticulitis. There is mild ectasia of the infrarenal abdominal aorta to 2.8 cm. No significant abnormalities are seen of the terminal ileum or cecum.  Appendix is not definitely identified. No intrapelvic inflammatory changes are seen. Bony structures appear to be intact. There is an old L1 vertebral compression deformity, with practical fusion of L1 and T12. There is moderately advanced L5-S1 degenerative disc disease.     Impression: Impression: 1. Colon contains mostly fluid. There may be a mild degree of rectal impaction, but no colonic inflammation or evidence of bowel obstruction is seen. 2. 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 3. Large, but intact-appearing duodenal diverticulum apparently incidental. 4. Moderately distended, otherwise normal-appearing bladder. Sigmoid diverticulosis without evidence of diverticulitis. Electronically Signed: eDrek Grove  7/19/2023 7:11 PM EDT  Workstation ID: [id number]    Peripheral Block    Result Date: 7/21/2023  Meliza Dykes CRNA     7/21/2023  1:12 PM Peripheral Block Pre-sedation assessment completed: 7/21/2023 12:54 PM Patient reassessed immediately prior to procedure Patient location during procedure: pre-op Start time: 7/21/2023 1:06 PM Stop time: 7/21/2023 1:10 PM Reason for block: at surgeon's request and post-op pain management Preanesthetic Checklist Completed: patient identified, IV checked, site marked, risks and benefits discussed, surgical consent, monitors and equipment checked, pre-op evaluation and timeout performed Prep: Sterile barriers:cap, gloves, mask and washed/disinfected hands Prep: ChloraPrep Patient monitoring: blood pressure monitoring, continuous pulse oximetry and EKG Procedure Sedation: yes Performed under: local infiltration Guidance:ultrasound guided ULTRASOUND INTERPRETATION.  Using ultrasound guidance a 20 G gauge needle was placed in close proximity to the nerve, at which point, under ultrasound guidance anesthetic was injected in the area of the nerve and spread of the anesthesia was seen on ultrasound in close proximity thereto.  There were  "no abnormalities seen on ultrasound; a digital image was taken; and the patient tolerated the procedure with no complications. Images:still images obtained, printed/placed on chart Block Type:popliteal Injection Technique:catheter Needle Type:echogenic and Tuohy Needle Gauge:18 G Resistance on Injection: none Catheter Size:20 G Medications Used: bupivacaine-EPINEPHrine PF (MARCAINE w/EPI) 0.25% -1:236062 injection - Injection  30 mL - 7/21/2023 1:06:00 PM dexamethasone sodium phosphate injection - Injection  2 mg - 7/21/2023 1:06:00 PM Post Assessment Injection Assessment: negative aspiration for heme, no paresthesia on injection and incremental injection Patient Tolerance:comfortable throughout block Complications:no Additional Notes SINGLE shot  A high-frequency linear transducer, with sterile cover, was placed in the popliteal fossa to identify the popliteal artery and vein, Tibial nerve (TN) and Common Peroneal nerve (CP). The transducer was then moved in a cephalad fashion to observe the TN and CP nerve bifurcation to form the Sciatic Nerve. The insertion site was prepped and draped in sterile fashion. Skin and cutaneous tissue was infiltrated with 2-5 ml of 1% Lidocaine. Using ultrasound-guidance, a 20-gauge B-Grady 4\" Ultraplex 360 non-stimulating echogenic needle was then inserted and advanced in plane from lateral to medial. Preservative-free normal saline was utilized for hydro-dissection of tissue, advancement of Touhy, and to confirm final needle placement posterior to the nerves. Local anesthetic injection spread, in incremental 3-5 ml injections, to surround both nerve structures. Aspiration every 5 ml to prevent intravascular injection. Injection was completed with negative aspiration of blood and negative intravascular injection. Injection pressures were normal with minimal resistance     Peripheral Block    Result Date: 7/21/2023  Meliza Dykes CRNA     7/21/2023  1:10 PM Peripheral Block " Pre-sedation assessment completed: 7/21/2023 12:54 PM Patient reassessed immediately prior to procedure Start time: 7/21/2023 1:06 PM Stop time: 7/21/2023 1:10 PM Reason for block: at surgeon's request and post-op pain management Preanesthetic Checklist Completed: patient identified, IV checked, site marked, risks and benefits discussed, surgical consent, monitors and equipment checked, pre-op evaluation and timeout performed Prep: Pt Position: supine Sterile barriers:cap, gloves, mask, sterile barriers and washed/disinfected hands Prep: ChloraPrep Patient monitoring: blood pressure monitoring, continuous pulse oximetry and EKG Procedure Performed under: spinal Guidance:ultrasound guided ULTRASOUND INTERPRETATION.  Using ultrasound guidance a 20 G gauge needle was placed in close proximity to the nerve, at which point, under ultrasound guidance anesthetic was injected in the area of the nerve and spread of the anesthesia was seen on ultrasound in close proximity thereto.  There were no abnormalities seen on ultrasound; a digital image was taken; and the patient tolerated the procedure with no complications. Images:still images obtained, printed/placed on chart Block Type:adductor canal block Injection Technique:catheter Needle Type:Tuohy and echogenic Needle Gauge:18 G Resistance on Injection: none Catheter Size:20 G (20g) Medications Used: dexamethasone sodium phosphate injection - Injection  2 mg - 7/21/2023 1:06:00 PM bupivacaine-EPINEPHrine PF (MARCAINE w/EPI) 0.25% -1:654744 injection - Injection  30 mL - 7/21/2023 1:06:00 PM Post Assessment Injection Assessment: negative aspiration for heme, incremental injection and no paresthesia on injection Patient Tolerance:comfortable throughout block Complications:no Additional Notes SINGLE shot A high-frequency linear transducer, with sterile cover, was placed on the anterior mid-thigh (between the anterior superior iliac spine and patella). The transducer was then  "moved medially to identify the Sartorius muscle (Joseph), Vastus Medialis muscle (VMM), Superficial Femoral Artery (SFA) and Vein. The transducer was then moved cephalad or caudad to position the SFA in the middle of the Joseph. The insertion site was prepped and draped in sterile fashion. Skin and cutaneous tissue was infiltrated with 2-5 ml of 1% Lidocaine. Using ultrasound-guidance, a 20-gauge B-Grady 4\" Ultraplex 360 non-stimulating echogenic needle was advanced in plane from lateral to medial. Preservative-free normal saline was utilized for hydro-dissection of tissue, advancement of needle, and to confirm needle placement below the fascial plane of the Joseph where the Nerve to the VMM is located. Local anesthetic (LA) 5 ml deposited here. The needle continues its path lateral to the SFA at the level of the Saphenous Nerve. The remainder of the LA was deposited at the 10-11 o'clock position of the SFA. This injection created a space between the Joseph and the SFA. Aspiration every 5 ml to prevent intravascular injection. Injection was completed with negative aspiration of blood and negative intravascular injection. Injection pressures were normal with minimal resistance.      Results for orders placed during the hospital encounter of 07/19/23    Adult Transthoracic Echo Complete W/ Cont if Necessary Per Protocol    Interpretation Summary    Left ventricular systolic function is normal. Calculated left ventricular EF = 60%    Left ventricular diastolic function was normal.    Left atrial volume is moderately increased.    Saline test results are negative.    There is calcification of the aortic valve.    Moderate tricuspid valve regurgitation is present.    Estimated right ventricular systolic pressure from tricuspid regurgitation is mildly elevated (35-45 mmHg).      Current medications:  Scheduled Meds:[MAR Hold] atorvastatin, 80 mg, Oral, Nightly  ceFAZolin, 2,000 mg, Intravenous, Q8H  [MAR Hold] enoxaparin, 40 mg, " Subcutaneous, Q24H  ethyl alcohol, 2 swab , Nasal, Once  famotidine, 20 mg, Intravenous, Once  famotidine, 20 mg, Oral, Once  metoprolol tartrate, 25 mg, Oral, BID  [MAR Hold] senna-docusate sodium, 2 tablet, Oral, BID  [MAR Hold] sodium chloride, 10 mL, Intravenous, Q12H  sodium chloride, 10 mL, Intravenous, Q12H  sodium chloride, 3 mL, Intravenous, Q12H      Continuous Infusions:lactated ringers, 100 mL/hr, Last Rate: Stopped (07/21/23 0829)  lactated ringers, 9 mL/hr, Last Rate: 200 mL/hr (07/21/23 1254)      PRN Meds:.  [MAR Hold] acetaminophen **OR** [MAR Hold] acetaminophen **OR** [MAR Hold] acetaminophen    [MAR Hold] senna-docusate sodium **AND** [MAR Hold] polyethylene glycol **AND** [MAR Hold] bisacodyl **AND** [MAR Hold] bisacodyl    [MAR Hold] Calcium Replacement - Follow Nurse / BPA Driven Protocol    droperidol **OR** droperidol    fentanyl    [MAR Hold] HYDROcodone-acetaminophen    HYDROcodone-acetaminophen    HYDROmorphone    ipratropium-albuterol    labetalol    lactated ringers    lidocaine PF 1%    [MAR Hold] Magnesium Standard Dose Replacement - Follow Nurse / BPA Driven Protocol    [MAR Hold] melatonin    [MAR Hold] Morphine **AND** [MAR Hold] naloxone    naloxone    [MAR Hold] ondansetron **OR** [MAR Hold] ondansetron    ondansetron    [MAR Hold] Phosphorus Replacement - Follow Nurse / BPA Driven Protocol    Potassium Replacement - Follow Nurse / BPA Driven Protocol    promethazine **OR** promethazine    sodium chloride    [COMPLETED] Insert Peripheral IV **AND** [MAR Hold] sodium chloride    [MAR Hold] sodium chloride    sodium chloride    sodium chloride    [MAR Hold] sodium chloride    sodium chloride    sodium chloride    BH OR ANTIBIOTIC IRRIGATION BUILDER    Assessment & Plan   Assessment & Plan     Active Hospital Problems    Diagnosis  POA    **Sepsis [A41.9]  Yes    Staphylococcus aureus bacteremia [R78.81, B95.61]  Yes    Acute osteomyelitis of left ankle or foot [M86.172]  Unknown     S/P transmetatarsal amputation of foot, left [Z89.432]  Not Applicable    Neuropathy [G62.9]  Yes    CKD (chronic kidney disease) stage 2, GFR 60-89 ml/min [N18.2]  Yes    Atrial fibrillation with RVR [I48.91]  Yes      Resolved Hospital Problems   No resolved problems to display.        Brief Hospital Course to date:  Carlos Culver is a 77 y.o. male with a history of peripheral neuropathy, osteomyelitis, CKD, A-fib who is presenting with sepsis/bacteremia    Sepsis w/ MSSA bacteremia  Concern for left foot osteomyelitis  -- ID following and managing antibiotics.  We will plan on getting PICC line after procedure. Echo shows no vegetations-- w staph aureus bacteremia. Repeat cultures in process  -- Reviewed MRI results, consistent with high suspicion for osteomyelitis  - Discussed with Ortho , to surgery today     A fib rvr-resolved  -- Status post IV fluids, continue beta-blocker  -- Continue holding Eliquis, will resume when okay with Ortho     CKD II       Expected Discharge Location and Transportation: home vs rehab  Expected Discharge   Expected Discharge Date: 7/24/2023; Expected Discharge Time:      DVT prophylaxis:  Medical and mechanical DVT prophylaxis orders are present.     AM-PAC 6 Clicks Score (PT): 24 (07/21/23 0800)    CODE STATUS:   Code Status and Medical Interventions:   Ordered at: 07/19/23 1154     Code Status (Patient has no pulse and is not breathing):    CPR (Attempt to Resuscitate)     Medical Interventions (Patient has pulse or is breathing):    Full Support     Release to patient:    Routine Release       Tianna Gallagher MD  07/21/23

## 2023-07-21 NOTE — PROGRESS NOTES
"      Orthopaedic Surgery Progress Note  CC:   Chief Complaint   Patient presents with    Abnormal Lab      Subjective   Interval History:   Resting in bed, pain well controlled, no acute events overnight    ROS: Denies fever, chills, nausea or vomiting    Objective     Vital Signs:  Temp (24hrs), Av.2 °F (36.8 °C), Min:97.9 °F (36.6 °C), Max:98.4 °F (36.9 °C)    /83   Pulse 108   Temp 97.9 °F (36.6 °C) (Oral)   Resp 18   Ht 181.6 cm (71.5\")   Wt 99.8 kg (220 lb)   SpO2 97%   BMI 30.26 kg/m²   Body mass index is 30.26 kg/m².    Physical Exam:  Left foot with well-healed incisions from previous transmetatarsal amputation.  Small scab plantar lateral forefoot with no drainage or surrounding erythema.  Foot is warm and well-perfused.  Palpable dorsalis pedis pulse.  Patient able to actively plantarflex and dorsiflex ankle. 0/10 sites felt on monofilament testing of foot.  Normal ankle range of motion, 5 out of 5 resisted plantarflexion strength.    Assessment and Plan:  77-year-old male with left foot abscess with osteomyelitis    -N.p.o. for  -Surgery later today  -Hold DVT prophylaxis prior to surgery  -Rest of management per primary team    Mao Mckay MD  23  10:02 EDT       "

## 2023-07-21 NOTE — PLAN OF CARE
Goal Outcome Evaluation:  Plan of Care Reviewed With: patient        Progress: no change  Patient vital signs stable and on 3L of oxygen via nasal cannula. Patient denies pain, nausea, and shortness of air. Fall and safety precautions maintained.

## 2023-07-21 NOTE — PROGRESS NOTES
[facility]    INFECTIOUS DISEASE PROGRESS NOTE    Carlos Culver  1945  [id number]    Consult: 7/19/23    Admit date: 7/19/2023    Requesting Provider: No ref. provider found  Evaluating physician: Jeffery Young MD  Reason for Consultation: MSSA sepsis with bacteremia with 4 out of 4 blood cultures positive by PCR from 7/18/2023, probable left foot osteomyelitis, Abscess  Chief Complaint: Above      Subjective   History of present illness:  Patient is a  77 y.o.  Yr old male with a history of peripheral neuropathy, hard of hearing, hyperlipidemia, essential hypertension, previous left transmetatarsal amputation for osteomyelitis, who developed a left plantar ulcer in April 2023 after working out on an elliptical exercise machine.  His ulceration has persisted off and on and he was treated briefly with oral antibiotics by Dr. Rajat SWANSON.P.MEunice at [facility].  Patient still had persistence of his left plantar wound.  Also complained of some unmeasured fevers and fatigue over the past few weeks.  He was evaluated in clinic and had blood cultures obtained and laboratories.  He had no other localizing signs or symptoms of infection.  Blood cultures drawn on 7/18 were positive by PCR for methicillin sensitive Staph aureus in 4 out of 4 bottles.  White blood cell count was 17.  Patient was directed to be admitted to [facility] on 7/19/2023.  I was consulted on 7/19/2023 for further evaluation and treatment.    7/20/2023 history reviewed.  Awaiting orthopedic evaluation of left foot.  No high fever.  Tolerating cefazolin for MSSA sepsis.    7/21/2023 history reviewed.  Awaits I&D of his left foot today.  No high fever.  On cefazolin for MSSA sepsis and likely left foot abscess and osteomyelitis.  Duration to be determined.    Past Medical History:   Diagnosis Date    A-fib     Acute kidney failure     Elevated cholesterol     Hard of hearing      Hyperlipidemia     Hypertension     Renal disorder     Wears hearing aid in both ears     Wears reading eyeglasses        Past Surgical History:   Procedure Laterality Date    AMPUTATION DIGIT Left 2020    Procedure: AMPUTATION LEFT 2ND TOE;  Surgeon: Idania Osborn MD;  Location:  [facility];  Service: Orthopedics    AMPUTATION DIGIT Left 2020    Procedure: TRANSMETATARSAL AMPUTATION LEFT;  Surgeon: Idania Osborn MD;  Location:  [facility];  Service: Orthopedics;  Laterality: Left;    APPENDECTOMY      COLONOSCOPY  2006    FOOT SURGERY Left 2020    (L) transmetatarsal amputation 2020 - Dr. Idania Osborn    INCISION AND DRAINAGE LEG Left 10/23/2018    Procedure: INCISION AND DRAINAGE LEFT FOOT;  Surgeon: Idania Osborn MD;  Location:  [facility] OR;  Service: Orthopedics    TOE AMPUTATION      TONSILLECTOMY      VENOUS ACCESS DEVICE (PORT) INSERTION N/A 10/23/2018    Procedure: GROSHONG CATHETER PLACEMENT;  Surgeon: Marquez Simons MD;  Location:  [facility] OR;  Service: General    VENOUS ACCESS DEVICE (PORT) INSERTION N/A 2020    Procedure: GROSHONG INSERTION;  Surgeon: Antonio Bains MD;  Location:  [facility];  Service: General;  Laterality: N/A;    VENOUS ACCESS DEVICE (PORT) REMOVAL         Pediatric History   Patient Parents    Not on file     Other Topics Concern    Not on file   Social History Narrative    Not on file   , 3 children, no pets, 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  Father  of cancer, mother  of old age and cardiac disease, 1 brother, mother also had hypothyroidism  Allergies   Allergen Reactions    Sulfa Antibiotics Unknown (See Comments)     Pt was told as child he had an allergy.  Has not taken and doesn't know the response       Immunization History   Administered Date(s) Administered    COVID-19 (PFIZER) BIVALENT 12+YRS 10/19/2022    COVID-19 (PFIZER) Purple Cap Monovalent 2021, 2021, 10/02/2021     Covid-19 (Pfizer) Gray Cap Monovalent 04/14/2022       Medication:    Current Facility-Administered Medications:     acetaminophen (TYLENOL) tablet 650 mg, 650 mg, Oral, Q4H PRN **OR** acetaminophen (TYLENOL) 160 MG/5ML solution 650 mg, 650 mg, Oral, Q4H PRN **OR** acetaminophen (TYLENOL) suppository 650 mg, 650 mg, Rectal, Q4H PRN, Henrietta Zhang II, DO    atorvastatin (LIPITOR) tablet 80 mg, 80 mg, Oral, Nightly, Henrietta Zhang II, DO, 80 mg at 07/20/23 2046    sennosides-docusate (PERICOLACE) 8.6-50 MG per tablet 2 tablet, 2 tablet, Oral, BID **AND** polyethylene glycol (MIRALAX) packet 17 g, 17 g, Oral, Daily PRN **AND** bisacodyl (DULCOLAX) EC tablet 5 mg, 5 mg, Oral, Daily PRN **AND** bisacodyl (DULCOLAX) suppository 10 mg, 10 mg, Rectal, Daily PRN, Henrietta Zhang II, DO    Calcium Replacement - Follow Nurse / BPA Driven Protocol, , Does not apply, PRN, Henrietta Zhang II, DO    ceFAZolin in dextrose (ANCEF) IVPB solution 2,000 mg, 2,000 mg, Intravenous, Q8H, Jeffery Young MD, 2,000 mg at 07/21/23 0340    Enoxaparin Sodium (LOVENOX) syringe 40 mg, 40 mg, Subcutaneous, Q24H, Henrietta Zhang II, DO, 40 mg at 07/20/23 1319    famotidine (PEPCID) injection 20 mg, 20 mg, Intravenous, Once, Dex Kumar MD    famotidine (PEPCID) tablet 20 mg, 20 mg, Oral, Once, Dex Kumar MD    HYDROcodone-acetaminophen (NORCO) 5-325 MG per tablet 1 tablet, 1 tablet, Oral, Q4H PRN, Henrietta Zhang II, DO    lactated ringers infusion, 100 mL/hr, Intravenous, Continuous, Henrietta Zhang II, DO, Stopped at 07/21/23 0829    lactated ringers infusion, 9 mL/hr, Intravenous, Continuous, Dex Kumar MD    lidocaine PF 1% (XYLOCAINE) injection 0.5 mL, 0.5 mL, Injection, Once PRN, Nolan Allred MD    Magnesium Standard Dose Replacement - Follow Nurse / BPA Driven Protocol, , Does not apply, PRN, Henrietta Zhang II, DO    melatonin tablet 5 mg, 5 mg, Oral, Nightly PRN, Concetta Espino, APRN, 5 mg at 07/20/23  2259    metoprolol tartrate (LOPRESSOR) tablet 25 mg, 25 mg, Oral, BID, Leatha, Henrietta M II, DO, 25 mg at 07/21/23 0826    morphine injection 2 mg, 2 mg, Intravenous, Q2H PRN **AND** naloxone (NARCAN) injection 0.4 mg, 0.4 mg, Intravenous, Q5 Min PRN, Leatha, Henrietta M II, DO    ondansetron (ZOFRAN) tablet 4 mg, 4 mg, Oral, Q6H PRN **OR** ondansetron (ZOFRAN) injection 4 mg, 4 mg, Intravenous, Q6H PRN, Leatha, Henrietta M II, DO    Phosphorus Replacement - Follow Nurse / BPA Driven Protocol, , Does not apply, PRN, Leatha Henrietta M II, DO    Potassium Replacement - Follow Nurse / BPA Driven Protocol, , Does not apply, PRN, Leatha Henrietta M II, DO    [COMPLETED] Insert Peripheral IV, , , Once **AND** sodium chloride 0.9 % flush 10 mL, 10 mL, Intravenous, PRN, Juan Bosch MD    sodium chloride 0.9 % flush 10 mL, 10 mL, Intravenous, Q12H, Leatha, Henrietta M II, DO, 10 mL at 07/21/23 0826    sodium chloride 0.9 % flush 10 mL, 10 mL, Intravenous, PRN, Leatha Henrietta M II, DO    sodium chloride 0.9 % flush 10 mL, 10 mL, Intravenous, Q12H, Nolan Allred MD, 10 mL at 07/21/23 0827    sodium chloride 0.9 % flush 10 mL, 10 mL, Intravenous, PRN, Nolan Allred MD    sodium chloride 0.9 % infusion 40 mL, 40 mL, Intravenous, PRN, LeathaJuan Albertocy M II, DO    sodium chloride 0.9 % infusion 40 mL, 40 mL, Intravenous, PRNChalino Paul, MD    Please refer to the medical record for a full medication list    Review of Systems:    Constitutional--some low-grade fever, no chills or sweats.  Appetite good, and no malaise.  Some fatigue.  HEENT-- No new vision, hearing or throat complaints.  No epistaxis or oral sores.  Denies odynophagia or dysphagia.  No odynophagia or dysphagia. No headache, photophobia or neck stiffness.  CV-- No chest pain, palpitation or syncope  Resp-- No SOB/cough/Hemoptysis  GI- No nausea, vomiting, or diarrhea.  No hematochezia, melena, or hematemesis. Denies jaundice or chronic liver disease.  -- No  "dysuria, hematuria, or flank pain.  Denies hesitancy, urgency or flank pain.  Lymph- no swollen lymph nodes in neck/axilla or groin.   Heme- No active bruising or bleeding; no Hx of DVT or PE.  MS-- no swelling or pain in the bones or joints of arms/legs.  No new back pain.  Neuro-- No acute focal weakness or numbness in the arms or legs.  No seizures.  Skin--No rashes or lesions, except left foot plantar    Physical Exam:   Vital Signs   Temp:  [97.9 °F (36.6 °C)-98.4 °F (36.9 °C)] 97.9 °F (36.6 °C)  Heart Rate:  [] 97  Resp:  [18] 18  BP: (108-156)/(70-88) 108/83    Temp  Min: 97.9 °F (36.6 °C)  Max: 98.4 °F (36.9 °C)  BP  Min: 108/83  Max: 156/81  Pulse  Min: 88  Max: 127  Resp  Min: 18  Max: 18  SpO2  Min: 94 %  Max: 97 %    Blood pressure 108/83, pulse 97, temperature 97.9 °F (36.6 °C), temperature source Oral, resp. rate 18, height 181.6 cm (71.5\"), weight 99.8 kg (220 lb), SpO2 97 %.  GENERAL: Awake and alert, in minimal distress. Appears older than stated age.  HEENT:  Normocephalic, atraumatic.  Oropharynx without thrush. Dentition in good repair. No cervical adenopathy. No neck masses.  Ears externally normal, Nose externally normal.  EYES:  No conjunctival injection. No icterus. EOM full.  LYMPHATICS: No lymphadenopathy of the neck or axillary or inguinal regions.   HEART: No murmur, gallop, or pericardial friction rub. Reg rate rhythm, No JVD at 45 degrees.  LUNGS: Clear to auscultation and percussion. No respiratory distress, no use of accessory muscles.  ABDOMEN: Soft, nontender, nondistended. No appreciable HSM.  Bowel sounds normal.  Slightly obese.  No mass.  SKIN: Warm and dry without cutaneous eruptions.  No nodules.  Left foot with some plantar fourth metatarsal callus with some minimal serous drainage but no surrounding crepitus or bullae or foul smell  PSYCHIATRIC: Mental status lucid. No confusion.  EXT:  No cellulitic change.  NEURO: Oriented to name, nonfocal.      Results Review:   I " reviewed the patient's new clinical results.  I reviewed the patient's new imaging results and agree with the interpretation.  I reviewed the patient's other test results and agree with the interpretation    Results from last 7 days   Lab Units 07/21/23  0443 07/20/23  0351 07/19/23  1034   WBC 10*3/mm3 13.48* 18.24* 15.95*   HEMOGLOBIN g/dL 10.0* 10.3* 12.7*   HEMATOCRIT % 30.9* 31.5* 39.2   PLATELETS 10*3/mm3 267 283 325       Results from last 7 days   Lab Units 07/21/23  0443   SODIUM mmol/L 135*   POTASSIUM mmol/L 4.2   CHLORIDE mmol/L 101   CO2 mmol/L 21.0*   BUN mg/dL 15   CREATININE mg/dL 0.88   GLUCOSE mg/dL 123*   CALCIUM mg/dL 8.3*       Results from last 7 days   Lab Units 07/21/23  0443   ALK PHOS U/L 162*   BILIRUBIN mg/dL 1.6*   ALT (SGPT) U/L 15   AST (SGOT) U/L 84*       Results from last 7 days   Lab Units 07/18/23  1205   SED RATE mm/hr 118*       Results from last 7 days   Lab Units 07/18/23  1205   CRP mg/dL 27.88*           Results from last 7 days   Lab Units 07/21/23  0443   LACTATE mmol/L 1.1       Estimated Creatinine Clearance: 84 mL/min (by C-G formula based on SCr of 0.88 mg/dL).  CPK          7/20/2023    03:51   Common Labsle   Creatine Kinase 95       Procalitonin Results:      Lab 07/21/23  0443 07/20/23  0351 07/19/23  1034   PROCALCITONIN 0.54* 0.73* 0.75*        Brief Urine Lab Results  (Last result in the past 365 days)        Color   Clarity   Blood   Leuk Est   Nitrite   Protein   CREAT   Urine HCG        07/19/23 0955 Orange   Clear   Negative   Negative   Negative   Trace                  No results found for: SITE, ALLENTEST, PHART, JHB7UJU, PO2ART, SMT9TYQ, BASEEXCESS, P2ETVJAJ, HGBBG, HCTABG, OXYHEMOGLOBI, METHHGBN, CARBOXYHGB, CO2CT, BAROMETRIC, MODALITY, FIO2     Microbiology:  No results found for: ACANTHNAEG, AFBCX, BPERTUSSISCX, BLOODCX  No results found for: BCIDPCR, CXREFLEX, CSFCX, CULTURETIS  No results found for: CULTURES, HSVCX, URCX  No results found for:  EYECULTURE, GCCX, HSVCULTURE, LABHSV  No results found for: LEGIONELLA, MRSACX, MUMPSCX, MYCOPLASCX  No results found for: NOCARDIACX, STOOLCX  Urine Culture   Date Value Ref Range Status   07/18/2023 No growth  Preliminary     Wound Culture   Date Value Ref Range Status   07/18/2023 Culture in progress  Preliminary        Urine Culture   Date Value Ref Range Status   07/18/2023 No growth  Preliminary     Wound Culture   Date Value Ref Range Status   07/18/2023 Culture in progress  Preliminary   (    Blood cultures 7/18/2023 reported positive in 4 out of 4 bottles by PCR for MSSA    Radiology:  Imaging Results (Last 72 Hours)       Procedure Component Value Units Date/Time    CT Abdomen Pelvis Without Contrast [[id number]] Collected: 07/19/23 1858     Updated: 07/19/23 1914    Narrative:      CT ABDOMEN PELVIS WO CONTRAST    Date of Exam: 7/19/2023 6:38 PM EDT    Indication: Sepsis.    Comparison: No previous CT scans.    Technique: Axial CT images were obtained of the abdomen and pelvis without the administration of contrast. Reconstructed coronal and sagittal images were also obtained. Automated exposure control and iterative construction methods were used.      Findings:  The included lower lungs appear grossly clear. There is mild diffuse fatty liver change. No significant abnormalities are noted of the spleen, gallbladder, pancreas, adrenal glands, or kidneys for non-IV contrast enhanced exam. There is a large, intact   appearing duodenal diverticulum of the second-third portion of the duodenum, 6 cm in diameter. There is no visible associated inflammation or extraluminal air. Upper abdominal bowel loops are normal in caliber and normal in appearance, although the colon   is seen to contain only fluid and contrast.    Regarding the lower abdomen pelvis, bladder is moderately distended, approximately 12 cm in length and appears normal. There is no formed stool except at the level of the rectum, where there may be  a mild degree of impaction. No perirectal inflammation   is seen. There are a few sigmoid diverticuli and somewhat more numerous descending colon diverticuli without evidence of diverticulitis. There is mild ectasia of the infrarenal abdominal aorta to 2.8 cm. No significant abnormalities are seen of the   terminal ileum or cecum. Appendix is not definitely identified. No intrapelvic inflammatory changes are seen.    Bony structures appear to be intact. There is an old L1 vertebral compression deformity, with practical fusion of L1 and T12. There is moderately advanced L5-S1 degenerative disc disease.          Impression:      Impression:    1. Colon contains mostly fluid. There may be a mild degree of rectal impaction, but no colonic inflammation or evidence of bowel obstruction is seen.    2. 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    3. Large, but intact-appearing duodenal diverticulum apparently incidental.    4. Moderately distended, otherwise normal-appearing bladder. Sigmoid diverticulosis without evidence of diverticulitis.      Electronically Signed: Derek Grove    7/19/2023 7:11 PM EDT    Workstation ID: [id number]    XR Foot 3+ View Left [[id number]] Collected: 07/19/23 1442     Updated: 07/19/23 1454    Narrative:      XR FOOT 3+ VW LEFT    Date of Exam: 7/19/2023 2:09 PM EDT    Indication: om    Comparison: MRI performed the same day. Radiographs January 6, 2020    Findings:  Status post transmetatarsal amputation at the proximal metatarsals. There is diffuse soft tissue prominence, particularly at the remaining distal foot. There is a suspected wound at the lateral-distal foot. There is heterogeneous osseous lucency/erosive   change involving the base of the fourth metatarsal and distal cuboid. No significant visualized proximal fifth metatarsal is seen on this exam. There is also osseous lucency at the lateral aspect of the third metatarsal base, lateral navicular, and    lateral cuneiform. These findings correlate with suspected osteomyelitis seen on prior MRI. Oblique linear lucency at the lateral base of the third metatarsal suggests a pathologic nondisplaced fracture. No other definite acute fracture is seen. No other   definite acute osseous abnormality. There appear to be moderate degenerative changes and heterogeneous sclerosis of the proximal metatarsals and tarsal bones. There is calcific atherosclerosis of the dorsalis pedis.      Impression:      Impression:  1.Status post transmetatarsal amputation.  2.Abnormal appearance of the proximal third and fourth metatarsals, distal cuboid, lateral cuneiform, and lateral navicular suspicious form osteomyelitis as seen on prior MRI.  3.Diffuse forefoot soft tissue prominence, with suspected wound at the lateral distal foot.      Electronically Signed: Antonio Ronquillo    7/19/2023 2:51 PM EDT    Workstation ID: [id number]    MRI Foot Left Without Contrast [[id number]] Collected: 07/19/23 1326     Updated: 07/19/23 1344    Narrative:        MRI FOOT LEFT WO CONTRAST    Date of Exam: 7/19/2023 1:07 PM EDT    Indication: Foot swelling, nondiabetic, osteomyelitis suspected.     Comparison: Left foot MRI 11/16/2020, left foot radiographs 1/6/2020    Technique:  Routine multiplanar/multisequence sequence images of the left foot were obtained without contrast administration.      Findings:  From most recent available imaging of the left foot there has been interval surgical change of transmetatarsal amputation through the proximal metatarsal bases.    There is a 2.1 x 1.5 x 2.6 cm (AP X TV X CC) T1 intermediate, T2/STIR hyperintense collection in the soft tissues lateral to the distal cuboid (series 12 image 17, series 8 image 19), concerning for phlegmon or abscess. There is abnormal T1 hypointense,   T2/STIR hyperintense signal change of the cuboid, third cuneiform, fourth metatarsal base, and fifth metatarsal base compatible with  osteomyelitis. There is some T2/STIR hyperintense marrow edema of the distal cuboid and lateral aspect of the navicular,   without convincing loss of the T1 hyperintense marrow signal, likely reflecting some reactive marrow edema although contiguous osteomyelitis is difficult to exclude. There is diffuse subcutaneous edema throughout the dorsal foot and ankle which may   reflect contiguous cellulitis. No ankle joint effusion.        Impression:      Impression:  2.1 x 1.5 x 2.6 cm T2/STIR hyperintense structure in the soft tissues abutting the lateral aspect of the distal cuboid, concerning for phlegmon or abscess, incompletely characterized in the absence of IV contrast.    There are marrow signal changes of the cuboid, third cuneiform, and fourth and fifth metatarsal bases which are highly suspicious for osteomyelitis. Correlate for any open wound in this area. Correlate with ESR and CRP. Marrow signal changes relating to   Charcot arthropathy cannot be entirely excluded but are considered somewhat less likely in the setting of adjacent suspected phlegmon/abscess. There is some marrow edema of the nearby distal cuboid and lateral navicular bone which may reflect reactive   marrow edema although contiguous osteomyelitis cannot be entirely excluded.        Electronically Signed: Juan Remy    7/19/2023 1:41 PM EDT    Workstation ID: [id number]    XR Chest 1 View [[id number]] Collected: 07/19/23 1033     Updated: 07/19/23 1037    Narrative:      XR CHEST 1 VW    Date of Exam: 7/19/2023 10:16 AM EDT    Indication: +blood culture    Comparison: 7/19/2023 .    Findings:  Heart and pulmonary vessels are normal. Lung fields are normally inflated and clear. There are no effusions.      Impression:      Impression:  No acute process.      Electronically Signed: Tina Weinbegr MD    7/19/2023 10:33 AM EDT    Workstation ID: [id number]            IMPRESSION:     1.  MSSA (sensitive to tetracycline and sulfa) high-grade sepsis  with bacteremia in 4 out of 4 bottles drawn from 7/18/2023.  Most likely source is left foot osteomyelitis and abscess (MRI 7/19/23) although unusual to have such high-grade bacteremia.  Could have seeded an intravascular source including endocarditis.  No chronic indwelling prosthetic device.  Unlikely to be contaminant.  Repeat blood culture 7/19 positive in 1 out of 2 sets.  TTE 7-19 was negative for endocarditis.  EF 60%.  2.  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  High risk for underlying osteomyelitis.  Likely a function of his peripheral neuropathy and some noncompliance with nonweightbearing status.  , CRP 22.  MRI consistent with abscess and osteomyelitis left foot.  3.  Leukocytosis, neutrophilic related to above issues.  In removed.  4.  Hyponatremia.  135.  5.  Elevated LFTs with alkaline phosphatase 162, worse, bilirubin 1.6, and AST 84, worse.  May be related to sepsis versus other.  Alcohol-related disease?  CT of the abdomen and pelvis consistent with that liver.  Biliary track okay.  6.  Severe peripheral neuropathy, impacting all aspects of his care.  7.  Mild pyuria, 6-12 wbc on 7/18, asymptomatic.  May be related to above.  Urine culture negative to date.  8.  Anemia, chronic disease, slightly worse.    Plan:    1.  Diagnostically, continue to follow patient's physical exam, CBC, CMP, CRP, CPK, repeat blood cultures x2.    2.  Therapeutically, continue cefazolin 2 g IV every 8 hours for 4 to 6 weeks depending on surgical disposition.  Discussed with Reynaldo in the microbiology lab.  Duration to be determined.  3.  Supportive care.  4.  Ortho Dr. Mao Mckay I&D and potential amputation on 7/21 left foot.    I discussed the patient's findings and my recommendations with patient and nursing staff    Thank you for asking me to see Carlos Culver.  Our group would be pleased to follow this patient over the  course of their hospitalization and assist with outpatient antimicrobial therapy, as indicated.  Further recommendations depend on the results of the cultures and clinical course.  Side effects discussed.  Increased risk for adverse drug reactions, complications of IV access, readmission.  Increased risk for surgery.  See next on Monday, call sooner if needed.    Jeffery Young MD  7/21/2023

## 2023-07-21 NOTE — CASE MANAGEMENT/SOCIAL WORK
Continued Stay Note  [facility]     Patient Name: Carlos Culver  MRN: [id number]  Today's Date: 7/21/2023    Admit Date: 7/19/2023    Plan: Home   Discharge Plan       Row Name 07/21/23 1301       Plan    Plan Home    Plan Comments Discussed patient in MDR.  Patient scheduled to go to OR today for left foot irrigation debridement versus revision amputation.  Patient's goal is home at discharge.  CM will continue to follow.    Final Discharge Disposition Code 01 - home or self-care                   Discharge Codes    No documentation.                 Expected Discharge Date and Time       Expected Discharge Date Expected Discharge Time    Jul 24, 2023               Laura Guillermo RN

## 2023-07-21 NOTE — OP NOTE
ORTHOPAEDIC SURGERY OPERATIVE REPORT    Patient Name:  Carlos Culver  YOB: 1945    Date of Surgery:  7/21/2023     Pre-op Diagnosis:   Acute osteomyelitis of left ankle or foot [M86.172]    Post-op Diagnosis:      Post-Op Diagnosis Codes:     * Acute osteomyelitis of left ankle or foot [M86.172]    Procedure/CPT® Codes:  1. Chopart amputation Left Foot, 20209  2. Achilles lengthening, 73493  3. I&D below fascia foot, 17846  4. Application short leg splint, 67741     Staff:  Surgeon(s):  Mao Mckay MD       Anesthesia: General with Block    Estimated Blood Loss: 200 mL    Specimen:          None    Complications:   None    CLINICAL HISTORY:  Carlos Culver is a 77 y.o. male with the above condition. Discussed operative and non-operative treatment options and risks including but not limited to pain, infection, bleeding, damage to surrounding structures, and need for additional procedures.  After all questions were fully answered, Carlos Culver understood the risks and elected to proceed, and informed consent was obtained. The patient was marked with indelible marking pen after verifying correct side, site, and procedure.      DESCRIPTION OF PROCEDURE:   The patient was identified in the pre-operative area where correct procedure, site, and patient were verified. The patient was brought to the operating theater in stable condition and was transferred to the operative table where they remained in the supine position for the entirety of the case. Preoperative timeout was taken to ensure the correct identity, operative procedure, and location. General anesthesia was then administered. The patient received appropriate weight based IV antibiotics within 30 minutes of skin incision.  All bony prominences well-padded. The left leg was then prepped and draped in the standard sterile fashion. After the leg was elevated, the tourniquet was inflated.     We started making an incision  through the previous incision used for the previous transmetatarsal amputation.  Harjinder pus was encountered laterally and was collected and sent for cultures.  Next the soft tissues were dissected off the residual first through fifth metatarsals.  The residual metatarsals were then sharply excised off the foot.  I then further dissected the soft tissues off of the tarsal bones until I reached the talonavicular joint medially and the calcaneocuboid joint laterally.  I then sharply excised the tarsal bones off of the foot and placed him on the back table.  I then took a rongeur and removed some of the infected portion of the cuboid and placed that in a cup to be sent to the lab for culture as well.  Next I used blunt dissection to identify the tibialis anterior as well as the peroneus longus and the peroneus brevis tendons these tendons were marked with a PDS suture.  At this point all visibly infected tissue below the fascia was sharply dissected and removed.  At this point 6 L of antibiotic irrigation were then used to washout the residual wound.  Following the flap was debulked of any unnecessary tendons and tissue that would interfere with flap application.  The flap was then trimmed leaving an adequate amount of plantar fascial tissue to provide coverage over the wound.  At this point the tourniquet was taken down.  Hemostasis was carefully achieved using electrocautery.  After hemostasis was found to be adequate we turned our attention to the Achilles tendon. A series of 3 percutaneous incisions were made along the distal course of the Achilles tendon starting approximately 1 cm proximal to the calcaneus.  The distal incision was made and the #15 blade rotated laterally 90 degrees and then the Achilles tendon was mayur-transected in that direction, while the tendon was kept under tension. A centimeter more proximal, another incision was made percutaneously and the Achilles medially mayur-transected.  Finally, a  third, most proximal incision was made percutaneously and Achilles laterally mayur-transected. Next, the skin envelope was trimmed to allow for good apposition with minimal tension and dead space. The plantar tissue flap was brought up such that the final closure wound would be away from the plantar surface. The edges of the tissue were bleeding and appeared viable.      The skin was closed in layers with 0 PDS used to close the deep layer over the bones. 2-0 PDS was used to close the subcutaneous layer, and 2-0 nylons were used to close the skin with horizontal mattress sutures to take tension off of the closure and maximize bloodflow to the wound.    At this point a sterile dressing was applied followed by well-padded 3-way splint.  The ankle was splinted in neutral.      The patient tolerated the procedure well no with acute complications identified.  All sponge & needle counts were correct x2 prior to procedure conclusion.  Patient was awoken from anesthesia and transferred back to the PACU without complication.     Counts:    Sponge: Correct    Needle: Correct    Sharp: Correct    Instrument: Correct     POSTOPERATIVE PLAN:   Patient is to be NWB in splint. Plan to see patient in clinic in 1 week for post-op follow up, wound check. Plan for placement in cast. Plan for suture removal at between 4-8 weeks post op. Likely NWB for ~8 weeks (until wound is healed). No xrays needed at follow up. Will plan for referral to [facility] to be fitted for AFO with custom built-in molded insole.       Implants:    Nothing was implanted during the procedure      Mao Mckay MD     Date: 7/21/2023  Time: 16:37 EDT  Electronically signed by Mao Mckay MD, 07/21/23, 4:37 PM EDT.

## 2023-07-22 ENCOUNTER — APPOINTMENT (OUTPATIENT)
Dept: GENERAL RADIOLOGY | Facility: HOSPITAL | Age: 78
DRG: 854 | End: 2023-07-22
Payer: MEDICARE

## 2023-07-22 LAB
ALBUMIN SERPL-MCNC: 2.6 G/DL (ref 3.5–5.2)
ALBUMIN/GLOB SERPL: 0.8 G/DL
ALP SERPL-CCNC: 158 U/L (ref 39–117)
ALT SERPL W P-5'-P-CCNC: 11 U/L (ref 1–41)
ANION GAP SERPL CALCULATED.3IONS-SCNC: 10 MMOL/L (ref 5–15)
AST SERPL-CCNC: 62 U/L (ref 1–40)
BACTERIA SPEC AEROBE CULT: ABNORMAL
BASOPHILS # BLD AUTO: 0.01 10*3/MM3 (ref 0–0.2)
BASOPHILS NFR BLD AUTO: 0.1 % (ref 0–1.5)
BILIRUB SERPL-MCNC: 0.8 MG/DL (ref 0–1.2)
BUN SERPL-MCNC: 17 MG/DL (ref 8–23)
BUN/CREAT SERPL: 20.2 (ref 7–25)
CALCIUM SPEC-SCNC: 8.2 MG/DL (ref 8.6–10.5)
CHLORIDE SERPL-SCNC: 104 MMOL/L (ref 98–107)
CK SERPL-CCNC: 77 U/L (ref 20–200)
CO2 SERPL-SCNC: 21 MMOL/L (ref 22–29)
CREAT SERPL-MCNC: 0.84 MG/DL (ref 0.76–1.27)
D-LACTATE SERPL-SCNC: 1.5 MMOL/L (ref 0.5–2)
DEPRECATED RDW RBC AUTO: 44.3 FL (ref 37–54)
EGFRCR SERPLBLD CKD-EPI 2021: 89.3 ML/MIN/1.73
EOSINOPHIL # BLD AUTO: 0 10*3/MM3 (ref 0–0.4)
EOSINOPHIL NFR BLD AUTO: 0 % (ref 0.3–6.2)
ERYTHROCYTE [DISTWIDTH] IN BLOOD BY AUTOMATED COUNT: 13.1 % (ref 12.3–15.4)
GLOBULIN UR ELPH-MCNC: 3.2 GM/DL
GLUCOSE SERPL-MCNC: 185 MG/DL (ref 65–99)
GRAM STN SPEC: ABNORMAL
HCT VFR BLD AUTO: 25.8 % (ref 37.5–51)
HGB BLD-MCNC: 8.6 G/DL (ref 13–17.7)
IMM GRANULOCYTES # BLD AUTO: 0.1 10*3/MM3 (ref 0–0.05)
IMM GRANULOCYTES NFR BLD AUTO: 0.8 % (ref 0–0.5)
ISOLATED FROM: ABNORMAL
LYMPHOCYTES # BLD AUTO: 0.78 10*3/MM3 (ref 0.7–3.1)
LYMPHOCYTES NFR BLD AUTO: 6.1 % (ref 19.6–45.3)
MCH RBC QN AUTO: 30.8 PG (ref 26.6–33)
MCHC RBC AUTO-ENTMCNC: 33.3 G/DL (ref 31.5–35.7)
MCV RBC AUTO: 92.5 FL (ref 79–97)
MONOCYTES # BLD AUTO: 0.48 10*3/MM3 (ref 0.1–0.9)
MONOCYTES NFR BLD AUTO: 3.7 % (ref 5–12)
NEUTROPHILS NFR BLD AUTO: 11.48 10*3/MM3 (ref 1.7–7)
NEUTROPHILS NFR BLD AUTO: 89.3 % (ref 42.7–76)
NRBC BLD AUTO-RTO: 0 /100 WBC (ref 0–0.2)
PLATELET # BLD AUTO: 232 10*3/MM3 (ref 140–450)
PMV BLD AUTO: 9.3 FL (ref 6–12)
POTASSIUM SERPL-SCNC: 4.3 MMOL/L (ref 3.5–5.2)
PROT SERPL-MCNC: 5.8 G/DL (ref 6–8.5)
RBC # BLD AUTO: 2.79 10*6/MM3 (ref 4.14–5.8)
SODIUM SERPL-SCNC: 135 MMOL/L (ref 136–145)
WBC NRBC COR # BLD: 12.85 10*3/MM3 (ref 3.4–10.8)

## 2023-07-22 PROCEDURE — 25010000002 ENOXAPARIN PER 10 MG: Performed by: ORTHOPAEDIC SURGERY

## 2023-07-22 PROCEDURE — 83605 ASSAY OF LACTIC ACID: CPT | Performed by: INTERNAL MEDICINE

## 2023-07-22 PROCEDURE — 97162 PT EVAL MOD COMPLEX 30 MIN: CPT

## 2023-07-22 PROCEDURE — C1751 CATH, INF, PER/CENT/MIDLINE: HCPCS

## 2023-07-22 PROCEDURE — 97535 SELF CARE MNGMENT TRAINING: CPT

## 2023-07-22 PROCEDURE — 71045 X-RAY EXAM CHEST 1 VIEW: CPT

## 2023-07-22 PROCEDURE — 85025 COMPLETE CBC W/AUTO DIFF WBC: CPT | Performed by: ORTHOPAEDIC SURGERY

## 2023-07-22 PROCEDURE — C1894 INTRO/SHEATH, NON-LASER: HCPCS

## 2023-07-22 PROCEDURE — 99024 POSTOP FOLLOW-UP VISIT: CPT | Performed by: ORTHOPAEDIC SURGERY

## 2023-07-22 PROCEDURE — 02HV33Z INSERTION OF INFUSION DEVICE INTO SUPERIOR VENA CAVA, PERCUTANEOUS APPROACH: ICD-10-PCS | Performed by: INTERNAL MEDICINE

## 2023-07-22 PROCEDURE — 93010 ELECTROCARDIOGRAM REPORT: CPT | Performed by: INTERNAL MEDICINE

## 2023-07-22 PROCEDURE — 99232 SBSQ HOSP IP/OBS MODERATE 35: CPT | Performed by: STUDENT IN AN ORGANIZED HEALTH CARE EDUCATION/TRAINING PROGRAM

## 2023-07-22 PROCEDURE — 80053 COMPREHEN METABOLIC PANEL: CPT | Performed by: ORTHOPAEDIC SURGERY

## 2023-07-22 PROCEDURE — 97166 OT EVAL MOD COMPLEX 45 MIN: CPT

## 2023-07-22 PROCEDURE — 25010000002 CEFAZOLIN IN DEXTROSE 2-4 GM/100ML-% SOLUTION: Performed by: ORTHOPAEDIC SURGERY

## 2023-07-22 PROCEDURE — 93005 ELECTROCARDIOGRAM TRACING: CPT | Performed by: STUDENT IN AN ORGANIZED HEALTH CARE EDUCATION/TRAINING PROGRAM

## 2023-07-22 PROCEDURE — 82550 ASSAY OF CK (CPK): CPT | Performed by: ORTHOPAEDIC SURGERY

## 2023-07-22 RX ORDER — METOPROLOL TARTRATE 50 MG/1
50 TABLET, FILM COATED ORAL 2 TIMES DAILY
Status: DISCONTINUED | OUTPATIENT
Start: 2023-07-22 | End: 2023-07-26 | Stop reason: HOSPADM

## 2023-07-22 RX ORDER — SODIUM CHLORIDE 0.9 % (FLUSH) 0.9 %
10 SYRINGE (ML) INJECTION EVERY 12 HOURS SCHEDULED
Status: DISCONTINUED | OUTPATIENT
Start: 2023-07-22 | End: 2023-07-26 | Stop reason: HOSPADM

## 2023-07-22 RX ORDER — SODIUM CHLORIDE 9 MG/ML
75 INJECTION, SOLUTION INTRAVENOUS CONTINUOUS
Status: ACTIVE | OUTPATIENT
Start: 2023-07-22 | End: 2023-07-22

## 2023-07-22 RX ORDER — SODIUM CHLORIDE 0.9 % (FLUSH) 0.9 %
10 SYRINGE (ML) INJECTION AS NEEDED
Status: DISCONTINUED | OUTPATIENT
Start: 2023-07-22 | End: 2023-07-26 | Stop reason: HOSPADM

## 2023-07-22 RX ADMIN — OXYCODONE HYDROCHLORIDE 5 MG: 5 TABLET ORAL at 23:14

## 2023-07-22 RX ADMIN — Medication 2000 MG: at 03:31

## 2023-07-22 RX ADMIN — Medication 2000 MG: at 11:45

## 2023-07-22 RX ADMIN — METOPROLOL TARTRATE 50 MG: 50 TABLET ORAL at 20:39

## 2023-07-22 RX ADMIN — SODIUM CHLORIDE 75 ML/HR: 9 INJECTION, SOLUTION INTRAVENOUS at 11:52

## 2023-07-22 RX ADMIN — SODIUM CHLORIDE 100 ML/HR: 9 INJECTION, SOLUTION INTRAVENOUS at 18:25

## 2023-07-22 RX ADMIN — ATORVASTATIN CALCIUM 80 MG: 40 TABLET, FILM COATED ORAL at 20:39

## 2023-07-22 RX ADMIN — METOPROLOL TARTRATE 25 MG: 25 TABLET, FILM COATED ORAL at 10:13

## 2023-07-22 RX ADMIN — Medication 2000 MG: at 20:43

## 2023-07-22 RX ADMIN — Medication 10 ML: at 20:40

## 2023-07-22 RX ADMIN — ENOXAPARIN SODIUM 40 MG: 100 INJECTION SUBCUTANEOUS at 14:13

## 2023-07-22 RX ADMIN — Medication 10 ML: at 10:18

## 2023-07-22 RX ADMIN — METOPROLOL TARTRATE 25 MG: 25 TABLET, FILM COATED ORAL at 11:45

## 2023-07-22 RX ADMIN — Medication 5 MG: at 20:45

## 2023-07-22 RX ADMIN — Medication 10 ML: at 10:19

## 2023-07-22 NOTE — PLAN OF CARE
Goal Outcome Evaluation:  Plan of Care Reviewed With: patient           Outcome Evaluation: OT initial eval and expanded chart review completed. Pt presents with multiple comorbidities and decreased independence with ADL's and mobility from baseline. Recommend contnued skilled OT services and transfer to IRF at d/c to ensure safe transition to home.      Anticipated Discharge Disposition (OT): inpatient rehabilitation facility

## 2023-07-22 NOTE — PROGRESS NOTES
[facility] Medicine Services  PROGRESS NOTE    Patient Name: Carlos Culver  : 1945  MRN: [id number]    Date of Admission: 2023  Primary Care Physician: Marcus Cheng MD    Subjective   Subjective     CC:f/u bacteremia    HPI:  Resting comfortably, reports eating better since after surgery    ROS:  Gen- No fevers, chills  CV- No chest pain, palpitations  Resp- No cough, dyspnea  GI- No N/V/D, abd pain       Objective   Objective     Vital Signs:   Temp:  [97.2 °F (36.2 °C)-98.1 °F (36.7 °C)] 97.6 °F (36.4 °C)  Heart Rate:  [] 102  Resp:  [14-18] 18  BP: ()/(56-96) 112/61  Flow (L/min):  [3-4] 3     Physical Exam:  Constitutional: No acute distress, awake, alert  HENT: NCAT, mucous membranes moist  Respiratory: Clear to auscultation bilaterally, respiratory effort normal   Cardiovascular: irregular, rate is higher than yesterday  Gastrointestinal: Positive bowel sounds, soft, nontender, nondistended  Musculoskeletal: trace LLE ankle edema. S/p foot amputation of L. L foot now wrapped  Psychiatric: Appropriate affect, cooperative  Neurologic: Oriented x 3, no focal deficits  Skin: no rashes on exposed skin      Results Reviewed:  LAB RESULTS:      Lab 23  0517 23  0443 23  0351 23  1034 23  1205   WBC 12.85* 13.48* 18.24* 15.95* 17.82*   HEMOGLOBIN 8.6* 10.0* 10.3* 12.7* 13.0   HEMATOCRIT 25.8* 30.9* 31.5* 39.2 41.5   PLATELETS 232 267 283 325 340   NEUTROS ABS 11.48* 10.76* 14.73* 13.03* 14.66*   IMMATURE GRANS (ABS) 0.10* 0.13* 0.15* 0.11* 0.11*   LYMPHS ABS 0.78 1.30 1.77 1.41 1.45   MONOS ABS 0.48 1.18* 1.50* 1.28* 1.51*   EOS ABS 0.00 0.06 0.03 0.05 0.03   MCV 92.5 91.4 91.0 93.8 94.5   SED RATE  --   --   --   --  118*   CRP  --   --   --   --  27.88*   PROCALCITONIN  --  0.54* 0.73* 0.75*  --    LACTATE 1.5 1.1 1.4 3.4*  --            Lab 23  0517 23  0443 23  0351 23  1034 23  1205    SODIUM 135* 135* 133* 138 133*   POTASSIUM 4.3 4.2 4.4 4.4 4.7   CHLORIDE 104 101 100 100 98   CO2 21.0* 21.0* 20.0* 22.0 22.0   ANION GAP 10.0 13.0 13.0 16.0* 13.0   BUN 17 15 21 23 19   CREATININE 0.84 0.88 1.09 1.18 1.19   EGFR 89.3 88.0 69.5 63.2 62.5   GLUCOSE 185* 123* 109* 138* 107*   CALCIUM 8.2* 8.3* 8.9 10.2 10.0   MAGNESIUM  --   --  1.6  --   --            Lab 07/22/23  0517 07/21/23  0443 07/20/23  0351 07/19/23  1034 07/18/23  1205   TOTAL PROTEIN 5.8* 5.1* 6.1 7.5 7.9   ALBUMIN 2.6* 2.5* 2.5* 2.8* 3.3*   GLOBULIN 3.2 2.6 3.6 4.7 4.6   ALT (SGPT) 11 15 15 24 33   AST (SGOT) 62* 84* 41* 33 45*   BILIRUBIN 0.8 1.6* 1.7* 1.5* 1.6*   ALK PHOS 158* 162* 149* 196* 236*   LIPASE  --   --  109* 110*  --                        Brief Urine Lab Results  (Last result in the past 365 days)        Color   Clarity   Blood   Leuk Est   Nitrite   Protein   CREAT   Urine HCG        07/19/23 0955 Orange   Clear   Negative   Negative   Negative   Trace                   Microbiology Results Abnormal       Procedure Component Value - Date/Time    Blood Culture - Blood, Hand, Right [[id number]]  (Normal) Collected: 07/20/23 1120    Lab Status: Preliminary result Specimen: Blood from Hand, Right Updated: 07/22/23 1245     Blood Culture No growth at 2 days    Blood Culture - Blood, Hand, Left [[id number]]  (Normal) Collected: 07/20/23 1110    Lab Status: Preliminary result Specimen: Blood from Hand, Left Updated: 07/22/23 1245     Blood Culture No growth at 2 days    AFB Culture - Wound, Foot, Left [[id number]] Collected: 07/21/23 1341    Lab Status: Preliminary result Specimen: Wound from Foot, Left Updated: 07/22/23 1130     AFB Stain No acid fast bacilli seen on concentrated smear    AFB Culture - Bone, Foot, Left [[id number]] Collected: 07/21/23 1350    Lab Status: Preliminary result Specimen: Bone from Foot, Left Updated: 07/22/23 1130     AFB Stain No acid fast bacilli seen on concentrated smear    COVID PRE-OP /  PRE-PROCEDURE SCREENING ORDER (NO ISOLATION) - Swab, Nasopharynx [[id number]]  (Normal) Collected: 07/20/23 1142    Lab Status: Final result Specimen: Swab from Nasopharynx Updated: 07/20/23 1203    Narrative:      The following orders were created for panel order COVID PRE-OP / PRE-PROCEDURE SCREENING ORDER (NO ISOLATION) - Swab, Nasopharynx.  Procedure                               Abnormality         Status                     ---------                               -----------         ------                     COVID-19 and FLU A/B PCR...[[id number]]  Normal              Final result                 Please view results for these tests on the individual orders.    COVID-19 and FLU A/B PCR - Swab, Nasopharynx [[id number]]  (Normal) Collected: 07/20/23 1142    Lab Status: Final result Specimen: Swab from Nasopharynx Updated: 07/20/23 1203     COVID19 Not Detected     Influenza A PCR Not Detected     Influenza B PCR Not Detected    Narrative:      Fact sheet for providers: https://www.fda.gov/media/988074/download    Fact sheet for patients: https://www.fda.gov/media/248011/download    Test performed by PCR.            Peripheral Block    Result Date: 7/21/2023  Meliza Dykes CRNA     7/21/2023  1:12 PM Peripheral Block Pre-sedation assessment completed: 7/21/2023 12:54 PM Patient reassessed immediately prior to procedure Patient location during procedure: pre-op Start time: 7/21/2023 1:06 PM Stop time: 7/21/2023 1:10 PM Reason for block: at surgeon's request and post-op pain management Preanesthetic Checklist Completed: patient identified, IV checked, site marked, risks and benefits discussed, surgical consent, monitors and equipment checked, pre-op evaluation and timeout performed Prep: Sterile barriers:cap, gloves, mask and washed/disinfected hands Prep: ChloraPrep Patient monitoring: blood pressure monitoring, continuous pulse oximetry and EKG Procedure Sedation: yes Performed under: local infiltration  "Guidance:ultrasound guided ULTRASOUND INTERPRETATION.  Using ultrasound guidance a 20 G gauge needle was placed in close proximity to the nerve, at which point, under ultrasound guidance anesthetic was injected in the area of the nerve and spread of the anesthesia was seen on ultrasound in close proximity thereto.  There were no abnormalities seen on ultrasound; a digital image was taken; and the patient tolerated the procedure with no complications. Images:still images obtained, printed/placed on chart Block Type:popliteal Injection Technique:catheter Needle Type:echogenic and Tuohy Needle Gauge:18 G Resistance on Injection: none Catheter Size:20 G Medications Used: bupivacaine-EPINEPHrine PF (MARCAINE w/EPI) 0.25% -1:[id number] injection - Injection  30 mL - 7/21/2023 1:06:00 PM dexamethasone sodium phosphate injection - Injection  2 mg - 7/21/2023 1:06:00 PM Post Assessment Injection Assessment: negative aspiration for heme, no paresthesia on injection and incremental injection Patient Tolerance:comfortable throughout block Complications:no Additional Notes SINGLE shot  A high-frequency linear transducer, with sterile cover, was placed in the popliteal fossa to identify the popliteal artery and vein, Tibial nerve (TN) and Common Peroneal nerve (CP). The transducer was then moved in a cephalad fashion to observe the TN and CP nerve bifurcation to form the Sciatic Nerve. The insertion site was prepped and draped in sterile fashion. Skin and cutaneous tissue was infiltrated with 2-5 ml of 1% Lidocaine. Using ultrasound-guidance, a 20-gauge B-Grady 4\" Ultraplex 360 non-stimulating echogenic needle was then inserted and advanced in plane from lateral to medial. Preservative-free normal saline was utilized for hydro-dissection of tissue, advancement of Touhy, and to confirm final needle placement posterior to the nerves. Local anesthetic injection spread, in incremental 3-5 ml injections, to surround both nerve structures. " Aspiration every 5 ml to prevent intravascular injection. Injection was completed with negative aspiration of blood and negative intravascular injection. Injection pressures were normal with minimal resistance     Peripheral Block    Result Date: 7/21/2023  Meliza Dykes CRNA     7/21/2023  1:10 PM Peripheral Block Pre-sedation assessment completed: 7/21/2023 12:54 PM Patient reassessed immediately prior to procedure Start time: 7/21/2023 1:06 PM Stop time: 7/21/2023 1:10 PM Reason for block: at surgeon's request and post-op pain management Preanesthetic Checklist Completed: patient identified, IV checked, site marked, risks and benefits discussed, surgical consent, monitors and equipment checked, pre-op evaluation and timeout performed Prep: Pt Position: supine Sterile barriers:cap, gloves, mask, sterile barriers and washed/disinfected hands Prep: ChloraPrep Patient monitoring: blood pressure monitoring, continuous pulse oximetry and EKG Procedure Performed under: spinal Guidance:ultrasound guided ULTRASOUND INTERPRETATION.  Using ultrasound guidance a 20 G gauge needle was placed in close proximity to the nerve, at which point, under ultrasound guidance anesthetic was injected in the area of the nerve and spread of the anesthesia was seen on ultrasound in close proximity thereto.  There were no abnormalities seen on ultrasound; a digital image was taken; and the patient tolerated the procedure with no complications. Images:still images obtained, printed/placed on chart Block Type:adductor canal block Injection Technique:catheter Needle Type:Tuohy and echogenic Needle Gauge:18 G Resistance on Injection: none Catheter Size:20 G (20g) Medications Used: dexamethasone sodium phosphate injection - Injection  2 mg - 7/21/2023 1:06:00 PM bupivacaine-EPINEPHrine PF (MARCAINE w/EPI) 0.25% -1:456473 injection - Injection  30 mL - 7/21/2023 1:06:00 PM Post Assessment Injection Assessment: negative aspiration for heme,  "incremental injection and no paresthesia on injection Patient Tolerance:comfortable throughout block Complications:no Additional Notes SINGLE shot A high-frequency linear transducer, with sterile cover, was placed on the anterior mid-thigh (between the anterior superior iliac spine and patella). The transducer was then moved medially to identify the Sartorius muscle (Joseph), Vastus Medialis muscle (VMM), Superficial Femoral Artery (SFA) and Vein. The transducer was then moved cephalad or caudad to position the SFA in the middle of the Joseph. The insertion site was prepped and draped in sterile fashion. Skin and cutaneous tissue was infiltrated with 2-5 ml of 1% Lidocaine. Using ultrasound-guidance, a 20-gauge B-Grady 4\" Ultraplex 360 non-stimulating echogenic needle was advanced in plane from lateral to medial. Preservative-free normal saline was utilized for hydro-dissection of tissue, advancement of needle, and to confirm needle placement below the fascial plane of the Joseph where the Nerve to the VMM is located. Local anesthetic (LA) 5 ml deposited here. The needle continues its path lateral to the SFA at the level of the Saphenous Nerve. The remainder of the LA was deposited at the 10-11 o'clock position of the SFA. This injection created a space between the Joseph and the SFA. Aspiration every 5 ml to prevent intravascular injection. Injection was completed with negative aspiration of blood and negative intravascular injection. Injection pressures were normal with minimal resistance.      Results for orders placed during the hospital encounter of 07/19/23    Adult Transthoracic Echo Complete W/ Cont if Necessary Per Protocol    Interpretation Summary    Left ventricular systolic function is normal. Calculated left ventricular EF = 60%    Left ventricular diastolic function was normal.    Left atrial volume is moderately increased.    Saline test results are negative.    There is calcification of the aortic valve.    " Moderate tricuspid valve regurgitation is present.    Estimated right ventricular systolic pressure from tricuspid regurgitation is mildly elevated (35-45 mmHg).      Current medications:  Scheduled Meds:atorvastatin, 80 mg, Oral, Nightly  ceFAZolin, 2,000 mg, Intravenous, Q8H  enoxaparin, 40 mg, Subcutaneous, Q24H  metoprolol tartrate, 50 mg, Oral, BID  senna-docusate sodium, 2 tablet, Oral, BID  sodium chloride, 10 mL, Intravenous, Q12H  sodium chloride, 10 mL, Intravenous, Q12H      Continuous Infusions:sodium chloride, 100 mL/hr, Last Rate: 100 mL/hr (07/21/23 2114)  sodium chloride, 75 mL/hr, Last Rate: 75 mL/hr (07/22/23 1152)      PRN Meds:.  acetaminophen **OR** acetaminophen    senna-docusate sodium **AND** polyethylene glycol **AND** bisacodyl **AND** bisacodyl    bisacodyl    Calcium Replacement - Follow Nurse / BPA Driven Protocol    HYDROmorphone **AND** naloxone    Magnesium Standard Dose Replacement - Follow Nurse / BPA Driven Protocol    melatonin    Morphine **AND** naloxone    ondansetron **OR** ondansetron    oxyCODONE    Phosphorus Replacement - Follow Nurse / BPA Driven Protocol    Potassium Replacement - Follow Nurse / BPA Driven Protocol    [COMPLETED] Insert Peripheral IV **AND** sodium chloride    sodium chloride    sodium chloride    sodium chloride    sodium chloride    Assessment & Plan   Assessment & Plan     Active Hospital Problems    Diagnosis  POA    **Sepsis [A41.9]  Yes    Staphylococcus aureus bacteremia [R78.81, B95.61]  Yes    Acute osteomyelitis of left ankle or foot [M86.172]  Yes    S/P transmetatarsal amputation of foot, left [Z89.432]  Not Applicable    Neuropathy [G62.9]  Yes    CKD (chronic kidney disease) stage 2, GFR 60-89 ml/min [N18.2]  Yes    Atrial fibrillation with RVR [I48.91]  Yes      Resolved Hospital Problems   No resolved problems to display.        Brief Hospital Course to date:  Carlos Culver is a 77 y.o. male with a history of peripheral neuropathy,  osteomyelitis, CKD, A-fib who is presenting with sepsis/bacteremia    Sepsis w/ MSSA bacteremia  Concern for left foot osteomyelitis, status post left foot incision and drainage and revision amputation  -- ID following and managing antibiotics.  We will plan on PICC consult.  Echo shows no vegetations-- w staph aureus bacteremia. Repeat cultures in process  -- Reviewed MRI results, consistent with high suspicion for osteomyelitis  - s/p L foot incision and drainage and revision amputation 7/21 . NWB per ortho. DVT ppx, can resume home dose of Eliquis on discharge  - Wound culture is showing Staph aureus, other cultures in place from surgery     A fib rvr-resolved  -- rates are coming back up, increased metoprolol  --added some maintenance fluids since he did have surgery yesterday and was NPO for a bit yesterday     CKD II       Expected Discharge Location and Transportation: home vs rehab  Expected Discharge   Expected Discharge Date: 7/24/2023; Expected Discharge Time:      DVT prophylaxis:  Medical DVT prophylaxis orders are present.     AM-PAC 6 Clicks Score (PT): 17 (07/22/23 1057)    CODE STATUS:   Code Status and Medical Interventions:   Ordered at: 07/19/23 1154     Code Status (Patient has no pulse and is not breathing):    CPR (Attempt to Resuscitate)     Medical Interventions (Patient has pulse or is breathing):    Full Support     Release to patient:    Routine Release       Tianna Gallagher MD  07/22/23

## 2023-07-22 NOTE — PROGRESS NOTES
"          Orthopaedic Surgery Progress Note    LOS: 3 days   Patient Care Team:  Marcus Cheng MD as PCP - General  Marcus Cheng MD as PCP - Family Medicine  Jeffery Young MD as Consulting Physician (Infectious Diseases)  Hang Anderson MD as Consulting Physician (Cardiology)  1 Day Post-Op   Procedure(s):  LEFT FOOT INCISION AND DRAINAGE; REVISION AMPUTATION; ACHILLES LENGTHENING  Subjective   Interval History:   Resting comfortably in bed.  Denies pain.  No acute events overnight.  No new complaints.    Objective     Vital Signs:  Temp (24hrs), Av.6 °F (36.4 °C), Min:97.2 °F (36.2 °C), Max:98.1 °F (36.7 °C)    /75 (BP Location: Right arm, Patient Position: Lying)   Pulse 82   Temp 97.6 °F (36.4 °C) (Oral)   Resp 18   Ht 181.6 cm (71.5\")   Wt 99.8 kg (220 lb)   SpO2 95%   BMI 30.26 kg/m²   Body mass index is 30.26 kg/m².    Labs:  Lab Results (last 24 hours)       Procedure Component Value Units Date/Time    Comprehensive Metabolic Panel [[id number]]  (Abnormal) Collected: 23    Specimen: Blood Updated: 23     Glucose 185 mg/dL      BUN 17 mg/dL      Creatinine 0.84 mg/dL      Sodium 135 mmol/L      Potassium 4.3 mmol/L      Chloride 104 mmol/L      CO2 21.0 mmol/L      Calcium 8.2 mg/dL      Total Protein 5.8 g/dL      Albumin 2.6 g/dL      ALT (SGPT) 11 U/L      AST (SGOT) 62 U/L      Alkaline Phosphatase 158 U/L      Total Bilirubin 0.8 mg/dL      Globulin 3.2 gm/dL      Comment: Calculated Result        A/G Ratio 0.8 g/dL      BUN/Creatinine Ratio 20.2     Anion Gap 10.0 mmol/L      eGFR 89.3 mL/min/1.73     Narrative:      GFR Normal >60  Chronic Kidney Disease <60  Kidney Failure <15    The GFR formula is only valid for adults with stable renal function between ages 18 and 70.    CK [[id number]]  (Normal) Collected: 23    Specimen: Blood Updated: 23     Creatine Kinase 77 U/L     Blood Culture - Blood, Arm, Right [[id number]]  " (Abnormal) Collected: 07/19/23 1122    Specimen: Blood from Arm, Right Updated: 07/22/23 0648     Blood Culture Staphylococcus aureus     Comment:   Infectious disease consultation is highly recommended to rule out distant foci of infection.        Isolated from Aerobic Bottle     Gram Stain Aerobic Bottle Gram positive cocci in pairs and clusters    Narrative:      Refer to previous blood culture collected on  07/18/23 at 1205 for MICs.      Lactic Acid, Plasma [[id number]]  (Normal) Collected: 07/22/23 0517    Specimen: Blood Updated: 07/22/23 0647     Lactate 1.5 mmol/L      Comment: Falsely depressed results may occur on samples drawn from patients receiving N-Acetylcysteine (NAC) or Metamizole.       CBC & Differential [[id number]]  (Abnormal) Collected: 07/22/23 0517    Specimen: Blood Updated: 07/22/23 0624    Narrative:      The following orders were created for panel order CBC & Differential.  Procedure                               Abnormality         Status                     ---------                               -----------         ------                     CBC Auto Differential[[id number]]        Abnormal            Final result                 Please view results for these tests on the individual orders.    CBC Auto Differential [[id number]]  (Abnormal) Collected: 07/22/23 0517    Specimen: Blood Updated: 07/22/23 0624     WBC 12.85 10*3/mm3      RBC 2.79 10*6/mm3      Hemoglobin 8.6 g/dL      Hematocrit 25.8 %      MCV 92.5 fL      MCH 30.8 pg      MCHC 33.3 g/dL      RDW 13.1 %      RDW-SD 44.3 fl      MPV 9.3 fL      Platelets 232 10*3/mm3      Neutrophil % 89.3 %      Lymphocyte % 6.1 %      Monocyte % 3.7 %      Eosinophil % 0.0 %      Basophil % 0.1 %      Immature Grans % 0.8 %      Neutrophils, Absolute 11.48 10*3/mm3      Lymphocytes, Absolute 0.78 10*3/mm3      Monocytes, Absolute 0.48 10*3/mm3      Eosinophils, Absolute 0.00 10*3/mm3      Basophils, Absolute 0.01 10*3/mm3      Immature  Grans, Absolute 0.10 10*3/mm3      nRBC 0.0 /100 WBC     Tissue / Bone Culture - Bone, Foot, Left [[id number]] Collected: 07/21/23 1350    Specimen: Bone from Foot, Left Updated: 07/21/23 1949     Gram Stain Few (2+) WBCs seen      No organisms seen    Wound Culture - Wound, Foot, Left [[id number]] Collected: 07/21/23 1341    Specimen: Wound from Foot, Left Updated: 07/21/23 1719     Gram Stain Many (4+) WBCs seen      No organisms seen    Anaerobic Culture - Bone, Foot, Left [[id number]] Collected: 07/21/23 1350    Specimen: Bone from Foot, Left Updated: 07/21/23 1636    Fungus Culture - Bone, Foot, Left [[id number]] Collected: 07/21/23 1350    Specimen: Bone from Foot, Left Updated: 07/21/23 1636    AFB Culture - Bone, Foot, Left [[id number]] Collected: 07/21/23 1350    Specimen: Bone from Foot, Left Updated: 07/21/23 1636    Fungus Culture - Wound, Foot, Left [[id number]] Collected: 07/21/23 1341    Specimen: Wound from Foot, Left Updated: 07/21/23 1634    AFB Culture - Wound, Foot, Left [[id number]] Collected: 07/21/23 1341    Specimen: Wound from Foot, Left Updated: 07/21/23 1634    Anaerobic Culture - Wound, Foot, Left [[id number]] Collected: 07/21/23 1341    Specimen: Wound from Foot, Left Updated: 07/21/23 1634    Blood Culture - Blood, Hand, Right [[id number]]  (Normal) Collected: 07/20/23 1120    Specimen: Blood from Hand, Right Updated: 07/21/23 1245     Blood Culture No growth at 24 hours    Blood Culture - Blood, Hand, Left [[id number]]  (Normal) Collected: 07/20/23 1110    Specimen: Blood from Hand, Left Updated: 07/21/23 1245     Blood Culture No growth at 24 hours            Physical Exam:  left LE: splint CDI, compartments soft/compressible around splint, no calf tenderness       Assessment/Plan:  1 Day Post-Op status post:  LEFT FOOT INCISION AND DRAINAGE; REVISION AMPUTATION; ACHILLES LENGTHENING  -NWB operative extremity  -Advance diet as tolerated  -Keep splint/operative dressing clean and dry  -DVT  prophylaxis, mechanical and chemical, resume home dose of Eliquis on discharge  -Continue antibiotics per ID recommendations  -Pain control  -Follow-up Intra-Op cultures  -Elevate operative extremity  -No plans for further orthopedic intervention during this hospital stay  -Rest of management per primary team     Mao Mckay MD  07/22/23  08:42 EDT

## 2023-07-22 NOTE — PLAN OF CARE
Goal Outcome Evaluation:  Plan of Care Reviewed With: patient           Outcome Evaluation: Pt presents s/p L foot amputation 7/21 and NWB status. Pt req Debra x1 to t/f, then amb forward from recliner x7ft with RW and Debra x1 +1 for recliner in tow. Pt able to maintain NWB LLE; distance limited by tachycardia and decreased stamina. Pt is below baseline level of function for mobility and PT rec d/c IRF as pt reports he lives at home with his wife whose own health issues render her incapable of providing pt with any physical assistance.      Anticipated Discharge Disposition (PT): inpatient rehabilitation facility

## 2023-07-22 NOTE — THERAPY EVALUATION
Patient Name: Carlos Culver  : 1945    MRN: [id number]                              Today's Date: 2023       Admit Date: 2023    Visit Dx:     ICD-10-CM ICD-9-CM   1. Bacteremia  R78.81 790.7   2. Elevated blood pressure reading with diagnosis of hypertension  I10 401.9   3. Acute osteomyelitis of left ankle or foot  M86.172 730.07   4. Impaired functional mobility, balance, gait, and endurance  Z74.09 V49.89     Patient Active Problem List   Diagnosis    Cellulitis of left foot    GUCCI (acute kidney injury)    Essential hypertension    Leukocytosis    Atrial fibrillation with RVR    Osteomyelitis of left foot    Chronic anticoagulation    Neuropathy    Elevated transaminase level    CKD (chronic kidney disease) stage 2, GFR 60-89 ml/min    Skin ulcer of right heel    Chronic multifocal osteomyelitis of left foot    Contracture of joint of foot, left    MARTINEZ (dyspnea on exertion)    Neuropathic ulcer, limited to breakdown of skin    Hyperlipidemia LDL goal <100    A-fib    S/P transmetatarsal amputation of foot, left    Leukocytosis, likely reactive    Acute postoperative pain    Alcohol abuse    Sepsis    Staphylococcus aureus bacteremia    Acute osteomyelitis of left ankle or foot     Past Medical History:   Diagnosis Date    A-fib     Acute kidney failure     Elevated cholesterol     Hard of hearing     Hyperlipidemia     Hypertension     Renal disorder     Wears hearing aid in both ears     Wears reading eyeglasses      Past Surgical History:   Procedure Laterality Date    AMPUTATION DIGIT Left 2020    Procedure: AMPUTATION LEFT 2ND TOE;  Surgeon: Idania Osborn MD;  Location:  [facility];  Service: Orthopedics    AMPUTATION DIGIT Left 2020    Procedure: TRANSMETATARSAL AMPUTATION LEFT;  Surgeon: Idania Osborn MD;  Location:  [facility];  Service: Orthopedics;  Laterality: Left;    APPENDECTOMY      COLONOSCOPY  2006    FOOT SURGERY Left 2020    (L) transmetatarsal  amputation 12/09/2020 - Dr. Idania Osborn    INCISION AND DRAINAGE LEG Left 10/23/2018    Procedure: INCISION AND DRAINAGE LEFT FOOT;  Surgeon: Idania Osborn MD;  Location:  [facility];  Service: Orthopedics    TOE AMPUTATION      TONSILLECTOMY      VENOUS ACCESS DEVICE (PORT) INSERTION N/A 10/23/2018    Procedure: GROSHONG CATHETER PLACEMENT;  Surgeon: Marquez Simons MD;  Location:  [facility];  Service: General    VENOUS ACCESS DEVICE (PORT) INSERTION N/A 12/9/2020    Procedure: GROSHONG INSERTION;  Surgeon: Antonio Bains MD;  Location:  [facility];  Service: General;  Laterality: N/A;    VENOUS ACCESS DEVICE (PORT) REMOVAL        General Information       Row Name 07/22/23 1002          Physical Therapy Time and Intention    Document Type evaluation  -SD     Mode of Treatment physical therapy  -SD       Row Name 07/22/23 1002          General Information    Patient Profile Reviewed yes  -SD     Prior Level of Function independent:;all household mobility;gait;transfer;bed mobility;ADL's;using stairs;driving  pt does most chores around home due to wife with COPD and limited physical ability  -SD     Existing Precautions/Restrictions fall;non-weight bearing  -SD     Barriers to Rehab medically complex;previous functional deficit;family issues  pt's wife unable to provide physical assistance  -SD       Row Name 07/22/23 1002          Living Environment    People in Home spouse  -SD       Row Name 07/22/23 1002          Home Main Entrance    Number of Stairs, Main Entrance none  -SD       Row Name 07/22/23 1002          Stairs Within Home, Primary    Stairs, Within Home, Primary ranch home with basement (laundry in basement)  -SD       Row Name 07/22/23 1002          Cognition    Orientation Status (Cognition) oriented x 4  -SD       Row Name 07/22/23 1002          Safety Issues, Functional Mobility    Safety Issues Affecting Function (Mobility) insight into deficits/self-awareness;sequencing  abilities;safety precautions follow-through/compliance;positioning of assistive device  -SD     Impairments Affecting Function (Mobility) balance;coordination;sensation/sensory awareness;endurance/activity tolerance  -SD               User Key  (r) = Recorded By, (t) = Taken By, (c) = Cosigned By      Initials Name Provider Type    Mica Montero, DORINDA Physical Therapist                   Mobility       Row Name 07/22/23 1046          Bed Mobility    Bed Mobility supine-sit  -SD     Supine-Sit [place] (Bed Mobility) set up  -SD     Comment, (Bed Mobility) pt able to perform with setup assist only  -SD       Row Name 07/22/23 1046          Transfers    Comment, (Transfers) Pt given visual demo and max verbal/tactile cues for sequencing a NWB SPT from EOB > recliner, then sit <> stand x2 reps from recliner with RW. Pt able to maintain NWB LLE.  -SD       Row Name 07/22/23 1046          Bed-Chair Transfer    Bed-Chair [place] (Transfers) minimum assist (75% patient effort);1 person assist;verbal cues;nonverbal cues (demo/gesture)  -SD       Row Name 07/22/23 1046          Sit-Stand Transfer    Sit-Stand [place] (Transfers) minimum assist (75% patient effort);1 person assist;verbal cues;nonverbal cues (demo/gesture)  -SD     Assistive Device (Sit-Stand Transfers) walker, front-wheeled  -SD       Row Name 07/22/23 1046          Gait/Stairs (Locomotion)    [place] Level (Gait) minimum assist (75% patient effort);1 person assist;1 person to manage equipment;verbal cues;nonverbal cues (demo/gesture)  -SD     Assistive Device (Gait) walker, front-wheeled  -SD     Distance in Feet (Gait) 7ft  -SD     Deviations/Abnormal Patterns (Gait) david decreased;gait speed decreased  -SD     Bilateral Gait Deviations forward flexed posture  -SD     Comment, (Gait/Stairs) pt amb forward from recliner with RW and Debra x1 +1 for recliner in tow. Pt able to maintain NWB LLE; distance limited by tachycardia and  decreased stamina.  -SD               User Key  (r) = Recorded By, (t) = Taken By, (c) = Cosigned By      Initials Name Provider Type    Mica Montero PT Physical Therapist                   Obj/Interventions       Row Name 07/22/23 1049          Range of Motion Comprehensive    General Range of Motion bilateral lower extremity ROM WFL  -SD       [facility] Name 07/22/23 1049          Strength Comprehensive (MMT)    General Manual Muscle Testing (MMT) Assessment lower extremity strength deficits identified  -SD               User Key  (r) = Recorded By, (t) = Taken By, (c) = Cosigned By      Initials Name Provider Type    Mica Montero PT Physical Therapist                   Goals/Plan       [facility] Name 07/22/23 1056          Transfer Goal 1 (PT)    Activity/Assistive Device (Transfer Goal 1, PT) sit-to-stand/stand-to-sit;bed-to-chair/chair-to-bed  -SD     [place] Level/Cues Needed (Transfer Goal 1, PT) modified independence  -SD     Time Frame (Transfer Goal 1, PT) long term goal (LTG);2 weeks  -SD       Row Name 07/22/23 1056          Gait Training Goal 1 (PT)    Activity/Assistive Device (Gait Training Goal 1, PT) gait (walking locomotion);increase endurance/gait distance;walker, rolling  -SD     [place] Level (Gait Training Goal 1, PT) standby assist  -SD     Distance (Gait Training Goal 1, PT) 25ft  -SD     Time Frame (Gait Training Goal 1, PT) long term goal (LTG);2 weeks  -SD       [facility] Name 07/22/23 1056          Patient Education Goal (PT)    Activity (Patient Education Goal, PT) HEP  -SD     [place]/Cues/Accuracy (Memory Goal 2, PT) demonstrates adequately;verbalizes understanding  -SD     Time Frame (Patient Education Goal, PT) long term goal (LTG);2 weeks  -SD       [facility] Name 07/22/23 1056          Therapy Assessment/Plan (PT)    Planned Therapy Interventions (PT) balance training;bed mobility training;gait training;home exercise program;patient/family education;transfer  training;neuromuscular re-education;strengthening  -SD               User Key  (r) = Recorded By, (t) = Taken By, (c) = Cosigned By      Initials Name Provider Type    Mica Montero, PT Physical Therapist                   Clinical Impression       Row Name 07/22/23 1049          Pain    Pretreatment Pain Rating 0/10 - no pain  -SD     Posttreatment Pain Rating 0/10 - no pain  -SD       Row Name 07/22/23 1049          Plan of Care Review    Plan of Care Reviewed With patient  -SD     Outcome Evaluation Pt presents s/p L foot amputation 7/21 and NWB status. Pt req Debra x1 to t/f, then amb forward from recliner x7ft with RW and Debra x1 +1 for recliner in tow. Pt able to maintain NWB LLE; distance limited by tachycardia and decreased stamina. Pt is below baseline level of function for mobility and PT rec d/c IRF as pt reports he lives at home with his wife whose own health issues render her incapable of providing pt with any physical assistance.  -SD       Row Name 07/22/23 1049          Therapy Assessment/Plan (PT)    Patient/Family Therapy Goals Statement (PT) return to independence  -SD     Rehab Potential (PT) good, to achieve stated therapy goals  -SD     Criteria for Skilled Interventions Met (PT) yes;skilled treatment is necessary  -SD     Therapy Frequency (PT) daily  -SD     Predicted Duration of Therapy Intervention (PT) 2wks  -SD       Row Name 07/22/23 1049          Vital Signs    Pre Systolic BP Rehab 117  -SD     Pre Treatment Diastolic BP 96  -SD     Pretreatment Heart Rate (beats/min) 109  -SD     Intratreatment Heart Rate (beats/min) 152  -SD     Posttreatment Heart Rate (beats/min) 129  -SD     Pre SpO2 (%) 96  -SD     O2 Delivery Pre Treatment room air  -SD     Pre Patient Position Supine  -SD     Post Patient Position Sitting  -SD       Row Name 07/22/23 1049          Positioning and Restraints    Pre-Treatment Position in bed  -SD     Post Treatment Position chair  -SD     In Chair  notified nsg;reclined;call light within reach;encouraged to call for assist;exit alarm on;waffle cushion;on mechanical lift sling;L heel elevated  -SD               User Key  (r) = Recorded By, (t) = Taken By, (c) = Cosigned By      Initials Name Provider Type    Mica Montero PT Physical Therapist                   Outcome Measures       Row Name 07/22/23 1057          How much help from another person do you currently need...    Turning from your back to your side while in flat bed without using bedrails? 4  -SD     Moving from lying on back to sitting on the side of a flat bed without bedrails? 4  -SD     Moving to and from a bed to a chair (including a wheelchair)? 3  -SD     Standing up from a chair using your arms (e.g., wheelchair, bedside chair)? 3  -SD     Climbing 3-5 steps with a railing? 1  -SD     To walk in hospital room? 2  -SD     AM-PAC 6 Clicks Score (PT) 17  -SD     Highest level of mobility 5 --> Static standing  -SD       Row Name 07/22/23 1057          Functional Assessment    Outcome Measure Options AM-PAC 6 Clicks Basic Mobility (PT)  -SD               User Key  (r) = Recorded By, (t) = Taken By, (c) = Cosigned By      Initials Name Provider Type    Mica Montero PT Physical Therapist                                 Physical Therapy Education       Title: PT OT SLP Therapies (In Progress)       Topic: Physical Therapy (In Progress)       Point: Mobility training (Done)       Learning Progress Summary             Patient Acceptance, E,D, VU,NR by SD at 7/22/2023 1058                         Point: Home exercise program (Not Started)       Learner Progress:  Not documented in this visit.              Point: Body mechanics (Done)       Learning Progress Summary             Patient Acceptance, E,D, VU,NR by SD at 7/22/2023 1058                         Point: Precautions (Done)       Learning Progress Summary             Patient Acceptance, E,D, VU,NR by SD at 7/22/2023 1058                                          User Key       Initials Effective Dates Name Provider Type Discipline    SD 03/13/23 -  Mica Guadarrama PT Physical Therapist PT                  PT Recommendation and Plan  Planned Therapy Interventions (PT): balance training, bed mobility training, gait training, home exercise program, patient/family education, transfer training, neuromuscular re-education, strengthening  Plan of Care Reviewed With: patient  Outcome Evaluation: Pt presents s/p L foot amputation 7/21 and NWB status. Pt req Debra x1 to t/f, then amb forward from recliner x7ft with RW and Debra x1 +1 for recliner in tow. Pt able to maintain NWB LLE; distance limited by tachycardia and decreased stamina. Pt is below baseline level of function for mobility and PT rec d/c IRF as pt reports he lives at home with his wife whose own health issues render her incapable of providing pt with any physical assistance.     Time Calculation:   PT Evaluation Complexity  History, PT Evaluation Complexity: 3 or more personal factors and/or comorbidities  Examination of Body Systems (PT Eval Complexity): total of 3 or more elements  Clinical Presentation (PT Evaluation Complexity): evolving  Clinical Decision Making (PT Evaluation Complexity): moderate complexity  Overall Complexity (PT Evaluation Complexity): moderate complexity     PT Charges       Row Name 07/22/23 1058             Time Calculation    Start Time 1002  -SD      PT Non-Billable Time (min) 56 min  -SD      PT Received On 07/22/23  -SD      PT Goal Re-Cert Due Date 08/01/23  -SD                User Key  (r) = Recorded By, (t) = Taken By, (c) = Cosigned By      Initials Name Provider Type    SD Mica Guadarrama, DORINDA Physical Therapist                  Therapy Charges for Today       Code Description Service Date Service Provider Modifiers Qty    82936864417 HC PT EVAL MOD COMPLEXITY 4 7/22/2023 Mica Guadarrama, PT GP 1            PT G-Codes  Outcome  Measure Options: AM-PAC 6 Clicks Basic Mobility (PT)  AM-PAC 6 Clicks Score (PT): 17  PT Discharge Summary  Anticipated Discharge Disposition (PT): inpatient rehabilitation facility    Mica Guadarrama, DORINDA  7/22/2023

## 2023-07-22 NOTE — THERAPY EVALUATION
Patient Name: Carlos Culver  : 1945    MRN: [id number]                              Today's Date: 2023       Admit Date: 2023    Visit Dx:     ICD-10-CM ICD-9-CM   1. Bacteremia  R78.81 790.7   2. Elevated blood pressure reading with diagnosis of hypertension  I10 401.9   3. Acute osteomyelitis of left ankle or foot  M86.172 730.07   4. Impaired functional mobility, balance, gait, and endurance  Z74.09 V49.89     Patient Active Problem List   Diagnosis    Cellulitis of left foot    GUCCI (acute kidney injury)    Essential hypertension    Leukocytosis    Atrial fibrillation with RVR    Osteomyelitis of left foot    Chronic anticoagulation    Neuropathy    Elevated transaminase level    CKD (chronic kidney disease) stage 2, GFR 60-89 ml/min    Skin ulcer of right heel    Chronic multifocal osteomyelitis of left foot    Contracture of joint of foot, left    MARTINEZ (dyspnea on exertion)    Neuropathic ulcer, limited to breakdown of skin    Hyperlipidemia LDL goal <100    A-fib    S/P transmetatarsal amputation of foot, left    Leukocytosis, likely reactive    Acute postoperative pain    Alcohol abuse    Sepsis    Staphylococcus aureus bacteremia    Acute osteomyelitis of left ankle or foot     Past Medical History:   Diagnosis Date    A-fib     Acute kidney failure     Elevated cholesterol     Hard of hearing     Hyperlipidemia     Hypertension     Renal disorder     Wears hearing aid in both ears     Wears reading eyeglasses      Past Surgical History:   Procedure Laterality Date    AMPUTATION DIGIT Left 2020    Procedure: AMPUTATION LEFT 2ND TOE;  Surgeon: Idania Osborn MD;  Location:  [facility];  Service: Orthopedics    AMPUTATION DIGIT Left 2020    Procedure: TRANSMETATARSAL AMPUTATION LEFT;  Surgeon: Idania Osborn MD;  Location:  [facility];  Service: Orthopedics;  Laterality: Left;    APPENDECTOMY      COLONOSCOPY  2006    FOOT SURGERY Left 2020    (L) transmetatarsal  amputation 12/09/2020 - Dr. Idania Osborn    INCISION AND DRAINAGE LEG Left 10/23/2018    Procedure: INCISION AND DRAINAGE LEFT FOOT;  Surgeon: Idania Osborn MD;  Location:  [facility];  Service: Orthopedics    TOE AMPUTATION      TONSILLECTOMY      VENOUS ACCESS DEVICE (PORT) INSERTION N/A 10/23/2018    Procedure: GROSHONG CATHETER PLACEMENT;  Surgeon: Marquez Simons MD;  Location:  [facility];  Service: General    VENOUS ACCESS DEVICE (PORT) INSERTION N/A 12/9/2020    Procedure: GROSHONG INSERTION;  Surgeon: Antonio Bains MD;  Location:  [facility];  Service: General;  Laterality: N/A;    VENOUS ACCESS DEVICE (PORT) REMOVAL        General Information       Row Name 07/22/23 1056          OT Time and Intention    Document Type evaluation  -     Mode of Treatment occupational therapy  -[facility] Name 07/22/23 1055          General Information    Patient Profile Reviewed yes  -JR     Prior Level of Function independent:;gait;transfer;bed mobility;ADL's;home management  Pt reports wife with medical issues and is unable to assist pt at home. Pt reports he preciously completed all home management tasks, has  2x/month. Reports laundry in the basement.  -JR     Existing Precautions/Restrictions fall;non-weight bearing   NWB L LE  -JR     Barriers to Rehab medically complex;hearing deficit  -       Row Name 07/22/23 1055          Living Environment    People in Home spouse  -       Row Name 07/22/23 1055          Home Main Entrance    Number of Stairs, Main Entrance none  ranch with basement  -[facility] Name 07/22/23 1055          Stairs Within Home, Primary    Number of Stairs, Within Home, Primary other (see comments)  laundry in basement  -       Row Name 07/22/23 1055          Cognition    Orientation Status (Cognition) oriented x 4  -       Row Name 07/22/23 1055          Safety Issues, Functional Mobility    Safety Issues Affecting Function (Mobility) awareness of need for  assistance;insight into deficits/self-awareness;safety precautions follow-through/compliance;safety precaution awareness  -     Impairments Affecting Function (Mobility) balance;cognition;endurance/activity tolerance;postural/trunk control;strength  -     Cognitive Impairments, Mobility Safety/Performance awareness, need for assistance;insight into deficits/self-awareness;safety precaution awareness;safety precaution follow-through  -               User Key  (r) = Recorded By, (t) = Taken By, (c) = Cosigned By      Initials Name Provider Type    JR Safia Mendez OT Occupational Therapist                     Mobility/ADL's       Row Name 07/22/23 1059          Bed Mobility    Bed Mobility supine-sit  -     Supine-Sit [place] (Bed Mobility) standby assist  -     Bed Mobility, Safety Issues decreased use of legs for bridging/pushing  -     Assistive Device (Bed Mobility) head of bed elevated;bed rails  -       Row Name 07/22/23 1059          Transfers    Transfers bed-chair transfer;sit-stand transfer  -       Row Name 07/22/23 1059          Bed-Chair Transfer    Bed-Chair [place] (Transfers) minimum assist (75% patient effort);verbal cues;1 person assist;1 person to manage equipment  -JR     Comment, (Bed-Chair Transfer) Pt initially completed squat pivot transfer to chair with verbal cues and teaching, and verbal cues regarding L LE NWB.  -       Row Name 07/22/23 1059          Sit-Stand Transfer    Sit-Stand [place] (Transfers) minimum assist (75% patient effort);verbal cues;1 person assist;1 person to manage equipment  -     Assistive Device (Sit-Stand Transfers) walker, front-wheeled  -JR     Comment, (Sit-Stand Transfer) Pt required max verbal cues for hand placement and NWB status with transfer.  -       Row Name 07/22/23 1059          Functional Mobility    Functional Mobility- Ind. Level minimum assist (75% patient effort);verbal cues required  -     Functional  Mobility- Device walker, front-wheeled  -     Functional Mobility-Distance (Feet) --  approx 5' forward  -     Functional Mobility- Safety Issues sequencing ability decreased;step length decreased;weight-shifting ability decreased  -     Functional Mobility- Comment Pt able to take a few steps forward with RWx and max verbal cues/teaching regarding NWB. Pt followed closely with chair.  -[facility] Name 07/22/23 1059          Activities of Daily Living    BADL Assessment/Intervention upper body dressing  -[facility] Name 07/22/23 1059          Mobility    Extremity Weight-bearing Status left lower extremity   -     Left Lower Extremity (Weight-bearing Status) non weight-bearing (NWB)   -[facility] Name 07/22/23 1059          Upper Body Dressing Assessment/Training    [place] Level (Upper Body Dressing) don;front opening garment;maximum assist (25% patient effort)  -     Position (Upper Body Dressing) edge of bed sitting  -               User Key  (r) = Recorded By, (t) = Taken By, (c) = Cosigned By      Initials Name Provider Type     Safia Mendez, OT Occupational Therapist                   Obj/Interventions       [facility] Name 07/22/23 1105          Sensory Assessment (Somatosensory)    Sensory Assessment (Somatosensory) sensation intact  -[facility] Name 07/22/23 1105          Vision Assessment/Intervention    Visual Impairment/Limitations corrective lenses for reading  -[facility] Name 07/22/23 1105          Range of Motion Comprehensive    General Range of Motion no range of motion deficits identified  -[facility] Name 07/22/23 1105          Strength Comprehensive (MMT)    General Manual Muscle Testing (MMT) Assessment no strength deficits identified  -[facility] Name 07/22/23 1105          Balance    Balance Assessment sitting static balance;standing dynamic balance  -     Static Sitting Balance contact guard  -     Dynamic Standing Balance minimal assist  -      Position/Device Used, Standing Balance walker, rolling  -JR               User Key  (r) = Recorded By, (t) = Taken By, (c) = Cosigned By      Initials Name Provider Type    Safia Rust, OT Occupational Therapist                   Goals/Plan       Row Name 07/22/23 1108          Transfer Goal 1 (OT)    Activity/Assistive Device (Transfer Goal 1, OT) transfers, all;walker, rolling  -JR     [place] Level/Cues Needed (Transfer Goal 1, OT) standby assist;verbal cues required  -JR     Time Frame (Transfer Goal 1, OT) long term goal (LTG);by discharge  -JR     Progress/Outcome (Transfer Goal 1, OT) new goal;goal ongoing  -       Row Name 07/22/23 1108          Dressing Goal 1 (OT)    Activity/Device (Dressing Goal 1, OT) lower body dressing  -JR     [place]/Cues Needed (Dressing Goal 1, OT) minimum assist (75% or more patient effort);verbal cues required  -JR     Time Frame (Dressing Goal 1, OT) long term goal (LTG);by discharge  -JR     Progress/Outcome (Dressing Goal 1, OT) goal ongoing  -       Row Name 07/22/23 1108          Toileting Goal 1 (OT)    Activity/Device (Toileting Goal 1, OT) toileting skills, all  -JR     [place] Level/Cues Needed (Toileting Goal 1, OT) minimum assist (75% or more patient effort);verbal cues required  -JR     Time Frame (Toileting Goal 1, OT) long term goal (LTG);by discharge  -     Progress/Outcome (Toileting Goal 1, OT) new goal;goal ongoing  -       Row Name 07/22/23 1108          Strength Goal 1 (OT)    Strength Goal 1 (OT) Pt to demo independence with B UE strengthening HEP to assist with NWB mobiltiy status.  -JR     Time Frame (Strength Goal 1, OT) long term goal (LTG);by discharge  -     Progress/Outcome (Strength Goal 1, OT) new goal;goal ongoing  -       Row Name 07/22/23 1108          Therapy Assessment/Plan (OT)    Planned Therapy Interventions (OT) activity tolerance training;adaptive equipment training;BADL retraining;functional balance  retraining;occupation/activity based interventions;ROM/therapeutic exercise;strengthening exercise;patient/caregiver education/training;transfer/mobility retraining  -               User Key  (r) = Recorded By, (t) = Taken By, (c) = Cosigned By      Initials Name Provider Type    Safia Rust, OT Occupational Therapist                   Clinical Impression       Row Name 07/22/23 1106          Pain Assessment    Pretreatment Pain Rating 0/10 - no pain  -JR     Posttreatment Pain Rating 0/10 - no pain  -JR     Additional Documentation Pain Scale: Word Pre/Post-Treatment (Group)  -       Row Name 07/22/23 1106          Plan of Care Review    Plan of Care Reviewed With patient  -JR     Outcome Evaluation OT initial eval and expanded chart review completed. Pt presents with multiple comorbidities and decreased independence with ADL's and mobility from baseline. Recommend contnued skilled OT services and transfer to IRF at d/c to ensure safe transition to home.  -       Row Name 07/22/23 1106          Therapy Assessment/Plan (OT)    Patient/Family Therapy Goal Statement (OT) go home  -     Rehab Potential (OT) good, to achieve stated therapy goals  -     Criteria for Skilled Therapeutic Interventions Met (OT) yes;meets criteria;skilled treatment is necessary  -     Therapy Frequency (OT) daily  -       Row Name 07/22/23 1106          Therapy Plan Review/Discharge Plan (OT)    Anticipated Discharge Disposition (OT) inpatient rehabilitation facility  -       Row Name 07/22/23 1106          Vital Signs    Pre Systolic BP Rehab 128  -JR     Pre Treatment Diastolic BP 75  -JR     Intra Systolic BP Rehab 117  -JR     Intra Treatment Diastolic BP 96  -JR     Post Systolic BP Rehab 112  -JR     Post Treatment Diastolic BP 61  -JR     Pretreatment Heart Rate (beats/min) 114  -JR     Posttreatment Heart Rate (beats/min) 115  -JR     Pre SpO2 (%) 91  -JR     O2 Delivery Pre Treatment room air  -JR     Post  SpO2 (%) 100  -JR     O2 Delivery Post Treatment room air  -JR     Pre Patient Position Supine  -JR     Intra Patient Position Standing  -JR     Post Patient Position Sitting  -JR       Row Name 07/22/23 1106          Positioning and Restraints    Pre-Treatment Position in bed  -JR     Post Treatment Position chair  -JR     In Chair notified nsg;reclined;call light within reach;encouraged to call for assist;exit alarm on;waffle cushion;on mechanical lift sling;LLE elevated  -JR               User Key  (r) = Recorded By, (t) = Taken By, (c) = Cosigned By      Initials Name Provider Type    JR Safia Mendez, OT Occupational Therapist                   Outcome Measures       Row Name 07/22/23 1110          How much help from another is currently needed...    Putting on and taking off regular lower body clothing? 1  -JR     Bathing (including washing, rinsing, and drying) 2  -JR     Toileting (which includes using toilet bed pan or urinal) 2  -JR     Putting on and taking off regular upper body clothing 2  -JR     Taking care of personal grooming (such as brushing teeth) 3  -JR     Eating meals 4  -JR     AM-PAC 6 Clicks Score (OT) 14  -JR       Row Name 07/22/23 1057          How much help from another person do you currently need...    Turning from your back to your side while in flat bed without using bedrails? 4  -SD     Moving from lying on back to sitting on the side of a flat bed without bedrails? 4  -SD     Moving to and from a bed to a chair (including a wheelchair)? 3  -SD     Standing up from a chair using your arms (e.g., wheelchair, bedside chair)? 3  -SD     Climbing 3-5 steps with a railing? 1  -SD     To walk in hospital room? 2  -SD     AM-PAC 6 Clicks Score (PT) 17  -SD     Highest level of mobility 5 --> Static standing  -SD       Row Name 07/22/23 1110 07/22/23 1057       Functional Assessment    Outcome Measure Options AM-PAC 6 Clicks Daily Activity (OT)  -JR AM-PAC 6 Clicks Basic Mobility (PT)   -SD              User Key  (r) = Recorded By, (t) = Taken By, (c) = Cosigned By      Initials Name Provider Type    SD Mica Guadarrama, PT Physical Therapist    Safia Rust, OT Occupational Therapist                    Occupational Therapy Education       Title: PT OT SLP Therapies (In Progress)       Topic: Occupational Therapy (In Progress)       Point: ADL training (In Progress)       Description:   Instruct learner(s) on proper safety adaptation and remediation techniques during self care or transfers.   Instruct in proper use of assistive devices.                  Learning Progress Summary             Patient Acceptance, E, NR by  at 7/22/2023 1000    Comment: Educated pt regarding role of therapy and ongoing treatment plan                         Point: Home exercise program (In Progress)       Description:   Instruct learner(s) on appropriate technique for monitoring, assisting and/or progressing therapeutic exercises/activities.                  Learning Progress Summary             Patient Acceptance, E, NR by  at 7/22/2023 1000    Comment: Educated pt regarding role of therapy and ongoing treatment plan                         Point: Precautions (Not Started)       Description:   Instruct learner(s) on prescribed precautions during self-care and functional transfers.                  Learner Progress:  Not documented in this visit.              Point: Body mechanics (Not Started)       Description:   Instruct learner(s) on proper positioning and spine alignment during self-care, functional mobility activities and/or exercises.                  Learner Progress:  Not documented in this visit.                              User Key       Initials Effective Dates Name Provider Type Mando     02/03/23 -  Safia Mendez, OT Occupational Therapist OT                  OT Recommendation and Plan  Planned Therapy Interventions (OT): activity tolerance training, adaptive equipment  training, BADL retraining, functional balance retraining, occupation/activity based interventions, ROM/therapeutic exercise, strengthening exercise, patient/caregiver education/training, transfer/mobility retraining  Therapy Frequency (OT): daily  Plan of Care Review  Plan of Care Reviewed With: patient  Outcome Evaluation: OT initial eval and expanded chart review completed. Pt presents with multiple comorbidities and decreased independence with ADL's and mobility from baseline. Recommend contnued skilled OT services and transfer to IRF at d/c to ensure safe transition to home.     Time Calculation:   Evaluation Complexity (OT)  Review Occupational Profile/Medical/Therapy History Complexity: expanded/moderate complexity  Assessment, Occupational Performance/Identification of Deficit Complexity: 3-5 performance deficits  Clinical Decision Making Complexity (OT): detailed assessment/moderate complexity  Overall Complexity of Evaluation (OT): moderate complexity     Time Calculation- OT       Row Name 07/22/23 1112             Time Calculation- OT    OT Start Time 1000  -JR      OT Received On 07/22/23  -JR      OT Goal Re-Cert Due Date 08/01/23  -JR         Timed Charges    60719 - OT Self Care/Mgmt Minutes 8  -JR         Untimed Charges    OT Eval/Re-eval Minutes 48  -JR         Total Minutes    Timed Charges Total Minutes 8  -JR      Untimed Charges Total Minutes 48  -JR       Total Minutes 56  -JR                User Key  (r) = Recorded By, (t) = Taken By, (c) = Cosigned By      Initials Name Provider Type    JR Safia Mendez OT Occupational Therapist                  Therapy Charges for Today       Code Description Service Date Service Provider Modifiers Qty    [id number]  OT SELF CARE/MGMT/TRAIN EA 15 MIN 7/22/2023 Safia Mendez OT GO 1    [id number]  OT EVAL MOD COMPLEXITY 4 7/22/2023 Safia Mendez OT GO 1                 Safia Mendez, OT  7/22/2023

## 2023-07-22 NOTE — PLAN OF CARE
Goal Outcome Evaluation:  Plan of Care Reviewed With: patient           Outcome Evaluation: Pt. AOx4, over 95% on RA. Heart rate varied this morning from 90s -130s rapidly, even after 25mg Metoprolol, EKG and medications administered per orders. Heart rate lowered throughout the afternoon under 100s. Pt. has limited mobility, fall and safety precautions maintianed. Sepsis from foot wound managed by antibiotics. PICC line started per order for home antibiotics, after D/C. Pt denies pain, SOA or nausea. Continue monitoring.

## 2023-07-22 NOTE — CASE MANAGEMENT/SOCIAL WORK
Continued Stay Note   [place]     Patient Name: Carlos Culver  MRN: [id number]  Today's Date: 7/22/2023    Admit Date: 7/19/2023    Plan: IRF   Discharge Plan       Row Name 07/22/23 1453       Plan    Plan IRF    Patient/Family in Agreement with Plan yes    Plan Comments Spoke with patient at bedside. Therapy recommends IRF. Explained to patient the probable need for IRF. Patient agreeable with plan. Patient requesting [facility], but open to other options if needed. Will refer to [facility] on Monday. CM will continue to follow.    Final Discharge Disposition Code 62 - inpatient rehab facility                   Discharge Codes    No documentation.                 Expected Discharge Date and Time       Expected Discharge Date Expected Discharge Time    Jul 24, 2023               Janet Callejas RN

## 2023-07-22 NOTE — DISCHARGE INSTRUCTIONS
Mao Mckay MD  Orthopedic Foot & Ankle Surgeon  [facility]    Diagnosis: Sepsis  Procedure Performed: Procedure(s) (LRB):  LEFT FOOT INCISION AND DRAINAGE; REVISION AMPUTATION; ACHILLES LENGTHENING (Left)    What to Expect After Surgery  Diet after surgery:  Resume your diet gradually.  Begin with clear liquids and gradually progress as you feel ready.  Surgical Site Care:  Please remain in your post-operative dressing/splint until your first post op appointment with Dr. Mckay.  Please make sure to keep your post-operative dressing clean and dry.  Please use a shower bag (e.g., [email or web address]) when showering or bathing to keep things dry. Wet padding can cause skin breakdown.  Do not stick anything down your cast or splint.  If you sustain a scratch, you are at higher risk for infection as casts and splints harbor more bacteria.  Follow Up Appointment: Please call the [facility] at [phone] or Dr. Mckay's schedulers within two (2) days of your discharge to /confirm/verify your follow-up appointment date/time with Dr. Mckay.  Typically, Dr. Mckay will want to see you 14-21 days after surgery for a post-operative follow-up appointment - before 14 days, the sutures may have to stay in and you will need to return in the 14-21 day window again.  Please arrive ~15 minutes prior to your appointment time to take care of any paperwork.      Activity Precautions:  Elevate the leg above your heart as much as possible for the first two weeks after surgery.  If the leg is higher than your heart, gravity helps decrease swelling, decrease pain, and improve blood flow.  If the leg is below your heart, swelling increases, pain can worsen, and your wound is at greater risk of infection.  Keep all weight off of your surgical leg until cleared by your physician.  Use ice packs intermittently (20 minutes on, 20 minutes off) to reduce pain and swelling, however, use  extreme caution with icing if your block is still in effect.  Make sure to have a barrier between your skin and the ice pack to avoid frost bite.   If you are in a post-operative splint, you can wiggle your toes to help push swelling to your lymph ducts, but do not try to move your ankle.      Pain Management  Nerve Blocks:  to help control pain after surgery, you may have received a nerve block where the anesthesiologist injected numbing medication around the nerves that send pain signals from your foot or ankle to your brain.  This helps you feel little to no pain.   The time a nerve block lasts varies from person to person.  Often, they will last between 12 and 24 hours but could last days.  You may not be able to move your foot and/or ankle during this time.  As the block medication wears off, you may feel a tingling sensation as the nerves start to wake up.  Keep your foot and ankle safe while it is numb.  Avoid excessive heating,  scratching, etc. until the block has completely worn off.  If icing the ankle or foot, watch the toes closely to ensure that they do not become discolored (i.e., white, red or blue)  Even if you still feel no pain in your leg before you go to bed, take a dose of your narcotic medication before you go to sleep.  You do not want to wake up with the block having worn off and being behind on pain control - it is quite difficult to 'catch up' again.  Pain Medication:    Acetaminophen (Tylenol) 500 mg tablets are typically your baseline pain medication.  Take this tablet up to once every 4 hours. Do not exceed 3,000 mg of acetaminophen daily.  You have been provided Oxycodone immediate release tablets as your initial narcotic pain reliever. Take doses of this medication if you are having severe breakthrough pain uncontrolled by the Tylenol alone. Typically, patients are taking it every 4 hours for the first day or two after surgery.  Actively wean down your use of this medication after the  third day after surgery.  Note that it takes about 30-45 minutes for oral pain medication to take effect.  DO NOT drink alcoholic beverages, use recreational drugs, or use prescription sedative medications when taking pain medication.  DO NOT operate heavy machinery/drive while taking opioids.  To avoid the risk of nausea, please make sure that you are taking your medication with food.  You may experience light headedness while taking your pain medication.  Make sure you drink plenty of water while taking narcotic pain medication to stay well hydrated and help avoid constipation.    You have been provided a Docusate prescription to help with constipation that can be a side effect of taking narcotics.  Take that medication as needed.  You have been provided a Zofran prescription to help with nausea that you can take as needed.  Itching is a common side effect to pain medication usage.  If you have an associated rash with the itching, please contact your provider.       When to Call Your Physician  Common or Normal Post-Operative Reactions:  Low grade fever (approximately 100.5 degrees) for up to one week.  Small amount of blood or fluid leaking from the surgical site or dressing  Bruising along the surgical extremity  Swelling around the surgical site and surrounding area  The reactions listed above are NORMAL, but you want to call your surgeon if any of these reactions persist.  Symptoms to Report:  Please report any of the following signs to your surgeon:  Signs of infection:  Redness or pain around your incision (if you cannot see your incision, red streaking up your extremity should be reported).  Thick, dark yellow or foul-smelling drainage at the incision site or from your dressing.  Temperature measured over 101.5 degrees for more than 24 hours despite Tylenol.  Signs of Decreased Circulation to the Ankle and Foot:  Coldness of ankle and foot  Foot or leg turning pale  Tingling/numbness (after the block has  fully resolved)  Sustained increases in pain uncontrolled by medication  Toenail bed turns blue in color  Blood Clots:  Although rare, please be aware and utilize the recommendations below to avoid getting a blood clot.  Prevention of deep vein thrombus DVT (blood clots):  Resume home dose of Eliquis to help prevent blood clot formation.  Get up 4-5 times during the daytime and walk/crutch/walker across the room to keep the blood circulating in your legs. (Maintain any weightbearing restrictions, of course)  Wiggle your toes frequently if you are in a splint or case  Notify your physician if you are a nicotine user, on hormone replacement therapy medications, are taking birth control, or have a history of blood clots as these can increase your risk of developing a blood clot.  Notify your provider if you develop pain or tenderness in your calf muscle    If you have any additional questions, please contact Dr. Mckay's staff the [facility] at [phone]    IF YOU HAVE AN EMERGENCY, i.e., SHORTNESS OF BREATH, CHEST PAIN, OR SYMPTOMS SEVERE IN NATURE, PLEASE CALL 911 OR VISIT YOUR LOCAL EMERGENCY ROOM

## 2023-07-22 NOTE — PROGRESS NOTES
[facility]    INFECTIOUS DISEASE PROGRESS NOTE    Carlos Culver  1945  [id number]    Consult: 7/19/23    Admit date: 7/19/2023    Requesting Provider: No ref. provider found  Evaluating physician: Jeffery Young MD  Reason for Consultation: MSSA sepsis with bacteremia with 4 out of 4 blood cultures positive by PCR from 7/18/2023, left foot osteomyelitis, Abscess  Chief Complaint: Above      Subjective   History of present illness:  Patient is a  77 y.o.  Yr old male with a history of peripheral neuropathy, hard of hearing, hyperlipidemia, essential hypertension, previous left transmetatarsal amputation for osteomyelitis, who developed a left plantar ulcer in April 2023 after working out on an elliptical exercise machine.  His ulceration has persisted off and on and he was treated briefly with oral antibiotics by Dr. Rajat SWANSON.P.MEunice at [facility].  Patient still had persistence of his left plantar wound.  Also complained of some unmeasured fevers and fatigue over the past few weeks.  He was evaluated in clinic and had blood cultures obtained and laboratories.  He had no other localizing signs or symptoms of infection.  Blood cultures drawn on 7/18 were positive by PCR for methicillin sensitive Staph aureus in 4 out of 4 bottles.  White blood cell count was 17.  Patient was directed to be admitted to [facility] on 7/19/2023.  I was consulted on 7/19/2023 for further evaluation and treatment.    7/20/2023 history reviewed.  Awaiting orthopedic evaluation of left foot.  No high fever.  Tolerating cefazolin for MSSA sepsis.    7/21/2023 history reviewed.  Awaits I&D of his left foot today.  No high fever.  On cefazolin for MSSA sepsis and likely left foot abscess and osteomyelitis.  Duration to be determined.    7/22/2023 history reviewed.  Status post partial amputation left foot with debridement for abscess and osteomyelitis on 7/21.  On  cefazolin for MSSA sepsis and osteomyelitis to continue until 2023, then likely oral antibiotics for an additional 3 months.  No high fever.  No pain with his peripheral neuropathy.    Past Medical History:   Diagnosis Date    A-fib     Acute kidney failure     Elevated cholesterol     Hard of hearing     Hyperlipidemia     Hypertension     Renal disorder     Wears hearing aid in both ears     Wears reading eyeglasses        Past Surgical History:   Procedure Laterality Date    AMPUTATION DIGIT Left 2020    Procedure: AMPUTATION LEFT 2ND TOE;  Surgeon: Idania Osborn MD;  Location:  [facility] OR;  Service: Orthopedics    AMPUTATION DIGIT Left 2020    Procedure: TRANSMETATARSAL AMPUTATION LEFT;  Surgeon: Idania Osborn MD;  Location:  [facility] OR;  Service: Orthopedics;  Laterality: Left;    APPENDECTOMY      COLONOSCOPY  2006    FOOT SURGERY Left 2020    (L) transmetatarsal amputation 2020 - Dr. Idania Osborn    INCISION AND DRAINAGE LEG Left 10/23/2018    Procedure: INCISION AND DRAINAGE LEFT FOOT;  Surgeon: Idania Osborn MD;  Location:  [facility] OR;  Service: Orthopedics    TOE AMPUTATION      TONSILLECTOMY      VENOUS ACCESS DEVICE (PORT) INSERTION N/A 10/23/2018    Procedure: GROSHONG CATHETER PLACEMENT;  Surgeon: Marquez Simons MD;  Location:  [facility] OR;  Service: General    VENOUS ACCESS DEVICE (PORT) INSERTION N/A 2020    Procedure: GROSHONG INSERTION;  Surgeon: Antonio Bains MD;  Location:  [facility] OR;  Service: General;  Laterality: N/A;    VENOUS ACCESS DEVICE (PORT) REMOVAL         Pediatric History   Patient Parents    Not on file     Other Topics Concern    Not on file   Social History Narrative    Not on file   , 3 children, no pets, 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  Father  of cancer, mother  of old age and cardiac disease, 1 brother, mother also had hypothyroidism  Allergies   Allergen Reactions    Sulfa Antibiotics  Unknown (See Comments)     Pt was told as child he had an allergy.  Has not taken and doesn't know the response       Immunization History   Administered Date(s) Administered    COVID-19 (PFIZER) BIVALENT 12+YRS 10/19/2022    COVID-19 (PFIZER) Purple Cap Monovalent 01/29/2021, 02/25/2021, 10/02/2021    Covid-19 (Pfizer) Gray Cap Monovalent 04/14/2022       Medication:    Current Facility-Administered Medications:     acetaminophen (TYLENOL) tablet 650 mg, 650 mg, Oral, Q4H PRN **OR** acetaminophen (TYLENOL) suppository 650 mg, 650 mg, Rectal, Q4H PRN, Mao Mckay MD    atorvastatin (LIPITOR) tablet 80 mg, 80 mg, Oral, Nightly, Mao Mckay MD, 80 mg at 07/21/23 2112    sennosides-docusate (PERICOLACE) 8.6-50 MG per tablet 2 tablet, 2 tablet, Oral, BID **AND** polyethylene glycol (MIRALAX) packet 17 g, 17 g, Oral, Daily PRN **AND** bisacodyl (DULCOLAX) EC tablet 5 mg, 5 mg, Oral, Daily PRN **AND** bisacodyl (DULCOLAX) suppository 10 mg, 10 mg, Rectal, Daily PRN, Mao Mckay MD    bisacodyl (DULCOLAX) suppository 10 mg, 10 mg, Rectal, Daily PRN, Mao Mckay MD    Calcium Replacement - Follow Nurse / BPA Driven Protocol, , Does not apply, PRN, Mao Mckay MD    ceFAZolin in dextrose (ANCEF) IVPB solution 2,000 mg, 2,000 mg, Intravenous, Q8H, Mao Mckay MD, 2,000 mg at 07/22/23 1145    Enoxaparin Sodium (LOVENOX) syringe 40 mg, 40 mg, Subcutaneous, Q24H, Mao Mckay MD, 40 mg at 07/20/23 1319    HYDROmorphone (DILAUDID) injection 0.5 mg, 0.5 mg, Intravenous, Q2H PRN **AND** naloxone (NARCAN) injection 0.1 mg, 0.1 mg, Intravenous, Q5 Min PRNNely Jeremy, MD    Magnesium Standard Dose Replacement - Follow Nurse / BPA Driven Protocol, , Does not apply, Nely JIMENEZ Jeremy, MD    melatonin tablet 5 mg, 5 mg, Oral, Nightly PRN, Mao Mckay MD, 5 mg at 07/21/23 2112    metoprolol tartrate (LOPRESSOR) tablet 50 mg, 50 mg, Oral, BID, Tianna Gallagher MD    morphine injection 2 mg, 2 mg, Intravenous, Q2H  PRN **AND** naloxone (NARCAN) injection 0.4 mg, 0.4 mg, Intravenous, Q5 Min Nely JIMENEZ Jeremy, MD    ondansetron (ZOFRAN) tablet 4 mg, 4 mg, Oral, Q6H PRN **OR** ondansetron (ZOFRAN) injection 4 mg, 4 mg, Intravenous, Q6H PRNely PADILLA Jeremy, MD    oxyCODONE (ROXICODONE) immediate release tablet 5 mg, 5 mg, Oral, Q4H PRNNely Jeremy, MD    Phosphorus Replacement - Follow Nurse / BPA Driven Protocol, , Does not apply, Nely JIMENEZ Jeremy, MD    Potassium Replacement - Follow Nurse / BPA Driven Protocol, , Does not apply, Nely JIMENEZ Jeremy, MD    [COMPLETED] Insert Peripheral IV, , , Once **AND** sodium chloride 0.9 % flush 10 mL, 10 mL, Intravenous, PRNNely Jeremy, MD    sodium chloride 0.9 % flush 10 mL, 10 mL, Intravenous, Q12H, Mao Mckay MD, 10 mL at 07/22/23 1019    sodium chloride 0.9 % flush 10 mL, 10 mL, Intravenous, PRNNely Jeremy, MD    sodium chloride 0.9 % flush 10 mL, 10 mL, Intravenous, Q12H, Mao Mckay MD, 10 mL at 07/22/23 1018    sodium chloride 0.9 % flush 10 mL, 10 mL, Intravenous, PRNNely Jeremy, MD    sodium chloride 0.9 % infusion 40 mL, 40 mL, Intravenous, PRNNely Jeremy, MD    sodium chloride 0.9 % infusion 40 mL, 40 mL, Intravenous, PRNNely Jeremy, MD    sodium chloride 0.9 % infusion, 100 mL/hr, Intravenous, Continuous, Mao Mckay MD, Last Rate: 100 mL/hr at 07/21/23 2114, 100 mL/hr at 07/21/23 2114    sodium chloride 0.9 % infusion, 75 mL/hr, Intravenous, Continuous, Tianna Gallagher MD, Last Rate: 75 mL/hr at 07/22/23 1152, 75 mL/hr at 07/22/23 1152    Please refer to the medical record for a full medication list    Review of Systems:    Constitutional--some low-grade fever, no chills or sweats.  Appetite good, and no malaise.  Some fatigue.  HEENT-- No new vision, hearing or throat complaints.  No epistaxis or oral sores.  Denies odynophagia or dysphagia.  No odynophagia or dysphagia. No headache, photophobia or neck stiffness.  CV-- No chest pain,  "palpitation or syncope  Resp-- No SOB/cough/Hemoptysis  GI- No nausea, vomiting, or diarrhea.  No hematochezia, melena, or hematemesis. Denies jaundice or chronic liver disease.  -- No dysuria, hematuria, or flank pain.  Denies hesitancy, urgency or flank pain.  Lymph- no swollen lymph nodes in neck/axilla or groin.   Heme- No active bruising or bleeding; no Hx of DVT or PE.  MS-- no swelling or pain in the bones or joints of arms/legs.  No new back pain.  Neuro-- No acute focal weakness or numbness in the arms or legs.  No seizures.  Skin--No rashes or lesions, except left foot    Physical Exam:   Vital Signs   Temp:  [97.2 °F (36.2 °C)-98.1 °F (36.7 °C)] 97.6 °F (36.4 °C)  Heart Rate:  [] 102  Resp:  [14-18] 18  BP: ()/(56-96) 112/61    Temp  Min: 97.2 °F (36.2 °C)  Max: 98.1 °F (36.7 °C)  BP  Min: 85/57  Max: 128/75  Pulse  Min: 80  Max: 146  Resp  Min: 14  Max: 18  SpO2  Min: 81 %  Max: 100 %    Blood pressure 112/61, pulse 102, temperature 97.6 °F (36.4 °C), temperature source Oral, resp. rate 18, height 181.6 cm (71.5\"), weight 99.8 kg (220 lb), SpO2 91 %.  GENERAL: Awake and alert, in moderate distress. Appears older than stated age.  Resting in bed.  HEENT:  Normocephalic, atraumatic.  Oropharynx without thrush. Dentition in good repair. No cervical adenopathy. No neck masses.  Ears externally normal, Nose externally normal.  EYES:  No conjunctival injection. No icterus. EOM full.  LYMPHATICS: No lymphadenopathy of the neck or axillary or inguinal regions.   HEART: No murmur, gallop, or pericardial friction rub. Reg rate rhythm, No JVD at 45 degrees.  LUNGS: Clear to auscultation and percussion. No respiratory distress, no use of accessory muscles.  ABDOMEN: Soft, nontender, nondistended. No appreciable HSM.  Bowel sounds normal.  Slightly obese.  No mass.  SKIN: Warm and dry without cutaneous eruptions.  No nodules.  Left foot with s surgical dressing in place.  PSYCHIATRIC: Mental status " lucid. No confusion.  EXT:  No cellulitic change.  NEURO: Oriented to name, nonfocal.      Results Review:   I reviewed the patient's new clinical results.  I reviewed the patient's new imaging results and agree with the interpretation.  I reviewed the patient's other test results and agree with the interpretation    Results from last 7 days   Lab Units 07/22/23  0517 07/21/23  0443 07/20/23  0351   WBC 10*3/mm3 12.85* 13.48* 18.24*   HEMOGLOBIN g/dL 8.6* 10.0* 10.3*   HEMATOCRIT % 25.8* 30.9* 31.5*   PLATELETS 10*3/mm3 232 267 283       Results from last 7 days   Lab Units 07/22/23  0517   SODIUM mmol/L 135*   POTASSIUM mmol/L 4.3   CHLORIDE mmol/L 104   CO2 mmol/L 21.0*   BUN mg/dL 17   CREATININE mg/dL 0.84   GLUCOSE mg/dL 185*   CALCIUM mg/dL 8.2*       Results from last 7 days   Lab Units 07/22/23  0517   ALK PHOS U/L 158*   BILIRUBIN mg/dL 0.8   ALT (SGPT) U/L 11   AST (SGOT) U/L 62*       Results from last 7 days   Lab Units 07/18/23  1205   SED RATE mm/hr 118*       Results from last 7 days   Lab Units 07/18/23  1205   CRP mg/dL 27.88*           Results from last 7 days   Lab Units 07/22/23  0517   LACTATE mmol/L 1.5       Estimated Creatinine Clearance: 88 mL/min (by C-G formula based on SCr of 0.84 mg/dL).  CPK          7/22/2023    05:17   Common Labsle   Creatine Kinase 77       Procalitonin Results:      Lab 07/21/23  0443 07/20/23  0351 07/19/23  1034   PROCALCITONIN 0.54* 0.73* 0.75*        Brief Urine Lab Results  (Last result in the past 365 days)        Color   Clarity   Blood   Leuk Est   Nitrite   Protein   CREAT   Urine HCG        07/19/23 0955 Orange   Clear   Negative   Negative   Negative   Trace                  No results found for: SITE, ALLENTEST, PHART, WKK6GBM, PO2ART, ZNP3FPX, BASEEXCESS, H7VUVNCV, HGBBG, HCTABG, OXYHEMOGLOBI, METHHGBN, CARBOXYHGB, CO2CT, BAROMETRIC, MODALITY, FIO2     Microbiology:  No results found for: ACANTHNAEG, AFBCX, BPERTUSSISCX, BLOODCX  No results found for:  BCIDPCR, CXREFLEX, CSFCX, CULTURETIS  No results found for: CULTURES, HSVCX, URCX  No results found for: EYECULTURE, GCCX, HSVCULTURE, LABHSV  No results found for: LEGIONELLA, MRSACX, MUMPSCX, MYCOPLASCX  No results found for: NOCARDIACX, STOOLCX  Urine Culture   Date Value Ref Range Status   07/18/2023 No growth  Preliminary     Wound Culture   Date Value Ref Range Status   07/18/2023 Culture in progress  Preliminary        Urine Culture   Date Value Ref Range Status   07/18/2023 No growth  Preliminary     Wound Culture   Date Value Ref Range Status   07/18/2023 Culture in progress  Preliminary   (    Blood cultures 7/18/2023 reported positive in 4 out of 4 bottles by PCR for MSSA    Radiology:  Imaging Results (Last 72 Hours)       Procedure Component Value Units Date/Time    CT Abdomen Pelvis Without Contrast [[id number]] Collected: 07/19/23 1858     Updated: 07/19/23 1914    Narrative:      CT ABDOMEN PELVIS WO CONTRAST    Date of Exam: 7/19/2023 6:38 PM EDT    Indication: Sepsis.    Comparison: No previous CT scans.    Technique: Axial CT images were obtained of the abdomen and pelvis without the administration of contrast. Reconstructed coronal and sagittal images were also obtained. Automated exposure control and iterative construction methods were used.      Findings:  The included lower lungs appear grossly clear. There is mild diffuse fatty liver change. No significant abnormalities are noted of the spleen, gallbladder, pancreas, adrenal glands, or kidneys for non-IV contrast enhanced exam. There is a large, intact   appearing duodenal diverticulum of the second-third portion of the duodenum, 6 cm in diameter. There is no visible associated inflammation or extraluminal air. Upper abdominal bowel loops are normal in caliber and normal in appearance, although the colon   is seen to contain only fluid and contrast.    Regarding the lower abdomen pelvis, bladder is moderately distended, approximately 12 cm in  length and appears normal. There is no formed stool except at the level of the rectum, where there may be a mild degree of impaction. No perirectal inflammation   is seen. There are a few sigmoid diverticuli and somewhat more numerous descending colon diverticuli without evidence of diverticulitis. There is mild ectasia of the infrarenal abdominal aorta to 2.8 cm. No significant abnormalities are seen of the   terminal ileum or cecum. Appendix is not definitely identified. No intrapelvic inflammatory changes are seen.    Bony structures appear to be intact. There is an old L1 vertebral compression deformity, with practical fusion of L1 and T12. There is moderately advanced L5-S1 degenerative disc disease.          Impression:      Impression:    1. Colon contains mostly fluid. There may be a mild degree of rectal impaction, but no colonic inflammation or evidence of bowel obstruction is seen.    2. 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    3. Large, but intact-appearing duodenal diverticulum apparently incidental.    4. Moderately distended, otherwise normal-appearing bladder. Sigmoid diverticulosis without evidence of diverticulitis.      Electronically Signed: Derek Grove    7/19/2023 7:11 PM EDT    Workstation ID: [id number]    XR Foot 3+ View Left [[id number]] Collected: 07/19/23 1442     Updated: 07/19/23 1454    Narrative:      XR FOOT 3+ VW LEFT    Date of Exam: 7/19/2023 2:09 PM EDT    Indication: om    Comparison: MRI performed the same day. Radiographs January 6, 2020    Findings:  Status post transmetatarsal amputation at the proximal metatarsals. There is diffuse soft tissue prominence, particularly at the remaining distal foot. There is a suspected wound at the lateral-distal foot. There is heterogeneous osseous lucency/erosive   change involving the base of the fourth metatarsal and distal cuboid. No significant visualized proximal fifth metatarsal is seen on this  exam. There is also osseous lucency at the lateral aspect of the third metatarsal base, lateral navicular, and   lateral cuneiform. These findings correlate with suspected osteomyelitis seen on prior MRI. Oblique linear lucency at the lateral base of the third metatarsal suggests a pathologic nondisplaced fracture. No other definite acute fracture is seen. No other   definite acute osseous abnormality. There appear to be moderate degenerative changes and heterogeneous sclerosis of the proximal metatarsals and tarsal bones. There is calcific atherosclerosis of the dorsalis pedis.      Impression:      Impression:  1.Status post transmetatarsal amputation.  2.Abnormal appearance of the proximal third and fourth metatarsals, distal cuboid, lateral cuneiform, and lateral navicular suspicious form osteomyelitis as seen on prior MRI.  3.Diffuse forefoot soft tissue prominence, with suspected wound at the lateral distal foot.      Electronically Signed: Antonio Ronquillo    7/19/2023 2:51 PM EDT    Workstation ID: [id number]    MRI Foot Left Without Contrast [[id number]] Collected: 07/19/23 1326     Updated: 07/19/23 1344    Narrative:        MRI FOOT LEFT WO CONTRAST    Date of Exam: 7/19/2023 1:07 PM EDT    Indication: Foot swelling, nondiabetic, osteomyelitis suspected.     Comparison: Left foot MRI 11/16/2020, left foot radiographs 1/6/2020    Technique:  Routine multiplanar/multisequence sequence images of the left foot were obtained without contrast administration.      Findings:  From most recent available imaging of the left foot there has been interval surgical change of transmetatarsal amputation through the proximal metatarsal bases.    There is a 2.1 x 1.5 x 2.6 cm (AP X TV X CC) T1 intermediate, T2/STIR hyperintense collection in the soft tissues lateral to the distal cuboid (series 12 image 17, series 8 image 19), concerning for phlegmon or abscess. There is abnormal T1 hypointense,   T2/STIR hyperintense signal  change of the cuboid, third cuneiform, fourth metatarsal base, and fifth metatarsal base compatible with osteomyelitis. There is some T2/STIR hyperintense marrow edema of the distal cuboid and lateral aspect of the navicular,   without convincing loss of the T1 hyperintense marrow signal, likely reflecting some reactive marrow edema although contiguous osteomyelitis is difficult to exclude. There is diffuse subcutaneous edema throughout the dorsal foot and ankle which may   reflect contiguous cellulitis. No ankle joint effusion.        Impression:      Impression:  2.1 x 1.5 x 2.6 cm T2/STIR hyperintense structure in the soft tissues abutting the lateral aspect of the distal cuboid, concerning for phlegmon or abscess, incompletely characterized in the absence of IV contrast.    There are marrow signal changes of the cuboid, third cuneiform, and fourth and fifth metatarsal bases which are highly suspicious for osteomyelitis. Correlate for any open wound in this area. Correlate with ESR and CRP. Marrow signal changes relating to   Charcot arthropathy cannot be entirely excluded but are considered somewhat less likely in the setting of adjacent suspected phlegmon/abscess. There is some marrow edema of the nearby distal cuboid and lateral navicular bone which may reflect reactive   marrow edema although contiguous osteomyelitis cannot be entirely excluded.        Electronically Signed: Juan Remy    7/19/2023 1:41 PM EDT    Workstation ID: [id number]            IMPRESSION:     1.  MSSA (sensitive to tetracycline and sulfa) high-grade sepsis with bacteremia in 4 out of 4 bottles drawn from 7/18/2023.  Most likely source is left foot osteomyelitis and abscess (MRI 7/19/23) although unusual to have such high-grade bacteremia.  Could have seeded an intravascular source including endocarditis.  No chronic indwelling prosthetic device.  Unlikely to be contaminant.  Repeat blood culture 7/19 positive in 1 out of 2 sets, for  MSSA, blood cultures 7/20 negative.  TTE 7-19 was negative for endocarditis.  EF 60%.  2.  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  High risk for underlying osteomyelitis.  Likely a function of his peripheral neuropathy and some noncompliance with nonweightbearing status.  , CRP 22.  MRI consistent with abscess and osteomyelitis left foot.  3.  Leukocytosis, neutrophilic related to above issues.  In removed.  4.  Hyponatremia.  132, worse.  5.  Elevated LFTs with alkaline phosphatase 162, worse, bilirubin 1.6, and AST 84, worse.  May be related to sepsis versus other.  Alcohol-related disease?  CT of the abdomen and pelvis consistent with that liver.  Biliary track okay.  6.  Severe peripheral neuropathy, impacting all aspects of his care.  7.  Mild pyuria, 6-12 wbc on 7/18, asymptomatic.  May be related to above.  Urine culture negative to date.  8.  Anemia, chronic disease, worse, and acute postop blood loss.    Plan:    1.  Diagnostically, continue to follow patient's physical exam, CBC, CMP, CRP, CPK, repeat blood cultures x2.    2.  Therapeutically, continue cefazolin 2 g IV every 8 hours until 9/1/2023 for 6 weeks, then oral for 3 months.  Discussed previously with Reynaldo in the microbiology lab.  3.  Supportive care.  4.  Ortho Dr. Mao Mckay I&D status post partial amputation on 7/21 left foot.    Case management orders: Please arrange for home antibiotics with cefazolin 2 g IV every 8 hours until 9/1/2023 for left foot osteomyelitis with MSSA.  Check CBC, CMP, CRP weekly while on IV antibiotics.  Fax orders to [phone], call [phone] with final arrangements.  Arrange for follow-up with me in 1 week postdischarge.  Weekly PICC dressing changes.  Probiotics recommended.    I discussed the patient's findings and my recommendations with patient and nursing staff    Thank you for asking me to see Carlos Culver.  Our group  would be pleased to follow this patient over the course of their hospitalization and assist with outpatient antimicrobial therapy, as indicated.  Further recommendations depend on the results of the cultures and clinical course.  Side effects discussed.  Increased risk for adverse drug reactions, complications of IV access, readmission.  Increased risk for surgery.  See next on Monday, call sooner if needed.  Discussed with Dr. Mckay.    Jeffery Young MD  7/22/2023

## 2023-07-23 LAB
ANION GAP SERPL CALCULATED.3IONS-SCNC: 12 MMOL/L (ref 5–15)
BACTERIA SPEC AEROBE CULT: ABNORMAL
BACTERIA SPEC AEROBE CULT: ABNORMAL
BASOPHILS # BLD AUTO: 0.02 10*3/MM3 (ref 0–0.2)
BASOPHILS NFR BLD AUTO: 0.1 % (ref 0–1.5)
BUN SERPL-MCNC: 21 MG/DL (ref 8–23)
BUN/CREAT SERPL: 22.8 (ref 7–25)
CALCIUM SPEC-SCNC: 7.6 MG/DL (ref 8.6–10.5)
CHLORIDE SERPL-SCNC: 105 MMOL/L (ref 98–107)
CO2 SERPL-SCNC: 20 MMOL/L (ref 22–29)
CREAT SERPL-MCNC: 0.92 MG/DL (ref 0.76–1.27)
DEPRECATED RDW RBC AUTO: 46.4 FL (ref 37–54)
EGFRCR SERPLBLD CKD-EPI 2021: 85.1 ML/MIN/1.73
EOSINOPHIL # BLD AUTO: 0.01 10*3/MM3 (ref 0–0.4)
EOSINOPHIL NFR BLD AUTO: 0.1 % (ref 0.3–6.2)
ERYTHROCYTE [DISTWIDTH] IN BLOOD BY AUTOMATED COUNT: 13.2 % (ref 12.3–15.4)
GLUCOSE SERPL-MCNC: 144 MG/DL (ref 65–99)
GRAM STN SPEC: ABNORMAL
HCT VFR BLD AUTO: 28.3 % (ref 37.5–51)
HGB BLD-MCNC: 9 G/DL (ref 13–17.7)
IMM GRANULOCYTES # BLD AUTO: 0.14 10*3/MM3 (ref 0–0.05)
IMM GRANULOCYTES NFR BLD AUTO: 0.9 % (ref 0–0.5)
LYMPHOCYTES # BLD AUTO: 1.59 10*3/MM3 (ref 0.7–3.1)
LYMPHOCYTES NFR BLD AUTO: 9.7 % (ref 19.6–45.3)
MCH RBC QN AUTO: 30.5 PG (ref 26.6–33)
MCHC RBC AUTO-ENTMCNC: 31.8 G/DL (ref 31.5–35.7)
MCV RBC AUTO: 95.9 FL (ref 79–97)
MONOCYTES # BLD AUTO: 0.94 10*3/MM3 (ref 0.1–0.9)
MONOCYTES NFR BLD AUTO: 5.7 % (ref 5–12)
NEUTROPHILS NFR BLD AUTO: 13.67 10*3/MM3 (ref 1.7–7)
NEUTROPHILS NFR BLD AUTO: 83.5 % (ref 42.7–76)
NRBC BLD AUTO-RTO: 0 /100 WBC (ref 0–0.2)
PLATELET # BLD AUTO: 264 10*3/MM3 (ref 140–450)
PMV BLD AUTO: 9.7 FL (ref 6–12)
POTASSIUM SERPL-SCNC: 4.3 MMOL/L (ref 3.5–5.2)
QT INTERVAL: 264 MS
QTC INTERVAL: 391 MS
RBC # BLD AUTO: 2.95 10*6/MM3 (ref 4.14–5.8)
SODIUM SERPL-SCNC: 137 MMOL/L (ref 136–145)
WBC NRBC COR # BLD: 16.37 10*3/MM3 (ref 3.4–10.8)

## 2023-07-23 PROCEDURE — 99024 POSTOP FOLLOW-UP VISIT: CPT | Performed by: ORTHOPAEDIC SURGERY

## 2023-07-23 PROCEDURE — 85025 COMPLETE CBC W/AUTO DIFF WBC: CPT | Performed by: STUDENT IN AN ORGANIZED HEALTH CARE EDUCATION/TRAINING PROGRAM

## 2023-07-23 PROCEDURE — 25010000002 ENOXAPARIN PER 10 MG: Performed by: ORTHOPAEDIC SURGERY

## 2023-07-23 PROCEDURE — 99232 SBSQ HOSP IP/OBS MODERATE 35: CPT | Performed by: STUDENT IN AN ORGANIZED HEALTH CARE EDUCATION/TRAINING PROGRAM

## 2023-07-23 PROCEDURE — 80048 BASIC METABOLIC PNL TOTAL CA: CPT | Performed by: STUDENT IN AN ORGANIZED HEALTH CARE EDUCATION/TRAINING PROGRAM

## 2023-07-23 PROCEDURE — 25010000002 CEFAZOLIN IN DEXTROSE 2-4 GM/100ML-% SOLUTION: Performed by: ORTHOPAEDIC SURGERY

## 2023-07-23 RX ADMIN — Medication 2000 MG: at 20:32

## 2023-07-23 RX ADMIN — METOPROLOL TARTRATE 50 MG: 50 TABLET ORAL at 20:32

## 2023-07-23 RX ADMIN — METOPROLOL TARTRATE 50 MG: 50 TABLET ORAL at 09:03

## 2023-07-23 RX ADMIN — ENOXAPARIN SODIUM 40 MG: 100 INJECTION SUBCUTANEOUS at 15:05

## 2023-07-23 RX ADMIN — Medication 2000 MG: at 12:26

## 2023-07-23 RX ADMIN — Medication 10 ML: at 20:33

## 2023-07-23 RX ADMIN — Medication 2000 MG: at 04:08

## 2023-07-23 RX ADMIN — SENNOSIDES AND DOCUSATE SODIUM 2 TABLET: 50; 8.6 TABLET ORAL at 20:32

## 2023-07-23 RX ADMIN — ATORVASTATIN CALCIUM 80 MG: 40 TABLET, FILM COATED ORAL at 20:31

## 2023-07-23 RX ADMIN — Medication 10 ML: at 20:32

## 2023-07-23 NOTE — PROGRESS NOTES
"          Orthopaedic Surgery Progress Note    LOS: 4 days   Patient Care Team:  Marcus Cheng MD as PCP - General  Marcus Cheng MD as PCP - Family Medicine  Jeffery Young MD as Consulting Physician (Infectious Diseases)  Hang Anderson MD as Consulting Physician (Cardiology)  2 Days Post-Op   Procedure(s):  LEFT FOOT INCISION AND DRAINAGE; REVISION AMPUTATION; ACHILLES LENGTHENING  Subjective   Interval History:   Resting comfortably in bed.  Denies pain.  Worked with physical therapy yesterday.  Plans on working with therapy again today.  May be headed to [facility] after discharge.    Objective     Vital Signs:  Temp (24hrs), Av °F (36.7 °C), Min:97.6 °F (36.4 °C), Max:98.2 °F (36.8 °C)    /86 (BP Location: Left arm, Patient Position: Lying)   Pulse 75   Temp 97.6 °F (36.4 °C) (Oral)   Resp 20   Ht 181.6 cm (71.5\")   Wt 99.8 kg (220 lb)   SpO2 94%   BMI 30.26 kg/m²   Body mass index is 30.26 kg/m².    Labs:  Lab Results (last 24 hours)       Procedure Component Value Units Date/Time    Basic Metabolic Panel [[id number]]  (Abnormal) Collected: 23    Specimen: Blood Updated: 23     Glucose 144 mg/dL      BUN 21 mg/dL      Creatinine 0.92 mg/dL      Sodium 137 mmol/L      Potassium 4.3 mmol/L      Chloride 105 mmol/L      CO2 20.0 mmol/L      Calcium 7.6 mg/dL      BUN/Creatinine Ratio 22.8     Anion Gap 12.0 mmol/L      eGFR 85.1 mL/min/1.73     Narrative:      GFR Normal >60  Chronic Kidney Disease <60  Kidney Failure <15    The GFR formula is only valid for adults with stable renal function between ages 18 and 70.    CBC & Differential [[id number]]  (Abnormal) Collected: 23    Specimen: Blood Updated: 23    Narrative:      The following orders were created for panel order CBC & Differential.  Procedure                               Abnormality         Status                     ---------                               -----------   "       ------                     CBC Auto Differential[[id number]]        Abnormal            Final result                 Please view results for these tests on the individual orders.    CBC Auto Differential [[id number]]  (Abnormal) Collected: 07/23/23 0402    Specimen: Blood Updated: 07/23/23 0502     WBC 16.37 10*3/mm3      RBC 2.95 10*6/mm3      Hemoglobin 9.0 g/dL      Hematocrit 28.3 %      MCV 95.9 fL      MCH 30.5 pg      MCHC 31.8 g/dL      RDW 13.2 %      RDW-SD 46.4 fl      MPV 9.7 fL      Platelets 264 10*3/mm3      Neutrophil % 83.5 %      Lymphocyte % 9.7 %      Monocyte % 5.7 %      Eosinophil % 0.1 %      Basophil % 0.1 %      Immature Grans % 0.9 %      Neutrophils, Absolute 13.67 10*3/mm3      Lymphocytes, Absolute 1.59 10*3/mm3      Monocytes, Absolute 0.94 10*3/mm3      Eosinophils, Absolute 0.01 10*3/mm3      Basophils, Absolute 0.02 10*3/mm3      Immature Grans, Absolute 0.14 10*3/mm3      nRBC 0.0 /100 WBC     Blood Culture - Blood, Hand, Right [[id number]]  (Normal) Collected: 07/20/23 1120    Specimen: Blood from Hand, Right Updated: 07/22/23 1245     Blood Culture No growth at 2 days    Blood Culture - Blood, Hand, Left [[id number]]  (Normal) Collected: 07/20/23 1110    Specimen: Blood from Hand, Left Updated: 07/22/23 1245     Blood Culture No growth at 2 days    Tissue / Bone Culture - Bone, Foot, Left [[id number]]  (Abnormal) Collected: 07/21/23 1350    Specimen: Bone from Foot, Left Updated: 07/22/23 1229     Tissue Culture Light growth (2+) Staphylococcus aureus     Gram Stain Few (2+) WBCs seen      No organisms seen    Wound Culture - Wound, Foot, Left [[id number]]  (Abnormal) Collected: 07/21/23 1341    Specimen: Wound from Foot, Left Updated: 07/22/23 1227     Wound Culture Light growth (2+) Staphylococcus aureus     Gram Stain Many (4+) WBCs seen      No organisms seen    AFB Culture - Wound, Foot, Left [[id number]] Collected: 07/21/23 1341    Specimen: Wound from Foot, Left Updated:  07/22/23 1130     AFB Stain No acid fast bacilli seen on concentrated smear    AFB Culture - Bone, Foot, Left [[id number]] Collected: 07/21/23 1350    Specimen: Bone from Foot, Left Updated: 07/22/23 1130     AFB Stain No acid fast bacilli seen on concentrated smear            Physical Exam:  left LE: splint CDI, compartments soft/compressible around splint, no calf tenderness, dressing removed for exam, incision clean dry and intact       Assessment/Plan:  2 Days Post-Op status post:  LEFT FOOT INCISION AND DRAINAGE; REVISION AMPUTATION; ACHILLES LENGTHENING  -NWB operative extremity  -Advance diet as tolerated  -Keep splint/operative dressing clean and dry  -DVT prophylaxis, mechanical and chemical, resume home dose of Eliquis on discharge  -Continue antibiotics per ID recommendations  -Pain control  -Staph aureus growing from both bone and tissue cultures  -Elevate operative extremity  -No plans for further orthopedic intervention during this hospital stay  -Rest of management per primary team     Mao Mckay MD  07/23/23  09:04 EDT

## 2023-07-23 NOTE — PROGRESS NOTES
Psychiatric Medicine Services  PROGRESS NOTE    Patient Name: Carlos Culver  : 1945  MRN: [id number]    Date of Admission: 2023  Primary Care Physician: Marcus Cheng MD    Subjective   Subjective     CC:f/u bacteremia    HPI:  No acute overnight events, pain controlled    ROS:  Gen- No fevers, chills  CV- No chest pain, palpitations  Resp- No cough, dyspnea  GI- No N/V/D, abd pain       Objective   Objective     Vital Signs:   Temp:  [97.6 °F (36.4 °C)-98.2 °F (36.8 °C)] 97.6 °F (36.4 °C)  Heart Rate:  [] 75  Resp:  [18-20] 20  BP: (106-123)/(61-96) 119/86     Physical Exam:  Constitutional: No acute distress, awake, alert  HENT: NCAT, mucous membranes moist  Respiratory: Clear to auscultation bilaterally, respiratory effort normal   Cardiovascular: irregular, rate more controlled today  Gastrointestinal: Positive bowel sounds, soft, nontender, nondistended  Musculoskeletal: trace LLE ankle edema. S/p foot amputation of L. L foot now wrapped  Psychiatric: Appropriate affect, cooperative  Neurologic: Oriented x 3, no focal deficits  Skin: no rashes on exposed skin      Results Reviewed:  LAB RESULTS:      Lab 23  0402 23  0517 23  0443 23  0351 23  1034 23  1205   WBC 16.37* 12.85* 13.48* 18.24* 15.95* 17.82*   HEMOGLOBIN 9.0* 8.6* 10.0* 10.3* 12.7* 13.0   HEMATOCRIT 28.3* 25.8* 30.9* 31.5* 39.2 41.5   PLATELETS 264 232 267 283 325 340   NEUTROS ABS 13.67* 11.48* 10.76* 14.73* 13.03* 14.66*   IMMATURE GRANS (ABS) 0.14* 0.10* 0.13* 0.15* 0.11* 0.11*   LYMPHS ABS 1.59 0.78 1.30 1.77 1.41 1.45   MONOS ABS 0.94* 0.48 1.18* 1.50* 1.28* 1.51*   EOS ABS 0.01 0.00 0.06 0.03 0.05 0.03   MCV 95.9 92.5 91.4 91.0 93.8 94.5   SED RATE  --   --   --   --   --  118*   CRP  --   --   --   --   --  27.88*   PROCALCITONIN  --   --  0.54* 0.73* 0.75*  --    LACTATE  --  1.5 1.1 1.4 3.4*  --          Lab 23  0402 23  0517  07/21/23  0443 07/20/23  0351 07/19/23  1034   SODIUM 137 135* 135* 133* 138   POTASSIUM 4.3 4.3 4.2 4.4 4.4   CHLORIDE 105 104 101 100 100   CO2 20.0* 21.0* 21.0* 20.0* 22.0   ANION GAP 12.0 10.0 13.0 13.0 16.0*   BUN 21 17 15 21 23   CREATININE 0.92 0.84 0.88 1.09 1.18   EGFR 85.1 89.3 88.0 69.5 63.2   GLUCOSE 144* 185* 123* 109* 138*   CALCIUM 7.6* 8.2* 8.3* 8.9 10.2   MAGNESIUM  --   --   --  1.6  --          Lab 07/22/23  0517 07/21/23 0443 07/20/23  0351 07/19/23  1034 07/18/23  1205   TOTAL PROTEIN 5.8* 5.1* 6.1 7.5 7.9   ALBUMIN 2.6* 2.5* 2.5* 2.8* 3.3*   GLOBULIN 3.2 2.6 3.6 4.7 4.6   ALT (SGPT) 11 15 15 24 33   AST (SGOT) 62* 84* 41* 33 45*   BILIRUBIN 0.8 1.6* 1.7* 1.5* 1.6*   ALK PHOS 158* 162* 149* 196* 236*   LIPASE  --   --  109* 110*  --                      Brief Urine Lab Results  (Last result in the past 365 days)        Color   Clarity   Blood   Leuk Est   Nitrite   Protein   CREAT   Urine HCG        07/19/23 0955 Orange   Clear   Negative   Negative   Negative   Trace                   Microbiology Results Abnormal       Procedure Component Value - Date/Time    Blood Culture - Blood, Hand, Right [[id number]]  (Normal) Collected: 07/20/23 1120    Lab Status: Preliminary result Specimen: Blood from Hand, Right Updated: 07/22/23 1245     Blood Culture No growth at 2 days    Blood Culture - Blood, Hand, Left [[id number]]  (Normal) Collected: 07/20/23 1110    Lab Status: Preliminary result Specimen: Blood from Hand, Left Updated: 07/22/23 1245     Blood Culture No growth at 2 days    AFB Culture - Wound, Foot, Left [[id number]] Collected: 07/21/23 1341    Lab Status: Preliminary result Specimen: Wound from Foot, Left Updated: 07/22/23 1130     AFB Stain No acid fast bacilli seen on concentrated smear    AFB Culture - Bone, Foot, Left [[id number]] Collected: 07/21/23 1350    Lab Status: Preliminary result Specimen: Bone from Foot, Left Updated: 07/22/23 1130     AFB Stain No acid fast bacilli seen on  concentrated smear    COVID PRE-OP / PRE-PROCEDURE SCREENING ORDER (NO ISOLATION) - Swab, Nasopharynx [[id number]]  (Normal) Collected: 07/20/23 1142    Lab Status: Final result Specimen: Swab from Nasopharynx Updated: 07/20/23 1203    Narrative:      The following orders were created for panel order COVID PRE-OP / PRE-PROCEDURE SCREENING ORDER (NO ISOLATION) - Swab, Nasopharynx.  Procedure                               Abnormality         Status                     ---------                               -----------         ------                     COVID-19 and FLU A/B PCR...[[id number]]  Normal              Final result                 Please view results for these tests on the individual orders.    COVID-19 and FLU A/B PCR - Swab, Nasopharynx [[id number]]  (Normal) Collected: 07/20/23 1142    Lab Status: Final result Specimen: Swab from Nasopharynx Updated: 07/20/23 1203     COVID19 Not Detected     Influenza A PCR Not Detected     Influenza B PCR Not Detected    Narrative:      Fact sheet for providers: https://www.fda.gov/media/492545/download    Fact sheet for patients: https://www.fda.gov/media/133139/download    Test performed by PCR.            XR Chest 1 View    Result Date: 7/22/2023  XR CHEST 1 VW Date of Exam: 7/22/2023 2:35 PM EDT Indication: PICC line placement Comparison: 7/19/2023. Findings: There is a right upper extremity PICC line in place with the tip terminating in the superior vena cava. The heart is slightly enlarged. The pulmonary vascular markings are normal. The lungs and pleural spaces are clear of active disease. There are chronic age-related changes involving the bony thorax and thoracic aorta.     Impression: Impression: 1. The tip of the right upper extremity PICC line terminates in the superior vena cava. 2. Mild cardiomegaly. 3. No active pulmonary disease. Electronically Signed: Yuan Burnett  7/22/2023 3:06 PM EDT  Workstation ID: [id number]    Peripheral Block    Result Date:  7/21/2023  Meliza Dykes, CRNA     7/21/2023  1:12 PM Peripheral Block Pre-sedation assessment completed: 7/21/2023 12:54 PM Patient reassessed immediately prior to procedure Patient location during procedure: pre-op Start time: 7/21/2023 1:06 PM Stop time: 7/21/2023 1:10 PM Reason for block: at surgeon's request and post-op pain management Preanesthetic Checklist Completed: patient identified, IV checked, site marked, risks and benefits discussed, surgical consent, monitors and equipment checked, pre-op evaluation and timeout performed Prep: Sterile barriers:cap, gloves, mask and washed/disinfected hands Prep: ChloraPrep Patient monitoring: blood pressure monitoring, continuous pulse oximetry and EKG Procedure Sedation: yes Performed under: local infiltration Guidance:ultrasound guided ULTRASOUND INTERPRETATION.  Using ultrasound guidance a 20 G gauge needle was placed in close proximity to the nerve, at which point, under ultrasound guidance anesthetic was injected in the area of the nerve and spread of the anesthesia was seen on ultrasound in close proximity thereto.  There were no abnormalities seen on ultrasound; a digital image was taken; and the patient tolerated the procedure with no complications. Images:still images obtained, printed/placed on chart Block Type:popliteal Injection Technique:catheter Needle Type:echogenic and Tuohy Needle Gauge:18 G Resistance on Injection: none Catheter Size:20 G Medications Used: bupivacaine-EPINEPHrine PF (MARCAINE w/EPI) 0.25% -1:680519 injection - Injection  30 mL - 7/21/2023 1:06:00 PM dexamethasone sodium phosphate injection - Injection  2 mg - 7/21/2023 1:06:00 PM Post Assessment Injection Assessment: negative aspiration for heme, no paresthesia on injection and incremental injection Patient Tolerance:comfortable throughout block Complications:no Additional Notes SINGLE shot  A high-frequency linear transducer, with sterile cover, was placed in the popliteal  "fossa to identify the popliteal artery and vein, Tibial nerve (TN) and Common Peroneal nerve (CP). The transducer was then moved in a cephalad fashion to observe the TN and CP nerve bifurcation to form the Sciatic Nerve. The insertion site was prepped and draped in sterile fashion. Skin and cutaneous tissue was infiltrated with 2-5 ml of 1% Lidocaine. Using ultrasound-guidance, a 20-gauge B-Grady 4\" Ultraplex 360 non-stimulating echogenic needle was then inserted and advanced in plane from lateral to medial. Preservative-free normal saline was utilized for hydro-dissection of tissue, advancement of Touhy, and to confirm final needle placement posterior to the nerves. Local anesthetic injection spread, in incremental 3-5 ml injections, to surround both nerve structures. Aspiration every 5 ml to prevent intravascular injection. Injection was completed with negative aspiration of blood and negative intravascular injection. Injection pressures were normal with minimal resistance     Peripheral Block    Result Date: 7/21/2023  Meliza Dykes CRNA     7/21/2023  1:10 PM Peripheral Block Pre-sedation assessment completed: 7/21/2023 12:54 PM Patient reassessed immediately prior to procedure Start time: 7/21/2023 1:06 PM Stop time: 7/21/2023 1:10 PM Reason for block: at surgeon's request and post-op pain management Preanesthetic Checklist Completed: patient identified, IV checked, site marked, risks and benefits discussed, surgical consent, monitors and equipment checked, pre-op evaluation and timeout performed Prep: Pt Position: supine Sterile barriers:cap, gloves, mask, sterile barriers and washed/disinfected hands Prep: ChloraPrep Patient monitoring: blood pressure monitoring, continuous pulse oximetry and EKG Procedure Performed under: spinal Guidance:ultrasound guided ULTRASOUND INTERPRETATION.  Using ultrasound guidance a 20 G gauge needle was placed in close proximity to the nerve, at which point, under ultrasound " "guidance anesthetic was injected in the area of the nerve and spread of the anesthesia was seen on ultrasound in close proximity thereto.  There were no abnormalities seen on ultrasound; a digital image was taken; and the patient tolerated the procedure with no complications. Images:still images obtained, printed/placed on chart Block Type:adductor canal block Injection Technique:catheter Needle Type:Tuohy and echogenic Needle Gauge:18 G Resistance on Injection: none Catheter Size:20 G (20g) Medications Used: dexamethasone sodium phosphate injection - Injection  2 mg - 7/21/2023 1:06:00 PM bupivacaine-EPINEPHrine PF (MARCAINE w/EPI) 0.25% -1:376704 injection - Injection  30 mL - 7/21/2023 1:06:00 PM Post Assessment Injection Assessment: negative aspiration for heme, incremental injection and no paresthesia on injection Patient Tolerance:comfortable throughout block Complications:no Additional Notes SINGLE shot A high-frequency linear transducer, with sterile cover, was placed on the anterior mid-thigh (between the anterior superior iliac spine and patella). The transducer was then moved medially to identify the Sartorius muscle (Joseph), Vastus Medialis muscle (VMM), Superficial Femoral Artery (SFA) and Vein. The transducer was then moved cephalad or caudad to position the SFA in the middle of the Joseph. The insertion site was prepped and draped in sterile fashion. Skin and cutaneous tissue was infiltrated with 2-5 ml of 1% Lidocaine. Using ultrasound-guidance, a 20-gauge B-Grady 4\" Ultraplex 360 non-stimulating echogenic needle was advanced in plane from lateral to medial. Preservative-free normal saline was utilized for hydro-dissection of tissue, advancement of needle, and to confirm needle placement below the fascial plane of the Joseph where the Nerve to the VMM is located. Local anesthetic (LA) 5 ml deposited here. The needle continues its path lateral to the SFA at the level of the Saphenous Nerve. The remainder of " the LA was deposited at the 10-11 o'clock position of the SFA. This injection created a space between the Joseph and the SFA. Aspiration every 5 ml to prevent intravascular injection. Injection was completed with negative aspiration of blood and negative intravascular injection. Injection pressures were normal with minimal resistance.      Results for orders placed during the hospital encounter of 07/19/23    Adult Transthoracic Echo Complete W/ Cont if Necessary Per Protocol    Interpretation Summary    Left ventricular systolic function is normal. Calculated left ventricular EF = 60%    Left ventricular diastolic function was normal.    Left atrial volume is moderately increased.    Saline test results are negative.    There is calcification of the aortic valve.    Moderate tricuspid valve regurgitation is present.    Estimated right ventricular systolic pressure from tricuspid regurgitation is mildly elevated (35-45 mmHg).      Current medications:  Scheduled Meds:atorvastatin, 80 mg, Oral, Nightly  ceFAZolin, 2,000 mg, Intravenous, Q8H  enoxaparin, 40 mg, Subcutaneous, Q24H  metoprolol tartrate, 50 mg, Oral, BID  senna-docusate sodium, 2 tablet, Oral, BID  sodium chloride, 10 mL, Intravenous, Q12H  sodium chloride, 10 mL, Intravenous, Q12H  sodium chloride, 10 mL, Intravenous, Q12H      Continuous Infusions:sodium chloride, 100 mL/hr, Last Rate: Stopped (07/23/23 0911)      PRN Meds:.  acetaminophen **OR** acetaminophen    senna-docusate sodium **AND** polyethylene glycol **AND** bisacodyl **AND** bisacodyl    bisacodyl    Calcium Replacement - Follow Nurse / BPA Driven Protocol    HYDROmorphone **AND** naloxone    Magnesium Standard Dose Replacement - Follow Nurse / BPA Driven Protocol    melatonin    Morphine **AND** naloxone    ondansetron **OR** ondansetron    oxyCODONE    Phosphorus Replacement - Follow Nurse / BPA Driven Protocol    Potassium Replacement - Follow Nurse / BPA Driven Protocol    [COMPLETED]  Insert Peripheral IV **AND** sodium chloride    sodium chloride    sodium chloride    sodium chloride    sodium chloride    sodium chloride    Assessment & Plan   Assessment & Plan     Active Hospital Problems    Diagnosis  POA    **Sepsis [A41.9]  Yes    Staphylococcus aureus bacteremia [R78.81, B95.61]  Yes    Acute osteomyelitis of left ankle or foot [M86.172]  Yes    S/P transmetatarsal amputation of foot, left [Z89.432]  Not Applicable    Neuropathy [G62.9]  Yes    CKD (chronic kidney disease) stage 2, GFR 60-89 ml/min [N18.2]  Yes    Atrial fibrillation with RVR [I48.91]  Yes      Resolved Hospital Problems   No resolved problems to display.        Brief Hospital Course to date:  Carlos Culver is a 77 y.o. male with a history of peripheral neuropathy, osteomyelitis, CKD, A-fib who is presenting with sepsis/bacteremia    Sepsis w/ MSSA bacteremia  Concern for left foot osteomyelitis, status post left foot incision and drainage and revision amputation  -- ID following and managing antibiotics.  PICC line placed.  Echo shows no vegetations-- w staph aureus bacteremia. Repeat cultures in process  -- Reviewed MRI results, consistent with high suspicion for osteomyelitis  - s/p L foot incision and drainage and revision amputation 7/21 . NWB per ortho. DVT ppx, can resume home dose of Eliquis on discharge  - Wound culture is showing Staph aureus, other cultures in place from surgery  - ID has placed antibiotic recommendations.  Case management to help arrange for home antibiotics     A fib rvr-resolved  -- Heart rate better controlled today with increased dose of metoprolol and status post IV fluids.  Encourage hydration     CKD II  Stable    Expected Discharge Location and Transportation: Rehab  Expected Discharge   Expected Discharge Date: 7/25/2023; Expected Discharge Time:      DVT prophylaxis:  Medical DVT prophylaxis orders are present.     AM-PAC 6 Clicks Score (PT): 14 (07/23/23 0914)    CODE STATUS:    Code Status and Medical Interventions:   Ordered at: 07/19/23 1154     Code Status (Patient has no pulse and is not breathing):    CPR (Attempt to Resuscitate)     Medical Interventions (Patient has pulse or is breathing):    Full Support     Release to patient:    Routine Release       Tianna Gallagher MD  07/23/23

## 2023-07-23 NOTE — PLAN OF CARE
Goal Outcome Evaluation:  Plan of Care Reviewed With: patient           Outcome Evaluation: Pt. AOx4, over 95% on RA. VSS. Pt. has limited mobility, fall and safety precautions maintianed. Pt denies pain, SOA or nausea. Continue monitoring.

## 2023-07-24 PROCEDURE — 25010000002 CEFAZOLIN IN DEXTROSE 2-4 GM/100ML-% SOLUTION: Performed by: ORTHOPAEDIC SURGERY

## 2023-07-24 PROCEDURE — 25010000002 ENOXAPARIN PER 10 MG: Performed by: ORTHOPAEDIC SURGERY

## 2023-07-24 PROCEDURE — 97110 THERAPEUTIC EXERCISES: CPT

## 2023-07-24 PROCEDURE — 97535 SELF CARE MNGMENT TRAINING: CPT

## 2023-07-24 PROCEDURE — 99024 POSTOP FOLLOW-UP VISIT: CPT | Performed by: ORTHOPAEDIC SURGERY

## 2023-07-24 PROCEDURE — 99232 SBSQ HOSP IP/OBS MODERATE 35: CPT | Performed by: NURSE PRACTITIONER

## 2023-07-24 PROCEDURE — 97116 GAIT TRAINING THERAPY: CPT

## 2023-07-24 RX ADMIN — ENOXAPARIN SODIUM 40 MG: 100 INJECTION SUBCUTANEOUS at 15:08

## 2023-07-24 RX ADMIN — Medication 2000 MG: at 11:58

## 2023-07-24 RX ADMIN — METOPROLOL TARTRATE 50 MG: 50 TABLET ORAL at 20:48

## 2023-07-24 RX ADMIN — METOPROLOL TARTRATE 50 MG: 50 TABLET ORAL at 09:31

## 2023-07-24 RX ADMIN — ACETAMINOPHEN 650 MG: 325 TABLET ORAL at 17:47

## 2023-07-24 RX ADMIN — Medication 2000 MG: at 20:47

## 2023-07-24 RX ADMIN — Medication 10 ML: at 09:31

## 2023-07-24 RX ADMIN — ATORVASTATIN CALCIUM 80 MG: 40 TABLET, FILM COATED ORAL at 20:48

## 2023-07-24 RX ADMIN — OXYCODONE HYDROCHLORIDE 5 MG: 5 TABLET ORAL at 01:28

## 2023-07-24 RX ADMIN — SENNOSIDES AND DOCUSATE SODIUM 2 TABLET: 50; 8.6 TABLET ORAL at 09:31

## 2023-07-24 RX ADMIN — Medication 2000 MG: at 03:53

## 2023-07-24 NOTE — PROGRESS NOTES
[facility] Medicine Services  PROGRESS NOTE    Patient Name: Carlos Culver  : 1945  MRN: [id number]    Date of Admission: 2023  Primary Care Physician: Marcus Cheng MD    Subjective   Subjective     CC:f/u bacteremia    HPI:  No issues overnight  Denies pain    ROS:  Gen- No fevers, chills  CV- No chest pain, palpitations  Resp- No cough, dyspnea  GI- No N/V/D, abd pain      Objective   Objective     Vital Signs:   Temp:  [97.5 °F (36.4 °C)-97.8 °F (36.6 °C)] 97.7 °F (36.5 °C)  Heart Rate:  [60-88] 88  Resp:  [16-20] 18  BP: (116-136)/(81-94) 131/94     Physical Exam:  Constitutional: No acute distress, awake, alert, up in chair  HENT: NCAT, mucous membranes moist  Respiratory: Clear to auscultation bilaterally, respiratory effort normal   Cardiovascular: irreg, no murmurs, rubs, or gallops  Gastrointestinal: Positive bowel sounds, soft, nontender, nondistended  Musculoskeletal: No bilateral ankle edema  Psychiatric: Appropriate affect, cooperative, pleasant  Neurologic: Oriented x 3, ARMIJO, speech clear  Skin: No rashes noted      Results Reviewed:  LAB RESULTS:      Lab 23  0402 23  0517 23  0443 23  0351 23  1034 23  1205   WBC 16.37* 12.85* 13.48* 18.24* 15.95* 17.82*   HEMOGLOBIN 9.0* 8.6* 10.0* 10.3* 12.7* 13.0   HEMATOCRIT 28.3* 25.8* 30.9* 31.5* 39.2 41.5   PLATELETS 264 232 267 283 325 340   NEUTROS ABS 13.67* 11.48* 10.76* 14.73* 13.03* 14.66*   IMMATURE GRANS (ABS) 0.14* 0.10* 0.13* 0.15* 0.11* 0.11*   LYMPHS ABS 1.59 0.78 1.30 1.77 1.41 1.45   MONOS ABS 0.94* 0.48 1.18* 1.50* 1.28* 1.51*   EOS ABS 0.01 0.00 0.06 0.03 0.05 0.03   MCV 95.9 92.5 91.4 91.0 93.8 94.5   SED RATE  --   --   --   --   --  118*   CRP  --   --   --   --   --  27.88*   PROCALCITONIN  --   --  0.54* 0.73* 0.75*  --    LACTATE  --  1.5 1.1 1.4 3.4*  --          Lab 23  0402 23  0517 23  0443 23  0351 23  1034   SODIUM  137 135* 135* 133* 138   POTASSIUM 4.3 4.3 4.2 4.4 4.4   CHLORIDE 105 104 101 100 100   CO2 20.0* 21.0* 21.0* 20.0* 22.0   ANION GAP 12.0 10.0 13.0 13.0 16.0*   BUN 21 17 15 21 23   CREATININE 0.92 0.84 0.88 1.09 1.18   EGFR 85.1 89.3 88.0 69.5 63.2   GLUCOSE 144* 185* 123* 109* 138*   CALCIUM 7.6* 8.2* 8.3* 8.9 10.2   MAGNESIUM  --   --   --  1.6  --          Lab 07/22/23  0517 07/21/23  0443 07/20/23  0351 07/19/23  1034 07/18/23  1205   TOTAL PROTEIN 5.8* 5.1* 6.1 7.5 7.9   ALBUMIN 2.6* 2.5* 2.5* 2.8* 3.3*   GLOBULIN 3.2 2.6 3.6 4.7 4.6   ALT (SGPT) 11 15 15 24 33   AST (SGOT) 62* 84* 41* 33 45*   BILIRUBIN 0.8 1.6* 1.7* 1.5* 1.6*   ALK PHOS 158* 162* 149* 196* 236*   LIPASE  --   --  109* 110*  --                      Brief Urine Lab Results  (Last result in the past 365 days)        Color   Clarity   Blood   Leuk Est   Nitrite   Protein   CREAT   Urine HCG        07/19/23 0955 Orange   Clear   Negative   Negative   Negative   Trace                   Microbiology Results Abnormal       Procedure Component Value - Date/Time    Blood Culture - Blood, Hand, Right [[id number]]  (Normal) Collected: 07/20/23 1120    Lab Status: Preliminary result Specimen: Blood from Hand, Right Updated: 07/24/23 1245     Blood Culture No growth at 4 days    Blood Culture - Blood, Hand, Left [[id number]]  (Normal) Collected: 07/20/23 1110    Lab Status: Preliminary result Specimen: Blood from Hand, Left Updated: 07/24/23 1245     Blood Culture No growth at 4 days    Anaerobic Culture - Wound, Foot, Left [[id number]]  (Normal) Collected: 07/21/23 1341    Lab Status: Preliminary result Specimen: Wound from Foot, Left Updated: 07/24/23 0800     Anaerobic Culture No anaerobes isolated at 3 days    Anaerobic Culture - Bone, Foot, Left [[id number]]  (Normal) Collected: 07/21/23 1350    Lab Status: Preliminary result Specimen: Bone from Foot, Left Updated: 07/24/23 0800     Anaerobic Culture No anaerobes isolated at 3 days    AFB Culture -  Wound, Foot, Left [[id number]] Collected: 07/21/23 1341    Lab Status: Preliminary result Specimen: Wound from Foot, Left Updated: 07/22/23 1130     AFB Stain No acid fast bacilli seen on concentrated smear    AFB Culture - Bone, Foot, Left [[id number]] Collected: 07/21/23 1350    Lab Status: Preliminary result Specimen: Bone from Foot, Left Updated: 07/22/23 1130     AFB Stain No acid fast bacilli seen on concentrated smear    COVID PRE-OP / PRE-PROCEDURE SCREENING ORDER (NO ISOLATION) - Swab, Nasopharynx [[id number]]  (Normal) Collected: 07/20/23 1142    Lab Status: Final result Specimen: Swab from Nasopharynx Updated: 07/20/23 1203    Narrative:      The following orders were created for panel order COVID PRE-OP / PRE-PROCEDURE SCREENING ORDER (NO ISOLATION) - Swab, Nasopharynx.  Procedure                               Abnormality         Status                     ---------                               -----------         ------                     COVID-19 and FLU A/B PCR...[[id number]]  Normal              Final result                 Please view results for these tests on the individual orders.    COVID-19 and FLU A/B PCR - Swab, Nasopharynx [[id number]]  (Normal) Collected: 07/20/23 1142    Lab Status: Final result Specimen: Swab from Nasopharynx Updated: 07/20/23 1203     COVID19 Not Detected     Influenza A PCR Not Detected     Influenza B PCR Not Detected    Narrative:      Fact sheet for providers: https://www.fda.gov/media/381411/download    Fact sheet for patients: https://www.fda.gov/media/144795/download    Test performed by PCR.            XR Chest 1 View    Result Date: 7/22/2023  XR CHEST 1 VW Date of Exam: 7/22/2023 2:35 PM EDT Indication: PICC line placement Comparison: 7/19/2023. Findings: There is a right upper extremity PICC line in place with the tip terminating in the superior vena cava. The heart is slightly enlarged. The pulmonary vascular markings are normal. The lungs and pleural spaces are  clear of active disease. There are chronic age-related changes involving the bony thorax and thoracic aorta.     Impression: Impression: 1. The tip of the right upper extremity PICC line terminates in the superior vena cava. 2. Mild cardiomegaly. 3. No active pulmonary disease. Electronically Signed: Yuan Lex  7/22/2023 3:06 PM EDT  Workstation ID: [id number]     Results for orders placed during the hospital encounter of 07/19/23    Adult Transthoracic Echo Complete W/ Cont if Necessary Per Protocol    Interpretation Summary    Left ventricular systolic function is normal. Calculated left ventricular EF = 60%    Left ventricular diastolic function was normal.    Left atrial volume is moderately increased.    Saline test results are negative.    There is calcification of the aortic valve.    Moderate tricuspid valve regurgitation is present.    Estimated right ventricular systolic pressure from tricuspid regurgitation is mildly elevated (35-45 mmHg).      Current medications:  Scheduled Meds:atorvastatin, 80 mg, Oral, Nightly  ceFAZolin, 2,000 mg, Intravenous, Q8H  enoxaparin, 40 mg, Subcutaneous, Q24H  metoprolol tartrate, 50 mg, Oral, BID  senna-docusate sodium, 2 tablet, Oral, BID  sodium chloride, 10 mL, Intravenous, Q12H      Continuous Infusions:sodium chloride, 100 mL/hr, Last Rate: Stopped (07/23/23 0911)      PRN Meds:.  acetaminophen **OR** acetaminophen    senna-docusate sodium **AND** polyethylene glycol **AND** bisacodyl **AND** bisacodyl    bisacodyl    Calcium Replacement - Follow Nurse / BPA Driven Protocol    HYDROmorphone **AND** naloxone    Magnesium Standard Dose Replacement - Follow Nurse / BPA Driven Protocol    melatonin    Morphine **AND** naloxone    ondansetron **OR** ondansetron    oxyCODONE    Phosphorus Replacement - Follow Nurse / BPA Driven Protocol    Potassium Replacement - Follow Nurse / BPA Driven Protocol    [COMPLETED] Insert Peripheral IV **AND** sodium chloride    sodium  chloride    sodium chloride    sodium chloride    Assessment & Plan   Assessment & Plan     Active Hospital Problems    Diagnosis  POA    **Sepsis [A41.9]  Yes    Staphylococcus aureus bacteremia [R78.81, B95.61]  Yes    Acute osteomyelitis of left ankle or foot [M86.172]  Yes    S/P transmetatarsal amputation of foot, left [Z89.432]  Not Applicable    Neuropathy [G62.9]  Yes    CKD (chronic kidney disease) stage 2, GFR 60-89 ml/min [N18.2]  Yes    Atrial fibrillation with RVR [I48.91]  Yes      Resolved Hospital Problems   No resolved problems to display.        Brief Hospital Course to date:  Carlos Culver is a 77 y.o. male with a history of peripheral neuropathy, osteomyelitis, CKD, A-fib who is presenting with sepsis/bacteremia    Sepsis w/ MSSA bacteremia  Concern for left foot osteomyelitis, status post left foot incision and drainage and revision amputation  -- ID following and managing antibiotics.  PICC line placed.  Echo shows no vegetations-- w staph aureus bacteremia. Repeat blood cultures NG x4d  -- Reviewed MRI results, consistent with high suspicion for osteomyelitis  - s/p L foot incision and drainage and revision amputation 7/21 . NWB per ortho. DVT ppx, can resume home dose of Eliquis on discharge  - ID has placed antibiotic recommendations.  Case management to help arrange for home antibiotics vs [facility]     A fib RVR  -- Heart rate better controlled today with increased dose of metoprolol and status post IV fluids.  Encourage hydration     CKD II  -- Stable    Expected Discharge Location and Transportation: Rehab  Expected Discharge   Expected Discharge Date: 7/25/2023; Expected Discharge Time:      DVT prophylaxis:  Medical DVT prophylaxis orders are present.     AM-PAC 6 Clicks Score (PT): 16 (07/24/23 8687)    CODE STATUS:   Code Status and Medical Interventions:   Ordered at: 07/19/23 3364     Code Status (Patient has no pulse and is not breathing):    CPR (Attempt to Resuscitate)      Medical Interventions (Patient has pulse or is breathing):    Full Support     Release to patient:    Routine Release       Myrna Hernandez, ROBIN  07/24/23

## 2023-07-24 NOTE — PROGRESS NOTES
[facility]    INFECTIOUS DISEASE PROGRESS NOTE    Carlos Culver  1945  [id number]    Consult: 7/19/23    Admit date: 7/19/2023    Requesting Provider: No ref. provider found  Evaluating physician: Jeffery Young MD  Reason for Consultation: MSSA sepsis with bacteremia with 4 out of 4 blood cultures positive by PCR from 7/18/2023, left foot osteomyelitis, Abscess  Chief Complaint: Above      Subjective   History of present illness:  Patient is a  77 y.o.  Yr old male with a history of peripheral neuropathy, hard of hearing, hyperlipidemia, essential hypertension, previous left transmetatarsal amputation for osteomyelitis, who developed a left plantar ulcer in April 2023 after working out on an elliptical exercise machine.  His ulceration has persisted off and on and he was treated briefly with oral antibiotics by Dr. Rajat SWANSON.P.MEunice at [facility].  Patient still had persistence of his left plantar wound.  Also complained of some unmeasured fevers and fatigue over the past few weeks.  He was evaluated in clinic and had blood cultures obtained and laboratories.  He had no other localizing signs or symptoms of infection.  Blood cultures drawn on 7/18 were positive by PCR for methicillin sensitive Staph aureus in 4 out of 4 bottles.  White blood cell count was 17.  Patient was directed to be admitted to [facility] on 7/19/2023.  I was consulted on 7/19/2023 for further evaluation and treatment.    7/20/2023 history reviewed.  Awaiting orthopedic evaluation of left foot.  No high fever.  Tolerating cefazolin for MSSA sepsis.    7/21/2023 history reviewed.  Awaits I&D of his left foot today.  No high fever.  On cefazolin for MSSA sepsis and likely left foot abscess and osteomyelitis.  Duration to be determined.    7/22/2023 history reviewed.  Status post partial amputation left foot with debridement for abscess and osteomyelitis on 7/21.  On  cefazolin for MSSA sepsis and osteomyelitis to continue until 9/1/2023, then likely oral antibiotics for an additional 3 months.  No high fever.  No pain with his peripheral neuropathy.    7/24/2023 history reviewed.  Continues on cefazolin until 9/1/2023, then oral antibiotics for an additional 3 months for MSSA sepsis and left foot osteomyelitis status post debridement and partial amputation on 7/21.  No high fever.  Awaits placement to rehab.    Past Medical History:   Diagnosis Date    A-fib     Acute kidney failure     Elevated cholesterol     Hard of hearing     Hyperlipidemia     Hypertension     Renal disorder     Wears hearing aid in both ears     Wears reading eyeglasses        Past Surgical History:   Procedure Laterality Date    AMPUTATION DIGIT Left 1/7/2020    Procedure: AMPUTATION LEFT 2ND TOE;  Surgeon: Idania Osborn MD;  Location: [facility];  Service: Orthopedics    AMPUTATION DIGIT Left 12/8/2020    Procedure: TRANSMETATARSAL AMPUTATION LEFT;  Surgeon: Idania Osborn MD;  Location:  [id number];  Service: Orthopedics;  Laterality: Left;    APPENDECTOMY      COLONOSCOPY  2006    FOOT SURGERY Left 12/09/2020    (L) transmetatarsal amputation 12/09/2020 - Dr. Idania Osborn    INCISION AND DRAINAGE LEG Left 10/23/2018    Procedure: INCISION AND DRAINAGE LEFT FOOT;  Surgeon: Idania Osborn MD;  Location: [facility];  Service: Orthopedics    TOE AMPUTATION      TONSILLECTOMY      VENOUS ACCESS DEVICE (PORT) INSERTION N/A 10/23/2018    Procedure: GROSHONG CATHETER PLACEMENT;  Surgeon: Marquez Simons MD;  Location: [facility];  Service: General    VENOUS ACCESS DEVICE (PORT) INSERTION N/A 12/9/2020    Procedure: GROSHONG INSERTION;  Surgeon: Antonio Bains MD;  Location: [facility];  Service: General;  Laterality: N/A;    VENOUS ACCESS DEVICE (PORT) REMOVAL         Pediatric History   Patient Parents    Not on file     Other Topics Concern    Not on file   Social History Narrative    Not on  file   , 3 children, no pets, 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  Father  of cancer, mother  of old age and cardiac disease, 1 brother, mother also had hypothyroidism  Allergies   Allergen Reactions    Sulfa Antibiotics Unknown (See Comments)     Pt was told as child he had an allergy.  Has not taken and doesn't know the response       Immunization History   Administered Date(s) Administered    COVID-19 (PFIZER) BIVALENT 12+YRS 10/19/2022    COVID-19 (PFIZER) Purple Cap Monovalent 2021, 2021, 10/02/2021    Covid-19 (Pfizer) Gray Cap Monovalent 2022       Medication:    Current Facility-Administered Medications:     acetaminophen (TYLENOL) tablet 650 mg, 650 mg, Oral, Q4H PRN **OR** acetaminophen (TYLENOL) suppository 650 mg, 650 mg, Rectal, Q4H PRN, Mao Mckay MD    atorvastatin (LIPITOR) tablet 80 mg, 80 mg, Oral, Nightly, Mao Mckay MD, 80 mg at 23    sennosides-docusate (PERICOLACE) 8.6-50 MG per tablet 2 tablet, 2 tablet, Oral, BID, 2 tablet at 23 0931 **AND** polyethylene glycol (MIRALAX) packet 17 g, 17 g, Oral, Daily PRN **AND** bisacodyl (DULCOLAX) EC tablet 5 mg, 5 mg, Oral, Daily PRN **AND** bisacodyl (DULCOLAX) suppository 10 mg, 10 mg, Rectal, Daily PRN, Mao Mckay MD    bisacodyl (DULCOLAX) suppository 10 mg, 10 mg, Rectal, Daily PRN, Mao Mckay MD    Calcium Replacement - Follow Nurse / BPA Driven Protocol, , Does not apply, PRN, Mao Mckay MD    ceFAZolin in dextrose (ANCEF) IVPB solution 2,000 mg, 2,000 mg, Intravenous, Q8H, Mao Mckay MD, 2,000 mg at 23 0353    Enoxaparin Sodium (LOVENOX) syringe 40 mg, 40 mg, Subcutaneous, Q24H, Mao Mckay MD, 40 mg at 23 1505    HYDROmorphone (DILAUDID) injection 0.5 mg, 0.5 mg, Intravenous, Q2H PRN **AND** naloxone (NARCAN) injection 0.1 mg, 0.1 mg, Intravenous, Q5 Min PRN, Mao Mckay MD    Magnesium Standard Dose Replacement - Follow Nurse  / BPA Driven Protocol, , Does not apply, Nely JIMENEZ Jeremy, MD    melatonin tablet 5 mg, 5 mg, Oral, Nightly PRN, Mao Mckay MD, 5 mg at 07/22/23 2045    metoprolol tartrate (LOPRESSOR) tablet 50 mg, 50 mg, Oral, BID, Tianna Gallagher MD, 50 mg at 07/24/23 0931    morphine injection 2 mg, 2 mg, Intravenous, Q2H PRN **AND** naloxone (NARCAN) injection 0.4 mg, 0.4 mg, Intravenous, Q5 Min PRN, Mao Mckay MD    ondansetron (ZOFRAN) tablet 4 mg, 4 mg, Oral, Q6H PRN **OR** ondansetron (ZOFRAN) injection 4 mg, 4 mg, Intravenous, Q6H PRN, Mao Mckay MD    oxyCODONE (ROXICODONE) immediate release tablet 5 mg, 5 mg, Oral, Q4H PRN, Mao Mckay MD, 5 mg at 07/24/23 0128    Phosphorus Replacement - Follow Nurse / BPA Driven Protocol, , Does not apply, Nely JIMENEZ Jeremy, MD    Potassium Replacement - Follow Nurse / BPA Driven Protocol, , Does not apply, Nely JIMENEZ Jeremy, MD    [COMPLETED] Insert Peripheral IV, , , Once **AND** sodium chloride 0.9 % flush 10 mL, 10 mL, Intravenous, PRNNely Jeremy, MD    sodium chloride 0.9 % flush 10 mL, 10 mL, Intravenous, Q12H, Mao Mckay MD, 10 mL at 07/24/23 0931    sodium chloride 0.9 % flush 10 mL, 10 mL, Intravenous, PRNNely Jeremy, MD    sodium chloride 0.9 % flush 10 mL, 10 mL, Intravenous, Q12H, Mao Mckay MD, 10 mL at 07/23/23 2033    sodium chloride 0.9 % flush 10 mL, 10 mL, Intravenous, PRNNely Jeremy, MD    sodium chloride 0.9 % flush 10 mL, 10 mL, Intravenous, Q12H, Jeffery Young MD, 10 mL at 07/24/23 0931    sodium chloride 0.9 % flush 10 mL, 10 mL, Intravenous, PRN, Jeffery Young MD    sodium chloride 0.9 % infusion 40 mL, 40 mL, Intravenous, PRN, Mao Mckay MD    sodium chloride 0.9 % infusion 40 mL, 40 mL, Intravenous, PRN, Mao Mckay MD    sodium chloride 0.9 % infusion, 100 mL/hr, Intravenous, Continuous, Mao Mckay MD, Stopped at 07/23/23 0911    Please refer to the medical record for a full medication list    Review  "of Systems:    Constitutional--some low-grade fever, no chills or sweats.  Appetite good, and no malaise.  Some fatigue.  HEENT-- No new vision, hearing or throat complaints.  No epistaxis or oral sores.  Denies odynophagia or dysphagia.  No odynophagia or dysphagia. No headache, photophobia or neck stiffness.  CV-- No chest pain, palpitation or syncope  Resp-- No SOB/cough/Hemoptysis  GI- No nausea, vomiting, or diarrhea.  No hematochezia, melena, or hematemesis. Denies jaundice or chronic liver disease.  -- No dysuria, hematuria, or flank pain.  Denies hesitancy, urgency or flank pain.  Lymph- no swollen lymph nodes in neck/axilla or groin.   Heme- No active bruising or bleeding; no Hx of DVT or PE.  MS-- no swelling or pain in the bones or joints of arms/legs.  No new back pain.  Neuro-- No acute focal weakness or numbness in the arms or legs.  No seizures.  Skin--No rashes or lesions, except left foot, no nodules    Physical Exam:   Vital Signs   Temp:  [97.5 °F (36.4 °C)-97.8 °F (36.6 °C)] 97.7 °F (36.5 °C)  Heart Rate:  [60-83] 78  Resp:  [16-22] 18  BP: (116-136)/(79-90) 136/81    Temp  Min: 97.5 °F (36.4 °C)  Max: 97.8 °F (36.6 °C)  BP  Min: 116/83  Max: 136/81  Pulse  Min: 60  Max: 83  Resp  Min: 16  Max: 22  SpO2  Min: 89 %  Max: 100 %    Blood pressure 136/81, pulse 78, temperature 97.7 °F (36.5 °C), temperature source Axillary, resp. rate 18, height 181.6 cm (71.5\"), weight 99.8 kg (220 lb), SpO2 97 %.  GENERAL: Awake and alert, in moderate distress. Appears older than stated age.  Resting in bed.  HEENT:  Normocephalic, atraumatic.  Oropharynx without thrush. Dentition in good repair. No cervical adenopathy. No neck masses.  Ears externally normal, Nose externally normal.  EYES:  No conjunctival injection. No icterus. EOM full.  LYMPHATICS: No lymphadenopathy of the neck or axillary or inguinal regions.   HEART: No murmur, gallop, or pericardial friction rub. Reg rate rhythm, No JVD at 45 degrees.  No " JVD.  LUNGS: Clear to auscultation and percussion. No respiratory distress, no use of accessory muscles.  ABDOMEN: Soft, nontender, nondistended. No appreciable HSM.  Bowel sounds normal.  Slightly obese.  No mass.  SKIN: Warm and dry without cutaneous eruptions.  No nodules.  Left foot with s surgical dressing in place.  PSYCHIATRIC: Mental status lucid. No confusion.  EXT:  No cellulitic change.  NEURO: Oriented to name, nonfocal.      Results Review:   I reviewed the patient's new clinical results.  I reviewed the patient's new imaging results and agree with the interpretation.  I reviewed the patient's other test results and agree with the interpretation    Results from last 7 days   Lab Units 07/23/23  0402 07/22/23  0517 07/21/23  0443   WBC 10*3/mm3 16.37* 12.85* 13.48*   HEMOGLOBIN g/dL 9.0* 8.6* 10.0*   HEMATOCRIT % 28.3* 25.8* 30.9*   PLATELETS 10*3/mm3 264 232 267       Results from last 7 days   Lab Units 07/23/23  0402   SODIUM mmol/L 137   POTASSIUM mmol/L 4.3   CHLORIDE mmol/L 105   CO2 mmol/L 20.0*   BUN mg/dL 21   CREATININE mg/dL 0.92   GLUCOSE mg/dL 144*   CALCIUM mg/dL 7.6*       Results from last 7 days   Lab Units 07/22/23  0517   ALK PHOS U/L 158*   BILIRUBIN mg/dL 0.8   ALT (SGPT) U/L 11   AST (SGOT) U/L 62*       Results from last 7 days   Lab Units 07/18/23  1205   SED RATE mm/hr 118*       Results from last 7 days   Lab Units 07/18/23  1205   CRP mg/dL 27.88*           Results from last 7 days   Lab Units 07/22/23  0517   LACTATE mmol/L 1.5       Estimated Creatinine Clearance: 80.3 mL/min (by C-G formula based on SCr of 0.92 mg/dL).  CPK          7/22/2023    05:17   Common Labsle   Creatine Kinase 77       Procalitonin Results:      Lab 07/21/23  0443 07/20/23  0351 07/19/23  1034   PROCALCITONIN 0.54* 0.73* 0.75*        Brief Urine Lab Results  (Last result in the past 365 days)        Color   Clarity   Blood   Leuk Est   Nitrite   Protein   CREAT   Urine HCG        07/19/23 3285  Orange   Clear   Negative   Negative   Negative   Trace                  No results found for: SITE, ALLENTEST, PHART, ZLH7YHR, PO2ART, HNX3TBB, BASEEXCESS, D9SEBXNW, HGBBG, HCTABG, OXYHEMOGLOBI, METHHGBN, CARBOXYHGB, CO2CT, BAROMETRIC, MODALITY, FIO2     Microbiology:  No results found for: ACANTHNAEG, AFBCX, BPERTUSSISCX, BLOODCX  No results found for: BCIDPCR, CXREFLEX, CSFCX, CULTURETIS  No results found for: CULTURES, HSVCX, URCX  No results found for: EYECULTURE, GCCX, HSVCULTURE, LABHSV  No results found for: LEGIONELLA, MRSACX, MUMPSCX, MYCOPLASCX  No results found for: NOCARDIACX, STOOLCX  Urine Culture   Date Value Ref Range Status   07/18/2023 No growth  Preliminary     Wound Culture   Date Value Ref Range Status   07/18/2023 Culture in progress  Preliminary        Urine Culture   Date Value Ref Range Status   07/18/2023 No growth  Preliminary     Wound Culture   Date Value Ref Range Status   07/18/2023 Culture in progress  Preliminary   (    Blood cultures 7/18/2023 reported positive in 4 out of 4 bottles by PCR for MSSA    Radiology:  Imaging Results (Last 72 Hours)       Procedure Component Value Units Date/Time    XR Chest 1 View [[id number]] Collected: 07/22/23 1506     Updated: 07/22/23 1510    Narrative:      XR CHEST 1 VW    Date of Exam: 7/22/2023 2:35 PM EDT    Indication: PICC line placement    Comparison: 7/19/2023.    Findings:  There is a right upper extremity PICC line in place with the tip terminating in the superior vena cava. The heart is slightly enlarged. The pulmonary vascular markings are normal. The lungs and pleural spaces are clear of active disease. There are   chronic age-related changes involving the bony thorax and thoracic aorta.      Impression:      Impression:    1. The tip of the right upper extremity PICC line terminates in the superior vena cava.  2. Mild cardiomegaly.  3. No active pulmonary disease.      Electronically Signed: Yuan Burnett    7/22/2023 3:06 PM EDT     Workstation ID: [id number]            IMPRESSION:     1.  MSSA (sensitive to tetracycline and sulfa) high-grade sepsis with bacteremia in 4 out of 4 bottles drawn from 7/18/2023.  Most likely source is left foot osteomyelitis and abscess (MRI 7/19/23) although unusual to have such high-grade bacteremia.  Could have seeded an intravascular source including endocarditis.  No chronic indwelling prosthetic device.  Unlikely to be contaminant.  Repeat blood culture 7/19 positive in 1 out of 2 sets, for MSSA, blood cultures 7/20 negative.  TTE 7-19 was negative for endocarditis.  EF 60%.  2.  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  High risk for underlying osteomyelitis.  Likely a function of his peripheral neuropathy and some noncompliance with nonweightbearing status.  , CRP 22.  MRI consistent with abscess and osteomyelitis left foot.  3.  Leukocytosis, neutrophilic related to above issues.  Slightly worse.  4.  Hyponatremia.  Resolved.  5.  Elevated LFTs with alkaline phosphatase 162, on ongoing, bilirubin 1.6, and AST 84, continues.  May be related to sepsis versus other.  Alcohol-related disease?  CT of the abdomen and pelvis consistent with that liver.  Biliary track okay.  6.  Severe peripheral neuropathy, impacting all aspects of his care.  7.  Mild pyuria, 6-12 wbc on 7/18, asymptomatic.  May be related to above.  Urine culture negative to date.  8.  Anemia, chronic disease, ongoing and acute postop blood loss.    Plan:    1.  Diagnostically, continue to follow patient's physical exam, CBC, CMP, CRP, CPK, repeat blood cultures x2.    2.  Therapeutically, continue cefazolin 2 g IV every 8 hours until 9/1/2023 for 6 weeks, then oral for 3 months.  Discussed previously with Reynaldo in the microbiology lab.  3.  Supportive care.  4.  Ortho Dr. Mao Mckay I&D status post partial amputation on 7/21 left foot.    Case management  orders: Please arrange for home antibiotics or skilled facility or rehab with cefazolin 2 g IV every 8 hours until 9/1/2023 for left foot osteomyelitis with MSSA.  Check CBC, CMP, CRP weekly while on IV antibiotics.  Fax orders to [phone], call [phone] with final arrangements.  Arrange for follow-up with me in 1 week postdischarge.  Weekly PICC dressing changes.  Probiotics recommended.    I discussed the patient's findings and my recommendations with patient and nursing staff    Thank you for asking me to see Carlos Culver.  Our group would be pleased to follow this patient over the course of their hospitalization and assist with outpatient antimicrobial therapy, as indicated.  Further recommendations depend on the results of the cultures and clinical course.  Side effects discussed.  Increased risk for adverse drug reactions, complications of IV access, readmission.  Increased risk for surgery.  Discussed previously with Dr. Mckay.    Jeffery Young MD  7/24/2023

## 2023-07-24 NOTE — PLAN OF CARE
Goal Outcome Evaluation:  Plan of Care Reviewed With: patient           Outcome Evaluation: Pt a/o, VSS, RA, sat. 94%,  no c/o pain at this time. Continue monitoring.

## 2023-07-24 NOTE — THERAPY TREATMENT NOTE
Patient Name: Carlos Culver  : 1945    MRN: [id number]                              Today's Date: 2023       Admit Date: 2023    Visit Dx:     ICD-10-CM ICD-9-CM   1. Bacteremia  R78.81 790.7   2. Elevated blood pressure reading with diagnosis of hypertension  I10 401.9   3. Acute osteomyelitis of left ankle or foot  M86.172 730.07   4. Impaired functional mobility, balance, gait, and endurance  Z74.09 V49.89     Patient Active Problem List   Diagnosis    Cellulitis of left foot    GUCCI (acute kidney injury)    Essential hypertension    Leukocytosis    Atrial fibrillation with RVR    Osteomyelitis of left foot    Chronic anticoagulation    Neuropathy    Elevated transaminase level    CKD (chronic kidney disease) stage 2, GFR 60-89 ml/min    Skin ulcer of right heel    Chronic multifocal osteomyelitis of left foot    Contracture of joint of foot, left    MARTINEZ (dyspnea on exertion)    Neuropathic ulcer, limited to breakdown of skin    Hyperlipidemia LDL goal <100    A-fib    S/P transmetatarsal amputation of foot, left    Leukocytosis, likely reactive    Acute postoperative pain    Alcohol abuse    Sepsis    Staphylococcus aureus bacteremia    Acute osteomyelitis of left ankle or foot     Past Medical History:   Diagnosis Date    A-fib     Acute kidney failure     Elevated cholesterol     Hard of hearing     Hyperlipidemia     Hypertension     Renal disorder     Wears hearing aid in both ears     Wears reading eyeglasses      Past Surgical History:   Procedure Laterality Date    AMPUTATION DIGIT Left 2020    Procedure: AMPUTATION LEFT 2ND TOE;  Surgeon: Idania Osborn MD;  Location:  [facility];  Service: Orthopedics    AMPUTATION DIGIT Left 2020    Procedure: TRANSMETATARSAL AMPUTATION LEFT;  Surgeon: Idania Osborn MD;  Location:  [facility];  Service: Orthopedics;  Laterality: Left;    APPENDECTOMY      COLONOSCOPY  2006    FOOT SURGERY Left 2020    (L) transmetatarsal  amputation 12/09/2020 - Dr. Idania Osborn    INCISION AND DRAINAGE LEG Left 10/23/2018    Procedure: INCISION AND DRAINAGE LEFT FOOT;  Surgeon: Idania Osborn MD;  Location:  [facility];  Service: Orthopedics    TOE AMPUTATION      TONSILLECTOMY      VENOUS ACCESS DEVICE (PORT) INSERTION N/A 10/23/2018    Procedure: GROSHONG CATHETER PLACEMENT;  Surgeon: Marquez Simons MD;  Location:  [facility];  Service: General    VENOUS ACCESS DEVICE (PORT) INSERTION N/A 12/9/2020    Procedure: GROSHONG INSERTION;  Surgeon: Antonio Bains MD;  Location:  [facility];  Service: General;  Laterality: N/A;    VENOUS ACCESS DEVICE (PORT) REMOVAL        General Information       Row Name 07/24/23 1047          Physical Therapy Time and Intention    Document Type therapy note (daily note)  -NS     Mode of Treatment physical therapy  -NS       Row Name 07/24/23 1047          General Information    Patient Profile Reviewed yes  -NS     Existing Precautions/Restrictions fall;non-weight bearing  NWB LLE  -NS       Row Name 07/24/23 1047          Cognition    Orientation Status (Cognition) oriented x 4  -NS       Row Name 07/24/23 1047          Safety Issues, Functional Mobility    Safety Issues Affecting Function (Mobility) safety precaution awareness;safety precautions follow-through/compliance;sequencing abilities  -NS     Impairments Affecting Function (Mobility) balance;coordination;endurance/activity tolerance;strength;postural/trunk control  -NS               User Key  (r) = Recorded By, (t) = Taken By, (c) = Cosigned By      Initials Name Provider Type    NS Ivone Figueroa PT Physical Therapist                   Mobility       Row Name 07/24/23 1047          Bed Mobility    Bed Mobility supine-sit  -NS     Supine-Sit [place] (Bed Mobility) standby assist  -NS     Assistive Device (Bed Mobility) head of bed elevated;bed rails  -NS       Row Name 07/24/23 1047          Transfers    Comment, (Transfers) Cues for  sequencing, hand placement, and lining up with chair prior to sitting. Pt cued to avoid touching LLE down to floor when moving stand>sit.  -NS       Row Name 07/24/23 1047          Bed-Chair Transfer    Bed-Chair [place] (Transfers) minimum assist (75% patient effort);2 person assist  -NS     Assistive Device (Bed-Chair Transfers) walker, front-wheeled  -NS       Row Name 07/24/23 1047          Sit-Stand Transfer    Sit-Stand [place] (Transfers) minimum assist (75% patient effort);1 person assist  -NS     Assistive Device (Sit-Stand Transfers) walker, front-wheeled  -NS       Row Name 07/24/23 1047          Gait/Stairs (Locomotion)    [place] Level (Gait) minimum assist (75% patient effort);2 person assist  -NS     Assistive Device (Gait) walker, front-wheeled  -NS     Distance in Feet (Gait) 8  -NS     Deviations/Abnormal Patterns (Gait) david decreased;gait speed decreased  -NS     Bilateral Gait Deviations forward flexed posture  -NS     Comment, (Gait/Stairs) Patient ambulated at slow pace, demonstrating mild R knee buckling and quick fatigue. Close chair follow for safety. Maintained NWB status throughout.  -NS       Row Name 07/24/23 1047          Mobility    Extremity Weight-bearing Status left lower extremity  -NS     Left Lower Extremity (Weight-bearing Status) non weight-bearing (NWB)  -NS               User Key  (r) = Recorded By, (t) = Taken By, (c) = Cosigned By      Initials Name Provider Type    Ivone Ibrahim PT Physical Therapist                   Obj/Interventions       Row Name 07/24/23 1047          Motor Skills    Therapeutic Exercise hip;knee;ankle  -NS       Row Name 07/24/23 1047          Hip (Therapeutic Exercise)    Hip (Therapeutic Exercise) strengthening exercise  -NS     Hip Strengthening (Therapeutic Exercise) bilateral;aBduction;aDduction;marching while seated;10 repetitions  -NS       Row Name 07/24/23 1047          Knee (Therapeutic Exercise)    Knee (Therapeutic  Exercise) strengthening exercise  -NS     Knee Strengthening (Therapeutic Exercise) bilateral;SLR (straight leg raise);heel slides;LAQ (long arc quad);10 repetitions  -NS       Row Name 07/24/23 1047          Ankle (Therapeutic Exercise)    Ankle (Therapeutic Exercise) AROM (active range of motion)  -NS     Ankle AROM (Therapeutic Exercise) right;dorsiflexion;plantarflexion;10 repetitions  -NS       Row Name 07/24/23 1047          Balance    Balance Assessment sitting static balance;sitting dynamic balance;standing static balance;standing dynamic balance  -NS     Static Sitting Balance standby assist  -NS     Dynamic Sitting Balance standby assist  -NS     Position, Sitting Balance unsupported;sitting edge of bed  -NS     Static Standing Balance minimal assist  -NS     Dynamic Standing Balance minimal assist;2-person assist  -NS     Position/Device Used, Standing Balance supported;walker, front-wheeled  -NS               User Key  (r) = Recorded By, (t) = Taken By, (c) = Cosigned By      Initials Name Provider Type    NS Ivone Figueroa, PT Physical Therapist                   Goals/Plan    No documentation.                  Clinical Impression       Row Name 07/24/23 1047          Pain    Pretreatment Pain Rating 0/10 - no pain  -NS     Posttreatment Pain Rating 0/10 - no pain  -NS       Row Name 07/24/23 1047          Plan of Care Review    Plan of Care Reviewed With patient  -NS     Progress improving  -NS     Outcome Evaluation Patient continues to be limited by weakness, decreased functional endurance, and balance impairments. He requires cues for maintaining NWB status during stand>sit. Anticipate that pt will be appropriate to trial knee scooter next session. Recommend IRF at discharge.  -NS       Row Name 07/24/23 1047          Vital Signs    Pre Systolic BP Rehab --  VSS- RN cleared for PT treatment  -NS     Pre Patient Position Supine  -NS     Intra Patient Position Standing  -NS     Post Patient Position  Sitting  -NS       Row Name 07/24/23 1047          Positioning and Restraints    Pre-Treatment Position in bed  -NS     Post Treatment Position chair  -NS     In Chair notified nsg;reclined;call light within reach;encouraged to call for assist;exit alarm on;LLE elevated;waffle cushion;on mechanical lift sling  -NS               User Key  (r) = Recorded By, (t) = Taken By, (c) = Cosigned By      Initials Name Provider Type    Ivone Ibrahim PT Physical Therapist                   Outcome Measures       Row Name 07/24/23 1047          How much help from another person do you currently need...    Turning from your back to your side while in flat bed without using bedrails? 4  -NS     Moving from lying on back to sitting on the side of a flat bed without bedrails? 3  -NS     Moving to and from a bed to a chair (including a wheelchair)? 3  -NS     Standing up from a chair using your arms (e.g., wheelchair, bedside chair)? 3  -NS     Climbing 3-5 steps with a railing? 1  -NS     To walk in hospital room? 2  -NS     AM-PAC 6 Clicks Score (PT) 16  -NS     Highest level of mobility 5 --> Static standing  -NS       Row Name 07/24/23 1047          Functional Assessment    Outcome Measure Options AM-PAC 6 Clicks Basic Mobility (PT)  -NS               User Key  (r) = Recorded By, (t) = Taken By, (c) = Cosigned By      Initials Name Provider Type    Ivone Ibrahim PT Physical Therapist                                 Physical Therapy Education       Title: PT OT SLP Therapies (In Progress)       Topic: Physical Therapy (Done)       Point: Mobility training (Done)       Learning Progress Summary             Patient Acceptance, E, VU,NR by NS at 7/24/2023 1152    Acceptance, E,D, VU,NR by SD at 7/22/2023 1058                         Point: Home exercise program (Done)       Learning Progress Summary             Patient Acceptance, E, VU,NR by NS at 7/24/2023 1152                         Point: Body mechanics (Done)        Learning Progress Summary             Patient Acceptance, E, VU,NR by NS at 7/24/2023 1152    Acceptance, E,D, VU,NR by SD at 7/22/2023 1058                         Point: Precautions (Done)       Learning Progress Summary             Patient Acceptance, E, VU,NR by NS at 7/24/2023 1152    Acceptance, E,D, VU,NR by SD at 7/22/2023 1058                                         User Key       Initials Effective Dates Name Provider Type Discipline    SD 03/13/23 -  Mica Guadarrama, PT Physical Therapist PT    NS 06/16/21 -  Ivone Figueroa PT Physical Therapist PT                  PT Recommendation and Plan     Plan of Care Reviewed With: patient  Progress: improving  Outcome Evaluation: Patient continues to be limited by weakness, decreased functional endurance, and balance impairments. He requires cues for maintaining NWB status during stand>sit. Anticipate that pt will be appropriate to trial knee scooter next session. Recommend IRF at discharge.     Time Calculation:         PT Charges       Row Name 07/24/23 1047             Time Calculation    Start Time 1047  -NS      PT Received On 07/24/23  -NS      PT Goal Re-Cert Due Date 08/01/23  -NS         Timed Charges    38976 - PT Therapeutic Exercise Minutes 16  -NS      14269 - Gait Training Minutes  13  -NS         Total Minutes    Timed Charges Total Minutes 29  -NS       Total Minutes 29  -NS                User Key  (r) = Recorded By, (t) = Taken By, (c) = Cosigned By      Initials Name Provider Type    NS Ivone Figueroa, DORINDA Physical Therapist                  Therapy Charges for Today       Code Description Service Date Service Provider Modifiers Qty    07238420968 HC GAIT TRAINING EA 15 MIN 7/24/2023 Ivone Figueroa, PT GP 1    94049388845 HC PT THER PROC EA 15 MIN 7/24/2023 Ivone Figueroa, PT GP 1    05980848707 HC PT THER SUPP EA 15 MIN 7/24/2023 Ivone Figueroa, PT GP 2            PT G-Codes  Outcome Measure Options: AM-PAC 6 Clicks Basic Mobility (PT)  AM-PAC  6 Clicks Score (PT): 16  AM-PAC 6 Clicks Score (OT): 14  PT Discharge Summary  Anticipated Discharge Disposition (PT): inpatient rehabilitation facility    Ivone Figueroa, DORINDA  7/24/2023

## 2023-07-24 NOTE — THERAPY TREATMENT NOTE
Patient Name: Carlos Culver  : 1945    MRN: [id number]                              Today's Date: 2023       Admit Date: 2023    Visit Dx:     ICD-10-CM ICD-9-CM   1. Bacteremia  R78.81 790.7   2. Elevated blood pressure reading with diagnosis of hypertension  I10 401.9   3. Acute osteomyelitis of left ankle or foot  M86.172 730.07   4. Impaired functional mobility, balance, gait, and endurance  Z74.09 V49.89     Patient Active Problem List   Diagnosis    Cellulitis of left foot    GUCCI (acute kidney injury)    Essential hypertension    Leukocytosis    Atrial fibrillation with RVR    Osteomyelitis of left foot    Chronic anticoagulation    Neuropathy    Elevated transaminase level    CKD (chronic kidney disease) stage 2, GFR 60-89 ml/min    Skin ulcer of right heel    Chronic multifocal osteomyelitis of left foot    Contracture of joint of foot, left    MARTINEZ (dyspnea on exertion)    Neuropathic ulcer, limited to breakdown of skin    Hyperlipidemia LDL goal <100    A-fib    S/P transmetatarsal amputation of foot, left    Leukocytosis, likely reactive    Acute postoperative pain    Alcohol abuse    Sepsis    Staphylococcus aureus bacteremia    Acute osteomyelitis of left ankle or foot     Past Medical History:   Diagnosis Date    A-fib     Acute kidney failure     Elevated cholesterol     Hard of hearing     Hyperlipidemia     Hypertension     Renal disorder     Wears hearing aid in both ears     Wears reading eyeglasses      Past Surgical History:   Procedure Laterality Date    AMPUTATION DIGIT Left 2020    Procedure: AMPUTATION LEFT 2ND TOE;  Surgeon: Idania Osborn MD;  Location:  [email or web address] OR;  Service: Orthopedics    AMPUTATION DIGIT Left 2020    Procedure: TRANSMETATARSAL AMPUTATION LEFT;  Surgeon: Idania Osborn MD;  Location:  [facility] OR;  Service: Orthopedics;  Laterality: Left;    APPENDECTOMY      COLONOSCOPY  2006    FOOT SURGERY Left 2020    (L) transmetatarsal  amputation 12/09/2020 - Dr. Idania Osborn    INCISION AND DRAINAGE LEG Left 10/23/2018    Procedure: INCISION AND DRAINAGE LEFT FOOT;  Surgeon: Idania Osborn MD;  Location:  [facility] OR;  Service: Orthopedics    INCISION AND DRAINAGE LEG Left 7/21/2023    Procedure: FOOT INCISION AND DRAINAGE; REVISION AMPUTATION; ACHILLES LENGTHENING -LEFT;  Surgeon: Mao Mckay MD;  Location:  [facility] OR;  Service: Orthopedics;  Laterality: Left;    TOE AMPUTATION      TONSILLECTOMY      VENOUS ACCESS DEVICE (PORT) INSERTION N/A 10/23/2018    Procedure: GROSHONG CATHETER PLACEMENT;  Surgeon: Marquez Simons MD;  Location:  [facility] OR;  Service: General    VENOUS ACCESS DEVICE (PORT) INSERTION N/A 12/9/2020    Procedure: GROSHONG INSERTION;  Surgeon: Antonio Bains MD;  Location:  [facility] OR;  Service: General;  Laterality: N/A;    VENOUS ACCESS DEVICE (PORT) REMOVAL        General Information       Row Name 07/24/23 1438          OT Time and Intention    Document Type therapy note (daily note)  -ALISA     Mode of Treatment occupational therapy  -ALISA       Row Name 07/24/23 [facility]8          General Information    Patient Profile Reviewed yes  -ALISA     Existing Precautions/Restrictions fall;non-weight bearing;other (see comments)  NWB LLE  -ALISA     Barriers to Rehab medically complex;hearing deficit  -ALISA       Row Name 07/24/23 1438          Cognition    Orientation Status (Cognition) oriented x 4  -ALISA       Row Name 07/24/23 1438          Safety Issues, Functional Mobility    Safety Issues Affecting Function (Mobility) safety precaution awareness;safety precautions follow-through/compliance  -ALISA     Impairments Affecting Function (Mobility) balance;coordination;endurance/activity tolerance;postural/trunk control;strength  -ALISA     Cognitive Impairments, Mobility Safety/Performance insight into deficits/self-awareness;judgment;problem-solving/reasoning;safety precaution awareness;safety precaution follow-through  -ALISA                User Key  (r) = Recorded By, (t) = Taken By, (c) = Cosigned By      Initials Name Provider Type    Megan Pineda, OT Occupational Therapist                     Mobility/ADL's       Row Name 07/24/23 1442          Bed Mobility    Comment, (Bed Mobility) Received C and preferred to remain in chair at close of OT session  -ALISA       Row Name 07/24/23 1442          Transfers    Transfers toilet transfer  -ALISA       Row Name 07/24/23 1442          Toilet Transfer    Type (Toilet Transfer) stand pivot/stand step  -ALISA     Assistive Device (Toilet Transfer) commode, bedside without drop arms  -ALISA     Comment, (Toilet Transfer) Demo provided for transfer on/off BSC from EOB with patient verbalizing understanding of pre-positioning, weight shifting and body mechanics.  -ALISA       Row Name 07/24/23 1442          Activities of Daily Living    BADL Assessment/Intervention lower body dressing;toileting  -ALISA       Row Name 07/24/23 1442          Mobility    Extremity Weight-bearing Status left lower extremity   -ALISA     Left Lower Extremity (Weight-bearing Status) non weight-bearing (NWB)   -ALISA       Row Name 07/24/23 1442          Lower Body Dressing Assessment/Training    Carey Level (Lower Body Dressing) don;doff;pants/bottoms  -ALISA     Position (Lower Body Dressing) edge of bed sitting  -ALISA     Comment, (Lower Body Dressing) Demo provided for donning/doffing pants using seated weight shift to manage clothing over hips with pt verbalizing understanding.  -ALISA       Row Name 07/24/23 1442          Toileting Assessment/Training    Carey Level (Toileting) adjust/manage clothing;perform perineal hygiene  -ALISA     Assistive Devices (Toileting) commode, bedside without drop arms  -ALISA     Position (Toileting) unsupported sitting  -ALISA     Comment, (Toileting) Demo provided regarding clothing management and hygiene while seated on commode to maintain LLE NWB precautions with pt verbalizing understanding.  -ALISA                User Key  (r) = Recorded By, (t) = Taken By, (c) = Cosigned By      Initials Name Provider Type    Megan Pineda OT Occupational Therapist                   Obj/Interventions    No documentation.                  Goals/Plan    No documentation.                  Clinical Impression       Row Name 07/24/23 1555          Pain Assessment    Pretreatment Pain Rating 0/10 - no pain  -ALISA     Posttreatment Pain Rating 0/10 - no pain  -ALISA       Row Name 07/24/23 1554          Plan of Care Review    Plan of Care Reviewed With patient  -ALISA     Progress improving  -     Outcome Evaluation Education provided to patient regarding LBD, commode transfers, and toileting tasks with LLE NWB precaution with pt verbalizing understanding following demonstration with ongoing practice needed. Pt continues to be limited by generalized weakness, decreased activity tolerance, and impaired balance. Recommend IRF at discharge.  -[facility] Name 07/24/23 1553          Therapy Plan Review/Discharge Plan (OT)    Anticipated Discharge Disposition (OT) inpatient rehabilitation facility  -ALISA       Row Name 07/24/23 1553          Vital Signs    O2 Delivery Pre Treatment room air  -ALISA     O2 Delivery Intra Treatment room air  -ALISA     O2 Delivery Post Treatment room air  -ALISA     Pre Patient Position Sitting  -ALISA     Intra Patient Position Sitting  -ALISA     Post Patient Position Sitting  -ALISA       Row Name 07/24/23 1553          Positioning and Restraints    Pre-Treatment Position sitting in chair/recliner  -ALISA     Post Treatment Position chair  -ALISA     In Chair notified nsg;reclined;call light within reach;encouraged to call for assist;exit alarm on;waffle cushion;on mechanical lift sling;legs elevated;LLE elevated  -ALISA               User Key  (r) = Recorded By, (t) = Taken By, (c) = Cosigned By      Initials Name Provider Type    Megan Pineda OT Occupational Therapist                   Outcome Measures       Row Name 07/24/23 1600           How much help from another is currently needed...    Putting on and taking off regular lower body clothing? 2  -ALISA     Bathing (including washing, rinsing, and drying) 2  -ALISA     Toileting (which includes using toilet bed pan or urinal) 2  -ALISA     Putting on and taking off regular upper body clothing 2  -ALISA     Taking care of personal grooming (such as brushing teeth) 3  -ALISA     Eating meals 4  -ALISA     AM-PAC 6 Clicks Score (OT) 15  -ALISA       Row Name 07/24/23 1047          How much help from another person do you currently need...    Turning from your back to your side while in flat bed without using bedrails? 4  -NS     Moving from lying on back to sitting on the side of a flat bed without bedrails? 3  -NS     Moving to and from a bed to a chair (including a wheelchair)? 3  -NS     Standing up from a chair using your arms (e.g., wheelchair, bedside chair)? 3  -NS     Climbing 3-5 steps with a railing? 1  -NS     To walk in hospital room? 2  -NS     AM-PAC 6 Clicks Score (PT) 16  -NS     Highest level of mobility 5 --> Static standing  -NS       Row Name 07/24/23 1600 07/24/23 1047       Functional Assessment    Outcome Measure Options AM-PAC 6 Clicks Daily Activity (OT)  -ALISA AM-PAC 6 Clicks Basic Mobility (PT)  -NS              User Key  (r) = Recorded By, (t) = Taken By, (c) = Cosigned By      Initials Name Provider Type    Ivone Ibrahim, PT Physical Therapist    Megan Pineda OT Occupational Therapist                    Occupational Therapy Education       Title: PT OT SLP Therapies (In Progress)       Topic: Occupational Therapy (In Progress)       Point: ADL training (Done)       Description:   Instruct learner(s) on proper safety adaptation and remediation techniques during self care or transfers.   Instruct in proper use of assistive devices.                  Learning Progress Summary             Patient Acceptance, E,D, VU,NR by ALISA at 7/24/2023 1601    Comment: LBD, commode transfers, STS, toileting  tasks with LLE NWB precautions    Acceptance, E, NR by  at 7/22/2023 1000    Comment: Educated pt regarding role of therapy and ongoing treatment plan                         Point: Home exercise program (In Progress)       Description:   Instruct learner(s) on appropriate technique for monitoring, assisting and/or progressing therapeutic exercises/activities.                  Learning Progress Summary             Patient Acceptance, E, NR by  at 7/22/2023 1000    Comment: Educated pt regarding role of therapy and ongoing treatment plan                         Point: Precautions (Done)       Description:   Instruct learner(s) on prescribed precautions during self-care and functional transfers.                  Learning Progress Summary             Patient Acceptance, E,D, VU,NR by ALISA at 7/24/2023 1601    Comment: LBD, commode transfers, STS, toileting tasks with LLE NWB precautions                         Point: Body mechanics (Done)       Description:   Instruct learner(s) on proper positioning and spine alignment during self-care, functional mobility activities and/or exercises.                  Learning Progress Summary             Patient Acceptance, E,D, VU,NR by ALISA at 7/24/2023 1601    Comment: LBD, commode transfers, STS, toileting tasks with LLE NWB precautions                                         User Key       Initials Effective Dates Name Provider Type Discipline     02/03/23 -  Safia Mendez, OT Occupational Therapist OT    ALISA 06/16/21 -  Meagn Ramirez OT Occupational Therapist OT                  OT Recommendation and Plan     Plan of Care Review  Plan of Care Reviewed With: patient  Progress: improving  Outcome Evaluation: Education provided to patient regarding LBD, commode transfers, and toileting tasks with LLE NWB precaution with pt verbalizing understanding following demonstration with ongoing practice needed. Pt continues to be limited by generalized weakness, decreased activity  tolerance, and impaired balance. Recommend IRF at discharge.     Time Calculation:         Time Calculation- OT       Row Name 07/24/23 1355 07/24/23 1047          Time Calculation- OT    OT Start Time 1355  -ALISA --     OT Received On 07/24/23  -ALISA --        Timed Charges    28767 - Gait Training Minutes  -- 13  -NS     43449 - OT Self Care/Mgmt Minutes 30  -ALISA --        Total Minutes    Timed Charges Total Minutes 30  -ALISA 13  -NS      Total Minutes 30  -ALISA 13  -NS               User Key  (r) = Recorded By, (t) = Taken By, (c) = Cosigned By      Initials Name Provider Type    NS Ivone Figueroa, PT Physical Therapist    Megan Pineda, OT Occupational Therapist                  Therapy Charges for Today       Code Description Service Date Service Provider Modifiers Qty    96171096354 HC OT SELF CARE/MGMT/TRAIN EA 15 MIN 7/24/2023 Megan Ramirez OT GO 2                 Megan Ramirez OT  7/24/2023

## 2023-07-24 NOTE — PLAN OF CARE
Goal Outcome Evaluation:              Outcome Evaluation: Pt is A&O with VSS, A-Fib on the monitor, and on RA. Pt complained of pain in his lower back that was relieved with PRN medication. Left leg cast is clean, dry, and intact and the pt hasn't complained of warmth, itching, or pain. No other complaints at this time.

## 2023-07-24 NOTE — CASE MANAGEMENT/SOCIAL WORK
Continued Stay Note   Nima     Patient Name: Carlos Culver  MRN: [id number]  Today's Date: 7/24/2023    Admit Date: 7/19/2023    Plan: IRF   Discharge Plan       Row Name 07/24/23 1539       Plan    Plan IRF    Patient/Family in Agreement with Plan yes    Plan Comments Spoke with patient at bedside. Therapy recommends IRF. Plan is [facility]. Patient is agreeable with plan. Referral called to April. CM will continue to follow.    Final Discharge Disposition Code 62 - inpatient rehab facility                   Discharge Codes    No documentation.                 Expected Discharge Date and Time       Expected Discharge Date Expected Discharge Time    Jul 25, 2023               Janet Callejas RN

## 2023-07-24 NOTE — PLAN OF CARE
Goal Outcome Evaluation:  Plan of Care Reviewed With: patient        Progress: improving  Outcome Evaluation: Education provided to patient regarding LBD, commode transfers, and toileting tasks with LLE NWB precaution with pt verbalizing understanding following demonstration with ongoing practice needed. Pt continues to be limited by generalized weakness, decreased activity tolerance, and impaired balance. Recommend IRF at discharge.      Anticipated Discharge Disposition (OT): inpatient rehabilitation facility

## 2023-07-24 NOTE — PROGRESS NOTES
"          Orthopaedic Surgery Progress Note    LOS: 5 days   Patient Care Team:  Marcus Cheng MD as PCP - General  Marcus Cheng MD as PCP - Family Medicine  Jeffery Young MD as Consulting Physician (Infectious Diseases)  Hang Anderson MD as Consulting Physician (Cardiology)  3 Days Post-Op   Procedure(s):  LEFT FOOT INCISION AND DRAINAGE; REVISION AMPUTATION; ACHILLES LENGTHENING  Subjective   Interval History:   Resting comfortably in bed.  No acute events overnight.  Denies pain.  States working on possible discharge to [facility].  Working with PT.    Objective     Vital Signs:  Temp (24hrs), Av.6 °F (36.4 °C), Min:97.5 °F (36.4 °C), Max:97.8 °F (36.6 °C)    /81 (BP Location: Left arm, Patient Position: Lying)   Pulse 78   Temp 97.7 °F (36.5 °C) (Axillary)   Resp 18   Ht 181.6 cm (71.5\")   Wt 99.8 kg (220 lb)   SpO2 97%   BMI 30.26 kg/m²   Body mass index is 30.26 kg/m².    Labs:  Lab Results (last 24 hours)       Procedure Component Value Units Date/Time    Anaerobic Culture - Wound, Foot, Left [[id number]]  (Normal) Collected: 23 1341    Specimen: Wound from Foot, Left Updated: 23 0800     Anaerobic Culture No anaerobes isolated at 3 days    Anaerobic Culture - Bone, Foot, Left [[id number]]  (Normal) Collected: 23 1350    Specimen: Bone from Foot, Left Updated: 23 0800     Anaerobic Culture No anaerobes isolated at 3 days    Blood Culture - Blood, Hand, Right [[id number]]  (Normal) Collected: 23 1120    Specimen: Blood from Hand, Right Updated: 23 1245     Blood Culture No growth at 3 days    Blood Culture - Blood, Hand, Left [[id number]]  (Normal) Collected: 23 1110    Specimen: Blood from Hand, Left Updated: 23 1245     Blood Culture No growth at 3 days    Tissue / Bone Culture - Bone, Foot, Left [[id number]]  (Abnormal) Collected: 23 1350    Specimen: Bone from Foot, Left Updated: 23 1007     Tissue Culture " Light growth (2+) Staphylococcus aureus     Gram Stain Few (2+) WBCs seen      No organisms seen    Narrative:      Refer to previous wound culture collected on 07/21/2023 1341 for [facility].      Wound Culture - Wound, Foot, Left [[id number]]  (Abnormal)  (Susceptibility) Collected: 07/21/23 1341    Specimen: Wound from Foot, Left Updated: 07/23/23 1007     Wound Culture Light growth (2+) Staphylococcus aureus     Gram Stain Many (4+) WBCs seen      No organisms seen    Susceptibility        Staphylococcus aureus      KELIN      Clindamycin Resistant      Erythromycin Resistant      Inducible Clindamycin Resistance Positive      Oxacillin Susceptible      Rifampin Susceptible      Tetracycline Susceptible      Trimethoprim + Sulfamethoxazole Susceptible      Vancomycin Susceptible                       Susceptibility Comments       Staphylococcus aureus    This isolate is presumed to be clindamycin resistant based on detection of inducible clindamycin resistance.  Clindamycin may still be effective in some patients.                       Physical Exam:  left LE: splint CDI, compartments soft/compressible around splint, no calf tenderness       Assessment/Plan:  3 Days Post-Op status post:  LEFT FOOT INCISION AND DRAINAGE; REVISION AMPUTATION; ACHILLES LENGTHENING  -NWB operative extremity  -Advance diet as tolerated  -Keep splint/operative dressing clean and dry  -DVT prophylaxis, mechanical and chemical, resume home dose of Eliquis on discharge  -Continue antibiotics per ID recommendations  -Pain control  -Staph aureus growing from both bone and tissue cultures  -Elevate operative extremity  -No plans for further orthopedic intervention during this hospital stay  -Rest of management per primary team     Mao Mckay MD  07/24/23  08:24 EDT

## 2023-07-24 NOTE — PLAN OF CARE
Goal Outcome Evaluation:  Plan of Care Reviewed With: patient        Progress: improving  Outcome Evaluation: Patient continues to be limited by weakness, decreased functional endurance, and balance impairments. He requires cues for maintaining NWB status during stand>sit. Anticipate that pt will be appropriate to trial knee scooter next session. Recommend IRF at discharge.      Anticipated Discharge Disposition (PT): inpatient rehabilitation facility

## 2023-07-25 LAB
BACTERIA SPEC AEROBE CULT: NORMAL
BACTERIA SPEC AEROBE CULT: NORMAL
DEPRECATED RDW RBC AUTO: 46.3 FL (ref 37–54)
ERYTHROCYTE [DISTWIDTH] IN BLOOD BY AUTOMATED COUNT: 13.6 % (ref 12.3–15.4)
HCT VFR BLD AUTO: 30.4 % (ref 37.5–51)
HGB BLD-MCNC: 9.6 G/DL (ref 13–17.7)
MCH RBC QN AUTO: 30 PG (ref 26.6–33)
MCHC RBC AUTO-ENTMCNC: 31.6 G/DL (ref 31.5–35.7)
MCV RBC AUTO: 95 FL (ref 79–97)
PLATELET # BLD AUTO: 305 10*3/MM3 (ref 140–450)
PMV BLD AUTO: 9.5 FL (ref 6–12)
RBC # BLD AUTO: 3.2 10*6/MM3 (ref 4.14–5.8)
WBC NRBC COR # BLD: 16.4 10*3/MM3 (ref 3.4–10.8)

## 2023-07-25 PROCEDURE — 25010000002 ENOXAPARIN PER 10 MG: Performed by: ORTHOPAEDIC SURGERY

## 2023-07-25 PROCEDURE — 99024 POSTOP FOLLOW-UP VISIT: CPT | Performed by: ORTHOPAEDIC SURGERY

## 2023-07-25 PROCEDURE — 97530 THERAPEUTIC ACTIVITIES: CPT

## 2023-07-25 PROCEDURE — 99232 SBSQ HOSP IP/OBS MODERATE 35: CPT | Performed by: STUDENT IN AN ORGANIZED HEALTH CARE EDUCATION/TRAINING PROGRAM

## 2023-07-25 PROCEDURE — 85027 COMPLETE CBC AUTOMATED: CPT | Performed by: STUDENT IN AN ORGANIZED HEALTH CARE EDUCATION/TRAINING PROGRAM

## 2023-07-25 PROCEDURE — 25010000002 CEFAZOLIN IN DEXTROSE 2-4 GM/100ML-% SOLUTION: Performed by: ORTHOPAEDIC SURGERY

## 2023-07-25 RX ADMIN — ATORVASTATIN CALCIUM 80 MG: 40 TABLET, FILM COATED ORAL at 20:44

## 2023-07-25 RX ADMIN — Medication 2000 MG: at 11:33

## 2023-07-25 RX ADMIN — METOPROLOL TARTRATE 50 MG: 50 TABLET ORAL at 20:44

## 2023-07-25 RX ADMIN — Medication 10 ML: at 20:44

## 2023-07-25 RX ADMIN — ACETAMINOPHEN 650 MG: 325 TABLET ORAL at 01:53

## 2023-07-25 RX ADMIN — ACETAMINOPHEN 650 MG: 325 TABLET ORAL at 22:24

## 2023-07-25 RX ADMIN — ACETAMINOPHEN 650 MG: 325 TABLET ORAL at 13:57

## 2023-07-25 RX ADMIN — METOPROLOL TARTRATE 50 MG: 50 TABLET ORAL at 08:26

## 2023-07-25 RX ADMIN — Medication 10 ML: at 08:26

## 2023-07-25 RX ADMIN — Medication 2000 MG: at 20:45

## 2023-07-25 RX ADMIN — Medication 2000 MG: at 04:14

## 2023-07-25 RX ADMIN — ENOXAPARIN SODIUM 40 MG: 100 INJECTION SUBCUTANEOUS at 13:57

## 2023-07-25 NOTE — PROGRESS NOTES
[facility]    INFECTIOUS DISEASE PROGRESS NOTE    Carlos Culver  1945  [id number]    Consult: 7/19/23    Admit date: 7/19/2023    Requesting Provider: No ref. provider found  Evaluating physician: Jeffery Young MD  Reason for Consultation: MSSA sepsis with bacteremia with 4 out of 4 blood cultures positive by PCR from 7/18/2023, left foot osteomyelitis, Abscess  Chief Complaint: Above      Subjective   History of present illness:  Patient is a  77 y.o.  Yr old male with a history of peripheral neuropathy, hard of hearing, hyperlipidemia, essential hypertension, previous left transmetatarsal amputation for osteomyelitis, who developed a left plantar ulcer in April 2023 after working out on an elliptical exercise machine.  His ulceration has persisted off and on and he was treated briefly with oral antibiotics by Dr. Rajat SWANSON.P.MEunice at [facility].  Patient still had persistence of his left plantar wound.  Also complained of some unmeasured fevers and fatigue over the past few weeks.  He was evaluated in clinic and had blood cultures obtained and laboratories.  He had no other localizing signs or symptoms of infection.  Blood cultures drawn on 7/18 were positive by PCR for methicillin sensitive Staph aureus in 4 out of 4 bottles.  White blood cell count was 17.  Patient was directed to be admitted to [facility] on 7/19/2023.  I was consulted on 7/19/2023 for further evaluation and treatment.    7/20/2023 history reviewed.  Awaiting orthopedic evaluation of left foot.  No high fever.  Tolerating cefazolin for MSSA sepsis.    7/21/2023 history reviewed.  Awaits I&D of his left foot today.  No high fever.  On cefazolin for MSSA sepsis and likely left foot abscess and osteomyelitis.  Duration to be determined.    7/22/2023 history reviewed.  Status post partial amputation left foot with debridement for abscess and osteomyelitis on 7/21.  On  cefazolin for MSSA sepsis and osteomyelitis to continue until 9/1/2023, then likely oral antibiotics for an additional 3 months.  No high fever.  No pain with his peripheral neuropathy.    7/24/2023 history reviewed.  Continues on cefazolin until 9/1/2023, then oral antibiotics for an additional 3 months for MSSA sepsis and left foot osteomyelitis status post debridement and partial amputation on 7/21.  No high fever.  Awaits placement to rehab.    7/25/2023 history reviewed.  Tolerating cefazolin until 9/1 for left foot osteomyelitis and abscess, then oral antibiotics for an additional 3 months.  Status post partial amputation 7/21.  No high fever.  Awaits transfer to [facility].    Past Medical History:   Diagnosis Date    A-fib     Acute kidney failure     Elevated cholesterol     Hard of hearing     Hyperlipidemia     Hypertension     Renal disorder     Wears hearing aid in both ears     Wears reading eyeglasses        Past Surgical History:   Procedure Laterality Date    AMPUTATION DIGIT Left 1/7/2020    Procedure: AMPUTATION LEFT 2ND TOE;  Surgeon: Idania Osborn MD;  Location:  [facility];  Service: Orthopedics    AMPUTATION DIGIT Left 12/8/2020    Procedure: TRANSMETATARSAL AMPUTATION LEFT;  Surgeon: Idania Osborn MD;  Location:  [facility];  Service: Orthopedics;  Laterality: Left;    APPENDECTOMY      COLONOSCOPY  2006    FOOT SURGERY Left 12/09/2020    (L) transmetatarsal amputation 12/09/2020 - Dr. Idania Osborn    INCISION AND DRAINAGE LEG Left 10/23/2018    Procedure: INCISION AND DRAINAGE LEFT FOOT;  Surgeon: Idania Osborn MD;  Location:  [facility];  Service: Orthopedics    INCISION AND DRAINAGE LEG Left 7/21/2023    Procedure: FOOT INCISION AND DRAINAGE; REVISION AMPUTATION; ACHILLES LENGTHENING -LEFT;  Surgeon: Mao Mckay MD;  Location:  [facility];  Service: Orthopedics;  Laterality: Left;    TOE AMPUTATION      TONSILLECTOMY      VENOUS ACCESS DEVICE (PORT) INSERTION N/A  10/23/2018    Procedure: GROSHONG CATHETER PLACEMENT;  Surgeon: Marquez Simons MD;  Location:  [facility];  Service: General    VENOUS ACCESS DEVICE (PORT) INSERTION N/A 2020    Procedure: GROSHONG INSERTION;  Surgeon: Antonio Bains MD;  Location:  [facility];  Service: General;  Laterality: N/A;    VENOUS ACCESS DEVICE (PORT) REMOVAL         Pediatric History   Patient Parents    Not on file     Other Topics Concern    Not on file   Social History Narrative    Not on file   , 3 children, no pets, 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  Father  of cancer, mother  of old age and cardiac disease, 1 brother, mother also had hypothyroidism  Allergies   Allergen Reactions    Sulfa Antibiotics Unknown (See Comments)     Pt was told as child he had an allergy.  Has not taken and doesn't know the response       Immunization History   Administered Date(s) Administered    COVID-19 (PFIZER) BIVALENT 12+YRS 10/19/2022    COVID-19 (PFIZER) Purple Cap Monovalent 2021, 2021, 10/02/2021    Covid-19 (Pfizer) Gray Cap Monovalent 2022       Medication:    Current Facility-Administered Medications:     acetaminophen (TYLENOL) tablet 650 mg, 650 mg, Oral, Q4H PRN, 650 mg at 23 0153 **OR** acetaminophen (TYLENOL) suppository 650 mg, 650 mg, Rectal, Q4H PRN, Mao Mckay MD    atorvastatin (LIPITOR) tablet 80 mg, 80 mg, Oral, Nightly, Mao Mcaky MD, 80 mg at 23    sennosides-docusate (PERICOLACE) 8.6-50 MG per tablet 2 tablet, 2 tablet, Oral, BID, 2 tablet at 23 0931 **AND** polyethylene glycol (MIRALAX) packet 17 g, 17 g, Oral, Daily PRN **AND** bisacodyl (DULCOLAX) EC tablet 5 mg, 5 mg, Oral, Daily PRN **AND** bisacodyl (DULCOLAX) suppository 10 mg, 10 mg, Rectal, Daily PRN, Mao Mckay MD    bisacodyl (DULCOLAX) suppository 10 mg, 10 mg, Rectal, Daily PRN, Mao Mckay MD    Calcium Replacement - Follow Nurse / BPA Driven  Protocol, , Does not apply, PRNely PADILLA Jeremy, MD    ceFAZolin in dextrose (ANCEF) IVPB solution 2,000 mg, 2,000 mg, Intravenous, Q8H, Mao Mckay MD, 2,000 mg at 07/25/23 0414    Enoxaparin Sodium (LOVENOX) syringe 40 mg, 40 mg, Subcutaneous, Q24H, Mao Mckay MD, 40 mg at 07/24/23 1508    HYDROmorphone (DILAUDID) injection 0.5 mg, 0.5 mg, Intravenous, Q2H PRN **AND** naloxone (NARCAN) injection 0.1 mg, 0.1 mg, Intravenous, Q5 Min PRN, Mao Mckay MD    Magnesium Standard Dose Replacement - Follow Nurse / BPA Driven Protocol, , Does not apply, Nely JIMENEZ Jeremy, MD    melatonin tablet 5 mg, 5 mg, Oral, Nightly PRN, Mao Mckay MD, 5 mg at 07/22/23 2045    metoprolol tartrate (LOPRESSOR) tablet 50 mg, 50 mg, Oral, BID, Tianna Gallagher MD, 50 mg at 07/25/23 0826    morphine injection 2 mg, 2 mg, Intravenous, Q2H PRN **AND** naloxone (NARCAN) injection 0.4 mg, 0.4 mg, Intravenous, Q5 Min PRN, Mao Mckay MD    ondansetron (ZOFRAN) tablet 4 mg, 4 mg, Oral, Q6H PRN **OR** ondansetron (ZOFRAN) injection 4 mg, 4 mg, Intravenous, Q6H PRN, Mao Mckay MD    oxyCODONE (ROXICODONE) immediate release tablet 5 mg, 5 mg, Oral, Q4H PRN, Mao Mckay MD, 5 mg at 07/24/23 0128    Phosphorus Replacement - Follow Nurse / BPA Driven Protocol, , Does not apply, Nely JIMENEZ Jeremy, MD    Potassium Replacement - Follow Nurse / BPA Driven Protocol, , Does not apply, Nely JIMENEZ Jeremy, MD    [COMPLETED] Insert Peripheral IV, , , Once **AND** sodium chloride 0.9 % flush 10 mL, 10 mL, Intravenous, PRN, Mao Mckay MD    sodium chloride 0.9 % flush 10 mL, 10 mL, Intravenous, Q12H, Jeffery Young MD, 10 mL at 07/25/23 0826    sodium chloride 0.9 % flush 10 mL, 10 mL, Intravenous, PRN, Jeffery Young MD    sodium chloride 0.9 % infusion 40 mL, 40 mL, Intravenous, Nely JIMENEZ Jeremy, MD    sodium chloride 0.9 % infusion 40 mL, 40 mL, Intravenous, PRN, Mao Mckay MD    Please refer to the medical record for a  "full medication list    Review of Systems:    Constitutional--some low-grade fever, no chills or sweats.  Appetite good, and no malaise.  Some fatigue.  HEENT-- No new vision, hearing or throat complaints.  No epistaxis or oral sores.  Denies odynophagia or dysphagia.  No odynophagia or dysphagia. No headache, photophobia or neck stiffness.  CV-- No chest pain, palpitation or syncope  Resp-- No SOB/cough/Hemoptysis  GI- No nausea, vomiting, or diarrhea.  No hematochezia, melena, or hematemesis. Denies jaundice or chronic liver disease.  -- No dysuria, hematuria, or flank pain.  Denies hesitancy, urgency or flank pain.  Lymph- no swollen lymph nodes in neck/axilla or groin.   Heme- No active bruising or bleeding; no Hx of DVT or PE.  MS-- no swelling or pain in the bones or joints of arms/legs.  No new back pain.  Neuro-- No acute focal weakness or numbness in the arms or legs.  No seizures.  Skin--No rashes or lesions, except left foot    Physical Exam:   Vital Signs   Temp:  [97.5 °F (36.4 °C)-98.1 °F (36.7 °C)] 98.1 °F (36.7 °C)  Heart Rate:  [] 76  Resp:  [16-18] 16  BP: (115-135)/(68-94) 135/85    Temp  Min: 97.5 °F (36.4 °C)  Max: 98.1 °F (36.7 °C)  BP  Min: 115/79  Max: 135/85  Pulse  Min: 72  Max: 104  Resp  Min: 16  Max: 18  SpO2  Min: 94 %  Max: 97 %    Blood pressure 135/85, pulse 76, temperature 98.1 °F (36.7 °C), temperature source Oral, resp. rate 16, height 181.6 cm (71.5\"), weight 99.8 kg (220 lb), SpO2 96 %.  GENERAL: Awake and alert, in moderate distress. Appears older than stated age.  Resting in chair, watching TV.  HEENT:  Normocephalic, atraumatic.  Oropharynx without thrush. Dentition in good repair. No cervical adenopathy. No neck masses.  Ears externally normal, Nose externally normal.  EYES:  No conjunctival injection. No icterus. EOM full.  LYMPHATICS: No lymphadenopathy of the neck or axillary or inguinal regions.   HEART: No murmur, gallop, or pericardial friction rub. Reg rate " rhythm, No JVD at 45 degrees.    LUNGS: Clear to auscultation and percussion. No respiratory distress, no use of accessory muscles.  ABDOMEN: Soft, nontender, nondistended. No appreciable HSM.  Bowel sounds normal.  Slightly obese.  No mass.  SKIN: Warm and dry without cutaneous eruptions.  No nodules.  Left foot with s surgical dressing in place.  PSYCHIATRIC: Mental status lucid. No confusion.  EXT:  No cellulitic change.  NEURO: Oriented to name, nonfocal.      Results Review:   I reviewed the patient's new clinical results.  I reviewed the patient's new imaging results and agree with the interpretation.  I reviewed the patient's other test results and agree with the interpretation    Results from last 7 days   Lab Units 07/23/23  0402 07/22/23  0517 07/21/23  0443   WBC 10*3/mm3 16.37* 12.85* 13.48*   HEMOGLOBIN g/dL 9.0* 8.6* 10.0*   HEMATOCRIT % 28.3* 25.8* 30.9*   PLATELETS 10*3/mm3 264 232 267       Results from last 7 days   Lab Units 07/23/23  0402   SODIUM mmol/L 137   POTASSIUM mmol/L 4.3   CHLORIDE mmol/L 105   CO2 mmol/L 20.0*   BUN mg/dL 21   CREATININE mg/dL 0.92   GLUCOSE mg/dL 144*   CALCIUM mg/dL 7.6*       Results from last 7 days   Lab Units 07/22/23  0517   ALK PHOS U/L 158*   BILIRUBIN mg/dL 0.8   ALT (SGPT) U/L 11   AST (SGOT) U/L 62*       Results from last 7 days   Lab Units 07/18/23  1205   SED RATE mm/hr 118*       Results from last 7 days   Lab Units 07/18/23  1205   CRP mg/dL 27.88*           Results from last 7 days   Lab Units 07/22/23  0517   LACTATE mmol/L 1.5       Estimated Creatinine Clearance: 80.3 mL/min (by C-G formula based on SCr of 0.92 mg/dL).  CPK          7/22/2023    05:17   Common Labsle   Creatine Kinase 77       Procalitonin Results:      Lab 07/21/23  0443 07/20/23  0351 07/19/23  1034   PROCALCITONIN 0.54* 0.73* 0.75*        Brief Urine Lab Results  (Last result in the past 365 days)        Color   Clarity   Blood   Leuk Est   Nitrite   Protein   CREAT   Urine HCG         07/19/23 0955 Orange   Clear   Negative   Negative   Negative   Trace                  No results found for: SITE, ALLENTEST, PHART, SMK3UOD, PO2ART, CLR7XNV, BASEEXCESS, K4SUDXJT, HGBBG, HCTABG, OXYHEMOGLOBI, METHHGBN, CARBOXYHGB, CO2CT, BAROMETRIC, MODALITY, FIO2     Microbiology:  No results found for: ACANTHNAEG, AFBCX, BPERTUSSISCX, BLOODCX  No results found for: BCIDPCR, CXREFLEX, CSFCX, CULTURETIS  No results found for: CULTURES, HSVCX, URCX  No results found for: EYECULTURE, GCCX, HSVCULTURE, LABHSV  No results found for: LEGIONELLA, MRSACX, MUMPSCX, MYCOPLASCX  No results found for: NOCARDIACX, STOOLCX  Urine Culture   Date Value Ref Range Status   07/18/2023 No growth  Preliminary     Wound Culture   Date Value Ref Range Status   07/18/2023 Culture in progress  Preliminary        Urine Culture   Date Value Ref Range Status   07/18/2023 No growth  Preliminary     Wound Culture   Date Value Ref Range Status   07/18/2023 Culture in progress  Preliminary   (    Blood cultures 7/18/2023 reported positive in 4 out of 4 bottles by PCR for MSSA    Radiology:  Imaging Results (Last 72 Hours)       Procedure Component Value Units Date/Time    XR Chest 1 View [[id number]] Collected: 07/22/23 1506     Updated: 07/22/23 1510    Narrative:      XR CHEST 1 VW    Date of Exam: 7/22/2023 2:35 PM EDT    Indication: PICC line placement    Comparison: 7/19/2023.    Findings:  There is a right upper extremity PICC line in place with the tip terminating in the superior vena cava. The heart is slightly enlarged. The pulmonary vascular markings are normal. The lungs and pleural spaces are clear of active disease. There are   chronic age-related changes involving the bony thorax and thoracic aorta.      Impression:      Impression:    1. The tip of the right upper extremity PICC line terminates in the superior vena cava.  2. Mild cardiomegaly.  3. No active pulmonary disease.      Electronically Signed: Yuan Burnett     7/22/2023 3:06 PM EDT    Workstation ID: [id number]            IMPRESSION:     1.  MSSA (sensitive to tetracycline and sulfa) high-grade sepsis with bacteremia in 4 out of 4 bottles drawn from 7/18/2023.  Most likely source is left foot osteomyelitis and abscess (MRI 7/19/23) although unusual to have such high-grade bacteremia.  Could have seeded an intravascular source including endocarditis.  No chronic indwelling prosthetic device.  Unlikely to be contaminant.  Repeat blood culture 7/19 positive in 1 out of 2 sets, for MSSA, blood cultures 7/20 negative.  TTE 7-19 was negative for endocarditis.  EF 60%.  2.  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  High risk for underlying osteomyelitis.  Likely a function of his peripheral neuropathy and some noncompliance with nonweightbearing status.  , CRP 22.  MRI consistent with abscess and osteomyelitis left foot.  3.  Leukocytosis, neutrophilic related to above issues.  Normally worse.  4.  Hyponatremia.  Resolved.  5.  Elevated LFTs with alkaline phosphatase 162, on ongoing, bilirubin 1.6, and AST 84, continues.  May be related to sepsis versus other.  Alcohol-related disease?  CT of the abdomen and pelvis consistent with that liver.  Biliary track okay.  6.  Severe peripheral neuropathy, impacting all aspects of his care.  7.  Mild pyuria, 6-12 wbc on 7/18, asymptomatic.  May be related to above.  Urine culture negative to date.  8.  Anemia, chronic disease, ongoing and acute postop blood loss.  9.  Hypocalcemia 7.6, new.    Plan:    1.  Diagnostically, continue to follow patient's physical exam, CBC, CMP, CRP, CPK, repeat blood cultures x2.    2.  Therapeutically, continue cefazolin 2 g IV every 8 hours until 9/1/2023 for 6 weeks, then oral for 3 months.  Discussed previously with Reynaldo in the microbiology lab.  3.  Supportive care.  4.  Ortho Dr. Mao Mckay I&D status post partial  amputation on 7/21 left foot.    Case management orders: Please arrange for home antibiotics or skilled facility or rehab with cefazolin 2 g IV every 8 hours until 9/1/2023 for left foot osteomyelitis with MSSA.  Check CBC, CMP, CRP weekly while on IV antibiotics.  Fax orders to [phone], call [phone] with final arrangements.  Arrange for follow-up with me in 1 week postdischarge.  Weekly PICC dressing changes.  Probiotics recommended.    I discussed the patient's findings and my recommendations with patient and nursing staff    Thank you for asking me to see Carlos Culver.  Our group would be pleased to follow this patient over the course of their hospitalization and assist with outpatient antimicrobial therapy, as indicated.  Further recommendations depend on the results of the cultures and clinical course.  Side effects discussed.  Increased risk for adverse drug reactions, complications of IV access, readmission.  Increased risk for surgery.  Discussed previously with Dr. Mckay.    Jeffery Young MD  7/25/2023

## 2023-07-25 NOTE — CASE MANAGEMENT/SOCIAL WORK
Continued Stay Note  [facility]     Patient Name: Carlos Culver  MRN: [id number]  Today's Date: 7/25/2023    Admit Date: 7/19/2023    Plan: IRF   Discharge Plan       Row Name 07/25/23 1229       Plan    Plan IRF    Plan Comments Spoke with April at [facility].  She has submitted referral to their MD for approval and is waiting to hear back.  CM will continue to follow.    Final Discharge Disposition Code 62 - inpatient rehab facility                   Discharge Codes    No documentation.                 Expected Discharge Date and Time       Expected Discharge Date Expected Discharge Time    Jul 26, 2023               Laura Guillermo RN

## 2023-07-25 NOTE — PROGRESS NOTES
"          Orthopaedic Surgery Progress Note    LOS: 6 days   Patient Care Team:  Marcus Cheng MD as PCP - General  Marcus Cheng MD as PCP - Family Medicine  Jeffery Young MD as Consulting Physician (Infectious Diseases)  Hang Anderson MD as Consulting Physician (Cardiology)  4 Days Post-Op   Procedure(s):  LEFT FOOT INCISION AND DRAINAGE; REVISION AMPUTATION; ACHILLES LENGTHENING  Subjective   Interval History:   Resting comfortably in chair. Pain well controlled, No complaints. States might be headed to [facility] today.  Overall working on using knee scooter with occupational therapy.  Plan to see him in the office on Friday.    Objective     Vital Signs:  Temp (24hrs), Av.8 °F (36.6 °C), Min:97.5 °F (36.4 °C), Max:98.1 °F (36.7 °C)    /85 (BP Location: Left arm, Patient Position: Lying)   Pulse 76   Temp 98.1 °F (36.7 °C) (Oral)   Resp 16   Ht 181.6 cm (71.5\")   Wt 99.8 kg (220 lb)   SpO2 96%   BMI 30.26 kg/m²   Body mass index is 30.26 kg/m².    Labs:  Lab Results (last 24 hours)       Procedure Component Value Units Date/Time    Blood Culture - Blood, Hand, Right [[id number]]  (Normal) Collected: 23 1120    Specimen: Blood from Hand, Right Updated: 23 1245     Blood Culture No growth at 4 days    Blood Culture - Blood, Hand, Left [[id number]]  (Normal) Collected: 23 1110    Specimen: Blood from Hand, Left Updated: 23 1245     Blood Culture No growth at 4 days            Physical Exam:  left LE: splint CDI, compartments soft/compressible around splint, no calf tenderness       Assessment/Plan:  4 Days Post-Op status post:  LEFT FOOT INCISION AND DRAINAGE; REVISION AMPUTATION; ACHILLES LENGTHENING  -NWB operative extremity  -Advance diet as tolerated  -Keep splint/operative dressing clean and dry  -DVT prophylaxis, mechanical and chemical, resume home dose of Eliquis on discharge  -Continue antibiotics per ID recommendations  -Pain control  -Staph " aureus growing from both bone and tissue cultures  -Elevate operative extremity  -No plans for further orthopedic intervention during this hospital stay  -Rest of management per primary team     Mao Mckay MD  07/25/23  11:09 EDT

## 2023-07-25 NOTE — PLAN OF CARE
Goal Outcome Evaluation:              Outcome Evaluation: Pt is A&O with VSS, A-Fib on the monitor, and on RA. Pt got up to the bedside commode last night and had 5 BMs that went from formed to liquid. Pt hasn't complained of any pain and slept well. There are no other complaints at this time.

## 2023-07-25 NOTE — PLAN OF CARE
Goal Outcome Evaluation:  Plan of Care Reviewed With: patient        Progress: improving  Outcome Evaluation: Physical therapy treatment complete. The patient progressed with mobility on knee scooter, required CGA and minimal verbal cues for safety. The patient able to maintain NWB LLE throughout treatment session. The patient continues to present below baseline for mobility. Continue to recommend IPR at hospital discharge.

## 2023-07-25 NOTE — PROGRESS NOTES
[facility] Medicine Services  PROGRESS NOTE    Patient Name: Carlos Culver  : 1945  MRN: [id number]    Date of Admission: 2023  Primary Care Physician: Marcus Cheng MD    Subjective   Subjective     CC:f/u bacteremia    HPI:  No acute overnight events, comfortable    ROS:  Gen- No fevers, chills  CV- No chest pain, palpitations  Resp- No cough, dyspnea  GI- No N/V/D, abd pain      Objective   Objective     Vital Signs:   Temp:  [97.5 °F (36.4 °C)-98.1 °F (36.7 °C)] 98 °F (36.7 °C)  Heart Rate:  [] 66  Resp:  [16-18] 16  BP: (104-135)/(68-85) 104/75     Physical Exam:  Constitutional: No acute distress, awake, alert, up in chair  HENT: NCAT, mucous membranes moist  Respiratory: Clear to auscultation bilaterally, respiratory effort normal   Cardiovascular: irreg, no murmurs, rubs, or gallops  Gastrointestinal: Positive bowel sounds, soft, nontender, nondistended  Musculoskeletal: No bilateral ankle edema  Psychiatric: Appropriate affect, cooperative, pleasant  Neurologic: Oriented x 3, ARMIJO, speech clear  Skin: No rashes noted      Results Reviewed:  LAB RESULTS:      Lab 23  1112 23  0402 23  0517 23  0443 23  0351 23  1034   WBC 16.40* 16.37* 12.85* 13.48* 18.24* 15.95*   HEMOGLOBIN 9.6* 9.0* 8.6* 10.0* 10.3* 12.7*   HEMATOCRIT 30.4* 28.3* 25.8* 30.9* 31.5* 39.2   PLATELETS 305 264 232 267 283 325   NEUTROS ABS  --  13.67* 11.48* 10.76* 14.73* 13.03*   IMMATURE GRANS (ABS)  --  0.14* 0.10* 0.13* 0.15* 0.11*   LYMPHS ABS  --  1.59 0.78 1.30 1.77 1.41   MONOS ABS  --  0.94* 0.48 1.18* 1.50* 1.28*   EOS ABS  --  0.01 0.00 0.06 0.03 0.05   MCV 95.0 95.9 92.5 91.4 91.0 93.8   PROCALCITONIN  --   --   --  0.54* 0.73* 0.75*   LACTATE  --   --  1.5 1.1 1.4 3.4*           Lab 23  0402 23  0517 23  0443 23  0351 23  1034   SODIUM 137 135* 135* 133* 138   POTASSIUM 4.3 4.3 4.2 4.4 4.4   CHLORIDE 105 104  101 100 100   CO2 20.0* 21.0* 21.0* 20.0* 22.0   ANION GAP 12.0 10.0 13.0 13.0 16.0*   BUN 21 17 15 21 23   CREATININE 0.92 0.84 0.88 1.09 1.18   EGFR 85.1 89.3 88.0 69.5 63.2   GLUCOSE 144* 185* 123* 109* 138*   CALCIUM 7.6* 8.2* 8.3* 8.9 10.2   MAGNESIUM  --   --   --  1.6  --            Lab 07/22/23  0517 07/21/23  0443 07/20/23  0351 07/19/23  1034   TOTAL PROTEIN 5.8* 5.1* 6.1 7.5   ALBUMIN 2.6* 2.5* 2.5* 2.8*   GLOBULIN 3.2 2.6 3.6 4.7   ALT (SGPT) 11 15 15 24   AST (SGOT) 62* 84* 41* 33   BILIRUBIN 0.8 1.6* 1.7* 1.5*   ALK PHOS 158* 162* 149* 196*   LIPASE  --   --  109* 110*                       Brief Urine Lab Results  (Last result in the past 365 days)        Color   Clarity   Blood   Leuk Est   Nitrite   Protein   CREAT   Urine HCG        07/19/23 0955 Orange   Clear   Negative   Negative   Negative   Trace                   Microbiology Results Abnormal       Procedure Component Value - Date/Time    Blood Culture - Blood, Hand, Right [[id number]]  (Normal) Collected: 07/20/23 1120    Lab Status: Preliminary result Specimen: Blood from Hand, Right Updated: 07/24/23 1245     Blood Culture No growth at 4 days    Blood Culture - Blood, Hand, Left [[id number]]  (Normal) Collected: 07/20/23 1110    Lab Status: Preliminary result Specimen: Blood from Hand, Left Updated: 07/24/23 1245     Blood Culture No growth at 4 days    Anaerobic Culture - Wound, Foot, Left [[id number]]  (Normal) Collected: 07/21/23 1341    Lab Status: Preliminary result Specimen: Wound from Foot, Left Updated: 07/24/23 0800     Anaerobic Culture No anaerobes isolated at 3 days    Anaerobic Culture - Bone, Foot, Left [[id number]]  (Normal) Collected: 07/21/23 1350    Lab Status: Preliminary result Specimen: Bone from Foot, Left Updated: 07/24/23 0800     Anaerobic Culture No anaerobes isolated at 3 days    AFB Culture - Wound, Foot, Left [[id number]] Collected: 07/21/23 1341    Lab Status: Preliminary result Specimen: Wound from Foot, Left  Updated: 07/22/23 1130     AFB Stain No acid fast bacilli seen on concentrated smear    AFB Culture - Bone, Foot, Left [[id number]] Collected: 07/21/23 1350    Lab Status: Preliminary result Specimen: Bone from Foot, Left Updated: 07/22/23 1130     AFB Stain No acid fast bacilli seen on concentrated smear    COVID PRE-OP / PRE-PROCEDURE SCREENING ORDER (NO ISOLATION) - Swab, Nasopharynx [[id number]]  (Normal) Collected: 07/20/23 1142    Lab Status: Final result Specimen: Swab from Nasopharynx Updated: 07/20/23 1203    Narrative:      The following orders were created for panel order COVID PRE-OP / PRE-PROCEDURE SCREENING ORDER (NO ISOLATION) - Swab, Nasopharynx.  Procedure                               Abnormality         Status                     ---------                               -----------         ------                     COVID-19 and FLU A/B PCR...[[id number]]  Normal              Final result                 Please view results for these tests on the individual orders.    COVID-19 and FLU A/B PCR - Swab, Nasopharynx [[id number]]  (Normal) Collected: 07/20/23 1142    Lab Status: Final result Specimen: Swab from Nasopharynx Updated: 07/20/23 1203     COVID19 Not Detected     Influenza A PCR Not Detected     Influenza B PCR Not Detected    Narrative:      Fact sheet for providers: https://www.fda.gov/media/608643/download    Fact sheet for patients: https://www.fda.gov/media/792901/download    Test performed by PCR.            No radiology results from the last 24 hrs    Results for orders placed during the hospital encounter of 07/19/23    Adult Transthoracic Echo Complete W/ Cont if Necessary Per Protocol    Interpretation Summary    Left ventricular systolic function is normal. Calculated left ventricular EF = 60%    Left ventricular diastolic function was normal.    Left atrial volume is moderately increased.    Saline test results are negative.    There is calcification of the aortic valve.    Moderate  tricuspid valve regurgitation is present.    Estimated right ventricular systolic pressure from tricuspid regurgitation is mildly elevated (35-45 mmHg).      Current medications:  Scheduled Meds:atorvastatin, 80 mg, Oral, Nightly  ceFAZolin, 2,000 mg, Intravenous, Q8H  enoxaparin, 40 mg, Subcutaneous, Q24H  metoprolol tartrate, 50 mg, Oral, BID  senna-docusate sodium, 2 tablet, Oral, BID  sodium chloride, 10 mL, Intravenous, Q12H      Continuous Infusions:     PRN Meds:.  acetaminophen **OR** acetaminophen    senna-docusate sodium **AND** polyethylene glycol **AND** bisacodyl **AND** bisacodyl    bisacodyl    Calcium Replacement - Follow Nurse / BPA Driven Protocol    HYDROmorphone **AND** naloxone    Magnesium Standard Dose Replacement - Follow Nurse / BPA Driven Protocol    melatonin    Morphine **AND** naloxone    ondansetron **OR** ondansetron    oxyCODONE    Phosphorus Replacement - Follow Nurse / BPA Driven Protocol    Potassium Replacement - Follow Nurse / BPA Driven Protocol    [COMPLETED] Insert Peripheral IV **AND** sodium chloride    sodium chloride    sodium chloride    sodium chloride    Assessment & Plan   Assessment & Plan     Active Hospital Problems    Diagnosis  POA    **Sepsis [A41.9]  Yes    Staphylococcus aureus bacteremia [R78.81, B95.61]  Yes    Acute osteomyelitis of left ankle or foot [M86.172]  Yes    S/P transmetatarsal amputation of foot, left [Z89.432]  Not Applicable    Neuropathy [G62.9]  Yes    CKD (chronic kidney disease) stage 2, GFR 60-89 ml/min [N18.2]  Yes    Atrial fibrillation with RVR [I48.91]  Yes      Resolved Hospital Problems   No resolved problems to display.        Brief Hospital Course to date:  Carlos Culver is a 77 y.o. male with a history of peripheral neuropathy, osteomyelitis, CKD, A-fib who is presenting with sepsis/bacteremia    Sepsis w/ MSSA bacteremia   left foot osteomyelitis, status post left foot incision and drainage and revision amputation  -- ID  following and managing antibiotics.  PICC line placed.  Echo shows no vegetations-. Repeat blood cultures NG x4d  - s/p L foot incision and drainage and revision amputation 7/21 for L foot osteomyelitis . NWB per ortho. DVT ppx, can resume home dose of Eliquis on discharge  - ID has placed antibiotic recommendations.  Case management to help arrange for home antibiotics vs [facility]     A fib RVR  -- Heart rate better controlled today with increased dose of metoprolol and status post IV fluids.  Encourage hydration     CKD II  -- Stable    Expected Discharge Location and Transportation: Rehab  Expected Discharge   Expected Discharge Date: 7/26/2023; Expected Discharge Time:      DVT prophylaxis:  Medical DVT prophylaxis orders are present.     AM-PAC 6 Clicks Score (PT): 17 (07/25/23 9816)    CODE STATUS:   Code Status and Medical Interventions:   Ordered at: 07/19/23 1154     Code Status (Patient has no pulse and is not breathing):    CPR (Attempt to Resuscitate)     Medical Interventions (Patient has pulse or is breathing):    Full Support     Release to patient:    Routine Release       Tianna Gallagher MD  07/25/23

## 2023-07-25 NOTE — PLAN OF CARE
Goal Outcome Evaluation:      Patient is A-fib on the monitor, rate controlled and on room air.  Patient is alert and oriented times four.  Assist times one with walker.  Patient up to chair with PT.  Plan of care, Cardinal Breen at 1130 on 7/26.  Bed in lowest position, phone and call light in reach.

## 2023-07-25 NOTE — THERAPY TREATMENT NOTE
Patient Name: Carlos Culver  : 1945    MRN: [id number]                              Today's Date: 2023       Admit Date: 2023    Visit Dx:     ICD-10-CM ICD-9-CM   1. Bacteremia  R78.81 790.7   2. Elevated blood pressure reading with diagnosis of hypertension  I10 401.9   3. Acute osteomyelitis of left ankle or foot  M86.172 730.07   4. Impaired functional mobility, balance, gait, and endurance  Z74.09 V49.89     Patient Active Problem List   Diagnosis    Cellulitis of left foot    GUCCI (acute kidney injury)    Essential hypertension    Leukocytosis    Atrial fibrillation with RVR    Osteomyelitis of left foot    Chronic anticoagulation    Neuropathy    Elevated transaminase level    CKD (chronic kidney disease) stage 2, GFR 60-89 ml/min    Skin ulcer of right heel    Chronic multifocal osteomyelitis of left foot    Contracture of joint of foot, left    MARTINEZ (dyspnea on exertion)    Neuropathic ulcer, limited to breakdown of skin    Hyperlipidemia LDL goal <100    A-fib    S/P transmetatarsal amputation of foot, left    Leukocytosis, likely reactive    Acute postoperative pain    Alcohol abuse    Sepsis    Staphylococcus aureus bacteremia    Acute osteomyelitis of left ankle or foot     Past Medical History:   Diagnosis Date    A-fib     Acute kidney failure     Elevated cholesterol     Hard of hearing     Hyperlipidemia     Hypertension     Renal disorder     Wears hearing aid in both ears     Wears reading eyeglasses      Past Surgical History:   Procedure Laterality Date    AMPUTATION DIGIT Left 2020    Procedure: AMPUTATION LEFT 2ND TOE;  Surgeon: Idania Osborn MD;  Location:  [facility] OR;  Service: Orthopedics    AMPUTATION DIGIT Left 2020    Procedure: TRANSMETATARSAL AMPUTATION LEFT;  Surgeon: Idania Osborn MD;  Location:  [facility] OR;  Service: Orthopedics;  Laterality: Left;    APPENDECTOMY      COLONOSCOPY  2006    FOOT SURGERY Left 2020    (L) transmetatarsal  amputation 12/09/2020 - Dr. Idania Osborn    INCISION AND DRAINAGE LEG Left 10/23/2018    Procedure: INCISION AND DRAINAGE LEFT FOOT;  Surgeon: Idania Osborn MD;  Location:  [facility] OR;  Service: Orthopedics    INCISION AND DRAINAGE LEG Left 7/21/2023    Procedure: FOOT INCISION AND DRAINAGE; REVISION AMPUTATION; ACHILLES LENGTHENING -LEFT;  Surgeon: Mao Mckay MD;  Location:  [facility] OR;  Service: Orthopedics;  Laterality: Left;    TOE AMPUTATION      TONSILLECTOMY      VENOUS ACCESS DEVICE (PORT) INSERTION N/A 10/23/2018    Procedure: GROSHONG CATHETER PLACEMENT;  Surgeon: Marquez Simons MD;  Location:  [facility] OR;  Service: General    VENOUS ACCESS DEVICE (PORT) INSERTION N/A 12/9/2020    Procedure: GROSHONG INSERTION;  Surgeon: Antonio Bains MD;  Location:  [facility] OR;  Service: General;  Laterality: N/A;    VENOUS ACCESS DEVICE (PORT) REMOVAL        General Information       Row Name 07/25/23 0914          Physical Therapy Time and Intention    Document Type therapy note (daily note)  -ML     Mode of Treatment physical therapy  -ML       Row Name 07/25/23 0914          General Information    Patient Profile Reviewed yes  -ML     Existing Precautions/Restrictions fall;non-weight bearing;other (see comments)  NWB LLE  -ML     Barriers to Rehab medically complex;hearing deficit  -ML       Row Name 07/25/23 0914          Cognition    Orientation Status (Cognition) oriented x 4  -ML       Row Name 07/25/23 0914          Safety Issues, Functional Mobility    Safety Issues Affecting Function (Mobility) safety precaution awareness  -ML     Impairments Affecting Function (Mobility) balance;coordination;endurance/activity tolerance;postural/trunk control;strength  -ML               User Key  (r) = Recorded By, (t) = Taken By, (c) = Cosigned By      Initials Name Provider Type    ML Lizzy Summers Physical Therapist                   Mobility       Row Name 07/25/23 0916          Bed Mobility    Bed Mobility  supine-sit  -ML     Supine-Sit [place] (Bed Mobility) standby assist  -ML     Assistive Device (Bed Mobility) head of bed elevated;bed rails  -ML       Row Name 07/25/23 0916          Sit-Stand Transfer    Sit-Stand [place] (Transfers) contact guard;verbal cues  -ML     Assistive Device (Sit-Stand Transfers) walker, knee scooter  -ML     Comment, (Sit-Stand Transfer) verbal cues to place brakes prior to sit to stand transfer  -ML       Row Name 07/25/23 0916          Gait/Stairs (Locomotion)    [place] Level (Gait) contact guard  -ML     Assistive Device (Gait) walker, knee scooter  -ML     Distance in Feet (Gait) 90  -ML     Comment, (Gait/Stairs) Patient able to navigate on knee scooter safely with minimal verbal cues, able to maintain NWB LLE throughout treatment session.  -ML       Row Name 07/25/23 0916          Mobility    Extremity Weight-bearing Status left lower extremity  -ML     Left Lower Extremity (Weight-bearing Status) non weight-bearing (NWB)  -ML               User Key  (r) = Recorded By, (t) = Taken By, (c) = Cosigned By      Initials Name Provider Type     Lizzy Summers Physical Therapist                   Obj/Interventions       Row Name 07/25/23 0920          Hip (Therapeutic Exercise)    Hip (Therapeutic Exercise) strengthening exercise;isometric exercises  -     Hip Isometrics (Therapeutic Exercise) gluteal sets;10 repetitions;5 second hold  -     Hip Strengthening (Therapeutic Exercise) marching while standing;10 repetitions  -ML       [facility] Name 07/25/23 0920          Knee (Therapeutic Exercise)    Knee (Therapeutic Exercise) strengthening exercise  -     Knee Strengthening (Therapeutic Exercise) bilateral;LAQ (long arc quad);10 repetitions  -ML       Row Name 07/25/23 0920          Balance    Balance Assessment sitting static balance;sitting dynamic balance;sit to stand dynamic balance;standing static balance;standing dynamic balance  -ML     Static Sitting Balance  standby assist  -ML     Dynamic Sitting Balance standby assist  -ML     Position, Sitting Balance unsupported;sitting edge of bed  -ML     Sit to Stand Dynamic Balance contact guard;verbal cues  -ML     Static Standing Balance contact guard  -ML     Dynamic Standing Balance contact guard;verbal cues  -ML     Position/Device Used, Standing Balance supported;other (see comments)  knee scooter  -ML     Balance Interventions sitting;standing;sit to stand;supported;occupation based/functional task  -ML               User Key  (r) = Recorded By, (t) = Taken By, (c) = Cosigned By      Initials Name Provider Type    ML Lizzy Summers Physical Therapist                   Goals/Plan    No documentation.                  Clinical Impression       Row Name 07/25/23 0922          Pain    Pretreatment Pain Rating 0/10 - no pain  -ML     Posttreatment Pain Rating 0/10 - no pain  -ML       Row Name 07/25/23 0922          Plan of Care Review    Plan of Care Reviewed With patient  -ML     Progress improving  -ML     Outcome Evaluation Physical therapy treatment complete. The patient progressed with mobility on knee scooter, reuqired CGA and minimal verbal cues for safety. The patient able to maintain NWB LLE throughout treatment session. The patient continues to present below baseline for mobility. Continue to recommend IPR at hospital discharge.  -ML       Row Name 07/25/23 0922          Vital Signs    Pre Systolic BP Rehab 135  -ML     Pre Treatment Diastolic BP 85  -ML     Pretreatment Heart Rate (beats/min) 75  -ML     Pre SpO2 (%) 97  -ML     O2 Delivery Pre Treatment room air  -ML     Pre Patient Position Supine  -ML     Intra Patient Position Standing  -ML     Post Patient Position Sitting  -ML       Row Name 07/25/23 0922          Positioning and Restraints    Pre-Treatment Position in bed  -ML     Post Treatment Position chair  -ML     In Chair notified nsg;reclined;call light within reach;encouraged to call for assist;exit  alarm on;waffle cushion;legs elevated;LLE elevated  -ML               User Key  (r) = Recorded By, (t) = Taken By, (c) = Cosigned By      Initials Name Provider Type    Lizzy Real Physical Therapist                   Outcome Measures       Row Name 07/25/23 0924          How much help from another person do you currently need...    Turning from your back to your side while in flat bed without using bedrails? 4  -ML     Moving from lying on back to sitting on the side of a flat bed without bedrails? 3  -ML     Moving to and from a bed to a chair (including a wheelchair)? 3  -ML     Standing up from a chair using your arms (e.g., wheelchair, bedside chair)? 3  -ML     Climbing 3-5 steps with a railing? 2  -ML     To walk in hospital room? 2  -ML     AM-PAC 6 Clicks Score (PT) 17  -ML     Highest level of mobility 5 --> Static standing  -ML       Row Name 07/25/23 0924          Functional Assessment    Outcome Measure Options AM-PAC 6 Clicks Basic Mobility (PT)  -ML               User Key  (r) = Recorded By, (t) = Taken By, (c) = Cosigned By      Initials Name Provider Type    Lizzy Real Physical Therapist                                 Physical Therapy Education       Title: PT OT SLP Therapies (In Progress)       Topic: Physical Therapy (Done)       Point: Mobility training (Done)       Learning Progress Summary             Patient Acceptance, E, VU,DU by ML at 7/25/2023 0925    Acceptance, E, VU,NR by NS at 7/24/2023 1152    Acceptance, E,D, VU,NR by SD at 7/22/2023 1058                         Point: Home exercise program (Done)       Learning Progress Summary             Patient Acceptance, E, VU,DU by ML at 7/25/2023 0925    Acceptance, E, VU,NR by NS at 7/24/2023 1152                         Point: Body mechanics (Done)       Learning Progress Summary             Patient Acceptance, E, VU,DU by ML at 7/25/2023 0925    Acceptance, E, VU,NR by NS at 7/24/2023 1152    Acceptance, E,D, VU,NR by SD at  7/22/2023 1058                         Point: Precautions (Done)       Learning Progress Summary             Patient Acceptance, E, VU,DU by ML at 7/25/2023 0925    Acceptance, E, VU,NR by NS at 7/24/2023 1152    Acceptance, E,D, VU,NR by SD at 7/22/2023 1058                                         User Key       Initials Effective Dates Name Provider Type Discipline    SD 03/13/23 -  Mica Guadarrama, PT Physical Therapist PT    NS 06/16/21 -  Ivone Figueroa, PT Physical Therapist PT     04/22/21 -  Lizzy Summers Physical Therapist PT                  PT Recommendation and Plan     Plan of Care Reviewed With: patient  Progress: improving  Outcome Evaluation: Physical therapy treatment complete. The patient progressed with mobility on knee scooter, reuqired CGA and minimal verbal cues for safety. The patient able to maintain NWB LLE throughout treatment session. The patient continues to present below baseline for mobility. Continue to recommend IPR at hospital discharge.     Time Calculation:         PT Charges       Row Name 07/25/23 0926             Time Calculation    Start Time 0842  -ML      PT Received On 07/25/23  -ML         Timed Charges    64526 - PT Therapeutic Activity Minutes 27  -ML         Total Minutes    Timed Charges Total Minutes 27  -ML       Total Minutes 27  -ML                User Key  (r) = Recorded By, (t) = Taken By, (c) = Cosigned By      Initials Name Provider Type    Lizzy Real Physical Therapist                  Therapy Charges for Today       Code Description Service Date Service Provider Modifiers Qty    10126135521 HC PT THERAPEUTIC ACT EA 15 MIN 7/25/2023 Lizzy Summers GP 2    99767597938 HC PT THER SUPP EA 15 MIN 7/25/2023 Lizzy Summers GP 1            PT G-Codes  Outcome Measure Options: AM-PAC 6 Clicks Basic Mobility (PT)  AM-PAC 6 Clicks Score (PT): 17  AM-PAC 6 Clicks Score (OT): 15       Lizzy Summers  7/25/2023

## 2023-07-26 VITALS
HEART RATE: 100 BPM | WEIGHT: 220 LBS | HEIGHT: 72 IN | DIASTOLIC BLOOD PRESSURE: 82 MMHG | SYSTOLIC BLOOD PRESSURE: 117 MMHG | OXYGEN SATURATION: 96 % | TEMPERATURE: 97.4 F | BODY MASS INDEX: 29.8 KG/M2 | RESPIRATION RATE: 16 BRPM

## 2023-07-26 PROBLEM — I48.91 ATRIAL FIBRILLATION WITH RVR: Status: RESOLVED | Noted: 2018-10-25 | Resolved: 2023-07-26

## 2023-07-26 PROBLEM — A41.9 SEPSIS: Status: RESOLVED | Noted: 2023-07-19 | Resolved: 2023-07-26

## 2023-07-26 LAB
BACTERIA SPEC ANAEROBE CULT: NORMAL
BACTERIA SPEC ANAEROBE CULT: NORMAL

## 2023-07-26 PROCEDURE — 25010000002 CEFAZOLIN IN DEXTROSE 2-4 GM/100ML-% SOLUTION: Performed by: ORTHOPAEDIC SURGERY

## 2023-07-26 PROCEDURE — 99239 HOSP IP/OBS DSCHRG MGMT >30: CPT | Performed by: STUDENT IN AN ORGANIZED HEALTH CARE EDUCATION/TRAINING PROGRAM

## 2023-07-26 PROCEDURE — 99024 POSTOP FOLLOW-UP VISIT: CPT | Performed by: ORTHOPAEDIC SURGERY

## 2023-07-26 RX ORDER — SACCHAROMYCES BOULARDII 250 MG
250 CAPSULE ORAL 2 TIMES DAILY
Qty: 30 CAPSULE | Refills: 0
Start: 2023-07-26

## 2023-07-26 RX ORDER — METOPROLOL TARTRATE 50 MG/1
50 TABLET, FILM COATED ORAL 2 TIMES DAILY
Qty: 60 TABLET | Refills: 0
Start: 2023-07-26

## 2023-07-26 RX ORDER — OXYCODONE HYDROCHLORIDE 5 MG/1
5 TABLET ORAL EVERY 4 HOURS PRN
Qty: 12 TABLET | Refills: 0
Start: 2023-07-26 | End: 2023-07-28

## 2023-07-26 RX ORDER — CEFAZOLIN SODIUM 2 G/100ML
2000 INJECTION, SOLUTION INTRAVENOUS EVERY 8 HOURS
Qty: 9000 ML | Refills: 1
Start: 2023-07-26 | End: 2023-09-03

## 2023-07-26 RX ORDER — SACCHAROMYCES BOULARDII 250 MG
250 CAPSULE ORAL 2 TIMES DAILY
Qty: 30 CAPSULE | Refills: 0 | Status: SHIPPED | OUTPATIENT
Start: 2023-07-26 | End: 2023-07-26 | Stop reason: SDUPTHER

## 2023-07-26 RX ORDER — METOPROLOL TARTRATE 50 MG/1
50 TABLET, FILM COATED ORAL 2 TIMES DAILY
Qty: 60 TABLET | Refills: 0 | Status: SHIPPED | OUTPATIENT
Start: 2023-07-26 | End: 2023-07-26 | Stop reason: SDUPTHER

## 2023-07-26 RX ORDER — OXYCODONE HYDROCHLORIDE 5 MG/1
5 TABLET ORAL EVERY 4 HOURS PRN
Qty: 12 TABLET | Refills: 0 | Status: SHIPPED | OUTPATIENT
Start: 2023-07-26 | End: 2023-07-26 | Stop reason: SDUPTHER

## 2023-07-26 RX ADMIN — Medication 10 ML: at 09:23

## 2023-07-26 RX ADMIN — METOPROLOL TARTRATE 50 MG: 50 TABLET ORAL at 09:22

## 2023-07-26 RX ADMIN — Medication 2000 MG: at 03:58

## 2023-07-26 NOTE — PLAN OF CARE
Goal Outcome Evaluation:              Outcome Evaluation: Pt is A&O with VSS, A-Fib on the monitor, and on RA. Pt complained of lower back pain that was relieved with PRN Tylenol. Pt had one BM this shift. There are no other complaints at this time.

## 2023-07-26 NOTE — PROGRESS NOTES
Clinical Nutrition   Nutrition Support Assessment  Reason for Visit: [facility]      Patient Name: Carlos Culver  YOB: 1945  MRN: [id number]  Date of Encounter: 07/26/23 09:42 EDT  Admission date: 7/19/2023    Comments:  Pt at risk for acute malnutrition 2/2 to reported minimal po intakes/eating 1 meal daily x ~2 months. Pt also reports unintended wt loss though unable to verify at this time 2/2 limited wt hx in chart.     Pt w/increased nutrient needs for skin integrity, encouraged ONS at home and adequate po intake.     Nutrition Assessment   Admission Diagnosis:  Sepsis [A41.9]      Problem List:    Neuropathy    CKD (chronic kidney disease) stage 2, GFR 60-89 ml/min    S/P transmetatarsal amputation of foot, left    Staphylococcus aureus bacteremia    Acute osteomyelitis of left ankle or foot        PMH:   He  has a past medical history of A-fib, Acute kidney failure, Elevated cholesterol, Hard of hearing, Hyperlipidemia, Hypertension, Renal disorder, Wears hearing aid in both ears, and Wears reading eyeglasses.    PSH:  He  has a past surgical history that includes Toe amputation; Tonsillectomy; Appendectomy; Venous Access Device (Port) (N/A, 10/23/2018); incision and drainage leg (Left, 10/23/2018); Venous Access Device (Port) Removal; Colonoscopy (2006); Finger amputation (Left, 1/7/2020); Finger amputation (Left, 12/8/2020); Venous Access Device (Port) (N/A, 12/9/2020); Foot surgery (Left, 12/09/2020); and incision and drainage leg (Left, 7/21/2023).    Applicable Nutrition Concerns:   Skin:  Oral:  GI:    Applicable Interval History:   7/21-revision amputation for L foot      Reported/Observed/Food/Nutrition Related History:     RD spoke w/pt at bedside who reports poor po intake x ~2 months 2/2 clinical condition. Pt states food hasn't sounded good and has been eating ~1 meal daily despite wife encouraging po intake at home. Pt tells me he usually does not eat until dinner  "time, might be a smoothie or milkshake. Pt also reports recent wt loss w/MD office visit wt PTA of 206lbs. Pt reports UBW of 220lbs. Unable to verify at this time 2/2 limited wt hx in chart. Pt states he is interested in trying Boost at home to help w/po intake. NKFA.     Anthropometrics     Flowsheet Rows      Flowsheet Row First Filed Value   Admission Height 181.6 cm (71.5\") Documented at 07/19/2023 0923   Admission Weight 99.8 kg (220 lb) Documented at 07/19/2023 0923              Height: Height: 181.6 cm (71.5\")  Last Filed Weight: Weight: 99.8 kg (220 lb) (07/19/23 0923)  Method: Weight Method: Stated  BMI: BMI (Calculated): 30.3  BMI classification: Obese Class I: 30-34.9kg/m2     UBW:  218lbs per pt report  Weight change:  reported 12lb wt loss x 2-3 months      Weight       Weight (kg) Weight (lbs) Weight Method Visit Report   3/14/2022 95.255 kg  210 lb   --    1/9/2023 99.791 kg  220 lb   --    7/19/2023 99.791 kg  220 lb  Stated           Nutrition Focused Physical Exam     Date:     7/26    NFPE completed, patient does not meet criteria for MSA at this time. Pt likely w/age-related sarcopenia which is a contributing factor to muscle wasting.     Subcutaneous fat:  Orbital Unable to determine at this time   Triceps/Biceps Severe   Thoracic and lumbar region- ribs lower back, midaxillary line Unable to determine at this time     Muscle:  Temple/temporalis No muscle loss identified   Clavicle- pectoralis, deltoid, trapezius Mild   Clavicle and acromion process  Mild   Scapular bone region Mild   Dorsal hand Moderate   Patellar  Mild   Quadriceps Mild   Calf Severe       Current Nutrition Prescription   PO: Diet: Cardiac Diets; Healthy Heart (2-3 Na+); Texture: Regular Texture (IDDSI 7); Fluid Consistency: Thin (IDDSI 0)  Oral Nutrition Supplement:   Intake: 50% x 10 meals      Nutrition Diagnosis   Date:  7/26            Updated:    Problem Increased nutrient needs   Etiology Skin integrity "   Signs/Symptoms S/p L foot amputation/revision   Status:     Date:  7/26 Updated:  Problem Predicted suboptimal energy intake   Etiology Clinical condition/sepsis   Signs/Symptoms Reported poor po intake PTA   Status:    Goal:   General: Nutrition to support treatment  PO: Increase intake  EN/PN: N/A    Nutrition Intervention      Follow treatment progress, Care plan reviewed, Encourage intake, Supplement provided    Boost+ BID. Pt possibly to discharge today, will provide if pt here for lunch.     Monitoring/Evaluation:   Per protocol, PO intake, Supplement intake, Weight, Skin status      Key Palacios RD  Time Spent: 30

## 2023-07-26 NOTE — PROGRESS NOTES
"          Orthopaedic Surgery Progress Note    LOS: 7 days   Patient Care Team:  Marcus Cheng MD as PCP - General  Marcus Cheng MD as PCP - Family Medicine  Jeffery Young MD as Consulting Physician (Infectious Diseases)  Hang Anderson MD as Consulting Physician (Cardiology)  5 Days Post-Op   Procedure(s):  LEFT FOOT INCISION AND DRAINAGE; REVISION AMPUTATION; ACHILLES LENGTHENING  Subjective   Interval History:   Resting comfortably in chair. No new complaints.  Planning to head to [facility] this morning.  Plan to see me in the office on Friday.    Objective     Vital Signs:  Temp (24hrs), Av.6 °F (36.4 °C), Min:97.3 °F (36.3 °C), Max:98 °F (36.7 °C)    /82 (BP Location: Left arm, Patient Position: Lying)   Pulse 100   Temp 97.4 °F (36.3 °C) (Oral)   Resp 16   Ht 181.6 cm (71.5\")   Wt 99.8 kg (220 lb)   SpO2 96%   BMI 30.26 kg/m²   Body mass index is 30.26 kg/m².    Labs:  Lab Results (last 24 hours)       Procedure Component Value Units Date/Time    Anaerobic Culture - Bone, Foot, Left [[id number]]  (Normal) Collected: 23 1350    Specimen: Bone from Foot, Left Updated: 23 0700     Anaerobic Culture No anaerobes isolated at 5 days    Anaerobic Culture - Wound, Foot, Left [[id number]]  (Normal) Collected: 23 1341    Specimen: Wound from Foot, Left Updated: 23 0700     Anaerobic Culture No anaerobes isolated at 5 days    Blood Culture - Blood, Hand, Right [[id number]]  (Normal) Collected: 23 1120    Specimen: Blood from Hand, Right Updated: 23 1245     Blood Culture No growth at 5 days    Blood Culture - Blood, Hand, Left [[id number]]  (Normal) Collected: 23 1110    Specimen: Blood from Hand, Left Updated: 23 1245     Blood Culture No growth at 5 days    CBC (No Diff) [[id number]]  (Abnormal) Collected: 23 1112    Specimen: Blood Updated: 23 1151     WBC 16.40 10*3/mm3      RBC 3.20 10*6/mm3      Hemoglobin 9.6 g/dL     "  Hematocrit 30.4 %      MCV 95.0 fL      MCH 30.0 pg      MCHC 31.6 g/dL      RDW 13.6 %      RDW-SD 46.3 fl      MPV 9.5 fL      Platelets 305 10*3/mm3             Physical Exam:  left LE: splint CDI, compartments soft/compressible around splint, no calf tenderness       Assessment/Plan:  5 Days Post-Op status post:  LEFT FOOT INCISION AND DRAINAGE; REVISION AMPUTATION; ACHILLES LENGTHENING  -NWB operative extremity  -Advance diet as tolerated  -Keep splint/operative dressing clean and dry  -DVT prophylaxis, mechanical and chemical, resume home dose of Eliquis on discharge  -Continue antibiotics per ID recommendations  -Pain control  -Staph aureus growing from both bone and tissue cultures  -Elevate operative extremity  -No plans for further orthopedic intervention during this hospital stay  -Rest of management per primary team   -Follow up already placed in [facility]    Mao Mckay MD  07/26/23  09:27 EDT

## 2023-07-26 NOTE — PLAN OF CARE
Goal Outcome Evaluation:         Patient ready to discharge to [facility].  PICC line to remain per Dr. Gallagher.

## 2023-07-26 NOTE — DISCHARGE SUMMARY
[facility] Medicine Services  DISCHARGE SUMMARY    Patient Name: Carlos Culver  : 1945  MRN: [id number]    Date of Admission: 2023 10:16 AM  Date of Discharge:  2023  Primary Care Physician: Marcus Cheng MD    Consults       Date and Time Order Name Status Description    2023 12:37 PM Inpatient Orthopedic Surgery Consult      2023  2:23 PM Inpatient Cardiothoracic Surgery Consult Completed             Hospital Course     Presenting Problem: Sepsis secondary to osteomyelitis    Active Hospital Problems    Diagnosis  POA    Staphylococcus aureus bacteremia [R78.81, B95.61]  Yes    Acute osteomyelitis of left ankle or foot [M86.172]  Yes    S/P transmetatarsal amputation of foot, left [Z89.432]  Not Applicable    Neuropathy [G62.9]  Yes    CKD (chronic kidney disease) stage 2, GFR 60-89 ml/min [N18.2]  Yes      Resolved Hospital Problems    Diagnosis Date Resolved POA    Sepsis [A41.9] 2023 Yes    Atrial fibrillation with RVR [I48.91] 2023 Yes          Hospital Course:  Carlos Culver is a 77 y.o. male  77 y.o. male with a history of peripheral neuropathy, osteomyelitis, CKD, A-fib who is presenting with sepsis/bacteremia.  He was sent over from ID office with MSSA bacteremia.  Imaging with MRI was suspicious for left foot osteomyelitis.  Orthopedic surgery was consulted, he is status post left foot incision and drainage and revision amputation on .  Nonweightbearing per orthopedic recommendations.  He was placed on DVT prophylaxis and will resume his home dose of Eliquis on discharge.  Infectious disease also followed for antibiotic recommendations.  A PICC line was placed for prolonged IV antibiotic administration.  Antibiotic decision was made based on culture data from surgery cultures blood cultures.  Per infectious disease recommendation, continue cefazolin for 6 weeks IV then oral for 3 months.  He will be discharged to  rehab where antibiotics will be continued.  Case management has assisted in setting up home antibiotics when he is discharged from rehab.  He is to have CBC, CMP, CRP weekly while on IV antibiotics.  We will need to follow-up with infectious disease 1 week post discharge and will need weekly PICC dressing changes.  Probiotics are also recommended    He presented in A-fib with RVR which was thought to be secondary to dehydration from sepsis.  It improved with IV fluids and an increased dose of metoprolol      Discharge Follow Up Recommendations for outpatient labs/diagnostics:  Infectious disease, PCP    Day of Discharge     HPI:   No acute overnight events resting comfortably    Review of Systems  Gen- No fevers, chills  CV- No chest pain, palpitations  Resp- No cough, dyspnea  GI- No N/V/D, abd pain      Vital Signs:   Temp:  [97.3 °F (36.3 °C)-98 °F (36.7 °C)] 97.4 °F (36.3 °C)  Heart Rate:  [] 100  Resp:  [16-18] 16  BP: (104-127)/(63-82) 117/82      Physical Exam:  Constitutional: No acute distress, awake, alert, up in chair  HENT: NCAT, mucous membranes moist  Respiratory: Clear to auscultation bilaterally, respiratory effort normal   Cardiovascular: irreg, no murmurs, rubs, or gallops  Gastrointestinal: Positive bowel sounds, soft, nontender, nondistended  Musculoskeletal: No bilateral ankle edema  Psychiatric: Appropriate affect, cooperative, pleasant  Neurologic: Oriented x 3, ARMIJO, speech clear  Skin: No rashes noted    Pertinent  and/or Most Recent Results     LAB RESULTS:      Lab 07/25/23  1112 07/23/23  0402 07/22/23  0517 07/21/23  0443 07/20/23  0351 07/19/23  1034   WBC 16.40* 16.37* 12.85* 13.48* 18.24* 15.95*   HEMOGLOBIN 9.6* 9.0* 8.6* 10.0* 10.3* 12.7*   HEMATOCRIT 30.4* 28.3* 25.8* 30.9* 31.5* 39.2   PLATELETS 305 264 232 267 283 325   NEUTROS ABS  --  13.67* 11.48* 10.76* 14.73* 13.03*   IMMATURE GRANS (ABS)  --  0.14* 0.10* 0.13* 0.15* 0.11*   LYMPHS ABS  --  1.59 0.78 1.30 1.77 1.41    MONOS ABS  --  0.94* 0.48 1.18* 1.50* 1.28*   EOS ABS  --  0.01 0.00 0.06 0.03 0.05   MCV 95.0 95.9 92.5 91.4 91.0 93.8   PROCALCITONIN  --   --   --  0.54* 0.73* 0.75*   LACTATE  --   --  1.5 1.1 1.4 3.4*         Lab 07/23/23  0402 07/22/23  0517 07/21/23 0443 07/20/23 0351 07/19/23  1034   SODIUM 137 135* 135* 133* 138   POTASSIUM 4.3 4.3 4.2 4.4 4.4   CHLORIDE 105 104 101 100 100   CO2 20.0* 21.0* 21.0* 20.0* 22.0   ANION GAP 12.0 10.0 13.0 13.0 16.0*   BUN 21 17 15 21 23   CREATININE 0.92 0.84 0.88 1.09 1.18   EGFR 85.1 89.3 88.0 69.5 63.2   GLUCOSE 144* 185* 123* 109* 138*   CALCIUM 7.6* 8.2* 8.3* 8.9 10.2   MAGNESIUM  --   --   --  1.6  --          Lab 07/22/23  0517 07/21/23 0443 07/20/23 0351 07/19/23  1034   TOTAL PROTEIN 5.8* 5.1* 6.1 7.5   ALBUMIN 2.6* 2.5* 2.5* 2.8*   GLOBULIN 3.2 2.6 3.6 4.7   ALT (SGPT) 11 15 15 24   AST (SGOT) 62* 84* 41* 33   BILIRUBIN 0.8 1.6* 1.7* 1.5*   ALK PHOS 158* 162* 149* 196*   LIPASE  --   --  109* 110*                     Brief Urine Lab Results  (Last result in the past 365 days)        Color   Clarity   Blood   Leuk Est   Nitrite   Protein   CREAT   Urine HCG        07/19/23 0955 Orange   Clear   Negative   Negative   Negative   Trace                 Microbiology Results (last 10 days)       Procedure Component Value - Date/Time    Anaerobic Culture - Bone, Foot, Left [[id number]]  (Normal) Collected: 07/21/23 1350    Lab Status: Final result Specimen: Bone from Foot, Left Updated: 07/26/23 0700     Anaerobic Culture No anaerobes isolated at 5 days    Tissue / Bone Culture - Bone, Foot, Left [[id number]]  (Abnormal) Collected: 07/21/23 1350    Lab Status: Final result Specimen: Bone from Foot, Left Updated: 07/23/23 1007     Tissue Culture Light growth (2+) Staphylococcus aureus     Gram Stain Few (2+) WBCs seen      No organisms seen    Narrative:      Refer to previous wound culture collected on 07/21/2023 1341 for MICS.      AFB Culture - Bone, Foot, Left  [[id number]] Collected: 07/21/23 1350    Lab Status: Preliminary result Specimen: Bone from Foot, Left Updated: 07/22/23 1130     AFB Stain No acid fast bacilli seen on concentrated smear    Anaerobic Culture - Wound, Foot, Left [[id number]]  (Normal) Collected: 07/21/23 1341    Lab Status: Final result Specimen: Wound from Foot, Left Updated: 07/26/23 0700     Anaerobic Culture No anaerobes isolated at 5 days    Wound Culture - Wound, Foot, Left [[id number]]  (Abnormal)  (Susceptibility) Collected: 07/21/23 1341    Lab Status: Final result Specimen: Wound from Foot, Left Updated: 07/23/23 1007     Wound Culture Light growth (2+) Staphylococcus aureus     Gram Stain Many (4+) WBCs seen      No organisms seen    Susceptibility        Staphylococcus aureus      KELIN      Clindamycin Resistant      Erythromycin Resistant      Inducible Clindamycin Resistance Positive      Oxacillin Susceptible      Rifampin Susceptible      Tetracycline Susceptible      Trimethoprim + Sulfamethoxazole Susceptible      Vancomycin Susceptible                       Susceptibility Comments       Staphylococcus aureus    This isolate is presumed to be clindamycin resistant based on detection of inducible clindamycin resistance.  Clindamycin may still be effective in some patients.               AFB Culture - Wound, Foot, Left [[id number]] Collected: 07/21/23 1341    Lab Status: Preliminary result Specimen: Wound from Foot, Left Updated: 07/22/23 1130     AFB Stain No acid fast bacilli seen on concentrated smear    COVID PRE-OP / PRE-PROCEDURE SCREENING ORDER (NO ISOLATION) - Swab, Nasopharynx [[id number]]  (Normal) Collected: 07/20/23 1142    Lab Status: Final result Specimen: Swab from Nasopharynx Updated: 07/20/23 1203    Narrative:      The following orders were created for panel order COVID PRE-OP / PRE-PROCEDURE SCREENING ORDER (NO ISOLATION) - Swab, Nasopharynx.  Procedure                               Abnormality         Status                      ---------                               -----------         ------                     COVID-19 and FLU A/B PCR...[[id number]]  Normal              Final result                 Please view results for these tests on the individual orders.    COVID-19 and FLU A/B PCR - Swab, Nasopharynx [[id number]]  (Normal) Collected: 07/20/23 1142    Lab Status: Final result Specimen: Swab from Nasopharynx Updated: 07/20/23 1203     COVID19 Not Detected     Influenza A PCR Not Detected     Influenza B PCR Not Detected    Narrative:      Fact sheet for providers: https://www.fda.gov/media/567942/download    Fact sheet for patients: https://www.fda.gov/media/697360/download    Test performed by PCR.    Blood Culture - Blood, Hand, Right [[id number]]  (Normal) Collected: 07/20/23 1120    Lab Status: Final result Specimen: Blood from Hand, Right Updated: 07/25/23 1245     Blood Culture No growth at 5 days    Blood Culture - Blood, Hand, Left [[id number]]  (Normal) Collected: 07/20/23 1110    Lab Status: Final result Specimen: Blood from Hand, Left Updated: 07/25/23 1245     Blood Culture No growth at 5 days    Blood Culture - Blood, Arm, Right [[id number]]  (Abnormal) Collected: 07/19/23 1122    Lab Status: Final result Specimen: Blood from Arm, Right Updated: 07/22/23 0648     Blood Culture Staphylococcus aureus     Comment:   Infectious disease consultation is highly recommended to rule out distant foci of infection.        Isolated from Aerobic Bottle     Gram Stain Aerobic Bottle Gram positive cocci in pairs and clusters    Narrative:      Refer to previous blood culture collected on  07/18/23 at 1205 for MICs.      Blood Culture - Blood, Arm, Left [[id number]]  (Abnormal) Collected: 07/19/23 1037    Lab Status: Final result Specimen: Blood from Arm, Left Updated: 07/21/23 0612     Blood Culture Staphylococcus aureus     Comment:   Infectious disease consultation is highly recommended to rule out distant foci of infection.         Isolated from Aerobic and Anaerobic Bottles     Gram Stain Aerobic Bottle Gram positive cocci in pairs and clusters      Anaerobic Bottle Gram positive cocci in pairs and clusters    Narrative:      Refer to previous blood culture collected on 07/18/2023 1205 for MICs        Wound Culture - Wound, Foot, Left [[id number]]  (Abnormal)  (Susceptibility) Collected: 07/18/23 1235    Lab Status: Final result Specimen: Wound from Foot, Left Updated: 07/21/23 0715     Wound Culture Rare Staphylococcus aureus     Gram Stain No WBCs or organisms seen    Susceptibility        Staphylococcus aureus      KELIN      Clindamycin Resistant      Erythromycin Resistant      Inducible Clindamycin Resistance Positive      Oxacillin Susceptible      Rifampin Susceptible      Tetracycline Susceptible      Trimethoprim + Sulfamethoxazole Susceptible      Vancomycin Susceptible                       Susceptibility Comments       Staphylococcus aureus    This isolate is presumed to be clindamycin resistant based on detection of inducible clindamycin resistance.  Clindamycin may still be effective in some patients.  This isolate is presumed to be clindamycin resistant based on detection of inducible clindamycin resistance.  Clindamycin may still be effective in some patients.               Urine Culture - Urine, Urine, Clean Catch [[id number]]  (Normal) Collected: 07/18/23 1205    Lab Status: Final result Specimen: Urine, Clean Catch Updated: 07/19/23 2140     Urine Culture No growth    Blood Culture - Blood, Arm, Right [[id number]]  (Abnormal)  (Susceptibility) Collected: 07/18/23 1205    Lab Status: Final result Specimen: Blood from Arm, Right Updated: 07/21/23 0608     Blood Culture Staphylococcus aureus     Comment:   Infectious disease consultation is highly recommended to rule out distant foci of infection.        Isolated from Aerobic and Anaerobic Bottles     Gram Stain Aerobic Bottle Gram positive cocci in pairs and clusters       Anaerobic Bottle Gram positive cocci in pairs and clusters    Susceptibility        Staphylococcus aureus      KELIN      Gentamicin Susceptible      Oxacillin Susceptible      Rifampin Susceptible      Vancomycin Susceptible                       Susceptibility Comments       Staphylococcus aureus    This isolate is presumed to be clindamycin resistant based on detection of inducible clindamycin resistance.  Clindamycin may still be effective in some patients.               Blood Culture - Blood, Arm, Left [[id number]]  (Abnormal) Collected: 07/18/23 1205    Lab Status: Final result Specimen: Blood from Arm, Left Updated: 07/21/23 0608     Blood Culture Staphylococcus aureus     Comment:   Infectious disease consultation is highly recommended to rule out distant foci of infection.        Isolated from Aerobic and Anaerobic Bottles     Gram Stain Aerobic Bottle Gram positive cocci in pairs and clusters      Anaerobic Bottle Gram positive cocci in pairs and clusters    Narrative:      Refer to previous blood culture collected on 07/18/2023 1205 for MICs    Blood Culture ID, PCR - Blood, Arm, Right [[id number]]  (Abnormal) Collected: 07/18/23 1205    Lab Status: Final result Specimen: Blood from Arm, Right Updated: 07/19/23 1122     BCID, PCR Staph aureus. mecA/C and MREJ (methicillin resistance gene) NOT detected. Identification by BCID2 PCR    Narrative:      Infectious disease consultation is highly recommended to rule out distant foci of infection.            Adult Transthoracic Echo Complete W/ Cont if Necessary Per Protocol    Result Date: 7/19/2023    Left ventricular systolic function is normal. Calculated left ventricular EF = 60%   Left ventricular diastolic function was normal.   Left atrial volume is moderately increased.   Saline test results are negative.   There is calcification of the aortic valve.   Moderate tricuspid valve regurgitation is present.   Estimated right ventricular systolic pressure from  tricuspid regurgitation is mildly elevated (35-45 mmHg).     CT Abdomen Pelvis Without Contrast    Result Date: 7/19/2023  CT ABDOMEN PELVIS WO CONTRAST Date of Exam: 7/19/2023 6:38 PM EDT Indication: Sepsis. Comparison: No previous CT scans. Technique: Axial CT images were obtained of the abdomen and pelvis without the administration of contrast. Reconstructed coronal and sagittal images were also obtained. Automated exposure control and iterative construction methods were used. Findings: The included lower lungs appear grossly clear. There is mild diffuse fatty liver change. No significant abnormalities are noted of the spleen, gallbladder, pancreas, adrenal glands, or kidneys for non-IV contrast enhanced exam. There is a large, intact appearing duodenal diverticulum of the second-third portion of the duodenum, 6 cm in diameter. There is no visible associated inflammation or extraluminal air. Upper abdominal bowel loops are normal in caliber and normal in appearance, although the colon  is seen to contain only fluid and contrast. Regarding the lower abdomen pelvis, bladder is moderately distended, approximately 12 cm in length and appears normal. There is no formed stool except at the level of the rectum, where there may be a mild degree of impaction. No perirectal inflammation is seen. There are a few sigmoid diverticuli and somewhat more numerous descending colon diverticuli without evidence of diverticulitis. There is mild ectasia of the infrarenal abdominal aorta to 2.8 cm. No significant abnormalities are seen of the terminal ileum or cecum. Appendix is not definitely identified. No intrapelvic inflammatory changes are seen. Bony structures appear to be intact. There is an old L1 vertebral compression deformity, with practical fusion of L1 and T12. There is moderately advanced L5-S1 degenerative disc disease.     Impression: 1. Colon contains mostly fluid. There may be a mild degree of rectal impaction, but  no colonic inflammation or evidence of bowel obstruction is seen. 2. 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 3. Large, but intact-appearing duodenal diverticulum apparently incidental. 4. Moderately distended, otherwise normal-appearing bladder. Sigmoid diverticulosis without evidence of diverticulitis. Electronically Signed: Derek Chandlerd  7/19/2023 7:11 PM EDT  Workstation ID: [id number]    XR Foot 3+ View Left    Result Date: 7/19/2023  XR FOOT 3+ VW LEFT Date of Exam: 7/19/2023 2:09 PM EDT Indication: om Comparison: MRI performed the same day. Radiographs January 6, 2020 Findings: Status post transmetatarsal amputation at the proximal metatarsals. There is diffuse soft tissue prominence, particularly at the remaining distal foot. There is a suspected wound at the lateral-distal foot. There is heterogeneous osseous lucency/erosive change involving the base of the fourth metatarsal and distal cuboid. No significant visualized proximal fifth metatarsal is seen on this exam. There is also osseous lucency at the lateral aspect of the third metatarsal base, lateral navicular, and lateral cuneiform. These findings correlate with suspected osteomyelitis seen on prior MRI. Oblique linear lucency at the lateral base of the third metatarsal suggests a pathologic nondisplaced fracture. No other definite acute fracture is seen. No other  definite acute osseous abnormality. There appear to be moderate degenerative changes and heterogeneous sclerosis of the proximal metatarsals and tarsal bones. There is calcific atherosclerosis of the dorsalis pedis.     Impression: 1.Status post transmetatarsal amputation. 2.Abnormal appearance of the proximal third and fourth metatarsals, distal cuboid, lateral cuneiform, and lateral navicular suspicious form osteomyelitis as seen on prior MRI. 3.Diffuse forefoot soft tissue prominence, with suspected wound at the lateral distal foot. Electronically Signed:  Antonio Shima  7/19/2023 2:51 PM EDT  Workstation ID: [id number]    XR Chest 1 View    Result Date: 7/22/2023  XR CHEST 1 VW Date of Exam: 7/22/2023 2:35 PM EDT Indication: PICC line placement Comparison: 7/19/2023. Findings: There is a right upper extremity PICC line in place with the tip terminating in the superior vena cava. The heart is slightly enlarged. The pulmonary vascular markings are normal. The lungs and pleural spaces are clear of active disease. There are chronic age-related changes involving the bony thorax and thoracic aorta.     Impression: 1. The tip of the right upper extremity PICC line terminates in the superior vena cava. 2. Mild cardiomegaly. 3. No active pulmonary disease. Electronically Signed: Yuan Burnett  7/22/2023 3:06 PM EDT  Workstation ID: [id number]    XR Chest 1 View    Result Date: 7/19/2023  XR CHEST 1 VW Date of Exam: 7/19/2023 10:16 AM EDT Indication: +blood culture Comparison: 7/19/2023 . Findings: Heart and pulmonary vessels are normal. Lung fields are normally inflated and clear. There are no effusions.     Impression: No acute process. Electronically Signed: Tina Weinberg MD  7/19/2023 10:33 AM EDT  Workstation ID: [id number]    MRI Foot Left Without Contrast    Result Date: 7/19/2023  MRI FOOT LEFT WO CONTRAST Date of Exam: 7/19/2023 1:07 PM EDT Indication: Foot swelling, nondiabetic, osteomyelitis suspected.  Comparison: Left foot MRI 11/16/2020, left foot radiographs 1/6/2020 Technique:  Routine multiplanar/multisequence sequence images of the left foot were obtained without contrast administration. Findings: From most recent available imaging of the left foot there has been interval surgical change of transmetatarsal amputation through the proximal metatarsal bases. There is a 2.1 x 1.5 x 2.6 cm (AP X TV X CC) T1 intermediate, T2/STIR hyperintense collection in the soft tissues lateral to the distal cuboid (series 12 image 17, series 8 image 19), concerning for phlegmon or  abscess. There is abnormal T1 hypointense, T2/STIR hyperintense signal change of the cuboid, third cuneiform, fourth metatarsal base, and fifth metatarsal base compatible with osteomyelitis. There is some T2/STIR hyperintense marrow edema of the distal cuboid and lateral aspect of the navicular, without convincing loss of the T1 hyperintense marrow signal, likely reflecting some reactive marrow edema although contiguous osteomyelitis is difficult to exclude. There is diffuse subcutaneous edema throughout the dorsal foot and ankle which may reflect contiguous cellulitis. No ankle joint effusion.     Impression: 2.1 x 1.5 x 2.6 cm T2/STIR hyperintense structure in the soft tissues abutting the lateral aspect of the distal cuboid, concerning for phlegmon or abscess, incompletely characterized in the absence of IV contrast. There are marrow signal changes of the cuboid, third cuneiform, and fourth and fifth metatarsal bases which are highly suspicious for osteomyelitis. Correlate for any open wound in this area. Correlate with ESR and CRP. Marrow signal changes relating to Charcot arthropathy cannot be entirely excluded but are considered somewhat less likely in the setting of adjacent suspected phlegmon/abscess. There is some marrow edema of the nearby distal cuboid and lateral navicular bone which may reflect reactive marrow edema although contiguous osteomyelitis cannot be entirely excluded. Electronically Signed: Juan Remy  7/19/2023 1:41 PM EDT  Workstation ID: [id number]    Peripheral Block    Result Date: 7/21/2023  Kishan Morales CRNA     7/23/2023  1:02 PM Peripheral Block Pre-sedation assessment completed: 7/21/2023 12:54 PM Patient reassessed immediately prior to procedure Patient location during procedure: pre-op Start time: 7/21/2023 1:06 PM Stop time: 7/21/2023 1:10 PM Reason for block: at surgeon's request and post-op pain management Preanesthetic Checklist Completed: patient identified, IV checked,  "site marked, risks and benefits discussed, surgical consent, monitors and equipment checked, pre-op evaluation and timeout performed Prep: Sterile barriers:cap, gloves, mask and washed/disinfected hands Prep: ChloraPrep Patient monitoring: blood pressure monitoring, continuous pulse oximetry and EKG Procedure Sedation: yes Performed under: local infiltration Guidance:ultrasound guided ULTRASOUND INTERPRETATION.  Using ultrasound guidance a 20 G gauge needle was placed in close proximity to the nerve, at which point, under ultrasound guidance anesthetic was injected in the area of the nerve and spread of the anesthesia was seen on ultrasound in close proximity thereto.  There were no abnormalities seen on ultrasound; a digital image was taken; and the patient tolerated the procedure with no complications. Images:still images obtained, printed/placed on chart Block Type:popliteal Injection Technique:single-shot Needle Type:echogenic and short-bevel Needle Gauge:20 G Resistance on Injection: none Medications Used: bupivacaine-EPINEPHrine PF (MARCAINE w/EPI) 0.25% -1:[id number] injection - Injection  30 mL - 7/21/2023 1:06:00 PM dexamethasone sodium phosphate injection - Injection  2 mg - 7/21/2023 1:06:00 PM Post Assessment Injection Assessment: negative aspiration for heme, no paresthesia on injection and incremental injection Patient Tolerance:comfortable throughout block Complications:no Additional Notes SINGLE shot  A high-frequency linear transducer, with sterile cover, was placed in the popliteal fossa to identify the popliteal artery and vein, Tibial nerve (TN) and Common Peroneal nerve (CP). The transducer was then moved in a cephalad fashion to observe the TN and CP nerve bifurcation to form the Sciatic Nerve. The insertion site was prepped and draped in sterile fashion. Skin and cutaneous tissue was infiltrated with 2-5 ml of 1% Lidocaine. Using ultrasound-guidance, a 20-gauge B-Grady 4\" Ultraplex 360 " non-stimulating echogenic needle was then inserted and advanced in plane from lateral to medial. Preservative-free normal saline was utilized for hydro-dissection of tissue, advancement of Touhy, and to confirm final needle placement posterior to the nerves. Local anesthetic injection spread, in incremental 3-5 ml injections, to surround both nerve structures. Aspiration every 5 ml to prevent intravascular injection. Injection was completed with negative aspiration of blood and negative intravascular injection. Injection pressures were normal with minimal resistance     Peripheral Block    Result Date: 7/21/2023  Kishan Morales, CRNA     7/23/2023  1:02 PM Peripheral Block Pre-sedation assessment completed: 7/21/2023 12:54 PM Patient reassessed immediately prior to procedure Start time: 7/21/2023 1:06 PM Stop time: 7/21/2023 1:10 PM Reason for block: at surgeon's request and post-op pain management Preanesthetic Checklist Completed: patient identified, IV checked, site marked, risks and benefits discussed, surgical consent, monitors and equipment checked, pre-op evaluation and timeout performed Prep: Pt Position: supine Sterile barriers:cap, gloves, mask, sterile barriers and washed/disinfected hands Prep: ChloraPrep Patient monitoring: blood pressure monitoring, continuous pulse oximetry and EKG Procedure Performed under: spinal Guidance:ultrasound guided ULTRASOUND INTERPRETATION.  Using ultrasound guidance a 20 G gauge needle was placed in close proximity to the nerve, at which point, under ultrasound guidance anesthetic was injected in the area of the nerve and spread of the anesthesia was seen on ultrasound in close proximity thereto.  There were no abnormalities seen on ultrasound; a digital image was taken; and the patient tolerated the procedure with no complications. Images:still images obtained, printed/placed on chart Block Type:adductor canal block Injection Technique:single-shot Needle Type:echogenic  "and short-bevel Needle Gauge:20 G Resistance on Injection: none Catheter size: 20g. Medications Used: bupivacaine-EPINEPHrine PF (MARCAINE w/EPI) 0.25% -[id number] injection - Injection  30 mL - 7/21/2023 1:06:00 PM dexamethasone sodium phosphate injection - Injection  2 mg - 7/21/2023 1:06:00 PM Post Assessment Injection Assessment: negative aspiration for heme, incremental injection and no paresthesia on injection Patient Tolerance:comfortable throughout block Complications:no Additional Notes SINGLE shot A high-frequency linear transducer, with sterile cover, was placed on the anterior mid-thigh (between the anterior superior iliac spine and patella). The transducer was then moved medially to identify the Sartorius muscle (Joseph), Vastus Medialis muscle (VMM), Superficial Femoral Artery (SFA) and Vein. The transducer was then moved cephalad or caudad to position the SFA in the middle of the Joseph. The insertion site was prepped and draped in sterile fashion. Skin and cutaneous tissue was infiltrated with 2-5 ml of 1% Lidocaine. Using ultrasound-guidance, a 20-gauge B-Grady 4\" Ultraplex 360 non-stimulating echogenic needle was advanced in plane from lateral to medial. Preservative-free normal saline was utilized for hydro-dissection of tissue, advancement of needle, and to confirm needle placement below the fascial plane of the Joseph where the Nerve to the VMM is located. Local anesthetic (LA) 5 ml deposited here. The needle continues its path lateral to the SFA at the level of the Saphenous Nerve. The remainder of the LA was deposited at the 10-11 o'clock position of the SFA. This injection created a space between the Joseph and the SFA. Aspiration every 5 ml to prevent intravascular injection. Injection was completed with negative aspiration of blood and negative intravascular injection. Injection pressures were normal with minimal resistance.     XR Chest PA & Lateral    Result Date: 7/18/2023  XR CHEST PA AND LATERAL " Date of Exam: 7/18/2023 12:47 PM EDT Indication: FATIGU&MALAIDE Comparison 9/14/2021. Findings: Heart and pulmonary vessels appear within normal limits. Lung fields appear normally inflated and clear of acute infiltrates or effusions.     Impression: No acute process. Electronically Signed: Tina Weinberg MD  7/18/2023 1:02 PM EDT  Workstation ID: [id number]     Results for orders placed during the hospital encounter of 10/19/18    Duplex Lower Extremity Art / Grafts - Bilateral CAR    Interpretation Summary  · The right common femoral artery demonstrates no significant stenosis.  · The right proximal deep femoral artery demonstrates no significant stenosis.  · The left common femoral artery demonstrates no significant stenosis.  · The left proximal deep femoral artery demonstrates no significant stenosis.  · The left proximal superficial femoral artery demonstrates no significant stenosis.  · The right proximal superficial femoral artery demonstrates no significant stenosis.  · The right mid superficial femoral artery demonstrates no significant stenosis.  · The left middle superficial femoral artery demonstrates no significant stenosis.  · The left distal superficial femoral artery demonstrates no significant stenosis.  · The right distal superficial femoral artery demonstrates no significant stenosis.  · The right popliteal artery demonstrates no significant stenosis.  · The right anterior tibial artery demonstrates no significant stenosis.  · The left anterior tibial artery demonstrates no significant stenosis.  · The left tibial/peroneal trunk artery demonstrates no significant stenosis.  · The right tibial peroneal trunk artery demonstrates no significant stenosis.  · The right posterior tibial artery demonstrates no significant stenosis.  · The left posterior tibial artery demonstrates no significant stenosis.  · The left peroneal artery demonstrates no significant stenosis.  · The right peroneal stenosis  artery demonstrates no significant stenosis.      Results for orders placed during the hospital encounter of 10/19/18    Duplex Lower Extremity Art / Grafts - Bilateral CAR    Interpretation Summary  · The right common femoral artery demonstrates no significant stenosis.  · The right proximal deep femoral artery demonstrates no significant stenosis.  · The left common femoral artery demonstrates no significant stenosis.  · The left proximal deep femoral artery demonstrates no significant stenosis.  · The left proximal superficial femoral artery demonstrates no significant stenosis.  · The right proximal superficial femoral artery demonstrates no significant stenosis.  · The right mid superficial femoral artery demonstrates no significant stenosis.  · The left middle superficial femoral artery demonstrates no significant stenosis.  · The left distal superficial femoral artery demonstrates no significant stenosis.  · The right distal superficial femoral artery demonstrates no significant stenosis.  · The right popliteal artery demonstrates no significant stenosis.  · The right anterior tibial artery demonstrates no significant stenosis.  · The left anterior tibial artery demonstrates no significant stenosis.  · The left tibial/peroneal trunk artery demonstrates no significant stenosis.  · The right tibial peroneal trunk artery demonstrates no significant stenosis.  · The right posterior tibial artery demonstrates no significant stenosis.  · The left posterior tibial artery demonstrates no significant stenosis.  · The left peroneal artery demonstrates no significant stenosis.  · The right peroneal stenosis artery demonstrates no significant stenosis.      Results for orders placed during the hospital encounter of 07/19/23    Adult Transthoracic Echo Complete W/ Cont if Necessary Per Protocol    Interpretation Summary    Left ventricular systolic function is normal. Calculated left ventricular EF = 60%    Left  ventricular diastolic function was normal.    Left atrial volume is moderately increased.    Saline test results are negative.    There is calcification of the aortic valve.    Moderate tricuspid valve regurgitation is present.    Estimated right ventricular systolic pressure from tricuspid regurgitation is mildly elevated (35-45 mmHg).      Plan for Follow-up of Pending Labs/Results: f/u w ID  Pending Labs       Order Current Status    Fungus Culture - Bone, Foot, Left In process    Fungus Culture - Wound, Foot, Left In process    AFB Culture - Bone, Foot, Left Preliminary result    AFB Culture - Wound, Foot, Left Preliminary result          Discharge Details        Discharge Medications        New Medications        Instructions Start Date   ceFAZolin in dextrose 2000 mg/ 100 mL solution IVPB  Commonly known as: ANCEF   2,000 mg, Intravenous, Every 8 Hours      oxyCODONE 5 MG immediate release tablet  Commonly known as: ROXICODONE   5 mg, Oral, Every 4 Hours PRN      saccharomyces boulardii 250 MG capsule  Commonly known as: Florastor   250 mg, Oral, 2 Times Daily             Changes to Medications        Instructions Start Date   atorvastatin 80 MG tablet  Commonly known as: LIPITOR  What changed: additional instructions   80 mg, Oral, Nightly, Hold while on Daptomycin      metoprolol tartrate 50 MG tablet  Commonly known as: LOPRESSOR  What changed:   medication strength  how much to take   50 mg, Oral, 2 Times Daily             Continue These Medications        Instructions Start Date   Eliquis 5 MG tablet tablet  Generic drug: apixaban   TAKE ONE TABLET BY MOUTH TWICE A DAY             Stop These Medications      lisinopril-hydrochlorothiazide 20-12.5 MG per tablet  Commonly known as: PRINZIDE,ZESTORETIC              Allergies   Allergen Reactions    Sulfa Antibiotics Unknown (See Comments)     Pt was told as child he had an allergy.  Has not taken and doesn't know the response         Discharge  Disposition:  rehab    Diet:  Hospital:  Diet Order   Procedures    Diet: Cardiac Diets; Healthy Heart (2-3 Na+); Texture: Regular Texture (IDDSI 7); Fluid Consistency: Thin (IDDSI 0)       Activity:  as tolerated    Restrictions or Other Recommendations:  As above       CODE STATUS:    Code Status and Medical Interventions:   Ordered at: 07/19/23 1154     Code Status (Patient has no pulse and is not breathing):    CPR (Attempt to Resuscitate)     Medical Interventions (Patient has pulse or is breathing):    Full Support     Release to patient:    Routine Release       Future Appointments   Date Time Provider Department Center   7/28/2023  9:30 AM Mao Mckay MD [facility]   2/12/2024 11:30 AM Hang Anderson MD [facility]       Additional Instructions for the Follow-ups that You Need to Schedule       Discharge Follow-up with Specified Provider: Nely; 1 Week   As directed      To: Nely   Follow Up: 1 Week                      Tianna Gallagher MD  07/26/23      Time Spent on Discharge:  I spent  45  minutes on this discharge activity which included: face-to-face encounter with the patient, reviewing the data in the system, coordination of the care with the nursing staff as well as consultants, documentation, and entering orders.

## 2023-07-26 NOTE — PROGRESS NOTES
[facility]    INFECTIOUS DISEASE PROGRESS NOTE    Carlos Culver  1945  [id number]    Consult: 7/19/23    Admit date: 7/19/2023    Requesting Provider: No ref. provider found  Evaluating physician: Jeffery Young MD  Reason for Consultation: MSSA sepsis with bacteremia with 4 out of 4 blood cultures positive by PCR from 7/18/2023, left foot osteomyelitis, Abscess  Chief Complaint: Above      Subjective   History of present illness:  Patient is a  77 y.o.  Yr old male with a history of peripheral neuropathy, hard of hearing, hyperlipidemia, essential hypertension, previous left transmetatarsal amputation for osteomyelitis, who developed a left plantar ulcer in April 2023 after working out on an elliptical exercise machine.  His ulceration has persisted off and on and he was treated briefly with oral antibiotics by Dr. Rajat SWANSON.P.MEunice at [facility].  Patient still had persistence of his left plantar wound.  Also complained of some unmeasured fevers and fatigue over the past few weeks.  He was evaluated in clinic and had blood cultures obtained and laboratories.  He had no other localizing signs or symptoms of infection.  Blood cultures drawn on 7/18 were positive by PCR for methicillin sensitive Staph aureus in 4 out of 4 bottles.  White blood cell count was 17.  Patient was directed to be admitted to [facility] on 7/19/2023.  I was consulted on 7/19/2023 for further evaluation and treatment.    7/20/2023 history reviewed.  Awaiting orthopedic evaluation of left foot.  No high fever.  Tolerating cefazolin for MSSA sepsis.    7/21/2023 history reviewed.  Awaits I&D of his left foot today.  No high fever.  On cefazolin for MSSA sepsis and likely left foot abscess and osteomyelitis.  Duration to be determined.    7/22/2023 history reviewed.  Status post partial amputation left foot with debridement for abscess and osteomyelitis on 7/21.  On  cefazolin for MSSA sepsis and osteomyelitis to continue until 9/1/2023, then likely oral antibiotics for an additional 3 months.  No high fever.  No pain with his peripheral neuropathy.    7/24/2023 history reviewed.  Continues on cefazolin until 9/1/2023, then oral antibiotics for an additional 3 months for MSSA sepsis and left foot osteomyelitis status post debridement and partial amputation on 7/21.  No high fever.  Awaits placement to rehab.    7/25/2023 history reviewed.  Tolerating cefazolin until 9/1 for left foot osteomyelitis and abscess, then oral antibiotics for an additional 3 months.  Status post partial amputation 7/21.  No high fever.  Awaits transfer to [facility].    7/26/2023 history reviewed.  No pain left foot.  On cefazolin until 9/1 for MSSA abscess and left foot osteomyelitis with sepsis, then oral antibiotics for an additional 3 months.  Status post partial amputation left foot 7/21.  Awaits transfer to rehab.  No high fever.    Past Medical History:   Diagnosis Date    A-fib     Acute kidney failure     Elevated cholesterol     Hard of hearing     Hyperlipidemia     Hypertension     Renal disorder     Wears hearing aid in both ears     Wears reading eyeglasses        Past Surgical History:   Procedure Laterality Date    AMPUTATION DIGIT Left 1/7/2020    Procedure: AMPUTATION LEFT 2ND TOE;  Surgeon: Idania Osborn MD;  Location:  [email or web address] OR;  Service: Orthopedics    AMPUTATION DIGIT Left 12/8/2020    Procedure: TRANSMETATARSAL AMPUTATION LEFT;  Surgeon: Idania Osborn MD;  Location:  [email or web address] OR;  Service: Orthopedics;  Laterality: Left;    APPENDECTOMY      COLONOSCOPY  2006    FOOT SURGERY Left 12/09/2020    (L) transmetatarsal amputation 12/09/2020 - Dr. Idania Osborn    INCISION AND DRAINAGE LEG Left 10/23/2018    Procedure: INCISION AND DRAINAGE LEFT FOOT;  Surgeon: Idania Osborn MD;  Location:  [email or web address] OR;  Service: Orthopedics    INCISION AND DRAINAGE LEG Left 7/21/2023     Procedure: FOOT INCISION AND DRAINAGE; REVISION AMPUTATION; ACHILLES LENGTHENING -LEFT;  Surgeon: Mao Mckay MD;  Location:  [facility] OR;  Service: Orthopedics;  Laterality: Left;    TOE AMPUTATION      TONSILLECTOMY      VENOUS ACCESS DEVICE (PORT) INSERTION N/A 10/23/2018    Procedure: GROSHONG CATHETER PLACEMENT;  Surgeon: Marquez Simons MD;  Location:  [facility] OR;  Service: General    VENOUS ACCESS DEVICE (PORT) INSERTION N/A 2020    Procedure: GROSHONG INSERTION;  Surgeon: Antonio Bains MD;  Location:  [facility] OR;  Service: General;  Laterality: N/A;    VENOUS ACCESS DEVICE (PORT) REMOVAL         Pediatric History   Patient Parents    Not on file     Other Topics Concern    Not on file   Social History Narrative    Not on file   , 3 children, no pets, 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  Father  of cancer, mother  of old age and cardiac disease, 1 brother, mother also had hypothyroidism  Allergies   Allergen Reactions    Sulfa Antibiotics Unknown (See Comments)     Pt was told as child he had an allergy.  Has not taken and doesn't know the response       Immunization History   Administered Date(s) Administered    COVID-19 (PFIZER) BIVALENT 12+YRS 10/19/2022    COVID-19 (PFIZER) Purple Cap Monovalent 2021, 2021, 10/02/2021    Covid-19 (Pfizer) Gray Cap Monovalent 2022       Medication:    Current Facility-Administered Medications:     acetaminophen (TYLENOL) tablet 650 mg, 650 mg, Oral, Q4H PRN, 650 mg at 234 **OR** acetaminophen (TYLENOL) suppository 650 mg, 650 mg, Rectal, Q4H PRN, Mao Mckay MD    atorvastatin (LIPITOR) tablet 80 mg, 80 mg, Oral, Nightly, Mao Mckay MD, 80 mg at 23    sennosides-docusate (PERICOLACE) 8.6-50 MG per tablet 2 tablet, 2 tablet, Oral, BID, 2 tablet at 23 0931 **AND** polyethylene glycol (MIRALAX) packet 17 g, 17 g, Oral, Daily PRN **AND** bisacodyl (DULCOLAX) EC tablet  5 mg, 5 mg, Oral, Daily PRN **AND** bisacodyl (DULCOLAX) suppository 10 mg, 10 mg, Rectal, Daily PRN, Mao Mckay MD    bisacodyl (DULCOLAX) suppository 10 mg, 10 mg, Rectal, Daily PRN, Mao Mckay MD    Calcium Replacement - Follow Nurse / BPA Driven Protocol, , Does not apply, PRNNely Jeremy, MD    ceFAZolin in dextrose (ANCEF) IVPB solution 2,000 mg, 2,000 mg, Intravenous, Q8H, Mao Mckay MD, 2,000 mg at 07/26/23 0358    Enoxaparin Sodium (LOVENOX) syringe 40 mg, 40 mg, Subcutaneous, Q24H, Mao Mckay MD, 40 mg at 07/25/23 1357    HYDROmorphone (DILAUDID) injection 0.5 mg, 0.5 mg, Intravenous, Q2H PRN **AND** naloxone (NARCAN) injection 0.1 mg, 0.1 mg, Intravenous, Q5 Min PRN, Mao Mckay MD    Magnesium Standard Dose Replacement - Follow Nurse / BPA Driven Protocol, , Does not apply, Nely JIMENEZ Jeremy, MD    melatonin tablet 5 mg, 5 mg, Oral, Nightly PRN, Mao Mckay MD, 5 mg at 07/22/23 2045    metoprolol tartrate (LOPRESSOR) tablet 50 mg, 50 mg, Oral, BID, Tianna Gallagher MD, 50 mg at 07/26/23 0922    morphine injection 2 mg, 2 mg, Intravenous, Q2H PRN **AND** naloxone (NARCAN) injection 0.4 mg, 0.4 mg, Intravenous, Q5 Min PRN, Mao Mckay MD    ondansetron (ZOFRAN) tablet 4 mg, 4 mg, Oral, Q6H PRN **OR** ondansetron (ZOFRAN) injection 4 mg, 4 mg, Intravenous, Q6H PRN, Mao Mckay MD    oxyCODONE (ROXICODONE) immediate release tablet 5 mg, 5 mg, Oral, Q4H PRN, Mao Mckay MD, 5 mg at 07/24/23 0128    Phosphorus Replacement - Follow Nurse / BPA Driven Protocol, , Does not apply, Nely JIMENEZ Jeremy, MD    Potassium Replacement - Follow Nurse / BPA Driven Protocol, , Does not apply, Nely JIMENEZ Jeremy, MD    [COMPLETED] Insert Peripheral IV, , , Once **AND** sodium chloride 0.9 % flush 10 mL, 10 mL, Intravenous, PRN, Mao Mckay MD    sodium chloride 0.9 % flush 10 mL, 10 mL, Intravenous, Q12H, Jeffery Young MD, 10 mL at 07/26/23 0923    sodium chloride 0.9 % flush 10 mL,  "10 mL, Intravenous, PRN, Jeffery Young MD    sodium chloride 0.9 % infusion 40 mL, 40 mL, Intravenous, PRN, Mao Mckay MD    sodium chloride 0.9 % infusion 40 mL, 40 mL, Intravenous, PRN, Mao Mckay MD    Please refer to the medical record for a full medication list    Review of Systems:    Constitutional--some low-grade fever, no chills or sweats.  Appetite good, and no malaise.  Some fatigue.  HEENT-- No new vision, hearing or throat complaints.  No epistaxis or oral sores.  Denies odynophagia or dysphagia.  No odynophagia or dysphagia. No headache, photophobia or neck stiffness.  CV-- No chest pain, palpitation or syncope  Resp-- No SOB/cough/Hemoptysis  GI- No nausea, vomiting, or diarrhea.  No hematochezia, melena, or hematemesis. Denies jaundice or chronic liver disease.  -- No dysuria, hematuria, or flank pain.  Denies hesitancy, urgency or flank pain.  Lymph- no swollen lymph nodes in neck/axilla or groin.   Heme- No active bruising or bleeding; no Hx of DVT or PE.  MS-- no swelling or pain in the bones or joints of arms/legs.  No new back pain.  Neuro-- No acute focal weakness or numbness in the arms or legs.  No seizures.  Skin--No rashes or lesions, except left foot, no nodules    Physical Exam:   Vital Signs   Temp:  [97.3 °F (36.3 °C)-98 °F (36.7 °C)] 97.4 °F (36.3 °C)  Heart Rate:  [] 100  Resp:  [16-18] 16  BP: (104-127)/(63-82) 117/82    Temp  Min: 97.3 °F (36.3 °C)  Max: 98 °F (36.7 °C)  BP  Min: 104/75  Max: 127/69  Pulse  Min: 62  Max: 100  Resp  Min: 16  Max: 18  SpO2  Min: 93 %  Max: 98 %    Blood pressure 117/82, pulse 100, temperature 97.4 °F (36.3 °C), temperature source Oral, resp. rate 16, height 181.6 cm (71.5\"), weight 99.8 kg (220 lb), SpO2 96 %.  GENERAL: Awake and alert, in moderate distress. Appears older than stated age.  Resting in chair  HEENT:  Normocephalic, atraumatic.  Oropharynx without thrush. Dentition in good repair. No cervical adenopathy. No neck " masses.  Ears externally normal, Nose externally normal.  EYES:  No conjunctival injection. No icterus. EOM full.  LYMPHATICS: No lymphadenopathy of the neck or axillary or inguinal regions.   HEART: No murmur, gallop, or pericardial friction rub. Reg rate rhythm  LUNGS: Clear to auscultation and percussion. No respiratory distress, no use of accessory muscles.  ABDOMEN: Soft, nontender, nondistended. No appreciable HSM.  Bowel sounds normal.  Slightly obese.  No mass.  SKIN: Warm and dry without cutaneous eruptions.  No nodules.  Left foot with s surgical dressing in place.  PSYCHIATRIC: Mental status lucid. No confusion.  EXT:  No cellulitic change.  NEURO: Oriented to name, nonfocal.      Results Review:   I reviewed the patient's new clinical results.  I reviewed the patient's new imaging results and agree with the interpretation.  I reviewed the patient's other test results and agree with the interpretation    Results from last 7 days   Lab Units 07/25/23  1112 07/23/23  0402 07/22/23  0517   WBC 10*3/mm3 16.40* 16.37* 12.85*   HEMOGLOBIN g/dL 9.6* 9.0* 8.6*   HEMATOCRIT % 30.4* 28.3* 25.8*   PLATELETS 10*3/mm3 305 264 232       Results from last 7 days   Lab Units 07/23/23  0402   SODIUM mmol/L 137   POTASSIUM mmol/L 4.3   CHLORIDE mmol/L 105   CO2 mmol/L 20.0*   BUN mg/dL 21   CREATININE mg/dL 0.92   GLUCOSE mg/dL 144*   CALCIUM mg/dL 7.6*       Results from last 7 days   Lab Units 07/22/23  0517   ALK PHOS U/L 158*   BILIRUBIN mg/dL 0.8   ALT (SGPT) U/L 11   AST (SGOT) U/L 62*                       Results from last 7 days   Lab Units 07/22/23  0517   LACTATE mmol/L 1.5       Estimated Creatinine Clearance: 80.3 mL/min (by C-G formula based on SCr of 0.92 mg/dL).  CPK          7/22/2023    05:17   Common Labsle   Creatine Kinase 77       Procalitonin Results:      Lab 07/21/23  0443 07/20/23  0351 07/19/23  1034   PROCALCITONIN 0.54* 0.73* 0.75*        Brief Urine Lab Results  (Last result in the past 365  days)        Color   Clarity   Blood   Leuk Est   Nitrite   Protein   CREAT   Urine HCG        07/19/23 0955 Orange   Clear   Negative   Negative   Negative   Trace                  No results found for: SITE, ALLENTEST, PHART, GYN6NTY, PO2ART, MZZ2IBU, BASEEXCESS, I3TRDXZR, HGBBG, HCTABG, OXYHEMOGLOBI, METHHGBN, CARBOXYHGB, CO2CT, BAROMETRIC, MODALITY, FIO2     Microbiology:  No results found for: ACANTHNAEG, AFBCX, BPERTUSSISCX, BLOODCX  No results found for: BCIDPCR, CXREFLEX, CSFCX, CULTURETIS  No results found for: CULTURES, HSVCX, URCX  No results found for: EYECULTURE, GCCX, HSVCULTURE, LABHSV  No results found for: LEGIONELLA, MRSACX, MUMPSCX, MYCOPLASCX  No results found for: NOCARDIACX, STOOLCX  Urine Culture   Date Value Ref Range Status   07/18/2023 No growth  Preliminary     Wound Culture   Date Value Ref Range Status   07/18/2023 Culture in progress  Preliminary        Urine Culture   Date Value Ref Range Status   07/18/2023 No growth  Preliminary     Wound Culture   Date Value Ref Range Status   07/18/2023 Culture in progress  Preliminary   (    Blood cultures 7/18/2023 reported positive in 4 out of 4 bottles by PCR for MSSA    Radiology:  Imaging Results (Last 72 Hours)       ** No results found for the last 72 hours. **            IMPRESSION:     1.  MSSA (sensitive to tetracycline and sulfa) high-grade sepsis with bacteremia in 4 out of 4 bottles drawn from 7/18/2023.  Most likely source is left foot osteomyelitis and abscess (MRI 7/19/23) although unusual to have such high-grade bacteremia.  Could have seeded an intravascular source including endocarditis.  No chronic indwelling prosthetic device.  Unlikely to be contaminant.  Repeat blood culture 7/19 positive in 1 out of 2 sets, for MSSA, blood cultures 7/20 negative.  TTE 7-19 was negative for endocarditis.  EF 60%.  2.  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  High  risk for underlying osteomyelitis.  Likely a function of his peripheral neuropathy and some noncompliance with nonweightbearing status.  , CRP 22.  MRI consistent with abscess and osteomyelitis left foot.  3.  Leukocytosis, neutrophilic related to above issues.  Ongoing.  4.  Hyponatremia.  Resolved.  5.  Elevated LFTs with alkaline phosphatase 162, on ongoing, bilirubin 1.6, and AST 84, continues.  May be related to sepsis versus other.  Alcohol-related disease?  CT of the abdomen and pelvis consistent with that liver.  Biliary track okay.  6.  Severe peripheral neuropathy, impacting all aspects of his care.  7.  Mild pyuria, 6-12 wbc on 7/18, asymptomatic.  May be related to above.  Urine culture negative to date.  8.  Anemia, chronic disease, ongoing and acute postop blood loss.  9.  Hypocalcemia 7.6, continues.    Plan:    1.  Diagnostically, continue to follow patient's physical exam, CBC, CMP, CRP, repeat blood cultures x2 from 7/20 are negative.    2.  Therapeutically, continue cefazolin 2 g IV every 8 hours until 9/1/2023 for 6 weeks, then oral for 3 months.  Discussed previously with Reynaldo in the microbiology lab.  3.  Supportive care.  4.  Ortho Dr. Mao Mckay I&D status post partial amputation on 7/21 left foot.    Case management orders: Please arrange for home antibiotics or skilled facility or rehab with cefazolin 2 g IV every 8 hours until 9/1/2023 for left foot osteomyelitis with MSSA.  Check CBC, CMP, CRP weekly while on IV antibiotics.  Fax orders to [phone], call [phone] with final arrangements.  Arrange for follow-up with me in 1 week postdischarge.  Weekly PICC dressing changes.  Probiotics recommended.    I discussed the patient's findings and my recommendations with patient and nursing staff    Thank you for asking me to see Carlos Culver.  Our group would be pleased to follow this patient over the course of their hospitalization and assist with outpatient antimicrobial  therapy, as indicated.  Further recommendations depend on the results of the cultures and clinical course.  Side effects discussed.  Increased risk for adverse drug reactions, complications of IV access, readmission.  Increased risk for surgery.  Discussed previously with Dr. Mckay.    Awaits placement to rehab.    Jeffery Young MD  7/26/2023

## 2023-07-26 NOTE — CASE MANAGEMENT/SOCIAL WORK
Case Management Discharge Note      Final Note: Patient has confirmed acute bed at [facility] Med unit for today. [facility] transport scheduled. Patient will need to be out in front of Maternity Entrance by 1120. Nurse to call report to [phone]. [facility] will get discharge summary out of Epic.         Selected Continued Care - Admitted Since 7/19/2023       Destination Coordination complete.      Service Provider Selected Services Address Phone Fax Patient Preferred    [facility] [address] [address] [phone] --              Durable Medical Equipment    No services have been selected for the patient.                Dialysis/Infusion    No services have been selected for the patient.                Home Medical Care    No services have been selected for the patient.                Therapy    No services have been selected for the patient.                Community Resources    No services have been selected for the patient.                Community & DME    No services have been selected for the patient.                    Transportation Services  W/C Van:  ([facility])    Final Discharge Disposition Code: 62 - inpatient rehab facility

## 2023-07-26 NOTE — CASE MANAGEMENT/SOCIAL WORK
Case Management Discharge Note      Final Note: Patient has confirmed acute bed at [facility] Med unit for today. [facility] transport scheduled. Patient will need to be out in front of Maternity Entrance by 1120. Nurse to call report to [phone]. [facility] will get discharge summary out of Epic.         Selected Continued Care - Admitted Since 7/19/2023       Destination    No services have been selected for the patient.                Durable Medical Equipment    No services have been selected for the patient.                Dialysis/Infusion    No services have been selected for the patient.                Home Medical Care    No services have been selected for the patient.                Therapy    No services have been selected for the patient.                Community Resources    No services have been selected for the patient.                Community & DME    No services have been selected for the patient.                         Final Discharge Disposition Code: 62 - inpatient rehab facility

## 2023-07-28 ENCOUNTER — OFFICE VISIT (OUTPATIENT)
Dept: ORTHOPEDIC SURGERY | Facility: CLINIC | Age: 78
End: 2023-07-28
Payer: MEDICARE

## 2023-07-28 VITALS — TEMPERATURE: 97.1 F

## 2023-07-28 DIAGNOSIS — M86.172 OTHER ACUTE OSTEOMYELITIS OF LEFT FOOT: Primary | ICD-10-CM

## 2023-07-28 LAB
FUNGUS WND CULT: NORMAL
FUNGUS WND CULT: NORMAL
MYCOBACTERIUM SPEC CULT: NORMAL
MYCOBACTERIUM SPEC CULT: NORMAL
NIGHT BLUE STAIN TISS: NORMAL
NIGHT BLUE STAIN TISS: NORMAL

## 2023-07-28 NOTE — PROGRESS NOTES
[facility] Orthopaedic Surgery Office Follow Up     Office Follow Up Visit     Date: 07/28/2023   Patient Name: Carlos Culver  MRN: [id number]  YOB: 1945  Chief Complaint:   Chief Complaint   Patient presents with    Post-op     1 week post op; 1 week s/p FOOT INCISION AND DRAINAGE; REVISION AMPUTATION; ACHILLES LENGTHENING -LEFT     History of Present Illness:   Carlos Culver is a 77 y.o. male who is here today for follow up f status post revision amputation of the left foot, Chopart amputation performed about 1 week ago.  Has been nonweightbearing in splint.  Is currently at [facility].  No complaints.  Denies pain.    Subjective   I reviewed the patient's chief complaint, history of present illness, review of systems, past medical history, surgical history, family history, social history, medications and allergy list   Objective    Vital Signs:   Vitals:    07/28/23 0946   Temp: 97.1 °F (36.2 °C)     There is no height or weight on file to calculate BMI.    Ortho Exam:  Splint removed for exam, incision at site of Chopart amputation clean and intact with sutures in place, there is some bloody drainage from the far lateral aspect of the wound, no purulence with expression no surrounding erythema, poke hole incisions at the Florinda Achilles lengthening clean dry and intact    Results Review:  No new imaging    Assessment / Plan    Assessment/Plan:   Diagnoses and all orders for this visit:    1. Other acute osteomyelitis of left foot (Primary)      Patient returns 1 week postop from Chopart amputation with Achilles lengthening.  Has been nonweightbearing in splint.  Here today for wound check.  There is some bloody drainage from the lateral aspect of the wound.  Drainage does not show any signs of infectious material.  Plan will be for patient to be placed in a nonweightbearing cast in clinic today with wound exposed so it can be monitored.  Plan to see patient back  in 2 weeks for reevaluation.  It was a pleasure seeing him today.    Follow Up:   Return in about 2 weeks (around 8/11/2023) for Recheck.      Mao Mckay MD  [facility] [place] [facility]

## 2023-08-01 ENCOUNTER — TRANSCRIBE ORDERS (OUTPATIENT)
Dept: HOME HEALTH SERVICES | Facility: HOME HEALTHCARE | Age: 78
End: 2023-08-01
Payer: MEDICARE

## 2023-08-01 ENCOUNTER — HOME HEALTH ADMISSION (OUTPATIENT)
Dept: HOME HEALTH SERVICES | Facility: HOME HEALTHCARE | Age: 78
End: 2023-08-01
Payer: MEDICARE

## 2023-08-01 DIAGNOSIS — Z47.81 ENCOUNTER FOR ORTHOPEDIC AFTERCARE FOLLOWING SURGICAL AMPUTATION: Primary | ICD-10-CM

## 2023-08-02 ENCOUNTER — HOME CARE VISIT (OUTPATIENT)
Dept: HOME HEALTH SERVICES | Facility: HOME HEALTHCARE | Age: 78
End: 2023-08-02
Payer: MEDICARE

## 2023-08-02 VITALS
DIASTOLIC BLOOD PRESSURE: 56 MMHG | SYSTOLIC BLOOD PRESSURE: 102 MMHG | HEART RATE: 101 BPM | RESPIRATION RATE: 16 BRPM | TEMPERATURE: 97.5 F | OXYGEN SATURATION: 97 %

## 2023-08-02 PROCEDURE — G0495 RN CARE TRAIN/EDU IN HH: HCPCS

## 2023-08-07 ENCOUNTER — HOME CARE VISIT (OUTPATIENT)
Dept: HOME HEALTH SERVICES | Facility: HOME HEALTHCARE | Age: 78
End: 2023-08-07
Payer: MEDICARE

## 2023-08-07 VITALS
HEART RATE: 78 BPM | TEMPERATURE: 97 F | OXYGEN SATURATION: 96 % | RESPIRATION RATE: 16 BRPM | DIASTOLIC BLOOD PRESSURE: 66 MMHG | SYSTOLIC BLOOD PRESSURE: 102 MMHG

## 2023-08-07 PROCEDURE — G0299 HHS/HOSPICE OF RN EA 15 MIN: HCPCS

## 2023-08-07 NOTE — HOME HEALTH
Routine Visit Note:    Skill/education provided:  PICC LIne care and labs.  L nonremovable cast in place denies pain   NO difficulty with IV antibiotics      Plan for next visit:  Picc line care and labs

## 2023-08-11 ENCOUNTER — OFFICE VISIT (OUTPATIENT)
Dept: ORTHOPEDIC SURGERY | Facility: CLINIC | Age: 78
End: 2023-08-11
Payer: MEDICARE

## 2023-08-11 DIAGNOSIS — M86.8X7 OTHER OSTEOMYELITIS OF LEFT FOOT: Primary | ICD-10-CM

## 2023-08-11 NOTE — PROGRESS NOTES
[facility] Office Follow Up     Office Follow Up Visit     Date: 08/11/2023   Patient Name: Carlos Culver  MRN: [id number]  YOB: 1945  Chief Complaint:   Chief Complaint   Patient presents with    Post-op     2 week follow up -- 3 weeks status post left foot incision and drainage, revision amputation, achilles lengthening 7/21/23      History of Present Illness:   Carlos Culver is a 77 y.o. male who is here today for follow up for left foot Chopart amputation.  Patient is now about 3 weeks postop.  Has been nonweightbearing in cast with use of knee scooter.  Denies pain.    Subjective   I reviewed the patient's chief complaint, history of present illness, review of systems, past medical history, surgical history, family history, social history, medications and allergy list   Objective    Vital Signs: There were no vitals filed for this visit.  There is no height or weight on file to calculate BMI.    Ortho Exam:  Cast removed for exam, incision at site of Chopart amputation clean, dry and intact with sutures in place, no drainage or purulence at incision site, poke hole incisions at the Florinda Achilles lengthening healing well, there is small area at dorsal lateral aspect of incision with some superficial skin breakdown which seems to be due to rubbing on the cast, no signs of infection    Results Review:  No new imaging    Assessment / Plan    Assessment/Plan:   Diagnoses and all orders for this visit:    1. Other osteomyelitis of left foot (Primary)      Patient returns to clinic for follow-up following left Chopart amputation.  I am happy that incision is clean dry and intact with no signs of infection.  Small area of abrasion on the dorsal lateral portion of the foot just superior to incision that looks like it is wearing on a cast.  A new cast fiberglass short leg cast was placed in clinic today with an opening cut out exposing the incision as  well as that area that was previously rubbing.  Patient is to be nonweightbearing in cast with use of assistive devices.  Plan to see patient back in clinic in 2 weeks for reevaluation and possible suture removal.  Was a pleasure seeing him today.    Follow Up:   No follow-ups on file.      Mao Mckay MD  [facility] [place] [facility]

## 2023-08-14 ENCOUNTER — LAB REQUISITION (OUTPATIENT)
Dept: LAB | Facility: HOSPITAL | Age: 78
End: 2023-08-14
Payer: MEDICARE

## 2023-08-14 ENCOUNTER — HOME CARE VISIT (OUTPATIENT)
Dept: HOME HEALTH SERVICES | Facility: HOME HEALTHCARE | Age: 78
End: 2023-08-14
Payer: MEDICARE

## 2023-08-14 VITALS
SYSTOLIC BLOOD PRESSURE: 132 MMHG | DIASTOLIC BLOOD PRESSURE: 84 MMHG | TEMPERATURE: 97 F | HEART RATE: 108 BPM | RESPIRATION RATE: 16 BRPM | OXYGEN SATURATION: 97 %

## 2023-08-14 DIAGNOSIS — M86.172 OTHER ACUTE OSTEOMYELITIS, LEFT ANKLE AND FOOT: ICD-10-CM

## 2023-08-14 LAB
ALBUMIN SERPL-MCNC: 3.5 G/DL (ref 3.5–5.2)
ALBUMIN/GLOB SERPL: 0.9 G/DL
ALP SERPL-CCNC: 119 U/L (ref 39–117)
ALT SERPL W P-5'-P-CCNC: <5 U/L (ref 1–41)
ANION GAP SERPL CALCULATED.3IONS-SCNC: 13 MMOL/L (ref 5–15)
AST SERPL-CCNC: 18 U/L (ref 1–40)
BASOPHILS # BLD AUTO: 0.05 10*3/MM3 (ref 0–0.2)
BASOPHILS NFR BLD AUTO: 0.5 % (ref 0–1.5)
BILIRUB SERPL-MCNC: 0.4 MG/DL (ref 0–1.2)
BUN SERPL-MCNC: 17 MG/DL (ref 8–23)
BUN/CREAT SERPL: 21.5 (ref 7–25)
CALCIUM SPEC-SCNC: 9.2 MG/DL (ref 8.6–10.5)
CHLORIDE SERPL-SCNC: 106 MMOL/L (ref 98–107)
CO2 SERPL-SCNC: 22 MMOL/L (ref 22–29)
CREAT SERPL-MCNC: 0.79 MG/DL (ref 0.76–1.27)
CRP SERPL-MCNC: 0.59 MG/DL (ref 0–0.5)
DEPRECATED RDW RBC AUTO: 51.2 FL (ref 37–54)
EGFRCR SERPLBLD CKD-EPI 2021: 90.9 ML/MIN/1.73
EOSINOPHIL # BLD AUTO: 0.35 10*3/MM3 (ref 0–0.4)
EOSINOPHIL NFR BLD AUTO: 3.8 % (ref 0.3–6.2)
ERYTHROCYTE [DISTWIDTH] IN BLOOD BY AUTOMATED COUNT: 14.7 % (ref 12.3–15.4)
ERYTHROCYTE [SEDIMENTATION RATE] IN BLOOD: 58 MM/HR (ref 0–20)
GLOBULIN UR ELPH-MCNC: 3.7 GM/DL
GLUCOSE SERPL-MCNC: 108 MG/DL (ref 65–99)
HCT VFR BLD AUTO: 35.8 % (ref 37.5–51)
HGB BLD-MCNC: 11.2 G/DL (ref 13–17.7)
IMM GRANULOCYTES # BLD AUTO: 0.03 10*3/MM3 (ref 0–0.05)
IMM GRANULOCYTES NFR BLD AUTO: 0.3 % (ref 0–0.5)
LYMPHOCYTES # BLD AUTO: 1.69 10*3/MM3 (ref 0.7–3.1)
LYMPHOCYTES NFR BLD AUTO: 18.6 % (ref 19.6–45.3)
MCH RBC QN AUTO: 29.9 PG (ref 26.6–33)
MCHC RBC AUTO-ENTMCNC: 31.3 G/DL (ref 31.5–35.7)
MCV RBC AUTO: 95.5 FL (ref 79–97)
MONOCYTES # BLD AUTO: 0.75 10*3/MM3 (ref 0.1–0.9)
MONOCYTES NFR BLD AUTO: 8.2 % (ref 5–12)
NEUTROPHILS NFR BLD AUTO: 6.23 10*3/MM3 (ref 1.7–7)
NEUTROPHILS NFR BLD AUTO: 68.6 % (ref 42.7–76)
NRBC BLD AUTO-RTO: 0 /100 WBC (ref 0–0.2)
PLATELET # BLD AUTO: 231 10*3/MM3 (ref 140–450)
PMV BLD AUTO: 9.7 FL (ref 6–12)
POTASSIUM SERPL-SCNC: 4.1 MMOL/L (ref 3.5–5.2)
PROT SERPL-MCNC: 7.2 G/DL (ref 6–8.5)
RBC # BLD AUTO: 3.75 10*6/MM3 (ref 4.14–5.8)
SODIUM SERPL-SCNC: 141 MMOL/L (ref 136–145)
WBC NRBC COR # BLD: 9.1 10*3/MM3 (ref 3.4–10.8)

## 2023-08-14 PROCEDURE — G0299 HHS/HOSPICE OF RN EA 15 MIN: HCPCS

## 2023-08-14 PROCEDURE — 85025 COMPLETE CBC W/AUTO DIFF WBC: CPT | Performed by: INTERNAL MEDICINE

## 2023-08-14 PROCEDURE — 80053 COMPREHEN METABOLIC PANEL: CPT | Performed by: INTERNAL MEDICINE

## 2023-08-14 PROCEDURE — 85652 RBC SED RATE AUTOMATED: CPT | Performed by: INTERNAL MEDICINE

## 2023-08-14 PROCEDURE — 86140 C-REACTIVE PROTEIN: CPT | Performed by: INTERNAL MEDICINE

## 2023-08-14 NOTE — HOME HEALTH
Routine Visit Note:    Skill/education provided:  Picc line care and labs.   Pts line a bit sluggish.  Able ot flush with additional 10ml without difficulty.   Pt saw Surgeon last week  and had new cast applied.  Still unable to visualize wound.        Plan for next visit: picc line care and labs

## 2023-08-15 ENCOUNTER — HOME CARE VISIT (OUTPATIENT)
Dept: HOME HEALTH SERVICES | Facility: HOME HEALTHCARE | Age: 78
End: 2023-08-15
Payer: MEDICARE

## 2023-08-21 ENCOUNTER — TRANSCRIBE ORDERS (OUTPATIENT)
Dept: ADMINISTRATIVE | Facility: HOSPITAL | Age: 78
End: 2023-08-21
Payer: MEDICARE

## 2023-08-21 ENCOUNTER — HOME CARE VISIT (OUTPATIENT)
Dept: HOME HEALTH SERVICES | Facility: HOME HEALTHCARE | Age: 78
End: 2023-08-21
Payer: MEDICARE

## 2023-08-21 ENCOUNTER — LAB REQUISITION (OUTPATIENT)
Dept: LAB | Facility: HOSPITAL | Age: 78
End: 2023-08-21
Payer: MEDICARE

## 2023-08-21 VITALS
OXYGEN SATURATION: 95 % | SYSTOLIC BLOOD PRESSURE: 92 MMHG | RESPIRATION RATE: 16 BRPM | DIASTOLIC BLOOD PRESSURE: 54 MMHG | TEMPERATURE: 96.7 F | HEART RATE: 77 BPM

## 2023-08-21 DIAGNOSIS — M86.172 OTHER ACUTE OSTEOMYELITIS, LEFT ANKLE AND FOOT: ICD-10-CM

## 2023-08-21 DIAGNOSIS — M86.172 ACUTE OSTEOMYELITIS OF LEFT ANKLE OR FOOT: Primary | ICD-10-CM

## 2023-08-21 LAB
ALBUMIN SERPL-MCNC: 3.5 G/DL (ref 3.5–5.2)
ALBUMIN/GLOB SERPL: 1 G/DL
ALP SERPL-CCNC: 114 U/L (ref 39–117)
ALT SERPL W P-5'-P-CCNC: <5 U/L (ref 1–41)
ANION GAP SERPL CALCULATED.3IONS-SCNC: 12 MMOL/L (ref 5–15)
AST SERPL-CCNC: 16 U/L (ref 1–40)
BASOPHILS # BLD AUTO: 0.09 10*3/MM3 (ref 0–0.2)
BASOPHILS NFR BLD AUTO: 0.9 % (ref 0–1.5)
BILIRUB SERPL-MCNC: 0.5 MG/DL (ref 0–1.2)
BUN SERPL-MCNC: 13 MG/DL (ref 8–23)
BUN/CREAT SERPL: 14.3 (ref 7–25)
CALCIUM SPEC-SCNC: 9 MG/DL (ref 8.6–10.5)
CHLORIDE SERPL-SCNC: 106 MMOL/L (ref 98–107)
CO2 SERPL-SCNC: 23 MMOL/L (ref 22–29)
CREAT SERPL-MCNC: 0.91 MG/DL (ref 0.76–1.27)
CRP SERPL-MCNC: 0.63 MG/DL (ref 0–0.5)
DEPRECATED RDW RBC AUTO: 49.9 FL (ref 37–54)
EGFRCR SERPLBLD CKD-EPI 2021: 86.3 ML/MIN/1.73
EOSINOPHIL # BLD AUTO: 0.24 10*3/MM3 (ref 0–0.4)
EOSINOPHIL NFR BLD AUTO: 2.5 % (ref 0.3–6.2)
ERYTHROCYTE [DISTWIDTH] IN BLOOD BY AUTOMATED COUNT: 14.3 % (ref 12.3–15.4)
ERYTHROCYTE [SEDIMENTATION RATE] IN BLOOD: 59 MM/HR (ref 0–20)
GLOBULIN UR ELPH-MCNC: 3.5 GM/DL
GLUCOSE SERPL-MCNC: 121 MG/DL (ref 65–99)
HCT VFR BLD AUTO: 39 % (ref 37.5–51)
HGB BLD-MCNC: 11.9 G/DL (ref 13–17.7)
IMM GRANULOCYTES # BLD AUTO: 0.05 10*3/MM3 (ref 0–0.05)
IMM GRANULOCYTES NFR BLD AUTO: 0.5 % (ref 0–0.5)
LYMPHOCYTES # BLD AUTO: 2.3 10*3/MM3 (ref 0.7–3.1)
LYMPHOCYTES NFR BLD AUTO: 24.2 % (ref 19.6–45.3)
MCH RBC QN AUTO: 29 PG (ref 26.6–33)
MCHC RBC AUTO-ENTMCNC: 30.5 G/DL (ref 31.5–35.7)
MCV RBC AUTO: 94.9 FL (ref 79–97)
MONOCYTES # BLD AUTO: 0.87 10*3/MM3 (ref 0.1–0.9)
MONOCYTES NFR BLD AUTO: 9.1 % (ref 5–12)
NEUTROPHILS NFR BLD AUTO: 5.97 10*3/MM3 (ref 1.7–7)
NEUTROPHILS NFR BLD AUTO: 62.8 % (ref 42.7–76)
NRBC BLD AUTO-RTO: 0 /100 WBC (ref 0–0.2)
PLATELET # BLD AUTO: 211 10*3/MM3 (ref 140–450)
PMV BLD AUTO: 9.7 FL (ref 6–12)
POTASSIUM SERPL-SCNC: 3.9 MMOL/L (ref 3.5–5.2)
PROT SERPL-MCNC: 7 G/DL (ref 6–8.5)
RBC # BLD AUTO: 4.11 10*6/MM3 (ref 4.14–5.8)
SODIUM SERPL-SCNC: 141 MMOL/L (ref 136–145)
WBC NRBC COR # BLD: 9.52 10*3/MM3 (ref 3.4–10.8)

## 2023-08-21 PROCEDURE — 85652 RBC SED RATE AUTOMATED: CPT | Performed by: INTERNAL MEDICINE

## 2023-08-21 PROCEDURE — 86140 C-REACTIVE PROTEIN: CPT | Performed by: INTERNAL MEDICINE

## 2023-08-21 PROCEDURE — G0299 HHS/HOSPICE OF RN EA 15 MIN: HCPCS

## 2023-08-21 PROCEDURE — 80053 COMPREHEN METABOLIC PANEL: CPT | Performed by: INTERNAL MEDICINE

## 2023-08-21 PROCEDURE — 85025 COMPLETE CBC W/AUTO DIFF WBC: CPT | Performed by: INTERNAL MEDICINE

## 2023-08-21 NOTE — HOME HEALTH
Routine Visit Note:    Skill/education provided: Upon arrival to home found injeciton cap missing from pts extension tubing.  It is suspected it has been off for about a week based on the information from the pt.  Phone call to ID office and spoke with Dr Abdelrahman angel.  Obtained order to remove the picc line today.  They will contact the pt about having a new one placed.   RN reeducated the pt on use of the extension tubing and the injection cap.  He was able to verbalize understanding.   RN mary lou labs.  RN was able to visualize the sutures to the L foot today inspite of the cast that is present..  Pt has been keeping it covered at last visit but states he is not leaving it open   Sutures are intact with some slight redness on the outer edge due to rubbing of the cast.  Pt seesm surgeon on friday.  Educated to keep a watch on the inciison and if if becomes red, swollen or drainage to call the MD right away.        Plan for next visit: Picc line care and labs.   assess L foot incison if open to air.

## 2023-08-22 ENCOUNTER — HOSPITAL ENCOUNTER (OUTPATIENT)
Dept: GENERAL RADIOLOGY | Facility: HOSPITAL | Age: 78
Discharge: HOME | End: 2023-08-22
Payer: MEDICARE

## 2023-08-22 ENCOUNTER — HOSPITAL ENCOUNTER (OUTPATIENT)
Dept: INFUSION THERAPY | Facility: HOSPITAL | Age: 78
Discharge: HOME | End: 2023-08-22
Payer: MEDICARE

## 2023-08-22 VITALS
TEMPERATURE: 98.2 F | DIASTOLIC BLOOD PRESSURE: 67 MMHG | BODY MASS INDEX: 30.8 KG/M2 | OXYGEN SATURATION: 95 % | HEIGHT: 71 IN | SYSTOLIC BLOOD PRESSURE: 109 MMHG | RESPIRATION RATE: 18 BRPM | WEIGHT: 220 LBS | HEART RATE: 80 BPM

## 2023-08-22 PROCEDURE — C1751 CATH, INF, PER/CENT/MIDLINE: HCPCS

## 2023-08-22 PROCEDURE — 71045 X-RAY EXAM CHEST 1 VIEW: CPT

## 2023-08-22 PROCEDURE — C1894 INTRO/SHEATH, NON-LASER: HCPCS

## 2023-08-22 RX ORDER — SODIUM CHLORIDE 0.9 % (FLUSH) 0.9 %
10 SYRINGE (ML) INJECTION EVERY 12 HOURS SCHEDULED
Status: DISCONTINUED | OUTPATIENT
Start: 2023-08-22 | End: 2023-08-24 | Stop reason: HOSPADM

## 2023-08-22 RX ORDER — SODIUM CHLORIDE 0.9 % (FLUSH) 0.9 %
10 SYRINGE (ML) INJECTION AS NEEDED
Status: DISCONTINUED | OUTPATIENT
Start: 2023-08-22 | End: 2023-08-24 | Stop reason: HOSPADM

## 2023-08-22 NOTE — NURSING NOTE
PICC position verified via CXR report. Right upper arm with intact dressing. No bleeding noted. All belongings with patient.

## 2023-08-25 ENCOUNTER — OFFICE VISIT (OUTPATIENT)
Dept: ORTHOPEDIC SURGERY | Facility: CLINIC | Age: 78
End: 2023-08-25
Payer: MEDICARE

## 2023-08-25 DIAGNOSIS — M86.172 ACUTE OSTEOMYELITIS OF LEFT ANKLE OR FOOT: Primary | ICD-10-CM

## 2023-08-25 NOTE — PROGRESS NOTES
[facility] [place] [facility] Office Follow Up     Office Follow Up Visit     Date: 08/25/2023   Patient Name: Carlos Culver  MRN: [id number]  YOB: 1945  Chief Complaint:   Chief Complaint   Patient presents with    Post-op     2 week recheck- 5 weeks status post left foot incision and drainage, revision amputation, achilles lengthening 7/21/23      History of Present Illness:   Carlos Culver is a 77 y.o. male who is here today for follow up for postop follow-up.  Still on IV antibiotics and has PICC line.  Will be switching to oral soon.  Continues to have close follow-up with infectious disease.  Continues to be nonweightbearing on left lower extremity after revision amputation.  No other complaints.    Subjective   I reviewed the patient's chief complaint, history of present illness, review of systems, past medical history, surgical history, family history, social history, medications and allergy list   Objective    Vital Signs: There were no vitals filed for this visit.  There is no height or weight on file to calculate BMI.    Ortho Exam:  Cast left in place for exam, incision at site of Chopart amputation clean, dry and intact with sutures in place, no drainage or purulence at incision site, no signs of infection     Results Review:  No new imaging    Assessment / Plan    Assessment/Plan:   Diagnoses and all orders for this visit:    1. Acute osteomyelitis of left ankle or foot (Primary)      Patient continues to be healing well after his Chopart amputation.  Sutures removed in clinic today.  Cast left in place with wound exposed for close inspection.  Patient to continue nonweightbearing in cast for 2 additional weeks at which time we will see him again for reevaluation.  Was a pleasure seeing him today.    Follow Up:   Return in about 2 weeks (around 9/8/2023).      Mao Mckay MD  [facility] [place] [facility] Surgeon

## 2023-08-28 ENCOUNTER — HOME CARE VISIT (OUTPATIENT)
Dept: HOME HEALTH SERVICES | Facility: HOME HEALTHCARE | Age: 78
End: 2023-08-28
Payer: MEDICARE

## 2023-08-28 ENCOUNTER — LAB REQUISITION (OUTPATIENT)
Dept: LAB | Facility: HOSPITAL | Age: 78
End: 2023-08-28
Payer: MEDICARE

## 2023-08-28 VITALS
TEMPERATURE: 98 F | SYSTOLIC BLOOD PRESSURE: 110 MMHG | DIASTOLIC BLOOD PRESSURE: 62 MMHG | OXYGEN SATURATION: 97 % | RESPIRATION RATE: 16 BRPM | HEART RATE: 88 BPM

## 2023-08-28 DIAGNOSIS — M86.172 OTHER ACUTE OSTEOMYELITIS, LEFT ANKLE AND FOOT: ICD-10-CM

## 2023-08-28 LAB
ALBUMIN SERPL-MCNC: 3.7 G/DL (ref 3.5–5.2)
ALBUMIN/GLOB SERPL: 1.1 G/DL
ALP SERPL-CCNC: 109 U/L (ref 39–117)
ALT SERPL W P-5'-P-CCNC: <5 U/L (ref 1–41)
ANION GAP SERPL CALCULATED.3IONS-SCNC: 14 MMOL/L (ref 5–15)
AST SERPL-CCNC: 22 U/L (ref 1–40)
BASOPHILS # BLD AUTO: 0.11 10*3/MM3 (ref 0–0.2)
BASOPHILS NFR BLD AUTO: 1.3 % (ref 0–1.5)
BILIRUB SERPL-MCNC: 0.3 MG/DL (ref 0–1.2)
BUN SERPL-MCNC: 11 MG/DL (ref 8–23)
BUN/CREAT SERPL: 13.9 (ref 7–25)
CALCIUM SPEC-SCNC: 9.1 MG/DL (ref 8.6–10.5)
CHLORIDE SERPL-SCNC: 107 MMOL/L (ref 98–107)
CO2 SERPL-SCNC: 23 MMOL/L (ref 22–29)
CREAT SERPL-MCNC: 0.79 MG/DL (ref 0.76–1.27)
CRP SERPL-MCNC: 0.31 MG/DL (ref 0–0.5)
DEPRECATED RDW RBC AUTO: 50.8 FL (ref 37–54)
EGFRCR SERPLBLD CKD-EPI 2021: 90.9 ML/MIN/1.73
EOSINOPHIL # BLD AUTO: 0.22 10*3/MM3 (ref 0–0.4)
EOSINOPHIL NFR BLD AUTO: 2.6 % (ref 0.3–6.2)
ERYTHROCYTE [DISTWIDTH] IN BLOOD BY AUTOMATED COUNT: 14.7 % (ref 12.3–15.4)
ERYTHROCYTE [SEDIMENTATION RATE] IN BLOOD: 26 MM/HR (ref 0–20)
GLOBULIN UR ELPH-MCNC: 3.3 GM/DL
GLUCOSE SERPL-MCNC: 99 MG/DL (ref 65–99)
HCT VFR BLD AUTO: 39 % (ref 37.5–51)
HGB BLD-MCNC: 12.2 G/DL (ref 13–17.7)
IMM GRANULOCYTES # BLD AUTO: 0.04 10*3/MM3 (ref 0–0.05)
IMM GRANULOCYTES NFR BLD AUTO: 0.5 % (ref 0–0.5)
LYMPHOCYTES # BLD AUTO: 2.06 10*3/MM3 (ref 0.7–3.1)
LYMPHOCYTES NFR BLD AUTO: 24.6 % (ref 19.6–45.3)
MCH RBC QN AUTO: 29.8 PG (ref 26.6–33)
MCHC RBC AUTO-ENTMCNC: 31.3 G/DL (ref 31.5–35.7)
MCV RBC AUTO: 95.4 FL (ref 79–97)
MONOCYTES # BLD AUTO: 0.74 10*3/MM3 (ref 0.1–0.9)
MONOCYTES NFR BLD AUTO: 8.8 % (ref 5–12)
NEUTROPHILS NFR BLD AUTO: 5.2 10*3/MM3 (ref 1.7–7)
NEUTROPHILS NFR BLD AUTO: 62.2 % (ref 42.7–76)
NRBC BLD AUTO-RTO: 0 /100 WBC (ref 0–0.2)
PLATELET # BLD AUTO: 184 10*3/MM3 (ref 140–450)
PMV BLD AUTO: 9.9 FL (ref 6–12)
POTASSIUM SERPL-SCNC: 4 MMOL/L (ref 3.5–5.2)
PROT SERPL-MCNC: 7 G/DL (ref 6–8.5)
RBC # BLD AUTO: 4.09 10*6/MM3 (ref 4.14–5.8)
SODIUM SERPL-SCNC: 144 MMOL/L (ref 136–145)
WBC NRBC COR # BLD: 8.37 10*3/MM3 (ref 3.4–10.8)

## 2023-08-28 PROCEDURE — 86140 C-REACTIVE PROTEIN: CPT | Performed by: INTERNAL MEDICINE

## 2023-08-28 PROCEDURE — 85652 RBC SED RATE AUTOMATED: CPT | Performed by: INTERNAL MEDICINE

## 2023-08-28 PROCEDURE — 85025 COMPLETE CBC W/AUTO DIFF WBC: CPT | Performed by: INTERNAL MEDICINE

## 2023-08-28 PROCEDURE — G0299 HHS/HOSPICE OF RN EA 15 MIN: HCPCS

## 2023-08-28 PROCEDURE — 80053 COMPREHEN METABOLIC PANEL: CPT | Performed by: INTERNAL MEDICINE

## 2023-08-28 NOTE — HOME HEALTH
Routine Visit Note:    Skill/education provided: picc line dressing change and labs.  pt saw surgeon last week and sutures removed.  Cast remain in place and area open to air.   Pt will finish his antibiotics on 9/1 at 6am  RN to pull picc line same day      Plan for next visit: pull picc line

## 2023-09-02 ENCOUNTER — HOME CARE VISIT (OUTPATIENT)
Dept: HOME HEALTH SERVICES | Facility: HOME HEALTHCARE | Age: 78
End: 2023-09-02
Payer: MEDICARE

## 2023-09-02 VITALS
DIASTOLIC BLOOD PRESSURE: 78 MMHG | SYSTOLIC BLOOD PRESSURE: 120 MMHG | HEART RATE: 64 BPM | RESPIRATION RATE: 18 BRPM | OXYGEN SATURATION: 97 %

## 2023-09-02 PROCEDURE — G0299 HHS/HOSPICE OF RN EA 15 MIN: HCPCS

## 2023-09-02 NOTE — HOME HEALTH
Routine Visit Note:    Skill/education provided:PICC removed from RUE with very small amt bleeding from insertion site, pressure applied, no further bleeding, gauze dressing applied. Instructed pt to watch site for bleeding and apply pressure if bleeding occurs, change dressing daily for next few days. Reviewed meds and added new oral ATB to med list. Pt completed last dose of IV ATB last night and started po ATB today. Instructed new ATB to take bid, pt stated understanding. Lt partial foot amputation incision is CDI, no s/s infection. Pt has upcoming appt with surgeon.    Patient/caregiver response: pt julianne care well and stated understanding of teaching    Plan for next visit: Assess lt foot incision    Other pertinent info:

## 2023-09-08 ENCOUNTER — HOME CARE VISIT (OUTPATIENT)
Dept: HOME HEALTH SERVICES | Facility: HOME HEALTHCARE | Age: 78
End: 2023-09-08
Payer: MEDICARE

## 2023-09-08 VITALS
SYSTOLIC BLOOD PRESSURE: 120 MMHG | RESPIRATION RATE: 16 BRPM | TEMPERATURE: 97 F | OXYGEN SATURATION: 97 % | HEART RATE: 78 BPM | DIASTOLIC BLOOD PRESSURE: 64 MMHG

## 2023-09-08 PROCEDURE — G0495 RN CARE TRAIN/EDU IN HH: HCPCS

## 2023-09-08 NOTE — HOME HEALTH
Routine Visit Note:    Skill/education provided:Pt still has cast to L lower leg. Incision is well approximated and granulated.  No drainage noted.  Saw ID office this week and was given good report  finishing oral antiboitics this week.  Pt will see surgeon on 9.11 with plans to have cast removed.   Educated to continue protein in his diet, drink water and use caution not to bump his L foot.  He is still using knee scooter and lmiting his wt bearing.  Plan to see pt next week after surgeon visit and discharge if no new complication.  Pt agrees

## 2023-09-11 ENCOUNTER — OFFICE VISIT (OUTPATIENT)
Dept: ORTHOPEDIC SURGERY | Facility: CLINIC | Age: 78
End: 2023-09-11
Payer: MEDICARE

## 2023-09-11 DIAGNOSIS — Z89.432: Primary | ICD-10-CM

## 2023-09-11 PROCEDURE — 1159F MED LIST DOCD IN RCRD: CPT | Performed by: ORTHOPAEDIC SURGERY

## 2023-09-11 PROCEDURE — 99024 POSTOP FOLLOW-UP VISIT: CPT | Performed by: ORTHOPAEDIC SURGERY

## 2023-09-11 PROCEDURE — 1160F RVW MEDS BY RX/DR IN RCRD: CPT | Performed by: ORTHOPAEDIC SURGERY

## 2023-09-11 NOTE — PROGRESS NOTES
[facility] [place] [facility] Office Follow Up     Office Follow Up Visit     Date: 09/11/2023   Patient Name: Carlos Culver  MRN: [id number]  YOB: 1945  Chief Complaint:   Chief Complaint   Patient presents with   • Follow-up     2 week follow up - 7.5 weeks S/P Foot Incision And Drainage; Revision Amputation; Achilles Lengthening -left      History of Present Illness:   Carlos Culver is a 77 y.o. male who is here today for follow up for status post left Chopart amputation.  Is now about 7 weeks out.  Has been nonweightbearing in cast since last visit.  States saw infectious disease who is now discontinues antibiotics.  States infectious disease think he is doing well.  Has been using knee scooter and states has been nonweightbearing.  No other complaints.    Subjective   I reviewed the patient's chief complaint, history of present illness, review of systems, past medical history, surgical history, family history, social history, medications and allergy list   Objective    Vital Signs: There were no vitals filed for this visit.  There is no height or weight on file to calculate BMI.    Ortho Exam:  Cast in place for exam, incision at site of Chopart amputation clean, incision well-healed, no signs of infection      Results Review:  No new imaging    Assessment / Plan    Assessment/Plan:   Diagnoses and all orders for this visit:    1. History of Chopart amputation of left foot (Primary)  -     Ambulatory Referral For Orthotics      Patient continues to do well in his recovery from his Chopart amputation.  Cast removed in clinic today.  Patient placed in tall cam walking boot.  Can be weightbearing as tolerated in boot with even up.  Encourage patient to use knee scooter for walking longer distances.  Referral made to [facility] orthopedics for custom shoes/orthotics/AFO.  Plan to see patient back in clinic in 4 weeks for reevaluation.    -Patient is motivated to ambulate  and their limb is healed and ready for prosthetic fitting.  -Patient has potential to ambulate at a K3 level at home and in the community.  -Patient is indicated for a left foot prosthesis and all necessary supplies to promote functional dependence and improved quality of life.     Follow Up:   Return in about 4 weeks (around 10/9/2023) for Recheck.      Mao Mckay MD  [facility] [place] [facility] Surgeon

## 2023-09-15 ENCOUNTER — HOME CARE VISIT (OUTPATIENT)
Dept: HOME HEALTH SERVICES | Facility: HOME HEALTHCARE | Age: 78
End: 2023-09-15
Payer: MEDICARE

## 2023-09-15 VITALS
DIASTOLIC BLOOD PRESSURE: 66 MMHG | RESPIRATION RATE: 16 BRPM | TEMPERATURE: 97.5 F | HEART RATE: 78 BPM | SYSTOLIC BLOOD PRESSURE: 132 MMHG | OXYGEN SATURATION: 98 %

## 2023-09-15 PROCEDURE — G0495 RN CARE TRAIN/EDU IN HH: HCPCS

## 2023-09-15 NOTE — HOME HEALTH
Discharge Summary/Summary of Care Provided: PICC line care, labs to monitor IV antibiotics   wound assessment of L foot.  Patient received home health for diagnosis: Osteomylities of L ankle and foot  Current level of functional ability: ambulatory with protective boot  Living arrangements: lives with wife in their home  Progress towards goals and/or Were all goals met? yes  If not all goals met, barriers that prevented patient from meeting goals: n/a  SDOH concerns (i.e. Caregiver availability, social isolation, environment, income, transportation access, food insecurity etc.)n/a  Follow-up appointment plans and community resources provided:  Dr Houser  10.3.23

## 2023-10-02 ENCOUNTER — LAB (OUTPATIENT)
Dept: LAB | Facility: HOSPITAL | Age: 78
End: 2023-10-02
Payer: MEDICARE

## 2023-10-02 ENCOUNTER — TRANSCRIBE ORDERS (OUTPATIENT)
Dept: LAB | Facility: HOSPITAL | Age: 78
End: 2023-10-02
Payer: MEDICARE

## 2023-10-02 DIAGNOSIS — M86.172 ACUTE OSTEOMYELITIS OF LEFT ANKLE OR FOOT: Primary | ICD-10-CM

## 2023-10-02 DIAGNOSIS — L03.116 CELLULITIS OF LEFT FOOT: ICD-10-CM

## 2023-10-02 DIAGNOSIS — M86.172 ACUTE OSTEOMYELITIS OF LEFT ANKLE OR FOOT: ICD-10-CM

## 2023-10-02 DIAGNOSIS — M86.472 CHRONIC OSTEOMYELITIS WITH DRAINING SINUS, LEFT ANKLE AND FOOT: ICD-10-CM

## 2023-10-02 DIAGNOSIS — L02.612 ABSCESS OF LEFT FOOT: ICD-10-CM

## 2023-10-02 DIAGNOSIS — A41.01 METHICILLIN SUSCEPTIBLE STAPHYLOCOCCUS AUREUS SEPTICEMIA: ICD-10-CM

## 2023-10-02 LAB
BASOPHILS # BLD AUTO: 0.11 10*3/MM3 (ref 0–0.2)
BASOPHILS NFR BLD AUTO: 0.9 % (ref 0–1.5)
CRP SERPL-MCNC: 0.43 MG/DL (ref 0–0.5)
DEPRECATED RDW RBC AUTO: 54 FL (ref 37–54)
EOSINOPHIL # BLD AUTO: 0.28 10*3/MM3 (ref 0–0.4)
EOSINOPHIL NFR BLD AUTO: 2.3 % (ref 0.3–6.2)
ERYTHROCYTE [DISTWIDTH] IN BLOOD BY AUTOMATED COUNT: 15.5 % (ref 12.3–15.4)
HCT VFR BLD AUTO: 45.9 % (ref 37.5–51)
HGB BLD-MCNC: 14.5 G/DL (ref 13–17.7)
IMM GRANULOCYTES # BLD AUTO: 0.06 10*3/MM3 (ref 0–0.05)
IMM GRANULOCYTES NFR BLD AUTO: 0.5 % (ref 0–0.5)
LYMPHOCYTES # BLD AUTO: 2.74 10*3/MM3 (ref 0.7–3.1)
LYMPHOCYTES NFR BLD AUTO: 22.7 % (ref 19.6–45.3)
MCH RBC QN AUTO: 30 PG (ref 26.6–33)
MCHC RBC AUTO-ENTMCNC: 31.6 G/DL (ref 31.5–35.7)
MCV RBC AUTO: 95 FL (ref 79–97)
MONOCYTES # BLD AUTO: 1.08 10*3/MM3 (ref 0.1–0.9)
MONOCYTES NFR BLD AUTO: 9 % (ref 5–12)
NEUTROPHILS NFR BLD AUTO: 64.6 % (ref 42.7–76)
NEUTROPHILS NFR BLD AUTO: 7.79 10*3/MM3 (ref 1.7–7)
NRBC BLD AUTO-RTO: 0 /100 WBC (ref 0–0.2)
PLATELET # BLD AUTO: 205 10*3/MM3 (ref 140–450)
PMV BLD AUTO: 9.7 FL (ref 6–12)
RBC # BLD AUTO: 4.83 10*6/MM3 (ref 4.14–5.8)
WBC NRBC COR # BLD: 12.06 10*3/MM3 (ref 3.4–10.8)

## 2023-10-02 PROCEDURE — 85025 COMPLETE CBC W/AUTO DIFF WBC: CPT

## 2023-10-02 PROCEDURE — 86140 C-REACTIVE PROTEIN: CPT

## 2023-10-02 PROCEDURE — 36415 COLL VENOUS BLD VENIPUNCTURE: CPT

## 2023-10-09 ENCOUNTER — OFFICE VISIT (OUTPATIENT)
Dept: ORTHOPEDIC SURGERY | Facility: CLINIC | Age: 78
End: 2023-10-09
Payer: MEDICARE

## 2023-10-09 DIAGNOSIS — Z89.432: Primary | ICD-10-CM

## 2023-10-09 NOTE — PROGRESS NOTES
[facility] Orthopaedic Surgery Office Follow Up     Office Follow Up Visit     Date: 10/09/2023   Patient Name: Carlos Culver  MRN: [id number]  YOB: 1945  Chief Complaint:   Chief Complaint   Patient presents with    Post-op     1 month f/u-- 2.5 months S/P Foot Incision And Drainage; Revision Amputation; Achilles Lengthening -left  7/21/23     History of Present Illness:   Carlos Culver is a 78 y.o. male who is here today for follow up for status post left Chopart amputation.  Is now about 11 weeks out from his surgery.  Is weightbearing as tolerated in a tall cam walking boot.  Has been to [facility] and currently a prosthesis is being manufactured for him.  States happy with his progress no new complaints.    Subjective   I reviewed the patient's chief complaint, history of present illness, review of systems, past medical history, surgical history, family history, social history, medications and allergy list   Objective    Vital Signs: There were no vitals filed for this visit.  There is no height or weight on file to calculate BMI.    Ortho Exam:  Cam boot removed for exam, incision at site of Chopart amputation clean, incision well-healed, no signs of infection       Results Review:  No new imaging    Assessment / Plan    Assessment/Plan:   Diagnoses and all orders for this visit:    1. History of Chopart amputation of left foot (Primary)      Continues to do well with his recovery from his Chopart amputation.  Patient can continue weightbearing as tolerated in tall cam walking boot.  Encourage patient once again to use knee scooter for walking longer distances.  Cam boot was rubbing on his leg and cam boot was adjusted in clinic today to help prevent irritation of the skin proximal leg.  Plan to see patient back in 3 months for reevaluation following use of his new prosthesis.  Patient is doing well for which I am pleased.  Was a pleasure seeing him  today.    Follow Up:   Return in about 3 months (around 1/9/2024).      Mao Mckay MD  [facility] [place] Orthopedic Surgeon

## 2023-11-10 ENCOUNTER — TELEPHONE (OUTPATIENT)
Dept: CARDIOLOGY | Facility: CLINIC | Age: 78
End: 2023-11-10
Payer: MEDICARE

## 2023-11-10 NOTE — TELEPHONE ENCOUNTER
Caller: SHANA GONZALEZ    Relationship:SELF    Callback number: [phone]   Is it ok to leave a message: [x] Yes [] No    Requested medication for samples: ELIQUIS 5MG    How much medication does the patient currently have left: 2 DAYS SUPPLY    Who will be picking up the samples: PATIENT    Do you need information about patient financial assistance for this medication: [] Yes [x] No    Additional details provided: PLEASE CONTACT PATIENT WHEN THIS IS READY FOR

## 2023-11-10 NOTE — TELEPHONE ENCOUNTER
OCTAVIA for pt, advised I could send samples to [place] on Monday, but if he needed them today I have some in [facility]. Asked him to return my call to let me know what he'd like to do.

## 2024-01-08 ENCOUNTER — TRANSCRIBE ORDERS (OUTPATIENT)
Dept: LAB | Facility: HOSPITAL | Age: 79
End: 2024-01-08
Payer: COMMERCIAL

## 2024-01-08 ENCOUNTER — LAB (OUTPATIENT)
Dept: LAB | Facility: HOSPITAL | Age: 79
End: 2024-01-08
Payer: MEDICARE

## 2024-01-08 ENCOUNTER — OFFICE VISIT (OUTPATIENT)
Dept: ORTHOPEDIC SURGERY | Facility: CLINIC | Age: 79
End: 2024-01-08
Payer: MEDICARE

## 2024-01-08 VITALS
SYSTOLIC BLOOD PRESSURE: 136 MMHG | WEIGHT: 216 LBS | BODY MASS INDEX: 31.99 KG/M2 | DIASTOLIC BLOOD PRESSURE: 84 MMHG | HEIGHT: 69 IN

## 2024-01-08 DIAGNOSIS — L97.525 NON-PRESSURE CHRONIC ULCER OF OTHER PART OF LEFT FOOT WITH MUSCLE INVOLVEMENT WITHOUT EVIDENCE OF NECROSIS: ICD-10-CM

## 2024-01-08 DIAGNOSIS — L03.116 CELLULITIS OF LEFT FOOT: ICD-10-CM

## 2024-01-08 DIAGNOSIS — M86.172 ACUTE OSTEOMYELITIS OF LEFT ANKLE OR FOOT: ICD-10-CM

## 2024-01-08 DIAGNOSIS — M86.472 CHRONIC OSTEOMYELITIS WITH DRAINING SINUS, LEFT ANKLE AND FOOT: ICD-10-CM

## 2024-01-08 DIAGNOSIS — L02.612 ABSCESS OF LEFT FOOT: ICD-10-CM

## 2024-01-08 DIAGNOSIS — Z89.432: Primary | ICD-10-CM

## 2024-01-08 DIAGNOSIS — M86.172 ACUTE OSTEOMYELITIS OF LEFT ANKLE OR FOOT: Primary | ICD-10-CM

## 2024-01-08 DIAGNOSIS — Z89.432: ICD-10-CM

## 2024-01-08 LAB
BASOPHILS # BLD AUTO: 0.11 10*3/MM3 (ref 0–0.2)
BASOPHILS NFR BLD AUTO: 1.1 % (ref 0–1.5)
CRP SERPL-MCNC: 0.79 MG/DL (ref 0–0.5)
DEPRECATED RDW RBC AUTO: 50.3 FL (ref 37–54)
EOSINOPHIL # BLD AUTO: 0.25 10*3/MM3 (ref 0–0.4)
EOSINOPHIL NFR BLD AUTO: 2.5 % (ref 0.3–6.2)
ERYTHROCYTE [DISTWIDTH] IN BLOOD BY AUTOMATED COUNT: 14.3 % (ref 12.3–15.4)
HCT VFR BLD AUTO: 47.6 % (ref 37.5–51)
HGB BLD-MCNC: 15.9 G/DL (ref 13–17.7)
IMM GRANULOCYTES # BLD AUTO: 0.06 10*3/MM3 (ref 0–0.05)
IMM GRANULOCYTES NFR BLD AUTO: 0.6 % (ref 0–0.5)
LYMPHOCYTES # BLD AUTO: 2.48 10*3/MM3 (ref 0.7–3.1)
LYMPHOCYTES NFR BLD AUTO: 24.4 % (ref 19.6–45.3)
MCH RBC QN AUTO: 32.1 PG (ref 26.6–33)
MCHC RBC AUTO-ENTMCNC: 33.4 G/DL (ref 31.5–35.7)
MCV RBC AUTO: 96 FL (ref 79–97)
MONOCYTES # BLD AUTO: 0.96 10*3/MM3 (ref 0.1–0.9)
MONOCYTES NFR BLD AUTO: 9.4 % (ref 5–12)
NEUTROPHILS NFR BLD AUTO: 6.3 10*3/MM3 (ref 1.7–7)
NEUTROPHILS NFR BLD AUTO: 62 % (ref 42.7–76)
NRBC BLD AUTO-RTO: 0 /100 WBC (ref 0–0.2)
PLATELET # BLD AUTO: 161 10*3/MM3 (ref 140–450)
PMV BLD AUTO: 10.2 FL (ref 6–12)
RBC # BLD AUTO: 4.96 10*6/MM3 (ref 4.14–5.8)
WBC NRBC COR # BLD AUTO: 10.16 10*3/MM3 (ref 3.4–10.8)

## 2024-01-08 PROCEDURE — 3079F DIAST BP 80-89 MM HG: CPT | Performed by: ORTHOPAEDIC SURGERY

## 2024-01-08 PROCEDURE — 83036 HEMOGLOBIN GLYCOSYLATED A1C: CPT

## 2024-01-08 PROCEDURE — 3075F SYST BP GE 130 - 139MM HG: CPT | Performed by: ORTHOPAEDIC SURGERY

## 2024-01-08 PROCEDURE — 85025 COMPLETE CBC W/AUTO DIFF WBC: CPT

## 2024-01-08 PROCEDURE — 1160F RVW MEDS BY RX/DR IN RCRD: CPT | Performed by: ORTHOPAEDIC SURGERY

## 2024-01-08 PROCEDURE — 36415 COLL VENOUS BLD VENIPUNCTURE: CPT

## 2024-01-08 PROCEDURE — 1159F MED LIST DOCD IN RCRD: CPT | Performed by: ORTHOPAEDIC SURGERY

## 2024-01-08 PROCEDURE — 99213 OFFICE O/P EST LOW 20 MIN: CPT | Performed by: ORTHOPAEDIC SURGERY

## 2024-01-08 PROCEDURE — 86140 C-REACTIVE PROTEIN: CPT

## 2024-01-08 NOTE — PROGRESS NOTES
"                          [facility] [place] [facility]opa[facility] Surgery Office Follow Up     Office Follow Up Visit     Date: 01/08/2024   Patient Name: Carlos Culver  MRN: [id number]  YOB: 1945  Chief Complaint:   Chief Complaint   Patient presents with    Follow-up     3 month f/u-- 6 months S/P Foot Incision And Drainage; Revision Amputation; Achilles Lengthening -left  7/21/23     History of Present Illness:   Carlos Culver is a 78 y.o. male who is here today for follow up for status post left Chopart amputation.  Amputation was July 21, 2023.  Has been weightbearing as tolerated with orthotic.  No complaints.  Very happy with his progress.  Is back to all activities with no discomfort.  No issues with brace.  Describes no open wounds or ulcers.    Subjective   I reviewed the patient's chief complaint, history of present illness, review of systems, past medical history, surgical history, family history, social history, medications and allergy list   Objective    Vital Signs:   Vitals:    01/08/24 0916   BP: 136/84   Weight: 98 kg (216 lb)   Height: 175.9 cm (69.25\")     Body mass index is 31.67 kg/m².    Ortho Exam:  Shoe and AFO removed for exam, incision at site of Chopart amputation clean, incision well-healed, no signs of infection, patient able to actively dorsiflex and plantarflex ankle.  No wounds or drainage.  No areas of erythema.        Results Review:  No new imaging    Assessment / Plan    Assessment/Plan:   Diagnoses and all orders for this visit:    1. History of Chopart amputation of left foot (Primary)  -     Hemoglobin A1c; Future    Patient continues to do very well following his Chopart amputation.  Can continue activity as tolerated in shoe with orthotic.  Can return to all activities.  Did have a discussion with patient regarding his A1c.  Somehow his last A1c was a couple of years ago and patient reports he remembers it being above 6.  We did discuss the importance of foot hygiene " moving forward and I do think part of his prophylactic treatment for prevention of additional wounds and/or osteomyelitis of the left foot is knowing his current A1c.  Once again discussed the importance of foot hygiene and keeping a close eye in his foot.  I will follow the results up of A1c and contact the patient and/or his PCP if they are concerning.  Otherwise we will plan to see patient back in 6 months for reevaluation.  Counseled patient to contact clinic sooner if any concerns arise.  It was a pleasure seeing him today.        Follow Up:   Return in about 6 weeks (around 2/19/2024).      Mao Mckay MD  [facility] [place] Orthopedic Surgeon

## 2024-01-09 LAB — HBA1C MFR BLD: 6.1 % (ref 4.8–5.6)

## 2024-03-04 ENCOUNTER — OFFICE VISIT (OUTPATIENT)
Dept: ORTHOPEDIC SURGERY | Facility: CLINIC | Age: 79
End: 2024-03-04
Payer: MEDICARE

## 2024-03-04 VITALS
DIASTOLIC BLOOD PRESSURE: 82 MMHG | WEIGHT: 210.8 LBS | HEIGHT: 69 IN | BODY MASS INDEX: 31.22 KG/M2 | SYSTOLIC BLOOD PRESSURE: 130 MMHG

## 2024-03-04 DIAGNOSIS — Z89.432: Primary | ICD-10-CM

## 2024-03-04 PROCEDURE — 3079F DIAST BP 80-89 MM HG: CPT | Performed by: ORTHOPAEDIC SURGERY

## 2024-03-04 PROCEDURE — 1159F MED LIST DOCD IN RCRD: CPT | Performed by: ORTHOPAEDIC SURGERY

## 2024-03-04 PROCEDURE — 3075F SYST BP GE 130 - 139MM HG: CPT | Performed by: ORTHOPAEDIC SURGERY

## 2024-03-04 PROCEDURE — 10120 INC&RMVL FB SUBQ TISS SMPL: CPT | Performed by: ORTHOPAEDIC SURGERY

## 2024-03-04 PROCEDURE — 1160F RVW MEDS BY RX/DR IN RCRD: CPT | Performed by: ORTHOPAEDIC SURGERY

## 2024-03-04 PROCEDURE — 99213 OFFICE O/P EST LOW 20 MIN: CPT | Performed by: ORTHOPAEDIC SURGERY

## 2024-03-04 NOTE — PROGRESS NOTES
Orthopaedic Procedure Note  Procedure Name: Foreign body removal  Site: left foot   Indication:  Pain     Pre-Procedure Diagnosis:    1. Surgery follow-up       Post-Procedure Diagnosis:  Same     Procedure/CPT® Codes:  21039 Foreign body removal     Informed Consent and Counseling:    The risks, benefits, and alternatives of an in office debridement were discussed with the patient, and the patient wished to proceed with the procedure today.       Procedure:  Prior to a procedure, the appropriate equipment and medications were set up.  A pause for Safety was performed: Informed written consent was obtained and reviewed with the patient procedure, site and laterality reiterated with the patient. All supplies, instruments, equipment and medications were present in the clinic room prior to the start of the procedure/intervention. Procedure/intervention was immediately performed after consent discussion.     The  area was prepared in the usual sterile fashion and the overlying skin cleansed. A combination of pickups and scissors were used to debride the area of epidermis allowing for removal of the suture material from the subcutaneous tissues which included dermis and subcutaneous tissue.  The area was then copiously irrigated with sterile saline. Compression was then provided allowing for hemostasis.  Estimated blood loss was less than 0.5 cc.       A sterile dressing was applied to the site.  Anticipatory guidance as well as standard post-procedure care was explained.  Return precautions were given.  The patient tolerated the procedure well without complications.     Mao Mckay MD  [facility] [place] Orthopedic Surgeon

## 2024-03-04 NOTE — PROGRESS NOTES
"                          [facility] [place] [facility]opa[facility] Surgery Office Follow Up     Office Follow Up Visit     Date: 03/04/2024   Patient Name: Carlos Culver  MRN: [id number]  YOB: 1945  Chief Complaint:   Chief Complaint   Patient presents with    Follow-up     8 week follow up- 7.5 months S/P Foot Incision And Drainage; Revision Amputation; Achilles Lengthening -left  7/21/23     History of Present Illness:   Carlos Culver is a 78 y.o. male who is here today for follow up for spot of drainage left foot wound.  Said noticed it sometime in the last couple weeks.  Area is not painful.  Denies any drainage or erythema.  Denies fevers chills or feeling ill.    Subjective   I reviewed the patient's chief complaint, history of present illness, review of systems, past medical history, surgical history, family history, social history, medications and allergy list   Objective    Vital Signs:   Vitals:    03/04/24 1539   BP: 130/82   Weight: 95.6 kg (210 lb 12.8 oz)   Height: 175.9 cm (69.25\")     Body mass index is 30.9 kg/m².    Ortho Exam:  Shoe and AFO removed for exam, incision at site of Chopart amputation clean, no signs of infection, incision well-healed, visible blue suture material in central part of incision    Results Review:  No new imaging    Assessment / Plan    Assessment/Plan:   Diagnoses and all orders for this visit:    1. History of Chopart amputation of left foot (Primary)      Returns to clinic for small area of dehiscence at previous surgical site.  Visible suture material at the site.  Foreign body removed in clinic, see separate procedure note.  Patient instructed to keep area clean and dry.  Counseled patient to change dressing daily over site of debridement.  Plan to see patient back on an as-needed basis.    Follow Up:   Return if symptoms worsen or fail to improve.      Mao Mckay MD  [facility] [place] Orthopedic Surgeon   "

## 2024-03-08 NOTE — PROGRESS NOTES
OFFICE VISIT  NOTE  [facility] CARDIOLOGY      Name: Carlos Culver    Date: 3/11/2024  MRN:  [id number]  :  1945      REFERRING/PRIMARY PROVIDER:  Marcus Cheng MD    Chief Complaint   Patient presents with    Atrial Fibrillation       HPI: Carlos Culver is a 78 y.o. male who presents for PAF. history of excess alcohol use, hypertension, hyperlipidemia, osteomyelitis left foot status post transmetatarsal amputation 2020 by Dr. Fernández.  Originally diagnosed PAF , started metoprolol and Xarelto but he did not take blood thinners due to concomitant alcohol use.  Patient seen prior to surgery 2020, A. fib with RVR, surgery canceled, started on metoprolol and Eliquis.  Successful surgery later in 2020.  Denies palpitations chest pain or shortness of breath.  Still drinking 3-4 alcoholic beverages per night.  No interest in quitting.  Had further amputation of his left foot 2023 walks with a limp now.    Past Medical History:   Diagnosis Date    A-fib     Acute kidney failure     Elevated cholesterol     Hard of hearing     Hyperlipidemia     Hypertension     Renal disorder     Wears hearing aid in both ears     Wears reading eyeglasses        Past Surgical History:   Procedure Laterality Date    AMPUTATION DIGIT Left 2020    Procedure: AMPUTATION LEFT 2ND TOE;  Surgeon: Idania Fernández MD;  Location:  [facility];  Service: Orthopedics    AMPUTATION DIGIT Left 2020    Procedure: TRANSMETATARSAL AMPUTATION LEFT;  Surgeon: Idania Fernández MD;  Location:  [facility];  Service: Orthopedics;  Laterality: Left;    APPENDECTOMY      COLONOSCOPY  2006    FOOT SURGERY Left 2020    (L) transmetatarsal amputation 2020 - Dr. Idania Fernández    INCISION AND DRAINAGE LEG Left 10/23/2018    Procedure: INCISION AND DRAINAGE LEFT FOOT;  Surgeon: Idania Fernández MD;  Location:  [facility];  Service: Orthopedics    INCISION AND DRAINAGE LEG Left 2023     Procedure: FOOT INCISION AND DRAINAGE; REVISION AMPUTATION; ACHILLES LENGTHENING -LEFT;  Surgeon: Mao Mckay MD;  Location:  [facility];  Service: Orthopedics;  Laterality: Left;    TOE AMPUTATION      TONSILLECTOMY      VENOUS ACCESS DEVICE (PORT) INSERTION N/A 10/23/2018    Procedure: GROSHONG CATHETER PLACEMENT;  Surgeon: Marquez Simons MD;  Location:  [facility];  Service: General    VENOUS ACCESS DEVICE (PORT) INSERTION N/A 12/9/2020    Procedure: GROSHONG INSERTION;  Surgeon: Antonio Bains MD;  Location:  [facility];  Service: General;  Laterality: N/A;    VENOUS ACCESS DEVICE (PORT) REMOVAL         Social History     Socioeconomic History    Marital status:    Tobacco Use    Smoking status: Never    Smokeless tobacco: Never   Vaping Use    Vaping status: Never Used   Substance and Sexual Activity    Alcohol use: Yes     Alcohol/week: 3.0 - 4.0 standard drinks of alcohol     Types: 3 - 4 Shots of liquor per week     Comment: drinks a fifth a week DRINKS 3-5 DRINKS DAILY     Drug use: No    Sexual activity: Defer       Family History   Problem Relation Age of Onset    No Known Problems Mother     Cancer Father         ROS:   Constitutional no fever,  no weight loss   Skin no rash, no subcutaneous nodules   Otolaryngeal no difficulty swallowing   Cardiovascular See HPI   Pulmonary no cough, no sputum production   Gastrointestinal no constipation, no diarrhea   Genitourinary no dysuria, no hematuria   Hematologic no easy bruisability, no abnormal bleeding   Musculoskeletal no muscle pain   Neurologic no dizziness, no falls         Allergies   Allergen Reactions    Sulfa Antibiotics Unknown (See Comments) and Rash     Pt was told as child he had an allergy.  Has not taken and doesn't know the response         Current Outpatient Medications:     atorvastatin (LIPITOR) 80 MG tablet, Take 1 tablet by mouth Every Night. Hold while on Daptomycin, Disp: 90 tablet, Rfl: 3    Eliquis 5 MG tablet  "tablet, TAKE ONE TABLET BY MOUTH TWICE A DAY, Disp: 180 tablet, Rfl: 3    saccharomyces boulardii (Florastor) 250 MG capsule, Take 1 capsule by mouth 2 (Two) Times a Day., Disp: 30 capsule, Rfl: 0    Vitals:    03/11/24 0944   BP: 110/64   BP Location: Right arm   Patient Position: Sitting   Pulse: 62   SpO2: 98%   Weight: 98.7 kg (217 lb 9.6 oz)   Height: 182.9 cm (72\")       Body mass index is 29.51 kg/m².    PHYSICAL EXAM:    General Appearance:   well developed  well nourished  HENT:   oropharynx moist  lips not cyanotic  Neck:  thyroid not enlarged  supple  Respiratory:  no respiratory distress  normal breath sounds  no rales  Cardiovascular:  no jugular venous distention  Irregular irregular rhythm  apical impulse normal  S1 normal, S2 normal  no S3, no S4   no murmur  no rub, no thrill  carotid pulses normal; no bruit  pedal pulses normal  lower extremity edema: none    Gastrointestinal:   bowel sounds normal  non-tender  no hepatomegaly, no splenomegaly  Musculoskeletal:  no clubbing of fingers.   normocephalic, head atraumatic  Skin:   warm, dry  Psychiatric:  judgement and insight appropriate  normal mood and affect    RESULTS:   Procedures    Results for orders placed during the hospital encounter of 07/19/23    Adult Transthoracic Echo Complete W/ Cont if Necessary Per Protocol    Interpretation Summary    Left ventricular systolic function is normal. Calculated left ventricular EF = 60%    Left ventricular diastolic function was normal.    Left atrial volume is moderately increased.    Saline test results are negative.    There is calcification of the aortic valve.    Moderate tricuspid valve regurgitation is present.    Estimated right ventricular systolic pressure from tricuspid regurgitation is mildly elevated (35-45 mmHg).        Labs:  Lab Results   Component Value Date    AST 22 08/28/2023    ALT <5 08/28/2023     Lab Results   Component Value Date    HGBA1C 6.10 (H) 01/08/2024     No components " "found for: \"CREATINININE\"  eGFR Non  Am   Date Value Ref Range Status   09/10/2021 >60 >60 mL/min/1.73m*2 Final     Comment:     eGFR = estimated GFR; eGFR units = mL/min/1.73 sq meters Chronic Kidney Disease is considered if eGFR <60 mL/min/1.73 sq meters Kidney failure is considered if eGFR is <15 mL/min/1.73 sq meters. eGFR assumes steady state plasma creatinine concentration; not applicable if renal function is rapidly changing or patient is on dialysis.   08/23/2021 >60 >60 mL/min/1.73m*2 Final     Comment:     eGFR = estimated GFR; eGFR units = mL/min/1.73 sq meters Chronic Kidney Disease is considered if eGFR <60 mL/min/1.73 sq meters Kidney failure is considered if eGFR is <15 mL/min/1.73 sq meters. eGFR assumes steady state plasma creatinine concentration; not applicable if renal function is rapidly changing or patient is on dialysis.     eGFR Non  Amer   Date Value Ref Range Status   09/13/2021 50 (L) >60 mL/min/1.73 Final   01/25/2021 57 (L) >60 mL/min/1.73 Final   01/18/2021 58 (L) >60 mL/min/1.73 Final         ASSESSMENT:  Problem List Items Addressed This Visit       Essential hypertension    MARTINEZ (dyspnea on exertion)    Overview     10/23/18 Echo  Mild tricuspid valve regurgitation is present.  EF 70%  Calculated right ventricular systolic pressure from tricuspid regurgitation is 26 mmHg.  Mild mitral valve regurgitation is present           Hyperlipidemia LDL goal <100    A-fib - Primary    Overview     10/23/18 Echo  Mild tricuspid valve regurgitation is present.  Lumason contrast shows normal overall wall motion and systolic function with an EF 70%            PLAN:    1.  Persistent atrial fibrillation:  I discussed possibility of rhythm versus rate control, echo shows moderate left atrial enlargement with EF of 50-55% with moderate to severe TR, moderate elevated RVSP.  Given low likelihood of maintaining sinus rhythm with concomitant alcohol use, we will continue rate control " strategy at this time.    Continue metoprolol 25 mg twice daily sent  Continue Eliquis 5 mg twice daily, I discussed the risk of bleeding due to concomitant alcohol use, he understands this risk but wishes to continue anticoagulant therapy.    Decrease alcohol, complete abstinence would be preferred    2.  Hypertension:  Low normal at this time  If less than 100 systolic consistently or symptomatic, discontinue Zestoretic    3.  Alcohol abuse  I recommend complete alcohol abstinence with weaning off, he will think about it, but at least we will try to cut down the 1 to 2/day    Return to clinic in 12 months, or sooner as needed.    Thank you for the opportunity to share in the care of your patient; please do not hesitate to call me with any questions.     Hang Anderson MD, [facility]  Office: [phone] [address] [address]    03/11/24

## 2024-03-11 ENCOUNTER — OFFICE VISIT (OUTPATIENT)
Dept: CARDIOLOGY | Facility: CLINIC | Age: 79
End: 2024-03-11
Payer: MEDICARE

## 2024-03-11 VITALS
WEIGHT: 217.6 LBS | DIASTOLIC BLOOD PRESSURE: 64 MMHG | BODY MASS INDEX: 29.47 KG/M2 | SYSTOLIC BLOOD PRESSURE: 110 MMHG | OXYGEN SATURATION: 98 % | HEIGHT: 72 IN | HEART RATE: 62 BPM

## 2024-03-11 DIAGNOSIS — E78.5 HYPERLIPIDEMIA LDL GOAL <100: ICD-10-CM

## 2024-03-11 DIAGNOSIS — R06.09 DOE (DYSPNEA ON EXERTION): ICD-10-CM

## 2024-03-11 DIAGNOSIS — I10 ESSENTIAL HYPERTENSION: ICD-10-CM

## 2024-03-11 DIAGNOSIS — I48.0 PAROXYSMAL ATRIAL FIBRILLATION: Primary | ICD-10-CM

## 2024-03-11 PROCEDURE — 99214 OFFICE O/P EST MOD 30 MIN: CPT | Performed by: INTERNAL MEDICINE

## 2024-03-11 PROCEDURE — 3074F SYST BP LT 130 MM HG: CPT | Performed by: INTERNAL MEDICINE

## 2024-03-11 PROCEDURE — 3078F DIAST BP <80 MM HG: CPT | Performed by: INTERNAL MEDICINE

## 2024-06-24 RX ORDER — APIXABAN 5 MG/1
TABLET, FILM COATED ORAL
Qty: 180 TABLET | Refills: 3 | Status: SHIPPED | OUTPATIENT
Start: 2024-06-24

## 2024-07-08 ENCOUNTER — OFFICE VISIT (OUTPATIENT)
Dept: ORTHOPEDIC SURGERY | Facility: CLINIC | Age: 79
End: 2024-07-08
Payer: MEDICARE

## 2024-07-08 VITALS
SYSTOLIC BLOOD PRESSURE: 108 MMHG | HEIGHT: 72 IN | BODY MASS INDEX: 28.71 KG/M2 | DIASTOLIC BLOOD PRESSURE: 66 MMHG | WEIGHT: 212 LBS

## 2024-07-08 DIAGNOSIS — Z89.432: Primary | ICD-10-CM

## 2024-07-08 PROCEDURE — 1160F RVW MEDS BY RX/DR IN RCRD: CPT | Performed by: ORTHOPAEDIC SURGERY

## 2024-07-08 PROCEDURE — 1159F MED LIST DOCD IN RCRD: CPT | Performed by: ORTHOPAEDIC SURGERY

## 2024-07-08 PROCEDURE — 3078F DIAST BP <80 MM HG: CPT | Performed by: ORTHOPAEDIC SURGERY

## 2024-07-08 PROCEDURE — 99213 OFFICE O/P EST LOW 20 MIN: CPT | Performed by: ORTHOPAEDIC SURGERY

## 2024-07-08 PROCEDURE — 3074F SYST BP LT 130 MM HG: CPT | Performed by: ORTHOPAEDIC SURGERY

## 2024-07-08 NOTE — PROGRESS NOTES
"                          [facility] [place] Orthopaedic Surgery Office Follow Up     Office Follow Up Visit     Date: 07/08/2024   Patient Name: Carlos Culver  MRN: [id number]  YOB: 1945  Chief Complaint:   Chief Complaint   Patient presents with    Follow-up     4 month follow up -History of Chopart amputation of left foot     History of Present Illness:   Carlos Culver is a 78 y.o. male who is here today for follow up status post left Chopart amputation July 21, 2023.  Is here for routine follow-up.  Has been weightbearing as tolerated with orthotic.  He is doing all the activities he would like to do.  Denies discomfort.  Continues to be vigilant about taking care of his feet.  No new complaints.      Subjective   I reviewed the patient's chief complaint, history of present illness, review of systems, past medical history, surgical history, family history, social history, medications and allergy list   Objective    Vital Signs:   Vitals:    07/08/24 0921   BP: 108/66   Weight: 96.2 kg (212 lb)   Height: 182.9 cm (72\")     Body mass index is 28.75 kg/m².    Ortho Exam:  Shoe and AFO removed for exam, incision at site of Chopart amputation clean, incision well-healed, no signs of infection, patient able to actively dorsiflex and plantarflex ankle.  No wounds or drainage.  No areas of erythema.        Results Review:  No new imaging    Assessment / Plan    Assessment/Plan:   Diagnoses and all orders for this visit:    1. History of Chopart amputation of left foot (Primary)      Returns for follow-up status post left Chopart amputation.  Has returned to all activities and the incision is well-healed.  No signs of recurrent infection.  Is happy with progress.  Is here for surveillance follow-up and things continue to go well.  We talked once again about the importance of foot hygiene and being vigilant about any issues that he starts having with his feet.  Will plan to see him back in 6 months for " reevaluation.  It was a pleasure seeing him today.    Follow Up:   No follow-ups on file.      Mao Mckay MD  [facility] [place] Orthopedic Surgeon

## 2024-10-15 ENCOUNTER — TELEPHONE (OUTPATIENT)
Dept: CARDIOLOGY | Facility: CLINIC | Age: 79
End: 2024-10-15
Payer: COMMERCIAL

## 2024-10-15 NOTE — TELEPHONE ENCOUNTER
Caller Name: Carlos Culver      Relationship: Self      Best Contact Number: [phone]       Patient is requesting samples of ELIQUIS 5 MG       How many days of medication do you have left? 1 DAY REMAINING       Additional Information: [facility] AND HIS MEDICATION IS $430 FOR A 90 DAY SUPPLY. PATIENT WOULD LIKE TO KNOW IF WE HAVE ANY SAMPLES OF ELIQUIS 5 MG. PLEASE CALL TO DISCUSS.

## 2024-10-22 ENCOUNTER — TELEPHONE (OUTPATIENT)
Dept: ORTHOPEDIC SURGERY | Facility: CLINIC | Age: 79
End: 2024-10-22
Payer: COMMERCIAL

## 2024-10-22 NOTE — TELEPHONE ENCOUNTER
Spoke to pt who preferred coming in to the office for a repeat evaluation; I worked pt in to see Dr. Mckay tomorrow at 8:40am at [facility].     Yolie MONROY CMA ([facility]), ROT

## 2024-10-22 NOTE — TELEPHONE ENCOUNTER
LVM with pt requesting he give me a call back to discuss his message; Offered him an appt to come in to see Dr. Mckay tomorrow or asked that he send in a picture of the spot of concern so I can send to Dr. Mckay and allow him to advise.     Yolie MONROY CMA ([facility]), ROT

## 2024-10-22 NOTE — TELEPHONE ENCOUNTER
JOANIE    Received a call from the patient, concerned regarding a new spot located on his left foot. Per patient, his provider LEANN informed him to call our office as soon as possible, if anything changes with his foot, or he notices anything new. Per patient, he has left foot neuropathy, his concern is regarding a spot he noticed on his foot with some minor discharge that stains his sock. He indicates the discharge is brown in color, but unsure of the consistency regarding texture. Patient denies warmth, denies discoloration, denies fever, denies nausea, denies any sort of odor. Per patient he is concerned, not sure if there is any infection and would like someone to call him at their earliest convenience. Please advise. Thank you kindly!

## 2024-10-23 ENCOUNTER — OFFICE VISIT (OUTPATIENT)
Dept: ORTHOPEDIC SURGERY | Facility: CLINIC | Age: 79
End: 2024-10-23
Payer: MEDICARE

## 2024-10-23 VITALS
WEIGHT: 217 LBS | BODY MASS INDEX: 29.39 KG/M2 | HEIGHT: 72 IN | SYSTOLIC BLOOD PRESSURE: 128 MMHG | DIASTOLIC BLOOD PRESSURE: 80 MMHG

## 2024-10-23 DIAGNOSIS — Z89.432: Primary | ICD-10-CM

## 2024-10-23 RX ORDER — METOPROLOL TARTRATE 25 MG/1
TABLET, FILM COATED ORAL
COMMUNITY
Start: 2024-09-20

## 2024-10-23 NOTE — PROGRESS NOTES
"                          [facility] [place] Orthopaedic Surgery Office Follow Up     Office Follow Up Visit     Date: 10/23/2024   Patient Name: Carlos Culver  MRN: [id number]  YOB: 1945  Chief Complaint:   Chief Complaint   Patient presents with    Follow-up     3.5 month follow up -History of Chopart amputation of left foot     History of Present Illness:   Carlos Culver is a 79 y.o. male who is here today for follow up for follow-up after developing a spot on the bottom of his foot that is having some clear drainage.  Denies pain but patient is densely neuropathic.  States has been putting a Band-Aid over the area and has been noticing some clear drainage on the Band-Aid.  Denies fevers chills or feeling ill.  No other complaints.    Subjective   I reviewed the patient's chief complaint, history of present illness, review of systems, past medical history, surgical history, family history, social history, medications and allergy list   Objective    Vital Signs:   Vitals:    10/23/24 0827   BP: 128/80   Weight: 98.4 kg (217 lb)   Height: 182.9 cm (72.01\")     Body mass index is 29.42 kg/m².    Ortho Exam:  Shoe and AFO removed for exam, incision at site of Chopart amputation clean, incision well-healed, no signs of infection, patient able to actively dorsiflex and plantarflex ankle.  There is a small area over palpable prominence with some macerated tissue with some visible breakdown and ecchymosis, see pictures in chart.  No active drainage.  No expressible purulence. 0/10 sites felt on monofilament testing of foot.     Results Review:  XR Calcaneus 2+ View Left  Left Calcaneus X-Ray 10/23/24   Indication: Pain  Views: 2 weight bearing , comparison to previous  Findings: xrays reviewed by me today in the office and show postsurgical   changes consistent with Chopart amputation, some fragmentation lateral   dorsal calcaneus best seen on the Leal view, no catalino acute osseous   abnormality, no catalino " signs of osteomyelitis       Assessment / Plan    Assessment/Plan:   Diagnoses and all orders for this visit:    1. History of Chopart amputation of left foot (Primary)  -     XR Calcaneus 2+ View Left      Patient returns to clinic for follow-up post left Chopart amputation July 21, 2023.  Here today due to concern for new area plantar hindfoot that has now developed some maceration with drainage.  No catalino signs of osteomyelitis on x-ray.  I had a long discussion with patient.  Patient's in shoe orthotic is worn down at area of skin breakdown.  I think this is likely a result of bony prominence and pressure however would like to rule out osteomyelitis.  Patient made nonweightbearing in boot with use of knee scooter.  Patient provided with order for MRI.  Will plan to see patient back for further management following completion of MRI.  Counseled patient that if between now and seeing me again he develops purulent drainage or erythema that appears to be moving up his leg that he should present to the emergency department.  Patient expressed agreement and understanding with plan.  If no signs of osteomyelitis will likely continue nonweightbearing and will plan for new offloading orthotic.  Was a pleasure seeing him today.    Follow Up:   Return for F/U following completion of advanced imaging.      Mao Mckay MD  [facility] [place] Orthopedic Surgeon

## 2024-10-30 ENCOUNTER — TELEPHONE (OUTPATIENT)
Dept: ORTHOPEDIC SURGERY | Facility: CLINIC | Age: 79
End: 2024-10-30

## 2024-10-30 DIAGNOSIS — Z89.432: Primary | ICD-10-CM

## 2024-10-30 NOTE — TELEPHONE ENCOUNTER
Caller: Carlos Culver    Relationship: Self    Best call back number: [phone]    What was the call regarding: PT STATES DR NAIR RELAYED TO PT THAT HE WOULD BE PLACING AN ORDER FOR A L FOOT MRI FOR PT TO COMPLETE. PT STATES HE HAS NOT HEARD ANYTHING REGARDING GETTING THAT MRI SCHEDULED. NO MRI REFERRAL IN CHART. PLEASE CONTACT PT TO CONFIRM THAT DR NAIR WOULD STILL LIKE FOR PT TO GET THE L FOOT MRI AND THAT IT HAS BEEN ORDERED.

## 2024-11-19 ENCOUNTER — TELEPHONE (OUTPATIENT)
Age: 79
End: 2024-11-19
Payer: MEDICARE

## 2024-12-18 ENCOUNTER — TELEPHONE (OUTPATIENT)
Dept: CARDIOLOGY | Facility: CLINIC | Age: 79
End: 2024-12-18
Payer: MEDICARE

## 2024-12-18 NOTE — TELEPHONE ENCOUNTER
Caller: SHANA GONZALEZ    Relationship:SELF    Callback number: [phone]   Is it ok to leave a message: [x] Yes [] No    Requested medication for samples: ELIQUIS 5 MG 2X A DAY    How much medication does the patient currently have left: 6-7 DAYS    Who will be picking up the samples: PATIENT    Do you need information about patient financial assistance for this medication: [] Yes [x] No    Additional details provided: PT JUST NEEDING AROUND 10 DAYS TO HOLD HIM OVER UNTIL THE BEGINNING OF THE YEAR

## 2024-12-18 NOTE — TELEPHONE ENCOUNTER
Called patient about Eliquis 5mg samples. Left voicemail to see if patient wanted to  samples in [place] or at [facility] on 12/23/2024.

## 2025-01-08 ENCOUNTER — OFFICE VISIT (OUTPATIENT)
Dept: ORTHOPEDIC SURGERY | Facility: CLINIC | Age: 80
End: 2025-01-08
Payer: MEDICARE

## 2025-01-08 VITALS
SYSTOLIC BLOOD PRESSURE: 128 MMHG | BODY MASS INDEX: 30.12 KG/M2 | WEIGHT: 222.4 LBS | HEIGHT: 72 IN | DIASTOLIC BLOOD PRESSURE: 74 MMHG

## 2025-01-08 DIAGNOSIS — L97.525 NON-PRESSURE CHRONIC ULCER OF OTHER PART OF LEFT FOOT WITH MUSCLE INVOLVEMENT WITHOUT EVIDENCE OF NECROSIS: Primary | ICD-10-CM

## 2025-01-08 NOTE — PROGRESS NOTES
"                          [facility] [place] Orthopaedic Surgery Office Follow Up     Office Follow Up Visit     Date: 01/08/2025   Patient Name: Carlos Culver  MRN: [id number]  YOB: 1945  Chief Complaint:   Chief Complaint   Patient presents with    Follow-up     3 month follow up - History of Chopart amputation of left foot      History of Present Illness:   Carlos Culver is a 79 y.o. male who is here today for follow-up for area of drainage plantar aspect of foot.  Last seen in clinic on October 23.  Patient reports there was miscommunication with scheduling MRI and then his symptoms started to improve so he elected not to complete MRI.  States was nonweightbearing for several weeks following his visit and the area started to improve so he resumed weightbearing.  Here today for follow-up.    Subjective   I reviewed the patient's chief complaint, history of present illness, review of systems, past medical history, surgical history, family history, social history, medications and allergy list   Objective    Vital Signs:   Vitals:    01/08/25 0927   BP: 128/74   Weight: 101 kg (222 lb 6.4 oz)   Height: 182.9 cm (72.01\")     Body mass index is 30.16 kg/m².    Ortho Exam:  Shoe and AFO removed for exam, incision at site of Chopart amputation clean, incision well-healed, no signs of infection, patient able to actively dorsiflex and plantarflex ankle.   Continues to be area of palpable prominence with some callus formation.  Tissue does not appear macerated as previously did.  There is a pinpoint area of serous drainage on exam today. No expressible purulence. 0/10 sites felt on monofilament testing of foot.     Results Review:  No new imaging    Assessment / Plan    Assessment/Plan:   Diagnoses and all orders for this visit:    1. 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  He is status post left Chopart amputation July 1, 2023.  Here today again for prominent area plantar hindfoot that although improved from previous does still has some clear drainage.  We discussed at length that there was some miscommunication with regard to the MRI.  I stressed him the importance of getting an MRI to rule out osteomyelitis.  I also recommend the patient continue to be nonweightbearing while area continues to heal.  I am optimistic that this is simply due to pressure however hit in light of his history if there is concern that we could have recurrent osteomyelitis.  I stressed the patient the importance of getting the MRI and following up when it is completed for further management.  Patient to be nonweightbearing in the interim.  Further management upon completion of MRI.  Patient expressed understanding and agreement with plan.    Follow Up:   Return for F/U following completion of advanced imaging.      Mao Mckay MD  [facility] [place] [place] Orthopedic Surgeon

## 2025-01-10 ENCOUNTER — HOSPITAL ENCOUNTER (OUTPATIENT)
Facility: HOSPITAL | Age: 80
Discharge: HOME | End: 2025-01-10
Payer: MEDICARE

## 2025-01-10 DIAGNOSIS — L97.525 NON-PRESSURE CHRONIC ULCER OF OTHER PART OF LEFT FOOT WITH MUSCLE INVOLVEMENT WITHOUT EVIDENCE OF NECROSIS: ICD-10-CM

## 2025-01-10 PROCEDURE — 73720 MRI LWR EXTREMITY W/O&W/DYE: CPT

## 2025-01-10 PROCEDURE — 25510000002 GADOBENATE DIMEGLUMINE 529 MG/ML SOLUTION: Performed by: ORTHOPAEDIC SURGERY

## 2025-01-10 PROCEDURE — A9577 INJ MULTIHANCE: HCPCS | Performed by: ORTHOPAEDIC SURGERY

## 2025-01-10 RX ADMIN — GADOBENATE DIMEGLUMINE 20 ML: 529 INJECTION, SOLUTION INTRAVENOUS at 17:05

## 2025-01-21 ENCOUNTER — TELEPHONE (OUTPATIENT)
Dept: CARDIOLOGY | Facility: CLINIC | Age: 80
End: 2025-01-21
Payer: MEDICARE

## 2025-01-21 NOTE — TELEPHONE ENCOUNTER
Refill request for [employer]    Lab Results   Component Value Date    WBC 10.16 01/08/2024    HGB 15.9 01/08/2024    HCT 47.6 01/08/2024    MCV 96.0 01/08/2024     01/08/2024

## 2025-01-29 ENCOUNTER — OFFICE VISIT (OUTPATIENT)
Dept: ORTHOPEDIC SURGERY | Facility: CLINIC | Age: 80
End: 2025-01-29
Payer: MEDICARE

## 2025-01-29 VITALS — HEIGHT: 72 IN | BODY MASS INDEX: 30.16 KG/M2 | WEIGHT: 222.66 LBS

## 2025-01-29 DIAGNOSIS — L97.529 ULCER OF LEFT FOOT, UNSPECIFIED ULCER STAGE: Primary | ICD-10-CM

## 2025-01-29 NOTE — PROGRESS NOTES
"                          [facility] Orthopaedic Surgery Office Follow Up     Office Follow Up Visit     Date: 01/29/2025   Patient Name: Carlos Culver  MRN: [id number]  YOB: 1945  Chief Complaint:   Chief Complaint   Patient presents with   • Left Foot - Follow-up     After MRI 01/10/2025     History of Present Illness:   Carlos Culver is a 79 y.o. male who is here today for follow-up pressure ulcer plantar foot.  Last seen in clinic on January 8.  Here today for MRI follow-up.  States drainage has stopped since last visit.  Has been primarily nonweightbearing with use of scooter since last visit which patient states has helped area improve.  No new complaints.    Subjective   I reviewed the patient's chief complaint, history of present illness, review of systems, past medical history, surgical history, family history, social history, medications and allergy list   Objective    Vital Signs:   Vitals:    01/29/25 1254   Weight: 101 kg (222 lb 10.6 oz)   Height: 182.9 cm (72.01\")     Body mass index is 30.19 kg/m².    Ortho Exam:  Shoe and AFO removed for exam, incision at site of Chopart amputation clean, incision well-healed, no signs of infection, patient able to actively dorsiflex and plantarflex ankle.   Continues to be area of palpable prominence with some callus formation plantar heel.  Small area of skin breakdown with no drainage. No expressible purulence or signs of infection. 0/10 sites felt on monofilament testing of foot.     Results Review:  MRI Foot Left With & Without Contrast  Narrative: MRI FOOT LEFT W WO CONTRAST    Date of Exam: 1/10/2025 4:10 PM EST    Indication: concern for calcaneus osteomyelitis.     Comparison: Radiographs October 23, 2024, July 19, 2023. MRI foot July 19, 2023    Technique:  Routine multiplanar/multisequence sequence images of the left foot were obtained before and after the uneventful administration of 20 mL Multihance.      Findings:  As seen on " prior radiographs, there has been forefoot amputation to the distal talus and calcaneus.    Marker overlies the reported wound at the plantar foot underlying the plantar aspect of the remaining calcaneus. In this location there appears to be an ill-defined area of mild T2 hyperintense and T1 hypointense signal in the soft tissues with   postcontrast enhancement. No significant rim-enhancing collection is identified.    There is mild diffuse subcutaneous edema of the visualized lower leg, ankle, and foot. There is suspected scarring at the anterior-lateral aspect of the remaining foot. No other definite localized soft tissue collection is seen. There is evidence of   tendinopathy of the mid Achilles tendon and intermediate signal and thinning of the distal Achilles tendon suggesting chronic partial tear. No fluid signal Achilles defect is seen at this time. There are postoperative changes of peroneal, flexor, and   extensor tendons.    There does not appear to be abnormal confluent marrow edema or T1 signal abnormality of the plantar calcaneus or talus in the area of the wound to indicate osteomyelitis. There are foci of subchondral edema/cyst formation at the anterior aspect of the   posterior subtalar joint and at the posterior tibiotalar joint where there appears to be advanced joint space narrowing and cartilage loss suggesting these marrow signal changes are related to arthropathy. No significant joint effusion. No other   suspicious marrow signal abnormality. No evidence of fracture. No acute ligament injury is identified.  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  2.Underlying mild ill-defined soft tissue edema signal and enhancement at the reported location of the plantar foot wound. This could represent skin and soft tissue infection an/or or fibrosis.  3.No definite findings of osteomyelitis or abscess at this time.  4.Nonspecific subcutaneous edema.    Electronically  Signed: Antonio Ronquillo    1/10/2025 6:25 PM EST    Workstation ID: [id number]     01/29/25 I have personally reviewed and interpreted the images from outside facility with the documented findings, no signs of osteomyelitis    Assessment / Plan    Assessment/Plan:   Diagnoses and all orders for this visit:    1. 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  Here today for MRI follow-up.  MRI does not show any signs of osteomyelitis.  Area of breakdown improved with limited weightbearing.  Recommend patient continue nonweightbearing on left lower extremity until he receives a new orthotic.  I provided patient with referral to [facility] orth[facility] for new orthotic in clinic today.  Will plan to see patient back in 6 weeks following him hopefully receiving his new in shoe orthotic to help offload the area for reevaluation.  Counseled patient contact clinic sooner should any concerns arise.  It was a pleasure seeing him today.  And happy with his progress.    Follow Up:   Return in about 6 weeks (around 3/12/2025) for Recheck.      Mao Mckay MD  [facility]

## 2025-03-03 ENCOUNTER — HOSPITAL ENCOUNTER (EMERGENCY)
Facility: HOSPITAL | Age: 80
Discharge: HOME | End: 2025-03-03
Attending: EMERGENCY MEDICINE | Admitting: EMERGENCY MEDICINE
Payer: MEDICARE

## 2025-03-03 ENCOUNTER — APPOINTMENT (OUTPATIENT)
Dept: GENERAL RADIOLOGY | Facility: HOSPITAL | Age: 80
End: 2025-03-03
Payer: MEDICARE

## 2025-03-03 VITALS
SYSTOLIC BLOOD PRESSURE: 135 MMHG | TEMPERATURE: 99.8 F | DIASTOLIC BLOOD PRESSURE: 90 MMHG | OXYGEN SATURATION: 92 % | RESPIRATION RATE: 18 BRPM | HEART RATE: 105 BPM | HEIGHT: 71 IN | WEIGHT: 220 LBS | BODY MASS INDEX: 30.8 KG/M2

## 2025-03-03 DIAGNOSIS — R53.81 MALAISE AND FATIGUE: ICD-10-CM

## 2025-03-03 DIAGNOSIS — Z79.01 CHRONIC ANTICOAGULATION: ICD-10-CM

## 2025-03-03 DIAGNOSIS — R05.1 ACUTE COUGH: ICD-10-CM

## 2025-03-03 DIAGNOSIS — R50.9 FEVER AND CHILLS: ICD-10-CM

## 2025-03-03 DIAGNOSIS — J10.1 INFLUENZA A: Primary | ICD-10-CM

## 2025-03-03 DIAGNOSIS — R19.7 DIARRHEA, UNSPECIFIED TYPE: ICD-10-CM

## 2025-03-03 DIAGNOSIS — D69.6 THROMBOCYTOPENIA: ICD-10-CM

## 2025-03-03 DIAGNOSIS — R53.83 MALAISE AND FATIGUE: ICD-10-CM

## 2025-03-03 DIAGNOSIS — E87.1 HYPONATREMIA: ICD-10-CM

## 2025-03-03 DIAGNOSIS — R63.0 ANOREXIA: ICD-10-CM

## 2025-03-03 DIAGNOSIS — I48.20 CHRONIC ATRIAL FIBRILLATION: ICD-10-CM

## 2025-03-03 LAB
ALBUMIN SERPL-MCNC: 4.2 G/DL (ref 3.5–5.2)
ALBUMIN/GLOB SERPL: 1.3 G/DL
ALP SERPL-CCNC: 107 U/L (ref 39–117)
ALT SERPL W P-5'-P-CCNC: 35 U/L (ref 1–41)
ANION GAP SERPL CALCULATED.3IONS-SCNC: 13 MMOL/L (ref 5–15)
AST SERPL-CCNC: 56 U/L (ref 1–40)
BASOPHILS # BLD AUTO: 0.06 10*3/MM3 (ref 0–0.2)
BASOPHILS NFR BLD AUTO: 0.5 % (ref 0–1.5)
BILIRUB SERPL-MCNC: 0.6 MG/DL (ref 0–1.2)
BUN SERPL-MCNC: 19 MG/DL (ref 8–23)
BUN/CREAT SERPL: 19.2 (ref 7–25)
CALCIUM SPEC-SCNC: 9.8 MG/DL (ref 8.6–10.5)
CHLORIDE SERPL-SCNC: 92 MMOL/L (ref 98–107)
CO2 SERPL-SCNC: 23 MMOL/L (ref 22–29)
CREAT SERPL-MCNC: 0.99 MG/DL (ref 0.76–1.27)
D-LACTATE SERPL-SCNC: 1.8 MMOL/L (ref 0.5–2)
DEPRECATED RDW RBC AUTO: 44.8 FL (ref 37–54)
EGFRCR SERPLBLD CKD-EPI 2021: 77 ML/MIN/1.73
EOSINOPHIL # BLD AUTO: 0.13 10*3/MM3 (ref 0–0.4)
EOSINOPHIL NFR BLD AUTO: 1.2 % (ref 0.3–6.2)
ERYTHROCYTE [DISTWIDTH] IN BLOOD BY AUTOMATED COUNT: 12.9 % (ref 12.3–15.4)
FLUAV RNA RESP QL NAA+PROBE: DETECTED
FLUBV RNA RESP QL NAA+PROBE: NOT DETECTED
GLOBULIN UR ELPH-MCNC: 3.3 GM/DL
GLUCOSE SERPL-MCNC: 113 MG/DL (ref 65–99)
HCT VFR BLD AUTO: 48.1 % (ref 37.5–51)
HGB BLD-MCNC: 16.6 G/DL (ref 13–17.7)
IMM GRANULOCYTES # BLD AUTO: 0.03 10*3/MM3 (ref 0–0.05)
IMM GRANULOCYTES NFR BLD AUTO: 0.3 % (ref 0–0.5)
LYMPHOCYTES # BLD AUTO: 0.99 10*3/MM3 (ref 0.7–3.1)
LYMPHOCYTES NFR BLD AUTO: 8.9 % (ref 19.6–45.3)
MCH RBC QN AUTO: 32.7 PG (ref 26.6–33)
MCHC RBC AUTO-ENTMCNC: 34.5 G/DL (ref 31.5–35.7)
MCV RBC AUTO: 94.7 FL (ref 79–97)
MONOCYTES # BLD AUTO: 1.28 10*3/MM3 (ref 0.1–0.9)
MONOCYTES NFR BLD AUTO: 11.6 % (ref 5–12)
NEUTROPHILS NFR BLD AUTO: 77.5 % (ref 42.7–76)
NEUTROPHILS NFR BLD AUTO: 8.59 10*3/MM3 (ref 1.7–7)
NRBC BLD AUTO-RTO: 0 /100 WBC (ref 0–0.2)
NT-PROBNP SERPL-MCNC: 769.9 PG/ML (ref 0–1800)
PLATELET # BLD AUTO: 135 10*3/MM3 (ref 140–450)
PMV BLD AUTO: 9.7 FL (ref 6–12)
POTASSIUM SERPL-SCNC: 4.2 MMOL/L (ref 3.5–5.2)
PROCALCITONIN SERPL-MCNC: 0.11 NG/ML (ref 0–0.25)
PROT SERPL-MCNC: 7.5 G/DL (ref 6–8.5)
RBC # BLD AUTO: 5.08 10*6/MM3 (ref 4.14–5.8)
RSV RNA RESP QL NAA+PROBE: NOT DETECTED
SARS-COV-2 RNA RESP QL NAA+PROBE: NOT DETECTED
SODIUM SERPL-SCNC: 128 MMOL/L (ref 136–145)
TROPONIN T SERPL HS-MCNC: 14 NG/L
WBC NRBC COR # BLD AUTO: 11.08 10*3/MM3 (ref 3.4–10.8)

## 2025-03-03 PROCEDURE — 25810000003 SODIUM CHLORIDE 0.9 % SOLUTION: Performed by: PHYSICIAN ASSISTANT

## 2025-03-03 PROCEDURE — 85025 COMPLETE CBC W/AUTO DIFF WBC: CPT | Performed by: PHYSICIAN ASSISTANT

## 2025-03-03 PROCEDURE — 71045 X-RAY EXAM CHEST 1 VIEW: CPT

## 2025-03-03 PROCEDURE — 84145 PROCALCITONIN (PCT): CPT | Performed by: PHYSICIAN ASSISTANT

## 2025-03-03 PROCEDURE — 93005 ELECTROCARDIOGRAM TRACING: CPT | Performed by: PHYSICIAN ASSISTANT

## 2025-03-03 PROCEDURE — 87637 SARSCOV2&INF A&B&RSV AMP PRB: CPT | Performed by: PHYSICIAN ASSISTANT

## 2025-03-03 PROCEDURE — 83605 ASSAY OF LACTIC ACID: CPT | Performed by: PHYSICIAN ASSISTANT

## 2025-03-03 PROCEDURE — 99284 EMERGENCY DEPT VISIT MOD MDM: CPT

## 2025-03-03 PROCEDURE — 80053 COMPREHEN METABOLIC PANEL: CPT | Performed by: PHYSICIAN ASSISTANT

## 2025-03-03 PROCEDURE — 84484 ASSAY OF TROPONIN QUANT: CPT | Performed by: PHYSICIAN ASSISTANT

## 2025-03-03 PROCEDURE — 83880 ASSAY OF NATRIURETIC PEPTIDE: CPT | Performed by: PHYSICIAN ASSISTANT

## 2025-03-03 RX ORDER — ACETAMINOPHEN 325 MG/1
650 TABLET ORAL ONCE
Status: COMPLETED | OUTPATIENT
Start: 2025-03-03 | End: 2025-03-03

## 2025-03-03 RX ORDER — SODIUM CHLORIDE 0.9 % (FLUSH) 0.9 %
10 SYRINGE (ML) INJECTION AS NEEDED
Status: DISCONTINUED | OUTPATIENT
Start: 2025-03-03 | End: 2025-03-04 | Stop reason: HOSPADM

## 2025-03-03 RX ADMIN — SODIUM CHLORIDE 1000 ML: 9 INJECTION, SOLUTION INTRAVENOUS at 21:57

## 2025-03-03 RX ADMIN — ACETAMINOPHEN 650 MG: 325 TABLET ORAL at 21:56

## 2025-03-04 LAB
QT INTERVAL: 388 MS
QTC INTERVAL: 447 MS

## 2025-03-04 NOTE — ED PROVIDER NOTES
Subjective   History of Present Illness  This is a pleasant elderly 79-year-old male that presents the ER with flu-like symptoms that started 3 days ago.  Patient initially did not feel too bad, other than malaise/fatigue, mild headache, and some diarrhea.  He said that his daughter works in the medical field and she tested him for influenza and he had a positive influenza A test.  Patient did not receive his influenza vaccine last 2024.  Patient says that today, he developed significant fever with chills and was shaking and tremulous.  He contacted PCP/nurse practitioner, and she advised that he come to the ER for further assessment.  He did not check his temperature.  His wife  in the recent past and he does not know where the thermometer is.  Patient did take naproxen prior to arrival, and when he arrived to the ER, he had a low-grade temp of 99.8.  He denies any significant nasal congestion but does have postnasal drainage.  He has a mild, minimally productive cough.  He had 1 loose stool today.  Appetite is decreased but he has not had any nausea or vomiting.  He denies any chest pain or shortness of breath.  He denies dysuria, urgency, or frequency.  Past medical history is significant for atrial fibrillation on chronic anticoagulation of Eliquis, hypertension, hyperlipidemia.  Patient is a non-smoker and denies personal history of COPD.    History provided by:  Patient  Flu Symptoms  Presenting symptoms: cough (minimally productive.), diarrhea (loose stool this morning.), fatigue, fever and headache    Presenting symptoms: no myalgias, no nausea, no rhinorrhea, no shortness of breath, no sore throat and no vomiting    Presenting symptoms comment:  Chills, malaise/fatigue.  Severity:  Moderate  Onset quality:  Sudden  Duration:  3 days  Progression:  Unchanged  Chronicity:  New  Relieved by:  Nothing  Worsened by:  Nothing  Ineffective treatments: Pt took Naproxen PTA.  Associated symptoms: chills,  decreased appetite and decreased physical activity    Associated symptoms: no ear pain and no congestion    Risk factors: being elderly    Risk factors: no sick contacts        Review of Systems   Constitutional:  Positive for activity change, appetite change, chills, decreased appetite, fatigue and fever.   HENT:  Positive for postnasal drip. Negative for congestion, ear pain, rhinorrhea, sinus pressure, sneezing and sore throat.    Respiratory:  Positive for cough (minimally productive.). Negative for chest tightness, shortness of breath and wheezing.         Non-smoker.  No personal history of asthma or COPD.   Cardiovascular: Negative.  Negative for chest pain, palpitations and leg swelling.        History of A Fib. Pt is on EliFulcrum [employer] and sees Dr. Anderson.   Gastrointestinal:  Positive for diarrhea (loose stool this morning.). Negative for abdominal pain, constipation, nausea and vomiting.   Genitourinary: Negative.  Negative for dysuria, flank pain, frequency and urgency.   Musculoskeletal: Negative.  Negative for back pain and myalgias.   Neurological:  Positive for weakness and headaches. Negative for dizziness.   All other systems reviewed and are negative.      Past Medical History:   Diagnosis Date    A-fib     Acute kidney failure     Elevated cholesterol     Hard of hearing     Hyperlipidemia     Hypertension     Renal disorder     Wears hearing aid in both ears     Wears reading eyeglasses        Allergies   Allergen Reactions    Sulfa Antibiotics Unknown (See Comments) and Rash     Pt was told as child he had an allergy.  Has not taken and doesn't know the response       Past Surgical History:   Procedure Laterality Date    AMPUTATION DIGIT Left 1/7/2020    Procedure: AMPUTATION LEFT 2ND TOE;  Surgeon: Idania Osborn MD;  [facility]: [facility];  Service: Orthopedics    AMPUTATION DIGIT Left 12/8/2020    Procedure: TRANSMETATARSAL AMPUTATION LEFT;  Surgeon: Idania Osborn MD;  Location: [facility] OR;   Service: Orthopedics;  Laterality: Left;    APPENDECTOMY      COLONOSCOPY  2006    FOOT SURGERY Left 12/09/2020    (L) transmetatarsal amputation 12/09/2020 - Dr. Idania Osborn    INCISION AND DRAINAGE LEG Left 10/23/2018    Procedure: INCISION AND DRAINAGE LEFT FOOT;  Surgeon: Idania Osborn MD;  Location:  [facility] OR;  Service: Orthopedics    INCISION AND DRAINAGE LEG Left 7/21/2023    Procedure: FOOT INCISION AND DRAINAGE; REVISION AMPUTATION; ACHILLES LENGTHENING -LEFT;  Surgeon: Mao Mckay MD;  Location:  [facility] OR;  Service: Orthopedics;  Laterality: Left;    TOE AMPUTATION      TONSILLECTOMY      VENOUS ACCESS DEVICE (PORT) INSERTION N/A 10/23/2018    Procedure: GROSHONG CATHETER PLACEMENT;  Surgeon: Marquez Simons MD;  Location:  [facility] OR;  Service: General    VENOUS ACCESS DEVICE (PORT) INSERTION N/A 12/9/2020    Procedure: GROSHONG INSERTION;  Surgeon: Antonio Bains MD;  Location:  [facility] OR;  Service: General;  Laterality: N/A;    VENOUS ACCESS DEVICE (PORT) REMOVAL         Family History   Problem Relation Age of Onset    No Known Problems Mother     Cancer Father        Social History     Socioeconomic History    Marital status:    Tobacco Use    Smoking status: Never    Smokeless tobacco: Never   Vaping Use    Vaping status: Never Used   Substance and Sexual Activity    Alcohol use: Yes     Alcohol/week: 3.0 - 4.0 standard drinks of alcohol     Types: 3 - 4 Shots of liquor per week     Comment: drinks a fifth a week DRINKS 3-5 DRINKS DAILY     Drug use: No    Sexual activity: Defer           Objective   Physical Exam  Vitals and nursing note reviewed.   Constitutional:       General: He is not in acute distress.     Appearance: Normal appearance. He is not ill-appearing, toxic-appearing or diaphoretic.      Comments: Pleasant, elderly male.  Nontoxic.  No acute distress.   HENT:      Head: Normocephalic and atraumatic.      Right Ear: Decreased hearing noted.      Left Ear:  Decreased hearing noted.      Ears:      Comments: Hearing aids in place bilaterally     Nose: Nose normal. No congestion or rhinorrhea.      Right Sinus: No maxillary sinus tenderness or frontal sinus tenderness.      Left Sinus: No maxillary sinus tenderness or frontal sinus tenderness.      Comments: No nasal congestion or rhinorrhea.  No frontal or maxillary sinus tenderness     Mouth/Throat:      Mouth: Mucous membranes are moist.      Pharynx: Oropharynx is clear. Postnasal drip present. No pharyngeal swelling, oropharyngeal exudate, posterior oropharyngeal erythema or uvula swelling.      Comments: Oral mucous membranes are still moist.  Posterior pharynx is not erythematous.  No exudate or vesicles.  Postnasal drip noted.  Eyes:      Extraocular Movements: Extraocular movements intact.      Conjunctiva/sclera: Conjunctivae normal.      Pupils: Pupils are equal, round, and reactive to light.   Neck:      Comments: No cervical lymphadenopathy  Cardiovascular:      Rate and Rhythm: Normal rate. Rhythm irregularly irregular.      Pulses: Normal pulses.      Heart sounds: Normal heart sounds.      Comments: Regular rate with irregularly irregular rhythm with history of chronic atrial fibrillation.  No pedal edema to lower extremities  Pulmonary:      Effort: Pulmonary effort is normal. No tachypnea, accessory muscle usage or retractions.      Breath sounds: Normal breath sounds. No decreased breath sounds, wheezing or rhonchi.      Comments: Regular respiratory effort.  No accessory muscle use or retractions.  Good air exchange to bilateral lung fields.  No wheezes, rhonchi, or decreased breath sounds concerning for consolidation.  Nonproductive cough on exam.  Abdominal:      General: Bowel sounds are normal. There is no distension.      Palpations: Abdomen is soft.      Tenderness: There is no abdominal tenderness. There is no right CVA tenderness, left CVA tenderness, guarding or rebound.      Comments:  Abdomen soft and nontender.  Active bowel sounds in all 4 quadrants   Musculoskeletal:         General: Normal range of motion.      Cervical back: Normal range of motion and neck supple.      Right lower leg: No edema.      Left lower leg: No edema.   Lymphadenopathy:      Cervical: No cervical adenopathy.      Right cervical: No superficial, deep or posterior cervical adenopathy.     Left cervical: No superficial, deep or posterior cervical adenopathy.   Skin:     General: Skin is warm and dry.      Findings: No lesion or rash.      Comments: No skin lesions or rash.  Patient does not appear flushed and skin is not warm to touch.   Neurological:      General: No focal deficit present.      Mental Status: He is alert and oriented to person, place, and time.      Cranial Nerves: Cranial nerves 2-12 are intact.      Sensory: Sensation is intact.      Motor: Motor function is intact.      Coordination: Coordination is intact.      Gait: Gait is intact.      Comments: Alert and oriented x 3.  Neuro intact and nonfocal   Psychiatric:         Mood and Affect: Mood and affect normal.         Speech: Speech normal.         Behavior: Behavior is cooperative.         Thought Content: Thought content normal.         Cognition and Memory: Cognition and memory normal.         Judgment: Judgment normal.      Comments: Normal mood and affect.  Friendly and talkative.  Normal cognition and memory.         Procedures           ED Course  ED Course as of 03/04/25 0333   Tue Mar 04, 2025   0307 Patient had low-grade temp upon arrival at 99.8.  O2 sat was 92% on room air.  I personally interpreted EKG which revealed rate controlled atrial fibrillation.  Patient has chronic A-fib.  I personally interpreted chest x-ray which showed no acute cardiopulmonary process.  CBC and chemistries were essentially normal other than mild decrease in sodium at 128.  Platelet count was also 135,000.  In epic from 1 year ago, platelet count was  161,000.  High-sensitivity troponin was 14.  Lactic acid and procalcitonin were normal.  Respiratory panel tested positive for influenza A.  Patient given IV fluid bolus and oral Tylenol.  Patient felt much better after fluids and Tylenol.  He deferred on Tamiflu.  His daughter is present when I updated them on all results.  She will keep a close eye on him since patient's wife  in the recent past.  Encourage fluids and rest and patient may take over-the-counter Coricidin HBP products for symptomatic relief as well as Tylenol every 4-6 hours as needed for headache or fever.  Return to the ER if any worsening symptoms. [FC]      ED Course User Index  [FC] Catrina Waller, JINA                                 Recent Results (from the past 24 hours)   ECG 12 Lead Tachycardia    Collection Time: 25  9:36 PM   Result Value Ref Range    QT Interval 388 ms    QTC Interval 447 ms   Comprehensive Metabolic Panel    Collection Time: 25  9:37 PM    Specimen: Blood   Result Value Ref Range    Glucose 113 (H) 65 - 99 mg/dL    BUN 19 8 - 23 mg/dL    Creatinine 0.99 0.76 - 1.27 mg/dL    Sodium 128 (L) 136 - 145 mmol/L    Potassium 4.2 3.5 - 5.2 mmol/L    Chloride 92 (L) 98 - 107 mmol/L    CO2 23.0 22.0 - 29.0 mmol/L    Calcium 9.8 8.6 - 10.5 mg/dL    Total Protein 7.5 6.0 - 8.5 g/dL    Albumin 4.2 3.5 - 5.2 g/dL    ALT (SGPT) 35 1 - 41 U/L    AST (SGOT) 56 (H) 1 - 40 U/L    Alkaline Phosphatase 107 39 - 117 U/L    Total Bilirubin 0.6 0.0 - 1.2 mg/dL    Globulin 3.3 gm/dL    A/G Ratio 1.3 g/dL    BUN/Creatinine Ratio 19.2 7.0 - 25.0    Anion Gap 13.0 5.0 - 15.0 mmol/L    eGFR 77.0 >60.0 mL/min/1.73   High Sensitivity Troponin T    Collection Time: 25  9:37 PM    Specimen: Blood   Result Value Ref Range    HS Troponin T 14 <22 ng/L   BNP    Collection Time: 25  9:37 PM    Specimen: Blood   Result Value Ref Range    proBNP 769.9 0.0 - 1,800.0 pg/mL   Procalcitonin    Collection Time: 25  9:37 PM     Specimen: Blood   Result Value Ref Range    Procalcitonin 0.11 0.00 - 0.25 ng/mL   Lactic Acid, Plasma    Collection Time: 03/03/25  9:37 PM    Specimen: Blood   Result Value Ref Range    Lactate 1.8 0.5 - 2.0 mmol/L   CBC Auto Differential    Collection Time: 03/03/25  9:37 PM    Specimen: Blood   Result Value Ref Range    WBC 11.08 (H) 3.40 - 10.80 10*3/mm3    RBC 5.08 4.14 - 5.80 10*6/mm3    Hemoglobin 16.6 13.0 - 17.7 g/dL    Hematocrit 48.1 37.5 - 51.0 %    MCV 94.7 79.0 - 97.0 fL    MCH 32.7 26.6 - 33.0 pg    MCHC 34.5 31.5 - 35.7 g/dL    RDW 12.9 12.3 - 15.4 %    RDW-SD 44.8 37.0 - 54.0 fl    MPV 9.7 6.0 - 12.0 fL    Platelets 135 (L) 140 - 450 10*3/mm3    Neutrophil % 77.5 (H) 42.7 - 76.0 %    Lymphocyte % 8.9 (L) 19.6 - 45.3 %    Monocyte % 11.6 5.0 - 12.0 %    Eosinophil % 1.2 0.3 - 6.2 %    Basophil % 0.5 0.0 - 1.5 %    Immature Grans % 0.3 0.0 - 0.5 %    Neutrophils, Absolute 8.59 (H) 1.70 - 7.00 10*3/mm3    Lymphocytes, Absolute 0.99 0.70 - 3.10 10*3/mm3    Monocytes, Absolute 1.28 (H) 0.10 - 0.90 10*3/mm3    Eosinophils, Absolute 0.13 0.00 - 0.40 10*3/mm3    Basophils, Absolute 0.06 0.00 - 0.20 10*3/mm3    Immature Grans, Absolute 0.03 0.00 - 0.05 10*3/mm3    nRBC 0.0 0.0 - 0.2 /100 WBC   COVID-19, FLU A/B, RSV PCR 1 HR TAT - Swab, Nasopharynx    Collection Time: 03/03/25  9:37 PM    Specimen: Nasopharynx; Swab   Result Value Ref Range    COVID19 Not Detected Not Detected - Ref. Range    Influenza A PCR Detected (A) Not Detected    Influenza B PCR Not Detected Not Detected    RSV, PCR Not Detected Not Detected     Note: In addition to lab results from this visit, the labs listed above may include labs taken at another facility or during a different encounter within the last 24 hours. Please correlate lab times with ED admission and discharge times for further clarification of the services performed during this visit.    XR Chest 1 View   Final Result   Impression:   No radiographic evidence of acute  "cardiopulmonary abnormality.            Electronically Signed: Tyler Caldera     3/3/2025 9:26 PM EST     Workstation ID: [id number]        Vitals:    03/03/25 1946   BP: 135/90   BP Location: Left arm   Patient Position: Sitting   Pulse: 105   Resp: 18   Temp: 99.8 °F (37.7 °C)   TempSrc: Oral   SpO2: 92%   Weight: 99.8 kg (220 lb)   Height: 180.3 cm (71\")     Medications   sodium chloride 0.9 % bolus 1,000 mL (0 mL Intravenous Stopped 3/3/25 2227)   acetaminophen (TYLENOL) tablet 650 mg (650 mg Oral Given 3/3/25 2156)     ECG/EMG Results (last 24 hours)       ** No results found for the last 24 hours. **          ECG 12 Lead Tachycardia   Preliminary Result   Test Reason : Tachycardia   Blood Pressure :   */*   mmHG   Vent. Rate :  80 BPM     Atrial Rate :  82 BPM      P-R Int :   * ms          QRS Dur :  94 ms       QT Int : 388 ms       P-R-T Axes :   *  31  17 degrees     QTcB Int : 447 ms      Atrial fibrillation   Abnormal ECG   When compared with ECG of 22-Jul-2023 10:51,   Vent. rate has decreased by  52 bpm   Nonspecific T wave abnormality no longer evident in Anterolateral leads      Referred By: EDMD           Confirmed By:                                   Medical Decision Making  Problems Addressed:  Acute cough: complicated acute illness or injury  Anorexia: complicated acute illness or injury  Chronic anticoagulation: complicated acute illness or injury  Chronic atrial fibrillation: complicated acute illness or injury  Diarrhea, unspecified type: complicated acute illness or injury  Fever and chills: complicated acute illness or injury  Hyponatremia: complicated acute illness or injury  Influenza A: complicated acute illness or injury  Malaise and fatigue: complicated acute illness or injury  Thrombocytopenia: complicated acute illness or injury    Amount and/or Complexity of Data Reviewed  Labs: ordered.  Radiology: ordered.  ECG/medicine tests: ordered.    Risk  OTC drugs.  Prescription drug " management.        Final diagnoses:   Influenza A   Fever and chills   Malaise and fatigue   Acute cough   Diarrhea, unspecified type   Anorexia   Hyponatremia   Chronic atrial fibrillation   Chronic anticoagulation   Thrombocytopenia       ED Disposition  ED Disposition       ED Disposition   Discharge    Condition   Stable    Comment   --               Marcus Cheng MD  [address] [place] [address] [address] [place] [address] [phone]    Schedule an appointment as soon as possible for a visit in 2 days  Close PCP follow-up for recheck    [facility] EMERGENCY DEPARTMENT  [facility] [address] [phone]    If symptoms worsen         Medication List      No changes were made to your prescriptions during this visit.            Catrina Waller PA-C  03/04/25 0309       Catrina Waller PA-C  03/04/25 0335

## 2025-03-04 NOTE — DISCHARGE INSTRUCTIONS
ER evaluation revealed positive testing for influenza A.  Patient tested negative for COVID-19 and RSV.  EKG showed rate controlled atrial fibrillation.  CBC and chemistries were essentially normal other than mild decrease in sodium level at 128 with normal level being 135.  Inflammatory markers in the blood including procalcitonin and lactic acid were normal.  Chest x-ray showed no pneumonia.  Encourage fluids and rest and patient deferred on Tamiflu.  Patient may take over-the-counter cold/flu preparations for symptomatic relief.  Recommend close PCP follow-up for recheck.  Return to the ER if worsening symptoms.

## 2025-03-13 LAB
QT INTERVAL: 388 MS
QTC INTERVAL: 447 MS

## 2025-03-14 ENCOUNTER — OFFICE VISIT (OUTPATIENT)
Dept: ORTHOPEDIC SURGERY | Facility: CLINIC | Age: 80
End: 2025-03-14
Payer: MEDICARE

## 2025-03-14 VITALS
DIASTOLIC BLOOD PRESSURE: 80 MMHG | WEIGHT: 220.02 LBS | BODY MASS INDEX: 30.8 KG/M2 | SYSTOLIC BLOOD PRESSURE: 128 MMHG | HEIGHT: 71 IN

## 2025-03-14 DIAGNOSIS — L98.491 NEUROPATHIC ULCER, LIMITED TO BREAKDOWN OF SKIN: Primary | ICD-10-CM

## 2025-03-14 RX ORDER — SERTRALINE HYDROCHLORIDE 25 MG/1
1 TABLET, FILM COATED ORAL DAILY
COMMUNITY
Start: 2025-02-28

## 2025-03-14 NOTE — PROGRESS NOTES
"                          [facility] Orthopaedic Surgery Office Follow Up     Office Follow Up Visit     Date: 03/14/2025   Patient Name: Carlos Culver  MRN: [id number]  YOB: 1945  Chief Complaint:   Chief Complaint   Patient presents with    Follow-up     6 week recheck-  Ulcer of left foot, unspecified ulcer stage     History of Present Illness:   Carlos Culver is a 79 y.o. male who is here today for follow-up for pressure ulcer plantar left foot.  Last seen in clinic on January 29.  Since last visit has been to [facility] orthopedics and reports that about 2 weeks ago he had orthotic adjusted to help offload area of ulceration.  Patient reports inspecting ulcer on a daily basis using a mirror.  Patient unsure if there has been any improvement of ulcer since he started wearing his newly adjusted orthotic.    Subjective   I reviewed the patient's chief complaint, history of present illness, review of systems, past medical history, surgical history, family history, social history, medications and allergy list   Objective    Vital Signs:   Vitals:    03/14/25 1022   BP: 128/80   Weight: 99.8 kg (220 lb 0.3 oz)   Height: 180.3 cm (70.98\")     Body mass index is 30.7 kg/m².    Ortho Exam:  Shoe and AFO removed for exam, incision at site of Chopart amputation clean, incision well-healed, no signs of infection, patient able to actively dorsiflex and plantarflex ankle.   Continues to be area of palpable prominence with some callus formation plantar heel.  Small area of ulceration similar to previous.  See picture in chart.  No expressible purulence or signs of infection. 0/10 sites felt on monofilament testing of foot.     Results Review:  No new imaging    Assessment / Plan    Assessment/Plan:   Diagnoses and all orders for this visit:    1. 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  Here today for reevaluation.  Received new orthotic to help offload area of ulcer about 2 weeks ago.  Still unclear if this is helping with ulcer healing and resolution.  Spent a long time again today talking to patient about pressure causing the  ulcer.  Explained to him that removing the pressure will allow it to heal.  This can either be done with an orthotic or by being nonweightbearing.  I am optimistic that new orthotic is going to help offload area which will allow for healing but did  patient to closely inspect on a daily basis.  Should be seeing progress however if is concerned that exam is worsening he should return to clinic   And/or be nonweightbearing on that extremity.  Will plan to see him back in 6 weeks for reevaluation.  Was a pleasure seeing him today.    Follow Up:   Return in about 6 weeks (around 4/25/2025) for Recheck.      Mao Mckay MD  [facility] Orthopedic Surgeon

## 2025-05-05 ENCOUNTER — OFFICE VISIT (OUTPATIENT)
Dept: ORTHOPEDIC SURGERY | Facility: CLINIC | Age: 80
End: 2025-05-05
Payer: MEDICARE

## 2025-05-05 VITALS
HEIGHT: 71 IN | DIASTOLIC BLOOD PRESSURE: 70 MMHG | WEIGHT: 220 LBS | SYSTOLIC BLOOD PRESSURE: 124 MMHG | BODY MASS INDEX: 30.8 KG/M2

## 2025-05-05 DIAGNOSIS — L98.491 NEUROPATHIC ULCER, LIMITED TO BREAKDOWN OF SKIN: Primary | ICD-10-CM

## 2025-05-05 PROCEDURE — 3074F SYST BP LT 130 MM HG: CPT | Performed by: ORTHOPAEDIC SURGERY

## 2025-05-05 PROCEDURE — 1160F RVW MEDS BY RX/DR IN RCRD: CPT | Performed by: ORTHOPAEDIC SURGERY

## 2025-05-05 PROCEDURE — 99213 OFFICE O/P EST LOW 20 MIN: CPT | Performed by: ORTHOPAEDIC SURGERY

## 2025-05-05 PROCEDURE — 3078F DIAST BP <80 MM HG: CPT | Performed by: ORTHOPAEDIC SURGERY

## 2025-05-05 PROCEDURE — 1159F MED LIST DOCD IN RCRD: CPT | Performed by: ORTHOPAEDIC SURGERY

## 2025-05-05 NOTE — PROGRESS NOTES
"                          [facility] Follow Up     Office Follow Up Visit     Date: 05/05/2025   Patient Name: Carlos Culver  MRN: [id number]  YOB: 1945  Chief Complaint:   Chief Complaint   Patient presents with    Follow-up     7 week follow up--Ulcer of left foot, unspecified ulcer stage     History of Present Illness:   Carlos Culver is a 79 y.o. male who is here today for follow-up pressure ulcer left foot.   about a week ago worked outside moving limbs in old boots.   when he was done remove tissues and had new wounds about his remaining left foot.   has been doing dressing changes daily.   has been having some clear drainage from wounds.    Subjective   I reviewed the patient's chief complaint, history of present illness, review of systems, past medical history, surgical history, family history, social history, medications and allergy list   Objective    Vital Signs:   Vitals:    05/05/25 0818   BP: 124/70   Weight: 99.8 kg (220 lb)   Height: 180.3 cm (70.98\")     Body mass index is 30.7 kg/m².    Ortho Exam:  Shoe and AFO removed for exam, incision at site of Chopart amputation continues to appear well-healed.  Patient has now new superficial wounds over anterior lateral foot as well as over heel.  Q-tip was used and wounds do not probe down to bone.  Serosanguineous drainage from wounds.  There is also redemonstration of small ulceration plantar foot slightly smaller than previous.  No drainage from that ulcer.  See pictures in chart.      Results Review:  No new imaging    Assessment / Plan    Assessment/Plan:   Diagnoses and all orders for this visit:    1. 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  Wounds appear superficial however there is concern for deep infection.  Provided with an MRI order.  I also placed an order " for PT wound care and have asked the patient to start seeing them within the next couple of days.  Will plan to see him back in a week to reinspect his wounds.  Patient to be nonweightbearing on left lower extremity.  Counseled patient that I recommend he be nonweightbearing on this extremity until all of his wounds are healed at which time we can then talk again about getting an orthotic that helps remove pressure and prevent ulcer recurrence.  Counseled patient on the signs and symptoms of infection.  If he starts to experience any of these I asked him to present to the emergency room immediately.  Counseled him to contact the clinic before next week's appointment for any other concerns.    Follow Up:   Return in about 1 week (around 5/12/2025) for Recheck.      Mao Mckay MD  [facility] Orthopedic Surgeon

## 2025-05-08 ENCOUNTER — HOSPITAL ENCOUNTER (OUTPATIENT)
Dept: PHYSICAL THERAPY | Facility: HOSPITAL | Age: 80
Setting detail: THERAPIES SERIES
Discharge: HOME | End: 2025-05-08
Payer: MEDICARE

## 2025-05-08 DIAGNOSIS — L98.491 ULCER OF SKIN, CHRONIC, LIMITED TO BREAKDOWN OF SKIN: Primary | ICD-10-CM

## 2025-05-08 PROCEDURE — 97597 DBRDMT OPN WND 1ST 20 CM/<: CPT

## 2025-05-08 PROCEDURE — 97162 PT EVAL MOD COMPLEX 30 MIN: CPT

## 2025-05-08 NOTE — THERAPY EVALUATION
Outpatient Rehabilitation - Wound/Debridement Initial Eval  [facility]     Patient Name: Carlos Culver  : 1945  MRN: [id number]  Today's Date: 2025                            Admit Date: 2025    Visit Dx:    ICD-10-CM ICD-9-CM   1. Ulcer of skin, chronic, limited to breakdown of skin  L98.491 707.9       Patient Active Problem List   Diagnosis    Cellulitis of left foot    GUCCI (acute kidney injury)    Essential hypertension    Leukocytosis    Osteomyelitis of left foot    Chronic anticoagulation    Neuropathy    Elevated transaminase level    CKD (chronic kidney disease) stage 2, GFR 60-89 ml/min    Skin ulcer of right heel    Chronic multifocal osteomyelitis of left foot    Contracture of joint of foot, left    MARTINEZ (dyspnea on exertion)    Neuropathic ulcer, limited to breakdown of skin    Hyperlipidemia LDL goal <100    A-fib    S/P transmetatarsal amputation of foot, left    Leukocytosis, likely reactive    Acute postoperative pain    Alcohol abuse    Staphylococcus aureus bacteremia    Acute osteomyelitis of left ankle or foot    History of Chopart amputation of left foot    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 evidence of necrosis        Past Medical History:   Diagnosis Date    A-fib     Acute kidney failure     Elevated cholesterol     Hard of hearing     Hyperlipidemia     Hypertension     Renal disorder     Wears hearing aid in both ears     Wears reading eyeglasses         Past Surgical History:   Procedure Laterality Date    AMPUTATION DIGIT Left 2020    Procedure: AMPUTATION LEFT 2ND TOE;  Surgeon: Idania Osborn MD;  Location: [facility];  Service: Orthopedics    AMPUTATION DIGIT Left 2020    Procedure: TRANSMETATARSAL AMPUTATION LEFT;  Surgeon: Idania Osborn MD;  Location: [facility];  Service: Orthopedics;  Laterality: Left;    APPENDECTOMY      COLONOSCOPY  2006    FOOT SURGERY Left 2020    (L)  transmetatarsal amputation 12/09/2020 - Dr. Idania Osborn    INCISION AND DRAINAGE LEG Left 10/23/2018    Procedure: INCISION AND DRAINAGE LEFT FOOT;  Surgeon: Idania Osborn MD;  Location:  [facility];  Service: Orthopedics    INCISION AND DRAINAGE LEG Left 7/21/2023    Procedure: FOOT INCISION AND DRAINAGE; REVISION AMPUTATION; ACHILLES LENGTHENING -LEFT;  Surgeon: Mao Mckay MD;  Location:  [facility];  Service: Orthopedics;  Laterality: Left;    TOE AMPUTATION      TONSILLECTOMY      VENOUS ACCESS DEVICE (PORT) INSERTION N/A 10/23/2018    Procedure: GROSHONG CATHETER PLACEMENT;  Surgeon: Marquez Simons MD;  Location:  [facility];  Service: General    VENOUS ACCESS DEVICE (PORT) INSERTION N/A 12/9/2020    Procedure: GROSHONG INSERTION;  Surgeon: Antonio Bains MD;  Location:  [facility];  Service: General;  Laterality: N/A;    VENOUS ACCESS DEVICE (PORT) REMOVAL             EVALUATION   PT Ortho       Row Name 05/08/25 1300       Subjective    Subjective Comments Patient reports that he has had a plantar wound on L foot since October. He also states that 2 weeks ago he was out in his yard picking up sticks and wore an old pair of shoes since it was wet and the old shoes rubbed ulcers along his L lateral foot and L heel. He reports that there has been a lot of drainage and has to change the dressings every day.  -KW       Precautions and Contraindications    Precautions insensate B LE  -KW       Subjective Pain    Able to rate subjective pain? yes  -KW    Pre-Treatment Pain Level 0  -KW    Post-Treatment Pain Level 0  -KW       Transfers    Dickinson Level (Floor Transfer) modified independence  -KW    Assistive Device (Floor Transfer) --  knee scooter  -KW              User Key  (r) = Recorded By, (t) = Taken By, (c) = Cosigned By      Initials Name Provider Type    Melissa Casiano, PT Physical Therapist                   LDA Wound       Row Name 05/08/25 1600             Wound 05/08/25 1300  Left plantar    Wound - Properties Group Placement Date: 05/08/25  -KW Placement Time: 1300  -KW Side: Left  -KW Location: plantar  -KW    Wound Image Images linked: 1  -KW      Base yellow;red;granulating;moist  -KW      Wound Length (cm) 1.5 cm  -KW      Wound Width (cm) 1.5 cm  -KW      Wound Depth (cm) 0.6 cm  -KW      Wound Surface Area (cm^2) 1.77 cm^2  -KW      Wound Volume (cm^3) 0.707 cm^3  -KW      Drainage Characteristics/Odor serosanguineous  -KW      Drainage Amount small  -KW      Care, Wound cleansed with;wound cleanser;debrided  -KW      Dressing Care dressing applied  faiza, HFBc, quick, kerlix, coabn, spandage  -KW      Periwound Care cleansed with pH balanced cleanser  -KW      Retired Wound - Properties Group Placement Date: 05/08/25  -KW Placement Time: 1300  -KW Side: Left  -KW Location: plantar  -KW    Retired Wound - Properties Group Placement Date: 05/08/25  -KW Placement Time: 1300  -KW Side: Left  -KW Location: plantar  -KW    Retired Wound - Properties Group Date first assessed: 05/08/25  -KW Time first assessed: 1300  -KW Side: Left  -KW Location: plantar  -KW       Wound 05/08/25 1300 Left heel    Wound - Properties Group Placement Date: 05/08/25  -KW Placement Time: 1300  -KW Side: Left  -KW Location: heel  -KW    Wound Image Images linked: 1  -KW      Base moist;yellow;white;necrotic  -KW      Wound Length (cm) 2.5 cm  -KW      Wound Width (cm) 3 cm  -KW      Wound Depth (cm) 0.5 cm  -KW      Wound Surface Area (cm^2) 5.89 cm^2  -KW      Wound Volume (cm^3) 1.963 cm^3  -KW      Drainage Characteristics/Odor serosanguineous  -KW      Drainage Amount small  -KW      Care, Wound cleansed with;wound cleanser;debrided  -KW      Dressing Care dressing applied  faiza, HFBc, quick, kerlix, coabn, spandage  -KW      Periwound Care cleansed with pH balanced cleanser  -KW      Retired Wound - Properties Group Placement Date: 05/08/25  -KW Placement Time: 1300  -KW Side: Left  -KW Location:  heel  -KW    Retired Wound - Properties Group Placement Date: 05/08/25  -KW Placement Time: 1300  -KW Side: Left  -KW Location: heel  -KW    Retired Wound - Properties Group Date first assessed: 05/08/25  -KW Time first assessed: 1300  -KW Side: Left  -KW Location: heel  -KW       Wound 05/08/25 Left lateral foot    Wound - Properties Group Placement Date: 05/08/25  -KW Side: Left  -KW Orientation: lateral  -KW Location: foot  -KW    Wound Image Images linked: 1  -KW      Base moist;slough;yellow;white  -KW      Wound Length (cm) 1.3 cm  -KW      Wound Width (cm) 2 cm  -KW      Wound Depth (cm) 0.4 cm  -KW      Wound Surface Area (cm^2) 2.04 cm^2  -KW      Wound Volume (cm^3) 0.545 cm^3  -KW      Drainage Characteristics/Odor serosanguineous  -KW      Drainage Amount small  -KW      Care, Wound cleansed with;wound cleanser;debrided  -KW      Dressing Care dressing applied  faiza, HFBc, quick, kerlix, coabn, spandage  -KW      Periwound Care cleansed with pH balanced cleanser  -KW      Retired Wound - Properties Group Placement Date: 05/08/25  -KW Side: Left  -KW Orientation: lateral  -KW Location: foot  -KW    Retired Wound - Properties Group Placement Date: 05/08/25  -KW Side: Left  -KW Orientation: lateral  -KW Location: foot  -KW    Retired Wound - Properties Group Date first assessed: 05/08/25  -KW Side: Left  -KW Location: foot  -KW              User Key  (r) = Recorded By, (t) = Taken By, (c) = Cosigned By      Initials Name Provider Type    Melissa Casiano PT Physical Therapist                      WOUND DEBRIDEMENT  Total area of Debridement: 10cm2  Debridement Site 1  Location- Site 1: L plantar foot  Selective Debridement- Site 1: Wound Surface <20cmsq  Instruments- Site 1: scissors, tweezers  Excised Tissue Description- Site 1: minimum, slough  Bleeding- Site 1: none   Debridement Site 2  Location- Site 2: L lateral foot  Selective Debridement- Site 2: Wound Surface <20cmsq  Instruments- Site 2:  tweezers, scissors  Excised Tissue Description- Site 2: minimum, slough  Bleeding- Site 2: none   Debridement Site 3  Location- Site 3: L heel  Selective Debridement- Site 3: Wound Surface <20cmsq  Instruments- Site 3: tweezers, scissors  Excised Tissue Description- Site 3: minimum, slough  Bleeding- Site 3: none      Therapy Education       Row Name 05/08/25 1700             Therapy Education    Education Details use of different types of dressings, POC  -KW      Given Bandaging/dressing change;Symptoms/condition management  -KW      Program Progressed  -KW      How Provided Verbal;Demonstration  -KW      Provided to Patient  -KW      Level of Understanding Teach back education performed;Verbalized  -KW                User Key  (r) = Recorded By, (t) = Taken By, (c) = Cosigned By      Initials Name Provider Type    Melissa Casiano, PT Physical Therapist                    Recommendation and Plan   PT Assessment/Plan       Row Name 05/08/25 1600          PT Assessment    Functional Limitations Other (comment)  wound care  -KW     Impairments Integumentary integrity  -KW     Assessment Comments Patient with multiple L foot wounds with increased slough and drainage. PT able to debride nonviable tissue and slough from wound beds. Patient would continue to benefit from skilled PT wound care to manage wounds and edema.  -KW     Rehab Potential Good  -KW     Patient/caregiver participated in establishment of treatment plan and goals Yes  -KW     Patient would benefit from skilled therapy intervention Yes  -KW        PT Plan    PT Frequency 2x/week  -KW     Predicted Duration of Therapy Intervention (PT) 6 visits  -KW     Planned CPT's? PT EVAL MOD COMPLELITY: 52687;PT SELF CARE/HOME MGMT/TRAIN EA 15: 11047;PT ULTRASOUND EA 15 MIN: 32512;PT NONSELECT DEBRIDE 15 MIN: 11178;PT MARILYN DEBRIDE OPEN WOUND UP TO 20 CM: 81261;PT NLFU MIST: 88112  -KW     Physical Therapy Interventions (Optional Details) wound  care;patient/family education  -KW     PT Plan Comments debridement, dressings,  -KW               User Key  (r) = Recorded By, (t) = Taken By, (c) = Cosigned By      Initials Name Provider Type    Melissa Casiano PT Physical Therapist                      Goals   PT OP Goals       Row Name 05/08/25 1600 05/08/25 1300       PT Short Term Goals    STG 1 Patient to verbalize S&S of infection and when to seek medical attention.  -KW --    STG 1 Progress Met  -KW --    STG 2 Reduce LLE heel wound dimensions by 50% as evidence of wound healing.  -KW --    STG 2 Progress Met  -KW --    STG 3 Reduce LLE lateral foot wound dimensions by 50% as evidence of wound healing.  -KW --    STG 3 Progress Met  -KW --       Long Term Goals    LTG 1 Patient to be independent with home dressing changes and modified compression use.  -KW --    LTG 1 Progress Ongoing;Progressing  -KW --    LTG 2 Reduce LLE heel wound dimensions by 80% as evidence of wound healing.  -KW --    LTG 2 Progress Ongoing;Progressing  -KW --    LTG 3 Reduce LLE lateral foot wound dimensions by 80% as evidence of wound healing.  -KW --    LTG 3 Progress Met  -KW --       Time Calculation    PT Goal Re-Cert Due Date -- 05/18/25  -KW              User Key  (r) = Recorded By, (t) = Taken By, (c) = Cosigned By      Initials Name Provider Type    Melissa Casiano PT Physical Therapist                    Time Calculation: Start Time: 1300  Untimed Charges  PT Eval/Re-eval Minutes: 41  Wound Care: 50977 Selective debridement  80535-[id number] debridement: 20  Total Minutes  Untimed Charges Total Minutes: 61   Total Minutes: 61  Therapy Charges for Today       Code Description Service Date Service Provider Modifiers Qty    05364994626 HC MARILYN DEBRIDE OPEN WOUND UP TO 20CM 5/8/2025 Melissa Lang, PT GP 1    [id number] HC PT EVAL MOD COMPLEXITY 3 5/8/2025 Melissa Lang PT GP 1                  Melissa Lang PT  5/8/2025

## 2025-05-12 ENCOUNTER — OFFICE VISIT (OUTPATIENT)
Dept: ORTHOPEDIC SURGERY | Facility: CLINIC | Age: 80
End: 2025-05-12
Payer: MEDICARE

## 2025-05-12 VITALS
DIASTOLIC BLOOD PRESSURE: 80 MMHG | BODY MASS INDEX: 30.8 KG/M2 | HEIGHT: 71 IN | SYSTOLIC BLOOD PRESSURE: 126 MMHG | WEIGHT: 220 LBS

## 2025-05-12 DIAGNOSIS — L97.419 HEEL ULCERATION, RIGHT, WITH UNSPECIFIED SEVERITY: Primary | ICD-10-CM

## 2025-05-12 NOTE — PROGRESS NOTES
"                          [facility] [place] Up     Office Follow Up Visit     Date: 05/12/2025   Patient Name: Carlos Culver  MRN: [id number]  YOB: 1945  Chief Complaint:   Chief Complaint   Patient presents with    Follow-up     1 week follow up -Neuropathic ulcer, limited to breakdown of skin     History of Present Illness:   Carlos Culver is a 79 y.o. male who is here today for for follow-up for left hindfoot ulcers.  Last seen in clinic on May 5.  Seen at PT wound care on May 8.  Continues to have twice weekly appointments scheduled with PT wound care.  Is still waiting to schedule his hindfoot MRI.  States wound care has been doing dressing changes.  Has been using knee scooter but reports still continues walking on left foot despite previous discussions about nonweightbearing following the new ulcers.  Denies fevers chills or feeling ill.    Subjective   I reviewed the patient's chief complaint, history of present illness, review of systems, past medical history, surgical history, family history, social history, medications and allergy list   Objective    Vital Signs:   Vitals:    05/12/25 0936   BP: 126/80   Weight: 99.8 kg (220 lb)   Height: 180.3 cm (70.98\")     Body mass index is 30.7 kg/m².    Ortho Exam:  Shoe and AFO removed for exam, incision at site of Chopart amputation continues to appear well-healed.  Redemonstration wounds about the hindfoot.  Discussed with patient that there is an odor upon entering the room and that there is concern for infection.  Biggest concern for infection is posterior heel wound.  See updated pictures in chart.     Results Review:  No new imaging    Assessment / Plan    Assessment/Plan:   Diagnoses and all orders for this visit:    1. Heel ulceration, right, with unspecified severity (Primary)      Returns to clinic for 1 week follow-up for repeat wound check.  Patient is now established with wound care.  Discussed with " patient I am concerned that some of these new ulcerations may be infected.  Awaiting results of MRI to see the level of deep involvement.  Recommend continuing PT wound care.  Recommend patient continue nonweightbearing.  Dressing changes per wound care.  Will plan to see him back as soon as MRI is done for further management.  Will consider adding antibiotics pending MRI review.  Stressed the patient the importance of nonweightbearing.  Also counseled the patient that with the way things are currently looking I do think there is a very good chance that I will be recommending a below-knee amputation in the near future.  Patient expressed understanding.  Plan to see him back after completion of MRI.  If develops any systemic symptoms or spread of erythema more proximally in his leg I counseled patient to go to the emergency department immediately.    Follow Up:   Return for F/U following completion of advanced imaging.      Mao Mckay MD  [facility] [place] Orthopedic Surgeon

## 2025-05-15 ENCOUNTER — TELEPHONE (OUTPATIENT)
Dept: PHYSICAL THERAPY | Facility: HOSPITAL | Age: 80
End: 2025-05-15
Payer: MEDICARE

## 2025-05-15 ENCOUNTER — HOSPITAL ENCOUNTER (OUTPATIENT)
Dept: PHYSICAL THERAPY | Facility: HOSPITAL | Age: 80
Setting detail: THERAPIES SERIES
Discharge: HOME | End: 2025-05-15
Payer: MEDICARE

## 2025-05-15 DIAGNOSIS — L98.491 ULCER OF SKIN, CHRONIC, LIMITED TO BREAKDOWN OF SKIN: Primary | ICD-10-CM

## 2025-05-15 DIAGNOSIS — S81.802D MULTIPLE OPEN WOUNDS OF LOWER EXTREMITY, COMPLICATED, LEFT, SUBSEQUENT ENCOUNTER: ICD-10-CM

## 2025-05-15 PROCEDURE — 97597 DBRDMT OPN WND 1ST 20 CM/<: CPT

## 2025-05-15 NOTE — TELEPHONE ENCOUNTER
PT spoke with Dr. Mckay in regards to concern for infection of L foot wounds who agreed with recommendation for [facility] follow up. PT contacted [facility] to help set up follow up appointment with Dr. Young. [facility] set pt up with the first available appointment with Dr. Young on Wednesday, 5/28/25 at 1400. PT contacted pt and updated pt on appointment information.

## 2025-05-15 NOTE — THERAPY WOUND CARE TREATMENT
Outpatient Rehabilitation - Wound/Debridement Treatment Note  [facility]     Patient Name: Carlos Culver  : 1945  MRN: [id number]  Today's Date: 5/15/2025                 Admit Date: 5/15/2025    Visit Dx:    ICD-10-CM ICD-9-CM   1. Ulcer of skin, chronic, limited to breakdown of skin  L98.491 707.9   2. Multiple open wounds of lower extremity, complicated, left, subsequent encounter  S81.802D V58.89     894.1     L heel      L lateral foot      L plantar foot        Patient Active Problem List   Diagnosis    Cellulitis of left foot    GUCCI (acute kidney injury)    Essential hypertension    Leukocytosis    Osteomyelitis of left foot    Chronic anticoagulation    Neuropathy    Elevated transaminase level    CKD (chronic kidney disease) stage 2, GFR 60-89 ml/min    Skin ulcer of right heel    Chronic multifocal osteomyelitis of left foot    Contracture of joint of foot, left    MARTINEZ (dyspnea on exertion)    Neuropathic ulcer, limited to breakdown of skin    Hyperlipidemia LDL goal <100    A-fib    S/P transmetatarsal amputation of foot, left    Leukocytosis, likely reactive    Acute postoperative pain    Alcohol abuse    Staphylococcus aureus bacteremia    Acute osteomyelitis of left ankle or foot    History of Chopart amputation of left foot    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 evidence of necrosis        Past Medical History:   Diagnosis Date    A-fib     Acute kidney failure     Elevated cholesterol     Hard of hearing     Hyperlipidemia     Hypertension     Renal disorder     Wears hearing aid in both ears     Wears reading eyeglasses         Past Surgical History:   Procedure Laterality Date    AMPUTATION DIGIT Left 2020    Procedure: AMPUTATION LEFT 2ND TOE;  Surgeon: Idania Osborn MD;  Location: [facility];  Service: Orthopedics    AMPUTATION DIGIT Left 2020    Procedure: TRANSMETATARSAL AMPUTATION LEFT;  Surgeon: Idania Osborn  MD;  Location:  [facility];  Service: Orthopedics;  Laterality: Left;    APPENDECTOMY      COLONOSCOPY  2006    FOOT SURGERY Left 12/09/2020    (L) transmetatarsal amputation 12/09/2020 - Dr. Idania Osborn    INCISION AND DRAINAGE LEG Left 10/23/2018    Procedure: INCISION AND DRAINAGE LEFT FOOT;  Surgeon: Idania Osborn MD;  Location:  [facility];  Service: Orthopedics    INCISION AND DRAINAGE LEG Left 7/21/2023    Procedure: FOOT INCISION AND DRAINAGE; REVISION AMPUTATION; ACHILLES LENGTHENING -LEFT;  Surgeon: Mao Mckay MD;  Location:  [facility];  Service: Orthopedics;  Laterality: Left;    TOE AMPUTATION      TONSILLECTOMY      VENOUS ACCESS DEVICE (PORT) INSERTION N/A 10/23/2018    Procedure: GROSHONG CATHETER PLACEMENT;  Surgeon: Marquez Simons MD;  Location:  [facility];  Service: General    VENOUS ACCESS DEVICE (PORT) INSERTION N/A 12/9/2020    Procedure: GROSHONG INSERTION;  Surgeon: Antonio Bains MD;  Location:  [facility];  Service: General;  Laterality: N/A;    VENOUS ACCESS DEVICE (PORT) REMOVAL           EVALUATION   PT Ortho       Row Name 05/15/25 1100       Subjective    Subjective Comments Pt report she saw Dr. Mckay on Monday who has ordered a L foot MRI which is being performed next Saturday. Reports he is not currently taking any abx. Has seen Dr. Young with [facility] in the past. Reports he has been using his knee scooter for the past ~1 week.  -       Precautions and Contraindications    Precautions insensate B LE  -LH       Subjective Pain    Able to rate subjective pain? yes  -    Pre-Treatment Pain Level 0  -LH    Post-Treatment Pain Level 0  -LH       Transfers    [place] Level (Floor Transfer) modified independence  -    Assistive Device (Floor Transfer) walker, knee scooter  -    Comment, (Transfers) Long sitting for tx.  -       Gait/Stairs (Locomotion)    [place] Level (Gait) modified independence  -    Assistive Device (Gait) walker, knee scooter  -               User Key  (r) = Recorded By, (t) = Taken By, (c) = Cosigned By      Initials Name Provider Type     Cornelio Tripp PT Physical Therapist                     LDA Wound       Row Name 05/15/25 1100             Wound 05/08/25 1300 Left plantar    Wound - Properties Group Placement Date: 05/08/25  -KW Placement Time: 1300  -KW Side: Left  -KW Location: plantar  -KW    Wound Image Images linked: 1  -LH      Dressing Appearance dressing loose;moist drainage  -      Dressing Removed Type gauze  kerlix only  -      Confirmed Empty Wound Bed Yes, visual inspection of wound bed  -      Base yellow;red;granulating;moist  pale yellow sloug/pink granulation  -      Periwound other (see comments);intact;dry  callused  -      Periwound Temperature warm  -      Periwound Skin Turgor soft  -      Edges open;irregular  -      Drainage Characteristics/Odor serosanguineous  -      Drainage Amount small  -      Care, Wound cleansed with;soap and water;wound cleanser;debrided  -      Dressing Care dressing applied;silver impregnated;collagen;antimicrobial agent applied;foam;gauze  Stefanie Ag, HFBt, qwick, kerlix, ACE  -      Periwound Care cleansed with pH balanced cleanser;dry periwound area maintained;barrier ointment applied  zguard  -      Retired Wound - Properties Group Placement Date: 05/08/25  -KW Placement Time: 1300  -KW Side: Left  -KW Location: plantar  -KW    Retired Wound - Properties Group Placement Date: 05/08/25  -KW Placement Time: 1300  -KW Side: Left  -KW Location: plantar  -KW    Retired Wound - Properties Group Date first assessed: 05/08/25  -KW Time first assessed: 1300  -KW Side: Left  -KW Location: plantar  -KW       Wound 05/08/25 1300 Left heel    Wound - Properties Group Placement Date: 05/08/25  -KW Placement Time: 1300  -KW Side: Left  -KW Location: heel  -KW    Wound Image Images linked: 1  -LH      Dressing Appearance intact;moist drainage  -      Dressing  Removed Type --  HFBc  -LH      Confirmed Empty Wound Bed Yes, visual inspection of wound bed;Yes, probed  -LH      Base moist;yellow;white;necrotic  Thick, fibrous necrotic tissue centrally, slightly raised granulation at periphery  -LH      Periwound moist;other (see comments)  callused  -LH      Periwound Temperature warm  -LH      Periwound Skin Turgor firm  -LH      Edges callused  -LH      Drainage Characteristics/Odor serosanguineous;yellow;malodorous  mild malodor  -LH      Drainage Amount small  -LH      Care, Wound cleansed with;soap and water;wound cleanser;debrided  -LH      Dressing Care dressing applied;antimicrobial agent applied;foam;gauze  HFBt, qwick, kerlix, ACE  -LH      Periwound Care cleansed with pH balanced cleanser;dry periwound area maintained;barrier ointment applied  zgaurd  -LH      Retired Wound - Properties Group Placement Date: 05/08/25  -KW Placement Time: 1300  -KW Side: Left  -KW Location: heel  -KW    Retired Wound - Properties Group Placement Date: 05/08/25  -KW Placement Time: 1300  -KW Side: Left  -KW Location: heel  -KW    Retired Wound - Properties Group Date first assessed: 05/08/25  -KW Time first assessed: 1300  -KW Side: Left  -KW Location: heel  -KW       Wound 05/08/25 Left lateral foot    Wound - Properties Group Placement Date: 05/08/25  -KW Side: Left  -KW Orientation: lateral  -KW Location: foot  -KW    Wound Image Images linked: 1  -LH      Dressing Appearance intact;moist drainage  -LH      Dressing Removed Type --  HFBc  -LH      Confirmed Empty Wound Bed Yes, visual inspection of wound bed;Yes, probed  -LH      Base moist;slough;yellow;white  mixed slough and granulation at periphery  -LH      Periwound other (see comments);moist;pale white;macerated  callused, mild maceration  -LH      Periwound Temperature warm  -LH      Periwound Skin Turgor firm  -LH      Edges open;irregular  -LH      Drainage Characteristics/Odor serosanguineous;yellow;malodorous  mild  malodor  -LH      Drainage Amount small  -      Care, Wound cleansed with;soap and water;wound cleanser;debrided  -      Dressing Care dressing applied;antimicrobial agent applied;foam;gauze  HFBt, qwick, kerlix, ACE  -      Periwound Care cleansed with pH balanced cleanser;dry periwound area maintained;barrier ointment applied  zguard  -      Retired Wound - Properties Group Placement Date: 05/08/25  -KW Side: Left  -KW Orientation: lateral  -KW Location: foot  -KW    Retired Wound - Properties Group Placement Date: 05/08/25  -KW Side: Left  -KW Orientation: lateral  -KW Location: foot  -KW    Retired Wound - Properties Group Date first assessed: 05/08/25  -KW Side: Left  -KW Location: foot  -KW              User Key  (r) = Recorded By, (t) = Taken By, (c) = Cosigned By      Initials Name Provider Type     Cornelio Tripp, PT Physical Therapist    KW Melissa Lang PT Physical Therapist                      WOUND DEBRIDEMENT  Total area of Debridement: 16cm2  Debridement Site 1  Location- Site 1: L plantar foot  Selective Debridement- Site 1: Wound Surface <20cmsq  Instruments- Site 1: scissors, tweezers  Excised Tissue Description- Site 1: minimum, slough, other (comment) (periwound callus, nonviable peeling tissue)  Bleeding- Site 1: none   Debridement Site 2  Location- Site 2: L lateral foot  Selective Debridement- Site 2: Wound Surface <20cmsq  Instruments- Site 2: tweezers, #15, scapel  Excised Tissue Description- Site 2: moderate, slough, other (comment) (periwound callus)  Bleeding- Site 2: scant   Debridement Site 3  Location- Site 3: L heel  Selective Debridement- Site 3: Wound Surface <20cmsq  Instruments- Site 3: tweezers, #15, scapel  Excised Tissue Description- Site 3: moderate, slough, other (comment) (periwound callus)  Bleeding- Site 3: none      Therapy Education       Row Name 05/15/25 1100             Therapy Education    Education Details Reviewed proper dressing change  technique, Change the dressing in 2 days d/t amount of drainage. Contact [facility] as soon as possible for potential appointment. PT will plan to discuss case with Dr. Mckay.  -      Given Bandaging/dressing change;Symptoms/condition management  -      Program Progressed  -      How Provided Verbal;Demonstration  -      Provided to Patient  -      Level of Understanding Teach back education performed;Verbalized  -                User Key  (r) = Recorded By, (t) = Taken By, (c) = Cosigned By      Initials Name Provider Type     Cornelio Tripp, PT Physical Therapist                    Recommendation and Plan   PT Assessment/Plan       Row Name 05/15/25 1100          PT Assessment    Functional Limitations Other (comment)  wound care  -     Impairments Integumentary integrity  -     Assessment Comments Pt continuing to demonstrate considerable amount of necrotic tissue at both the L heel and L lateral foot wound base. Pt's L heel wound with fibrous, stringy, necrotic tissuea along with mild malodor and mild periwound erythema. Pt has history of course of care with [facility], so PT recommended contacting [facility] for assessment d/t signs/symptoms of infection. Pt's L lateral foot woudn initially fully necrotic with thick layer of slough, however following debridement pt with several areas of granulation visible. Pt also with large amount of periwound callus requiring extensive debridement this date. Pt's L plantar wound remains relatively small and shallow with moderate granulation of wound base. PT plans to contact Dr. Mckay to discuss potential recommendation for [facility] assessment prior to scheduled MRi.  -     Rehab Potential Fair  -     Patient/caregiver participated in establishment of treatment plan and goals Yes  -     Patient would benefit from skilled therapy intervention Yes  -        PT Plan    PT Frequency 2x/week  -     Physical Therapy Interventions (Optional Details) wound  care;patient/family education  -     PT Plan Comments Debridement, dressings.  -               User Key  (r) = Recorded By, (t) = Taken By, (c) = Cosigned By      Initials Name Provider Type     Cornelio Tripp, PT Physical Therapist                    Goals   PT OP Goals       Row Name 05/15/25 1120          Time Calculation    PT Goal Re-Cert Due Date 05/18/25  -               User Key  (r) = Recorded By, (t) = Taken By, (c) = Cosigned By      Initials Name Provider Type     Cornelio Tripp, PT Physical Therapist                    PT Goal Re-Cert Due Date: 05/18/25            Time Calculation: Start Time: 0900  Untimed Charges  42587-Urjvvfvrv debridement: 40  Total Minutes  Untimed Charges Total Minutes: 40   Total Minutes: 40  Therapy Charges for Today       Code Description Service Date Service Provider Modifiers Qty    02616103037 HC MARILYN DEBRIDE OPEN WOUND UP TO 20CM 5/15/2025 Cornelio Tripp, PT GP 1                    Cornelio Tripp PT  5/15/2025

## 2025-05-20 ENCOUNTER — HOSPITAL ENCOUNTER (OUTPATIENT)
Dept: PHYSICAL THERAPY | Facility: HOSPITAL | Age: 80
Setting detail: THERAPIES SERIES
Discharge: HOME | End: 2025-05-20
Payer: MEDICARE

## 2025-05-20 ENCOUNTER — TELEPHONE (OUTPATIENT)
Age: 80
End: 2025-05-20
Payer: MEDICARE

## 2025-05-20 DIAGNOSIS — S81.802D MULTIPLE OPEN WOUNDS OF LOWER EXTREMITY, COMPLICATED, LEFT, SUBSEQUENT ENCOUNTER: ICD-10-CM

## 2025-05-20 DIAGNOSIS — L98.491 ULCER OF SKIN, CHRONIC, LIMITED TO BREAKDOWN OF SKIN: Primary | ICD-10-CM

## 2025-05-20 PROCEDURE — 97597 DBRDMT OPN WND 1ST 20 CM/<: CPT

## 2025-05-20 NOTE — TELEPHONE ENCOUNTER
1X called and could not leave voicemail to schedule f/u appt on 5/28 or 5/30 per Dr. Mckay.      *hub please transfer to [place] @ the office*

## 2025-05-21 NOTE — THERAPY WOUND CARE TREATMENT
Outpatient Rehabilitation - Wound/Debridement Treatment Note   [facility]     Patient Name: Carlos Culver  : 1945  MRN: [id number]  Today's Date: 2025                 Admit Date: 2025    Visit Dx:    ICD-10-CM ICD-9-CM   1. Ulcer of skin, chronic, limited to breakdown of skin  L98.491 707.9   2. Multiple open wounds of lower extremity, complicated, left, subsequent encounter  S81.802D V58.89     894.1                     Patient Active Problem List   Diagnosis    Cellulitis of left foot    GUCCI (acute kidney injury)    Essential hypertension    Leukocytosis    Osteomyelitis of left foot    Chronic anticoagulation    Neuropathy    Elevated transaminase level    CKD (chronic kidney disease) stage 2, GFR 60-89 ml/min    Skin ulcer of right heel    Chronic multifocal osteomyelitis of left foot    Contracture of joint of foot, left    MARTINEZ (dyspnea on exertion)    Neuropathic ulcer, limited to breakdown of skin    Hyperlipidemia LDL goal <100    A-fib    S/P transmetatarsal amputation of foot, left    Leukocytosis, likely reactive    Acute postoperative pain    Alcohol abuse    Staphylococcus aureus bacteremia    Acute osteomyelitis of left ankle or foot    History of Chopart amputation of left foot    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 evidence of necrosis        Past Medical History:   Diagnosis Date    A-fib     Acute kidney failure     Elevated cholesterol     Hard of hearing     Hyperlipidemia     Hypertension     Renal disorder     Wears hearing aid in both ears     Wears reading eyeglasses         Past Surgical History:   Procedure Laterality Date    AMPUTATION DIGIT Left 2020    Procedure: AMPUTATION LEFT 2ND TOE;  Surgeon: Idania Osborn MD;  Location:  [facility];  Service: Orthopedics    AMPUTATION DIGIT Left 2020    Procedure: TRANSMETATARSAL AMPUTATION LEFT;  Surgeon: Idania Osborn MD;  Location: [facility];  Service:  Orthopedics;  Laterality: Left;    APPENDECTOMY      COLONOSCOPY  2006    FOOT SURGERY Left 12/09/2020    (L) transmetatarsal amputation 12/09/2020 - Dr. Idania Osborn    INCISION AND DRAINAGE LEG Left 10/23/2018    Procedure: INCISION AND DRAINAGE LEFT FOOT;  Surgeon: Idania Osborn MD;  Location:  [facility];  Service: Orthopedics    INCISION AND DRAINAGE LEG Left 7/21/2023    Procedure: FOOT INCISION AND DRAINAGE; REVISION AMPUTATION; ACHILLES LENGTHENING -LEFT;  Surgeon: Mao Mckay MD;  Location:  [facility];  Service: Orthopedics;  Laterality: Left;    TOE AMPUTATION      TONSILLECTOMY      VENOUS ACCESS DEVICE (PORT) INSERTION N/A 10/23/2018    Procedure: GROSHONG CATHETER PLACEMENT;  Surgeon: Marquez Simons MD;  Location:  [facility];  Service: General    VENOUS ACCESS DEVICE (PORT) INSERTION N/A 12/9/2020    Procedure: GROSHONG INSERTION;  Surgeon: Antonio Bains MD;  Location:  [facility];  Service: General;  Laterality: N/A;    VENOUS ACCESS DEVICE (PORT) REMOVAL           EVALUATION   PT Ortho       Row Name 05/20/25 0800       Subjective    Subjective Comments Pt with no new issues / complaints.  -MF       Precautions and Contraindications    Precautions insensate B LE  -MF       Subjective Pain    Able to rate subjective pain? yes  -MF    Pre-Treatment Pain Level 0  -MF    Post-Treatment Pain Level 0  -MF       Transfers    [place] Level (Floor Transfer) modified independence  -MF    Assistive Device (Floor Transfer) walker, knee scooter  -MF    Comment, (Transfers) Long sitting for tx.  -MF              User Key  (r) = Recorded By, (t) = Taken By, (c) = Cosigned By      Initials Name Provider Type    Koko Reynolds, PT Physical Therapist                     LDA Wound       Row Name 05/20/25 0800             Wound 05/08/25 1300 Left plantar    Wound - Properties Group Placement Date: 05/08/25  -KW Placement Time: 1300  -KW Side: Left  -KW Location: plantar  -KW    Wound Image  Images linked: 1  -MF      Dressing Appearance dried drainage;dressing loose  -MF      Dressing Removed Type gauze  -MF      Confirmed Empty Wound Bed Yes, visual inspection of wound bed  -MF      Base yellow;red;granulating;moist  -MF      Periwound intact;dry  -MF      Periwound Temperature warm  -MF      Periwound Skin Turgor soft  -MF      Edges callused  -MF      Drainage Characteristics/Odor serosanguineous  -MF      Drainage Amount small  -MF      Care, Wound irrigated with;wound cleanser;debrided  -MF      Dressing Care dressing changed;foam;low-adherent  faiza Ag with HFBc and qwick foam to cover kerlix and ACE to secure.  -MF      Periwound Care cleansed with pH balanced cleanser;dry periwound area maintained  -MF      Retired Wound - Properties Group Placement Date: 05/08/25  -KW Placement Time: 1300  -KW Side: Left  -KW Location: plantar  -KW    Retired Wound - Properties Group Placement Date: 05/08/25  -KW Placement Time: 1300  -KW Side: Left  -KW Location: plantar  -KW    Retired Wound - Properties Group Date first assessed: 05/08/25  -KW Time first assessed: 1300  -KW Side: Left  -KW Location: plantar  -KW       Wound 05/08/25 1300 Left heel    Wound - Properties Group Placement Date: 05/08/25  -KW Placement Time: 1300  -KW Side: Left  -KW Location: heel  -KW    Wound Image Images linked: 1  -MF      Dressing Appearance intact;moist drainage  -MF      Dressing Removed Type gauze  -MF      Confirmed Empty Wound Bed Yes, visual inspection of wound bed  -MF      Base moist;yellow;white;necrotic  -MF      Periwound intact;dry  -MF      Periwound Temperature warm  -MF      Periwound Skin Turgor soft  -MF      Edges callused  -MF      Drainage Characteristics/Odor serosanguineous  -MF      Drainage Amount small  -MF      Care, Wound irrigated with;wound cleanser;debrided  -MF      Dressing Care dressing changed;foam;low-adherent  faiza Ag with HFBc and qwick foam to cover kerlix and ACE to secure.  -MF       Periwound Care cleansed with pH balanced cleanser;dry periwound area maintained  -MF      Retired Wound - Properties Group Placement Date: 05/08/25  -KW Placement Time: 1300  -KW Side: Left  -KW Location: heel  -KW    Retired Wound - Properties Group Placement Date: 05/08/25  -KW Placement Time: 1300  -KW Side: Left  -KW Location: heel  -KW    Retired Wound - Properties Group Date first assessed: 05/08/25  -KW Time first assessed: 1300  -KW Side: Left  -KW Location: heel  -KW       Wound 05/08/25 Left lateral foot    Wound - Properties Group Placement Date: 05/08/25  -KW Side: Left  -KW Orientation: lateral  -KW Location: foot  -KW    Dressing Appearance intact  -MF      Dressing Removed Type gauze  -MF      Confirmed Empty Wound Bed Yes, visual inspection of wound bed  -MF      Base moist;slough;yellow;white  -MF      Periwound intact;dry  -MF      Periwound Temperature warm  -MF      Periwound Skin Turgor soft  -MF      Edges callused  -MF      Drainage Characteristics/Odor serosanguineous  -MF      Drainage Amount small  -MF      Care, Wound irrigated with;wound cleanser;debrided  -MF      Dressing Care dressing changed;foam;low-adherent  faiza Ag with HFBc and qwick foam to cover kerlix and ACE to secure.  -MF      Periwound Care cleansed with pH balanced cleanser  -MF      Retired Wound - Properties Group Placement Date: 05/08/25  -KW Side: Left  -KW Orientation: lateral  -KW Location: foot  -KW    Retired Wound - Properties Group Placement Date: 05/08/25  -KW Side: Left  -KW Orientation: lateral  -KW Location: foot  -KW    Retired Wound - Properties Group Date first assessed: 05/08/25  -KW Side: Left  -KW Location: foot  KW              User Key  (r) = Recorded By, (t) = Taken By, (c) = Cosigned By      Initials Name Provider Type    Koko Reynolds PT Physical Therapist    KW Melissa Lang PT Physical Therapist                      WOUND DEBRIDEMENT  Total area of Debridement:  ~10cm2  Debridement Site 1  Location- Site 1: L plantar foot  Selective Debridement- Site 1: Wound Surface <20cmsq  Instruments- Site 1: scissors, tweezers  Excised Tissue Description- Site 1: minimum, slough, other (comment) (periwound callus)  Bleeding- Site 1: scant, held pressure, 1 minute   Debridement Site 2  Location- Site 2: L lateral foot  Selective Debridement- Site 2: Wound Surface <20cmsq  Instruments- Site 2: tweezers, scissors  Excised Tissue Description- Site 2: minimum, slough, other (comment) (periwound callus)   Debridement Site 3  Location- Site 3: L heel  Selective Debridement- Site 3: Wound Surface <20cmsq  Instruments- Site 3: tweezers, scissors  Excised Tissue Description- Site 3: minimum, slough, other (comment) (periwound callus)         Recommendation and Plan   PT Assessment/Plan       Row Name 05/20/25 0800          PT Assessment    Functional Limitations Other (comment)  wound management  -MF     Impairments Integumentary integrity  -MF     Assessment Comments PT able to debride a moderate amount of slough and periwound callus from wound.  PT also noted a suture to the dorsal portion of the foot today with tx. PT attempted to remove, but suture was too deep to allow for removal. PT contacted Dr Mckay and discussed suture. MD to assess with Pt's next visit to the MD's office.  -MF     Rehab Potential Fair  -MF     Patient/caregiver participated in establishment of treatment plan and goals Yes  -MF     Patient would benefit from skilled therapy intervention Yes  -MF        PT Plan    PT Frequency 2x/week  -     Physical Therapy Interventions (Optional Details) wound care;patient/family education  -     PT Plan Comments Debridement, dressings.  -               User Key  (r) = Recorded By, (t) = Taken By, (c) = Cosigned By      Initials Name Provider Type    Koko Reynolds, PT Physical Therapist                    Goals   PT OP Goals       Row Name 05/20/25 0800          Time  Calculation    PT Goal Re-Cert Due Date 05/18/25  -               User Key  (r) = Recorded By, (t) = Taken By, (c) = Cosigned By      Initials Name Provider Type     Koko Barraza, PT Physical Therapist                    PT Goal Re-Cert Due Date: 05/18/25            Time Calculation: Start Time: 0800  Untimed Charges  84204-[id number] debridement: 35  Total Minutes  Untimed Charges Total Minutes: 35   Total Minutes: 35  Therapy Charges for Today       Code Description Service Date Service Provider Modifiers Qty    [id number] HC MARILYN DEBRIDE OPEN WOUND UP TO 20CM 5/20/2025 Koko Barraza, PT GP 1                    Koko Barraza, PT  5/21/2025

## 2025-05-23 ENCOUNTER — HOSPITAL ENCOUNTER (OUTPATIENT)
Dept: PHYSICAL THERAPY | Facility: HOSPITAL | Age: 80
Setting detail: THERAPIES SERIES
Discharge: HOME | End: 2025-05-23
Payer: MEDICARE

## 2025-05-23 ENCOUNTER — TELEPHONE (OUTPATIENT)
Dept: ORTHOPEDIC SURGERY | Facility: CLINIC | Age: 80
End: 2025-05-23
Payer: MEDICARE

## 2025-05-23 DIAGNOSIS — L98.491 ULCER OF SKIN, CHRONIC, LIMITED TO BREAKDOWN OF SKIN: Primary | ICD-10-CM

## 2025-05-23 DIAGNOSIS — S81.802D MULTIPLE OPEN WOUNDS OF LOWER EXTREMITY, COMPLICATED, LEFT, SUBSEQUENT ENCOUNTER: ICD-10-CM

## 2025-05-23 PROCEDURE — 97597 DBRDMT OPN WND 1ST 20 CM/<: CPT

## 2025-05-23 NOTE — TELEPHONE ENCOUNTER
Patient called in, stating that he just left wound care, they suggested he contact Dr. Mckay's and is requesting an antibiotic be prescribed.    Please Advise.      Callback number: [phone]    Preferred pharmacy: [facility] [id number] [place] [address] [phone]  - [phone]  [phone]

## 2025-05-23 NOTE — THERAPY WOUND CARE TREATMENT
Outpatient Rehabilitation - Wound/Debridement Treatment Note  [facility]     Patient Name: Carlos Culver  : 1945  MRN: [id number]  Today's Date: 2025                 Admit Date: 2025    Visit Dx:    ICD-10-CM ICD-9-CM   1. Ulcer of skin, chronic, limited to breakdown of skin  L98.491 707.9   2. Multiple open wounds of lower extremity, complicated, left, subsequent encounter  S81.802D V58.89     894.1     L plantar foot (indicating tan, purulent drainage)      L heel      L lateral foot        Patient Active Problem List   Diagnosis    Cellulitis of left foot    GUCCI (acute kidney injury)    Essential hypertension    Leukocytosis    Osteomyelitis of left foot    Chronic anticoagulation    Neuropathy    Elevated transaminase level    CKD (chronic kidney disease) stage 2, GFR 60-89 ml/min    Skin ulcer of right heel    Chronic multifocal osteomyelitis of left foot    Contracture of joint of foot, left    MARTINEZ (dyspnea on exertion)    Neuropathic ulcer, limited to breakdown of skin    Hyperlipidemia LDL goal <100    A-fib    S/P transmetatarsal amputation of foot, left    Leukocytosis, likely reactive    Acute postoperative pain    Alcohol abuse    Staphylococcus aureus bacteremia    Acute osteomyelitis of left ankle or foot    History of [place] amputation of left foot    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 evidence of necrosis        Past Medical History:   Diagnosis Date    A-fib     Acute kidney failure     Elevated cholesterol     Hard of hearing     Hyperlipidemia     Hypertension     Renal disorder     Wears hearing aid in both ears     Wears reading eyeglasses         Past Surgical History:   Procedure Laterality Date    AMPUTATION DIGIT Left 2020    Procedure: AMPUTATION LEFT 2ND TOE;  Surgeon: Idania Osborn MD;  Location: [facility];  Service: Orthopedics    AMPUTATION DIGIT Left 2020    Procedure: TRANSMETATARSAL  AMPUTATION LEFT;  Surgeon: Idania Osborn MD;  Location:  [facility];  Service: Orthopedics;  Laterality: Left;    APPENDECTOMY      COLONOSCOPY  2006    FOOT SURGERY Left 12/09/2020    (L) transmetatarsal amputation 12/09/2020 - Dr. Idania Osborn    INCISION AND DRAINAGE LEG Left 10/23/2018    Procedure: INCISION AND DRAINAGE LEFT FOOT;  Surgeon: Idania Osborn MD;  Location:  [facility];  Service: Orthopedics    INCISION AND DRAINAGE LEG Left 7/21/2023    Procedure: FOOT INCISION AND DRAINAGE; REVISION AMPUTATION; ACHILLES LENGTHENING -LEFT;  Surgeon: Mao Mckay MD;  Location:  [facility];  Service: Orthopedics;  Laterality: Left;    TOE AMPUTATION      TONSILLECTOMY      VENOUS ACCESS DEVICE (PORT) INSERTION N/A 10/23/2018    Procedure: GROSHONG CATHETER PLACEMENT;  Surgeon: Marquez Simons MD;  Location:  [facility];  Service: General    VENOUS ACCESS DEVICE (PORT) INSERTION N/A 12/9/2020    Procedure: GROSHONG INSERTION;  Surgeon: Antonio Bains MD;  Location:  [facility];  Service: General;  Laterality: N/A;    VENOUS ACCESS DEVICE (PORT) REMOVAL           EVALUATION   PT Ortho       Row Name 05/23/25 0900       Subjective    Subjective Comments Pt reports he is still not currently taking any abx. Reports he has noticed less drainage so he has not changed his dressings since previous session. Reports he has his MRI scheduled for tomorrow and then has a follow up with Dr. Mckay next week. Reports he is scheduled to see Dr. Young with [facility] June 4th.  -       Precautions and Contraindications    Precautions insensate B LE  -       Subjective Pain    Able to rate subjective pain? yes  -    Pre-Treatment Pain Level 0  -LH    Post-Treatment Pain Level 0  -LH       Transfers    [place] Level (Floor Transfer) modified independence  -    Assistive Device (Floor Transfer) walker, knee scooter  -    Comment, (Transfers) Long sitting for tx.  -       Gait/Stairs (Locomotion)    [place]  Level (Gait) modified independence  -    Assistive Device (Gait) walker, knee scooter  -              User Key  (r) = Recorded By, (t) = Taken By, (c) = Cosigned By      Initials Name Provider Type     Cornelio Tripp PT Physical Therapist                     LDA Wound       Row Name 05/23/25 0900             Wound 05/08/25 1300 Left plantar    Wound - Properties Group Placement Date: 05/08/25  -KW Placement Time: 1300  -KW Side: Left  -KW Location: plantar  -KW    Wound Image Images linked: 1  -LH      Dressing Appearance intact;moist drainage  -      Dressing Removed Type foam  HFBc  -      Confirmed Empty Wound Bed Yes, visual inspection of wound bed;Yes, probed  -      Base yellow;red;granulating;moist  -      Periwound intact;dry  Loose/lifted periwound skin, slighly increased depth at posterior aspect of wound.  -      Periwound Temperature warm  -      Periwound Skin Turgor soft  -      Edges callused  -      Drainage Characteristics/Odor tan;yellow;serosanguineous  Thick, tan draiange oozing from around HFBc dressing  -      Drainage Amount small  -      Care, Wound cleansed with;soap and water;wound cleanser;debrided  -      Dressing Care dressing applied;antimicrobial agent applied;foam;gauze  HFBc, qwick, kerlix, ACE  -      Periwound Care cleansed with pH balanced cleanser;dry periwound area maintained  -      Retired Wound - Properties Group Placement Date: 05/08/25  -KW Placement Time: 1300  -KW Side: Left  -KW Location: plantar  -KW    Retired Wound - Properties Group Placement Date: 05/08/25  -KW Placement Time: 1300  -KW Side: Left  -KW Location: plantar  -KW    Retired Wound - Properties Group Date first assessed: 05/08/25  -KW Time first assessed: 1300  -KW Side: Left  -KW Location: plantar  -KW       Wound 05/08/25 1300 Left heel    Wound - Properties Group Placement Date: 05/08/25  -KW Placement Time: 1300  -KW Side: Left  -KW Location: heel  -KW    Wound Image  Images linked: 1  -LH      Dressing Appearance intact;moist drainage  -LH      Dressing Removed Type foam  HFBc  -LH      Confirmed Empty Wound Bed Yes, visual inspection of wound bed;Yes, probed  -LH      Base moist;yellow;white;necrotic  -LH      Periwound intact;dry;redness;swelling;warm  moderate erythema  -LH      Periwound Temperature warm  -LH      Periwound Skin Turgor soft  -LH      Edges callused  -LH      Drainage Characteristics/Odor yellow;tan;serosanguineous  -LH      Drainage Amount small  -LH      Care, Wound cleansed with;soap and water;wound cleanser;debrided  -LH      Dressing Care dressing applied;antimicrobial agent applied;foam;gauze  HFBc, qwick, kerlix, ACE  -LH      Periwound Care cleansed with pH balanced cleanser;dry periwound area maintained  -LH      Retired Wound - Properties Group Placement Date: 05/08/25  -KW Placement Time: 1300  -KW Side: Left  -KW Location: heel  -KW    Retired Wound - Properties Group Placement Date: 05/08/25  -KW Placement Time: 1300  -KW Side: Left  -KW Location: heel  -KW    Retired Wound - Properties Group Date first assessed: 05/08/25  -KW Time first assessed: 1300  -KW Side: Left  -KW Location: heel  -KW       Wound 05/08/25 Left lateral foot    Wound - Properties Group Placement Date: 05/08/25  -KW Side: Left  -KW Orientation: lateral  -KW Location: foot  -KW    Wound Image Images linked: 1  -LH      Dressing Appearance intact;moist drainage  -LH      Dressing Removed Type foam  HFBc  -LH      Confirmed Empty Wound Bed Yes, visual inspection of wound bed  -LH      Base moist;slough;yellow;white  -LH      Periwound intact;dry  -LH      Periwound Temperature warm  -LH      Periwound Skin Turgor soft  -LH      Edges callused  -LH      Drainage Characteristics/Odor yellow;serosanguineous  -LH      Drainage Amount small  -LH      Care, Wound cleansed with;soap and water;wound cleanser;debrided  -LH      Dressing Care dressing applied;antimicrobial agent  applied;foam;gauze  HFBc, qwick, kerlix, ACE  -      Periwound Care cleansed with pH balanced cleanser;dry periwound area maintained  -      Retired Wound - Properties Group Placement Date: 05/08/25  -KW Side: Left  -KW Orientation: lateral  -KW Location: foot  -KW    Retired Wound - Properties Group Placement Date: 05/08/25  -KW Side: Left  -KW Orientation: lateral  -KW Location: foot  -KW    Retired Wound - Properties Group Date first assessed: 05/08/25  -KW Side: Left  -KW Location: foot  -KW              User Key  (r) = Recorded By, (t) = Taken By, (c) = Cosigned By      Initials Name Provider Type     Cornelio Tripp, PT Physical Therapist    KW Melissa Lang, DORINDA Physical Therapist                      WOUND DEBRIDEMENT  Total area of Debridement: 10cm2  Debridement Site 1  Location- Site 1: L plantar foot  Selective Debridement- Site 1: Wound Surface <20cmsq  Instruments- Site 1: scissors, tweezers, #15, scapel  Excised Tissue Description- Site 1: moderate, slough, other (comment) (periwound calls)  Bleeding- Site 1: minimum, held pressure, 1 minute   Debridement Site 2  Location- Site 2: L lateral foot  Selective Debridement- Site 2: Wound Surface <20cmsq  Instruments- Site 2: tweezers, #15, scapel  Excised Tissue Description- Site 2: moderate, slough, other (comment) (callus)  Bleeding- Site 2: scant, held pressure, 1 minute   Debridement Site 3  Location- Site 3: L heel  Selective Debridement- Site 3: Wound Surface <20cmsq  Instruments- Site 3: tweezers, #15, scapel  Excised Tissue Description- Site 3: moderate, slough, other (comment) (callus)  Bleeding- Site 3: minimum, held pressure, 1 minute      Therapy Education       Row Name 05/23/25 1000             Therapy Education    Education Details May keep dressings dry and intact until next session. Reviewed s/sx of infection and likelihood of need for abx prescription. Reviewed very high risk of loss of limb if wounds worsen and importance of  extremely low threshold for presentation to the ED.  -      Given Bandaging/dressing change;Symptoms/condition management  -      Program Progressed;Reinforced  -      How Provided Verbal;Demonstration  -      Provided to Patient  -      Level of Understanding Teach back education performed;Verbalized  -                User Key  (r) = Recorded By, (t) = Taken By, (c) = Cosigned By      Initials Name Provider Type     Cornelio Tripp, PT Physical Therapist                    Recommendation and Plan   PT Assessment/Plan       Row Name 05/23/25 1000          PT Assessment    Functional Limitations Other (comment)  wound management  -     Impairments Integumentary integrity  -     Assessment Comments Upon removal of dressings PT noted moderate amount of thick, tan, slightly malodorous drainage from the L plantar heel wound. Pt's L plantar foot wound also with some loosening/lifting of the periwound skin along with mild increase in depth at the posterior aspect of the wound. Pt continuing with moderate erythema surrounding the L heel/ankle. Pt also continuing with thick layer of necrotic slough nearly fully obscuring the L lateral foot and L heel wounds. Pt reports he is planning to reach out to Dr. Mckay about potential abx prescription. Pt has MRI scheduled for tomorrow and [facility] follow up scheduled for June 4th. Pt does not appear to be demonstrating progress toward wound healing at this time, PT highly suspicious of potential underlying process including potential infection limiting wound healing. PT instructed pt on very low threshold for worsening of symptoms and need for ED assessment.  -     Rehab Potential Fair  -     Patient/caregiver participated in establishment of treatment plan and goals Yes  -     Patient would benefit from skilled therapy intervention Yes  -        PT Plan    PT Frequency 2x/week  -     Physical Therapy Interventions (Optional Details) wound care;patient/family  education  -     PT Plan Comments Debridement, dressings.  -               User Key  (r) = Recorded By, (t) = Taken By, (c) = Cosigned By      Initials Name Provider Type     Cornelio Tripp, PT Physical Therapist                    Goals   PT OP Goals       Row Name 05/23/25 1016          Time Calculation    PT Goal Re-Cert Due Date 08/06/25  -               User Key  (r) = Recorded By, (t) = Taken By, (c) = Cosigned By      Initials Name Provider Type     Cornelio Tripp, PT Physical Therapist                    PT Goal Re-Cert Due Date: 08/06/25            Time Calculation: Start Time: 0800  Untimed Charges  [id number] debridement: 35  Total Minutes  Untimed Charges Total Minutes: 35   Total Minutes: 35  Therapy Charges for Today       Code Description Service Date Service Provider Modifiers Qty    [id number] HC MARILYN DEBRIDE OPEN WOUND UP TO 20CM 5/23/2025 Cornelio Tripp, PT GP 1                    Cornelio Tripp, PT  5/23/2025

## 2025-05-23 NOTE — TELEPHONE ENCOUNTER
I spoke with Dr Mckay and he had me call [facility] to try to get the patient in sooner. I spoke with [facility] and they were able to get him tomorrow at 9 am with Dr Young. I called and informed the patient of his appt and patient stated that he would be there. I also informed Dr Mckay of the appt as well.

## 2025-05-24 ENCOUNTER — HOSPITAL ENCOUNTER (OUTPATIENT)
Facility: HOSPITAL | Age: 80
Discharge: HOME | End: 2025-05-24
Payer: MEDICARE

## 2025-05-24 ENCOUNTER — TRANSCRIBE ORDERS (OUTPATIENT)
Dept: LAB | Facility: HOSPITAL | Age: 80
End: 2025-05-24
Payer: MEDICARE

## 2025-05-24 ENCOUNTER — LAB (OUTPATIENT)
Dept: LAB | Facility: HOSPITAL | Age: 80
End: 2025-05-24
Payer: MEDICARE

## 2025-05-24 DIAGNOSIS — L98.491 NEUROPATHIC ULCER, LIMITED TO BREAKDOWN OF SKIN: ICD-10-CM

## 2025-05-24 DIAGNOSIS — L97.422 ULCER OF LEFT HEEL, WITH FAT LAYER EXPOSED: ICD-10-CM

## 2025-05-24 DIAGNOSIS — G62.9 PERIPHERAL NERVE DISORDER: Primary | ICD-10-CM

## 2025-05-24 DIAGNOSIS — G62.9 PERIPHERAL NERVE DISORDER: ICD-10-CM

## 2025-05-24 LAB
ALBUMIN SERPL-MCNC: 3.9 G/DL (ref 3.5–5.2)
ALBUMIN/GLOB SERPL: 1.1 G/DL
ALP SERPL-CCNC: 118 U/L (ref 39–117)
ALT SERPL W P-5'-P-CCNC: 10 U/L (ref 1–41)
ANION GAP SERPL CALCULATED.3IONS-SCNC: 10 MMOL/L (ref 5–15)
AST SERPL-CCNC: 20 U/L (ref 1–40)
BASOPHILS # BLD AUTO: 0.11 10*3/MM3 (ref 0–0.2)
BASOPHILS NFR BLD AUTO: 1 % (ref 0–1.5)
BILIRUB SERPL-MCNC: 1 MG/DL (ref 0–1.2)
BUN SERPL-MCNC: 21 MG/DL (ref 8–23)
BUN/CREAT SERPL: 18.6 (ref 7–25)
CALCIUM SPEC-SCNC: 9.5 MG/DL (ref 8.6–10.5)
CHLORIDE SERPL-SCNC: 102 MMOL/L (ref 98–107)
CK SERPL-CCNC: 84 U/L (ref 20–200)
CO2 SERPL-SCNC: 24 MMOL/L (ref 22–29)
CREAT SERPL-MCNC: 1.13 MG/DL (ref 0.76–1.27)
CRP SERPL-MCNC: 6.29 MG/DL (ref 0–0.5)
DEPRECATED RDW RBC AUTO: 51.2 FL (ref 37–54)
EGFRCR SERPLBLD CKD-EPI 2021: 65.7 ML/MIN/1.73
EOSINOPHIL # BLD AUTO: 0.4 10*3/MM3 (ref 0–0.4)
EOSINOPHIL NFR BLD AUTO: 3.6 % (ref 0.3–6.2)
ERYTHROCYTE [DISTWIDTH] IN BLOOD BY AUTOMATED COUNT: 14.4 % (ref 12.3–15.4)
ERYTHROCYTE [SEDIMENTATION RATE] IN BLOOD: 54 MM/HR (ref 0–20)
GLOBULIN UR ELPH-MCNC: 3.5 GM/DL
GLUCOSE SERPL-MCNC: 112 MG/DL (ref 65–99)
HCT VFR BLD AUTO: 45.3 % (ref 37.5–51)
HGB BLD-MCNC: 14.5 G/DL (ref 13–17.7)
IMM GRANULOCYTES # BLD AUTO: 0.06 10*3/MM3 (ref 0–0.05)
IMM GRANULOCYTES NFR BLD AUTO: 0.5 % (ref 0–0.5)
LYMPHOCYTES # BLD AUTO: 2.34 10*3/MM3 (ref 0.7–3.1)
LYMPHOCYTES NFR BLD AUTO: 20.9 % (ref 19.6–45.3)
MCH RBC QN AUTO: 31.2 PG (ref 26.6–33)
MCHC RBC AUTO-ENTMCNC: 32 G/DL (ref 31.5–35.7)
MCV RBC AUTO: 97.4 FL (ref 79–97)
MONOCYTES # BLD AUTO: 1.24 10*3/MM3 (ref 0.1–0.9)
MONOCYTES NFR BLD AUTO: 11.1 % (ref 5–12)
NEUTROPHILS NFR BLD AUTO: 62.9 % (ref 42.7–76)
NEUTROPHILS NFR BLD AUTO: 7.03 10*3/MM3 (ref 1.7–7)
NRBC BLD AUTO-RTO: 0 /100 WBC (ref 0–0.2)
PLATELET # BLD AUTO: 183 10*3/MM3 (ref 140–450)
PMV BLD AUTO: 9.4 FL (ref 6–12)
POTASSIUM SERPL-SCNC: 5.1 MMOL/L (ref 3.5–5.2)
PROT SERPL-MCNC: 7.4 G/DL (ref 6–8.5)
RBC # BLD AUTO: 4.65 10*6/MM3 (ref 4.14–5.8)
SODIUM SERPL-SCNC: 136 MMOL/L (ref 136–145)
WBC NRBC COR # BLD AUTO: 11.18 10*3/MM3 (ref 3.4–10.8)

## 2025-05-24 PROCEDURE — 82550 ASSAY OF CK (CPK): CPT

## 2025-05-24 PROCEDURE — 87147 CULTURE TYPE IMMUNOLOGIC: CPT

## 2025-05-24 PROCEDURE — 87077 CULTURE AEROBIC IDENTIFY: CPT

## 2025-05-24 PROCEDURE — 86140 C-REACTIVE PROTEIN: CPT

## 2025-05-24 PROCEDURE — 80053 COMPREHEN METABOLIC PANEL: CPT

## 2025-05-24 PROCEDURE — 73718 MRI LOWER EXTREMITY W/O DYE: CPT

## 2025-05-24 PROCEDURE — 87205 SMEAR GRAM STAIN: CPT

## 2025-05-24 PROCEDURE — 85025 COMPLETE CBC W/AUTO DIFF WBC: CPT

## 2025-05-24 PROCEDURE — 87186 SC STD MICRODIL/AGAR DIL: CPT

## 2025-05-24 PROCEDURE — 36415 COLL VENOUS BLD VENIPUNCTURE: CPT

## 2025-05-24 PROCEDURE — 85652 RBC SED RATE AUTOMATED: CPT

## 2025-05-24 PROCEDURE — 87070 CULTURE OTHR SPECIMN AEROBIC: CPT

## 2025-05-27 ENCOUNTER — HOSPITAL ENCOUNTER (OUTPATIENT)
Dept: PHYSICAL THERAPY | Facility: HOSPITAL | Age: 80
Setting detail: THERAPIES SERIES
Discharge: HOME | End: 2025-05-27
Payer: MEDICARE

## 2025-05-27 DIAGNOSIS — L98.491 ULCER OF SKIN, CHRONIC, LIMITED TO BREAKDOWN OF SKIN: Primary | ICD-10-CM

## 2025-05-27 DIAGNOSIS — S81.802D MULTIPLE OPEN WOUNDS OF LOWER EXTREMITY, COMPLICATED, LEFT, SUBSEQUENT ENCOUNTER: ICD-10-CM

## 2025-05-27 LAB
BACTERIA SPEC AEROBE CULT: ABNORMAL
BACTERIA SPEC AEROBE CULT: ABNORMAL
GRAM STN SPEC: ABNORMAL
GRAM STN SPEC: ABNORMAL

## 2025-05-27 PROCEDURE — 97597 DBRDMT OPN WND 1ST 20 CM/<: CPT

## 2025-05-27 NOTE — THERAPY WOUND CARE TREATMENT
Outpatient Rehabilitation - Wound/Debridement Treatment Note   [place]     Patient Name: Carlos Culver  : 1945  MRN: [id number]  Today's Date: 2025                 Admit Date: 2025    Visit Dx:    ICD-10-CM ICD-9-CM   1. Ulcer of skin, chronic, limited to breakdown of skin  L98.491 707.9   2. Multiple open wounds of lower extremity, complicated, left, subsequent encounter  S81.802D V58.89     894.1                       Patient Active Problem List   Diagnosis    Cellulitis of left foot    GUCCI (acute kidney injury)    Essential hypertension    Leukocytosis    Osteomyelitis of left foot    Chronic anticoagulation    Neuropathy    Elevated transaminase level    CKD (chronic kidney disease) stage 2, GFR 60-89 ml/min    Skin ulcer of right heel    Chronic multifocal osteomyelitis of left foot    Contracture of joint of foot, left    MARTINEZ (dyspnea on exertion)    Neuropathic ulcer, limited to breakdown of skin    Hyperlipidemia LDL goal <100    A-fib    S/P transmetatarsal amputation of foot, left    Leukocytosis, likely reactive    Acute postoperative pain    Alcohol abuse    Staphylococcus aureus bacteremia    Acute osteomyelitis of left ankle or foot    History of Chopart amputation of left foot    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 evidence of necrosis        Past Medical History:   Diagnosis Date    A-fib     Acute kidney failure     Elevated cholesterol     Hard of hearing     Hyperlipidemia     Hypertension     Renal disorder     Wears hearing aid in both ears     Wears reading eyeglasses         Past Surgical History:   Procedure Laterality Date    AMPUTATION DIGIT Left 2020    Procedure: AMPUTATION LEFT 2ND TOE;  Surgeon: Idania Osborn MD;  Location:  [facility];  Service: Orthopedics    AMPUTATION DIGIT Left 2020    Procedure: TRANSMETATARSAL AMPUTATION LEFT;  Surgeon: Idania Osborn MD;  Location: [facility] OR;  Service:  Orthopedics;  Laterality: Left;    APPENDECTOMY      COLONOSCOPY  2006    FOOT SURGERY Left 12/09/2020    (L) transmetatarsal amputation 12/09/2020 - Dr. Idania Osborn    INCISION AND DRAINAGE LEG Left 10/23/2018    Procedure: INCISION AND DRAINAGE LEFT FOOT;  Surgeon: Idania Osborn MD;  Location:  [facility];  Service: Orthopedics    INCISION AND DRAINAGE LEG Left 7/21/2023    Procedure: FOOT INCISION AND DRAINAGE; REVISION AMPUTATION; ACHILLES LENGTHENING -LEFT;  Surgeon: Mao Mckay MD;  Location:  [facility];  Service: Orthopedics;  Laterality: Left;    TOE AMPUTATION      TONSILLECTOMY      VENOUS ACCESS DEVICE (PORT) INSERTION N/A 10/23/2018    Procedure: GROSHONG CATHETER PLACEMENT;  Surgeon: Marquez Simons MD;  Location:  [facility];  Service: General    VENOUS ACCESS DEVICE (PORT) INSERTION N/A 12/9/2020    Procedure: GROSHONG INSERTION;  Surgeon: Antonio Bains MD;  Location:  [facility];  Service: General;  Laterality: N/A;    VENOUS ACCESS DEVICE (PORT) REMOVAL           EVALUATION   PT Ortho       Row Name 05/27/25 0900       Subjective    Subjective Comments Pt reports he had an appointment with Dr. Young with [facility] who he reports didnt think the wounds appeared to be infected and did not think he needed abx at this time, however took blood tests and is awaiting MRI results. Reports he sees Dr. Mckay prior to next wound care appointment.  -       Precautions and Contraindications    Precautions insensate B LE  -LH       Subjective Pain    Able to rate subjective pain? yes  -    Pre-Treatment Pain Level 0  -LH    Post-Treatment Pain Level 0  -LH       Transfers    [place] Level (Floor Transfer) modified independence  -    Assistive Device (Floor Transfer) walker, knee scooter  -    Comment, (Transfers) Long sitting for tx w/ wedge.  -       Gait/Stairs (Locomotion)    [place] Level (Gait) modified independence  -    Assistive Device (Gait) walker, knee scooter  -               User Key  (r) = Recorded By, (t) = Taken By, (c) = Cosigned By      Initials Name Provider Type     Cornelio Tripp PT Physical Therapist                     LDA Wound       Row Name 05/27/25 0900             Wound 05/08/25 1300 Left plantar    Wound - Properties Group Placement Date: 05/08/25  -KW Placement Time: 1300  -KW Side: Left  -KW Location: plantar  -KW    Wound Image Images linked: 1  -LH      Dressing Appearance dressing loose;moist drainage  -      Dressing Removed Type foam  HFBc not in place, qwick, kerlix, ACE  -      Confirmed Empty Wound Bed Yes, visual inspection of wound bed  -      Base yellow;red;granulating;moist  -      Periwound intact;dry  Loose/lifted periwound skin, slighly increased depth at posterior aspect of wound.  -      Periwound Temperature warm  -      Periwound Skin Turgor soft  -      Edges callused  -      Wound Length (cm) 1.9 cm  -      Wound Width (cm) 1.9 cm  -      Wound Depth (cm) --  obscured  -      Wound Surface Area (cm^2) 2.84 cm^2  -      Drainage Characteristics/Odor yellow;serosanguineous;malodorous  mild malodor  -      Drainage Amount small  -      Care, Wound cleansed with;soap and water;wound cleanser;debrided  -      Dressing Care dressing applied;antimicrobial agent applied;foam;gauze;silver impregnated;low-adherent  HFBt, Optifoam Ag, kerlix, ACE  -      Periwound Care cleansed with pH balanced cleanser;dry periwound area maintained;barrier ointment applied  zguard  -      Retired Wound - Properties Group Placement Date: 05/08/25  -KW Placement Time: 1300  -KW Side: Left  -KW Location: plantar  -KW    Retired Wound - Properties Group Placement Date: 05/08/25  -KW Placement Time: 1300  -KW Side: Left  -KW Location: plantar  -KW    Retired Wound - Properties Group Date first assessed: 05/08/25  -KW Time first assessed: 1300  -KW Side: Left  -KW Location: plantar  -KW       Wound 05/08/25 1300 Left heel     Wound - Properties Group Placement Date: 05/08/25  -KW Placement Time: 1300  -KW Side: Left  -KW Location: heel  -KW    Wound Image Images linked: 2  -LH      Dressing Appearance dressing loose;moist drainage  -LH      Dressing Removed Type foam;other (see comments)  HFBc not in place, qwick, kerlix, ACE  -      Confirmed Empty Wound Bed Yes, visual inspection of wound bed  -      Base moist;yellow;white;necrotic  -      Periwound intact;dry;redness;swelling;warm  mild erythema  -      Periwound Temperature warm  -      Periwound Skin Turgor soft  -      Edges callused  -      Wound Length (cm) 2.7 cm  -      Wound Width (cm) 3.2 cm  -      Wound Depth (cm) --  obscured  -      Wound Surface Area (cm^2) 6.79 cm^2  -      Drainage Characteristics/Odor yellow;serosanguineous;malodorous  mild malodor  -      Drainage Amount small  -      Care, Wound cleansed with;soap and water;wound cleanser;debrided  -      Dressing Care dressing applied;antimicrobial agent applied;foam;gauze;silver impregnated;low-adherent  HFBt, Optifoam Ag, kerlix, ACE  -      Periwound Care cleansed with pH balanced cleanser;dry periwound area maintained;barrier ointment applied  zguard  -      Retired Wound - Properties Group Placement Date: 05/08/25  -KW Placement Time: 1300  -KW Side: Left  -KW Location: heel  -KW    Retired Wound - Properties Group Placement Date: 05/08/25  -KW Placement Time: 1300  -KW Side: Left  -KW Location: heel  -KW    Retired Wound - Properties Group Date first assessed: 05/08/25  -KW Time first assessed: 1300  -KW Side: Left  -KW Location: heel  -KW       Wound 05/08/25 Left lateral foot    Wound - Properties Group Placement Date: 05/08/25  -KW Side: Left  -KW Orientation: lateral  -KW Location: foot  -KW    Wound Image Images linked: 1  -LH      Dressing Appearance dressing loose;moist drainage  -LH      Dressing Removed Type foam;other (see comments)  HFBc not in place, qwick, kerlix,  ACE  -      Confirmed Empty Wound Bed Yes, visual inspection of wound bed  -      Base moist;slough;yellow;white  -      Periwound intact;dry  -      Periwound Temperature warm  -      Periwound Skin Turgor soft  -      Edges callused  -      Wound Length (cm) 1.2 cm  -      Wound Width (cm) 1.8 cm  -      Wound Depth (cm) --  obscured  -      Wound Surface Area (cm^2) 1.7 cm^2  -      Drainage Characteristics/Odor yellow;serosanguineous;malodorous  mild malodor  -      Drainage Amount small  -      Care, Wound cleansed with;soap and water;wound cleanser;debrided  -      Dressing Care dressing applied;antimicrobial agent applied;foam;gauze;silver impregnated;low-adherent  HFBt, Optifoam Ag, kerlix, ACE  -      Periwound Care cleansed with pH balanced cleanser;dry periwound area maintained;barrier ointment applied  zguard  -      Retired Wound - Properties Group Placement Date: 05/08/25  -KW Side: Left  - Orientation: lateral  - Location: foot  -KW    Retired Wound - Properties Group Placement Date: 05/08/25  -KW Side: Left  -KW Orientation: lateral  - Location: foot  -KW    Retired Wound - Properties Group Date first assessed: 05/08/25  - Side: Left  - Location: foot  -              User Key  (r) = Recorded By, (t) = Taken By, (c) = Cosigned By      Initials Name Provider Type     Cornelio Tripp, PT Physical Therapist    KW Melissa Lang, DORINDA Physical Therapist                      WOUND DEBRIDEMENT  Total area of Debridement: 12cm2  Debridement Site 1  Location- Site 1: L plantar foot  Selective Debridement- Site 1: Wound Surface <20cmsq  Instruments- Site 1: scissors, tweezers, #15, scapel  Excised Tissue Description- Site 1: moderate, slough, other (comment) (periwound callus, nonviable loose tissue)  Bleeding- Site 1: minimum, held pressure, 1 minute   Debridement Site 2  Location- Site 2: L lateral foot  Selective Debridement- Site 2: Wound Surface  <20cmsq  Instruments- Site 2: tweezers, #15, scapel  Excised Tissue Description- Site 2: moderate, slough, other (comment) (periwound callus/crusts)  Bleeding- Site 2: minimum, held pressure, 1 minute   Debridement Site 3  Location- Site 3: L heel  Selective Debridement- Site 3: Wound Surface <20cmsq  Instruments- Site 3: tweezers, #15, scapel  Excised Tissue Description- Site 3: moderate, slough, other (comment) (crusts/callus)  Bleeding- Site 3: scant, held pressure, 1 minute      Therapy Education       Row Name 05/27/25 1000             Therapy Education    Education Details Keep dressings intact until next session. If must change dressing may use entire sheet of HFBt with Optifoam ag to keep in place to help keep HFB in contact with wound base.  -      Given Bandaging/dressing change;Symptoms/condition management  -      Program Progressed;Reinforced  -      How Provided Verbal;Demonstration  -      Provided to Patient  -      Level of Understanding Teach back education performed;Verbalized  -                User Key  (r) = Recorded By, (t) = Taken By, (c) = Cosigned By      Initials Name Provider Type     Cornelio Tripp, PT Physical Therapist                    Recommendation and Plan   PT Assessment/Plan       Row Name 05/27/25 1000          PT Assessment    Functional Limitations Other (comment)  wound management  -     Impairments Integumentary integrity  -     Assessment Comments Pt continuing to demonstrate considerable, thick slough at wound bases requiring extensive debridement. Pt also with considerable dry, flaking nonviabler periwound skin and callus requiring debridement. Pt continuing with mild malodor of wounds/draiange along with mild erythema. Pt still awaiting MRI results. PT trialed transition to HFBt with Optifoam ag as secondary dressing to help keep HFB in contact with wound base to help with autolytic debridement and encouragement of granulation formation. Pt would  continue to benefit from current POC ot promote wound healing.  -     Rehab Potential Fair  -     Patient/caregiver participated in establishment of treatment plan and goals Yes  -     Patient would benefit from skilled therapy intervention Yes  -        PT Plan    PT Frequency 2x/week  -     Physical Therapy Interventions (Optional Details) wound care;patient/family education  -     PT Plan Comments Debridement, dressings.  -               User Key  (r) = Recorded By, (t) = Taken By, (c) = Cosigned By      Initials Name Provider Type     Cornelio Tripp, PT Physical Therapist                    Goals   PT OP Goals       Row Name 05/27/25 1007          Time Calculation    PT Goal Re-Cert Due Date 08/06/25  -               User Key  (r) = Recorded By, (t) = Taken By, (c) = Cosigned By      Initials Name Provider Type     Cornelio Tripp, PT Physical Therapist                    PT Goal Re-Cert Due Date: 08/06/25            Time Calculation: Start Time: 0845  Untimed Charges  72575-Yoflifliq debridement: 35  Total Minutes  Untimed Charges Total Minutes: 35   Total Minutes: 35  Therapy Charges for Today       Code Description Service Date Service Provider Modifiers Qty    [id number] HC MARILYN DEBRIDE OPEN WOUND UP TO 20CM 5/27/2025 Cornelio Tripp, PT GP 1                    Cornelio Tripp PT  5/27/2025

## 2025-05-30 ENCOUNTER — HOSPITAL ENCOUNTER (OUTPATIENT)
Dept: PHYSICAL THERAPY | Facility: HOSPITAL | Age: 80
Setting detail: THERAPIES SERIES
Discharge: HOME | End: 2025-05-30
Payer: MEDICARE

## 2025-05-30 ENCOUNTER — OFFICE VISIT (OUTPATIENT)
Dept: ORTHOPEDIC SURGERY | Facility: CLINIC | Age: 80
End: 2025-05-30
Payer: MEDICARE

## 2025-05-30 ENCOUNTER — PREP FOR SURGERY (OUTPATIENT)
Dept: OTHER | Facility: HOSPITAL | Age: 80
End: 2025-05-30
Payer: MEDICARE

## 2025-05-30 VITALS — BODY MASS INDEX: 30.8 KG/M2 | HEIGHT: 71 IN | WEIGHT: 220.02 LBS

## 2025-05-30 DIAGNOSIS — L98.491 ULCER OF SKIN, CHRONIC, LIMITED TO BREAKDOWN OF SKIN: Primary | ICD-10-CM

## 2025-05-30 DIAGNOSIS — S81.802D MULTIPLE OPEN WOUNDS OF LOWER EXTREMITY, COMPLICATED, LEFT, SUBSEQUENT ENCOUNTER: ICD-10-CM

## 2025-05-30 DIAGNOSIS — L97.529 ULCER OF LEFT FOOT, UNSPECIFIED ULCER STAGE: Primary | ICD-10-CM

## 2025-05-30 PROCEDURE — 97597 DBRDMT OPN WND 1ST 20 CM/<: CPT

## 2025-05-30 NOTE — PROGRESS NOTES
"                          [facility] Office Follow Up     Office Follow Up Visit     Date: 05/30/2025   Patient Name: Carlos Culver  MRN: [id number]  YOB: 1945  Chief Complaint:   Chief Complaint   Patient presents with    Left Foot - Follow-up     After MRI 05/24/2025     History of Present Illness:   Carlos Culver is a 79 y.o. male who is here today for MRI follow-up for left foot ulcers.  Seen regularly in PT wound care.  Also saw [facility].  Was not started on any antibiotics following visit.  Here to discuss further management following completion of MRI.  Denies fevers chills or feeling ill.  No new symptoms.      Subjective   I reviewed the patient's chief complaint, history of present illness, review of systems, past medical history, surgical history, family history, social history, medications and allergy list   Objective    Vital Signs:   Vitals:    05/30/25 0745   Weight: 99.8 kg (220 lb 0.3 oz)   Height: 180.3 cm (70.98\")     Body mass index is 30.7 kg/m².    Ortho Exam:  Shoe and AFO removed for exam, incision at site of Chopart amputation continues to appear well-healed.  Redemonstration wounds about the hindfoot.  No catalino signs of infection at ulcer sites.  No purulence.  Minimal surrounding erythema.       Results Review:  MRI Foot Left Without Contrast  Narrative: MRI FOOT LEFT WO CONTRAST    Date of Exam: 5/24/2025 4:39 PM EDT    Indication: wounds, concern for osteomyelitis.     Comparison: None available.    Technique:  Routine multiplanar/multisequence sequence images of the left foot were obtained without contrast administration.    Findings:  Postoperative changes noted status post amputation at the level of the midfoot/hindfoot.    There is a wound or ulceration involving the plantar soft tissues underlying the distal aspect the calcaneus. There is edema throughout the underlying soft tissues in this region. The " findings suggest changes of soft tissue cellulitis.    There is also evidence for a fluid collection underlying the wound which extends through the soft tissues laterally along the lateral margin of the hindfoot. This finding is somewhat complex or multilobulated in appearance but suggests large abscess   formation. The finding extends towards the inferior margin of the lateral malleolus. The more lateral and superior margin of the collection is more fluid in appearance. This finding measures approximately 7.6 x 1.7 cm in transverse by craniocaudal   dimensions.    No definitive underlying cortical erosion or destruction is identified. No abnormal bone marrow signal is seen. There are no definitive signs of osteomyelitis.  Impression: Impression:  1.Postoperative changes status post amputation at the level of the midfoot/hindfoot.  2.There is a wound or ulceration involving the plantar soft tissues underlying the distal aspect of the calcaneus. There is edema throughout the underlying soft tissues suggesting changes of soft tissue cellulitis.  3.There is a large fluid collection underlying the wound which extends through the soft tissues laterally along the lateral margin of the hindfoot. The findings suggest large abscess formation. The finding extends towards the inferior margin of the   lateral malleolus.  4.No definitive evidence for osteomyelitis.    Electronically Signed: Evert Syed MD    5/29/2025 9:20 AM EDT    Workstation ID: [id number]       Assessment / Plan    Assessment/Plan:   Diagnoses and all orders for this visit:    1. Ulcer of left foot, unspecified ulcer stage (Primary)  -     Case Request; Standing  -     ethyl alcohol 62 % 2 each  -     Case Request    Other orders  -     Follow Anesthesia Guidelines / Protocol; Standing  -     Nerve Block; Standing  -     Verify The Time Patient Completed ERAS Hydration Drink; Standing  -     SCD (Sequential Compression Device) Place on Patient in Pre-Op;  Standing  -     Clip Operative Site; Standing  -     Provide Patient With Carbo Loading Instructions  -     Provide Patient With ERAS Booklet(s)/Handout      Discussed MRI findings with patient in clinic today.  MRI shows abscess and labs support continued infection.  I recommended surgical intervention drainage of abscess with possible wound VAC placement and exostectomy of prominent bone that was the causative previous area of ulceration plantar foot.  We also discussed below-knee amputation.  Patient with a lot of questions that were answered in clinic today.  Plan for surgery next Tuesday.  Patient is going to use the weekend to think about his options.  Will consider I&D versus BKA.  Counseled patient to contact the clinic for additional questions.  I also offered information on talking to other people about a below knee amputation, patient deferred.  Counseled patient on the importance of urgent treatment of his infection to prevent further complications including sepsis and/or death.  I recommended to patient he wait no longer than next Tuesday to schedule surgery.  Also recommended patient contact infectious disease for antibiotic management.    The risks and benefits of the procedure were discussed with the patient, which include but are not limited to the risk of bleeding, infection, neurovascular damage, post-operative stiffness, continued/chronic pain, need for further revision surgeries in the future, deep venous thrombosis, deformity, need for further surgery, amputation, etc. We also discussed the general risks from anesthesia including death, strokes, heart attacks, blood clots, etc. We also discussed the post-operative rehabilitation, the need for physical therapy, and the overall expected outcomes from the procedure. We also discussed what would happen if we do nothing. I allowed proper time and answered the patient's questions regarding the procedure. The patient expressed understanding. Knowing  what the risks are and what the conservative treatment is, the patient elected to forgo any further conservative treatment options and proceed with the surgical intervention.     Follow Up:   Return in about 2 weeks for F/U after Surgery.      Mao Mckay MD  [facility]

## 2025-05-30 NOTE — H&P (VIEW-ONLY)
"                          [facility] Follow Up     Office Follow Up Visit     Date: 05/30/2025   Patient Name: Carlos Culver  MRN: [id number]  YOB: 1945  Chief Complaint:   Chief Complaint   Patient presents with    Left Foot - Follow-up     After MRI 05/24/2025     History of Present Illness:   Carlos Culver is a 79 y.o. male who is here today for MRI follow-up for left foot ulcers.  Seen regularly in PT wound care.  Also saw [place] infectious disease consultants.  Was not started on any antibiotics following visit.  Here to discuss further management following completion of MRI.  Denies fevers chills or feeling ill.  No new symptoms.      Subjective   I reviewed the patient's chief complaint, history of present illness, review of systems, past medical history, surgical history, family history, social history, medications and allergy list   Objective    Vital Signs:   Vitals:    05/30/25 0745   Weight: 99.8 kg (220 lb 0.3 oz)   Height: 180.3 cm (70.98\")     Body mass index is 30.7 kg/m².    Ortho Exam:  Shoe and AFO removed for exam, incision at site of Chopart amputation continues to appear well-healed.  Redemonstration wounds about the hindfoot.  No catalino signs of infection at ulcer sites.  No purulence.  Minimal surrounding erythema.       Results Review:  MRI Foot Left Without Contrast  Narrative: MRI FOOT LEFT WO CONTRAST    Date of Exam: 5/24/2025 4:39 PM EDT    Indication: wounds, concern for osteomyelitis.     Comparison: None available.    Technique:  Routine multiplanar/multisequence sequence images of the left foot were obtained without contrast administration.    Findings:  Postoperative changes noted status post amputation at the level of the midfoot/hindfoot.    There is a wound or ulceration involving the plantar soft tissues underlying the distal aspect the calcaneus. There is edema throughout the underlying soft tissues in this region. The " findings suggest changes of soft tissue cellulitis.    There is also evidence for a fluid collection underlying the wound which extends through the soft tissues laterally along the lateral margin of the hindfoot. This finding is somewhat complex or multilobulated in appearance but suggests large abscess   formation. The finding extends towards the inferior margin of the lateral malleolus. The more lateral and superior margin of the collection is more fluid in appearance. This finding measures approximately 7.6 x 1.7 cm in transverse by craniocaudal   dimensions.    No definitive underlying cortical erosion or destruction is identified. No abnormal bone marrow signal is seen. There are no definitive signs of osteomyelitis.  Impression: Impression:  1.Postoperative changes status post amputation at the level of the midfoot/hindfoot.  2.There is a wound or ulceration involving the plantar soft tissues underlying the distal aspect of the calcaneus. There is edema throughout the underlying soft tissues suggesting changes of soft tissue cellulitis.  3.There is a large fluid collection underlying the wound which extends through the soft tissues laterally along the lateral margin of the hindfoot. The findings suggest large abscess formation. The finding extends towards the inferior margin of the   lateral malleolus.  4.No definitive evidence for osteomyelitis.    Electronically Signed: Evert Syed MD    5/29/2025 9:20 AM EDT    Workstation ID: [id number]       Assessment / Plan    Assessment/Plan:   Diagnoses and all orders for this visit:    1. Ulcer of left foot, unspecified ulcer stage (Primary)  -     Case Request; Standing  -     ethyl alcohol 62 % 2 each  -     Case Request    Other orders  -     Follow Anesthesia Guidelines / Protocol; Standing  -     Nerve Block; Standing  -     Verify The Time Patient Completed ERAS Hydration Drink; Standing  -     SCD (Sequential Compression Device) Place on Patient in Pre-Op;  Standing  -     Clip Operative Site; Standing  -     Provide Patient With Carbo Loading Instructions  -     Provide Patient With ERAS Booklet(s)/Handout      Discussed MRI findings with patient in clinic today.  MRI shows abscess and labs support continued infection.  I recommended surgical intervention drainage of abscess with possible wound VAC placement and exostectomy of prominent bone that was the causative previous area of ulceration plantar foot.  We also discussed below-knee amputation.  Patient with a lot of questions that were answered in clinic today.  Plan for surgery next Tuesday.  Patient is going to use the weekend to think about his options.  Will consider I&D versus BKA.  Counseled patient to contact the clinic for additional questions.  I also offered information on talking to other people about a below knee amputation, patient deferred.  Counseled patient on the importance of urgent treatment of his infection to prevent further complications including sepsis and/or death.  I recommended to patient he wait no longer than next Tuesday to schedule surgery.  Also recommended patient contact infectious disease for antibiotic management.    The risks and benefits of the procedure were discussed with the patient, which include but are not limited to the risk of bleeding, infection, neurovascular damage, post-operative stiffness, continued/chronic pain, need for further revision surgeries in the future, deep venous thrombosis, deformity, need for further surgery, amputation, etc. We also discussed the general risks from anesthesia including death, strokes, heart attacks, blood clots, etc. We also discussed the post-operative rehabilitation, the need for physical therapy, and the overall expected outcomes from the procedure. We also discussed what would happen if we do nothing. I allowed proper time and answered the patient's questions regarding the procedure. The patient expressed understanding. Knowing  what the risks are and what the conservative treatment is, the patient elected to forgo any further conservative treatment options and proceed with the surgical intervention.     Follow Up:   Return in about 2 weeks for F/U after Surgery.      Mao Mckya MD  [facility] [place] [facility]

## 2025-05-30 NOTE — THERAPY WOUND CARE TREATMENT
Outpatient Rehabilitation - Wound/Debridement Treatment Note  [facility]     Patient Name: Carlos Culver  : 1945  MRN: [id number]  Today's Date: 2025                 Admit Date: 2025    Visit Dx:    ICD-10-CM ICD-9-CM   1. Ulcer of skin, chronic, limited to breakdown of skin  L98.491 707.9   2. Multiple open wounds of lower extremity, complicated, left, subsequent encounter  S81.802D V58.89     894.1                       Patient Active Problem List   Diagnosis    Cellulitis of left foot    GUCCI (acute kidney injury)    Essential hypertension    Leukocytosis    Osteomyelitis of left foot    Chronic anticoagulation    Neuropathy    Elevated transaminase level    CKD (chronic kidney disease) stage 2, GFR 60-89 ml/min    Skin ulcer of right heel    Chronic multifocal osteomyelitis of left foot    Contracture of joint of foot, left    MARTINEZ (dyspnea on exertion)    Neuropathic ulcer, limited to breakdown of skin    Hyperlipidemia LDL goal <100    A-fib    S/P transmetatarsal amputation of foot, left    Leukocytosis, likely reactive    Acute postoperative pain    Alcohol abuse    Staphylococcus aureus bacteremia    Acute osteomyelitis of left ankle or foot    History of Chopart amputation of left foot    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 evidence of necrosis    Ulcer of left foot        Past Medical History:   Diagnosis Date    A-fib     Acute kidney failure     Elevated cholesterol     Hard of hearing     Hyperlipidemia     Hypertension     Renal disorder     Wears hearing aid in both ears     Wears reading eyeglasses         Past Surgical History:   Procedure Laterality Date    AMPUTATION DIGIT Left 2020    Procedure: AMPUTATION LEFT 2ND TOE;  Surgeon: Idania Osborn MD;  Location: [facility];  Service: Orthopedics    AMPUTATION DIGIT Left 2020    Procedure: TRANSMETATARSAL AMPUTATION LEFT;  Surgeon: Idania Osborn MD;  Location:   [facility];  Service: Orthopedics;  Laterality: Left;    APPENDECTOMY      COLONOSCOPY  2006    FOOT SURGERY Left 12/09/2020    (L) transmetatarsal amputation 12/09/2020 - Dr. Idania Osborn    INCISION AND DRAINAGE LEG Left 10/23/2018    Procedure: INCISION AND DRAINAGE LEFT FOOT;  Surgeon: Idania Osborn MD;  Location:  [facility];  Service: Orthopedics    INCISION AND DRAINAGE LEG Left 7/21/2023    Procedure: FOOT INCISION AND DRAINAGE; REVISION AMPUTATION; ACHILLES LENGTHENING -LEFT;  Surgeon: Mao Mckay MD;  Location:  [facility];  Service: Orthopedics;  Laterality: Left;    TOE AMPUTATION      TONSILLECTOMY      VENOUS ACCESS DEVICE (PORT) INSERTION N/A 10/23/2018    Procedure: GROSHONG CATHETER PLACEMENT;  Surgeon: Marquez Simons MD;  Location:  [facility];  Service: General    VENOUS ACCESS DEVICE (PORT) INSERTION N/A 12/9/2020    Procedure: GROSHONG INSERTION;  Surgeon: Antonio Bains MD;  Location: [facility];  Service: General;  Laterality: N/A;    VENOUS ACCESS DEVICE (PORT) REMOVAL           EVALUATION   PT Ortho       Row Name 05/30/25 1000       Subjective    Subjective Comments Reports he got the MRI results back and had an appointment with Dr. Mckay this morning. Reports the MRI did not show any evidence of osteomyelitis, however did show a large abscess at the lateral foot. Reports Dr. Mckay recommended hospital admission with potential I&D, however pt reports he is unsure if that is the route he would like to pursue as he has been dealing with these issues for years and thinks he might be better off pursuing L BKA, however will need to think about his options prior to making a decision.  -LH       Precautions and Contraindications    Precautions insensate BLE  -LH       Subjective Pain    Able to rate subjective pain? yes  -LH    Pre-Treatment Pain Level 0  -LH    Post-Treatment Pain Level 0  -LH       Transfers    [place] Level (Floor Transfer) modified independence  -LH     Assistive Device (Floor Transfer) walker, knee scooter  -    Comment, (Transfers) Long sitting for tx w/ wedge.  -       Gait/Stairs (Locomotion)    [place] Level (Gait) modified independence  -    Assistive Device (Gait) walker, knee scooter  -              User Key  (r) = Recorded By, (t) = Taken By, (c) = Cosigned By      Initials Name Provider Type     Cornelio Tripp, PT Physical Therapist                     LDA Wound       Row Name 05/30/25 1000             Wound 05/08/25 1300 Left plantar    Wound - Properties Group Placement Date: 05/08/25  -KW Placement Time: 1300  -KW Side: Left  -KW Location: plantar  -KW    Wound Image Images linked: 1  -      Dressing Appearance dressing loose  -      Dressing Removed Type gauze  kerlix only from MD office  -      Confirmed Empty Wound Bed Yes, visual inspection of wound bed  -      Base yellow;red;granulating;moist;slough  -      Periwound intact;dry  -      Periwound Temperature warm  -      Periwound Skin Turgor soft  -      Edges callused  -      Drainage Characteristics/Odor yellow;serosanguineous;malodorous  mild malodor  -      Drainage Amount small  -      Care, Wound cleansed with;soap and water;wound cleanser;debrided  -      Dressing Care dressing applied;antimicrobial agent applied;foam;gauze;silver impregnated;low-adherent  HFBt, Optifoam Ag, kerlix, ACE  -      Periwound Care cleansed with pH balanced cleanser;dry periwound area maintained;barrier ointment applied  zguard  -      Retired Wound - Properties Group Placement Date: 05/08/25  -KW Placement Time: 1300  -KW Side: Left  -KW Location: plantar  -KW    Retired Wound - Properties Group Placement Date: 05/08/25  -KW Placement Time: 1300  -KW Side: Left  -KW Location: plantar  -KW    Retired Wound - Properties Group Date first assessed: 05/08/25  -KW Time first assessed: 1300  -KW Side: Left  -KW Location: plantar  -KW       Wound 05/08/25 1300 Left heel     Wound - Properties Group Placement Date: 05/08/25  -KW Placement Time: 1300  -KW Side: Left  -KW Location: heel  -KW    Wound Image Images linked: 1  -LH      Dressing Appearance dressing loose;moist drainage  -LH      Dressing Removed Type gauze  kerlix only from [facility]      Confirmed Empty Wound Bed Yes, visual inspection of wound bed  -LH      Base moist;yellow;white;necrotic  -      Periwound intact;dry;redness;swelling;warm  mild erythema  -      Periwound Temperature warm  -      Periwound Skin Turgor soft  -      Edges callused  -      Drainage Characteristics/Odor yellow;serosanguineous;malodorous  mild malodor  -      Drainage Amount small  -      Care, Wound cleansed with;soap and water;wound cleanser;debrided  -      Dressing Care dressing applied;antimicrobial agent applied;foam;gauze;silver impregnated;low-adherent  HFBt, Optifoam Ag, kerlix, ACE  -      Periwound Care cleansed with pH balanced cleanser;dry periwound area maintained;barrier ointment applied  zguard  -      Retired Wound - Properties Group Placement Date: 05/08/25  -KW Placement Time: 1300  -KW Side: Left  -KW Location: heel  -KW    Retired Wound - Properties Group Placement Date: 05/08/25  -KW Placement Time: 1300  -KW Side: Left  -KW Location: heel  -KW    Retired Wound - Properties Group Date first assessed: 05/08/25  -KW Time first assessed: 1300  -KW Side: Left  -KW Location: heel  -KW       Wound 05/08/25 Left lateral foot    Wound - Properties Group Placement Date: 05/08/25  -KW Side: Left  -KW Orientation: lateral  -KW Location: foot  -KW    Wound Image Images linked: 2  -LH      Dressing Appearance dressing loose;moist drainage  -LH      Dressing Removed Type gauze  kerlix only from [facility]      Confirmed Empty Wound Bed Yes, visual inspection of wound bed  -LH      Base moist;slough;yellow;white  -LH      Periwound intact;dry;other (see comments);excoriated;swelling;redness  red excoriated area proximal  ankle  -      Periwound Temperature warm  -      Periwound Skin Turgor soft  -      Edges callused  -      Drainage Characteristics/Odor yellow;serosanguineous;malodorous  mild malodor  -      Drainage Amount small  -LH      Care, Wound cleansed with;soap and water;wound cleanser;debrided  -LH      Dressing Care dressing applied;antimicrobial agent applied;foam;gauze;silver impregnated;low-adherent  HFBt, Optifoam Ag, kerlix, ACE  -      Periwound Care cleansed with pH balanced cleanser;dry periwound area maintained;barrier ointment applied  zguard  -      Retired Wound - Properties Group Placement Date: 05/08/25  -KW Side: Left  -KW Orientation: lateral  -KW Location: foot  -KW    Retired Wound - Properties Group Placement Date: 05/08/25  -KW Side: Left  -KW Orientation: lateral  -KW Location: foot  -KW    Retired Wound - Properties Group Date first assessed: 05/08/25  -KW Side: Left  -KW Location: foot  -KW              User Key  (r) = Recorded By, (t) = Taken By, (c) = Cosigned By      Initials Name Provider Type     Cornelio Tripp, PT Physical Therapist    KW Melissa Lang, DORINDA Physical Therapist                      WOUND DEBRIDEMENT  Total area of Debridement: 8cm2  Debridement Site 1  Location- Site 1: L plantar foot  Selective Debridement- Site 1: Wound Surface <20cmsq  Instruments- Site 1: scissors, tweezers, #15, scapel  Excised Tissue Description- Site 1: moderate, other (comment) (crusts/callus)  Bleeding- Site 1: none   Debridement Site 2  Location- Site 2: L lateral foot  Selective Debridement- Site 2: Wound Surface <20cmsq  Instruments- Site 2: tweezers, #15, scapel  Excised Tissue Description- Site 2: moderate, slough, other (comment) (crusts/callus)  Bleeding- Site 2: scant   Debridement Site 3  Location- Site 3: L heel  Selective Debridement- Site 3: Wound Surface <20cmsq  Instruments- Site 3: tweezers, #15, scapel  Excised Tissue Description- Site 3: moderate, slough, other  (comment) (crusts/callus)  Bleeding- Site 3: scant      Therapy Education       Row Name 05/30/25 1000             Therapy Education    Education Details Continue with current dressings. Reviewed potential options/benefits for potential BKA related to wound healing potential. Call and update PT on decision and surgical POC when able.  -      Given Bandaging/dressing change;Symptoms/condition management  -      Program Progressed;Reinforced  -      How Provided Verbal;Demonstration  -      Provided to Patient  -      Level of Understanding Teach back education performed;Verbalized  -                User Key  (r) = Recorded By, (t) = Taken By, (c) = Cosigned By      Initials Name Provider Type     Cornelio Tripp, PT Physical Therapist                    Recommendation and Plan   PT Assessment/Plan       Row Name 05/30/25 1000          PT Assessment    Functional Limitations Other (comment)  wound management  -     Impairments Integumentary integrity  -     Assessment Comments Pt presenting this date with small improvements in periwound skin integrity related to change in dressing type during previous session. Pt's wounds however remain relatively stable with no significant improvements noted. Pt still with moderate thick layer of slough from L lateral and L heel wounds. Pt now with MRI results indicating large abscess formation in which Dr. Mckay recommeded hosptial admission for I&D per pt report. Pt, however, is unsure if he would like to pursue I&D or pursue BKA instead. Pt would continue to benefit from further debridement and advanced wound dressing management while awaiting decision on surgical management.  -     Rehab Potential Fair  -     Patient/caregiver participated in establishment of treatment plan and goals Yes  -     Patient would benefit from skilled therapy intervention Yes  -        PT Plan    PT Frequency 2x/week  -     Physical Therapy Interventions (Optional Details)  wound care;patient/family education  -     PT Plan Comments Debridement, dressings, change in frequency/DC pending surgical POC  -               User Key  (r) = Recorded By, (t) = Taken By, (c) = Cosigned By      Initials Name Provider Type     Cornelio Tripp, PT Physical Therapist                    Goals   PT OP Goals       Row Name 05/30/25 1012          Time Calculation    PT Goal Re-Cert Due Date 08/06/25  -               User Key  (r) = Recorded By, (t) = Taken By, (c) = Cosigned By      Initials Name Provider Type     Cornelio Tripp, PT Physical Therapist                    PT Goal Re-Cert Due Date: 08/06/25            Time Calculation: Start Time: 0930  Untimed Charges  50616-Wtryrrvon debridement: 30  Total Minutes  Untimed Charges Total Minutes: 30   Total Minutes: 30  Therapy Charges for Today       Code Description Service Date Service Provider Modifiers Qty    86270041088 HC MARILYN DEBRIDE OPEN WOUND UP TO 20CM 5/30/2025 Cornelio Tripp, PT GP 1                    Cornelio Tripp PT  5/30/2025

## 2025-06-02 ENCOUNTER — ANESTHESIA EVENT (OUTPATIENT)
Dept: PERIOP | Facility: HOSPITAL | Age: 80
End: 2025-06-02
Payer: MEDICARE

## 2025-06-02 RX ORDER — FAMOTIDINE 10 MG/ML
20 INJECTION, SOLUTION INTRAVENOUS ONCE
Status: CANCELLED | OUTPATIENT
Start: 2025-06-02 | End: 2025-06-02

## 2025-06-02 RX ORDER — UBIDECARENONE 75 MG
1000 CAPSULE ORAL DAILY
COMMUNITY

## 2025-06-03 ENCOUNTER — APPOINTMENT (OUTPATIENT)
Dept: GENERAL RADIOLOGY | Facility: HOSPITAL | Age: 80
End: 2025-06-03
Payer: MEDICARE

## 2025-06-03 ENCOUNTER — APPOINTMENT (OUTPATIENT)
Dept: PHYSICAL THERAPY | Facility: HOSPITAL | Age: 80
End: 2025-06-03
Payer: MEDICARE

## 2025-06-03 ENCOUNTER — ANESTHESIA EVENT CONVERTED (OUTPATIENT)
Dept: ANESTHESIOLOGY | Facility: HOSPITAL | Age: 80
End: 2025-06-03
Payer: MEDICARE

## 2025-06-03 ENCOUNTER — ANESTHESIA (OUTPATIENT)
Dept: PERIOP | Facility: HOSPITAL | Age: 80
End: 2025-06-03
Payer: MEDICARE

## 2025-06-03 ENCOUNTER — HOSPITAL ENCOUNTER (OUTPATIENT)
Facility: HOSPITAL | Age: 80
LOS: 1 days | Discharge: HOME | End: 2025-06-09
Attending: ORTHOPAEDIC SURGERY | Admitting: ORTHOPAEDIC SURGERY
Payer: MEDICARE

## 2025-06-03 DIAGNOSIS — L02.612 ABSCESS OF LEFT FOOT: Primary | ICD-10-CM

## 2025-06-03 DIAGNOSIS — L97.529 ULCER OF LEFT FOOT, UNSPECIFIED ULCER STAGE: ICD-10-CM

## 2025-06-03 PROBLEM — L08.9 LEFT FOOT INFECTION: Status: ACTIVE | Noted: 2025-06-03

## 2025-06-03 PROCEDURE — 25010000002 LIDOCAINE PF 1% 1 % SOLUTION: Performed by: ANESTHESIOLOGY

## 2025-06-03 PROCEDURE — A9270 NON-COVERED ITEM OR SERVICE: HCPCS | Performed by: ORTHOPAEDIC SURGERY

## 2025-06-03 PROCEDURE — 87102 FUNGUS ISOLATION CULTURE: CPT | Performed by: ORTHOPAEDIC SURGERY

## 2025-06-03 PROCEDURE — 87075 CULTR BACTERIA EXCEPT BLOOD: CPT | Performed by: ORTHOPAEDIC SURGERY

## 2025-06-03 PROCEDURE — 63710000001 VITAMIN B-12 1000 MCG TABLET: Performed by: ORTHOPAEDIC SURGERY

## 2025-06-03 PROCEDURE — 87015 SPECIMEN INFECT AGNT CONCNTJ: CPT | Performed by: ORTHOPAEDIC SURGERY

## 2025-06-03 PROCEDURE — 87070 CULTURE OTHR SPECIMN AEROBIC: CPT | Performed by: ORTHOPAEDIC SURGERY

## 2025-06-03 PROCEDURE — 25010000002 PHENYLEPHRINE HCL-NACL 1000-0.9 MCG/10ML-% SOLUTION PREFILLED SYRINGE

## 2025-06-03 PROCEDURE — 25810000003 LACTATED RINGERS PER 1000 ML: Performed by: ANESTHESIOLOGY

## 2025-06-03 PROCEDURE — 25810000003 SODIUM CHLORIDE 0.9 % SOLUTION: Performed by: ORTHOPAEDIC SURGERY

## 2025-06-03 PROCEDURE — 87116 MYCOBACTERIA CULTURE: CPT | Performed by: ORTHOPAEDIC SURGERY

## 2025-06-03 PROCEDURE — A9270 NON-COVERED ITEM OR SERVICE: HCPCS | Performed by: INTERNAL MEDICINE

## 2025-06-03 PROCEDURE — 25010000002 ONDANSETRON PER 1 MG: Performed by: ANESTHESIOLOGY

## 2025-06-03 PROCEDURE — 25010000002 SUGAMMADEX 200 MG/2ML SOLUTION: Performed by: ANESTHESIOLOGY

## 2025-06-03 PROCEDURE — 87147 CULTURE TYPE IMMUNOLOGIC: CPT | Performed by: ORTHOPAEDIC SURGERY

## 2025-06-03 PROCEDURE — 63710000001 ATORVASTATIN 40 MG TABLET: Performed by: ORTHOPAEDIC SURGERY

## 2025-06-03 PROCEDURE — 25010000002 LIDOCAINE PF 1% 1 % SOLUTION

## 2025-06-03 PROCEDURE — 25010000002 CEFAZOLIN PER 500 MG: Performed by: ANESTHESIOLOGY

## 2025-06-03 PROCEDURE — 97530 THERAPEUTIC ACTIVITIES: CPT

## 2025-06-03 PROCEDURE — 87176 TISSUE HOMOGENIZATION CULTR: CPT | Performed by: ORTHOPAEDIC SURGERY

## 2025-06-03 PROCEDURE — 87186 SC STD MICRODIL/AGAR DIL: CPT | Performed by: ORTHOPAEDIC SURGERY

## 2025-06-03 PROCEDURE — 76000 FLUOROSCOPY <1 HR PHYS/QHP: CPT

## 2025-06-03 PROCEDURE — 25010000002 PROPOFOL 10 MG/ML EMULSION

## 2025-06-03 PROCEDURE — 97162 PT EVAL MOD COMPLEX 30 MIN: CPT

## 2025-06-03 PROCEDURE — 87205 SMEAR GRAM STAIN: CPT | Performed by: ORTHOPAEDIC SURGERY

## 2025-06-03 PROCEDURE — 63710000001 METOPROLOL TARTRATE 25 MG TABLET: Performed by: ORTHOPAEDIC SURGERY

## 2025-06-03 PROCEDURE — 25010000002 CEFAZOLIN PER 500 MG: Performed by: ORTHOPAEDIC SURGERY

## 2025-06-03 PROCEDURE — 87206 SMEAR FLUORESCENT/ACID STAI: CPT | Performed by: ORTHOPAEDIC SURGERY

## 2025-06-03 PROCEDURE — 63710000001 FOLIC ACID 1 MG TABLET: Performed by: INTERNAL MEDICINE

## 2025-06-03 PROCEDURE — 25010000002 FENTANYL CITRATE (PF) 50 MCG/ML SOLUTION

## 2025-06-03 PROCEDURE — 25010000002 DEXAMETHASONE PER 1 MG

## 2025-06-03 DEVICE — SEAL HEMO SURG ARISTA/AH ABS/PWDR 3GM: Type: IMPLANTABLE DEVICE | Site: FOOT | Status: FUNCTIONAL

## 2025-06-03 RX ORDER — ONDANSETRON 2 MG/ML
4 INJECTION INTRAMUSCULAR; INTRAVENOUS EVERY 6 HOURS PRN
Status: DISCONTINUED | OUTPATIENT
Start: 2025-06-03 | End: 2025-06-09 | Stop reason: HOSPADM

## 2025-06-03 RX ORDER — FENTANYL CITRATE 50 UG/ML
INJECTION, SOLUTION INTRAMUSCULAR; INTRAVENOUS AS NEEDED
Status: DISCONTINUED | OUTPATIENT
Start: 2025-06-03 | End: 2025-06-03 | Stop reason: SURG

## 2025-06-03 RX ORDER — LIDOCAINE HYDROCHLORIDE 10 MG/ML
INJECTION, SOLUTION EPIDURAL; INFILTRATION; INTRACAUDAL; PERINEURAL AS NEEDED
Status: DISCONTINUED | OUTPATIENT
Start: 2025-06-03 | End: 2025-06-03 | Stop reason: SURG

## 2025-06-03 RX ORDER — METOPROLOL TARTRATE 25 MG/1
25 TABLET, FILM COATED ORAL 2 TIMES DAILY
Status: DISCONTINUED | OUTPATIENT
Start: 2025-06-03 | End: 2025-06-09 | Stop reason: HOSPADM

## 2025-06-03 RX ORDER — LIDOCAINE HYDROCHLORIDE 10 MG/ML
0.5 INJECTION, SOLUTION EPIDURAL; INFILTRATION; INTRACAUDAL; PERINEURAL ONCE AS NEEDED
Status: COMPLETED | OUTPATIENT
Start: 2025-06-03 | End: 2025-06-03

## 2025-06-03 RX ORDER — PROPOFOL 10 MG/ML
VIAL (ML) INTRAVENOUS AS NEEDED
Status: DISCONTINUED | OUTPATIENT
Start: 2025-06-03 | End: 2025-06-03 | Stop reason: SURG

## 2025-06-03 RX ORDER — HYDROMORPHONE HYDROCHLORIDE 2 MG/ML
0.5 INJECTION, SOLUTION INTRAMUSCULAR; INTRAVENOUS; SUBCUTANEOUS
Status: DISCONTINUED | OUTPATIENT
Start: 2025-06-03 | End: 2025-06-09 | Stop reason: HOSPADM

## 2025-06-03 RX ORDER — LABETALOL HYDROCHLORIDE 5 MG/ML
10 INJECTION, SOLUTION INTRAVENOUS EVERY 4 HOURS PRN
Status: DISCONTINUED | OUTPATIENT
Start: 2025-06-03 | End: 2025-06-09 | Stop reason: HOSPADM

## 2025-06-03 RX ORDER — CEFAZOLIN SODIUM 1 G/3ML
INJECTION, POWDER, FOR SOLUTION INTRAMUSCULAR; INTRAVENOUS AS NEEDED
Status: DISCONTINUED | OUTPATIENT
Start: 2025-06-03 | End: 2025-06-03 | Stop reason: SURG

## 2025-06-03 RX ORDER — SODIUM CHLORIDE 9 MG/ML
40 INJECTION, SOLUTION INTRAVENOUS AS NEEDED
Status: DISCONTINUED | OUTPATIENT
Start: 2025-06-03 | End: 2025-06-09 | Stop reason: HOSPADM

## 2025-06-03 RX ORDER — MAGNESIUM HYDROXIDE 1200 MG/15ML
LIQUID ORAL AS NEEDED
Status: DISCONTINUED | OUTPATIENT
Start: 2025-06-03 | End: 2025-06-03 | Stop reason: HOSPADM

## 2025-06-03 RX ORDER — FOLIC ACID 1 MG/1
1 TABLET ORAL DAILY
Status: DISCONTINUED | OUTPATIENT
Start: 2025-06-03 | End: 2025-06-09 | Stop reason: HOSPADM

## 2025-06-03 RX ORDER — ACETAMINOPHEN 650 MG/1
650 SUPPOSITORY RECTAL EVERY 4 HOURS PRN
Status: DISCONTINUED | OUTPATIENT
Start: 2025-06-03 | End: 2025-06-09 | Stop reason: HOSPADM

## 2025-06-03 RX ORDER — EPHEDRINE SULFATE 50 MG/ML
INJECTION INTRAVENOUS AS NEEDED
Status: DISCONTINUED | OUTPATIENT
Start: 2025-06-03 | End: 2025-06-03 | Stop reason: SURG

## 2025-06-03 RX ORDER — ONDANSETRON 2 MG/ML
INJECTION INTRAMUSCULAR; INTRAVENOUS AS NEEDED
Status: DISCONTINUED | OUTPATIENT
Start: 2025-06-03 | End: 2025-06-03 | Stop reason: SURG

## 2025-06-03 RX ORDER — ATORVASTATIN CALCIUM 40 MG/1
80 TABLET, FILM COATED ORAL NIGHTLY
Status: DISCONTINUED | OUTPATIENT
Start: 2025-06-03 | End: 2025-06-09 | Stop reason: HOSPADM

## 2025-06-03 RX ORDER — LANOLIN ALCOHOL/MO/W.PET/CERES
1000 CREAM (GRAM) TOPICAL DAILY
Status: DISCONTINUED | OUTPATIENT
Start: 2025-06-03 | End: 2025-06-09 | Stop reason: HOSPADM

## 2025-06-03 RX ORDER — SODIUM CHLORIDE 0.9 % (FLUSH) 0.9 %
10 SYRINGE (ML) INJECTION AS NEEDED
Status: DISCONTINUED | OUTPATIENT
Start: 2025-06-03 | End: 2025-06-09 | Stop reason: HOSPADM

## 2025-06-03 RX ORDER — ROCURONIUM BROMIDE 10 MG/ML
INJECTION, SOLUTION INTRAVENOUS AS NEEDED
Status: DISCONTINUED | OUTPATIENT
Start: 2025-06-03 | End: 2025-06-03 | Stop reason: SURG

## 2025-06-03 RX ORDER — SODIUM CHLORIDE, SODIUM LACTATE, POTASSIUM CHLORIDE, CALCIUM CHLORIDE 600; 310; 30; 20 MG/100ML; MG/100ML; MG/100ML; MG/100ML
9 INJECTION, SOLUTION INTRAVENOUS CONTINUOUS
Status: ACTIVE | OUTPATIENT
Start: 2025-06-03 | End: 2025-06-04

## 2025-06-03 RX ORDER — DEXAMETHASONE SODIUM PHOSPHATE 4 MG/ML
INJECTION, SOLUTION INTRA-ARTICULAR; INTRALESIONAL; INTRAMUSCULAR; INTRAVENOUS; SOFT TISSUE AS NEEDED
Status: DISCONTINUED | OUTPATIENT
Start: 2025-06-03 | End: 2025-06-03 | Stop reason: SURG

## 2025-06-03 RX ORDER — ACETAMINOPHEN 325 MG/1
650 TABLET ORAL EVERY 4 HOURS PRN
Status: DISCONTINUED | OUTPATIENT
Start: 2025-06-03 | End: 2025-06-09 | Stop reason: HOSPADM

## 2025-06-03 RX ORDER — SODIUM CHLORIDE 0.9 % (FLUSH) 0.9 %
10 SYRINGE (ML) INJECTION AS NEEDED
Status: DISCONTINUED | OUTPATIENT
Start: 2025-06-03 | End: 2025-06-03 | Stop reason: HOSPADM

## 2025-06-03 RX ORDER — SODIUM CHLORIDE 0.9 % (FLUSH) 0.9 %
10 SYRINGE (ML) INJECTION EVERY 12 HOURS SCHEDULED
Status: DISCONTINUED | OUTPATIENT
Start: 2025-06-03 | End: 2025-06-09 | Stop reason: HOSPADM

## 2025-06-03 RX ORDER — NAPROXEN SODIUM 220 MG/1
220 TABLET, FILM COATED ORAL 2 TIMES DAILY PRN
COMMUNITY

## 2025-06-03 RX ORDER — DIPHENHYDRAMINE HCL 50 MG
100 CAPSULE ORAL NIGHTLY PRN
Status: DISCONTINUED | OUTPATIENT
Start: 2025-06-03 | End: 2025-06-09 | Stop reason: HOSPADM

## 2025-06-03 RX ORDER — OXYCODONE HYDROCHLORIDE 5 MG/1
5 TABLET ORAL EVERY 4 HOURS PRN
Status: DISCONTINUED | OUTPATIENT
Start: 2025-06-03 | End: 2025-06-09 | Stop reason: HOSPADM

## 2025-06-03 RX ORDER — POLYETHYLENE GLYCOL 3350 17 G/17G
17 POWDER, FOR SOLUTION ORAL AS NEEDED
Status: DISCONTINUED | OUTPATIENT
Start: 2025-06-03 | End: 2025-06-09 | Stop reason: HOSPADM

## 2025-06-03 RX ORDER — MIDAZOLAM HYDROCHLORIDE 1 MG/ML
0.5 INJECTION, SOLUTION INTRAMUSCULAR; INTRAVENOUS
Status: DISCONTINUED | OUTPATIENT
Start: 2025-06-03 | End: 2025-06-03 | Stop reason: HOSPADM

## 2025-06-03 RX ORDER — ONDANSETRON 4 MG/1
4 TABLET, ORALLY DISINTEGRATING ORAL EVERY 6 HOURS PRN
Status: DISCONTINUED | OUTPATIENT
Start: 2025-06-03 | End: 2025-06-09 | Stop reason: HOSPADM

## 2025-06-03 RX ORDER — IBUPROFEN 200 MG
400 TABLET ORAL EVERY 6 HOURS PRN
COMMUNITY
End: 2025-06-09 | Stop reason: HOSPADM

## 2025-06-03 RX ORDER — FAMOTIDINE 20 MG/1
20 TABLET, FILM COATED ORAL ONCE
Status: COMPLETED | OUTPATIENT
Start: 2025-06-03 | End: 2025-06-03

## 2025-06-03 RX ORDER — SODIUM CHLORIDE 0.9 % (FLUSH) 0.9 %
10 SYRINGE (ML) INJECTION EVERY 12 HOURS SCHEDULED
Status: DISCONTINUED | OUTPATIENT
Start: 2025-06-03 | End: 2025-06-03 | Stop reason: HOSPADM

## 2025-06-03 RX ORDER — NALOXONE HCL 0.4 MG/ML
0.1 VIAL (ML) INJECTION
Status: DISCONTINUED | OUTPATIENT
Start: 2025-06-03 | End: 2025-06-09 | Stop reason: HOSPADM

## 2025-06-03 RX ORDER — DIPHENOXYLATE HYDROCHLORIDE AND ATROPINE SULFATE 2.5; .025 MG/1; MG/1
1 TABLET ORAL DAILY
Status: DISCONTINUED | OUTPATIENT
Start: 2025-06-04 | End: 2025-06-09 | Stop reason: HOSPADM

## 2025-06-03 RX ORDER — AMOXICILLIN 250 MG
2 CAPSULE ORAL 2 TIMES DAILY PRN
Status: DISCONTINUED | OUTPATIENT
Start: 2025-06-03 | End: 2025-06-09 | Stop reason: HOSPADM

## 2025-06-03 RX ORDER — HEPARIN SODIUM 5000 [USP'U]/ML
5000 INJECTION, SOLUTION INTRAVENOUS; SUBCUTANEOUS EVERY 8 HOURS SCHEDULED
Status: DISCONTINUED | OUTPATIENT
Start: 2025-06-04 | End: 2025-06-07

## 2025-06-03 RX ORDER — BISACODYL 10 MG
10 SUPPOSITORY, RECTAL RECTAL DAILY PRN
Status: DISCONTINUED | OUTPATIENT
Start: 2025-06-03 | End: 2025-06-09 | Stop reason: HOSPADM

## 2025-06-03 RX ORDER — SODIUM CHLORIDE 9 MG/ML
100 INJECTION, SOLUTION INTRAVENOUS CONTINUOUS
Status: ACTIVE | OUTPATIENT
Start: 2025-06-03 | End: 2025-06-04

## 2025-06-03 RX ORDER — PHENYLEPHRINE HCL IN 0.9% NACL 1 MG/10 ML
SYRINGE (ML) INTRAVENOUS AS NEEDED
Status: DISCONTINUED | OUTPATIENT
Start: 2025-06-03 | End: 2025-06-03 | Stop reason: SURG

## 2025-06-03 RX ADMIN — SODIUM CHLORIDE 100 ML/HR: 9 INJECTION, SOLUTION INTRAVENOUS at 16:46

## 2025-06-03 RX ADMIN — SODIUM CHLORIDE, POTASSIUM CHLORIDE, SODIUM LACTATE AND CALCIUM CHLORIDE 9 ML/HR: 600; 310; 30; 20 INJECTION, SOLUTION INTRAVENOUS at 09:48

## 2025-06-03 RX ADMIN — ONDANSETRON 4 MG: 2 INJECTION INTRAMUSCULAR; INTRAVENOUS at 14:29

## 2025-06-03 RX ADMIN — ATORVASTATIN CALCIUM 80 MG: 40 TABLET, FILM COATED ORAL at 20:40

## 2025-06-03 RX ADMIN — LIDOCAINE HYDROCHLORIDE 0.5 ML: 10 INJECTION, SOLUTION EPIDURAL; INFILTRATION; INTRACAUDAL; PERINEURAL at 09:48

## 2025-06-03 RX ADMIN — Medication 100 MCG: at 14:16

## 2025-06-03 RX ADMIN — Medication 100 MCG: at 13:55

## 2025-06-03 RX ADMIN — SUGAMMADEX 200 MG: 100 INJECTION, SOLUTION INTRAVENOUS at 14:45

## 2025-06-03 RX ADMIN — EPHEDRINE SULFATE 10 MG: 50 INJECTION INTRAVENOUS at 14:14

## 2025-06-03 RX ADMIN — EPHEDRINE SULFATE 15 MG: 50 INJECTION INTRAVENOUS at 13:25

## 2025-06-03 RX ADMIN — FENTANYL CITRATE 50 MCG: 50 INJECTION, SOLUTION INTRAMUSCULAR; INTRAVENOUS at 14:33

## 2025-06-03 RX ADMIN — FAMOTIDINE 20 MG: 20 TABLET, FILM COATED ORAL at 09:47

## 2025-06-03 RX ADMIN — DEXAMETHASONE SODIUM PHOSPHATE 8 MG: 4 INJECTION, SOLUTION INTRA-ARTICULAR; INTRALESIONAL; INTRAMUSCULAR; INTRAVENOUS; SOFT TISSUE at 13:13

## 2025-06-03 RX ADMIN — METOPROLOL TARTRATE 25 MG: 25 TABLET, FILM COATED ORAL at 20:40

## 2025-06-03 RX ADMIN — LIDOCAINE HYDROCHLORIDE 100 MG: 10 INJECTION, SOLUTION EPIDURAL; INFILTRATION; INTRACAUDAL; PERINEURAL at 13:07

## 2025-06-03 RX ADMIN — Medication 100 MCG: at 14:29

## 2025-06-03 RX ADMIN — EPHEDRINE SULFATE 10 MG: 50 INJECTION INTRAVENOUS at 14:29

## 2025-06-03 RX ADMIN — Medication 100 MCG: at 14:41

## 2025-06-03 RX ADMIN — SODIUM CHLORIDE 2 G: 900 INJECTION INTRAVENOUS at 20:40

## 2025-06-03 RX ADMIN — Medication 10 ML: at 20:41

## 2025-06-03 RX ADMIN — ROCURONIUM BROMIDE 50 MG: 10 INJECTION INTRAVENOUS at 13:07

## 2025-06-03 RX ADMIN — CYANOCOBALAMIN TAB 1000 MCG 1000 MCG: 1000 TAB at 16:56

## 2025-06-03 RX ADMIN — EPHEDRINE SULFATE 10 MG: 50 INJECTION INTRAVENOUS at 13:55

## 2025-06-03 RX ADMIN — CEFAZOLIN SODIUM 2 G: 1 INJECTION, POWDER, FOR SOLUTION INTRAMUSCULAR; INTRAVENOUS at 13:46

## 2025-06-03 RX ADMIN — FOLIC ACID 1 MG: 1 TABLET ORAL at 17:34

## 2025-06-03 RX ADMIN — Medication 100 MCG: at 13:22

## 2025-06-03 RX ADMIN — PROPOFOL 200 MG: 10 INJECTION, EMULSION INTRAVENOUS at 13:07

## 2025-06-03 RX ADMIN — FENTANYL CITRATE 50 MCG: 50 INJECTION, SOLUTION INTRAMUSCULAR; INTRAVENOUS at 13:07

## 2025-06-03 RX ADMIN — Medication 100 MCG: at 14:14

## 2025-06-03 NOTE — ANESTHESIA PROCEDURE NOTES
Peripheral Block    Pre-sedation assessment completed: 6/3/2025 9:24 AM    Patient reassessed immediately prior to procedure    Start time: 6/3/2025 9:24 AM  Reason for block: at surgeon's request and post-op pain management  Performed by  CRNA/CAA: Trino Sheridan, CRNA  Assisted by: Alis Iglesias RN  Preanesthetic Checklist  Completed: patient identified, IV checked, site marked, risks and benefits discussed, surgical consent, monitors and equipment checked, pre-op evaluation and timeout performed  Prep:  Pt Position: supine  Sterile barriers:cap, gloves, mask and washed/disinfected hands  Prep: ChloraPrep  Patient monitoring: blood pressure monitoring, continuous pulse oximetry and EKG  Procedure  Performed under: local infiltration  Guidance:ultrasound guided    ULTRASOUND INTERPRETATION.  Using ultrasound guidance a 20 G gauge needle was placed in close proximity to the nerve, at which point, under ultrasound guidance anesthetic was injected in the area of the nerve and spread of the anesthesia was seen on ultrasound in close proximity thereto.  There were no abnormalities seen on ultrasound; a digital image was taken; and the patient tolerated the procedure with no complications. Images:still images obtained, printed/placed on chart    Laterality:left  Block Type:fascia iliaca compartment  Injection Technique:single-shot  Needle Type:echogenic and short-bevel  Needle Gauge:18 G  Resistance on Injection: none  Catheter Size:20 G (20g)          Medications  Preservative Free Saline:10ml    Post Assessment  Injection Assessment: negative aspiration for heme, no paresthesia on injection and incremental injection  Patient Tolerance:comfortable throughout block  Complications:no  Additional Notes  CKAFASCIAILIACA: SINGLE shot   A high-frequency linear transducer, with sterile cover, was placed in parasagittal plane on top of the Anterior Superior Iliac Spine (ASIS) and moved medially to identify the Internal  "Oblique muscle, Sartorius muscle, Iliacus Muscle, Fascia Iliaca (FI) and Fascia Latae. The insertion site was prepped and draped in sterile fashion. Skin and cutaneous tissue was infiltrated with 2-5 ml of 1% Lidocaine. Using ultrasound-guidance, a 20-gauge B-Grady 4\" Ultraplex 360 non-stimulating echogenic needle was advanced in plane from caudad to cephalad. Preservative-free normal saline was utilized for hydro-dissection of tissue, advancement of needle, and to confirm final needle placement below FI. Local anesthetic in incremental 3-5 ml injections. Aspiration every 5 ml to prevent intravascular injection. Injection was completed with negative aspiration of blood and negative intravascular injection. Injection pressures were normal with minimal resistance.   Performed by: Trino Sheridan CRNA            "

## 2025-06-03 NOTE — INTERVAL H&P NOTE
"Psychiatric Pre-op    Full history and physical note from office is attached.    /86 (BP Location: Right arm, Patient Position: Sitting)   Pulse 66   Temp 98.4 °F (36.9 °C) (Temporal)   Resp 16   Ht 180.3 cm (71\")   Wt 97.5 kg (215 lb)   SpO2 98%   BMI 29.99 kg/m²     Review of Systems:  Constitutional-- No fever, chills or sweats. No fatigue.  CV-- No chest pain, palpitation or syncope  Resp-- No SOB, cough, hemoptysis  Skin--No rashes or lesions    Physical Exam:  Heart:   Regular rate and rhythm, S1 and S2 normal  Lungs: Clear to auscultation bilaterally, respirations unlabored    LAB Results:  Lab Results   Component Value Date    WBC 11.18 (H) 05/24/2025    HGB 14.5 05/24/2025    HCT 45.3 05/24/2025    MCV 97.4 (H) 05/24/2025     05/24/2025    NEUTROABS 7.03 (H) 05/24/2025    GLUCOSE 112 (H) 05/24/2025    BUN 21 05/24/2025    CREATININE 1.13 05/24/2025    EGFRIFNONA 50 (L) 09/13/2021    EGFRIFAFRI >60 09/10/2021     05/24/2025    K 5.1 05/24/2025     05/24/2025    CO2 24.0 05/24/2025    MG 1.6 07/20/2023    PHOS 2.5 10/19/2015    CALCIUM 9.5 05/24/2025    ALBUMIN 3.9 05/24/2025    AST 20 05/24/2025    ALT 10 05/24/2025    BILITOT 1.0 05/24/2025    PTT 27.2 12/09/2020    INR 1.14 12/09/2020     Study Result    Narrative & Impression      MRI FOOT LEFT WO CONTRAST     Date of Exam: 5/24/2025 4:39 PM EDT     Indication: wounds, concern for osteomyelitis.     Comparison: None available.     Technique:  Routine multiplanar/multisequence sequence images of the left foot were obtained without contrast administration.        Findings:  Postoperative changes noted status post amputation at the level of the midfoot/hindfoot.     There is a wound or ulceration involving the plantar soft tissues underlying the distal aspect the calcaneus. There is edema throughout the underlying soft tissues in this region. The findings suggest changes of soft tissue cellulitis.     There is " also evidence for a fluid collection underlying the wound which extends through the soft tissues laterally along the lateral margin of the hindfoot. This finding is somewhat complex or multilobulated in appearance but suggests large abscess   formation. The finding extends towards the inferior margin of the lateral malleolus. The more lateral and superior margin of the collection is more fluid in appearance. This finding measures approximately 7.6 x 1.7 cm in transverse by craniocaudal   dimensions.     No definitive underlying cortical erosion or destruction is identified. No abnormal bone marrow signal is seen. There are no definitive signs of osteomyelitis.     IMPRESSION:  Impression:  1.Postoperative changes status post amputation at the level of the midfoot/hindfoot.  2.There is a wound or ulceration involving the plantar soft tissues underlying the distal aspect of the calcaneus. There is edema throughout the underlying soft tissues suggesting changes of soft tissue cellulitis.  3.There is a large fluid collection underlying the wound which extends through the soft tissues laterally along the lateral margin of the hindfoot. The findings suggest large abscess formation. The finding extends towards the inferior margin of the   lateral malleolus.  4.No definitive evidence for osteomyelitis.     Cancer Staging (if applicable)  Cancer Patient: __ yes __no __unknown__N/A; If yes, clinical stage T:__ N:__M:__, stage group or __N/A      Impression: left foot ulcer      Plan: Left foot abscess I&D, exostectomy, possible wound VAC placement       ROBIN Walters   6/3/2025   09:27 EDT

## 2025-06-03 NOTE — H&P
Patient Name: Carlos Culver  MRN: [id number]  : 1945  DOS: 6/3/2025    Attending: Mao Mckay MD    Primary Care Provider: Marcus Cheng MD      Chief complaint: Left foot infection    Subjective   Patient is a pleasant 79 y.o. male presented for scheduled surgery by Dr. Mckay.    Patient has been followed in the office for left foot infection/ulcers.  Had workup including MRI.  I&D was recommended and patient today underwent the following procedures:  Left foot incision and drainage below fascia, Calc exostectomy, debridement of subcutaneous tissue.  Surgery was done under general anesthesia, was tolerated well.    Seen in his room postop, doing well, no complaints of pain, no nausea, vomiting, or shortness of breath.    Reviewed with patient his past medical history and home medications.  He has chronic atrial fibrillation for which she is followed by Dr. Anderson.  He is on chronic anticoagulation with Eliquis which is on hold for the surgery.      Allergies:  Allergies   Allergen Reactions    Sulfa Antibiotics Unknown (See Comments) and Rash     Pt was told as child he had an allergy.  Has not taken and doesn't know the response       Meds:  Medications Prior to Admission   Medication Sig Dispense Refill Last Dose/Taking    apixaban (Eliquis) 5 MG tablet tablet Take 1 tablet by mouth 2 (Two) Times a Day. 180 tablet 3 2025 Evening    atorvastatin (LIPITOR) 80 MG tablet Take 1 tablet by mouth Every Night. Hold while on Daptomycin (Patient taking differently: Take 1 tablet by mouth Every Night.) 90 tablet 3 2025 Evening    metoprolol tartrate (LOPRESSOR) 25 MG tablet Take 1 tablet by mouth 2 (Two) Times a Day.   6/3/2025 at  6:00 AM    ibuprofen (ADVIL,MOTRIN) 200 MG tablet Take 2 tablets by mouth Every 6 (Six) Hours As Needed for Mild Pain.   2025    multivitamin with minerals (MULTIVITAMIN ADULT PO) Take 1 tablet by mouth Daily.   2025 Evening    naproxen sodium (ALEVE) 220  MG tablet Take 1 tablet by mouth 2 (Two) Times a Day As Needed.   6/1/2025    saccharomyces boulardii (Florastor) 250 MG capsule Take 1 capsule by mouth 2 (Two) Times a Day. 30 capsule 0     sertraline (ZOLOFT) 25 MG tablet Take 1 tablet by mouth Daily.       vitamin B-12 (CYANOCOBALAMIN) 100 MCG tablet Take 10 tablets by mouth Daily.   6/1/2025 Evening         History:   Past Medical History:   Diagnosis Date    A-fib     Acute kidney failure     Elevated cholesterol     Hard of hearing     Hyperlipidemia     Hypertension     Osteomyelitis     Renal disorder     Wears hearing aid in both ears     Wears reading eyeglasses      Past Surgical History:   Procedure Laterality Date    AMPUTATION DIGIT Left 01/07/2020    Procedure: AMPUTATION LEFT 2ND TOE;  Surgeon: Idania Osborn MD;  Location:  [facility];  Service: Orthopedics    AMPUTATION DIGIT Left 12/08/2020    Procedure: TRANSMETATARSAL AMPUTATION LEFT;  Surgeon: Idania Osborn MD;  Location:  [facility];  Service: Orthopedics;  Laterality: Left;    APPENDECTOMY      COLONOSCOPY  2006    FOOT SURGERY Left 12/09/2020    (L) transmetatarsal amputation 12/09/2020 - Dr. Idania Osborn    INCISION AND DRAINAGE LEG Left 10/23/2018    Procedure: INCISION AND DRAINAGE LEFT FOOT;  Surgeon: Idania Osborn MD;  Location:  [facility];  Service: Orthopedics    INCISION AND DRAINAGE LEG Left 07/21/2023    Procedure: FOOT INCISION AND DRAINAGE; REVISION AMPUTATION; ACHILLES LENGTHENING -LEFT;  Surgeon: Mao Mckay MD;  Location:  [facility];  Service: Orthopedics;  Laterality: Left;    TOE AMPUTATION      TONSILLECTOMY      VENOUS ACCESS DEVICE (PORT) INSERTION N/A 10/23/2018    Procedure: GROSHONG CATHETER PLACEMENT;  Surgeon: Marquez Simons MD;  Location:  [facility];  Service: General    VENOUS ACCESS DEVICE (PORT) INSERTION N/A 12/09/2020    Procedure: GROSHONG INSERTION;  Surgeon: Antonio Bains MD;  Location:  [facility];  Service: General;  Laterality: N/A;     "VENOUS ACCESS DEVICE (PORT) REMOVAL       Family History   Problem Relation Age of Onset    No Known Problems Mother     Cancer Father      Social History     Tobacco Use    Smoking status: Never     Passive exposure: Never    Smokeless tobacco: Never   Vaping Use    Vaping status: Never Used   Substance Use Topics    Alcohol use: Yes     Comment: Patient states that he drinks about 6 oz.of vodka daily.    Drug use: No       Review of Systems  Pertinent items are noted in HPI    Vital Signs  /70 (BP Location: Right arm, Patient Position: Lying)   Pulse 80   Temp 98.4 °F (36.9 °C) (Axillary)   Resp 16   Ht 180.3 cm (71\")   Wt 97.5 kg (215 lb)   SpO2 99%   BMI 29.99 kg/m²     Physical Exam:    General Appearance:    Alert, cooperative, in no acute distress   Head:    Normocephalic, without obvious abnormality, atraumatic   Eyes:            Lids and lashes normal, conjunctivae and sclerae normal, no   icterus, no pallor, corneas clear,    Ears:    Ears appear intact with no abnormalities noted   Throat:   No oral lesions, no thrush, oral mucosa moist   Neck:   No adenopathy, supple, trachea midline, no thyromegaly         Lungs:     Clear to auscultation,respirations regular, even and                   unlabored    Heart:  Irregularly irregular rhythm and normal rate, normal S1 and S2, no            murmur, no gallop   Abdomen:     Normal bowel sounds, no masses, no organomegaly, soft,        nontender, nondistended, no guarding, no rebound                 tenderness   Genitalia:    Deferred   Extremities: Clean dry and intact dressing over left foot.   Pulses:   Pulses palpable and equal bilaterally   Skin:   No bleeding, bruising or rash   Neurologic:   Cranial nerves 2 - 12 grossly intact,       I reviewed the patient's new clinical results.             Invalid input(s): \"NEUTOPHILPCT\"        Invalid input(s): \"LABALBU\", \"PROT\"  Lab Results   Component Value Date    HGBA1C 6.10 (H) 01/08/2024 "        Latest Reference Range & Units 05/24/25 10:26   Sodium 136 - 145 mmol/L 136   Potassium 3.5 - 5.2 mmol/L 5.1   Chloride 98 - 107 mmol/L 102   CO2 22.0 - 29.0 mmol/L 24.0   Anion Gap 5.0 - 15.0 mmol/L 10.0   BUN 8 - 23 mg/dL 21   Creatinine 0.76 - 1.27 mg/dL 1.13   BUN/Creatinine Ratio 7.0 - 25.0  18.6   eGFR >60.0 mL/min/1.73 65.7   Glucose 65 - 99 mg/dL 112 (H)   Calcium 8.6 - 10.5 mg/dL 9.5   Alkaline Phosphatase 39 - 117 U/L 118 (H)   Total Protein 6.0 - 8.5 g/dL 7.4   Albumin 3.5 - 5.2 g/dL 3.9   Globulin gm/dL 3.5   A/G Ratio g/dL 1.1   AST (SGOT) 1 - 40 U/L 20   ALT (SGPT) 1 - 41 U/L 10   Total Bilirubin 0.0 - 1.2 mg/dL 1.0   (H): Data is abnormally high   Latest Reference Range & Units 05/24/25 10:26   WBC 3.40 - 10.80 10*3/mm3 11.18 (H)   RBC 4.14 - 5.80 10*6/mm3 4.65   Hemoglobin 13.0 - 17.7 g/dL 14.5   Hematocrit 37.5 - 51.0 % 45.3   Platelets 140 - 450 10*3/mm3 183   RDW 12.3 - 15.4 % 14.4   MCV 79.0 - 97.0 fL 97.4 (H)   MCH 26.6 - 33.0 pg 31.2   MCHC 31.5 - 35.7 g/dL 32.0   MPV 6.0 - 12.0 fL 9.4   RDW-SD 37.0 - 54.0 fl 51.2   (H): Data is abnormally high  Assessment and Plan:       Abscess of left foot, status post I&D, exostectomy, wound VAC placement    Essential hypertension    Chronic anticoagulation    Neuropathy    CKD (chronic kidney disease) stage 2, GFR 60-89 ml/min    S/P transmetatarsal amputation of foot, left    Alcohol abuse    History of [place]art amputation of left foot    Ulcer of left foot    Left foot infection      Plan  1. PT/OT, nonweightbearing left lower extremity  2. Pain control-prns   3. IS-encourage  4. DVT proph- mechanicals, and resumption of apixaban when okay with Dr. Mckay  For now subcutaneous heparin starting in a.m.  5. Bowel regimen  6. Resume home medications as appropriate  7. Monitor post-op labs  8. Discharge planning .    -Atrial fibrillation: Resume beta-blocker.  Apixaban will resume when okay with orthopedic surgery.    - Alcohol abuse.  No history of  withdrawal.  Will screen with CIWA protocol.  Thiamine, folic acid, and multivitamin ordered.    - Hypertension:  Resume home medications as appropriate, formulary substitution when indicated.  Holding parameters.  PRN medications for elevated blood pressure.    - Follow operative cultures, postop antibiotics will be managed by Dr. Jeffery Young with [facility].      Dragon disclaimer:  Part of this encounter note is an electronic transcription/translation of spoken language to printed text. The electronic translation of spoken language may permit erroneous, or at times, nonsensical words or phrases to be inadvertently transcribed; Although I have reviewed the note for such errors, some may still exist.    Jose Antonio Coleman MD  06/03/25  16:56 EDT

## 2025-06-03 NOTE — DISCHARGE INSTRUCTIONS
Mao Mckay MD  Orthopedic Foot & Ankle Surgeon  [facility]    Diagnosis: No chief complaint on file.       Procedure Performed: Procedure(s) (LRB):  Left foot abscess I&D, exostectomy (Left)    What to Expect After Surgery  Diet after surgery:  Resume your diet gradually.  Begin with clear liquids and gradually progress as you feel ready.  Surgical Site Care:  Please remain in your post-operative dressing/splint until your first post op appointment with Dr. Mckay.  Please make sure to keep your post-operative dressing clean and dry.  Please use a shower bag (e.g., [email or web address]) when showering or bathing to keep things dry. Wet padding can cause skin breakdown.  Do not stick anything down your cast or splint.  If you sustain a scratch, you are at higher risk for infection as casts and splints harbor more bacteria.  Follow Up Appointment: Please call the [facility] at [phone] or Dr. Mckay's schedulers within two (2) days of your discharge to /confirm/verify your follow-up appointment date/time with Dr. Mckay.  Typically, Dr. Mckay will want to see you 14-21 days after surgery for a post-operative follow-up appointment - before 14 days, the sutures may have to stay in and you will need to return in the 14-21 day window again.  Please arrive ~15 minutes prior to your appointment time to take care of any paperwork.      Activity Precautions:  Elevate the leg above your heart as much as possible for the first two weeks after surgery.  If the leg is higher than your heart, gravity helps decrease swelling, decrease pain, and improve blood flow.  If the leg is below your heart, swelling increases, pain can worsen, and your wound is at greater risk of infection.  Keep all weight off of your surgical leg until cleared by your physician.  Use ice packs intermittently (20 minutes on, 20 minutes off) to reduce pain and swelling, however, use extreme caution  with icing if your block is still in effect.  Make sure to have a barrier between your skin and the ice pack to avoid frost bite.   If you are in a post-operative splint, you can wiggle your toes to help push swelling to your lymph ducts, but do not try to move your ankle.      Pain Management  Nerve Blocks:  to help control pain after surgery, you may have received a nerve block where the anesthesiologist injected numbing medication around the nerves that send pain signals from your foot or ankle to your brain.  This helps you feel little to no pain.   The time a nerve block lasts varies from person to person.  Often, they will last between 12 and 24 hours but could last days.  You may not be able to move your foot and/or ankle during this time.  As the block medication wears off, you may feel a tingling sensation as the nerves start to wake up.  Keep your foot and ankle safe while it is numb.  Avoid excessive heating,  scratching, etc. until the block has completely worn off.  If icing the ankle or foot, watch the toes closely to ensure that they do not become discolored (i.e., white, red or blue)  Even if you still feel no pain in your leg before you go to bed, take a dose of your narcotic medication before you go to sleep.  You do not want to wake up with the block having worn off and being behind on pain control - it is quite difficult to 'catch up' again.  Pain Medication:    Acetaminophen (Tylenol) 500 mg tablets are typically your baseline pain medication.  Take this tablet up to once every 4 hours. Do not exceed 3,000 mg of acetaminophen daily.  You have been provided Oxycodone immediate release tablets as your initial narcotic pain reliever. Take doses of this medication if you are having severe breakthrough pain uncontrolled by the Tylenol alone. Typically, patients are taking it every 4 hours for the first day or two after surgery.  Actively wean down your use of this medication after the third day after  surgery.  Note that it takes about 30-45 minutes for oral pain medication to take effect.  DO NOT drink alcoholic beverages, use recreational drugs, or use prescription sedative medications when taking pain medication.  DO NOT operate heavy machinery/drive while taking opioids.  To avoid the risk of nausea, please make sure that you are taking your medication with food.  You may experience light headedness while taking your pain medication.  Make sure you drink plenty of water while taking narcotic pain medication to stay well hydrated and help avoid constipation.    You have been provided a Docusate prescription to help with constipation that can be a side effect of taking narcotics.  Take that medication as needed.  You have been provided a Zofran prescription to help with nausea that you can take as needed.  Itching is a common side effect to pain medication usage.  If you have an associated rash with the itching, please contact your provider.       When to Call Your Physician  Common or Normal Post-Operative Reactions:  Low grade fever (approximately 100.5 degrees) for up to one week.  Small amount of blood or fluid leaking from the surgical site or dressing  Bruising along the surgical extremity  Swelling around the surgical site and surrounding area  The reactions listed above are NORMAL, but you want to call your surgeon if any of these reactions persist.  Symptoms to Report:  Please report any of the following signs to your surgeon:  Signs of infection:  Redness or pain around your incision (if you cannot see your incision, red streaking up your extremity should be reported).  Thick, dark yellow or foul-smelling drainage at the incision site or from your dressing.  Temperature measured over 101.5 degrees for more than 24 hours despite Tylenol.  Signs of Decreased Circulation to the Ankle and Foot:  Coldness of ankle and foot  Foot or leg turning pale  Tingling/numbness (after the block has fully  resolved)  Sustained increases in pain uncontrolled by medication  Toenail bed turns blue in color  Blood Clots:  Although rare, please be aware and utilize the recommendations below to avoid getting a blood clot.  Prevention of deep vein thrombus DVT (blood clots):  Get up 4-5 times during the daytime and walk/crutch/walker across the room to keep the blood circulating in your legs. (Maintain any weightbearing restrictions, of course)  Wiggle your toes frequently if you are in a splint or case  Notify your physician if you are a nicotine user, on hormone replacement therapy medications, are taking birth control, or have a history of blood clots as these can increase your risk of developing a blood clot.  Notify your provider if you develop pain or tenderness in your calf muscle    If you have any additional questions, please contact Dr. Mckay's staff the [facility] at [phone]    IF YOU HAVE AN EMERGENCY, i.e., SHORTNESS OF BREATH, CHEST PAIN, OR SYMPTOMS SEVERE IN NATURE, PLEASE CALL 911 OR VISIT YOUR LOCAL EMERGENCY ROOM

## 2025-06-03 NOTE — ANESTHESIA POSTPROCEDURE EVALUATION
Patient: Carlos Culver    Procedure Summary       Date: 06/03/25 Room / Location:  [facility]  /  [facility]    Anesthesia Start: 1300 Anesthesia Stop: 1502    Procedure: Left foot abscess I&D, exostectomy, possible wound VAC placement (Left: Knee) Diagnosis:       Ulcer of left foot, unspecified ulcer stage      (Ulcer of left foot, unspecified ulcer stage [L97.529])    Surgeons: Moa Mckay MD Provider: Anthony Callejas MD    Anesthesia Type: general ASA Status: 3            Anesthesia Type: general    Vitals  Vitals Value Taken Time   /59 06/03/25 15:00   Temp 98 °F (36.7 °C) 06/03/25 15:00   Pulse 63 06/03/25 15:01   Resp 16 06/03/25 15:00   SpO2 96 % 06/03/25 15:01   Vitals shown include unfiled device data.        Post Anesthesia Care and Evaluation    Patient location during evaluation: PACU  Patient participation: complete - patient cannot participate  Level of consciousness: awake  Pain score: 0  Pain management: adequate    Airway patency: patent  Anesthetic complications: No anesthetic complications  PONV Status: none  Cardiovascular status: acceptable  Respiratory status: acceptable  Hydration status: stable

## 2025-06-03 NOTE — THERAPY EVALUATION
Patient Name: Carlos Culver  : 1945    MRN: [id number]                              Today's Date: 6/3/2025       Admit Date: 6/3/2025    Visit Dx:     ICD-10-CM ICD-9-CM   1. Ulcer of left foot, unspecified ulcer stage  L97.529 707.15     Patient Active Problem List   Diagnosis    Cellulitis of left foot    GUCCI (acute kidney injury)    Essential hypertension    Leukocytosis    Osteomyelitis of left foot    Chronic anticoagulation    Neuropathy    Elevated transaminase level    CKD (chronic kidney disease) stage 2, GFR 60-89 ml/min    Skin ulcer of right heel    Chronic multifocal osteomyelitis of left foot    Contracture of joint of foot, left    MARTINEZ (dyspnea on exertion)    Neuropathic ulcer, limited to breakdown of skin    Hyperlipidemia LDL goal <100    A-fib    S/P transmetatarsal amputation of foot, left    Leukocytosis, likely reactive    Acute postoperative pain    Alcohol abuse    Staphylococcus aureus bacteremia    Acute osteomyelitis of left ankle or foot    History of [place] amputation of left foot    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 evidence of necrosis    Ulcer of left foot    Left foot infection    Abscess of left foot, status post I&D, exostectomy     Past Medical History:   Diagnosis Date    A-fib     Acute kidney failure     Elevated cholesterol     Hard of hearing     Hyperlipidemia     Hypertension     Osteomyelitis     Renal disorder     Wears hearing aid in both ears     Wears reading eyeglasses      Past Surgical History:   Procedure Laterality Date    AMPUTATION DIGIT Left 2020    Procedure: AMPUTATION LEFT 2ND TOE;  Surgeon: Idania Osborn MD;  Location:  [facility] OR;  Service: Orthopedics    AMPUTATION DIGIT Left 2020    Procedure: TRANSMETATARSAL AMPUTATION LEFT;  Surgeon: Idania Osborn MD;  Location:  [facility] OR;  Service: Orthopedics;  Laterality: Left;    APPENDECTOMY      COLONOSCOPY  2006    FOOT SURGERY  Left 12/09/2020    (L) transmetatarsal amputation 12/09/2020 - Dr. Idania Osborn    INCISION AND DRAINAGE LEG Left 10/23/2018    Procedure: INCISION AND DRAINAGE LEFT FOOT;  Surgeon: Idania Osborn MD;  Location:  [facility] OR;  Service: Orthopedics    INCISION AND DRAINAGE LEG Left 07/21/2023    Procedure: FOOT INCISION AND DRAINAGE; REVISION AMPUTATION; ACHILLES LENGTHENING -LEFT;  Surgeon: Mao Mckay MD;  Location:  [facility] OR;  Service: Orthopedics;  Laterality: Left;    TOE AMPUTATION      TONSILLECTOMY      VENOUS ACCESS DEVICE (PORT) INSERTION N/A 10/23/2018    Procedure: GROSHONG CATHETER PLACEMENT;  Surgeon: Marquez Simons MD;  Location:  [facility] OR;  Service: General    VENOUS ACCESS DEVICE (PORT) INSERTION N/A 12/09/2020    Procedure: GROSHONG INSERTION;  Surgeon: Antonio Bains MD;  Location:  [facility] OR;  Service: General;  Laterality: N/A;    VENOUS ACCESS DEVICE (PORT) REMOVAL        General Information       Row Name 06/03/25 1656          Physical Therapy Time and Intention    Document Type evaluation  -ND     Mode of Treatment physical therapy  -ND       Row Name 06/03/25 1656          General Information    Patient Profile Reviewed yes  -ND     Prior Level of Function independent:;all household mobility;community mobility;gait;transfer;bed mobility  Pt was NWB PTA for ~3 weeks and independent with knee scooter. At baseline is independent without AD. Denies falls.  -ND     Existing Precautions/Restrictions fall;left;non-weight bearing;other (see comments)  s/p L foot I+D, LLE NWB  -ND     Barriers to Rehab previous functional deficit  -ND       Row Name 06/03/25 1656          Living Environment    Current Living Arrangements home  -ND     People in Home alone;other (see comments)  Daughter assists as needed  -ND       Row Name 06/03/25 1656          Home Main Entrance    Number of Stairs, Main Entrance none  -ND       Row Name 06/03/25 1656          Stairs Within Home, Primary    Stairs,  Within Home, Primary Basement, does not need to access.  -ND       Row Name 06/03/25 1656          Cognition    Orientation Status (Cognition) oriented x 3  -ND       Row Name 06/03/25 1656          Safety Issues/Impairments Affecting Functional Mobility    Safety Issues Affecting Function (Mobility) awareness of need for assistance;safety precaution awareness;safety precautions follow-through/compliance;sequencing abilities  -ND     Impairments Affecting Function (Mobility) balance;endurance/activity tolerance;strength;sensation/sensory awareness  -ND               User Key  (r) = Recorded By, (t) = Taken By, (c) = Cosigned By      Initials Name Provider Type    ND Brooke Hager, PT Physical Therapist                   Mobility       Row Name 06/03/25 1657          Bed Mobility    Bed Mobility supine-sit;sit-supine  -ND     Supine-Sit [place] (Bed Mobility) standby assist  -ND     Sit-Supine [place] (Bed Mobility) standby assist  -ND     Assistive Device (Bed Mobility) head of bed elevated  -ND     Comment, (Bed Mobility) Denies dizziness with position change. Cues provided to keep from pressing against bedrails with LLE.  -ND       Row Name 06/03/25 1657          Transfers    Comment, (Transfers) Pt has knee scooter from home in room.   -ND       Row Name 06/03/25 1657          Bed-Chair Transfer    Comment, (Bed-Chair Transfer) Pt defers sitting UIC at time of evaluation.  -ND       Row Name 06/03/25 1657          Sit-Stand Transfer    Sit-Stand [place] (Transfers) contact guard;verbal cues;nonverbal cues (demo/gesture)  -ND     Assistive Device (Sit-Stand Transfers) walker, knee scooter  -ND     Comment, (Sit-Stand Transfer) x1 from EOB. Pt transfers onto and off of knee scooter with excellent maintenance of NWB precautions and minimal cues for sequencing.  -ND       Row Name 06/03/25 1657          Gait/Stairs (Locomotion)    [place] Level (Gait) unable to assess  -ND     Comment,  (Gait/Stairs) Plan to assess at next session.  -ND       Row Name 06/03/25 1657          Mobility    Extremity Weight-bearing Status left lower extremity  -ND     Left Lower Extremity (Weight-bearing Status) non weight-bearing (NWB)  -ND               User Key  (r) = Recorded By, (t) = Taken By, (c) = Cosigned By      Initials Name Provider Type    ND Brooke Hager, PT Physical Therapist                   Obj/Interventions       Row Name 06/03/25 1659          Range of Motion Comprehensive    General Range of Motion bilateral lower extremity ROM WFL  -ND       Row Name 06/03/25 1659          Strength Comprehensive (MMT)    General Manual Muscle Testing (MMT) Assessment lower extremity strength deficits identified  -ND     Comment, General Manual Muscle Testing (MMT) Assessment Bilateral SLR performed.  -ND       Row Name 06/03/25 1659          Balance    Balance Assessment sitting static balance;sitting dynamic balance;standing static balance;standing dynamic balance  -ND     Static Sitting Balance independent  -ND     Dynamic Sitting Balance independent  -ND     Position, Sitting Balance unsupported;sitting edge of bed  -ND     Static Standing Balance contact guard  -ND     Dynamic Standing Balance contact guard  -ND     Position/Device Used, Standing Balance supported  -ND     Balance Interventions sitting;standing;sit to stand;supported;static;dynamic  -ND     Comment, Balance No knee buckling or LOB noted.  -ND       Row Name 06/03/25 1659          Sensory Assessment (Somatosensory)    Sensory Assessment (Somatosensory) LE sensation intact  -ND               User Key  (r) = Recorded By, (t) = Taken By, (c) = Cosigned By      Initials Name Provider Type    ND Brooke Hager, PT Physical Therapist                   Goals/Plan       Row Name 06/03/25 1704          Bed Mobility Goal 1 (PT)    Activity/Assistive Device (Bed Mobility Goal 1, PT) sit to supine/supine to sit  -ND     [place] Level/Cues  Needed (Bed Mobility Goal 1, PT) independent  -ND     Time Frame (Bed Mobility Goal 1, PT) short term goal (STG);2 days  -ND       Row Name 06/03/25 1704          Transfer Goal 1 (PT)    Activity/Assistive Device (Transfer Goal 1, PT) sit-to-stand/stand-to-sit;bed-to-chair/chair-to-bed  -ND     [place] Level/Cues Needed (Transfer Goal 1, PT) modified independence  -ND     Time Frame (Transfer Goal 1, PT) long term goal (LTG);3 days  -ND       Row Name 06/03/25 1704          Gait Training Goal 1 (PT)    Activity/Assistive Device (Gait Training Goal 1, PT) gait (walking locomotion);increase endurance/gait distance;decrease fall risk  -ND     [place] Level (Gait Training Goal 1, PT) modified independence  -ND     Distance (Gait Training Goal 1, PT) 300  -ND     Time Frame (Gait Training Goal 1, PT) long term goal (LTG);3 days  -ND       Row Name 06/03/25 1704          Balance Goal 1 (PT)    Activity/Assistive Device (Balance Goal) with functional mobility activities  -ND     [place] Level/Cues Needed (Balance Goal 1, PT) modified independence  -ND     Time Frame (Balance Goal 1, PT) long-term goal (LTG);3 days  -ND       Row Name 06/03/25 1704          Therapy Assessment/Plan (PT)    Planned Therapy Interventions (PT) balance training;bed mobility training;gait training;home exercise program;patient/family education;transfer training;postural re-education;ROM (range of motion);strengthening  -ND               User Key  (r) = Recorded By, (t) = Taken By, (c) = Cosigned By      Initials Name Provider Type    ND Brooke Hager, PT Physical Therapist                   Clinical Impression       Row Name 06/03/25 1700          Pain    Pretreatment Pain Rating 0/10 - no pain  -ND     Posttreatment Pain Rating 0/10 - no pain  -ND       Row Name 06/03/25 1700          Plan of Care Review    Plan of Care Reviewed With patient  -ND     Outcome Evaluation PT evaluation completed. Pt performs transfer onto and  off of knee scooter with CGA and excellent maintenance of NWB precautions without knee buckling or LOB  noted. NWB precautions reviewed with pt prior to initiating  mobility and pt verbalizes understanding. Recommend home with assist and HHPT pending successful gait trial.  -ND       Row Name 06/03/25 1700          Therapy Assessment/Plan (PT)    Patient/Family Therapy Goals Statement (PT) Return home.  -ND     Rehab Potential (PT) good  -ND     Criteria for Skilled Interventions Met (PT) yes;meets criteria;skilled treatment is necessary  -ND     Therapy Frequency (PT) daily  -ND     Predicted Duration of Therapy Intervention (PT) 3 days  -ND       Row Name 06/03/25 1700          Vital Signs    Pre Systolic BP Rehab 118  -ND     Pre Treatment Diastolic BP 70  -ND     Pretreatment Heart Rate (beats/min) 78  -ND     Posttreatment Heart Rate (beats/min) 76  -ND     Pre SpO2 (%) 95  -ND     O2 Delivery Pre Treatment room air  -ND     O2 Delivery Intra Treatment room air  -ND     Post SpO2 (%) 96  -ND     O2 Delivery Post Treatment room air  -ND     Pre Patient Position Supine  -ND     Intra Patient Position Standing  -ND     Post Patient Position Supine  -ND       Row Name 06/03/25 1700          Positioning and Restraints    Pre-Treatment Position in bed  -ND     Post Treatment Position bed  -ND     In Bed notified nsg;supine;with nsg;call light within reach;encouraged to call for assist;exit alarm on;LLE elevated  -ND               User Key  (r) = Recorded By, (t) = Taken By, (c) = Cosigned By      Initials Name Provider Type    Brooke Enrique, PT Physical Therapist                   Outcome Measures       Row Name 06/03/25 1705 06/03/25 1633       How much help from another person do you currently need...    Turning from your back to your side while in flat bed without using bedrails? 4  -ND 3  -MK    Moving from lying on back to sitting on the side of a flat bed without bedrails? 4  -ND 3  -MK    Moving to and  from a bed to a chair (including a wheelchair)? 3  -ND 3  -MK    Standing up from a chair using your arms (e.g., wheelchair, bedside chair)? 3  -ND 3  -MK    Climbing 3-5 steps with a railing? 2  -ND 2  -MK    To walk in hospital room? 3  -ND 2  -MK    AM-PAC 6 Clicks Score (PT) 19  -ND 16  -MK    Highest Level of Mobility Goal Walk 10 Steps or More-6  -ND Stand (1 or More Minutes)-5  -MK      Row Name 06/03/25 1705          Functional Assessment    Outcome Measure Options AM-PAC 6 Clicks Basic Mobility (PT)  -ND               User Key  (r) = Recorded By, (t) = Taken By, (c) = Cosigned By      Initials Name Provider Type    Ilda Amezquita, RN Registered Nurse    Brooke Enrique, PT Physical Therapist                                 Physical Therapy Education       Title: PT OT SLP Therapies (In Progress)       Topic: Physical Therapy (In Progress)       Point: Mobility training (Done)       Learning Progress Summary            Patient Acceptance, E, VU by ND at 6/3/2025 1706                      Point: Home exercise program (Not Started)       Learner Progress:  Not documented in this visit.              Point: Body mechanics (Done)       Learning Progress Summary            Patient Acceptance, E, VU by ND at 6/3/2025 1706                      Point: Precautions (Done)       Learning Progress Summary            Patient Acceptance, E, VU by ND at 6/3/2025 1706                                      User Key       Initials Effective Dates Name Provider Type Discipline    ND 11/16/23 -  Brooke Hager, DORINDA Physical Therapist PT                  PT Recommendation and Plan  Planned Therapy Interventions (PT): balance training, bed mobility training, gait training, home exercise program, patient/family education, transfer training, postural re-education, ROM (range of motion), strengthening  Outcome Evaluation: PT evaluation completed. Pt performs transfer onto and off of knee scooter with CGA and excellent  maintenance of NWB precautions without knee buckling or LOB  noted. NWB precautions reviewed with pt prior to initiating  mobility and pt verbalizes understanding. Recommend home with assist and HHPT pending successful gait trial.     Time Calculation:   PT Evaluation Complexity  History, PT Evaluation Complexity: 3 or more personal factors and/or comorbidities  Examination of Body Systems (PT Eval Complexity): total of 4 or more elements  Clinical Presentation (PT Evaluation Complexity): evolving  Clinical Decision Making (PT Evaluation Complexity): moderate complexity  Overall Complexity (PT Evaluation Complexity): moderate complexity     PT Charges       Row Name 06/03/25 1707             Time Calculation    Start Time 1640  -ND      PT Received On 06/03/25  -ND      PT Goal Re-Cert Due Date 06/13/25  -ND         Timed Charges    85864 - PT Therapeutic Activity Minutes 8  -ND         Untimed Charges    PT Eval/Re-eval Minutes 31  -ND         Total Minutes    Timed Charges Total Minutes 8  -ND      Untimed Charges Total Minutes 31  -ND       Total Minutes 39  -ND                User Key  (r) = Recorded By, (t) = Taken By, (c) = Cosigned By      Initials Name Provider Type    ND Brooke Hager, PT Physical Therapist                  Therapy Charges for Today       Code Description Service Date Service Provider Modifiers Qty    50418717679 HC PT THERAPEUTIC ACT EA 15 MIN 6/3/2025 Brooke Hager, PT GP 1    06122809844 HC PT EVAL MOD COMPLEXITY 3 6/3/2025 Brooke Hager, PT GP 1            PT G-Codes  Outcome Measure Options: AM-PAC 6 Clicks Basic Mobility (PT)  AM-PAC 6 Clicks Score (PT): 19  PT Discharge Summary  Anticipated Discharge Disposition (PT): home with assist, home with home health    Brooke Hager PT  6/3/2025

## 2025-06-03 NOTE — PLAN OF CARE
Goal Outcome Evaluation:  Plan of Care Reviewed With: patient           Outcome Evaluation: PT evaluation completed. Pt performs transfer onto and off of knee scooter with CGA and excellent maintenance of NWB precautions without knee buckling or LOB  noted. NWB precautions reviewed with pt prior to initiating  mobility and pt verbalizes understanding. Recommend home with assist and HHPT pending successful gait trial.    Anticipated Discharge Disposition (PT): home with assist, home with home health

## 2025-06-03 NOTE — OP NOTE
ORTHOPAEDIC SURGERY OPERATIVE REPORT    Location of Surgery: [facility]    Patient Name:  Carlos Culver  YOB: 1945    Date of Surgery:  6/3/2025     Pre-op Diagnosis:   Left foot infection    Post-op Diagnosis:      Same    Procedure/CPT® Codes:  83357 I&D below fascia  12077 calc exostectomy  73976 Jaja subq tissue 20 sq cm/<   51698 Jaja subq tissue 20 sq cm/<     Staff:  Surgeon(s):  Mao Mckay MD       Anesthesia: General    Estimated Blood Loss: <500ml    Specimen:          None    Complications:   None    CLINICAL HISTORY:  Carlos Culver is a 79 y.o. male with the above condition. Discussed operative and non-operative treatment options and risks including but not limited to pain, infection, bleeding, damage to surrounding structures, and need for additional procedures.  After all questions were fully answered, Carlos Culver understood the risks and elected to proceed, and informed consent was obtained. The patient was marked with indelible marking pen after verifying correct side, site, and procedure.      DESCRIPTION OF PROCEDURE:   The patient was identified in the pre-operative area where correct procedure, site, and patient were verified. The patient was brought to the operating theater in stable condition and was transferred to the operative table where they remained in the supine position for the entirety of the case. Preoperative timeout was taken to ensure the correct identity, operative procedure, and location. General anesthesia was then administered. The patient received appropriate weight based IV antibiotics within 30 minutes of skin incision.  All bony prominences well-padded. The left leg was then prepped and draped in the standard sterile fashion. After the leg was elevated, the tourniquet was inflated.     A longitudinal incision was made in the skin on the lateral border of the remaining hindfoot.  I came down around the lateral ulcer there and after  making an incision a pocket of purulence was encountered.  The purulent tissue was placed in a sterile specimen cup and then sent to the lab for cultures. Next a rongeur was used to debride any infected necrotic looking tissue and this was placed into a specimen cup.  Debridement continued beneath the deep fascia of the lateral foot. The infected area was explored to the ends of the fluid pockets that were found. I then dissected through the soft tissues until I identified an area of prominent plantar calcaneal bone. The periosteum was sharply elevated. Next an oscillating saw was used to remove prominent bone at the inferior calcaneus.  There was no gross appearance of bony involvement with regard to the infection at that location.  At this point antibiotics were given.  All remaining infected necrotic tissue was then sharply debrided from the area using a scalpel.  Excisional debridement using a scalpel was then performed at a separate plantar ulcer as well as another separate posterior heel ulcer.  Each area debrided was less than 20 cm².     Next the wound was copiously irrigated with irrisept followed by 2 L of normal saline irrigation.  The tourniquet was taken down and hemostasis was achieved with electrocautery.  Paul was placed to help with hemostasis.  The deep tissue was closed with 0 PDS suture and the skin with 3-0 nylon suture.  1 piece of sterile packing was placed in the wound and left hanging out of the wound.  Next a sterile dressing was applied.      The patient tolerated the procedure well no with acute complications identified.  All sponge & needle counts were correct x2 prior to procedure conclusion.  Patient was awoken from anesthesia and transferred back to the PACU without complication.     Counts:    Sponge: Correct    Needle: Correct    Sharp: Correct    Instrument: Correct     POSTOPERATIVE PLAN:   -NWB operative extremity  -Advance diet as tolerated  -Keep splint/operative dressing  clean and dry  -DVT prophylaxis, mechanical and subq hep  -Postop abx  -ID consult placed, appreciate recs  -Pain control  -Follow-up Intra-Op cultures  -Elevate operative extremity  -Consult medicine, appreciate recs  -Consult PT wound care, wv placement  -Dispo plan, pending    Implants:    Implant Name Type Inv. Item Serial No.  Lot No. LRB No. Used Action   SEAL HEMO SURG ABRAM/AH ABS/PWDR 3GM - [id number] Implant SEAL HEMO SURG ABRAM/AH ABS/PWDR 3GM  [employer] [id number] Left 1 Implanted         Mao Mckay MD     Date: 6/3/2025  Time: 15:37 EDT  Electronically signed by Mao Mckay MD, 06/03/25, 3:37 PM EDT.

## 2025-06-03 NOTE — ANESTHESIA PREPROCEDURE EVALUATION
Anesthesia Evaluation     Patient summary reviewed and Nursing notes reviewed   no history of anesthetic complications:   NPO Solid Status: > 8 hours  NPO Liquid Status: > 2 hours           Airway   Mallampati: II  TM distance: >3 FB  Neck ROM: full  No difficulty expected  Dental - normal exam     Pulmonary - negative pulmonary ROS and normal exam    breath sounds clear to auscultation  Cardiovascular - normal exam    ECG reviewed  PT is on anticoagulation therapy  Rhythm: regular  Rate: normal    (+) hypertension, dysrhythmias (On eliquis - last dose 2 days ago) Atrial Fib    ROS comment: 2023 Echo:  ·  Left ventricular systolic function is normal. Calculated left ventricular EF = 60%  ·  Left ventricular diastolic function was normal.  ·  Left atrial volume is moderately increased.  ·  Saline test results are negative.  ·  There is calcification of the aortic valve.  ·  Moderate tricuspid valve regurgitation is present.  ·  Estimated right ventricular systolic pressure from tricuspid regurgitation is mildly elevated (35-45 mmHg).      Neuro/Psych  (+) numbness (Left foot insensate)  GI/Hepatic/Renal/Endo    (+) obesity    Musculoskeletal         ROS comment: Chronic osteomyelitis of left foot  Abdominal    Substance History   (+) alcohol use (5-6 oz/day)     OB/GYN          Other                      Anesthesia Plan    ASA 3     general     intravenous induction     Anesthetic plan, risks, benefits, and alternatives have been provided, discussed and informed consent has been obtained with: patient.    Plan discussed with CRNA.    CODE STATUS:          bones(Osteoporosis,prev fx,steroid use,metastatic bone ca)

## 2025-06-03 NOTE — BRIEF OP NOTE
INCISION AND DRAINAGE LOWER EXTREMITY  Progress Note    Carlos Culver  6/3/2025    Pre-op Diagnosis:   Ulcer of left foot, unspecified ulcer stage [L97.529]       Post-Op Diagnosis Codes:     * Ulcer of left foot, unspecified ulcer stage [L97.529]    Procedure(s):      Procedure(s):  Left foot abscess I&D, exostectomy, possible wound VAC placement              Surgeon(s):  Mao Mckay MD    Anesthesia: General with Block    Staff:   Circulator: Lisa Paulino RN; Nyla Rodriguez RN  Radiology Technologist: Hang Jenkins  Scrub Person: Paulette Marion; Alyssa Godoy  Nursing Assistant: Gary Reddy PCT       Estimated Blood Loss: <500ml    Urine Voided: * No values recorded between 6/3/2025  1:00 PM and 6/3/2025  2:56 PM *    Specimens:                Specimens       ID Source Type Tests Collected By Collected At Frozen?    1 Foot, Left Tissue ANAEROBIC CULTURE  FUNGAL CULTURE  TISSUE / BONE CULTURE  AFB CULTURE   Mao Mckay MD 6/3/25 1345     Description: Left foot abscess soft tissue    This specimen was not marked as sent.              Drains: * No LDAs found *    Findings: Abscess      Complications: None    -NWB operative extremity  -Advance diet as tolerated  -Keep splint/operative dressing clean and dry  -DVT prophylaxis, mechanical and subq hep  -Postop abx  -ID consult placed, appreciate recs  -Pain control  -Follow-up Intra-Op cultures  -Elevate operative extremity  -Consult medicine, appreciate recs  -Dispo plan, pending          Mao Mckay MD     Date: 6/3/2025  Time: 15:01 EDT

## 2025-06-03 NOTE — PLAN OF CARE
Goal Outcome Evaluation:  Plan of Care Reviewed With: patient        Progress: no change  Outcome Evaluation: Pt arrives from PACU with IVFs infusing without difficulty. LLE elevated on several blankets.No weight bearing status noted. Patient admits to Vodka 4 drinks daily 7 days per week. MD informed. PARUL scoring in progress. Labs due for the morning

## 2025-06-03 NOTE — ANESTHESIA PROCEDURE NOTES
Peripheral Block    Pre-sedation assessment completed: 6/3/2025 9:24 AM    Patient reassessed immediately prior to procedure    Start time: 6/3/2025 9:24 AM  Reason for block: at surgeon's request and post-op pain management  Performed by  CRNA/CAA: Trino Sheridan, CRNA  Assisted by: Alis Iglesias RN  Preanesthetic Checklist  Completed: patient identified, IV checked, site marked, risks and benefits discussed, surgical consent, monitors and equipment checked, pre-op evaluation and timeout performed  Prep:  Pt Position: supine  Sterile barriers:cap, gloves, mask, sterile barriers and washed/disinfected hands  Prep: ChloraPrep  Patient monitoring: blood pressure monitoring, continuous pulse oximetry and EKG  Procedure  Performed under: spinal  Guidance:ultrasound guided    ULTRASOUND INTERPRETATION.  Using ultrasound guidance a 20 G gauge needle was placed in close proximity to the nerve, at which point, under ultrasound guidance anesthetic was injected in the area of the nerve and spread of the anesthesia was seen on ultrasound in close proximity thereto.  There were no abnormalities seen on ultrasound; a digital image was taken; and the patient tolerated the procedure with no complications. Images:still images obtained, printed/placed on chart    Laterality:left  Block Type:adductor canal block  Injection Technique:single-shot  Needle Type:Tuohy and echogenic  Needle Gauge:20 G  Resistance on Injection: none  Catheter size: 20g.          Medications  Preservative Free Saline:10ml    Post Assessment  Injection Assessment: negative aspiration for heme, incremental injection and no paresthesia on injection  Patient Tolerance:comfortable throughout block  Complications:no  Additional Notes  SINGLE shot   A high-frequency linear transducer, with sterile cover, was placed on the anterior mid-thigh (between the anterior superior iliac spine and patella). The transducer was then moved medially to identify the Sartorius  "muscle (Joseph), Vastus Medialis muscle (VMM), Superficial Femoral Artery (SFA) and Vein. The transducer was then moved cephalad or caudad to position the SFA in the middle of the Joseph. The insertion site was prepped and draped in sterile fashion. Skin and cutaneous tissue was infiltrated with 2-5 ml of 1% Lidocaine. Using ultrasound-guidance, a 20-gauge B-Grady 4\" Ultraplex 360 non-stimulating echogenic needle was advanced in plane from lateral to medial. Preservative-free normal saline was utilized for hydro-dissection of tissue, advancement of needle, and to confirm needle placement below the fascial plane of the Joseph where the Nerve to the VMM is located. Local anesthetic (LA) 5 ml deposited here. The needle continues its path lateral to the SFA at the level of the Saphenous Nerve. The remainder of the LA was deposited at the 10-11 o'clock position of the SFA. This injection created a space between the Joseph and the SFA. Aspiration every 5 ml to prevent intravascular injection. Injection was completed with negative aspiration of blood and negative intravascular injection. Injection pressures were normal with minimal resistance.   Performed by: Trino Sheridan CRNA            "

## 2025-06-04 ENCOUNTER — APPOINTMENT (OUTPATIENT)
Dept: GENERAL RADIOLOGY | Facility: HOSPITAL | Age: 80
End: 2025-06-04
Payer: MEDICARE

## 2025-06-04 LAB
ALBUMIN SERPL-MCNC: 3.4 G/DL (ref 3.5–5.2)
ALBUMIN/GLOB SERPL: 1.1 G/DL
ALP SERPL-CCNC: 81 U/L (ref 39–117)
ALT SERPL W P-5'-P-CCNC: 8 U/L (ref 1–41)
ANION GAP SERPL CALCULATED.3IONS-SCNC: 13 MMOL/L (ref 5–15)
AST SERPL-CCNC: 18 U/L (ref 1–40)
BILIRUB SERPL-MCNC: 0.6 MG/DL (ref 0–1.2)
BUN SERPL-MCNC: 19.5 MG/DL (ref 8–23)
BUN/CREAT SERPL: 19.9 (ref 7–25)
CALCIUM SPEC-SCNC: 8.6 MG/DL (ref 8.6–10.5)
CHLORIDE SERPL-SCNC: 100 MMOL/L (ref 98–107)
CO2 SERPL-SCNC: 23 MMOL/L (ref 22–29)
CREAT SERPL-MCNC: 0.98 MG/DL (ref 0.76–1.27)
DEPRECATED RDW RBC AUTO: 49.7 FL (ref 37–54)
EGFRCR SERPLBLD CKD-EPI 2021: 78 ML/MIN/1.73
ERYTHROCYTE [DISTWIDTH] IN BLOOD BY AUTOMATED COUNT: 14 % (ref 12.3–15.4)
GLOBULIN UR ELPH-MCNC: 3.1 GM/DL
GLUCOSE SERPL-MCNC: 176 MG/DL (ref 65–99)
HCT VFR BLD AUTO: 36.7 % (ref 37.5–51)
HGB BLD-MCNC: 11.8 G/DL (ref 13–17.7)
MCH RBC QN AUTO: 31.1 PG (ref 26.6–33)
MCHC RBC AUTO-ENTMCNC: 32.2 G/DL (ref 31.5–35.7)
MCV RBC AUTO: 96.8 FL (ref 79–97)
PLATELET # BLD AUTO: 177 10*3/MM3 (ref 140–450)
PMV BLD AUTO: 9.6 FL (ref 6–12)
POTASSIUM SERPL-SCNC: 4.5 MMOL/L (ref 3.5–5.2)
PROT SERPL-MCNC: 6.5 G/DL (ref 6–8.5)
RBC # BLD AUTO: 3.79 10*6/MM3 (ref 4.14–5.8)
SODIUM SERPL-SCNC: 136 MMOL/L (ref 136–145)
WBC NRBC COR # BLD AUTO: 23.06 10*3/MM3 (ref 3.4–10.8)

## 2025-06-04 PROCEDURE — 63710000001 VITAMIN B-12 1000 MCG TABLET: Performed by: ORTHOPAEDIC SURGERY

## 2025-06-04 PROCEDURE — 25010000002 PIPERACILLIN SOD-TAZOBACTAM PER 1 G: Performed by: INTERNAL MEDICINE

## 2025-06-04 PROCEDURE — 63710000001 METOPROLOL TARTRATE 25 MG TABLET: Performed by: ORTHOPAEDIC SURGERY

## 2025-06-04 PROCEDURE — 25010000002 CEFAZOLIN PER 500 MG: Performed by: ORTHOPAEDIC SURGERY

## 2025-06-04 PROCEDURE — 25010000002 HEPARIN (PORCINE) PER 1000 UNITS: Performed by: ORTHOPAEDIC SURGERY

## 2025-06-04 PROCEDURE — 63710000001 FOLIC ACID 1 MG TABLET: Performed by: INTERNAL MEDICINE

## 2025-06-04 PROCEDURE — 80053 COMPREHEN METABOLIC PANEL: CPT | Performed by: INTERNAL MEDICINE

## 2025-06-04 PROCEDURE — A9270 NON-COVERED ITEM OR SERVICE: HCPCS | Performed by: ORTHOPAEDIC SURGERY

## 2025-06-04 PROCEDURE — 63710000001 THIAMINE 100 MG TABLET: Performed by: INTERNAL MEDICINE

## 2025-06-04 PROCEDURE — 63710000001 DIPHENHYDRAMINE PER 50 MG: Performed by: INTERNAL MEDICINE

## 2025-06-04 PROCEDURE — 97116 GAIT TRAINING THERAPY: CPT

## 2025-06-04 PROCEDURE — A9270 NON-COVERED ITEM OR SERVICE: HCPCS | Performed by: INTERNAL MEDICINE

## 2025-06-04 PROCEDURE — 97165 OT EVAL LOW COMPLEX 30 MIN: CPT

## 2025-06-04 PROCEDURE — 63710000001 MULTIVITAMIN TABLET: Performed by: INTERNAL MEDICINE

## 2025-06-04 PROCEDURE — 63710000001 ATORVASTATIN 40 MG TABLET: Performed by: ORTHOPAEDIC SURGERY

## 2025-06-04 PROCEDURE — C1894 INTRO/SHEATH, NON-LASER: HCPCS

## 2025-06-04 PROCEDURE — 71045 X-RAY EXAM CHEST 1 VIEW: CPT

## 2025-06-04 PROCEDURE — 85027 COMPLETE CBC AUTOMATED: CPT | Performed by: ORTHOPAEDIC SURGERY

## 2025-06-04 PROCEDURE — C1751 CATH, INF, PER/CENT/MIDLINE: HCPCS

## 2025-06-04 PROCEDURE — 97530 THERAPEUTIC ACTIVITIES: CPT

## 2025-06-04 RX ORDER — SODIUM CHLORIDE 0.9 % (FLUSH) 0.9 %
10 SYRINGE (ML) INJECTION AS NEEDED
Status: DISCONTINUED | OUTPATIENT
Start: 2025-06-04 | End: 2025-06-09 | Stop reason: HOSPADM

## 2025-06-04 RX ORDER — SODIUM CHLORIDE 0.9 % (FLUSH) 0.9 %
20 SYRINGE (ML) INJECTION AS NEEDED
Status: DISCONTINUED | OUTPATIENT
Start: 2025-06-04 | End: 2025-06-09 | Stop reason: HOSPADM

## 2025-06-04 RX ORDER — SODIUM CHLORIDE 0.9 % (FLUSH) 0.9 %
10 SYRINGE (ML) INJECTION EVERY 12 HOURS SCHEDULED
Status: DISCONTINUED | OUTPATIENT
Start: 2025-06-04 | End: 2025-06-09 | Stop reason: HOSPADM

## 2025-06-04 RX ORDER — SODIUM CHLORIDE 9 MG/ML
40 INJECTION, SOLUTION INTRAVENOUS AS NEEDED
Status: DISCONTINUED | OUTPATIENT
Start: 2025-06-04 | End: 2025-06-09 | Stop reason: HOSPADM

## 2025-06-04 RX ADMIN — THIAMINE HCL TAB 100 MG 100 MG: 100 TAB at 17:07

## 2025-06-04 RX ADMIN — METOPROLOL TARTRATE 25 MG: 25 TABLET, FILM COATED ORAL at 09:08

## 2025-06-04 RX ADMIN — METOPROLOL TARTRATE 25 MG: 25 TABLET, FILM COATED ORAL at 20:45

## 2025-06-04 RX ADMIN — PIPERACILLIN AND TAZOBACTAM 4.5 G: 4; .5 INJECTION, POWDER, FOR SOLUTION INTRAVENOUS at 15:10

## 2025-06-04 RX ADMIN — HEPARIN SODIUM 5000 UNITS: 5000 INJECTION INTRAVENOUS; SUBCUTANEOUS at 20:45

## 2025-06-04 RX ADMIN — DIPHENHYDRAMINE HYDROCHLORIDE 100 MG: 50 CAPSULE ORAL at 00:05

## 2025-06-04 RX ADMIN — ATORVASTATIN CALCIUM 80 MG: 40 TABLET, FILM COATED ORAL at 20:45

## 2025-06-04 RX ADMIN — PIPERACILLIN AND TAZOBACTAM 4.5 G: 4; .5 INJECTION, POWDER, FOR SOLUTION INTRAVENOUS at 10:17

## 2025-06-04 RX ADMIN — Medication 10 ML: at 15:31

## 2025-06-04 RX ADMIN — SODIUM CHLORIDE 2 G: 900 INJECTION INTRAVENOUS at 06:11

## 2025-06-04 RX ADMIN — HEPARIN SODIUM 5000 UNITS: 5000 INJECTION INTRAVENOUS; SUBCUTANEOUS at 15:10

## 2025-06-04 RX ADMIN — MULTIVITAMIN TABLET 1 TABLET: TABLET at 09:08

## 2025-06-04 RX ADMIN — FOLIC ACID 1 MG: 1 TABLET ORAL at 09:07

## 2025-06-04 RX ADMIN — HEPARIN SODIUM 5000 UNITS: 5000 INJECTION INTRAVENOUS; SUBCUTANEOUS at 06:11

## 2025-06-04 RX ADMIN — CYANOCOBALAMIN TAB 1000 MCG 1000 MCG: 1000 TAB at 09:08

## 2025-06-04 RX ADMIN — Medication 10 ML: at 09:08

## 2025-06-04 RX ADMIN — Medication 10 ML: at 20:45

## 2025-06-04 NOTE — PLAN OF CARE
Goal Outcome Evaluation:   Pt is alert and oriented x 4. VSS on room air. No complaints of pain. LLE remains elevated and ace wrap in place. CIWA scored 2. PICC line was placed today in R arm. Call light in reach.     Problem: Adult Inpatient Plan of Care  Goal: Absence of Hospital-Acquired Illness or Injury  Intervention: Identify and Manage Fall Risk  Recent Flowsheet Documentation  Taken 6/4/2025 1600 by Huy Jones RN  Safety Promotion/Fall Prevention:   activity supervised   assistive device/personal items within reach   clutter free environment maintained   elopement precautions   fall prevention program maintained   gait belt   lighting adjusted   mobility aid in reach   muscle strengthening facilitated   nonskid shoes/slippers when out of bed   room organization consistent   safety round/check completed   toileting scheduled  Taken 6/4/2025 1400 by Huy Jones RN  Safety Promotion/Fall Prevention:   activity supervised   assistive device/personal items within reach   clutter free environment maintained   fall prevention program maintained   gait belt   elopement precautions   lighting adjusted   mobility aid in reach   muscle strengthening facilitated   nonskid shoes/slippers when out of bed   room organization consistent   safety round/check completed   toileting scheduled  Taken 6/4/2025 1245 by Huy Jones, WILLA  Safety Promotion/Fall Prevention:   activity supervised   assistive device/personal items within reach   clutter free environment maintained   fall prevention program maintained   elopement precautions   gait belt   lighting adjusted   muscle strengthening facilitated   mobility aid in reach   nonskid shoes/slippers when out of bed   room organization consistent   safety round/check completed   toileting scheduled  Taken 6/4/2025 1017 by Huy Jones, WILLA  Safety Promotion/Fall Prevention:   activity supervised   assistive device/personal items within reach   elopement precautions   clutter  free environment maintained   fall prevention program maintained   gait belt   lighting adjusted   mobility aid in reach   muscle strengthening facilitated   nonskid shoes/slippers when out of bed   room organization consistent   safety round/check completed   toileting scheduled  Taken 6/4/2025 0800 by Huy Jones RN  Safety Promotion/Fall Prevention:   activity supervised   assistive device/personal items within reach   fall prevention program maintained   elopement precautions   clutter free environment maintained   gait belt   lighting adjusted   mobility aid in reach   muscle strengthening facilitated   nonskid shoes/slippers when out of bed   room organization consistent   safety round/check completed   toileting scheduled

## 2025-06-04 NOTE — PROGRESS NOTES
"IM progress note      Carlos Culver  [id number]  1945     LOS: 1 day     Attending: Mao Mckay MD    Primary Care Provider: Marcus Cheng MD      Chief Complaint/Reason for visit:  Left foot infection    Subjective   Doing well. Denies pain. Denies f/c/n/v/sob/cp.    Objective     Vital Signs  Visit Vitals  /77 (BP Location: Right arm, Patient Position: Lying)   Pulse 93   Temp 97.8 °F (36.6 °C) (Oral)   Resp 18   Ht 180.3 cm (71\")   Wt 97.5 kg (215 lb)   SpO2 93%   BMI 29.99 kg/m²     Temp (24hrs), Av.8 °F (36.6 °C), Min:96.7 °F (35.9 °C), Max:98.4 °F (36.9 °C)      Nutrition: PO    Respiratory: RA    Physical Therapy: Pt demonstrated good ability with maintaining LLE NWB precautions and good ability to ambulate 200' with knee scooter this session. Pt demonstrates increased need for cues with transfers from sitting to knee scooter, and deficits in strength and endurance. PT to continue IPPT to address deficits. PT continue to rec home with assist and HHPT at d/c when medically appropriate.     Physical Exam:     General Appearance:    Alert, cooperative, in no acute distress   Head:    Normocephalic, without obvious abnormality, atraumatic    Lungs:     Normal effort, symmetric chest rise, no crepitus, clear to      auscultation bilaterally             Heart:    Regular rhythm and normal rate, normal S1 and S2   Abdomen:     Normal bowel sounds, no masses, no organomegaly, soft        non-tender, non-distended, no guarding, no rebound                tenderness   Extremities:   Left foot ACE wrap CDI.    Pulses:   Pulses palpable and equal bilaterally   Skin:   No bleeding, bruising or rash   Neurologic:   Cranial nerves 2 - 12 grossly intact     Results Review:     I reviewed the patient's new clinical results.   Results from last 7 days   Lab Units 25  0657   WBC 10*3/mm3 23.06*   HEMOGLOBIN g/dL 11.8*   HEMATOCRIT % 36.7*   PLATELETS 10*3/mm3 177           Invalid input(s): " "\"LABALBU\", \"PROT\"  I reviewed the patient's new imaging including images and reports.    All medications reviewed.   [Held by provider] apixaban, 5 mg, Oral, BID  atorvastatin, 80 mg, Oral, Nightly  folic acid, 1 mg, Oral, Daily  heparin (porcine), 5,000 Units, Subcutaneous, Q8H  metoprolol tartrate, 25 mg, Oral, BID  multivitamin, 1 tablet, Oral, Daily  piperacillin-tazobactam, 4.5 g, Intravenous, Q8H  sodium chloride, 10 mL, Intravenous, Q12H  thiamine, 100 mg, Oral, Daily  vitamin B-12, 1,000 mcg, Oral, Daily        Assessment & Plan     Abscess of left foot, status post I&D, exostectomy    Essential hypertension    Leukocytosis    Chronic anticoagulation    Neuropathy    CKD (chronic kidney disease) stage 2, GFR 60-89 ml/min    A-fib    S/P transmetatarsal amputation of foot, left    Alcohol abuse    History of Chopart amputation of left foot    Ulcer of left foot    Left foot infection      Plan  1. PT/OT- NWB LLE  2. Pain control-prns   3. IS-encouraged  4. DVT proph- mechs/heparin  5. Bowel regimen  6. Monitor post-op labs  7. DC planning for home     -Atrial fibrillation: Resume beta-blocker.  Apixaban will resume when okay with orthopedic surgery.     - Alcohol abuse.  No history of withdrawal.  Will screen with [facility] protocol.  Thiamine, folic acid, and multivitamin ordered.     - Hypertension:  Resume home medications as appropriate, formulary substitution when indicated.  Holding parameters.  PRN medications for elevated blood pressure.     - Follow operative cultures, postop antibiotics will be managed by Dr. Jeffery Young with [facility].      ROBIN Walters  06/04/25  10:50 EDT  "

## 2025-06-04 NOTE — PLAN OF CARE
Goal Outcome Evaluation:  Plan of Care Reviewed With: patient        Progress: improving     A&Ox4. VSS on room air. Pt has slept off and on throughout the shift. No c/o of pain. Ace wrap to LLE is CDI; elevated on blankets. CIWA scored 2. Urinal provided and voiding spontaneously. SCD to right leg.

## 2025-06-04 NOTE — CASE MANAGEMENT/SOCIAL WORK
Continued Stay Note  [facility]     Patient Name: Carlos Culver  MRN: [id number]  Today's Date: 6/4/2025    Admit Date: 6/3/2025    Plan: home   Discharge Plan       Row Name 06/04/25 1237       Plan    Plan home    Patient/Family in Agreement with Plan yes    Plan Comments Met with patient at the bedside to initiate discharge planning. Patient lives alone in a house with no steps, in [facility]. Patient is independent with ADLs and has a knee scooter, walker, commode, and shower chair. Patient denies any home health services or home oxygen use. Patient has Medicare and [employer] with prescription benefits and prefers to fill scripts at the [facility] in [place]. Patient's plan is home. Patient is agreeable to home health for skilled nursing services, if needed. Family or friend will transport. CM will continue to follow patient and assist with discharge planning as recommendations become available.    Final Discharge Disposition Code 01 - home or self-care                   Discharge Codes    No documentation.                       Jerry Trimble RN

## 2025-06-04 NOTE — CONSULTS
[facility]    INFECTIOUS DISEASE CONSULT/INITIAL HOSPITAL VISIT    Carlos Culver  1945  [id number]    Date of consult: 6/4/2025    Admit date: 6/3/2025    Requesting Provider: No Known Provider  Evaluating physician: Jeffery Young MD  Reason for Consultation: Left foot abscess in the setting of severe peripheral neuropathy  Chief Complaint: Above      Subjective   History of present illness:  Patient is a  79 y.o.  Yr old male with a history of severe peripheral neuropathy with previous foot infections related to ulcerations and worsening wound, hyperlipidemia, essential hypertension, hard of hearing, atrial fibrillation, who developed worsening left foot ulceration after wearing a new pair of shoes at the end of May 2025.  Patient developed ulceration with some drainage without high fevers or chills.  MRI of left foot on 5/24 was consistent with possible abscess.  The patient was admitted to [facility] on 6/3/2025 and underwent I&D of his left foot with purulence.  I was consulted on 6//25 for further evaluation and treatment.  Left foot wound cultures yielded MSSA and Pseudomonas aeruginosa.  Pseudomonas was sensitive to cefepime, levofloxacin, and MSSA was sensitive to piperacillin/tazobactam, sulfa, and tetracycline.  Surgical cultures from 6/3 negative to date.  The patient has no other localizing signs or symptoms of infection.    Past Medical History:   Diagnosis Date    A-fib     Acute kidney failure     Elevated cholesterol     Hard of hearing     Hyperlipidemia     Hypertension     Osteomyelitis     Renal disorder     Wears hearing aid in both ears     Wears reading eyeglasses        Past Surgical History:   Procedure Laterality Date    AMPUTATION DIGIT Left 01/07/2020    Procedure: AMPUTATION LEFT 2ND TOE;  Surgeon: Idania Osborn MD;  Location: [facility];  Service: Orthopedics    AMPUTATION DIGIT Left 12/08/2020    Procedure:  TRANSMETATARSAL AMPUTATION LEFT;  Surgeon: Idania Osborn MD;  Location:  [facility];  Service: Orthopedics;  Laterality: Left;    APPENDECTOMY      COLONOSCOPY  2006    FOOT SURGERY Left 12/09/2020    (L) transmetatarsal amputation 12/09/2020 - Dr. Idania Osborn    INCISION AND DRAINAGE LEG Left 10/23/2018    Procedure: INCISION AND DRAINAGE LEFT FOOT;  Surgeon: Idania Osborn MD;  Location:  [facility];  Service: Orthopedics    INCISION AND DRAINAGE LEG Left 07/21/2023    Procedure: FOOT INCISION AND DRAINAGE; REVISION AMPUTATION; ACHILLES LENGTHENING -LEFT;  Surgeon: Mao cMkay MD;  Location:  [facility];  Service: Orthopedics;  Laterality: Left;    INCISION AND DRAINAGE LEG Left 6/3/2025    Procedure: FOOT ABSCESS INCISION AND DRAINAGE, EXOSTECTOMY;  Surgeon: Mao Mckay MD;  Location:  [facility];  Service: Orthopedics;  Laterality: Left;    TOE AMPUTATION      TONSILLECTOMY      VENOUS ACCESS DEVICE (PORT) INSERTION N/A 10/23/2018    Procedure: GROSHONG CATHETER PLACEMENT;  Surgeon: Marquez Simons MD;  Location:  [facility];  Service: General    VENOUS ACCESS DEVICE (PORT) INSERTION N/A 12/09/2020    Procedure: GROSHONG INSERTION;  Surgeon: Antonio Bains MD;  Location:  [facility];  Service: General;  Laterality: N/A;    VENOUS ACCESS DEVICE (PORT) REMOVAL         Pediatric History   Patient Parents    Not on file     Other Topics Concern    Not on file   Social History Narrative    Not on file   No cigarettes, alcohol, drug use    family history includes Cancer in his father; No Known Problems in his mother.    Allergies   Allergen Reactions    Sulfa Antibiotics Unknown (See Comments) and Rash     Pt was told as child he had an allergy.  Has not taken and doesn't know the response       Immunization History   Administered Date(s) Administered    COVID-19 (MODERNA) 12YRS+ (SPIKEVAX) 10/27/2023    COVID-19 (PFIZER) 12YRS+ (COMIRNATY) 05/02/2025    COVID-19 (PFIZER) BIVALENT 12+YRS 10/19/2022     COVID-19 (PFIZER) Purple Cap Monovalent 01/29/2021, 02/25/2021, 10/02/2021    Covid-19 (Pfizer) Gray Cap Monovalent 04/14/2022       Medication:  @Scheduled Meds:[Held by provider] apixaban, 5 mg, Oral, BID  atorvastatin, 80 mg, Oral, Nightly  ceFAZolin, 2 g, Intravenous, Q8H  folic acid, 1 mg, Oral, Daily  heparin (porcine), 5,000 Units, Subcutaneous, Q8H  metoprolol tartrate, 25 mg, Oral, BID  multivitamin, 1 tablet, Oral, Daily  sodium chloride, 10 mL, Intravenous, Q12H  thiamine, 100 mg, Oral, Daily  vitamin B-12, 1,000 mcg, Oral, Daily      Continuous Infusions:lactated ringers, 9 mL/hr, Last Rate: Stopped (06/03/25 1500)  sodium chloride, 100 mL/hr, Last Rate: 100 mL/hr (06/03/25 1853)      PRN Meds:.  acetaminophen **OR** acetaminophen    bisacodyl    diphenhydrAMINE    HYDROmorphone **AND** naloxone    labetalol    melatonin    ondansetron ODT **OR** ondansetron    oxyCODONE    polyethylene glycol    senna-docusate sodium    sodium chloride    sodium chloride    sodium chloride     Please refer to the medical record for a full medication list    Review of Systems:    Constitutional-- No Fever, chills or sweats.  Appetite good, and no malaise. No fatigue.  HEENT-- No new vision, hearing or throat complaints.  No epistaxis or oral sores.  Denies odynophagia or dysphagia.  No odynophagia or dysphagia. No headache, photophobia or neck stiffness.  CV-- No chest pain, palpitation or syncope  Resp-- No SOB/cough/Hemoptysis  GI- No nausea, vomiting, or diarrhea.  No hematochezia, melena, or hematemesis. Denies jaundice or chronic liver disease.  -- No dysuria, hematuria, or flank pain.  Denies hesitancy, urgency.  Lymph- no swollen lymph nodes in neck/axilla or groin.   Heme- No active bruising or bleeding; no Hx of DVT or PE.  MS-- no swelling or pain in the bones or joints of arms/legs.  No new back pain.  Neuro-- No acute focal weakness or numbness in the arms or legs.  No seizures.  Skin--No rashes or lesions,  "except left foot as per HPI    Physical Exam:   Vital Signs   Temp:  [96.7 °F (35.9 °C)-98.4 °F (36.9 °C)] 97.8 °F (36.6 °C)  Heart Rate:  [60-93] 86  Resp:  [16-18] 18  BP: (114-137)/(59-94) 131/77    Blood pressure 131/77, pulse 86, temperature 97.8 °F (36.6 °C), temperature source Oral, resp. rate 18, height 180.3 cm (71\"), weight 97.5 kg (215 lb), SpO2 93%.  GENERAL: Awake and alert, in moderate distress. Appears older than stated age.  HEENT:  Normocephalic, atraumatic.  Oropharynx without thrush. Dentition in good repair. No cervical adenopathy. No neck masses.  Ears externally normal, Nose externally normal.  EYES: PERRLA. No conjunctival injection. No icterus. EOM full.  LYMPHATICS: No lymphadenopathy of the neck or axillary or inguinal regions.   HEART: No murmur, gallop, or pericardial friction rub. Reg rate rhythm, No JVD at 45 degrees.  LUNGS: Clear to auscultation and percussion. No respiratory distress, no use of accessory muscles.  ABDOMEN: Soft, nontender, nondistended. No appreciable HSM.  Bowel sounds normal.  Obese.  GENITAL: No external lesions, breasts without masses, back straight, no CVAT, rectal external without lesions.   SKIN: Warm and dry without cutaneous eruptions.  No nodules.  Left foot surgical dressing in place.  PSYCHIATRIC: Mental status lucid. No confusion.  EXT:  No cellulitic change.  NEURO: Oriented to name, CN 2 to 12 intact, DTR diminished lower extremities, symmetric, sensory diminished lower extremity, motor 5/5 upper and lower extremity, cerebellar and gait not tested.      Results Review:   I reviewed the patient's new clinical results.  I reviewed the patient's new imaging results and agree with the interpretation.  I reviewed the patient's other test results and agree with the interpretation    Results from last 7 days   Lab Units 06/04/25  0657   WBC 10*3/mm3 23.06*   HEMOGLOBIN g/dL 11.8*   HEMATOCRIT % 36.7*   PLATELETS 10*3/mm3 177     5/24/2025 laboratory review " "with sodium 136, potassium 5.1, chloride 102, CO2 24, calcium 9.5, ALT 10, AST 20, alkaline phosphatase 118, total bilirubin 1.0, creatinine 1.13, ESR 54, WBC 11, hemoglobin 14.                            Estimated Creatinine Clearance: 62.1 mL/min (by C-G formula based on SCr of 1.13 mg/dL).  CPK          5/24/2025    10:26   Common Labs   Creatine Kinase 84       Procalitonin Results:       Brief Urine Lab Results       None           No results found for: \"SITE\", \"ALLENTEST\", \"PHART\", \"GRY1LKM\", \"PO2ART\", \"HPS7CRQ\", \"BASEEXCESS\", \"B8ZUGQCD\", \"HGBBG\", \"HCTABG\", \"OXYHEMOGLOBI\", \"METHHGBN\", \"CARBOXYHGB\", \"CO2CT\", \"BAROMETRIC\", \"MODALITY\", \"FIO2\"     Microbiology:      Results for orders placed or performed during the hospital encounter of 07/19/23   Blood Culture - Blood, Hand, Left    Specimen: Hand, Left; Blood   Result Value Ref Range    Blood Culture No growth at 5 days          Culture results from last 30 days   Lab 06/03/25  1345 05/24/25  1129   WOUNDCX  --  Scant growth (1+) Pseudomonas aeruginosa*  Light growth (2+) Staphylococcus aureus*   TISSUE CULTURE Growth present, too young to evaluate  --       Brief Urine Lab Results       None                Tissue Culture   Date Value Ref Range Status   06/03/2025 Growth present, too young to evaluate  Preliminary   (      Radiology:  Imaging Results (Last 72 Hours)       Procedure Component Value Units Date/Time    FL C Arm During Surgery [[id number]] Resulted: 06/03/25 1414     Updated: 06/03/25 1414    Narrative:      This procedure was auto-finalized with no dictation required.            IMPRESSION:     Left foot abscess, Pseudomonas aeruginosa and MSSA related to peripheral neuropathy with worsening wound with MRI from 5/24 without osteomyelitis.  Not septic.  Status post incision and drainage by orthopedic surgery on 6/3/2025.  Leukocytosis, neutrophilic likely postoperative stress leukocytosis and related to above issues.  Anemia, chronic disease related " to above issues.  Severe peripheral neuropathy lower extremities of unclear etiology.  Contributing to recurrent ulcerations.  Status post mellitus left foot status post trans metatarsal amputation 2023.    RECOMMENDATIONS:    Diagnostically, continue to follow patient's physical exam, CBC, CMP, CRP, CPK, and cultures which have been obtained from surgery on 6/3/2025.  Therapeutically, consider piperacillin/tazobactam 4.5 g IV every 8 hours until 7/18/2025.  6 weeks.  Then likely oral antibiotics afterwards.  Depending upon his healing.  Dosing may need to be changed depending upon results of his laboratories and renal function.  I discussed the need for a PICC line in his IV antibiotics with the patient and he is in agreement.  Supportive care.  Room air on 6/4.  Patient is having increased difficulties living alone at home.  I would suspect he will need assisted living or skilled facility for IV antibiotics less home arrangements are improved.  PICC line for long-term venous access.  At risk for needing a left BKA in the future if this recurs.    I discussed the patient's findings and my recommendations with patient and nursing staff    I will be out of town.  Partners are covering.    Case management orders: Please arrange for outpatient IV antibiotics at skilled facility versus other with piperacillin/tazobactam 4.5 g IV every 8 hours to continue until 7/18/2025 for left foot abscess and early osteomyelitis.  Check CBC, CMP, CRP weekly while on IV antibiotics.  Cultures recently positive for Pseudomonas aeruginosa and MSSA.  Status post I&D 6/3/2025.  Nursing at [facility] to check for cultures from 6/3/2025.  Fax orders to [phone], call [phone] with final arrangements.  Arrange for follow-up with me in 1 week postdischarge.  Weekly PICC dressing changes.    Thank you for asking me to see Carlos Culver.  Our group would be pleased to follow this patient over the course of their hospitalization and assist with  outpatient antimicrobial therapy, as indicated.  Further recommendations depend on the results of the cultures and clinical course.  Side effects of medications discussed.  The patient is at increased risk for adverse drug reactions, complications of IV access, and readmission.    This visit included the following complex service elements:  Complex medical decision-making associated with antimicrobial prescribing.  In-depth chart review with high level synthesis for complex diagnoses.  Managed infection treatment protocol associated with transitions of care for this complex patient.  Counseled patients, family members, and/or caregivers regarding antimicrobial stewardship and antibiotic resistance.     Jeffery Young MD  6/4/2025

## 2025-06-04 NOTE — THERAPY TREATMENT NOTE
Patient Name: Carlos Culver  : 1945    MRN: [id number]                              Today's Date: 2025       Admit Date: 6/3/2025    Visit Dx:     ICD-10-CM ICD-9-CM   1. Ulcer of left foot, unspecified ulcer stage  L97.529 707.15     Patient Active Problem List   Diagnosis    Cellulitis of left foot    GUCCI (acute kidney injury)    Essential hypertension    Leukocytosis    Osteomyelitis of left foot    Chronic anticoagulation    Neuropathy    Elevated transaminase level    CKD (chronic kidney disease) stage 2, GFR 60-89 ml/min    Skin ulcer of right heel    Chronic multifocal osteomyelitis of left foot    Contracture of joint of foot, left    MARTINEZ (dyspnea on exertion)    Neuropathic ulcer, limited to breakdown of skin    Hyperlipidemia LDL goal <100    A-fib    S/P transmetatarsal amputation of foot, left    Leukocytosis, likely reactive    Acute postoperative pain    Alcohol abuse    Staphylococcus aureus bacteremia    Acute osteomyelitis of left ankle or foot    History of [place] amputation of left foot    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 evidence of necrosis    Ulcer of left foot    Left foot infection    Abscess of left foot, status post I&D, exostectomy     Past Medical History:   Diagnosis Date    A-fib     Acute kidney failure     Elevated cholesterol     Hard of hearing     Hyperlipidemia     Hypertension     Osteomyelitis     Renal disorder     Wears hearing aid in both ears     Wears reading eyeglasses      Past Surgical History:   Procedure Laterality Date    AMPUTATION DIGIT Left 2020    Procedure: AMPUTATION LEFT 2ND TOE;  Surgeon: Idania Osborn MD;  Location:  [facility];  Service: Orthopedics    AMPUTATION DIGIT Left 2020    Procedure: TRANSMETATARSAL AMPUTATION LEFT;  Surgeon: Idania Osborn MD;  Location:  [facility];  Service: Orthopedics;  Laterality: Left;    APPENDECTOMY      COLONOSCOPY  2006    FOOT SURGERY  Left 12/09/2020    (L) transmetatarsal amputation 12/09/2020 - Dr. Idania Osborn    INCISION AND DRAINAGE LEG Left 10/23/2018    Procedure: INCISION AND DRAINAGE LEFT FOOT;  Surgeon: Idania Osborn MD;  Location:  [facility];  Service: Orthopedics    INCISION AND DRAINAGE LEG Left 07/21/2023    Procedure: FOOT INCISION AND DRAINAGE; REVISION AMPUTATION; ACHILLES LENGTHENING -LEFT;  Surgeon: Mao Mckay MD;  Location:  [facility];  Service: Orthopedics;  Laterality: Left;    INCISION AND DRAINAGE LEG Left 6/3/2025    Procedure: FOOT ABSCESS INCISION AND DRAINAGE, EXOSTECTOMY;  Surgeon: Mao Mckay MD;  Location:  [facility];  Service: Orthopedics;  Laterality: Left;    TOE AMPUTATION      TONSILLECTOMY      VENOUS ACCESS DEVICE (PORT) INSERTION N/A 10/23/2018    Procedure: GROSHONG CATHETER PLACEMENT;  Surgeon: Marquez Simons MD;  Location:  [facility];  Service: General    VENOUS ACCESS DEVICE (PORT) INSERTION N/A 12/09/2020    Procedure: GROSHONG INSERTION;  Surgeon: Antonio Bains MD;  Location:  [facility];  Service: General;  Laterality: N/A;    VENOUS ACCESS DEVICE (PORT) REMOVAL        General Information       Row Name 06/04/25 0824          Physical Therapy Time and Intention    Document Type therapy note (daily note)  -CK (r) ES (t) CK (c)     Mode of Treatment physical therapy;individual therapy  -CK (r) ES (t) CK (c)       Row Name 06/04/25 0822          General Information    Patient Profile Reviewed yes  -CK (r) ES (t) CK (c)     Existing Precautions/Restrictions fall;left;non-weight bearing;other (see comments)  s/p L foot I+D, LLE NWB; [place] w/o hearing aids in  -CK (r) ES (t) CK (c)     Barriers to Rehab previous functional deficit;hearing deficit  -CK (r) ES (t) CK (c)       Row Name 06/04/25 0825          Cognition    Orientation Status (Cognition) oriented x 4  -CK (r) ES (t) CK (c)       Row Name 06/04/25 0869          Safety Issues/Impairments Affecting Functional Mobility    Safety  Issues Affecting Function (Mobility) awareness of need for assistance;safety precaution awareness;safety precautions follow-through/compliance;sequencing abilities;insight into deficits/self-awareness  -CK (r) ES (t) CK (c)     Impairments Affecting Function (Mobility) balance;endurance/activity tolerance;strength;sensation/sensory awareness;postural/trunk control  -CK (r) ES (t) CK (c)               User Key  (r) = Recorded By, (t) = Taken By, (c) = Cosigned By      Initials Name Provider Type    CK Brisa Kwong, PT Physical Therapist    ES Key Cote PT Student PT Student                   Mobility       Row Name 06/04/25 0912          Bed Mobility    Bed Mobility supine-sit  -CK (r) ES (t) CK (c)     Supine-Sit [place] (Bed Mobility) standby assist  -CK (r) ES (t) CK (c)     Assistive Device (Bed Mobility) head of bed elevated;bed rails  -CK (r) ES (t) CK (c)     Comment, (Bed Mobility) No dizziness or LOB with position change.  -CK (r) ES (t) CK (c)       Row Name 06/04/25 0912          Transfers    Comment, (Transfers) Pt has knee scooter from home in room.  -CK (r) ES (t) CK (c)       Row Name 06/04/25 0912          Sit-Stand Transfer    Sit-Stand [place] (Transfers) contact guard;verbal cues;nonverbal cues (demo/gesture)  -CK (r) ES (t) CK (c)     Assistive Device (Sit-Stand Transfers) walker, knee scooter  -CK (r) ES (t) CK (c)     Comment, (Sit-Stand Transfer) x1 from EOB, x3 chair to stand. Educated pt on proper placement of KS for transferring to maintain NWB and reduce shearing on EOB/chair on LLE. Pt able to complete transfer with improved safety after VC and demonstration.  -CK (r) ES (t) CK (c)       Row Name 06/04/25 0912          Gait/Stairs (Locomotion)    [place] Level (Gait) contact guard;1 person to manage equipment;nonverbal cues (demo/gesture)  -CK (r) ES (t) CK (c)     Assistive Device (Gait) walker, knee scooter  -CK (r) ES (t) CK (c)     Patient was able to  Ambulate yes  -CK (r) ES (t) CK (c)     Distance in Feet (Gait) 200  -CK (r) ES (t) CK (c)     Maintains Weight-bearing Status (Gait) able to maintain  -CK (r) ES (t) CK (c)     Comment, (Gait/Stairs) VC to slow speed to increase control and safety when ambulating with KS.  -CK (r) ES (t) CK (c)       Row Name 06/04/25 0912          Mobility    Extremity Weight-bearing Status left lower extremity  -CK (r) ES (t) CK (c)     Left Lower Extremity (Weight-bearing Status) non weight-bearing (NWB)  -CK (r) ES (t) CK (c)               User Key  (r) = Recorded By, (t) = Taken By, (c) = Cosigned By      Initials Name Provider Type    CK Brisa Kwong, PT Physical Therapist    ES Key Cote, PT Student PT Student                   Obj/Interventions       Row Name 06/04/25 0917          Motor Skills    Therapeutic Exercise hip;knee  -CK (r) ES (t) CK (c)       Row Name 06/04/25 0917          Hip (Therapeutic Exercise)    Hip (Therapeutic Exercise) strengthening exercise  -CK (r) ES (t) CK (c)     Hip Strengthening (Therapeutic Exercise) sitting;bilateral;marching while seated;10 repetitions  -CK (r) ES (t) CK (c)       Row Name 06/04/25 0917          Knee (Therapeutic Exercise)    Knee (Therapeutic Exercise) strengthening exercise  -CK (r) ES (t) CK (c)     Knee Strengthening (Therapeutic Exercise) sitting;bilateral;LAQ (long arc quad);10 repetitions  -CK (r) ES (t) CK (c)       Row Name 06/04/25 0917          Balance    Balance Assessment sitting static balance;sitting dynamic balance;standing static balance;standing dynamic balance  -CK (r) ES (t) CK (c)     Static Sitting Balance independent  -CK (r) ES (t) CK (c)     Dynamic Sitting Balance independent  -CK (r) ES (t) CK (c)     Position, Sitting Balance unsupported;sitting edge of bed;supported;sitting in chair  -CK (r) ES (t) CK (c)     Static Standing Balance contact guard;1 person to manage equipment  -CK (r) ES (t) CK (c)     Dynamic Standing Balance  contact guard;1 person to manage equipment  -CK (r) ES (t) CK (c)     Position/Device Used, Standing Balance supported;other (see comments)  KS  -CK (r) ES (t) CK (c)     Balance Interventions supported;sit to stand;static;dynamic;sitting;standing  -CK (r) ES (t) CK (c)     Comment, Balance No LOB noted. Cues for slowing speed with transfers for safety and proper sequencing.  -CK (r) ES (t) CK (c)               User Key  (r) = Recorded By, (t) = Taken By, (c) = Cosigned By      Initials Name Provider Type    CK Brisa Kwong, PT Physical Therapist    ES Key Cote, PT Student PT Student                   Goals/Plan    No documentation.                  Clinical Impression       Row Name 06/04/25 0919          Pain    Pretreatment Pain Rating 0/10 - no pain  -CK (r) ES (t) CK (c)     Posttreatment Pain Rating 0/10 - no pain  -CK (r) ES (t) CK (c)       Row Name 06/04/25 0919          Plan of Care Review    Plan of Care Reviewed With patient  -CK (r) ES (t) CK (c)     Progress improving  -CK (r) ES (t) CK (c)     Outcome Evaluation Pt demonstrated good ability with maintaining LLE NWB precautions and good ability to ambulate 200' with knee scooter this session. Pt demonstrates increased need for cues with transfers from sitting to knee scooter, and deficits in strength and endurance. PT to continue IPPT to address deficits. PT continue to rec home with assist and HHPT at d/c when medically appropriate.  -CK (r) ES (t) CK (c)       Row Name 06/04/25 0919          Therapy Assessment/Plan (PT)    Criteria for Skilled Interventions Met (PT) yes;meets criteria;skilled treatment is necessary  -CK (r) ES (t) CK (c)       Row Name 06/04/25 0919          Vital Signs    Pre Systolic BP Rehab 131  -CK (r) ES (t) CK (c)     Pre Treatment Diastolic BP 77  -CK (r) ES (t) CK (c)     Pretreatment Heart Rate (beats/min) 88  -CK (r) ES (t) CK (c)     Pre SpO2 (%) 95  -CK (r) ES (t) CK (c)     O2 Delivery Pre Treatment room  air  -CK (r) ES (t) CK (c)     O2 Delivery Intra Treatment room air  -CK (r) ES (t) CK (c)     O2 Delivery Post Treatment room air  -CK (r) ES (t) CK (c)     Pre Patient Position Supine  -CK (r) ES (t) CK (c)     Intra Patient Position Standing  -CK (r) ES (t) CK (c)     Post Patient Position Sitting  -CK (r) ES (t) CK (c)       Row Name 06/04/25 0919          Positioning and Restraints    Pre-Treatment Position in bed  -CK (r) ES (t) CK (c)     Post Treatment Position chair  -CK (r) ES (t) CK (c)     In Chair sitting;reclined;LLE elevated;legs elevated;waffle cushion;call light within reach;encouraged to call for assist;exit alarm on;notified nsg  Pt wanted break from SCDs, not placed back on  -CK (r) ES (t) CK (c)               User Key  (r) = Recorded By, (t) = Taken By, (c) = Cosigned By      Initials Name Provider Type    CK Brisa Kwong, PT Physical Therapist    Key Fenton, PT Student PT Student                   Outcome Measures       Row Name 06/04/25 0922          How much help from another person do you currently need...    Turning from your back to your side while in flat bed without using bedrails? 4  -CK (r) ES (t) CK (c)     Moving from lying on back to sitting on the side of a flat bed without bedrails? 4  -CK (r) ES (t) CK (c)     Moving to and from a bed to a chair (including a wheelchair)? 3  -CK (r) ES (t) CK (c)     Standing up from a chair using your arms (e.g., wheelchair, bedside chair)? 3  -CK (r) ES (t) CK (c)     Climbing 3-5 steps with a railing? 2  -CK (r) ES (t) CK (c)     To walk in hospital room? 3  -CK (r) ES (t) CK (c)     AM-PAC 6 Clicks Score (PT) 19  -CK (r) ES (t)     Highest Level of Mobility Goal Walk 10 Steps or More-6  -CK (r) ES (t)       Row Name 06/04/25 0922          Functional Assessment    Outcome Measure Options AM-PAC 6 Clicks Basic Mobility (PT)  -CK (r) ES (t) CK (c)               User Key  (r) = Recorded By, (t) = Taken By, (c) = Cosigned By       Initials Name Provider Type    CK Brisa Kwong, PT Physical Therapist    ES Key Cote, PT Student PT Student                                 Physical Therapy Education       Title: PT OT SLP Therapies (Done)       Topic: Physical Therapy (Done)       Point: Mobility training (Done)       Learning Progress Summary            Patient Acceptance, E,D, VU,NR by ES at 6/4/2025 0922    Comment: NWB LLE  Demonstrated and practiced safe STS transfers with proper placement of knee scooter.    Acceptance, E, VU by ND at 6/3/2025 1706                      Point: Home exercise program (Done)       Learning Progress Summary            Patient Acceptance, E,D, VU,NR by ES at 6/4/2025 0922    Comment: NWB LLE  Demonstrated and practiced safe STS transfers with proper placement of knee scooter.                      Point: Body mechanics (Done)       Learning Progress Summary            Patient Acceptance, E,D, VU,NR by ES at 6/4/2025 0922    Comment: NWB LLE  Demonstrated and practiced safe STS transfers with proper placement of knee scooter.    Acceptance, E, VU by ND at 6/3/2025 1706                      Point: Precautions (Done)       Learning Progress Summary            Patient Acceptance, E,D, VU,NR by ES at 6/4/2025 0922    Comment: NWB LLE  Demonstrated and practiced safe STS transfers with proper placement of knee scooter.    Acceptance, E, VU by ND at 6/3/2025 1706                                      User Key       Initials Effective Dates Name Provider Type Discipline    ND 11/16/23 -  Brooke Hager, PT Physical Therapist PT     05/05/25 -  Key Cote, PT Student PT Student PT                  PT Recommendation and Plan     Progress: improving  Outcome Evaluation: Pt demonstrated good ability with maintaining LLE NWB precautions and good ability to ambulate 200' with knee scooter this session. Pt demonstrates increased need for cues with transfers from sitting to knee scooter, and deficits  in strength and endurance. PT to continue IPPT to address deficits. PT continue to rec home with assist and HHPT at d/c when medically appropriate.     Time Calculation:         PT Charges       Row Name 06/04/25 0923             Time Calculation    Start Time 0841  -CK (r) ES (t) CK (c)      PT Received On 06/04/25  -CK (r) ES (t) CK (c)         Timed Charges    09245 - Gait Training Minutes  14  -CK (r) ES (t) CK (c)      80335 - PT Therapeutic Activity Minutes 10  -CK (r) ES (t) CK (c)         Total Minutes    Timed Charges Total Minutes 24  -CK (r) ES (t)       Total Minutes 24  -CK (r) ES (t)                User Key  (r) = Recorded By, (t) = Taken By, (c) = Cosigned By      Initials Name Provider Type    CK Brisa Kwong, PT Physical Therapist    ES Key Cote, PT Student PT Student                  Therapy Charges for Today       Code Description Service Date Service Provider Modifiers Qty    [id number] HC GAIT TRAINING EA 15 MIN 6/4/2025 Key Cote, PT Student GP 1    [id number]  PT THERAPEUTIC ACT EA 15 MIN 6/4/2025 Key Cote, PT Student GP 1            PT G-Codes  Outcome Measure Options: AM-PAC 6 Clicks Basic Mobility (PT)  AM-PAC 6 Clicks Score (PT): 19  PT Discharge Summary  Anticipated Discharge Disposition (PT): home with assist, home with home health    Key Cote, PT Student  6/4/2025

## 2025-06-04 NOTE — THERAPY DISCHARGE NOTE
Acute Care - Occupational Therapy Discharge  [facility]    Patient Name: Carlos Culver  : 1945    MRN: [id number]                              Today's Date: 2025       Admit Date: 6/3/2025    Visit Dx:     ICD-10-CM ICD-9-CM   1. Ulcer of left foot, unspecified ulcer stage  L97.529 707.15     Patient Active Problem List   Diagnosis    Cellulitis of left foot    GUCCI (acute kidney injury)    Essential hypertension    Leukocytosis    Osteomyelitis of left foot    Chronic anticoagulation    Neuropathy    Elevated transaminase level    CKD (chronic kidney disease) stage 2, GFR 60-89 ml/min    Skin ulcer of right heel    Chronic multifocal osteomyelitis of left foot    Contracture of joint of foot, left    MARTINEZ (dyspnea on exertion)    Neuropathic ulcer, limited to breakdown of skin    Hyperlipidemia LDL goal <100    A-fib    S/P transmetatarsal amputation of foot, left    Leukocytosis, likely reactive    Acute postoperative pain    Alcohol abuse    Staphylococcus aureus bacteremia    Acute osteomyelitis of left ankle or foot    History of [place] amputation of left foot    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 evidence of necrosis    Ulcer of left foot    Left foot infection    Abscess of left foot, status post I&D, exostectomy     Past Medical History:   Diagnosis Date    A-fib     Acute kidney failure     Elevated cholesterol     Hard of hearing     Hyperlipidemia     Hypertension     Osteomyelitis     Renal disorder     Wears hearing aid in both ears     Wears reading eyeglasses      Past Surgical History:   Procedure Laterality Date    AMPUTATION DIGIT Left 2020    Procedure: AMPUTATION LEFT 2ND TOE;  Surgeon: Idania Osborn MD;  Location: [facility];  Service: Orthopedics    AMPUTATION DIGIT Left 2020    Procedure: TRANSMETATARSAL AMPUTATION LEFT;  Surgeon: Idania Osborn MD;  Location: [facility] OR;  Service: Orthopedics;  Laterality:  Left;    APPENDECTOMY      COLONOSCOPY  2006    FOOT SURGERY Left 12/09/2020    (L) transmetatarsal amputation 12/09/2020 - Dr. Idania Osborn    INCISION AND DRAINAGE LEG Left 10/23/2018    Procedure: INCISION AND DRAINAGE LEFT FOOT;  Surgeon: Idania Osborn MD;  Location:  [facility] OR;  Service: Orthopedics    INCISION AND DRAINAGE LEG Left 07/21/2023    Procedure: FOOT INCISION AND DRAINAGE; REVISION AMPUTATION; ACHILLES LENGTHENING -LEFT;  Surgeon: Mao Mckay MD;  Location:  [facility] OR;  Service: Orthopedics;  Laterality: Left;    INCISION AND DRAINAGE LEG Left 6/3/2025    Procedure: FOOT ABSCESS INCISION AND DRAINAGE, EXOSTECTOMY;  Surgeon: Mao Mckay MD;  Location:  [facility] OR;  Service: Orthopedics;  Laterality: Left;    TOE AMPUTATION      TONSILLECTOMY      VENOUS ACCESS DEVICE (PORT) INSERTION N/A 10/23/2018    Procedure: GROSHONG CATHETER PLACEMENT;  Surgeon: Marquez Simons MD;  Location:  [facility] OR;  Service: General    VENOUS ACCESS DEVICE (PORT) INSERTION N/A 12/09/2020    Procedure: GROSHONG INSERTION;  Surgeon: Antonio Bains MD;  Location:  [facility] OR;  Service: General;  Laterality: N/A;    VENOUS ACCESS DEVICE (PORT) REMOVAL        General Information       Row Name 06/04/25 1105          OT Time and Intention    Document Type evaluation  -SA     Mode of Treatment occupational therapy  -       Row Name 06/04/25 1100          General Information    Patient Profile Reviewed yes  -SA     Prior Level of Function independent:;all household mobility;community mobility;gait;transfer;bed mobility;ADL's  Pt was NWB PTA for ~3 weeks and independent with knee scooter. At baseline is independent without AD. Denies falls.  -SA     Existing Precautions/Restrictions fall;left;non-weight bearing;other (see comments)  s/p L foot I+D, LLE NWB; [place], hearing aids  -SA     Barriers to Rehab previous functional deficit  -SA       Row Name 06/04/25 2033          Occupational Profile    Performance Patterns  (Occupational Profile) Has acess to RWx, w/c, and WIS with shower chair  -       Row Name 06/04/25 1105          Living Environment    Current Living Arrangements home  -     People in Home alone;other (see comments)  Daughter assists PRN  -       Row Name 06/04/25 1105          Home Main Entrance    Number of Stairs, Main Entrance none  -       Row Name 06/04/25 1105          Stairs Within Home, Primary    Stairs, Within Home, Primary [place] with basement, does not have to access basement  -       Row Name 06/04/25 1105          Cognition    Orientation Status (Cognition) oriented x 4  -       Row Name 06/04/25 1105          Safety Issues/Impairments Affecting Functional Mobility    Safety Issues Affecting Function (Mobility) awareness of need for assistance;insight into deficits/self-awareness;safety precaution awareness;safety precautions follow-through/compliance;sequencing abilities  -     Impairments Affecting Function (Mobility) balance;endurance/activity tolerance;strength;sensation/sensory awareness;postural/trunk control  -               User Key  (r) = Recorded By, (t) = Taken By, (c) = Cosigned By      Initials Name Provider Type     Jenniffer Shrestha OT Occupational Therapist                   Mobility/ADL's       Row Name 06/04/25 1108          Bed Mobility    Comment, (Bed Mobility) Pt up in chair upon OT arrival  -       Row Name 06/04/25 1108          Transfers    Transfers sit-stand transfer;stand-sit transfer  -       Row Name 06/04/25 1108          Sit-Stand Transfer    Sit-Stand [place] (Transfers) verbal cues;standby assist  -     Assistive Device (Sit-Stand Transfers) walker, knee scooter  -     Comment, (Sit-Stand Transfer) STS 1x from chair. Pt placed knee scooter in correct position and was able to maintain NWB LLE throughout transfer.  -       Row Name 06/04/25 1108          Stand-Sit Transfer    Stand-Sit [place] (Transfers) standby assist  -      Assistive Device (Stand-Sit Transfers) walker, knee scooter  -[facility] Name 06/04/25 1108          Functional Mobility    Functional Mobility- Ind. Level contact guard assist  -     Functional Mobility- Device other (see comments)  knee scooter  -     Functional Mobility-Distance (Feet) --  HH distance  -     Functional Mobility- Comment Pt ambulated household distance within room with knee scooter and CGA, no LOB at this time  -     Patient was able to Ambulate yes  -[facility] Name 06/04/25 1108          Activities of Daily Living    BADL Assessment/Intervention lower body dressing  -SA       Row Name 06/04/25 1108          Mobility    Extremity Weight-bearing Status left lower extremity  -     Left Lower Extremity (Weight-bearing Status) non weight-bearing (NWB)  -[facility] Name 06/04/25 1108          Lower Body Dressing Assessment/Training    [place] Level (Lower Body Dressing) doff;don;socks;independent  -     Position (Lower Body Dressing) unsupported sitting  -               User Key  (r) = Recorded By, (t) = Taken By, (c) = Cosigned By      Initials Name Provider Type     Jenniffer Shrestha OT Occupational Therapist                   Obj/Interventions       [facility] Name 06/04/25 1110          Sensory Assessment (Somatosensory)    Sensory Assessment (Somatosensory) UE sensation intact  -     Sensory Assessment Reports neuropathy, BLE, chronic in nature  -SA       Row Name 06/04/25 1110          Vision Assessment/Intervention    Visual Impairment/Limitations WFL  -SA       Row Name 06/04/25 1110          Range of Motion Comprehensive    General Range of Motion bilateral upper extremity ROM WNL  -SA       Row Name 06/04/25 1110          Strength Comprehensive (MMT)    Comment, General Manual Muscle Testing (MMT) Assessment BUE 5/5 througout  -SA       Row Name 06/04/25 1110          Balance    Balance Assessment sitting static balance;sitting dynamic balance;sit to stand dynamic  balance;standing static balance;standing dynamic balance  -     Static Sitting Balance independent  -SA     Dynamic Sitting Balance independent  -SA     Position, Sitting Balance unsupported;sitting in chair  -SA     Static Standing Balance standby assist  -     Dynamic Standing Balance contact guard  -     Position/Device Used, Standing Balance other (see comments)  knee scooter  -     Balance Interventions sitting;standing;sit to stand;supported;static;dynamic;minimal challenge;occupation based/functional task  -     Comment, Balance No LOB noted throughout evaluation  -               User Key  (r) = Recorded By, (t) = Taken By, (c) = Cosigned By      Initials Name Provider Type    SA Jenniffer Shrestha OT Occupational Therapist                   Goals/Plan    No documentation.                  Clinical Impression       Row Name 06/04/25 1111          Pain Assessment    Pretreatment Pain Rating 0/10 - no pain  -     Posttreatment Pain Rating 0/10 - no pain  -       Row Name 06/04/25 1111          Plan of Care Review    Plan of Care Reviewed With patient  -     Progress no change  -     Outcome Evaluation OT evaluation complete. Pt presents at functional baseline and warrants no skilled IPOT services at this time. Pt able to transfer onto knee scooter and ambulate household distance on knee scooter with CGA and no LOB. Pt demonstrated ability to perform LBD independently while sitting in chair. OT will sign off at this time, please reconsult if necessary. OT recommends home with assist and home health at time of discharge.  -       Row Name 06/04/25 1111          Therapy Assessment/Plan (OT)    Patient/Family Therapy Goal Statement (OT) Return home  -     Rehab Potential (OT) good  -     Criteria for Skilled Therapeutic Interventions Met (OT) no;no problems identified which require skilled intervention;does not meet criteria for skilled intervention  -       Row Name 06/04/25 1111           Therapy Plan Review/Discharge Plan (OT)    Anticipated Discharge Disposition (OT) home with assist;home with home health  -       Row Name 06/04/25 1111          Vital Signs    Pre Patient Position Sitting  -     Post Patient Position Sitting  -       Row Name 06/04/25 1111          Positioning and Restraints    Pre-Treatment Position sitting in chair/recliner  -SA     Post Treatment Position chair  -SA     In Chair notified nsg;reclined;call light within reach;encouraged to call for assist;exit alarm on;waffle cushion;LLE elevated  -               User Key  (r) = Recorded By, (t) = Taken By, (c) = Cosigned By      Initials Name Provider Type     Jenniffer Shrestha, MIKE Occupational Therapist                   Outcome Measures       Row Name 06/04/25 1114          How much help from another is currently needed...    Putting on and taking off regular lower body clothing? 4  -SA     Bathing (including washing, rinsing, and drying) 4  -SA     Toileting (which includes using toilet bed pan or urinal) 4  -SA     Putting on and taking off regular upper body clothing 4  -SA     Taking care of personal grooming (such as brushing teeth) 4  -SA     Eating meals 4  -SA     AM-PAC 6 Clicks Score (OT) 24  -SA       Row Name 06/04/25 0922          How much help from another person do you currently need...    Turning from your back to your side while in flat bed without using bedrails? 4  -CK (r) ES (t) CK (c)     Moving from lying on back to sitting on the side of a flat bed without bedrails? 4  -CK (r) ES (t) CK (c)     Moving to and from a bed to a chair (including a wheelchair)? 3  -CK (r) ES (t) CK (c)     Standing up from a chair using your arms (e.g., wheelchair, bedside chair)? 3  -CK (r) ES (t) CK (c)     Climbing 3-5 steps with a railing? 2  -CK (r) ES (t) CK (c)     To walk in hospital room? 3  -CK (r) ES (t) CK (c)     AM-PAC 6 Clicks Score (PT) 19  -CK (r) ES (t)     Highest Level of Mobility Goal Walk 10  Steps or More-6  -CK (r) ES (t)       Row Name 06/04/25 1114 06/04/25 0922       Functional Assessment    Outcome Measure Options AM-PAC 6 Clicks Daily Activity (OT)  - AM-PAC 6 Clicks Basic Mobility (PT)  -CK (r) ES (t) CK (c)              User Key  (r) = Recorded By, (t) = Taken By, (c) = Cosigned By      Initials Name Provider Type    CK Brisa Kwong, PT Physical Therapist    Jenniffer Ford, OT Occupational Therapist    Key Fenton, PT Student PT Student                  Occupational Therapy Education       Title: PT OT SLP Therapies (In Progress)       Topic: Occupational Therapy (In Progress)       Point: ADL training (Done)       Learning Progress Summary            Patient Eager, E,D, VU,DU by  at 6/4/2025 1115                      Point: Precautions (Done)       Learning Progress Summary            Patient Eager, E,D, VU,DU by  at 6/4/2025 1115                      Point: Body mechanics (Done)       Learning Progress Summary            Patient Eager, E,D, VU,DU by  at 6/4/2025 1115                                      User Key       Initials Effective Dates Name Provider Type Discipline     04/16/25 -  Jenniffer Shrestha, OT Occupational Therapist OT                  OT Recommendation and Plan     Plan of Care Review  Plan of Care Reviewed With: patient  Progress: no change  Outcome Evaluation: OT evaluation complete. Pt presents at functional baseline and warrants no skilled IPOT services at this time. Pt able to transfer onto knee scooter and ambulate household distance on knee scooter with CGA and no LOB. Pt demonstrated ability to perform LBD independently while sitting in chair. OT will sign off at this time, please reconsult if necessary. OT recommends home with assist and home health at time of discharge.  Plan of Care Reviewed With: patient  Outcome Evaluation: OT evaluation complete. Pt presents at functional baseline and warrants no skilled IPOT services at this  time. Pt able to transfer onto knee scooter and ambulate household distance on knee scooter with CGA and no LOB. Pt demonstrated ability to perform LBD independently while sitting in chair. OT will sign off at this time, please reconsult if necessary. OT recommends home with assist and home health at time of discharge.     Time Calculation:   Evaluation Complexity (OT)  Review Occupational Profile/Medical/Therapy History Complexity: brief/low complexity  Assessment, Occupational Performance/Identification of Deficit Complexity: 1-3 performance deficits  Clinical Decision Making Complexity (OT): problem focused assessment/low complexity  Overall Complexity of Evaluation (OT): low complexity     Time Calculation- OT       Row Name 06/04/25 1116 06/04/25 0923          Time Calculation- OT    OT Start Time 1016  -SA --     OT Received On 06/04/25 -SA --        Timed Charges    10172 - Gait Training Minutes  -- 14  -CK (r) ES (t) CK (c)        Untimed Charges    OT Eval/Re-eval Minutes 36  -SA --        Total Minutes    Timed Charges Total Minutes -- 14  -CK (r) ES (t)     Untimed Charges Total Minutes 36  -SA --      Total Minutes 36  -SA 14  -CK (r) ES (t)               User Key  (r) = Recorded By, (t) = Taken By, (c) = Cosigned By      Initials Name Provider Type    Brisa Salazar, PT Physical Therapist    Jenniffer Ford OT Occupational Therapist    Key Fenton, PT Student PT Student                  Therapy Charges for Today       Code Description Service Date Service Provider Modifiers Qty    [id number] HC OT EVAL LOW COMPLEXITY 3 6/4/2025 Jenniffer Shrestha OT GO 1               OT Discharge Summary  Anticipated Discharge Disposition (OT): home with assist, home with home health    Jenniffer Shrestha OT  6/4/2025

## 2025-06-04 NOTE — PLAN OF CARE
Goal Outcome Evaluation:  Plan of Care Reviewed With: (P) patient        Progress: (P) improving  Outcome Evaluation: (P) Pt demonstrated good ability with maintaining LLE NWB precautions and good ability to ambulate 200' with knee scooter this session. Pt demonstrates increased need for cues with transfers from sitting to knee scooter, and deficits in strength and endurance. PT to continue IPPT to address deficits. PT continue to rec home with assist and HHPT at d/c when medically appropriate.    Anticipated Discharge Disposition (PT): (P) home with assist, home with home health

## 2025-06-04 NOTE — PLAN OF CARE
Goal Outcome Evaluation:  Plan of Care Reviewed With: patient        Progress: no change  Outcome Evaluation: OT evaluation complete. Pt presents at functional baseline and warrants no skilled IPOT services at this time. Pt able to transfer onto knee scooter and ambulate household distance on knee scooter with CGA and no LOB. Pt demonstrated ability to perform LBD independently while sitting in chair. OT will sign off at this time, please reconsult if necessary. OT recommends home with assist and home health at time of discharge.    Anticipated Discharge Disposition (OT): home with assist, home with home health

## 2025-06-04 NOTE — PROGRESS NOTES
"          Orthopaedic Surgery Progress Note    LOS: 1 day   Patient Care Team:  Marcus Cheng MD as PCP - General  Marcus Cheng MD as PCP - Family Medicine  Jeffery Young MD as Consulting Physician (Infectious Diseases)  Hang Anderson MD as Consulting Physician (Cardiology)  Mao Mckay MD as Surgeon (Orthopedic Surgery)  Maricruz Romero PA-C as Physician Assistant (Cardiology)  1 Day Post-Op   Procedure(s):  FOOT ABSCESS INCISION AND DRAINAGE, EXOSTECTOMY  Subjective     Interval History:   Resting in bed.  Denies pain.  Reports is never had pain after his previous surgeries.  No complaints.    Objective     Vital Signs:  Temp (24hrs), Av.9 °F (36.6 °C), Min:96.7 °F (35.9 °C), Max:98.4 °F (36.9 °C)    /77 (BP Location: Right arm, Patient Position: Lying)   Pulse 86   Temp 97.8 °F (36.6 °C) (Oral)   Resp 18   Ht 180.3 cm (71\")   Wt 97.5 kg (215 lb)   SpO2 93%   BMI 29.99 kg/m²   Body mass index is 29.99 kg/m².    Labs:  Lab Results (last 24 hours)       Procedure Component Value Units Date/Time    CBC (No Diff) [[id number]]  (Abnormal) Collected: 25 0657    Specimen: Blood Updated: 25 0757     WBC 23.06 10*3/mm3      RBC 3.79 10*6/mm3      Hemoglobin 11.8 g/dL      Hematocrit 36.7 %      MCV 96.8 fL      MCH 31.1 pg      MCHC 32.2 g/dL      RDW 14.0 %      RDW-SD 49.7 fl      MPV 9.6 fL      Platelets 177 10*3/mm3     Tissue / Bone Culture - Tissue, Foot, Left [[id number]] Collected: 25 134    Specimen: Tissue from Foot, Left Updated: 25     Gram Stain Many (4+) WBCs seen      No organisms seen    Fungus Culture - Tissue, Foot, Left [[id number]] Collected: 25 1345    Specimen: Tissue from Foot, Left Updated: 25 192    AFB Culture - Tissue, Foot, Left [[id number]] Collected: 25    Specimen: Tissue from Foot, Left Updated: 25    Anaerobic Culture - Tissue, Foot, Left [[id number]] Collected: 25 134    " Specimen: Tissue from Foot, Left Updated: 06/03/25 1924            Physical Exam:  left LE: Dressing with small area of spotting.  Dressing change during exam.  No active bleeding.  4 x 4's were left in place.  Packing left in place.  New dressings placed.  No sensation which is patient's baseline.  Minimal erythema surrounding wound.  Compartments leg soft and compressible.    Assessment/Plan:  1 Day Post-Op status post:  Procedure(s):  FOOT ABSCESS INCISION AND DRAINAGE, EXOSTECTOMY  -NWB operative extremity  -Advance diet as tolerated  -Keep splint/operative dressing clean and dry  -DVT prophylaxis, mechanical and subq hep  -Postop abx  -ID consult placed, appreciate recs  -Pain control  -Follow-up Intra-Op cultures, NGTD, previous wound swab showed Pseudomonas and Staph aureus  -Elevate operative extremity  -Consult medicine, appreciate recs  -Consult PT wound care, wv placement, spoke to PT wound care, will likely place wound VAC tomorrow versus Friday  -Dispo plan, pending    Mao Mckay MD  06/04/25  08:18 EDT

## 2025-06-05 LAB
ALBUMIN SERPL-MCNC: 3.6 G/DL (ref 3.5–5.2)
ALBUMIN/GLOB SERPL: 1.1 G/DL
ALP SERPL-CCNC: 93 U/L (ref 39–117)
ALT SERPL W P-5'-P-CCNC: 12 U/L (ref 1–41)
ANION GAP SERPL CALCULATED.3IONS-SCNC: 10 MMOL/L (ref 5–15)
AST SERPL-CCNC: 25 U/L (ref 1–40)
BASOPHILS # BLD AUTO: 0.03 10*3/MM3 (ref 0–0.2)
BASOPHILS NFR BLD AUTO: 0.2 % (ref 0–1.5)
BILIRUB SERPL-MCNC: 0.4 MG/DL (ref 0–1.2)
BUN SERPL-MCNC: 19.2 MG/DL (ref 8–23)
BUN/CREAT SERPL: 16.8 (ref 7–25)
CALCIUM SPEC-SCNC: 9 MG/DL (ref 8.6–10.5)
CHLORIDE SERPL-SCNC: 106 MMOL/L (ref 98–107)
CK SERPL-CCNC: 110 U/L (ref 20–200)
CO2 SERPL-SCNC: 24 MMOL/L (ref 22–29)
CREAT SERPL-MCNC: 1.14 MG/DL (ref 0.76–1.27)
DEPRECATED RDW RBC AUTO: 52.1 FL (ref 37–54)
EGFRCR SERPLBLD CKD-EPI 2021: 65 ML/MIN/1.73
EOSINOPHIL # BLD AUTO: 0 10*3/MM3 (ref 0–0.4)
EOSINOPHIL NFR BLD AUTO: 0 % (ref 0.3–6.2)
ERYTHROCYTE [DISTWIDTH] IN BLOOD BY AUTOMATED COUNT: 14.6 % (ref 12.3–15.4)
GLOBULIN UR ELPH-MCNC: 3.3 GM/DL
GLUCOSE SERPL-MCNC: 134 MG/DL (ref 65–99)
HCT VFR BLD AUTO: 37.9 % (ref 37.5–51)
HGB BLD-MCNC: 11.8 G/DL (ref 13–17.7)
IMM GRANULOCYTES # BLD AUTO: 0.12 10*3/MM3 (ref 0–0.05)
IMM GRANULOCYTES NFR BLD AUTO: 0.7 % (ref 0–0.5)
LYMPHOCYTES # BLD AUTO: 1.59 10*3/MM3 (ref 0.7–3.1)
LYMPHOCYTES NFR BLD AUTO: 9.6 % (ref 19.6–45.3)
MCH RBC QN AUTO: 30.8 PG (ref 26.6–33)
MCHC RBC AUTO-ENTMCNC: 31.1 G/DL (ref 31.5–35.7)
MCV RBC AUTO: 99 FL (ref 79–97)
MONOCYTES # BLD AUTO: 1.26 10*3/MM3 (ref 0.1–0.9)
MONOCYTES NFR BLD AUTO: 7.6 % (ref 5–12)
NEUTROPHILS NFR BLD AUTO: 13.53 10*3/MM3 (ref 1.7–7)
NEUTROPHILS NFR BLD AUTO: 81.9 % (ref 42.7–76)
NRBC BLD AUTO-RTO: 0 /100 WBC (ref 0–0.2)
PLATELET # BLD AUTO: 186 10*3/MM3 (ref 140–450)
PMV BLD AUTO: 9.9 FL (ref 6–12)
POTASSIUM SERPL-SCNC: 4 MMOL/L (ref 3.5–5.2)
PROT SERPL-MCNC: 6.9 G/DL (ref 6–8.5)
RBC # BLD AUTO: 3.83 10*6/MM3 (ref 4.14–5.8)
SODIUM SERPL-SCNC: 140 MMOL/L (ref 136–145)
WBC NRBC COR # BLD AUTO: 16.53 10*3/MM3 (ref 3.4–10.8)

## 2025-06-05 PROCEDURE — A9270 NON-COVERED ITEM OR SERVICE: HCPCS | Performed by: INTERNAL MEDICINE

## 2025-06-05 PROCEDURE — 63710000001 THIAMINE 100 MG TABLET: Performed by: INTERNAL MEDICINE

## 2025-06-05 PROCEDURE — 63710000001 METOPROLOL TARTRATE 25 MG TABLET: Performed by: ORTHOPAEDIC SURGERY

## 2025-06-05 PROCEDURE — 63710000001 ATORVASTATIN 40 MG TABLET: Performed by: ORTHOPAEDIC SURGERY

## 2025-06-05 PROCEDURE — A9270 NON-COVERED ITEM OR SERVICE: HCPCS | Performed by: ORTHOPAEDIC SURGERY

## 2025-06-05 PROCEDURE — 80053 COMPREHEN METABOLIC PANEL: CPT | Performed by: INTERNAL MEDICINE

## 2025-06-05 PROCEDURE — 63710000001 MULTIVITAMIN TABLET: Performed by: INTERNAL MEDICINE

## 2025-06-05 PROCEDURE — 63710000001 SENNOSIDES-DOCUSATE 8.6-50 MG TABLET: Performed by: ORTHOPAEDIC SURGERY

## 2025-06-05 PROCEDURE — 63710000001 OXYCODONE 5 MG TABLET: Performed by: ORTHOPAEDIC SURGERY

## 2025-06-05 PROCEDURE — 25010000002 PIPERACILLIN SOD-TAZOBACTAM PER 1 G: Performed by: INTERNAL MEDICINE

## 2025-06-05 PROCEDURE — 85025 COMPLETE CBC W/AUTO DIFF WBC: CPT | Performed by: INTERNAL MEDICINE

## 2025-06-05 PROCEDURE — 97164 PT RE-EVAL EST PLAN CARE: CPT

## 2025-06-05 PROCEDURE — 97530 THERAPEUTIC ACTIVITIES: CPT

## 2025-06-05 PROCEDURE — 63710000001 VITAMIN B-12 1000 MCG TABLET: Performed by: ORTHOPAEDIC SURGERY

## 2025-06-05 PROCEDURE — 97605 NEG PRS WND THER DME<=50SQCM: CPT

## 2025-06-05 PROCEDURE — 82550 ASSAY OF CK (CPK): CPT | Performed by: INTERNAL MEDICINE

## 2025-06-05 PROCEDURE — 25010000002 HEPARIN (PORCINE) PER 1000 UNITS: Performed by: ORTHOPAEDIC SURGERY

## 2025-06-05 PROCEDURE — 63710000001 FOLIC ACID 1 MG TABLET: Performed by: INTERNAL MEDICINE

## 2025-06-05 RX ADMIN — METOPROLOL TARTRATE 25 MG: 25 TABLET, FILM COATED ORAL at 09:37

## 2025-06-05 RX ADMIN — ATORVASTATIN CALCIUM 80 MG: 40 TABLET, FILM COATED ORAL at 20:28

## 2025-06-05 RX ADMIN — PIPERACILLIN AND TAZOBACTAM 4.5 G: 4; .5 INJECTION, POWDER, FOR SOLUTION INTRAVENOUS at 17:00

## 2025-06-05 RX ADMIN — PIPERACILLIN AND TAZOBACTAM 4.5 G: 4; .5 INJECTION, POWDER, FOR SOLUTION INTRAVENOUS at 23:39

## 2025-06-05 RX ADMIN — METOPROLOL TARTRATE 25 MG: 25 TABLET, FILM COATED ORAL at 20:29

## 2025-06-05 RX ADMIN — DOCUSATE SODIUM 50 MG AND SENNOSIDES 8.6 MG 2 TABLET: 8.6; 5 TABLET, FILM COATED ORAL at 20:29

## 2025-06-05 RX ADMIN — PIPERACILLIN AND TAZOBACTAM 4.5 G: 4; .5 INJECTION, POWDER, FOR SOLUTION INTRAVENOUS at 00:20

## 2025-06-05 RX ADMIN — DOCUSATE SODIUM 50 MG AND SENNOSIDES 8.6 MG 2 TABLET: 8.6; 5 TABLET, FILM COATED ORAL at 09:36

## 2025-06-05 RX ADMIN — FOLIC ACID 1 MG: 1 TABLET ORAL at 09:37

## 2025-06-05 RX ADMIN — Medication 10 ML: at 20:29

## 2025-06-05 RX ADMIN — HEPARIN SODIUM 5000 UNITS: 5000 INJECTION INTRAVENOUS; SUBCUTANEOUS at 05:11

## 2025-06-05 RX ADMIN — HEPARIN SODIUM 5000 UNITS: 5000 INJECTION INTRAVENOUS; SUBCUTANEOUS at 20:29

## 2025-06-05 RX ADMIN — Medication 10 ML: at 09:37

## 2025-06-05 RX ADMIN — PIPERACILLIN AND TAZOBACTAM 4.5 G: 4; .5 INJECTION, POWDER, FOR SOLUTION INTRAVENOUS at 09:36

## 2025-06-05 RX ADMIN — THIAMINE HCL TAB 100 MG 100 MG: 100 TAB at 09:37

## 2025-06-05 RX ADMIN — MULTIVITAMIN TABLET 1 TABLET: TABLET at 09:37

## 2025-06-05 RX ADMIN — HEPARIN SODIUM 5000 UNITS: 5000 INJECTION INTRAVENOUS; SUBCUTANEOUS at 17:00

## 2025-06-05 RX ADMIN — OXYCODONE 5 MG: 5 TABLET ORAL at 23:39

## 2025-06-05 RX ADMIN — CYANOCOBALAMIN TAB 1000 MCG 1000 MCG: 1000 TAB at 09:36

## 2025-06-05 NOTE — PLAN OF CARE
Goal Outcome Evaluation:  Plan of Care Reviewed With: patient           Outcome Evaluation: Pt presents with chronic L foot wounds, now s/p surgical debridement of Lat foot. PT placed a wound vac to help improve granulation and increase healing potential.  Pt will benefit from wound vac to these wounds due to high risk for complications and previous difficulties with wound healing.

## 2025-06-05 NOTE — THERAPY TREATMENT NOTE
Patient Name: Carlos Culver  : 1945    MRN: [id number]                              Today's Date: 2025       Admit Date: 6/3/2025    Visit Dx:     ICD-10-CM ICD-9-CM   1. Ulcer of left foot, unspecified ulcer stage  L97.529 707.15     Patient Active Problem List   Diagnosis    Cellulitis of left foot    GUCCI (acute kidney injury)    Essential hypertension    Leukocytosis    Osteomyelitis of left foot    Chronic anticoagulation    Neuropathy    Elevated transaminase level    CKD (chronic kidney disease) stage 2, GFR 60-89 ml/min    Skin ulcer of right heel    Chronic multifocal osteomyelitis of left foot    Contracture of joint of foot, left    MARTINEZ (dyspnea on exertion)    Neuropathic ulcer, limited to breakdown of skin    Hyperlipidemia LDL goal <100    A-fib    S/P transmetatarsal amputation of foot, left    Leukocytosis, likely reactive    Acute postoperative pain    Alcohol abuse    Staphylococcus aureus bacteremia    Acute osteomyelitis of left ankle or foot    History of [place] amputation of left foot    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 evidence of necrosis    Ulcer of left foot    Left foot infection    Abscess of left foot, status post I&D, exostectomy     Past Medical History:   Diagnosis Date    A-fib     Acute kidney failure     Elevated cholesterol     Hard of hearing     Hyperlipidemia     Hypertension     Osteomyelitis     Renal disorder     Wears hearing aid in both ears     Wears reading eyeglasses      Past Surgical History:   Procedure Laterality Date    AMPUTATION DIGIT Left 2020    Procedure: AMPUTATION LEFT 2ND TOE;  Surgeon: Idania Osborn MD;  Location:  [facility] OR;  Service: Orthopedics    AMPUTATION DIGIT Left 2020    Procedure: TRANSMETATARSAL AMPUTATION LEFT;  Surgeon: Idania Osborn MD;  Location:  [facility] OR;  Service: Orthopedics;  Laterality: Left;    APPENDECTOMY      COLONOSCOPY  2006    FOOT SURGERY  Left 12/09/2020    (L) transmetatarsal amputation 12/09/2020 - Dr. Idania Osborn    INCISION AND DRAINAGE LEG Left 10/23/2018    Procedure: INCISION AND DRAINAGE LEFT FOOT;  Surgeon: Idania Osborn MD;  Location:  [facility];  Service: Orthopedics    INCISION AND DRAINAGE LEG Left 07/21/2023    Procedure: FOOT INCISION AND DRAINAGE; REVISION AMPUTATION; ACHILLES LENGTHENING -LEFT;  Surgeon: Mao Mckay MD;  Location:  [facility] OR;  Service: Orthopedics;  Laterality: Left;    INCISION AND DRAINAGE LEG Left 6/3/2025    Procedure: FOOT ABSCESS INCISION AND DRAINAGE, EXOSTECTOMY;  Surgeon: Mao Mckay MD;  Location:  [facility];  Service: Orthopedics;  Laterality: Left;    TOE AMPUTATION      TONSILLECTOMY      VENOUS ACCESS DEVICE (PORT) INSERTION N/A 10/23/2018    Procedure: GROSHONG CATHETER PLACEMENT;  Surgeon: Marquez Simons MD;  Location:  [facility];  Service: General    VENOUS ACCESS DEVICE (PORT) INSERTION N/A 12/09/2020    Procedure: GROSHONG INSERTION;  Surgeon: Antonio Bains MD;  Location:  [facility];  Service: General;  Laterality: N/A;    VENOUS ACCESS DEVICE (PORT) REMOVAL        General Information       Row Name 06/05/25 1603          Physical Therapy Time and Intention    Document Type therapy note (daily note)  -CK     Mode of Treatment physical therapy;individual therapy  -       Row Name 06/05/25 1603          General Information    Patient Profile Reviewed yes  -CK     Existing Precautions/Restrictions fall;left;non-weight bearing;other (see comments)  s/p L foot I+D, LLE NWB, wound vac, has knee scooter in room  -CK     Barriers to Rehab medically complex;previous functional deficit  -CK       Row Name 06/05/25 1603          Cognition    Orientation Status (Cognition) oriented x 4  -CK       Row Name 06/05/25 1603          Safety Issues/Impairments Affecting Functional Mobility    Safety Issues Affecting Function (Mobility) awareness of need for assistance;insight into  deficits/self-awareness;safety precaution awareness;safety precautions follow-through/compliance;sequencing abilities  -CK     Impairments Affecting Function (Mobility) balance;endurance/activity tolerance;strength;sensation/sensory awareness;postural/trunk control  -CK               User Key  (r) = Recorded By, (t) = Taken By, (c) = Cosigned By      Initials Name Provider Type    CK Brisa Kwong PT Physical Therapist                   Mobility       Row Name 06/05/25 1604          Bed Mobility    Bed Mobility supine-sit  -CK     Supine-Sit [place] (Bed Mobility) standby assist  -CK       Row Name 06/05/25 1604          Sit-Stand Transfer    Sit-Stand [place] (Transfers) contact guard  -CK     Assistive Device (Sit-Stand Transfers) walker, knee scooter  -CK     Comment, (Sit-Stand Transfer) reviewed proper placement of knee scooter and safety awareness with presence of wound vac now  -CK       Row Name 06/05/25 1604          Gait/Stairs (Locomotion)    [place] Level (Gait) contact guard  -CK     Assistive Device (Gait) walker, knee scooter  -CK     Distance in Feet (Gait) 650  -CK     Comment, (Gait/Stairs) Patient demonstrates proficiency with knee scooter in hung without LOB. Some cues for scooter positioning/obstacle navigation in room. He does becomes SOA and fatigued with this distances, SpO2 93% on RA upon return to room. Patient reports feeling better after mobilizing.  -CK       Row Name 06/05/25 1604          Mobility    Extremity Weight-bearing Status left lower extremity  -CK     Left Lower Extremity (Weight-bearing Status) non weight-bearing (NWB)  -CK               User Key  (r) = Recorded By, (t) = Taken By, (c) = Cosigned By      Initials Name Provider Type    Brisa Salazar PT Physical Therapist                   Obj/Interventions       Row Name 06/05/25 1605          Balance    Balance Assessment sitting static balance;standing static balance;standing dynamic balance   -CK     Static Sitting Balance independent  -CK     Position, Sitting Balance unsupported;sitting in chair;sitting edge of bed  -CK     Static Standing Balance standby assist  -CK     Dynamic Standing Balance contact guard  -CK     Position/Device Used, Standing Balance supported;other (see comments)  knee scooter  -CK               User Key  (r) = Recorded By, (t) = Taken By, (c) = Cosigned By      Initials Name Provider Type    Brisa Salazar PT Physical Therapist                   Goals/Plan    No documentation.                  Clinical Impression       Row Name 06/05/25 1606          Pain    Pretreatment Pain Rating 0/10 - no pain  -CK     Posttreatment Pain Rating 0/10 - no pain  -CK       Row Name 06/05/25 1606          Plan of Care Review    Plan of Care Reviewed With patient  -CK     Progress improving  -CK     Outcome Evaluation Patient able to increase ambulation distance on knee scooter to 650' CGA without LOB. He gives good effort with mobility and continues to be receptive to cues for safety. IPPT will continue to follow while admitted. Continue to recommend D/C home with assist.  -CK       Row Name 06/05/25 1606          Positioning and Restraints    Pre-Treatment Position in bed  -CK     Post Treatment Position chair  -CK     In Chair reclined;call light within reach;encouraged to call for assist;exit alarm on;waffle cushion;LLE elevated;notified nsg  -CK               User Key  (r) = Recorded By, (t) = Taken By, (c) = Cosigned By      Initials Name Provider Type    Brisa Salazar PT Physical Therapist                   Outcome Measures       Row Name 06/05/25 1641          How much help from another person do you currently need...    Turning from your back to your side while in flat bed without using bedrails? 4  -CK     Moving from lying on back to sitting on the side of a flat bed without bedrails? 4  -CK     Moving to and from a bed to a chair (including a wheelchair)? 3  -CK      Standing up from a chair using your arms (e.g., wheelchair, bedside chair)? 3  -CK     Climbing 3-5 steps with a railing? 2  -CK     To walk in hospital room? 3  -CK     AM-PAC 6 Clicks Score (PT) 19  -CK     Highest Level of Mobility Goal Walk 10 Steps or More-6  -CK       Row Name 06/05/25 1641          Functional Assessment    Outcome Measure Options AM-PAC 6 Clicks Basic Mobility (PT)  -CK               User Key  (r) = Recorded By, (t) = Taken By, (c) = Cosigned By      Initials Name Provider Type    Brisa Salazar PT Physical Therapist                                 Physical Therapy Education       Title: PT OT SLP Therapies (In Progress)       Topic: Physical Therapy (Done)       Point: Mobility training (Done)       Learning Progress Summary            Patient Acceptance, E, VU by CK at 6/5/2025 1642    Acceptance, E,D, VU,NR by ES at 6/4/2025 0922    Comment: NWLOY LLE  Demonstrated and practiced safe STS transfers with proper placement of knee scooter.    Acceptance, E, VU by ND at 6/3/2025 1706                      Point: Home exercise program (Done)       Learning Progress Summary            Patient Acceptance, E,D, VU,NR by ES at 6/4/2025 0922    Comment: NWLOY LLE  Demonstrated and practiced safe STS transfers with proper placement of knee scooter.                      Point: Body mechanics (Done)       Learning Progress Summary            Patient Acceptance, E, VU by CK at 6/5/2025 1642    Acceptance, E,D, VU,NR by ES at 6/4/2025 0922    Comment: NWB LLE  Demonstrated and practiced safe STS transfers with proper placement of knee scooter.    Acceptance, E, VU by ND at 6/3/2025 1706                      Point: Precautions (Done)       Learning Progress Summary            Patient Acceptance, E, VU by CK at 6/5/2025 1642    Acceptance, E,D, VU,NR by ES at 6/4/2025 0922    Comment: NWB LLE  Demonstrated and practiced safe STS transfers with proper placement of knee scooter.    Acceptance, E, VU by ND  at 6/3/2025 1706                                      User Key       Initials Effective Dates Name Provider Type Discipline    CK 02/06/24 -  Brisa Kwong, PT Physical Therapist PT    ND 11/16/23 -  Brooke Hager, PT Physical Therapist PT    ES 05/05/25 -  Key Cote, PT Student PT Student PT                  PT Recommendation and Plan     Progress: improving  Outcome Evaluation: Patient able to increase ambulation distance on knee scooter to 650' CGA without LOB. He gives good effort with mobility and continues to be receptive to cues for safety. IPPT will continue to follow while admitted. Continue to recommend D/C home with assist.     Time Calculation:         PT Charges       Row Name 06/05/25 1642 06/05/25 0830          Time Calculation    Start Time 1345  -CK 0830  -MF     PT Received On 06/05/25  -CK --     PT Goal Re-Cert Due Date -- 06/13/25  -        Timed Charges    65214 - PT Therapeutic Activity Minutes 23 -CK --        Untimed Charges    PT Eval/Re-eval Minutes -- 25  -MF     Wound Care -- 40720 Neg Press (DME) wound TO 50 sqcm  -MF     64810-Pbc Pressure wound to 50 sqcm -- 55  -MF        Total Minutes    Timed Charges Total Minutes 23 -CK --     Untimed Charges Total Minutes -- 80  -MF      Total Minutes 23  -CK 80  -MF               User Key  (r) = Recorded By, (t) = Taken By, (c) = Cosigned By      Initials Name Provider Type     Koko Barraza, PT Physical Therapist    CK Brisa Kwong, PT Physical Therapist                  Therapy Charges for Today       Code Description Service Date Service Provider Modifiers Qty    96014493116  PT THERAPEUTIC ACT EA 15 MIN 6/5/2025 Brisa Kwong, PT GP 2            PT G-Codes  Outcome Measure Options: AM-PAC 6 Clicks Basic Mobility (PT)  AM-PAC 6 Clicks Score (PT): 19  AM-PAC 6 Clicks Score (OT): 24  PT Discharge Summary  Anticipated Discharge Disposition (PT): home with assist, home with home health    Brisa  Elenita, PT  6/5/2025

## 2025-06-05 NOTE — PLAN OF CARE
Goal Outcome Evaluation:  Plan of Care Reviewed With: patient        Progress: improving  Outcome Evaluation: Patient able to increase ambulation distance on knee scooter to 650' CGA without LOB. He gives good effort with mobility and continues to be receptive to cues for safety. IPPT will continue to follow while admitted. Continue to recommend D/C home with assist.    Anticipated Discharge Disposition (PT): home with assist, home with home health

## 2025-06-05 NOTE — PROGRESS NOTES
INFECTIOUS DISEASE Progress Note    Carlos Culver  1945  [id number]    Date of consult: 6/4/2025    Admit date: 6/3/2025    Requesting Provider: No Known Provider  Evaluating physician: Wilfredo Leon MD  Reason for Consultation: Left foot abscess in the setting of severe peripheral neuropathy  Chief Complaint: Above      Subjective   History of present illness:  6/4/25: Patient is a  79 y.o.  Yr old male with a history of severe peripheral neuropathy with previous foot infections related to ulcerations and worsening wound, hyperlipidemia, essential hypertension, hard of hearing, atrial fibrillation, who developed worsening left foot ulceration after wearing a new pair of shoes at the end of May 2025.  Patient developed ulceration with some drainage without high fevers or chills.  MRI of left foot on 5/24 was consistent with possible abscess.  The patient was admitted to [facility] on 6/3/2025 and underwent I&D of his left foot with purulence.  I was consulted on 6//25 for further evaluation and treatment.  Left foot wound cultures yielded MSSA and Pseudomonas aeruginosa.  Pseudomonas was sensitive to cefepime, levofloxacin, and MSSA was sensitive to piperacillin/tazobactam, sulfa, and tetracycline.  Surgical cultures from 6/3 negative to date.  The patient has no other localizing signs or symptoms of infection.    6/5/25:  He remains afebrile.   White blood cell count yesterday was 23.1.    Operative cultures from 6/3 are pending.   Left foot cultures from 5/24 growing Pseudomonas and MSSA.  He is on intravenous Zosyn.   He denies uncontrolled pain.  He denies nausea and vomiting.    Operative cultures from 6/3 are growing Staph aureus.      Past Medical History:   Diagnosis Date    A-fib     Acute kidney failure     Elevated cholesterol     Hard of hearing     Hyperlipidemia     Hypertension     Osteomyelitis     Renal disorder     Wears hearing aid in both ears     Wears  reading eyeglasses        Past Surgical History:   Procedure Laterality Date    AMPUTATION DIGIT Left 01/07/2020    Procedure: AMPUTATION LEFT 2ND TOE;  Surgeon: Idania Osborn MD;  Location:  [facility] OR;  Service: Orthopedics    AMPUTATION DIGIT Left 12/08/2020    Procedure: TRANSMETATARSAL AMPUTATION LEFT;  Surgeon: Idania Osborn MD;  Location: [facility];  Service: Orthopedics;  Laterality: Left;    APPENDECTOMY      COLONOSCOPY  2006    FOOT SURGERY Left 12/09/2020    (L) transmetatarsal amputation 12/09/2020 - Dr. Idania Osborn    INCISION AND DRAINAGE LEG Left 10/23/2018    Procedure: INCISION AND DRAINAGE LEFT FOOT;  Surgeon: Idania Osborn MD;  Location: [facility];  Service: Orthopedics    INCISION AND DRAINAGE LEG Left 07/21/2023    Procedure: FOOT INCISION AND DRAINAGE; REVISION AMPUTATION; ACHILLES LENGTHENING -LEFT;  Surgeon: Mao Mckay MD;  Location: [facility];  Service: Orthopedics;  Laterality: Left;    INCISION AND DRAINAGE LEG Left 6/3/2025    Procedure: FOOT ABSCESS INCISION AND DRAINAGE, EXOSTECTOMY;  Surgeon: Mao Mckay MD;  Location: [facility];  Service: Orthopedics;  Laterality: Left;    TOE AMPUTATION      TONSILLECTOMY      VENOUS ACCESS DEVICE (PORT) INSERTION N/A 10/23/2018    Procedure: GROSHONG CATHETER PLACEMENT;  Surgeon: Marquez Simons MD;  Location: [facility];  Service: General    VENOUS ACCESS DEVICE (PORT) INSERTION N/A 12/09/2020    Procedure: GROSHONG INSERTION;  Surgeon: Antonio Baisn MD;  Location:  [facility];  Service: General;  Laterality: N/A;    VENOUS ACCESS DEVICE (PORT) REMOVAL         Pediatric History   Patient Parents    Not on file     Other Topics Concern    Not on file   Social History Narrative    Not on file   No cigarettes, alcohol, drug use    family history includes Cancer in his father; No Known Problems in his mother.    Allergies   Allergen Reactions    Sulfa Antibiotics Unknown (See Comments) and Rash     Pt was told as child he had  "an allergy.  Has not taken and doesn't know the response       Immunization History   Administered Date(s) Administered    COVID-19 (MODERNA) 12YRS+ (SPIKEVAX) 10/27/2023    COVID-19 (PFIZER) 12YRS+ (COMIRNATY) 05/02/2025    COVID-19 (PFIZER) BIVALENT 12+YRS 10/19/2022    COVID-19 (PFIZER) Purple Cap Monovalent 01/29/2021, 02/25/2021, 10/02/2021    Covid-19 (Pfizer) Gray Cap Monovalent 04/14/2022       Medication:  @Scheduled Meds:[Held by provider] apixaban, 5 mg, Oral, BID  atorvastatin, 80 mg, Oral, Nightly  folic acid, 1 mg, Oral, Daily  heparin (porcine), 5,000 Units, Subcutaneous, Q8H  metoprolol tartrate, 25 mg, Oral, BID  multivitamin, 1 tablet, Oral, Daily  piperacillin-tazobactam, 4.5 g, Intravenous, Q8H  sodium chloride, 10 mL, Intravenous, Q12H  sodium chloride, 10 mL, Intravenous, Q12H  thiamine, 100 mg, Oral, Daily  vitamin B-12, 1,000 mcg, Oral, Daily      Continuous Infusions:     PRN Meds:.  acetaminophen **OR** acetaminophen    bisacodyl    diphenhydrAMINE    HYDROmorphone **AND** naloxone    labetalol    melatonin    ondansetron ODT **OR** ondansetron    oxyCODONE    polyethylene glycol    senna-docusate sodium    sodium chloride    sodium chloride    sodium chloride    sodium chloride    sodium chloride    sodium chloride     Please refer to the medical record for a full medication list    Review of Systems:  See HPI    Physical Exam:   Vital Signs   Temp:  [97.8 °F (36.6 °C)-98.2 °F (36.8 °C)] 98.2 °F (36.8 °C)  Heart Rate:  [77-93] 78  Resp:  [18-20] 18  BP: (112-131)/(60-78) 126/78    Blood pressure 126/78, pulse 78, temperature 98.2 °F (36.8 °C), temperature source Oral, resp. rate 18, height 180.3 cm (71\"), weight 97.5 kg (215 lb), SpO2 95%.  GENERAL: Awake and alert, in moderate distress.  HEENT:  Normocephalic, atraumatic.    No labial ulcers  EYES: PERRLA. No conjunctival injection. No icterus. EOM full.  HEART: No murmur, gallop, or pericardial friction rub. Reg rate rhythm,.  LUNGS: " "Clear to auscultation and percussion. No respiratory distress, no use of accessory muscles.  ABDOMEN: Soft, nontender, nondistended. No appreciable HSM.   Obese.  GENITAL: No external lesions, breasts without masses, back straight, no CVAT, rectal external without lesions.   SKIN: Warm and dry without cutaneous eruptions.  No nodules.  Left foot surgical dressing in place.  PSYCHIATRIC: Mental status lucid. No confusion.  EXT:  No cellulitic change.  NEURO: Oriented to name, CN 2 to 12 intact, sensory diminished lower extremity    Right arm PICC line in place with no exit site erythema or drainage      Results Review:   I reviewed the patient's new clinical results.  I reviewed the patient's new imaging results and agree with the interpretation.  I reviewed the patient's other test results and agree with the interpretation    Results from last 7 days   Lab Units 06/04/25  0657   WBC 10*3/mm3 23.06*   HEMOGLOBIN g/dL 11.8*   HEMATOCRIT % 36.7*   PLATELETS 10*3/mm3 177     5/24/2025 laboratory review with sodium 136, potassium 5.1, chloride 102, CO2 24, calcium 9.5, ALT 10, AST 20, alkaline phosphatase 118, total bilirubin 1.0, creatinine 1.13, ESR 54, WBC 11, hemoglobin 14.    Results from last 7 days   Lab Units 06/04/25  1106   SODIUM mmol/L 136   POTASSIUM mmol/L 4.5   CHLORIDE mmol/L 100   CO2 mmol/L 23.0   BUN mg/dL 19.5   CREATININE mg/dL 0.98   GLUCOSE mg/dL 176*   CALCIUM mg/dL 8.6     Results from last 7 days   Lab Units 06/04/25  1106   ALK PHOS U/L 81   BILIRUBIN mg/dL 0.6   ALT (SGPT) U/L 8   AST (SGOT) U/L 18                     Estimated Creatinine Clearance: 71.6 mL/min (by C-G formula based on SCr of 0.98 mg/dL).  CPK          5/24/2025    10:26   Common Labs   Creatine Kinase 84       Procalitonin Results:       Brief Urine Lab Results       None           No results found for: \"SITE\", \"ALLENTEST\", \"PHART\", \"ITU4QNN\", \"PO2ART\", \"WUM0IZM\", \"BASEEXCESS\", \"T2KKQIZT\", \"HGBBG\", \"HCTABG\", \"OXYHEMOGLOBI\", " "\"METHHGBN\", \"CARBOXYHGB\", \"CO2CT\", \"BAROMETRIC\", \"MODALITY\", \"FIO2\"     Microbiology:      Results for orders placed or performed during the hospital encounter of 07/19/23   Blood Culture - Blood, Hand, Left    Specimen: Hand, Left; Blood   Result Value Ref Range    Blood Culture No growth at 5 days          Culture results from last 30 days   Lab 06/03/25  1345 05/24/25  1129   WOUNDCX  --  Scant growth (1+) Pseudomonas aeruginosa*  Light growth (2+) Staphylococcus aureus*   TISSUE CULTURE Growth present, too young to evaluate  --       Brief Urine Lab Results       None                Tissue Culture   Date Value Ref Range Status   06/03/2025 Growth present, too young to evaluate  Preliminary   (      Radiology:  Imaging Results (Last 72 Hours)       Procedure Component Value Units Date/Time    XR Chest 1 View [[id number]] Collected: 06/04/25 1334     Updated: 06/04/25 1338    Narrative:      XR CHEST 1 VW    Date of Exam: 6/4/2025 1:05 PM EDT    Indication: picc placement    Comparison: March 3, 2025    Findings:  The heart is enlarged. The lungs seem clear. There are no pleural effusions. There is a right-sided PICC line with its tip in the superior vena cava.      Impression:      Impression:  Right-sided PICC line with its tip in the superior vena cava.        Electronically Signed: Shashank Blair MD    6/4/2025 1:35 PM EDT    Workstation ID: [id number]    FL C Arm During Surgery [[id number]] Resulted: 06/03/25 1414     Updated: 06/03/25 1414    Narrative:      This procedure was auto-finalized with no dictation required.            IMPRESSION:   Left foot abscess, Pseudomonas aeruginosa and MSSA related to peripheral neuropathy with worsening wound with MRI from 5/24 without osteomyelitis. Status post incision and drainage by orthopedic surgery on 6/3/2025.  Leukocytosis, neutrophilic   Anemia, chronic disease related to above issues.  Severe peripheral neuropathy lower extremities of unclear etiology.  " Contributing to recurrent ulcerations.  Status post mellitus left foot status post trans metatarsal amputation 2023.    RECOMMENDATIONS:  1.  Continue intravenous Zosyn  2.  Possible discharge soon on outpatient intravenous antibiotic therapy    I discussed his complex situation with Dr. Mckay today.  We will reassess his culture data tomorrow and then decide on outpatient intravenous antibiotic therapy.    This visit included the following complex service elements:  Complex medical decision-making associated with antimicrobial prescribing.  In-depth chart review with high level synthesis for complex diagnoses.  Managed infection treatment protocol associated with transitions of care for this complex patient.      Case management orders(per Dr. Young): Please arrange for outpatient IV antibiotics at skilled facility versus other with piperacillin/tazobactam 4.5 g IV every 8 hours to continue until 7/18/2025 for left foot abscess and early osteomyelitis.  Check CBC, CMP, CRP weekly while on IV antibiotics.  Cultures recently positive for Pseudomonas aeruginosa and MSSA.  Status post I&D 6/3/2025.  Nursing at [facility] to check for cultures from 6/3/2025.  Fax orders to [phone], call [phone] with final arrangements.  Arrange for follow-up with me in 1 week postdischarge.  Weekly PICC dressing changes.      Wilfredo Leon MD  6/5/2025

## 2025-06-05 NOTE — THERAPY RE-EVALUATION
Acute Care - Wound/Debridement Initial Evaluation  [facility]     Patient Name: Carlos Culver  : 1945  MRN: [id number]  Today's Date: 2025                Admit Date: 6/3/2025    Visit Dx:    ICD-10-CM ICD-9-CM   1. Ulcer of left foot, unspecified ulcer stage  L97.529 707.15                 Patient Active Problem List   Diagnosis    Cellulitis of left foot    GUCCI (acute kidney injury)    Essential hypertension    Leukocytosis    Osteomyelitis of left foot    Chronic anticoagulation    Neuropathy    Elevated transaminase level    CKD (chronic kidney disease) stage 2, GFR 60-89 ml/min    Skin ulcer of right heel    Chronic multifocal osteomyelitis of left foot    Contracture of joint of foot, left    MARTINEZ (dyspnea on exertion)    Neuropathic ulcer, limited to breakdown of skin    Hyperlipidemia LDL goal <100    A-fib    S/P transmetatarsal amputation of foot, left    Leukocytosis, likely reactive    Acute postoperative pain    Alcohol abuse    Staphylococcus aureus bacteremia    Acute osteomyelitis of left ankle or foot    History of Chopart amputation of left foot    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 evidence of necrosis    Ulcer of left foot    Left foot infection    Abscess of left foot, status post I&D, exostectomy        Past Medical History:   Diagnosis Date    A-fib     Acute kidney failure     Elevated cholesterol     Hard of hearing     Hyperlipidemia     Hypertension     Osteomyelitis     Renal disorder     Wears hearing aid in both ears     Wears reading eyeglasses         Past Surgical History:   Procedure Laterality Date    AMPUTATION DIGIT Left 2020    Procedure: AMPUTATION LEFT 2ND TOE;  Surgeon: Idania Osborn MD;  Location: [facility];  Service: Orthopedics    AMPUTATION DIGIT Left 2020    Procedure: TRANSMETATARSAL AMPUTATION LEFT;  Surgeon: Idania Osborn MD;  Location: [facility] OR;  Service: Orthopedics;   Laterality: Left;    APPENDECTOMY      COLONOSCOPY  2006    FOOT SURGERY Left 12/09/2020    (L) transmetatarsal amputation 12/09/2020 - Dr. Idania Osborn    INCISION AND DRAINAGE LEG Left 10/23/2018    Procedure: INCISION AND DRAINAGE LEFT FOOT;  Surgeon: Idania Osborn MD;  Location:  [facility] OR;  Service: Orthopedics    INCISION AND DRAINAGE LEG Left 07/21/2023    Procedure: FOOT INCISION AND DRAINAGE; REVISION AMPUTATION; ACHILLES LENGTHENING -LEFT;  Surgeon: Mao Mckay MD;  Location:  [facility] OR;  Service: Orthopedics;  Laterality: Left;    INCISION AND DRAINAGE LEG Left 6/3/2025    Procedure: FOOT ABSCESS INCISION AND DRAINAGE, EXOSTECTOMY;  Surgeon: Mao Mckay MD;  Location:  [facility] OR;  Service: Orthopedics;  Laterality: Left;    TOE AMPUTATION      TONSILLECTOMY      VENOUS ACCESS DEVICE (PORT) INSERTION N/A 10/23/2018    Procedure: GROSHONG CATHETER PLACEMENT;  Surgeon: Marquez Simons MD;  Location:  [facility] OR;  Service: General    VENOUS ACCESS DEVICE (PORT) INSERTION N/A 12/09/2020    Procedure: GROSHONG INSERTION;  Surgeon: Antonio Bains MD;  Location:  [facility] OR;  Service: General;  Laterality: N/A;    VENOUS ACCESS DEVICE (PORT) REMOVAL             Wound 05/08/25 Left lateral foot Surgical (Active)   Wound Image   06/05/25 0830   Dressing Appearance intact;moist drainage 06/05/25 0830   Dressing Removed Type gauze 06/05/25 0830   Confirmed Empty Wound Bed Yes, visual inspection of wound bed;Yes, finger sweep performed 06/05/25 0830   Base moist;pink;red;subcutaneous;exposed structure 06/05/25 0830   Periwound intact;dry 06/05/25 0830   Periwound Temperature warm 06/05/25 0830   Periwound Skin Turgor soft 06/05/25 0830   Edges irregular 06/05/25 0830   Wound Length (cm) 7 cm 06/05/25 0830   Wound Width (cm) 2 cm 06/05/25 0830   Wound Depth (cm) 5.5 cm 06/05/25 0830   Wound Surface Area (cm^2) 11 cm^2 06/05/25 0830   Wound Volume (cm^3) 40.317 cm^3 06/05/25 0830   Drainage  Characteristics/Odor serosanguineous;sanguineous 06/05/25 0830   Drainage Amount moderate 06/05/25 0830   Care, Wound irrigated with;wound cleanser;negative pressure wound therapy 06/05/25 0830   Dressing Care dressing changed 06/05/25 0830       Wound 06/04/25 1123 Right lower leg Other (Comments) (Active)   Dressing Appearance dry;intact 06/04/25 2000   Closure Open to air 06/04/25 1600   Base bleeding;black 06/04/25 1722   Dressing Care border dressing 06/04/25 2000       Wound 06/05/25 0830 Left heel (Active)   Wound Image   06/05/25 0830   Dressing Appearance open to air 06/05/25 0830   Confirmed Empty Wound Bed Yes, finger sweep performed 06/05/25 0830   Base moist;pink 06/05/25 0830   Periwound intact;dry 06/05/25 0830   Periwound Temperature warm 06/05/25 0830   Periwound Skin Turgor soft 06/05/25 0830   Edges irregular 06/05/25 0830   Wound Length (cm) 1.5 cm 06/05/25 0830   Wound Width (cm) 2 cm 06/05/25 0830   Wound Depth (cm) 0.1 cm 06/05/25 0830   Wound Surface Area (cm^2) 2.36 cm^2 06/05/25 0830   Wound Volume (cm^3) 0.157 cm^3 06/05/25 0830   Drainage Characteristics/Odor serosanguineous 06/05/25 0830   Drainage Amount small 06/05/25 0830   Care, Wound irrigated with;wound cleanser;negative pressure wound therapy 06/05/25 0830   Dressing Care dressing changed 06/05/25 0830       Wound 06/05/25 0830 Left  foot Neuropathic Ulcer (Active)   Wound Image   06/05/25 0830   Dressing Appearance open to air 06/05/25 0830   Confirmed Empty Wound Bed Yes, visual inspection of wound bed;Yes, finger sweep performed 06/05/25 0830   Base moist;pink 06/05/25 0830   Periwound intact;dry 06/05/25 0830   Periwound Temperature warm 06/05/25 0830   Periwound Skin Turgor soft 06/05/25 0830   Edges irregular 06/05/25 0830   Wound Length (cm) 2 cm 06/05/25 0830   Wound Width (cm) 2 cm 06/05/25 0830   Wound Depth (cm) 0.1 cm 06/05/25 0830   Wound Surface Area (cm^2) 3.14 cm^2 06/05/25 0830   Wound Volume (cm^3) 0.209 cm^3  06/05/25 0830   Drainage Characteristics/Odor serosanguineous 06/05/25 0830   Drainage Amount small 06/05/25 0830   Care, Wound irrigated with;wound cleanser;negative pressure wound therapy 06/05/25 0830   Dressing Care dressing changed 06/05/25 0830         WOUND DEBRIDEMENT                     PT Assessment (Last 12 Hours)       PT Evaluation and Treatment       Row Name 06/05/25 0830          Physical Therapy Time and Intention    Subjective Information no complaints  -     Document Type evaluation;therapy note (daily note);wound care  -     Mode of Treatment individual therapy;physical therapy  -       Row Name 06/05/25 0830          General Information    Patient Profile Reviewed yes  -     Patient Observations agree to therapy;cooperative;alert  -     Pertinent History of Current Functional Problem chronic foot wounds s/p surgical debridement of L lateral foot  -     Risks Reviewed patient:;increased discomfort  -     Benefits Reviewed patient:;improve skin integrity  -     Barriers to Rehab medically complex;previous functional deficit;physical barrier  -       Row Name 06/05/25 0830          Pain    Pretreatment Pain Rating 0/10 - no pain  -     Posttreatment Pain Rating 0/10 - no pain  -       Row Name 06/05/25 0830          Cognition    Affect/Mental Status (Cognition) WNL  -       Row Name             [REMOVED] Wound 06/03/25 1450 Left anterior foot Surgical Open Surgical Incision    Wound - Properties Group Placement Date: 06/03/25  - Placement Time: 1450  -SS Side: Left  -SS Orientation: anterior  -SS Location: foot  -SS Primary Wound Type: Surgical  - Secondary Wound Type - Surgical: Open Surg  -SS Additional Comments: x-ray detectable kerlix placed with 4x4, abd, softroll, ace  -SS Removal Date: 06/05/25  - Removal Time: 1021  -MF    Retired Wound - Properties Group Placement Date: 06/03/25  - Placement Time: 1450  -SS Side: Left  -SS Orientation: anterior  -SS  Location: foot  -SS Additional Comments: x-ray detectable kerlix placed with 4x4, abd, softroll, ace  -SS Removal Date: 06/05/25  -MF Removal Time: 1021  -MF    Retired Wound - Properties Group Placement Date: 06/03/25  -SS Placement Time: 1450  -SS Side: Left  -SS Orientation: anterior  -SS Location: foot  -SS Additional Comments: x-ray detectable kerlix placed with 4x4, abd, softroll, ace  -SS Removal Date: 06/05/25  -MF Removal Time: 1021  -MF    Retired Wound - Properties Group Date first assessed: 06/03/25  -SS Time first assessed: 1450  -SS Side: Left  -SS Location: foot  -SS Additional Comments: x-ray detectable kerlix placed with 4x4, abd, softroll, ace  -SS Resolution Date: 06/05/25  - Resolution Time: 1021  -      Row Name 06/05/25 0830          Wound 05/08/25 Left lateral foot Surgical    Wound - Properties Group Placement Date: 05/08/25  -KW Side: Left  -KW Orientation: lateral  -KW Location: foot  -KW Primary Wound Type: Surgical  -MF    Wound Image Images linked: 1  -MF     Dressing Appearance intact;moist drainage  -MF     Dressing Removed Type gauze  -     Confirmed Empty Wound Bed Yes, visual inspection of wound bed;Yes, finger sweep performed  -     Base moist;pink;red;subcutaneous;exposed structure  bone and fascia noted to wound base  -MF     Periwound intact;dry  -MF     Periwound Temperature warm  -     Periwound Skin Turgor soft  -MF     Edges irregular  -MF     Wound Length (cm) 7 cm  -MF     Wound Width (cm) 2 cm  -MF     Wound Depth (cm) 5.5 cm  -MF     Wound Surface Area (cm^2) 11 cm^2  -MF     Wound Volume (cm^3) 40.317 cm^3  -MF     Drainage Characteristics/Odor serosanguineous;sanguineous  -MF     Drainage Amount moderate  -     Care, Wound irrigated with;wound cleanser;negative pressure wound therapy  -MF     Dressing Care dressing changed  -MF     Retired Wound - Properties Group Placement Date: 05/08/25  -KW Side: Left  -KW Orientation: lateral  -KW Location: foot  -KW     Retired Wound - Properties Group Placement Date: 05/08/25  -KW Side: Left  -KW Orientation: lateral  -KW Location: foot  -KW    Retired Wound - Properties Group Date first assessed: 05/08/25  -KW Side: Left  -KW Location: foot  -KW      Row Name             Wound 06/04/25 1123 Right lower leg Other (Comments)    Wound - Properties Group Placement Date: 06/04/25  -ALISA Placement Time: 1123  -ALISA Present on Original Admission: Y  -ALISA Side: Right  -ALISA Orientation: lower  -ALISA Location: leg  -ALISA Primary Wound Type: Other  -ALISA, Black circular scab - pt reports being there for a long time     Retired Wound - Properties Group Placement Date: 06/04/25  -ALISA Placement Time: 1123  -ALISA Present on Original Admission: Y  -ALISA Side: Right  -ALISA Orientation: lower  -ALISA Location: leg  -ALISA    Retired Wound - Properties Group Placement Date: 06/04/25  -ALISA Placement Time: 1123  -ALISA Present on Original Admission: Y  -ALISA Side: Right  -ALISA Orientation: lower  -ALISA Location: leg  -ALISA    Retired Wound - Properties Group Date first assessed: 06/04/25  -ALISA Time first assessed: 1123  -ALISA Present on Original Admission: Y  -ALISA Side: Right  -ALISA Location: leg  -ALISA      Row Name             [REMOVED] Wound 05/08/25 1300 Left plantar    Wound - Properties Group Placement Date: 05/08/25  -KW Placement Time: 1300  -KW Side: Left  -KW Location: plantar  -KW Removal Date: 06/05/25  -MF Removal Time: 1020  -MF    Retired Wound - Properties Group Placement Date: 05/08/25  -KW Placement Time: 1300  -KW Side: Left  -KW Location: plantar  -KW Removal Date: 06/05/25  -MF Removal Time: 1020  -MF    Retired Wound - Properties Group Placement Date: 05/08/25  -KW Placement Time: 1300  -KW Side: Left  -KW Location: plantar  -KW Removal Date: 06/05/25  -MF Removal Time: 1020  -MF    Retired Wound - Properties Group Date first assessed: 05/08/25  -KW Time first assessed: 1300  -KW Side: Left  -KW Location: plantar  -KW Resolution Date: 06/05/25  -MF Resolution Time: 1020   -MF      Row Name             [REMOVED] Wound 05/08/25 1300 Left heel    Wound - Properties Group Placement Date: 05/08/25  -KW Placement Time: 1300  -KW Side: Left  -KW Location: heel  -KW Removal Date: 06/05/25  -MF Removal Time: 1020  -MF    Retired Wound - Properties Group Placement Date: 05/08/25  -KW Placement Time: 1300  -KW Side: Left  -KW Location: heel  -KW Removal Date: 06/05/25  -MF Removal Time: 1020  -MF    Retired Wound - Properties Group Placement Date: 05/08/25  -KW Placement Time: 1300  -KW Side: Left  -KW Location: heel  -KW Removal Date: 06/05/25  -MF Removal Time: 1020  -MF    Retired Wound - Properties Group Date first assessed: 05/08/25  -KW Time first assessed: 1300  -KW Side: Left  -KW Location: heel  -KW Resolution Date: 06/05/25  -MF Resolution Time: 1020  -MF      Row Name             [REMOVED] Wound 07/21/23 Left anterior foot Amputation stump    Wound - Properties Group Placement Date: 07/21/23  -MB Side: Left  -MB Orientation: anterior  -MB Location: foot  -MB Primary Wound Type: Amputation s  -MB Removal Date: 06/05/25  -MF Removal Time: 1020  -MF    Retired Wound - Properties Group Placement Date: 07/21/23  -MB Side: Left  -MB Orientation: anterior  -MB Location: foot  -MB Primary Wound Type: Amputation s  -MB Removal Date: 06/05/25  -MF Removal Time: 1020  -MF    Retired Wound - Properties Group Placement Date: 07/21/23  -MB Side: Left  -MB Orientation: anterior  -MB Location: foot  -MB Primary Wound Type: Amputation s  -MB Removal Date: 06/05/25  -MF Removal Time: 1020  -MF    Retired Wound - Properties Group Date first assessed: 07/21/23  -MB Side: Left  -MB Location: foot  -MB Primary Wound Type: Amputation s  -MB Resolution Date: 06/05/25  -MF Resolution Time: 1020  -MF      Row Name 06/05/25 0830          Wound 06/05/25 0830 Left heel    Wound - Properties Group Placement Date: 06/05/25  -MF Placement Time: 0830  -MF Present on Original Admission: Y  -MF Side: Left  -MF  Location: heel  -MF    Wound Image Images linked: 1  -MF     Dressing Appearance open to air  Md removed dressing prior to PT entering  -MF     Confirmed Empty Wound Bed Yes, finger sweep performed  -MF     Base moist;pink  -MF     Periwound intact;dry  -MF     Periwound Temperature warm  -MF     Periwound Skin Turgor soft  -MF     Edges irregular  -MF     Wound Length (cm) 1.5 cm  -MF     Wound Width (cm) 2 cm  -MF     Wound Depth (cm) 0.1 cm  -MF     Wound Surface Area (cm^2) 2.36 cm^2  -MF     Wound Volume (cm^3) 0.157 cm^3  -MF     Drainage Characteristics/Odor serosanguineous  -MF     Drainage Amount small  -MF     Care, Wound irrigated with;wound cleanser;negative pressure wound therapy  -MF     Dressing Care dressing changed  -MF     Retired Wound - Properties Group Placement Date: 06/05/25  - Placement Time: 0830  -MF Present on Original Admission: Y  -MF Side: Left  -MF Location: heel  -MF    Retired Wound - Properties Group Placement Date: 06/05/25  - Placement Time: 0830  -MF Present on Original Admission: Y  -MF Side: Left  -MF Location: heel  -MF    Retired Wound - Properties Group Date first assessed: 06/05/25  -MF Time first assessed: 0830  -MF Present on Original Admission: Y  -MF Side: Left  -MF Location: heel  -MF      Row Name 06/05/25 0830          Wound 06/05/25 0830 Left  foot Neuropathic Ulcer    Wound - Properties Group Placement Date: 06/05/25  -MF Placement Time: 0830  -MF Present on Original Admission: Y  -MF Side: Left  -MF Orientation: --  -MF, plantar  Location: foot  -MF Primary Wound Type: Neuropathic  -MF    Wound Image Images linked: 1  -MF     Dressing Appearance open to air  -MF     Confirmed Empty Wound Bed Yes, visual inspection of wound bed;Yes, finger sweep performed  -MF     Base moist;pink  -MF     Periwound intact;dry  -MF     Periwound Temperature warm  -MF     Periwound Skin Turgor soft  -MF     Edges irregular  -MF     Wound Length (cm) 2 cm  -MF     Wound Width (cm)  2 cm  -MF     Wound Depth (cm) 0.1 cm  -MF     Wound Surface Area (cm^2) 3.14 cm^2  -MF     Wound Volume (cm^3) 0.209 cm^3  -MF     Drainage Characteristics/Odor serosanguineous  -MF     Drainage Amount small  -MF     Care, Wound irrigated with;wound cleanser;negative pressure wound therapy  -     Dressing Care dressing changed  -     Retired Wound - Properties Group Placement Date: 06/05/25  - Placement Time: 0830  -MF Present on Original Admission: Y  -MF Side: Left  -MF Orientation: --  -MF, plantar  Location: foot  -MF    Retired Wound - Properties Group Placement Date: 06/05/25  - Placement Time: 0830  -MF Present on Original Admission: Y  -MF Side: Left  -MF Orientation: --  -MF, plantar  Location: foot  -MF    Retired Wound - Properties Group Date first assessed: 06/05/25  - Time first assessed: 0830  -MF Present on Original Admission: Y  -MF Side: Left  -MF Location: foot  -      Row Name 06/05/25 0830          Coping    Observed Emotional State calm  -     Verbalized Emotional State acceptance  -     Trust Relationship/Rapport care explained  -       Row Name 06/05/25 0830          Plan of Care Review    Plan of Care Reviewed With patient  -     Outcome Evaluation Pt presents with chronic L foot wounds, now s/p surgical debridement of Lat foot. PT placed a wound vac to help improve granulation and increase healing potential.  Pt will benefit from wound vac to these wounds due to high risk for complications and previous difficulties with wound healing.  -       Row Name 06/05/25 0830          Positioning and Restraints    Pre-Treatment Position in bed  -     Post Treatment Position bed  -     In Bed supine;call light within reach  -       Row Name 06/05/25 0830          Therapy Assessment/Plan (PT)    Patient/Family Therapy Goals Statement (PT) wound healing / go home  -     PT Diagnosis (PT) L foot wounds  -     Rehab Potential (PT) fair  -     Criteria for Skilled  Interventions Met (PT) yes;meets criteria;skilled treatment is necessary  -     Predicted Duration of Therapy Intervention (PT) 10 days  -       Row Name 06/05/25 0830          Therapy Plan Review/Discharge Plan (PT)    Therapy Plan Review (PT) evaluation/treatment results reviewed;care plan/treatment goals reviewed;current/potential barriers reviewed;risks/benefits reviewed;participants voiced agreement with care plan;participants included;patient  -       Row Name 06/05/25 0830          Physical Therapy Goals    Wound Management Goal Selection (PT) wound management, PT goal 1;wound management, PT goal 2  -       Row Name 06/05/25 0830          Wound Management Goal 1 (PT)    Wound Management Goal (Wound Goal 1, PT) Decrease depth of Lat foot wound by 1cm as evidence of wound healing  -     Time Frame (Wound Goal 1, PT) 1 week  -       Row Name 06/05/25 0830          Wound Management Goal 2 (PT)    Wound Management Goal (Wound Goal 2, PT) Pt to have no bleeding with wound vac changes to improve healing potential.  -     Time Frame (Wound Goal 2, PT) 2 weeks  -               User Key  (r) = Recorded By, (t) = Taken By, (c) = Cosigned By      Initials Name Provider Type     Koko Barraza, PT Physical Therapist    Nyla Lockhart, RN Registered Nurse    Melissa Casiano, PT Physical Therapist    Huy Perdue, RN Registered Nurse    Alyssa English, RN Registered Nurse                  Physical Therapy Education       Title: PT OT SLP Therapies (In Progress)       Topic: Physical Therapy (Done)       Point: Mobility training (Done)       Learning Progress Summary            Patient Acceptance, E,D, VU,NR by ES at 6/4/2025 0922    Comment: NWB LLE  Demonstrated and practiced safe STS transfers with proper placement of knee scooter.    Acceptance, E, VU by ND at 6/3/2025 1706                      Point: Home exercise program (Done)       Learning Progress Summary             Patient Acceptance, E,D, VU,NR by ES at 6/4/2025 0922    Comment: NWB LLE  Demonstrated and practiced safe STS transfers with proper placement of knee scooter.                      Point: Body mechanics (Done)       Learning Progress Summary            Patient Acceptance, E,D, VU,NR by ES at 6/4/2025 0922    Comment: NWB LLE  Demonstrated and practiced safe STS transfers with proper placement of knee scooter.    Acceptance, E, VU by ND at 6/3/2025 1706                      Point: Precautions (Done)       Learning Progress Summary            Patient Acceptance, E,D, VU,NR by ES at 6/4/2025 0922    Comment: NWB LLE  Demonstrated and practiced safe STS transfers with proper placement of knee scooter.    Acceptance, E, VU by ND at 6/3/2025 1706                                      User Key       Initials Effective Dates Name Provider Type Discipline    ND 11/16/23 -  Brooke Hager, PT Physical Therapist PT     05/05/25 -  Key Cote PT Student PT Student PT                    Recommendation and Plan  Anticipated Discharge Disposition (PT): home with assist, home with home health  Planned Therapy Interventions (PT): wound care, patient/family education  Therapy Frequency (PT): daily  Plan of Care Reviewed With: patient           Outcome Evaluation: Pt presents with chronic L foot wounds, now s/p surgical debridement of Lat foot. PT placed a wound vac to help improve granulation and increase healing potential.  Pt will benefit from wound vac to these wounds due to high risk for complications and previous difficulties with wound healing.  Plan of Care Reviewed With: patient            Time Calculation   PT Charges       Row Name 06/05/25 0830             Time Calculation    Start Time 0830  -MF      PT Goal Re-Cert Due Date 06/13/25  -MF         Untimed Charges    PT Eval/Re-eval Minutes 25  -MF      Wound Care 82768 Neg Press (DME) wound TO 50 sqcm  -MF      08243-Tva Pressure wound to 50 sqcm 55  -MF          Total Minutes    Untimed Charges Total Minutes 80  -MF       Total Minutes 80  -MF                User Key  (r) = Recorded By, (t) = Taken By, (c) = Cosigned By      Initials Name Provider Type    Koko Reynolds, PT Physical Therapist                            PT G-Codes  Outcome Measure Options: AM-PAC 6 Clicks Daily Activity (OT)  AM-PAC 6 Clicks Score (PT): 19  AM-PAC 6 Clicks Score (OT): 24       Koko Barraza, PT  6/5/2025

## 2025-06-05 NOTE — PROGRESS NOTES
"IM progress note      Carlos Culver  [id number]  1945     LOS: 1 day     Attending: Mao Mckay MD    Primary Care Provider: Marcus Cheng MD      Chief Complaint/Reason for visit:  Left foot infection    Subjective   Doing well, wound vac placed this morning. Denies pain. Denies f/c/n/v/sob/cp.    Objective     Vital Signs  Visit Vitals  /75 (BP Location: Left arm, Patient Position: Lying)   Pulse 74   Temp 97.6 °F (36.4 °C) (Oral)   Resp 18   Ht 180.3 cm (71\")   Wt 97.5 kg (215 lb)   SpO2 (!) 88%   BMI 29.99 kg/m²     Temp (24hrs), Av.9 °F (36.6 °C), Min:97.6 °F (36.4 °C), Max:98.2 °F (36.8 °C)      Nutrition: PO    Respiratory: RA    Physical Therapy: Pt demonstrated good ability with maintaining LLE NWB precautions and good ability to ambulate 200' with knee scooter this session. Pt demonstrates increased need for cues with transfers from sitting to knee scooter, and deficits in strength and endurance. PT to continue IPPT to address deficits. PT continue to rec home with assist and HHPT at d/c when medically appropriate.     : Pt presents with chronic L foot wounds, now s/p surgical debridement of Lat foot. PT placed a wound vac to help improve granulation and increase healing potential. Pt will benefit from wound vac to these wounds due to high risk for complications and previous difficulties with wound healing.     Physical Exam:     General Appearance:    Alert, cooperative, in no acute distress   Head:    Normocephalic, without obvious abnormality, atraumatic    Lungs:     Normal effort, symmetric chest rise, no crepitus, clear to      auscultation bilaterally             Heart:    Regular rhythm and normal rate, normal S1 and S2   Abdomen:     Normal bowel sounds, no masses, no organomegaly, soft        non-tender, non-distended, no guarding, no rebound                tenderness   Extremities:   Left foot ACE wrap CDI.    Pulses:   Pulses palpable and equal bilaterally "   Skin:   No bleeding, bruising or rash   Neurologic:   Cranial nerves 2 - 12 grossly intact     Results Review:     I reviewed the patient's new clinical results.   Results from last 7 days   Lab Units 06/05/25  1206 06/04/25  0657   WBC 10*3/mm3 16.53* 23.06*   HEMOGLOBIN g/dL 11.8* 11.8*   HEMATOCRIT % 37.9 36.7*   PLATELETS 10*3/mm3 186 177     Results from last 7 days   Lab Units 06/04/25  1106   SODIUM mmol/L 136   POTASSIUM mmol/L 4.5   CHLORIDE mmol/L 100   CO2 mmol/L 23.0   BUN mg/dL 19.5   CREATININE mg/dL 0.98   CALCIUM mg/dL 8.6   BILIRUBIN mg/dL 0.6   ALK PHOS U/L 81   ALT (SGPT) U/L 8   AST (SGOT) U/L 18   GLUCOSE mg/dL 176*     I reviewed the patient's new imaging including images and reports.    All medications reviewed.   [Held by provider] apixaban, 5 mg, Oral, BID  atorvastatin, 80 mg, Oral, Nightly  folic acid, 1 mg, Oral, Daily  heparin (porcine), 5,000 Units, Subcutaneous, Q8H  metoprolol tartrate, 25 mg, Oral, BID  multivitamin, 1 tablet, Oral, Daily  piperacillin-tazobactam, 4.5 g, Intravenous, Q8H  sodium chloride, 10 mL, Intravenous, Q12H  sodium chloride, 10 mL, Intravenous, Q12H  thiamine, 100 mg, Oral, Daily  vitamin B-12, 1,000 mcg, Oral, Daily        Assessment & Plan     Abscess of left foot, status post I&D, exostectomy    Essential hypertension    Leukocytosis    Chronic anticoagulation    Neuropathy    CKD (chronic kidney disease) stage 2, GFR 60-89 ml/min    A-fib    S/P transmetatarsal amputation of foot, left    Alcohol abuse    History of Chopart amputation of left foot    Ulcer of left foot    Left foot infection      Plan  1. PT/OT- NWB LLE  2. Pain control-prns   3. IS-encouraged  4. DVT proph- mechs/heparin  5. Bowel regimen  6. Monitor post-op labs  7. DC planning for home     -Atrial fibrillation: Resume beta-blocker.  Apixaban will resume when okay with orthopedic surgery.     - Alcohol abuse.  No history of withdrawal.  Will screen with [facility] protocol.  Thiamine, folic  acid, and multivitamin ordered.     - Hypertension:  Resume home medications as appropriate, formulary substitution when indicated.  Holding parameters.  PRN medications for elevated blood pressure.     - Follow operative cultures, growing Staph aureus, postop antibiotics will be managed by Dr. Jeffery Young with [facility].( Team covering)      ROBIN Tatum  06/05/25  13:12 EDT    I have personally performed the evaluation on this patient. My history is consistent  with HPI obtained. My exam findings are listed above. I have personally reviewed and formulated the above treatment plan with APRN.  Noting wound vac has been placed by PT/WOCN.  wy

## 2025-06-05 NOTE — PLAN OF CARE
Problem: Adult Inpatient Plan of Care  Goal: Plan of Care Review  Outcome: Progressing  Flowsheets (Taken 6/5/2025 0407)  Plan of Care Reviewed With: patient  Goal: Optimal Comfort and Wellbeing  Outcome: Progressing  Intervention: Provide Person-Centered Care  Recent Flowsheet Documentation  Taken 6/4/2025 2000 by Ana Rosa Delacruz RN  Trust Relationship/Rapport:   care explained   choices provided   emotional support provided   empathic listening provided   questions answered   questions encouraged   reassurance provided   thoughts/feelings acknowledged  Goal: Readiness for Transition of Care  Outcome: Progressing     Problem: Surgery Nonspecified  Goal: Fluid and Electrolyte Balance  Outcome: Progressing  Goal: Absence of Infection Signs and Symptoms  Outcome: Progressing  Goal: Optimal Pain Control and Function  Outcome: Progressing  Intervention: Prevent or Manage Pain  Recent Flowsheet Documentation  Taken 6/4/2025 2000 by Ana Rosa Delacruz RN  Diversional Activities:   smartphone   television  Goal: Effective Urinary Elimination  Outcome: Progressing  Goal: Effective Oxygenation and Ventilation  Outcome: Progressing  Intervention: Optimize Oxygenation and Ventilation  Recent Flowsheet Documentation  Taken 6/5/2025 0400 by Ana Rosa Delacruz RN  Head of Bed (HOB) Positioning: HOB elevated  Taken 6/5/2025 0200 by Ana Rosa Delacruz RN  Head of Bed (HOB) Positioning: HOB elevated  Taken 6/5/2025 0000 by Ana Rosa Delacruz RN  Head of Bed (HOB) Positioning: HOB elevated  Taken 6/4/2025 2200 by Ana Rosa Delacruz RN  Head of Bed (HOB) Positioning: HOB elevated  Taken 6/4/2025 2000 by Ana Rosa Delacruz RN  Head of Bed (HOB) Positioning: HOB elevated     Problem: Fall Injury Risk  Goal: Absence of Fall and Fall-Related Injury  Outcome: Progressing  Intervention: Identify and Manage Contributors  Recent Flowsheet Documentation  Taken 6/4/2025 2000 by Ana Rosa Delacruz RN  Medication Review/Management:  medications reviewed  Intervention: Promote Injury-Free Environment  Recent Flowsheet Documentation  Taken 6/5/2025 0400 by Ana Rosa Delacruz, RN  Safety Promotion/Fall Prevention: safety round/check completed  Taken 6/5/2025 0200 by Ana Rosa Delacruz RN  Safety Promotion/Fall Prevention: safety round/check completed  Taken 6/5/2025 0000 by Ana Rosa Delacruz RN  Safety Promotion/Fall Prevention: safety round/check completed  Taken 6/4/2025 2200 by Ana Rosa Delacruz RN  Safety Promotion/Fall Prevention: safety round/check completed  Taken 6/4/2025 2000 by Ana Rosa Delacruz RN  Safety Promotion/Fall Prevention:   assistive device/personal items within reach   clutter free environment maintained   fall prevention program maintained   lighting adjusted   nonskid shoes/slippers when out of bed   room organization consistent   safety round/check completed   toileting scheduled   Goal Outcome Evaluation:  Plan of Care Reviewed With: patient

## 2025-06-06 ENCOUNTER — TELEPHONE (OUTPATIENT)
Dept: ORTHOPEDIC SURGERY | Facility: CLINIC | Age: 80
End: 2025-06-06
Payer: MEDICARE

## 2025-06-06 ENCOUNTER — APPOINTMENT (OUTPATIENT)
Dept: PHYSICAL THERAPY | Facility: HOSPITAL | Age: 80
End: 2025-06-06
Payer: MEDICARE

## 2025-06-06 LAB
BACTERIA SPEC AEROBE CULT: ABNORMAL
GRAM STN SPEC: ABNORMAL
GRAM STN SPEC: ABNORMAL

## 2025-06-06 PROCEDURE — 97605 NEG PRS WND THER DME<=50SQCM: CPT

## 2025-06-06 PROCEDURE — A9270 NON-COVERED ITEM OR SERVICE: HCPCS | Performed by: INTERNAL MEDICINE

## 2025-06-06 PROCEDURE — 63710000001 ATORVASTATIN 40 MG TABLET: Performed by: ORTHOPAEDIC SURGERY

## 2025-06-06 PROCEDURE — 63710000001 THIAMINE 100 MG TABLET: Performed by: INTERNAL MEDICINE

## 2025-06-06 PROCEDURE — 63710000001 VITAMIN B-12 1000 MCG TABLET: Performed by: ORTHOPAEDIC SURGERY

## 2025-06-06 PROCEDURE — 63710000001 METOPROLOL TARTRATE 25 MG TABLET: Performed by: ORTHOPAEDIC SURGERY

## 2025-06-06 PROCEDURE — 63710000001 MULTIVITAMIN TABLET: Performed by: INTERNAL MEDICINE

## 2025-06-06 PROCEDURE — 25010000002 CEFTRIAXONE PER 250 MG: Performed by: INTERNAL MEDICINE

## 2025-06-06 PROCEDURE — 25010000002 PIPERACILLIN SOD-TAZOBACTAM PER 1 G: Performed by: INTERNAL MEDICINE

## 2025-06-06 PROCEDURE — 25010000002 HEPARIN (PORCINE) PER 1000 UNITS: Performed by: ORTHOPAEDIC SURGERY

## 2025-06-06 PROCEDURE — A9270 NON-COVERED ITEM OR SERVICE: HCPCS | Performed by: ORTHOPAEDIC SURGERY

## 2025-06-06 PROCEDURE — 63710000001 FOLIC ACID 1 MG TABLET: Performed by: INTERNAL MEDICINE

## 2025-06-06 PROCEDURE — 97530 THERAPEUTIC ACTIVITIES: CPT

## 2025-06-06 RX ADMIN — MULTIVITAMIN TABLET 1 TABLET: TABLET at 08:04

## 2025-06-06 RX ADMIN — Medication 10 ML: at 20:13

## 2025-06-06 RX ADMIN — THIAMINE HCL TAB 100 MG 100 MG: 100 TAB at 08:04

## 2025-06-06 RX ADMIN — HEPARIN SODIUM 5000 UNITS: 5000 INJECTION INTRAVENOUS; SUBCUTANEOUS at 20:13

## 2025-06-06 RX ADMIN — METOPROLOL TARTRATE 25 MG: 25 TABLET, FILM COATED ORAL at 08:04

## 2025-06-06 RX ADMIN — FOLIC ACID 1 MG: 1 TABLET ORAL at 08:04

## 2025-06-06 RX ADMIN — CYANOCOBALAMIN TAB 1000 MCG 1000 MCG: 1000 TAB at 08:04

## 2025-06-06 RX ADMIN — PIPERACILLIN AND TAZOBACTAM 4.5 G: 4; .5 INJECTION, POWDER, FOR SOLUTION INTRAVENOUS at 08:06

## 2025-06-06 RX ADMIN — ATORVASTATIN CALCIUM 80 MG: 40 TABLET, FILM COATED ORAL at 20:13

## 2025-06-06 RX ADMIN — METOPROLOL TARTRATE 25 MG: 25 TABLET, FILM COATED ORAL at 20:13

## 2025-06-06 RX ADMIN — HEPARIN SODIUM 5000 UNITS: 5000 INJECTION INTRAVENOUS; SUBCUTANEOUS at 05:15

## 2025-06-06 RX ADMIN — Medication 10 ML: at 08:06

## 2025-06-06 RX ADMIN — HEPARIN SODIUM 5000 UNITS: 5000 INJECTION INTRAVENOUS; SUBCUTANEOUS at 15:53

## 2025-06-06 RX ADMIN — SODIUM CHLORIDE 2000 MG: 900 INJECTION INTRAVENOUS at 15:53

## 2025-06-06 NOTE — PROGRESS NOTES
INFECTIOUS DISEASE Progress Note    Carlos Culver  1945  [id number]    Date of consult: 6/4/2025    Admit date: 6/3/2025    Requesting Provider: No Known Provider  Evaluating physician: Wilfredo Leon MD  Reason for Consultation: Left foot abscess in the setting of severe peripheral neuropathy  Chief Complaint: Above      Subjective   History of present illness:  6/4/25: Patient is a  79 y.o.  Yr old male with a history of severe peripheral neuropathy with previous foot infections related to ulcerations and worsening wound, hyperlipidemia, essential hypertension, hard of hearing, atrial fibrillation, who developed worsening left foot ulceration after wearing a new pair of shoes at the end of May 2025.  Patient developed ulceration with some drainage without high fevers or chills.  MRI of left foot on 5/24 was consistent with possible abscess.  The patient was admitted to [facility] on 6/3/2025 and underwent I&D of his left foot with purulence.  I was consulted on 6//25 for further evaluation and treatment.  Left foot wound cultures yielded MSSA and Pseudomonas aeruginosa.  Pseudomonas was sensitive to cefepime, levofloxacin, and MSSA was sensitive to piperacillin/tazobactam, sulfa, and tetracycline.  Surgical cultures from 6/3 negative to date.  The patient has no other localizing signs or symptoms of infection.    6/5/25:  He remains afebrile.   White blood cell count yesterday was 23.1.    Operative cultures from 6/3 are pending.   Left foot cultures from 5/24 growing Pseudomonas and MSSA.  He is on intravenous Zosyn.   He denies uncontrolled pain.  He denies nausea and vomiting.    Operative cultures from 6/3 are growing Staph aureus.    6/6/25: He remains afebrile.   Operative cultures from 6/3 are growing scant Staph aureus.  He denies uncontrolled pain.  He denies nausea and vomiting.  He has infused himself at home previously.  He primarily takes care of himself at home and  has little direct assistance at home.      Past Medical History:   Diagnosis Date    A-fib     Acute kidney failure     Elevated cholesterol     Hard of hearing     Hyperlipidemia     Hypertension     Osteomyelitis     Renal disorder     Wears hearing aid in both ears     Wears reading eyeglasses        Past Surgical History:   Procedure Laterality Date    AMPUTATION DIGIT Left 01/07/2020    Procedure: AMPUTATION LEFT 2ND TOE;  Surgeon: Idania Osborn MD;  Location:  [facility];  Service: Orthopedics    AMPUTATION DIGIT Left 12/08/2020    Procedure: TRANSMETATARSAL AMPUTATION LEFT;  Surgeon: Idania Osborn MD;  Location: [facility];  Service: Orthopedics;  Laterality: Left;    APPENDECTOMY      COLONOSCOPY  2006    FOOT SURGERY Left 12/09/2020    (L) transmetatarsal amputation 12/09/2020 - Dr. Idania Osborn    INCISION AND DRAINAGE LEG Left 10/23/2018    Procedure: INCISION AND DRAINAGE LEFT FOOT;  Surgeon: Idania Osborn MD;  Location: [facility];  Service: Orthopedics    INCISION AND DRAINAGE LEG Left 07/21/2023    Procedure: FOOT INCISION AND DRAINAGE; REVISION AMPUTATION; ACHILLES LENGTHENING -LEFT;  Surgeon: Mao Mckay MD;  Location: [facility];  Service: Orthopedics;  Laterality: Left;    INCISION AND DRAINAGE LEG Left 6/3/2025    Procedure: FOOT ABSCESS INCISION AND DRAINAGE, EXOSTECTOMY;  Surgeon: Mao Mckay MD;  Location:  [facility];  Service: Orthopedics;  Laterality: Left;    TOE AMPUTATION      TONSILLECTOMY      VENOUS ACCESS DEVICE (PORT) INSERTION N/A 10/23/2018    Procedure: GROSHONG CATHETER PLACEMENT;  Surgeon: Marquez Simons MD;  Location:  [facility];  Service: General    VENOUS ACCESS DEVICE (PORT) INSERTION N/A 12/09/2020    Procedure: GROSHONG INSERTION;  Surgeon: Antonio Bains MD;  Location:  [facility];  Service: General;  Laterality: N/A;    VENOUS ACCESS DEVICE (PORT) REMOVAL         Pediatric History   Patient Parents    Not on file     Other Topics Concern    Not on  "file   Social History Narrative    Not on file   No cigarettes, alcohol, drug use    family history includes Cancer in his father; No Known Problems in his mother.    Allergies   Allergen Reactions    Sulfa Antibiotics Unknown (See Comments) and Rash     Pt was told as child he had an allergy.  Has not taken and doesn't know the response       Immunization History   Administered Date(s) Administered    COVID-19 (MODERNA) 12YRS+ (SPIKEVAX) 10/27/2023    COVID-19 (PFIZER) 12YRS+ (COMIRNATY) 05/02/2025    COVID-19 (PFIZER) BIVALENT 12+YRS 10/19/2022    COVID-19 (PFIZER) Purple Cap Monovalent 01/29/2021, 02/25/2021, 10/02/2021    Covid-19 (Pfizer) Gray Cap Monovalent 04/14/2022       Medication:  @Scheduled Meds:[Held by provider] apixaban, 5 mg, Oral, BID  atorvastatin, 80 mg, Oral, Nightly  folic acid, 1 mg, Oral, Daily  heparin (porcine), 5,000 Units, Subcutaneous, Q8H  metoprolol tartrate, 25 mg, Oral, BID  multivitamin, 1 tablet, Oral, Daily  piperacillin-tazobactam, 4.5 g, Intravenous, Q8H  sodium chloride, 10 mL, Intravenous, Q12H  sodium chloride, 10 mL, Intravenous, Q12H  thiamine, 100 mg, Oral, Daily  vitamin B-12, 1,000 mcg, Oral, Daily      Continuous Infusions:     PRN Meds:.  acetaminophen **OR** acetaminophen    bisacodyl    diphenhydrAMINE    HYDROmorphone **AND** naloxone    labetalol    melatonin    ondansetron ODT **OR** ondansetron    oxyCODONE    polyethylene glycol    senna-docusate sodium    sodium chloride    sodium chloride    sodium chloride    sodium chloride    sodium chloride    sodium chloride     Please refer to the medical record for a full medication list    Review of Systems:  See HPI    Physical Exam:   Vital Signs   Temp:  [96.4 °F (35.8 °C)-98.7 °F (37.1 °C)] 98.7 °F (37.1 °C)  Heart Rate:  [60-80] 63  Resp:  [18] 18  BP: (108-168)/() 131/84    Blood pressure 131/84, pulse 63, temperature 98.7 °F (37.1 °C), temperature source Oral, resp. rate 18, height 180.3 cm (71\"), weight " 97.5 kg (215 lb), SpO2 96%.  GENERAL: Awake and alert, in moderate distress.  HEENT:  Normocephalic, atraumatic.    No labial ulcers  EYES: PERRLA. No conjunctival injection. No icterus. EOM full.  HEART: No murmur, gallop, or pericardial friction rub. Reg rate rhythm,.  LUNGS: Clear to auscultation and percussion. No respiratory distress, no use of accessory muscles.  ABDOMEN: Soft, nontender, nondistended. No appreciable HSM.   Obese.  GENITAL: No external lesions, breasts without masses, back straight, no CVAT, rectal external without lesions.   SKIN: Warm and dry without cutaneous eruptions.  No nodules.  Left foot surgical dressing in place.  PSYCHIATRIC: Mental status lucid. No confusion.  EXT: Left foot is in a postoperative dressing  NEURO: Oriented to name, CN 2 to 12 intact, sensory diminished lower extremity    Right arm PICC line in place with no exit site erythema or drainage    Results Review:   I reviewed the patient's new clinical results.  I reviewed the patient's new imaging results and agree with the interpretation.  I reviewed the patient's other test results and agree with the interpretation    Results from last 7 days   Lab Units 06/05/25  1206 06/04/25  0657   WBC 10*3/mm3 16.53* 23.06*   HEMOGLOBIN g/dL 11.8* 11.8*   HEMATOCRIT % 37.9 36.7*   PLATELETS 10*3/mm3 186 177     5/24/2025 laboratory review with sodium 136, potassium 5.1, chloride 102, CO2 24, calcium 9.5, ALT 10, AST 20, alkaline phosphatase 118, total bilirubin 1.0, creatinine 1.13, ESR 54, WBC 11, hemoglobin 14.    Results from last 7 days   Lab Units 06/05/25  1206   SODIUM mmol/L 140   POTASSIUM mmol/L 4.0   CHLORIDE mmol/L 106   CO2 mmol/L 24.0   BUN mg/dL 19.2   CREATININE mg/dL 1.14   GLUCOSE mg/dL 134*   CALCIUM mg/dL 9.0     Results from last 7 days   Lab Units 06/05/25  1206   ALK PHOS U/L 93   BILIRUBIN mg/dL 0.4   ALT (SGPT) U/L 12   AST (SGOT) U/L 25                     Estimated Creatinine Clearance: 61.5 mL/min (by  "C-G formula based on SCr of 1.14 mg/dL).  CPK          6/5/2025    12:06   Common Labs   Creatine Kinase 110       Procalitonin Results:       Brief Urine Lab Results       None           No results found for: \"SITE\", \"ALLENTEST\", \"PHART\", \"KOR6VQE\", \"PO2ART\", \"GYY9SAQ\", \"BASEEXCESS\", \"E3LEEAVK\", \"HGBBG\", \"HCTABG\", \"OXYHEMOGLOBI\", \"METHHGBN\", \"CARBOXYHGB\", \"CO2CT\", \"BAROMETRIC\", \"MODALITY\", \"FIO2\"     Microbiology:      Results for orders placed or performed during the hospital encounter of 07/19/23   Blood Culture - Blood, Hand, Left    Specimen: Hand, Left; Blood   Result Value Ref Range    Blood Culture No growth at 5 days          Culture results from last 30 days   Lab 06/03/25  1345 05/24/25  1129   WOUNDCX  --  Scant growth (1+) Pseudomonas aeruginosa*  Light growth (2+) Staphylococcus aureus*   TISSUE CULTURE Scant growth (1+) Staphylococcus aureus*  --    ANACX No anaerobes isolated at 3 days  --       Brief Urine Lab Results       None                Tissue Culture   Date Value Ref Range Status   06/03/2025 Growth present, too young to evaluate  Preliminary   (      Radiology:  Imaging Results (Last 72 Hours)       Procedure Component Value Units Date/Time    XR Chest 1 View [[id number]] Collected: 06/04/25 1334     Updated: 06/04/25 1338    Narrative:      XR CHEST 1 VW    Date of Exam: 6/4/2025 1:05 PM EDT    Indication: picc placement    Comparison: March 3, 2025    Findings:  The heart is enlarged. The lungs seem clear. There are no pleural effusions. There is a right-sided PICC line with its tip in the superior vena cava.      Impression:      Impression:  Right-sided PICC line with its tip in the superior vena cava.        Electronically Signed: Shashank Blair MD    6/4/2025 1:35 PM EDT    Workstation ID: [id number]    FL C Arm During Surgery [[id number]] Resulted: 06/03/25 1414     Updated: 06/03/25 1414    Narrative:      This procedure was auto-finalized with no dictation required.      "       IMPRESSION:   MSSA left foot abscess-although he grew Pseudomonas and MSSA from his prior superficial cultures performed at wound care, his operative cultures have only grown MSSA.  This is the primary pathogen and the Pseudomonas was likely a wound colonizer.  I will plan to simplify his antibiotic regimen from Zosyn to daily ceftriaxone which he will certainly be able to manage more easily at home.  Leukocytosis, neutrophilic   Anemia, chronic disease related to above issues.  Severe peripheral neuropathy lower extremities of unclear etiology.  Contributing to recurrent ulcerations.  Status post mellitus left foot status post trans metatarsal amputation 2023.    RECOMMENDATIONS:  1.  Stop Zosyn  2.  Rocephin 2 g IV daily  3.  Follow-up with Dr. Young next week-Wednesday 6/11 at 1215-this appointment has been made  4.  Discharge to home today or in the morning    I discussed his complex situation in detail with him today.  I have called , And left a message today.  I am awaiting a callback.  I discussed his disposition with the pharmacist at [facility] today  I discussed his disposition with the [facility] staff today and arranged his follow-up appointment on 6/11.  I spent over 40 minutes on his complex care today.  I coordinated his care.      Outpatient orders:  1.  Outpatient intravenous antibiotic therapy: Rocephin 2 g IV daily to be provided by [facility]  2.  Stat CBC CMP ESR and CRP at the visit with Dr. Young on 6/11  3.  Appointment to see Dr. Young on Wednesday 6/11 at 1215-this appointment has been made  4.  PICC line dressing changes with a Biopatch or CHG gel dressing every Monday or Tuesday by home health    This visit included the following complex service elements:  Complex medical decision-making associated with antimicrobial prescribing.  In-depth chart review with high level synthesis for complex diagnoses.  Managed infection treatment protocol associated with  transitions of care for this complex patient.  Wilfredo Leon MD  6/6/2025

## 2025-06-06 NOTE — CASE MANAGEMENT/SOCIAL WORK
Continued Stay Note  [facility]     Patient Name: Carlos Culver  MRN: [id number]  Today's Date: 6/6/2025    Admit Date: 6/3/2025    Plan: home   Discharge Plan       Row Name 06/06/25 1142       Plan    Plan home    Patient/Family in Agreement with Plan yes    Plan Comments Patient's plan is home at discharge. PT wound care is following for wound vac needs. ID has been consulted for final IV antibiotic recommendations. CM will continue to follow and assist with discharge planning as recommendations become available.    15:47 EDT  PT wound care is working on insurance approval for home wound vac. Per [facility] has accepted patient to services for skilled nursing care. [facility] will provide IV antibiotics and education at the bedside prior to discharge.     Final Discharge Disposition Code 06 - home with home health care                   Discharge Codes    No documentation.                       Jerry Trimble RN

## 2025-06-06 NOTE — PLAN OF CARE
Goal Outcome Evaluation:  Plan of Care Reviewed With: patient        Progress: improving  Outcome Evaluation: Pt ambulated 350 ft using knee scooter w/ SBA. He demonstrates safe transfers and mobilization using knee scooter. Pt would continue to benefit from IP PT services while hospitalized to address deficits. Recommend home w/ assist and HHPT at d/c.    Anticipated Discharge Disposition (PT): home with assist, home with home health

## 2025-06-06 NOTE — THERAPY TREATMENT NOTE
Patient Name: Carlos Culver  : 1945    MRN: [id number]                              Today's Date: 2025       Admit Date: 6/3/2025    Visit Dx:     ICD-10-CM ICD-9-CM   1. Ulcer of left foot, unspecified ulcer stage  L97.529 707.15     Patient Active Problem List   Diagnosis    Cellulitis of left foot    GUCCI (acute kidney injury)    Essential hypertension    Leukocytosis    Osteomyelitis of left foot    Chronic anticoagulation    Neuropathy    Elevated transaminase level    CKD (chronic kidney disease) stage 2, GFR 60-89 ml/min    Skin ulcer of right heel    Chronic multifocal osteomyelitis of left foot    Contracture of joint of foot, left    MARTINEZ (dyspnea on exertion)    Neuropathic ulcer, limited to breakdown of skin    Hyperlipidemia LDL goal <100    A-fib    S/P transmetatarsal amputation of foot, left    Leukocytosis, likely reactive    Acute postoperative pain    Alcohol abuse    Staphylococcus aureus bacteremia    Acute osteomyelitis of left ankle or foot    History of Chopart amputation of left foot    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 evidence of necrosis    Ulcer of left foot    Left foot infection    Abscess of left foot, status post I&D, exostectomy     Past Medical History:   Diagnosis Date    A-fib     Acute kidney failure     Elevated cholesterol     Hard of hearing     Hyperlipidemia     Hypertension     Osteomyelitis     Renal disorder     Wears hearing aid in both ears     Wears reading eyeglasses      Past Surgical History:   Procedure Laterality Date    AMPUTATION DIGIT Left 2020    Procedure: AMPUTATION LEFT 2ND TOE;  Surgeon: Idania Osborn MD;  Location:  [facility];  Service: Orthopedics    AMPUTATION DIGIT Left 2020    Procedure: TRANSMETATARSAL AMPUTATION LEFT;  Surgeon: Idania Osborn MD;  Location:  [facility];  Service: Orthopedics;  Laterality: Left;    APPENDECTOMY      COLONOSCOPY  2006    FOOT SURGERY  Left 12/09/2020    (L) transmetatarsal amputation 12/09/2020 - Dr. Idania Osborn    INCISION AND DRAINAGE LEG Left 10/23/2018    Procedure: INCISION AND DRAINAGE LEFT FOOT;  Surgeon: Idania Osborn MD;  Location:  [facility];  Service: Orthopedics    INCISION AND DRAINAGE LEG Left 07/21/2023    Procedure: FOOT INCISION AND DRAINAGE; REVISION AMPUTATION; ACHILLES LENGTHENING -LEFT;  Surgeon: Mao Mckay MD;  Location:  [facility];  Service: Orthopedics;  Laterality: Left;    INCISION AND DRAINAGE LEG Left 6/3/2025    Procedure: FOOT ABSCESS INCISION AND DRAINAGE, EXOSTECTOMY;  Surgeon: Mao Mckay MD;  Location:  [facility];  Service: Orthopedics;  Laterality: Left;    TOE AMPUTATION      TONSILLECTOMY      VENOUS ACCESS DEVICE (PORT) INSERTION N/A 10/23/2018    Procedure: GROSHONG CATHETER PLACEMENT;  Surgeon: Marquez Simons MD;  Location:  [facility];  Service: General    VENOUS ACCESS DEVICE (PORT) INSERTION N/A 12/09/2020    Procedure: GROSHONG INSERTION;  Surgeon: Antonio Bains MD;  Location:  [facility];  Service: General;  Laterality: N/A;    VENOUS ACCESS DEVICE (PORT) REMOVAL        General Information       [facility] Name 06/06/25 1011          Physical Therapy Time and Intention    Document Type therapy note (daily note)  -     Mode of Treatment physical therapy  -[facility] Name 06/06/25 1011          General Information    Patient Profile Reviewed yes  -     Existing Precautions/Restrictions fall;left;non-weight bearing;other (see comments)  s/p L foot I+D, LLE NWB, wound vac, has knee scooter in room  -     Barriers to Rehab medically complex;previous functional deficit  -       Row Name 06/06/25 1011          Cognition    Orientation Status (Cognition) oriented x 4  -       Row Name 06/06/25 1011          Safety Issues/Impairments Affecting Functional Mobility    Safety Issues Affecting Function (Mobility) safety precaution awareness;safety precautions  follow-through/compliance;sequencing abilities  -     Impairments Affecting Function (Mobility) balance;endurance/activity tolerance;strength;sensation/sensory awareness;postural/trunk control  -               User Key  (r) = Recorded By, (t) = Taken By, (c) = Cosigned By      Initials Name Provider Type     Valentina Miles, PT Physical Therapist                   Mobility       Row Name 06/06/25 1012          Bed Mobility    Bed Mobility supine-sit  -     Supine-Sit [place] (Bed Mobility) standby assist  -     Assistive Device (Bed Mobility) head of bed elevated;bed rails  -     Comment, (Bed Mobility) No cues or assist needed  -       Row Name 06/06/25 1012          Transfers    Comment, (Transfers) Pt able to set up knee scooter appropriately for transfers. PT assisted w/ line management of wound vac tubing  -       Row Name 06/06/25 1012          Sit-Stand Transfer    Sit-Stand [place] (Transfers) standby assist  -     Assistive Device (Sit-Stand Transfers) walker, knee scooter  -     Comment, (Sit-Stand Transfer) STS from EOB  -       Row Name 06/06/25 1012          Gait/Stairs (Locomotion)    [place] Level (Gait) standby assist  -     Assistive Device (Gait) walker, knee scooter  -     Patient was able to Ambulate yes  -     Distance in Feet (Gait) 350  -     Maintains Weight-bearing Status (Gait) able to maintain  -     Comment, (Gait/Stairs) Pt demo safe mobilization on knee scooter and able to navigate around obstacles w/o cues. No LOB noted. MARTINEZ noted w/ SpO2 stable on RA. Pt deferred further mobility as he wanted to rest  -       Row Name 06/06/25 1012          Mobility    Extremity Weight-bearing Status left lower extremity  -     Left Lower Extremity (Weight-bearing Status) non weight-bearing (NWB)  -               User Key  (r) = Recorded By, (t) = Taken By, (c) = Cosigned By      Initials Name Provider Type     Valentina Miles PT Physical  Therapist                   Obj/Interventions       Row Name 06/06/25 1016          Balance    Balance Assessment sitting static balance;sitting dynamic balance;standing static balance;standing dynamic balance  -     Static Sitting Balance independent  -     Dynamic Sitting Balance independent  -     Position, Sitting Balance unsupported;sitting edge of bed  -     Static Standing Balance standby assist  -     Dynamic Standing Balance standby assist  -     Position/Device Used, Standing Balance supported;other (see comments)  knee scooter  -     Balance Interventions sitting;standing;sit to stand;supported;static;dynamic  -               User Key  (r) = Recorded By, (t) = Taken By, (c) = Cosigned By      Initials Name Provider Type     Valentina Miles PT Physical Therapist                   Goals/Plan    No documentation.                  Clinical Impression       [facility] Name 06/06/25 1017          Pain    Pretreatment Pain Rating 0/10 - no pain  -     Posttreatment Pain Rating 0/10 - no pain  -[facility] Name 06/06/25 1017          Plan of Care Review    Plan of Care Reviewed With patient  -     Progress improving  -     Outcome Evaluation Pt ambulated 350 ft using knee scooter w/ SBA. He demonstrates safe transfers and mobilization using knee scooter. Pt would continue to benefit from IP PT services while hospitalized to address deficits. Recommend home w/ assist and HHPT at d/c.  -       Row Name 06/06/25 1017          Vital Signs    Pre Systolic BP Rehab 131  -LH     Pre Treatment Diastolic BP 84  -LH     O2 Delivery Pre Treatment room air  -     Post SpO2 (%) 98  -LH     O2 Delivery Post Treatment room air  -     Pre Patient Position Supine  -     Post Patient Position Sitting  -       Row Name 06/06/25 1017          Positioning and Restraints    Pre-Treatment Position in bed  -     Post Treatment Position chair  -     In Chair notified nsg;reclined;sitting;call light within  reach;encouraged to call for assist;exit alarm on;waffle cushion;legs elevated;LLE elevated;L heel elevated  -               User Key  (r) = Recorded By, (t) = Taken By, (c) = Cosigned By      Initials Name Provider Type     Valentina Miles, DORINDA Physical Therapist                   Outcome Measures       Row Name 06/06/25 1019          How much help from another person do you currently need...    Turning from your back to your side while in flat bed without using bedrails? 4  -LH     Moving from lying on back to sitting on the side of a flat bed without bedrails? 3  -LH     Moving to and from a bed to a chair (including a wheelchair)? 3  -LH     Standing up from a chair using your arms (e.g., wheelchair, bedside chair)? 3  -LH     Climbing 3-5 steps with a railing? 2  -LH     To walk in hospital room? 3  -     AM-PAC 6 Clicks Score (PT) 18  -LH     Highest Level of Mobility Goal Walk 10 Steps or More-6  -       Row Name 06/06/25 1019          Functional Assessment    Outcome Measure Options AM-PAC 6 Clicks Basic Mobility (PT)  -               User Key  (r) = Recorded By, (t) = Taken By, (c) = Cosigned By      Initials Name Provider Type     Valentina Miles PT Physical Therapist                                 Physical Therapy Education       Title: PT OT SLP Therapies (In Progress)       Topic: Physical Therapy (Done)       Point: Mobility training (Done)       Learning Progress Summary            Patient Acceptance, E, VU,DU,NR by  at 6/6/2025 1019    Acceptance, E, VU by CK at 6/5/2025 1642    Acceptance, E,D, VU,NR by ES at 6/4/2025 0922    Comment: NWB LLE  Demonstrated and practiced safe STS transfers with proper placement of knee scooter.    Acceptance, E, VU by ND at 6/3/2025 1706                      Point: Home exercise program (Done)       Learning Progress Summary            Patient Acceptance, E, VU,DU,NR by  at 6/6/2025 1019    Acceptance, E,D, VU,NR by ES at 6/4/2025 0922    Comment:  NWB LLE  Demonstrated and practiced safe STS transfers with proper placement of knee scooter.                      Point: Body mechanics (Done)       Learning Progress Summary            Patient Acceptance, E, VU,DU,NR by  at 6/6/2025 1019    Acceptance, E, VU by CK at 6/5/2025 1642    Acceptance, E,D, VU,NR by ES at 6/4/2025 0922    Comment: NWB LLE  Demonstrated and practiced safe STS transfers with proper placement of knee scooter.    Acceptance, E, VU by ND at 6/3/2025 1706                      Point: Precautions (Done)       Learning Progress Summary            Patient Acceptance, E, VU,DU,NR by  at 6/6/2025 1019    Acceptance, E, VU by CK at 6/5/2025 1642    Acceptance, E,D, VU,NR by ES at 6/4/2025 0922    Comment: NWB LLE  Demonstrated and practiced safe STS transfers with proper placement of knee scooter.    Acceptance, E, VU by ND at 6/3/2025 1706                                      User Key       Initials Effective Dates Name Provider Type Discipline     02/06/24 -  Brisa Kwong, PT Physical Therapist PT     09/21/23 -  Valentina Miles, PT Physical Therapist PT    ND 11/16/23 -  Boroke Hager, PT Physical Therapist PT     05/05/25 -  Key Cote, PT Student PT Student PT                  PT Recommendation and Plan     Progress: improving  Outcome Evaluation: Pt ambulated 350 ft using knee scooter w/ SBA. He demonstrates safe transfers and mobilization using knee scooter. Pt would continue to benefit from IP PT services while hospitalized to address deficits. Recommend home w/ assist and HHPT at d/c.     Time Calculation:         PT Charges       Row Name 06/06/25 1019             Time Calculation    Start Time 0855  -      PT Received On 06/06/25  -      PT Goal Re-Cert Due Date 06/13/25  -         Timed Charges    43616 - PT Therapeutic Activity Minutes 23  -         Total Minutes    Timed Charges Total Minutes 23  -       Total Minutes 23  -                User  Key  (r) = Recorded By, (t) = Taken By, (c) = Cosigned By      Initials Name Provider Type     Valentina Miles, PT Physical Therapist                  Therapy Charges for Today       Code Description Service Date Service Provider Modifiers Qty    [id number] HC PT THERAPEUTIC ACT EA 15 MIN 6/6/2025 Valentina Miles, PT GP 2            PT G-Codes  Outcome Measure Options: AM-PAC 6 Clicks Basic Mobility (PT)  AM-PAC 6 Clicks Score (PT): 18  AM-PAC 6 Clicks Score (OT): 24  PT Discharge Summary  Anticipated Discharge Disposition (PT): home with assist, home with home health    Valentina Miles, DORINDA  6/6/2025

## 2025-06-06 NOTE — PROGRESS NOTES
"IM progress note      Carlos Culver  [id number]  1945     LOS: 1 day     Attending: Mao Mckay MD    Primary Care Provider: Marcus Cheng MD      Chief Complaint/Reason for visit:  Left foot infection    Subjective   Doing well, no new complaints.     Objective      Visit Vitals  /84 (BP Location: Left arm, Patient Position: Lying)   Pulse 63   Temp 98.7 °F (37.1 °C) (Oral)   Resp 18   Ht 180.3 cm (71\")   Wt 97.5 kg (215 lb)   SpO2 96%   BMI 29.99 kg/m²     Temp (24hrs), Av.9 °F (36.6 °C), Min:96.4 °F (35.8 °C), Max:98.7 °F (37.1 °C)      Nutrition: PO    Respiratory: RA    Physical Therapy:    Goal Outcome Evaluation:  Plan of Care Reviewed With: patient  Progress: improving  Outcome Evaluation: Pt ambulated 350 ft using knee scooter w/ SBA. He demonstrates safe transfers and mobilization using knee scooter. Pt would continue to benefit from IP PT services while hospitalized to address deficits. Recommend home w/ assist and HHPT at d/c.     Anticipated Discharge Disposition (PT): home with assist, home with home health     Physical Exam:     General Appearance:    Alert, cooperative, in no acute distress   Head:    Normocephalic, without obvious abnormality, atraumatic    Lungs:     Normal effort, symmetric chest rise, no crepitus, clear to      auscultation bilaterally             Heart:    Regular rhythm and normal rate, normal S1 and S2   Abdomen:     Normal bowel sounds, no masses, no organomegaly, soft        non-tender, non-distended, no guarding, no rebound                tenderness   Extremities:   Left foot with wound vac.    Pulses:   Pulses palpable and equal bilaterally   Skin:   No bleeding, bruising or rash          Results Review:     I reviewed the patient's new clinical results.   Results from last 7 days   Lab Units 25  1206 25  0657   WBC 10*3/mm3 16.53* 23.06*   HEMOGLOBIN g/dL 11.8* 11.8*   HEMATOCRIT % 37.9 36.7*   PLATELETS 10*3/mm3 186 177 "     Results from last 7 days   Lab Units 06/05/25  1206 06/04/25  1106   SODIUM mmol/L 140 136   POTASSIUM mmol/L 4.0 4.5   CHLORIDE mmol/L 106 100   CO2 mmol/L 24.0 23.0   BUN mg/dL 19.2 19.5   CREATININE mg/dL 1.14 0.98   CALCIUM mg/dL 9.0 8.6   BILIRUBIN mg/dL 0.4 0.6   ALK PHOS U/L 93 81   ALT (SGPT) U/L 12 8   AST (SGOT) U/L 25 18   GLUCOSE mg/dL 134* 176*     I reviewed the patient's new imaging including images and reports.    All medications reviewed.   [Held by provider] apixaban, 5 mg, Oral, BID  atorvastatin, 80 mg, Oral, Nightly  folic acid, 1 mg, Oral, Daily  heparin (porcine), 5,000 Units, Subcutaneous, Q8H  metoprolol tartrate, 25 mg, Oral, BID  multivitamin, 1 tablet, Oral, Daily  piperacillin-tazobactam, 4.5 g, Intravenous, Q8H  sodium chloride, 10 mL, Intravenous, Q12H  sodium chloride, 10 mL, Intravenous, Q12H  thiamine, 100 mg, Oral, Daily  vitamin B-12, 1,000 mcg, Oral, Daily        Assessment & Plan     Abscess of left foot, status post I&D, exostectomy    Essential hypertension    Leukocytosis    Chronic anticoagulation    Neuropathy    CKD (chronic kidney disease) stage 2, GFR 60-89 ml/min    A-fib    S/P transmetatarsal amputation of foot, left    Alcohol abuse    History of Chopart amputation of left foot    Ulcer of left foot    Left foot infection      Plan  1. PT/OT- NWB LLE  2. Pain control-prns   3. IS-encouraged  4. DVT proph- mechs/heparin  5. Bowel regimen  6.  DC planning for home     -Atrial fibrillation: Resume beta-blocker.  Apixaban will resume when okay with orthopedic surgery.     - Alcohol abuse.  No history of withdrawal.no S/S of alcohol WD.  Thiamine, folic acid, and multivitamin ordered.     - Hypertension:  Resume home medications as appropriate, formulary substitution when indicated.  Holding parameters.  PRN medications for elevated blood pressure.     - Follow operative cultures, growing Staph aureus, postop antibiotics will be managed by Dr. Jeffery Young with  [facility].( Team covering)      Jose Antonio Coleman MD  06/06/25  13:47 EDT

## 2025-06-06 NOTE — PLAN OF CARE
Problem: Adult Inpatient Plan of Care  Goal: Plan of Care Review  Outcome: Progressing  Flowsheets (Taken 6/6/2025 0322)  Plan of Care Reviewed With: patient  Goal: Optimal Comfort and Wellbeing  Outcome: Progressing  Intervention: Provide Person-Centered Care  Recent Flowsheet Documentation  Taken 6/5/2025 2000 by Ana Rosa Delacruz RN  Trust Relationship/Rapport:   care explained   choices provided   emotional support provided   empathic listening provided   questions answered   questions encouraged   reassurance provided   thoughts/feelings acknowledged  Goal: Readiness for Transition of Care  Outcome: Progressing     Problem: Surgery Nonspecified  Goal: Absence of Bleeding  Outcome: Progressing  Intervention: Monitor and Manage Bleeding  Recent Flowsheet Documentation  Taken 6/5/2025 2000 by Ana Rosa Delacruz RN  Bleeding Management: dressing monitored  Goal: Effective Bowel Elimination  Outcome: Progressing  Goal: Optimal Pain Control and Function  Outcome: Progressing  Intervention: Prevent or Manage Pain  Recent Flowsheet Documentation  Taken 6/5/2025 2000 by Ana Rosa Delacruz RN  Diversional Activities:   smartphone   television  Goal: Effective Urinary Elimination  Outcome: Progressing     Problem: Fall Injury Risk  Goal: Absence of Fall and Fall-Related Injury  Outcome: Progressing  Intervention: Identify and Manage Contributors  Recent Flowsheet Documentation  Taken 6/5/2025 2000 by Ana Rosa Delacruz RN  Medication Review/Management: medications reviewed  Intervention: Promote Injury-Free Environment  Recent Flowsheet Documentation  Taken 6/6/2025 0300 by Ana Rosa Delacruz RN  Safety Promotion/Fall Prevention: activity supervised  Taken 6/6/2025 0200 by Ana Rosa Delacurz RN  Safety Promotion/Fall Prevention: safety round/check completed  Taken 6/6/2025 0000 by Ana Rosa Delacruz RN  Safety Promotion/Fall Prevention: safety round/check completed  Taken 6/5/2025 2200 by Ana Rosa Delacruz RN  Safety  Promotion/Fall Prevention: safety round/check completed  Taken 6/5/2025 2000 by Ana Rosa Delacruz RN  Safety Promotion/Fall Prevention:   assistive device/personal items within reach   clutter free environment maintained   fall prevention program maintained   gait belt   lighting adjusted   nonskid shoes/slippers when out of bed   room organization consistent   safety round/check completed   toileting scheduled   Goal Outcome Evaluation:  Plan of Care Reviewed With: patient

## 2025-06-06 NOTE — TELEPHONE ENCOUNTER
[facility] CALLED - CONFIRMED REFERRAL TO HOME HEALTH POST DISCHARGE, [facility] AUTH'D FOLLOWING HOME HEALTH CARE POST ORTHO PROCEDURE. THEY WILL FAX RELEVANT DOCUMENTS.

## 2025-06-06 NOTE — PROGRESS NOTES
"          Orthopaedic Surgery Progress Note    LOS: 1 day   Patient Care Team:  Marcus Cheng MD as PCP - General  Marcus Cheng MD as PCP - Family Medicine  Jeffery Young MD as Consulting Physician (Infectious Diseases)  Hang Anderson MD as Consulting Physician (Cardiology)  Mao Mckay MD as Surgeon (Orthopedic Surgery)  Maricruz Romero PA-C as Physician Assistant (Cardiology)  3 Days Post-Op   Procedure(s):  FOOT ABSCESS INCISION AND DRAINAGE, EXOSTECTOMY  Subjective     Interval History:   Resting in bed.  Denies pain.  Wound VAC in place yesterday.  No new complaints.    Objective     Vital Signs:  Temp (24hrs), Av.7 °F (36.5 °C), Min:96.4 °F (35.8 °C), Max:98.7 °F (37.1 °C)    BP (!) 155/106 (BP Location: Left arm, Patient Position: Lying)   Pulse 63   Temp 96.4 °F (35.8 °C) (Axillary)   Resp 18   Ht 180.3 cm (71\")   Wt 97.5 kg (215 lb)   SpO2 96%   BMI 29.99 kg/m²   Body mass index is 29.99 kg/m².    Labs:  Lab Results (last 24 hours)       Procedure Component Value Units Date/Time    Anaerobic Culture - Tissue, Foot, Left [[id number]]  (Normal) Collected: 25 1345    Specimen: Tissue from Foot, Left Updated: 25 0650     Anaerobic Culture No anaerobes isolated at 3 days    Tissue / Bone Culture - Tissue, Foot, Left [[id number]]  (Abnormal)  (Susceptibility) Collected: 25 1345    Specimen: Tissue from Foot, Left Updated: 25 0648     Tissue Culture Scant growth (1+) Staphylococcus aureus     Gram Stain Many (4+) WBCs seen      No organisms seen    Susceptibility        Staphylococcus aureus      KELIN      Clindamycin Resistant      Erythromycin Resistant      Oxacillin Susceptible      Rifampin Susceptible      Tetracycline Susceptible      Trimethoprim + Sulfamethoxazole Susceptible      Vancomycin Susceptible                       Susceptibility Comments       Staphylococcus aureus    This isolate is presumed to be clindamycin resistant based on " detection of inducible clindamycin resistance.  Clindamycin may still be effective in some patients.               CK [[id number]]  (Normal) Collected: 06/05/25 1206    Specimen: Blood Updated: 06/05/25 1334     Creatine Kinase 110 U/L     Comprehensive Metabolic Panel [[id number]]  (Abnormal) Collected: 06/05/25 1206    Specimen: Blood Updated: 06/05/25 1334     Glucose 134 mg/dL      BUN 19.2 mg/dL      Creatinine 1.14 mg/dL      Sodium 140 mmol/L      Potassium 4.0 mmol/L      Comment: Slight hemolysis detected by analyzer. Result may be falsely elevated.        Chloride 106 mmol/L      CO2 24.0 mmol/L      Calcium 9.0 mg/dL      Total Protein 6.9 g/dL      Albumin 3.6 g/dL      ALT (SGPT) 12 U/L      AST (SGOT) 25 U/L      Alkaline Phosphatase 93 U/L      Total Bilirubin 0.4 mg/dL      Globulin 3.3 gm/dL      Comment: Calculated Result        A/G Ratio 1.1 g/dL      BUN/Creatinine Ratio 16.8     Anion Gap 10.0 mmol/L      eGFR 65.0 mL/min/1.73     Narrative:      GFR Categories in Chronic Kidney Disease (CKD)              GFR Category          GFR (mL/min/1.73)    Interpretation  G1                    90 or greater        Normal or high (1)  G2                    60-89                Mild decrease (1)  G3a                   45-59                Mild to moderate decrease  G3b                   30-44                Moderate to severe decrease  G4                    15-29                Severe decrease  G5                    14 or less           Kidney failure    (1)In the absence of evidence of kidney disease, neither GFR category G1 or G2 fulfill the criteria for CKD.    eGFR calculation 2021 CKD-EPI creatinine equation, which does not include race as a factor    CBC & Differential [[id number]]  (Abnormal) Collected: 06/05/25 1206    Specimen: Blood Updated: 06/05/25 1252    Narrative:      The following orders were created for panel order CBC & Differential.  Procedure                               Abnormality          Status                     ---------                               -----------         ------                     CBC Auto Differential[[id number]]        Abnormal            Final result                 Please view results for these tests on the individual orders.    CBC Auto Differential [[id number]]  (Abnormal) Collected: 06/05/25 1206    Specimen: Blood Updated: 06/05/25 1252     WBC 16.53 10*3/mm3      RBC 3.83 10*6/mm3      Hemoglobin 11.8 g/dL      Hematocrit 37.9 %      MCV 99.0 fL      MCH 30.8 pg      MCHC 31.1 g/dL      RDW 14.6 %      RDW-SD 52.1 fl      MPV 9.9 fL      Platelets 186 10*3/mm3      Neutrophil % 81.9 %      Lymphocyte % 9.6 %      Monocyte % 7.6 %      Eosinophil % 0.0 %      Basophil % 0.2 %      Immature Grans % 0.7 %      Neutrophils, Absolute 13.53 10*3/mm3      Lymphocytes, Absolute 1.59 10*3/mm3      Monocytes, Absolute 1.26 10*3/mm3      Eosinophils, Absolute 0.00 10*3/mm3      Basophils, Absolute 0.03 10*3/mm3      Immature Grans, Absolute 0.12 10*3/mm3      nRBC 0.0 /100 WBC             Physical Exam:  left LE: Dressing clean dry and intact with no spotting left lower extremity, wound vacuum in place holding suction with scant serosanguineous drainage in canister. Compartments leg soft and compressible.    Assessment/Plan:  3 Days Post-Op status post:  Procedure(s):  FOOT ABSCESS INCISION AND DRAINAGE, EXOSTECTOMY    -NWB operative extremity  -Advance diet as tolerated  -Keep splint/operative dressing clean and dry  -DVT prophylaxis, mechanical and subq hep  -ID consult placed, appreciate recs, antibiotic recommendations per infectious disease  -Pain control  -Follow-up Intra-Op cultures, cultures showing s. aureus, previous wound swab showed Pseudomonas and Staph aureus  -Elevate operative extremity  -Consult medicine, appreciate recs  - PT wound care managing wound VAC  -Dispo planning    Mao Mckay MD  06/06/25  07:45 EDT

## 2025-06-07 ENCOUNTER — DOCUMENTATION (OUTPATIENT)
Dept: PHYSICAL THERAPY | Facility: HOSPITAL | Age: 80
End: 2025-06-07
Payer: MEDICARE

## 2025-06-07 PROCEDURE — A9270 NON-COVERED ITEM OR SERVICE: HCPCS | Performed by: INTERNAL MEDICINE

## 2025-06-07 PROCEDURE — 63710000001 MULTIVITAMIN TABLET: Performed by: INTERNAL MEDICINE

## 2025-06-07 PROCEDURE — 63710000001 THIAMINE 100 MG TABLET: Performed by: INTERNAL MEDICINE

## 2025-06-07 PROCEDURE — A9270 NON-COVERED ITEM OR SERVICE: HCPCS | Performed by: ORTHOPAEDIC SURGERY

## 2025-06-07 PROCEDURE — 63710000001 MELATONIN 5 MG TABLET: Performed by: ORTHOPAEDIC SURGERY

## 2025-06-07 PROCEDURE — 63710000001 OXYCODONE 5 MG TABLET: Performed by: ORTHOPAEDIC SURGERY

## 2025-06-07 PROCEDURE — 63710000001 METOPROLOL TARTRATE 25 MG TABLET: Performed by: ORTHOPAEDIC SURGERY

## 2025-06-07 PROCEDURE — 63710000001 FOLIC ACID 1 MG TABLET: Performed by: INTERNAL MEDICINE

## 2025-06-07 PROCEDURE — 97605 NEG PRS WND THER DME<=50SQCM: CPT

## 2025-06-07 PROCEDURE — 63710000001 APIXABAN 5 MG TABLET: Performed by: INTERNAL MEDICINE

## 2025-06-07 PROCEDURE — 25010000002 HEPARIN (PORCINE) PER 1000 UNITS: Performed by: ORTHOPAEDIC SURGERY

## 2025-06-07 PROCEDURE — 63710000001 VITAMIN B-12 1000 MCG TABLET: Performed by: ORTHOPAEDIC SURGERY

## 2025-06-07 PROCEDURE — 25010000002 CEFTRIAXONE PER 250 MG: Performed by: INTERNAL MEDICINE

## 2025-06-07 PROCEDURE — 63710000001 ATORVASTATIN 40 MG TABLET: Performed by: ORTHOPAEDIC SURGERY

## 2025-06-07 RX ADMIN — METOPROLOL TARTRATE 25 MG: 25 TABLET, FILM COATED ORAL at 20:44

## 2025-06-07 RX ADMIN — APIXABAN 5 MG: 5 TABLET, FILM COATED ORAL at 20:45

## 2025-06-07 RX ADMIN — MULTIVITAMIN TABLET 1 TABLET: TABLET at 08:28

## 2025-06-07 RX ADMIN — METOPROLOL TARTRATE 25 MG: 25 TABLET, FILM COATED ORAL at 08:28

## 2025-06-07 RX ADMIN — THIAMINE HCL TAB 100 MG 100 MG: 100 TAB at 08:28

## 2025-06-07 RX ADMIN — HEPARIN SODIUM 5000 UNITS: 5000 INJECTION INTRAVENOUS; SUBCUTANEOUS at 05:31

## 2025-06-07 RX ADMIN — SODIUM CHLORIDE 2000 MG: 900 INJECTION INTRAVENOUS at 15:43

## 2025-06-07 RX ADMIN — Medication 10 ML: at 08:29

## 2025-06-07 RX ADMIN — Medication 10 ML: at 20:46

## 2025-06-07 RX ADMIN — CYANOCOBALAMIN TAB 1000 MCG 1000 MCG: 1000 TAB at 08:28

## 2025-06-07 RX ADMIN — Medication 5 MG: at 20:44

## 2025-06-07 RX ADMIN — ATORVASTATIN CALCIUM 80 MG: 40 TABLET, FILM COATED ORAL at 20:44

## 2025-06-07 RX ADMIN — FOLIC ACID 1 MG: 1 TABLET ORAL at 08:28

## 2025-06-07 RX ADMIN — OXYCODONE 5 MG: 5 TABLET ORAL at 20:44

## 2025-06-07 NOTE — PLAN OF CARE
Problem: Adult Inpatient Plan of Care  Goal: Plan of Care Review  Outcome: Progressing  Flowsheets (Taken 6/7/2025 0415)  Plan of Care Reviewed With: patient  Goal: Optimal Comfort and Wellbeing  Outcome: Progressing  Intervention: Provide Person-Centered Care  Recent Flowsheet Documentation  Taken 6/6/2025 2000 by Ana Rosa Delacruz RN  Trust Relationship/Rapport:   care explained   choices provided   emotional support provided   empathic listening provided   questions answered   questions encouraged   reassurance provided   thoughts/feelings acknowledged  Goal: Readiness for Transition of Care  Outcome: Progressing     Problem: Surgery Nonspecified  Goal: Effective Bowel Elimination  Outcome: Progressing  Goal: Optimal Pain Control and Function  Outcome: Progressing  Intervention: Prevent or Manage Pain  Recent Flowsheet Documentation  Taken 6/6/2025 2000 by Ana Rosa Delacruz RN  Diversional Activities:   smartphone   television  Goal: Effective Urinary Elimination  Outcome: Progressing  Goal: Effective Oxygenation and Ventilation  Outcome: Progressing  Intervention: Optimize Oxygenation and Ventilation  Recent Flowsheet Documentation  Taken 6/7/2025 0400 by Ana Rosa Delacruz RN  Head of Bed (HOB) Positioning: HOB elevated  Taken 6/7/2025 0200 by Ana Rosa Delacruz RN  Head of Bed (HOB) Positioning: HOB elevated  Taken 6/7/2025 0000 by Ana Rosa Delacruz RN  Head of Bed (HOB) Positioning: HOB elevated  Taken 6/6/2025 2334 by Ana Rosa Delacruz RN  Activity Management:   ambulated to bathroom   back to bed  Taken 6/6/2025 2200 by Ana Rosa Delacruz RN  Head of Bed (HOB) Positioning: HOB elevated  Taken 6/6/2025 2000 by Ana Rosa Delacruz RN  Head of Bed (HOB) Positioning: HOB elevated     Problem: Fall Injury Risk  Goal: Absence of Fall and Fall-Related Injury  Outcome: Progressing  Intervention: Identify and Manage Contributors  Recent Flowsheet Documentation  Taken 6/6/2025 2000 by Ana Rosa Delacruz  RN  Medication Review/Management: medications reviewed  Intervention: Promote Injury-Free Environment  Recent Flowsheet Documentation  Taken 6/7/2025 0400 by Ana Rosa Delacruz RN  Safety Promotion/Fall Prevention: safety round/check completed  Taken 6/7/2025 0200 by Ana Rosa Delacruz RN  Safety Promotion/Fall Prevention: safety round/check completed  Taken 6/7/2025 0000 by Ana Rosa Delacruz RN  Safety Promotion/Fall Prevention: safety round/check completed  Taken 6/6/2025 2200 by Ana Rosa Delacruz RN  Safety Promotion/Fall Prevention: safety round/check completed  Taken 6/6/2025 2000 by Ana Rosa Delacruz RN  Safety Promotion/Fall Prevention:   assistive device/personal items within reach   clutter free environment maintained   fall prevention program maintained   nonskid shoes/slippers when out of bed   room organization consistent   safety round/check completed   toileting scheduled   Goal Outcome Evaluation:  Plan of Care Reviewed With: patient

## 2025-06-07 NOTE — THERAPY DISCHARGE NOTE
Outpatient Rehabilitation - Wound/Debridement   Discharge Summary       Patient Name: Carlos Culver  : 1945  MRN: [id number]  Today's Date: 2025                  Admit Date: (Not on file)    Visit Dx:  No diagnosis found.    Patient Active Problem List   Diagnosis    Cellulitis of left foot    GUCCI (acute kidney injury)    Essential hypertension    Leukocytosis    Osteomyelitis of left foot    Chronic anticoagulation    Neuropathy    Elevated transaminase level    CKD (chronic kidney disease) stage 2, GFR 60-89 ml/min    Skin ulcer of right heel    Chronic multifocal osteomyelitis of left foot    Contracture of joint of foot, left    MARTINEZ (dyspnea on exertion)    Neuropathic ulcer, limited to breakdown of skin    Hyperlipidemia LDL goal <100    A-fib    S/P transmetatarsal amputation of foot, left    Leukocytosis, likely reactive    Acute postoperative pain    Alcohol abuse    Staphylococcus aureus bacteremia    Acute osteomyelitis of left ankle or foot    History of Chopart amputation of left foot    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 evidence of necrosis    Ulcer of left foot    Left foot infection    Abscess of left foot, status post I&D, exostectomy        Past Medical History:   Diagnosis Date    A-fib     Acute kidney failure     Elevated cholesterol     Hard of hearing     Hyperlipidemia     Hypertension     Osteomyelitis     Renal disorder     Wears hearing aid in both ears     Wears reading eyeglasses         Past Surgical History:   Procedure Laterality Date    AMPUTATION DIGIT Left 2020    Procedure: AMPUTATION LEFT 2ND TOE;  Surgeon: Idania Osborn MD;  Location:  [facility];  Service: Orthopedics    AMPUTATION DIGIT Left 2020    Procedure: TRANSMETATARSAL AMPUTATION LEFT;  Surgeon: Idania Osborn MD;  Location:  [facility];  Service: Orthopedics;  Laterality: Left;    APPENDECTOMY      COLONOSCOPY  2006    FOOT SURGERY  Left 12/09/2020    (L) transmetatarsal amputation 12/09/2020 - Dr. Idania Osborn    INCISION AND DRAINAGE LEG Left 10/23/2018    Procedure: INCISION AND DRAINAGE LEFT FOOT;  Surgeon: Idania Osborn MD;  Location:  [facility];  Service: Orthopedics    INCISION AND DRAINAGE LEG Left 07/21/2023    Procedure: FOOT INCISION AND DRAINAGE; REVISION AMPUTATION; ACHILLES LENGTHENING -LEFT;  Surgeon: Mao Mckay MD;  Location:  [facility];  Service: Orthopedics;  Laterality: Left;    INCISION AND DRAINAGE LEG Left 6/3/2025    Procedure: FOOT ABSCESS INCISION AND DRAINAGE, EXOSTECTOMY;  Surgeon: Mao Mckay MD;  Location:  [facility];  Service: Orthopedics;  Laterality: Left;    TOE AMPUTATION      TONSILLECTOMY      VENOUS ACCESS DEVICE (PORT) INSERTION N/A 10/23/2018    Procedure: GROSHONG CATHETER PLACEMENT;  Surgeon: Marquez Simons MD;  Location:  [facility];  Service: General    VENOUS ACCESS DEVICE (PORT) INSERTION N/A 12/09/2020    Procedure: GROSHONG INSERTION;  Surgeon: Antonio Bains MD;  Location:  [facility];  Service: General;  Laterality: N/A;    VENOUS ACCESS DEVICE (PORT) REMOVAL           EVALUATION                WOUND DEBRIDEMENT                            Recommendation and Plan      Goals   PT OP Goals       Row Name 06/07/25 0700          PT Short Term Goals    STG 1 Patient to verbalize S&S of infection and when to seek medical attention.  -     STG 1 Progress Met  -     STG 2 Reduce LLE heel wound dimensions by 50% as evidence of wound healing.  -     STG 2 Progress Met  -     STG 3 Reduce LLE lateral foot wound dimensions by 50% as evidence of wound healing.  -     STG 3 Progress Met  -        Long Term Goals    LTG 1 Patient to be independent with home dressing changes and modified compression use.  -     LTG 1 Progress Not Met  -     LTG 2 Reduce LLE heel wound dimensions by 80% as evidence of wound healing.  -     LTG 2 Progress Not Met  -     LTG 3 Reduce LLE lateral  foot wound dimensions by 80% as evidence of wound healing.  -     LTG 3 Progress Met  -               User Key  (r) = Recorded By, (t) = Taken By, (c) = Cosigned By      Initials Name Provider Type    Melisa Lr PT Physical Therapist                    Time Calculation:               OP Discharge Summary       Row Name 06/07/25 0718             OP PT Discharge Summary    Date of Discharge 06/03/25  -      Reason for Discharge Transfer to other facility/level of care  -      Outcomes Achieved Patient able to partially acheive established goals  -      Discharge Instructions/Additional Comments Pt was hospitalized and had surgery on 6/3/25. Currently admitted with plans to discharge home with . Pt will require a new referral if he wants to continue with OP wound care after d/c from   -                User Key  (r) = Recorded By, (t) = Taken By, (c) = Cosigned By      Initials Name Provider Type    Melisa Lr PT Physical Therapist                    Melisa Dolan PT  6/7/2025

## 2025-06-07 NOTE — PROGRESS NOTES
"IM progress note      Carlos Culver  [id number]  1945     LOS: 1 day     Attending: Mao Mckay MD    Primary Care Provider: Marcus Cheng MD      Chief Complaint/Reason for visit:  Left foot infection    Subjective   Doing well, no new complaints.   No f/c/n/vom/sob.     Objective      Visit Vitals  /77 (BP Location: Left arm, Patient Position: Sitting)   Pulse 73   Temp 97.8 °F (36.6 °C) (Oral)   Resp 16   Ht 180.3 cm (71\")   Wt 97.5 kg (215 lb)   SpO2 96%   BMI 29.99 kg/m²     Temp (24hrs), Av.1 °F (36.7 °C), Min:97.8 °F (36.6 °C), Max:98.2 °F (36.8 °C)      Nutrition: PO    Respiratory: RA    Physical Therapy:    Goal Outcome Evaluation:  Plan of Care Reviewed With: patient  Progress: improving  Outcome Evaluation: Pt ambulated 350 ft using knee scooter w/ SBA. He demonstrates safe transfers and mobilization using knee scooter. Pt would continue to benefit from IP PT services while hospitalized to address deficits. Recommend home w/ assist and HHPT at d/c.     Anticipated Discharge Disposition (PT): home with assist, home with home health     Physical Exam:     General Appearance:    Alert, cooperative, in no acute distress   Head:    Normocephalic, without obvious abnormality, atraumatic    Lungs:     Normal effort, symmetric chest rise, no crepitus, clear to      auscultation bilaterally             Heart:    Regular rhythm and normal rate, normal S1 and S2   Abdomen:     Normal bowel sounds, no masses, no organomegaly, soft        non-tender, non-distended, no guarding, no rebound                tenderness   Extremities:   Left foot with wound vac.    Pulses:   Pulses palpable and equal bilaterally   Skin:   No bleeding, bruising or rash          Results Review:     I reviewed the patient's new clinical results.   Results from last 7 days   Lab Units 25  1206 25  0657   WBC 10*3/mm3 16.53* 23.06*   HEMOGLOBIN g/dL 11.8* 11.8*   HEMATOCRIT % 37.9 36.7*   PLATELETS " 10*3/mm3 186 177     Results from last 7 days   Lab Units 06/05/25  1206 06/04/25  1106   SODIUM mmol/L 140 136   POTASSIUM mmol/L 4.0 4.5   CHLORIDE mmol/L 106 100   CO2 mmol/L 24.0 23.0   BUN mg/dL 19.2 19.5   CREATININE mg/dL 1.14 0.98   CALCIUM mg/dL 9.0 8.6   BILIRUBIN mg/dL 0.4 0.6   ALK PHOS U/L 93 81   ALT (SGPT) U/L 12 8   AST (SGOT) U/L 25 18   GLUCOSE mg/dL 134* 176*     I reviewed the patient's new imaging including images and reports.    All medications reviewed.   [Held by provider] apixaban, 5 mg, Oral, BID  atorvastatin, 80 mg, Oral, Nightly  cefTRIAXone, 2,000 mg, Intravenous, Q24H  folic acid, 1 mg, Oral, Daily  heparin (porcine), 5,000 Units, Subcutaneous, Q8H  metoprolol tartrate, 25 mg, Oral, BID  multivitamin, 1 tablet, Oral, Daily  sodium chloride, 10 mL, Intravenous, Q12H  sodium chloride, 10 mL, Intravenous, Q12H  thiamine, 100 mg, Oral, Daily  vitamin B-12, 1,000 mcg, Oral, Daily        Assessment & Plan     Abscess of left foot, status post I&D, exostectomy    Essential hypertension    Leukocytosis    Chronic anticoagulation    Neuropathy    CKD (chronic kidney disease) stage 2, GFR 60-89 ml/min    A-fib    S/P transmetatarsal amputation of foot, left    Alcohol abuse    History of Chopart amputation of left foot    Ulcer of left foot    Left foot infection      Plan  1. PT/OT- NWB LLE  2. Pain control-prns   3. IS-encouraged  4. DVT proph- mechs/heparin  5. Bowel regimen  6.  DC planning for home     -Atrial fibrillation: Resume beta-blocker.  Apixaban will resume when okay with orthopedic surgery.     - Alcohol abuse.  No history of withdrawal.no S/S of alcohol WD.  Thiamine, folic acid, and multivitamin ordered.     - Hypertension:  Resume home medications as appropriate, formulary substitution when indicated.  Holding parameters.  PRN medications for elevated blood pressure.     - Continue ceftriaxone daily as ordered by ID, plan for home infusion at time of discharge.    Discharge  home likely Monday after wound VAC has been approved.    Jose Antonio Coleman MD  06/07/25  13:50 EDT

## 2025-06-07 NOTE — PROGRESS NOTES
"          Orthopaedic Surgery Progress Note    LOS: 1 day   Patient Care Team:  Marcus Cheng MD as PCP - General  Marcus Cheng MD as PCP - Family Medicine  Jeffery Young MD as Consulting Physician (Infectious Diseases)  Hang Anderson MD as Consulting Physician (Cardiology)  Mao Mckay MD as Surgeon (Orthopedic Surgery)  Maricruz Romero PA-C as Physician Assistant (Cardiology)  4 Days Post-Op   Procedure(s):  FOOT ABSCESS INCISION AND DRAINAGE, EXOSTECTOMY  Subjective     Interval History:   Sitting in chair. Denies pain. Waiting for insurance approval for home wound vac. No new complaints.    Objective     Vital Signs:  Temp (24hrs), Av.1 °F (36.7 °C), Min:97.8 °F (36.6 °C), Max:98.2 °F (36.8 °C)    /77 (BP Location: Left arm, Patient Position: Sitting)   Pulse 73   Temp 97.8 °F (36.6 °C) (Oral)   Resp 16   Ht 180.3 cm (71\")   Wt 97.5 kg (215 lb)   SpO2 96%   BMI 29.99 kg/m²   Body mass index is 29.99 kg/m².    Labs:  Lab Results (last 24 hours)       ** No results found for the last 24 hours. **            Physical Exam:  left LE: Dressing clean dry and intact with no spotting left lower extremity, wound vacuum in place holding suction with scant serosanguineous drainage in canister. Compartments leg soft and compressible.    Assessment/Plan:  4 Days Post-Op status post:  Procedure(s):  FOOT ABSCESS INCISION AND DRAINAGE, EXOSTECTOMY    -NWB operative extremity  -Advance diet as tolerated  -Keep dressing clean and dry  -DVT prophylaxis, mechanical and subq hep  -ID consult placed, appreciate recs, antibiotic recommendations per infectious disease  -Pain control  -Follow-up Intra-Op cultures, cultures showing s. aureus, previous wound swab showed Pseudomonas and Staph aureus  -Elevate operative extremity  -Consult medicine, appreciate recs  - PT wound care managing wound VAC  -Dispo planning, likely home Monday with home wound vac and PT wound care follow " up    Mao Mckay MD  06/07/25  09:08 EDT

## 2025-06-08 LAB — BACTERIA SPEC ANAEROBE CULT: NORMAL

## 2025-06-08 PROCEDURE — 63710000001 FOLIC ACID 1 MG TABLET: Performed by: INTERNAL MEDICINE

## 2025-06-08 PROCEDURE — A9270 NON-COVERED ITEM OR SERVICE: HCPCS | Performed by: ORTHOPAEDIC SURGERY

## 2025-06-08 PROCEDURE — A9270 NON-COVERED ITEM OR SERVICE: HCPCS | Performed by: INTERNAL MEDICINE

## 2025-06-08 PROCEDURE — 63710000001 VITAMIN B-12 1000 MCG TABLET: Performed by: ORTHOPAEDIC SURGERY

## 2025-06-08 PROCEDURE — 63710000001 ATORVASTATIN 40 MG TABLET: Performed by: ORTHOPAEDIC SURGERY

## 2025-06-08 PROCEDURE — 97597 DBRDMT OPN WND 1ST 20 CM/<: CPT

## 2025-06-08 PROCEDURE — 25010000002 CEFTRIAXONE PER 250 MG: Performed by: INTERNAL MEDICINE

## 2025-06-08 PROCEDURE — 63710000001 APIXABAN 5 MG TABLET: Performed by: INTERNAL MEDICINE

## 2025-06-08 PROCEDURE — 63710000001 THIAMINE 100 MG TABLET: Performed by: INTERNAL MEDICINE

## 2025-06-08 PROCEDURE — 97605 NEG PRS WND THER DME<=50SQCM: CPT

## 2025-06-08 PROCEDURE — 63710000001 METOPROLOL TARTRATE 25 MG TABLET: Performed by: ORTHOPAEDIC SURGERY

## 2025-06-08 PROCEDURE — 63710000001 MULTIVITAMIN TABLET: Performed by: INTERNAL MEDICINE

## 2025-06-08 RX ADMIN — Medication 10 ML: at 20:31

## 2025-06-08 RX ADMIN — APIXABAN 5 MG: 5 TABLET, FILM COATED ORAL at 08:21

## 2025-06-08 RX ADMIN — Medication 10 ML: at 08:25

## 2025-06-08 RX ADMIN — MULTIVITAMIN TABLET 1 TABLET: TABLET at 08:21

## 2025-06-08 RX ADMIN — APIXABAN 5 MG: 5 TABLET, FILM COATED ORAL at 20:25

## 2025-06-08 RX ADMIN — CYANOCOBALAMIN TAB 1000 MCG 1000 MCG: 1000 TAB at 08:20

## 2025-06-08 RX ADMIN — METOPROLOL TARTRATE 25 MG: 25 TABLET, FILM COATED ORAL at 08:21

## 2025-06-08 RX ADMIN — SODIUM CHLORIDE 2000 MG: 900 INJECTION INTRAVENOUS at 14:48

## 2025-06-08 RX ADMIN — FOLIC ACID 1 MG: 1 TABLET ORAL at 08:21

## 2025-06-08 RX ADMIN — THIAMINE HCL TAB 100 MG 100 MG: 100 TAB at 08:21

## 2025-06-08 RX ADMIN — ATORVASTATIN CALCIUM 80 MG: 40 TABLET, FILM COATED ORAL at 20:25

## 2025-06-08 RX ADMIN — METOPROLOL TARTRATE 25 MG: 25 TABLET, FILM COATED ORAL at 20:25

## 2025-06-08 NOTE — PLAN OF CARE
Goal Outcome Evaluation:           Progress: no change     Patient is A&Ox4 on RA. VSS. C/o pain treated with prn PO medication. NPWV in place. Patient ambulates to the bathroom with knee scooter and assist x1. Several bowel movement this shift.

## 2025-06-08 NOTE — PLAN OF CARE
Goal Outcome Evaluation:  Plan of Care Reviewed With: patient        Progress: no change  Outcome Evaluation: Plantar and heel ulcerations appear to be improving, with moist, pink/red granulation. Pt with small, red area to the anterior ankle (over the bony protrusion) that is sluggishly blanchable today. The lateral area is moist and has mixed tissues present, with bone palpable and some necrotic tissue visible. Pt will continue to benefit from NPWT to manage exudate and promote granulation tissue formation. Anticipate change in 2-3 days.

## 2025-06-08 NOTE — PROGRESS NOTES
"      Orthopaedic Surgery Progress Note    CC: Ready to go home      Subjective     Interval History:   Patient is an 79-year-old male under the care of Dr. Mckay status post I&D left foot abscess and exostectomy.  Patient is currently receiving wound VAC treatment and IV antibiotics.  Tolerating wound VAC well.  Having no troubles with his PICC line.  Eager to go home.      ROS: Denies fever, chills, nausea or vomiting    Objective     Vital Signs:  Temp (24hrs), Av.9 °F (36.6 °C), Min:97.7 °F (36.5 °C), Max:98 °F (36.7 °C)    /75 (BP Location: Right arm, Patient Position: Lying)   Pulse 73   Temp 97.9 °F (36.6 °C) (Oral)   Resp 16   Ht 180.3 cm (71\")   Wt 97.5 kg (215 lb)   SpO2 96%   BMI 29.99 kg/m²       Physical Exam:  Patient's dressing is in place.  His wound VAC is in place and functioning properly.    Assessment and Plan:  -NWB LLE  -Advance diet as tolerated  -Keep dressing clean and dry  -DVT prophylaxis, mechanical and subq hep  -antibiotic recommendations per infectious disease  -Pain control  -cultures showing s. aureus, previous wound swab showed Pseudomonas and Staph aureus  -Elevate operative extremity  - PT wound care managing wound VAC  -Dispo planning, likely home Monday with home wound vac and PT wound care follow up    Samm Loredo MD  25  08:41 EDT        "

## 2025-06-08 NOTE — PROGRESS NOTES
"IM progress note      Carlos Culver  [id number]  1945     LOS: 1 day     Attending: Mao Mckay MD    Primary Care Provider: Marcus Cheng MD      Chief Complaint/Reason for visit:  Left foot infection    Subjective   Doing well, no new complaints.   No f/c/n/vom/sob.     Objective      Visit Vitals  /68 (BP Location: Left arm, Patient Position: Sitting)   Pulse 73   Temp 97.7 °F (36.5 °C) (Oral)   Resp 16   Ht 180.3 cm (71\")   Wt 97.5 kg (215 lb)   SpO2 96%   BMI 29.99 kg/m²     Temp (24hrs), Av.9 °F (36.6 °C), Min:97.7 °F (36.5 °C), Max:98 °F (36.7 °C)      Nutrition: PO    Respiratory: RA    Physical Therapy:    Goal Outcome Evaluation:  Plan of Care Reviewed With: patient  Progress: improving  Outcome Evaluation: Pt ambulated 350 ft using knee scooter w/ SBA. He demonstrates safe transfers and mobilization using knee scooter. Pt would continue to benefit from IP PT services while hospitalized to address deficits. Recommend home w/ assist and HHPT at d/c.     Anticipated Discharge Disposition (PT): home with assist, home with home health         Goal Outcome Evaluation:  Plan of Care Reviewed With: patient  Progress: no change  Outcome Evaluation: Plantar and heel ulcerations appear to be improving, with moist, pink/red granulation. Pt with small, red area to the anterior ankle (over the bony protrusion) that is sluggishly blanchable today. The lateral area is moist and has mixed tissues present, with bone palpable and some necrotic tissue visible. Pt will continue to benefit from NPWT to manage exudate and promote granulation tissue formation. Anticipate change in 2-3 days.           Physical Exam:     General Appearance:    Alert, cooperative, in no acute distress   Head:    Normocephalic, without obvious abnormality, atraumatic    Lungs:     Normal effort, symmetric chest rise, no crepitus, clear to      auscultation bilaterally             Heart:    Regular rhythm and normal " rate, normal S1 and S2   Abdomen:     Normal bowel sounds, no masses, no organomegaly, soft        non-tender, non-distended, no guarding, no rebound                tenderness   Extremities:   Left foot with wound vac.    Pulses:   Pulses palpable and equal bilaterally   Skin:   No bleeding, bruising or rash          Results Review:     I reviewed the patient's new clinical results.   Results from last 7 days   Lab Units 06/05/25  1206 06/04/25  0657   WBC 10*3/mm3 16.53* 23.06*   HEMOGLOBIN g/dL 11.8* 11.8*   HEMATOCRIT % 37.9 36.7*   PLATELETS 10*3/mm3 186 177     Results from last 7 days   Lab Units 06/05/25  1206 06/04/25  1106   SODIUM mmol/L 140 136   POTASSIUM mmol/L 4.0 4.5   CHLORIDE mmol/L 106 100   CO2 mmol/L 24.0 23.0   BUN mg/dL 19.2 19.5   CREATININE mg/dL 1.14 0.98   CALCIUM mg/dL 9.0 8.6   BILIRUBIN mg/dL 0.4 0.6   ALK PHOS U/L 93 81   ALT (SGPT) U/L 12 8   AST (SGOT) U/L 25 18   GLUCOSE mg/dL 134* 176*     I reviewed the patient's new imaging including images and reports.    All medications reviewed.   apixaban, 5 mg, Oral, BID  atorvastatin, 80 mg, Oral, Nightly  cefTRIAXone, 2,000 mg, Intravenous, Q24H  folic acid, 1 mg, Oral, Daily  metoprolol tartrate, 25 mg, Oral, BID  multivitamin, 1 tablet, Oral, Daily  sodium chloride, 10 mL, Intravenous, Q12H  sodium chloride, 10 mL, Intravenous, Q12H  thiamine, 100 mg, Oral, Daily  vitamin B-12, 1,000 mcg, Oral, Daily        Assessment & Plan     Abscess of left foot, status post I&D, exostectomy    Essential hypertension    Leukocytosis    Chronic anticoagulation    Neuropathy    CKD (chronic kidney disease) stage 2, GFR 60-89 ml/min    A-fib    S/P transmetatarsal amputation of foot, left    Alcohol abuse    History of Chopart amputation of left foot    Ulcer of left foot    Left foot infection      Plan  1. PT/OT- NWB LLE  2. Pain control-prns   3. IS-encouraged  4. DVT proph- mechs/heparin  5. Bowel regimen  6.  DC planning for home     -Atrial  fibrillation: Resume beta-blocker.  Apixaban has been resumed     - Alcohol abuse.  No history of withdrawal.no S/S of alcohol WD.       - Hypertension:  Resume home medications as appropriate, formulary substitution when indicated.  Holding parameters.  PRN medications for elevated blood pressure.     - Continue ceftriaxone daily as ordered by ID, plan for home infusion at time of discharge.    Discharge home when wound VAC has been approved.    Jose Antonio Coleman MD  06/08/25  16:48 EDT

## 2025-06-08 NOTE — PLAN OF CARE
Goal Outcome Evaluation:      Pt resting in chair without complaint.  Able to ambulate well with knee scooter.  BM have been controlled today and pt in good spirits.  Will cont to monitor.

## 2025-06-08 NOTE — THERAPY WOUND CARE TREATMENT
Acute Care - Wound/Debridement Treatment Note  [facility]     Patient Name: Carlos Culver  : 1945  MRN: [id number]  Today's Date: 2025                              Admit Date: 6/3/2025    Visit Dx:    ICD-10-CM ICD-9-CM   1. Abscess of left foot, status post I&D, exostectomy  L02.612 682.7   2. Ulcer of left foot, unspecified ulcer stage  L97.529 707.15       Patient Active Problem List   Diagnosis    Cellulitis of left foot    GUCCI (acute kidney injury)    Essential hypertension    Leukocytosis    Osteomyelitis of left foot    Chronic anticoagulation    Neuropathy    Elevated transaminase level    CKD (chronic kidney disease) stage 2, GFR 60-89 ml/min    Skin ulcer of right heel    Chronic multifocal osteomyelitis of left foot    Contracture of joint of foot, left    MARTINEZ (dyspnea on exertion)    Neuropathic ulcer, limited to breakdown of skin    Hyperlipidemia LDL goal <100    A-fib    S/P transmetatarsal amputation of foot, left    Leukocytosis, likely reactive    Acute postoperative pain    Alcohol abuse    Staphylococcus aureus bacteremia    Acute osteomyelitis of left ankle or foot    History of Chopart amputation of left foot    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 evidence of necrosis    Ulcer of left foot    Left foot infection    Abscess of left foot, status post I&D, exostectomy        Past Medical History:   Diagnosis Date    A-fib     Acute kidney failure     Elevated cholesterol     Hard of hearing     Hyperlipidemia     Hypertension     Osteomyelitis     Renal disorder     Wears hearing aid in both ears     Wears reading eyeglasses         Past Surgical History:   Procedure Laterality Date    AMPUTATION DIGIT Left 2020    Procedure: AMPUTATION LEFT 2ND TOE;  Surgeon: Idania Osborn MD;  Location: [facility];  Service: Orthopedics    AMPUTATION DIGIT Left 2020    Procedure: TRANSMETATARSAL AMPUTATION LEFT;  Surgeon:  Idania Osborn MD;  Location:  [facility];  Service: Orthopedics;  Laterality: Left;    APPENDECTOMY      COLONOSCOPY  2006    FOOT SURGERY Left 12/09/2020    (L) transmetatarsal amputation 12/09/2020 - Dr. Idania Osborn    INCISION AND DRAINAGE LEG Left 10/23/2018    Procedure: INCISION AND DRAINAGE LEFT FOOT;  Surgeon: Idania Osborn MD;  Location:  [facility];  Service: Orthopedics    INCISION AND DRAINAGE LEG Left 07/21/2023    Procedure: FOOT INCISION AND DRAINAGE; REVISION AMPUTATION; ACHILLES LENGTHENING -LEFT;  Surgeon: Mao Mckay MD;  Location:  [facility];  Service: Orthopedics;  Laterality: Left;    INCISION AND DRAINAGE LEG Left 6/3/2025    Procedure: FOOT ABSCESS INCISION AND DRAINAGE, EXOSTECTOMY;  Surgeon: Mao Mckay MD;  Location:  [facility];  Service: Orthopedics;  Laterality: Left;    TOE AMPUTATION      TONSILLECTOMY      VENOUS ACCESS DEVICE (PORT) INSERTION N/A 10/23/2018    Procedure: GROSHONG CATHETER PLACEMENT;  Surgeon: Marquez Simons MD;  Location:  [facility];  Service: General    VENOUS ACCESS DEVICE (PORT) INSERTION N/A 12/09/2020    Procedure: GROSHONG INSERTION;  Surgeon: Antonio Bains MD;  Location:  [facility];  Service: General;  Laterality: N/A;    VENOUS ACCESS DEVICE (PORT) REMOVAL             Wound 05/08/25 Left lateral foot Surgical (Active)   Wound Image   06/08/25 1147   Dressing Appearance intact;moist drainage 06/08/25 1147   Dressing Removed Type other (see comments) 06/08/25 1147   Confirmed Empty Wound Bed Yes, visual inspection of wound bed;Yes, finger sweep performed 06/08/25 1147   Closure Unable to assess 06/07/25 2044   Base exposed structure;moist;pink;red;yellow;subcutaneous;white;necrotic 06/08/25 1147   Periwound intact;dry;other (see comments) 06/08/25 1147   Periwound Temperature warm 06/08/25 1147   Periwound Skin Turgor soft 06/08/25 1147   Edges irregular 06/08/25 1147   Drainage Characteristics/Odor serosanguineous 06/08/25 1147   Drainage  Amount small 06/08/25 1147   Care, Wound cleansed with;wound cleanser;debrided;negative pressure wound therapy 06/08/25 1147   Dressing Care dressing changed 06/08/25 1147   Periwound Care cleansed with pH balanced cleanser;barrier film applied;other (see comments) 06/08/25 1147       Wound 06/04/25 1123 Right lower leg Other (Comments) (Active)   Dressing Appearance dry;intact 06/08/25 1000   Closure Adhesive bandage 06/08/25 1000   Base unable to visualize 06/07/25 2044   Dressing Care border dressing 06/08/25 1000       Wound 06/05/25 0830 Left heel (Active)   Wound Image    06/08/25 1147   Dressing Appearance dry;intact 06/08/25 1147   Dressing Removed Type other (see comments) 06/08/25 1147   Confirmed Empty Wound Bed Yes, visual inspection of wound bed 06/08/25 1147   Closure Adhesive bandage;Unable to assess 06/08/25 1000   Base clean;pink;moist;red 06/08/25 1147   Periwound intact;dry 06/08/25 1147   Periwound Temperature warm 06/08/25 1147   Periwound Skin Turgor soft 06/08/25 1147   Edges irregular 06/08/25 1147   Drainage Characteristics/Odor serosanguineous 06/08/25 1147   Drainage Amount scant 06/08/25 1147   Care, Wound cleansed with;wound cleanser;debrided;negative pressure wound therapy 06/08/25 1147   Dressing Care dressing changed 06/08/25 1147   Periwound Care cleansed with pH balanced cleanser;barrier film applied 06/08/25 1147       Wound 06/05/25 0830 Left  foot Neuropathic Ulcer (Active)   Wound Image   06/08/25 1147   Dressing Appearance dry;intact 06/08/25 1147   Dressing Removed Type other (see comments) 06/08/25 1147   Confirmed Empty Wound Bed Yes, visual inspection of wound bed 06/08/25 1147   Closure Unable to assess 06/08/25 1000   Base moist;pink;red 06/08/25 1147   Periwound intact;dry 06/08/25 1147   Periwound Temperature warm 06/08/25 1147   Periwound Skin Turgor soft 06/08/25 1147   Edges irregular 06/08/25 1147   Drainage Characteristics/Odor serosanguineous 06/08/25 1141    Drainage Amount small 06/08/25 1147   Care, Wound cleansed with;wound cleanser;debrided;negative pressure wound therapy 06/08/25 1147   Dressing Care dressing changed 06/08/25 1147   Periwound Care cleansed with pH balanced cleanser;dry periwound area maintained;barrier film applied 06/08/25 1147         WOUND DEBRIDEMENT  Total area of Debridement: 4 cm2  Debridement Site 1  Location- Site 1: L lateral foot wound  Selective Debridement- Site 1: Wound Surface <20cmsq  Instruments- Site 1: tweezers  Excised Tissue Description- Site 1: minimum, slough, necrotic  Bleeding- Site 1: none   Debridement Site 2  Location- Site 2: L foot wounds  Selective Debridement- Site 2: Wound Surface <20cmsq  Instruments- Site 2: tweezers  Excised Tissue Description- Site 2: minimum, slough  Bleeding- Site 2: none           PT Assessment (Last 12 Hours)       PT Evaluation and Treatment       Row Name 06/08/25 1147          Physical Therapy Time and Intention    Subjective Information no complaints  -     Document Type wound care;therapy note (daily note)  -     Mode of Treatment physical therapy;individual therapy  -       Row Name 06/08/25 1147          General Information    Patient Observations alert;cooperative;agree to therapy  -       Row Name 06/08/25 1147          Pain    Pretreatment Pain Rating 0/10 - no pain  -     Posttreatment Pain Rating 0/10 - no pain  -       Row Name 06/08/25 1147          Cognition    Orientation Status (Cognition) oriented x 4  -MC       Row Name 06/08/25 1147          Wound 05/08/25 Left lateral foot Surgical    Wound - Properties Group Placement Date: 05/08/25  -KW Side: Left  -KW Orientation: lateral  -KW Location: foot  -KW Primary Wound Type: Surgical  -MF    Wound Image Images linked: 1  -MC     Dressing Appearance intact;moist drainage  -MC     Dressing Removed Type other (see comments)  vac  -MC     Confirmed Empty Wound Bed Yes, visual inspection of wound bed;Yes, finger sweep  performed  -     Base exposed structure;moist;pink;red;yellow;subcutaneous;white;necrotic  -     Periwound intact;dry;other (see comments)  small area of discoloration to anterior foot/ankle  -     Periwound Temperature warm  -     Periwound Skin Turgor soft  -     Edges irregular  -     Drainage Characteristics/Odor serosanguineous  -     Drainage Amount small  -     Care, Wound cleansed with;wound cleanser;debrided;negative pressure wound therapy  -     Dressing Care dressing changed  vac, kerlix, ace  -     Periwound Care cleansed with pH balanced cleanser;barrier film applied;other (see comments)  NoSting, drape border  -     Retired Wound - Properties Group Placement Date: 05/08/25  -KW Side: Left  -KW Orientation: lateral  -KW Location: foot  -KW    Retired Wound - Properties Group Placement Date: 05/08/25  -KW Side: Left  -KW Orientation: lateral  -KW Location: foot  -KW    Retired Wound - Properties Group Date first assessed: 05/08/25  -KW Side: Left  -KW Location: foot  -KW      Row Name             Wound 06/04/25 1123 Right lower leg Other (Comments)    Wound - Properties Group Placement Date: 06/04/25  -ALISA Placement Time: 1123  -ALISA Present on Original Admission: Y  -ALISA Side: Right  -ALISA Orientation: lower  -ALISA Location: leg  -ALISA Primary Wound Type: Other  -ALISA, Black circular scab - pt reports being there for a long time     Retired Wound - Properties Group Placement Date: 06/04/25  -ALISA Placement Time: 1123  -ALISA Present on Original Admission: Y  -ALISA Side: Right  -ALISA Orientation: lower  -ALISA Location: leg  -ALISA    Retired Wound - Properties Group Placement Date: 06/04/25  -ALISA Placement Time: 1123  -ALISA Present on Original Admission: Y  -ALISA Side: Right  -ALISA Orientation: lower  -ALISA Location: leg  -ALISA    Retired Wound - Properties Group Date first assessed: 06/04/25  -ALISA Time first assessed: 1123  -ALISA Present on Original Admission: Y  -ALISA Side: Right  -ALISA Location: leg  -ALISA      Row Name  06/08/25 1147          Wound 06/05/25 0830 Left heel    Wound - Properties Group Placement Date: 06/05/25  - Placement Time: 0830  -MF Present on Original Admission: Y  -MF Side: Left  -MF Location: heel  -MF    Wound Image Images linked: 2  -MC     Dressing Appearance dry;intact  -MC     Dressing Removed Type other (see comments)  vac  -MC     Confirmed Empty Wound Bed Yes, visual inspection of wound bed  -MC     Base clean;pink;moist;red  -MC     Periwound intact;dry  -     Periwound Temperature warm  -     Periwound Skin Turgor soft  -     Edges irregular  -     Drainage Characteristics/Odor serosanguineous  -     Drainage Amount scant  -     Care, Wound cleansed with;wound cleanser;debrided;negative pressure wound therapy  -     Dressing Care dressing changed  -     Periwound Care cleansed with pH balanced cleanser;barrier film applied  -     Retired Wound - Properties Group Placement Date: 06/05/25  - Placement Time: 0830  -MF Present on Original Admission: Y  -MF Side: Left  -MF Location: heel  -MF    Retired Wound - Properties Group Placement Date: 06/05/25  - Placement Time: 0830  -MF Present on Original Admission: Y  -MF Side: Left  -MF Location: heel  -MF    Retired Wound - Properties Group Date first assessed: 06/05/25  - Time first assessed: 0830  -MF Present on Original Admission: Y  -MF Side: Left  -MF Location: heel  -MF      Row Name 06/08/25 1147          Wound 06/05/25 0830 Left  foot Neuropathic Ulcer    Wound - Properties Group Placement Date: 06/05/25  - Placement Time: 0830  -MF Present on Original Admission: Y  -MF Side: Left  -MF Orientation: --  -MF, plantar  Location: foot  -MF Primary Wound Type: Neuropathic  -MF    Wound Image Images linked: 1  -MC     Dressing Appearance dry;intact  -MC     Dressing Removed Type other (see comments)  vac  -MC     Confirmed Empty Wound Bed Yes, visual inspection of wound bed  -MC     Base moist;pink;red  -MC     Periwound  intact;dry  -     Periwound Temperature warm  -     Periwound Skin Turgor soft  -     Edges irregular  -     Drainage Characteristics/Odor serosanguineous  -     Drainage Amount small  -     Care, Wound cleansed with;wound cleanser;debrided;negative pressure wound therapy  -     Dressing Care dressing changed  vac  -     Periwound Care cleansed with pH balanced cleanser;dry periwound area maintained;barrier film applied  -     Retired Wound - Properties Group Placement Date: 06/05/25  - Placement Time: 0830  -MF Present on Original Admission: Y  -MF Side: Left  -MF Orientation: --  -MF, plantar  Location: foot  -MF    Retired Wound - Properties Group Placement Date: 06/05/25  - Placement Time: 0830  -MF Present on Original Admission: Y  -MF Side: Left  -MF Orientation: --  -MF, plantar  Location: foot  -MF    Retired Wound - Properties Group Date first assessed: 06/05/25  - Time first assessed: 0830  -MF Present on Original Admission: Y  -MF Side: Left  - Location: foot  -      Row Name 06/08/25 1147          Coping    Observed Emotional State calm;cooperative  -     Verbalized Emotional State acceptance  -       Row Name 06/08/25 1147          Plan of Care Review    Plan of Care Reviewed With patient  -     Progress no change  -     Outcome Evaluation Plantar and heel ulcerations appear to be improving, with moist, pink/red granulation. Pt with small, red area to the anterior ankle (over the bony protrusion) that is sluggishly blanchable today. The lateral area is moist and has mixed tissues present, with bone palpable and some necrotic tissue visible. Pt will continue to benefit from NPWT to manage exudate and promote granulation tissue formation. Anticipate change in 2-3 days.  -       Row Name 06/08/25 1147          Positioning and Restraints    Pre-Treatment Position sitting in chair/recliner  -     Post Treatment Position chair  -     In Chair reclined;call light  within reach;encouraged to call for assist  -               User Key  (r) = Recorded By, (t) = Taken By, (c) = Cosigned By      Initials Name Provider Type    Koko Reynolds, PT Physical Therapist    Melisa Lr, PT Physical Therapist    Melissa Casiano, PT Physical Therapist    Huy Perdue, RN Registered Nurse                  Physical Therapy Education       Title: PT OT SLP Therapies (In Progress)       Topic: Physical Therapy (Done)       Point: Mobility training (Done)       Learning Progress Summary            Patient Acceptance, E, VU,DU,NR by  at 6/6/2025 1019    Acceptance, E, VU by CK at 6/5/2025 1642    Acceptance, E,D, VU,NR by ES at 6/4/2025 0922    Comment: NWB LLE  Demonstrated and practiced safe STS transfers with proper placement of knee scooter.    Acceptance, E, VU by ND at 6/3/2025 1706                      Point: Home exercise program (Done)       Learning Progress Summary            Patient Acceptance, E, VU,DU,NR by  at 6/6/2025 1019    Acceptance, E,D, VU,NR by ES at 6/4/2025 0922    Comment: NWB LLE  Demonstrated and practiced safe STS transfers with proper placement of knee scooter.                      Point: Body mechanics (Done)       Learning Progress Summary            Patient Acceptance, E, VU,DU,NR by  at 6/6/2025 1019    Acceptance, E, VU by CK at 6/5/2025 1642    Acceptance, E,D, VU,NR by ES at 6/4/2025 0922    Comment: NWB LLE  Demonstrated and practiced safe STS transfers with proper placement of knee scooter.    Acceptance, E, VU by ND at 6/3/2025 1706                      Point: Precautions (Done)       Learning Progress Summary            Patient Acceptance, E, VU,DU,NR by  at 6/6/2025 1019    Acceptance, E, VU by CK at 6/5/2025 1642    Acceptance, E,D, VU,NR by ES at 6/4/2025 0922    Comment: NWB LLE  Demonstrated and practiced safe STS transfers with proper placement of knee scooter.    Acceptance, E, VU by ND at 6/3/2025 1706                                       User Key       Initials Effective Dates Name Provider Type Discipline    CK 02/06/24 -  Brisa Kwong, PT Physical Therapist PT    LH 09/21/23 -  Valentina Miles, PT Physical Therapist PT    ND 11/16/23 -  Brooke Hager, PT Physical Therapist PT    ES 05/05/25 -  Key Cote, PT Student PT Student PT                    Recommendation and Plan  Anticipated Discharge Disposition (PT): home with assist, home with home health  Planned Therapy Interventions (PT): wound care, patient/family education  Therapy Frequency (PT): daily  Plan of Care Reviewed With: patient   Progress: no change       Progress: no change  Outcome Evaluation: Plantar and heel ulcerations appear to be improving, with moist, pink/red granulation. Pt with small, red area to the anterior ankle (over the bony protrusion) that is sluggishly blanchable today. The lateral area is moist and has mixed tissues present, with bone palpable and some necrotic tissue visible. Pt will continue to benefit from NPWT to manage exudate and promote granulation tissue formation. Anticipate change in 2-3 days.  Plan of Care Reviewed With: patient            Time Calculation   PT Charges       Row Name 06/08/25 1155             Time Calculation    Start Time 1015  -MC      PT Goal Re-Cert Due Date 06/13/25  -MC         Untimed Charges    Wound Care 18529 Selective debridement  -      09898-Mthrvetho debridement 10  -MC      21279-Ekr Pressure wound to 50 sqcm 35  -MC         Total Minutes    Untimed Charges Total Minutes 45  -MC       Total Minutes 45  -MC                User Key  (r) = Recorded By, (t) = Taken By, (c) = Cosigned By      Initials Name Provider Type     Melisa Dolan, PT Physical Therapist                      Therapy Charges for Today       Code Description Service Date Service Provider Modifiers Qty    [id number]  PT NEG PRESS WOUND TO 50SQCM DME3 6/7/2025 Melisa Dolan, PT GP 1               PT G-Codes  Outcome Measure Options: AM-PAC 6 Clicks Basic Mobility (PT)  AM-PAC 6 Clicks Score (PT): 21  AM-PAC 6 Clicks Score (OT): 24       Melisa Dolan, PT  6/8/2025

## 2025-06-09 VITALS
BODY MASS INDEX: 30.1 KG/M2 | HEIGHT: 71 IN | DIASTOLIC BLOOD PRESSURE: 77 MMHG | HEART RATE: 75 BPM | OXYGEN SATURATION: 96 % | WEIGHT: 215 LBS | SYSTOLIC BLOOD PRESSURE: 113 MMHG | RESPIRATION RATE: 18 BRPM | TEMPERATURE: 97.7 F

## 2025-06-09 PROCEDURE — 25010000002 CEFTRIAXONE PER 250 MG: Performed by: INTERNAL MEDICINE

## 2025-06-09 PROCEDURE — 97605 NEG PRS WND THER DME<=50SQCM: CPT

## 2025-06-09 PROCEDURE — A9270 NON-COVERED ITEM OR SERVICE: HCPCS | Performed by: ORTHOPAEDIC SURGERY

## 2025-06-09 PROCEDURE — A9270 NON-COVERED ITEM OR SERVICE: HCPCS | Performed by: INTERNAL MEDICINE

## 2025-06-09 PROCEDURE — 63710000001 APIXABAN 5 MG TABLET: Performed by: INTERNAL MEDICINE

## 2025-06-09 PROCEDURE — 63710000001 MULTIVITAMIN TABLET: Performed by: INTERNAL MEDICINE

## 2025-06-09 PROCEDURE — 63710000001 FOLIC ACID 1 MG TABLET: Performed by: INTERNAL MEDICINE

## 2025-06-09 PROCEDURE — 63710000001 VITAMIN B-12 1000 MCG TABLET: Performed by: ORTHOPAEDIC SURGERY

## 2025-06-09 PROCEDURE — 63710000001 METOPROLOL TARTRATE 25 MG TABLET: Performed by: ORTHOPAEDIC SURGERY

## 2025-06-09 PROCEDURE — 63710000001 THIAMINE 100 MG TABLET: Performed by: INTERNAL MEDICINE

## 2025-06-09 RX ADMIN — CYANOCOBALAMIN TAB 1000 MCG 1000 MCG: 1000 TAB at 10:06

## 2025-06-09 RX ADMIN — THIAMINE HCL TAB 100 MG 100 MG: 100 TAB at 10:06

## 2025-06-09 RX ADMIN — FOLIC ACID 1 MG: 1 TABLET ORAL at 10:06

## 2025-06-09 RX ADMIN — APIXABAN 5 MG: 5 TABLET, FILM COATED ORAL at 10:05

## 2025-06-09 RX ADMIN — MULTIVITAMIN TABLET 1 TABLET: TABLET at 10:06

## 2025-06-09 RX ADMIN — Medication 10 ML: at 10:07

## 2025-06-09 RX ADMIN — SODIUM CHLORIDE 2000 MG: 900 INJECTION INTRAVENOUS at 13:25

## 2025-06-09 RX ADMIN — METOPROLOL TARTRATE 25 MG: 25 TABLET, FILM COATED ORAL at 10:06

## 2025-06-09 NOTE — DISCHARGE SUMMARY
Patient Name: Carlos Culver  MRN: [id number]  : 1945  DOS: 2025    Attending: Mao Mckay MD    Primary Care Provider: Marcus Cheng MD    Date of Admission:.6/3/2025  8:26 AM    Date of Discharge:  2025    Discharge Diagnosis:   Abscess of left foot, status post I&D, exostectomy    Essential hypertension    Leukocytosis    Chronic anticoagulation    Neuropathy    CKD (chronic kidney disease) stage 2, GFR 60-89 ml/min    A-fib    S/P transmetatarsal amputation of foot, left    Alcohol abuse    History of Chopart amputation of left foot    Ulcer of left foot    Left foot infection      Hospital Course    At admit:  Patient is a pleasant 79 y.o. male presented for scheduled surgery by Dr. Mckay.     Patient has been followed in the office for left foot infection/ulcers.  Had workup including MRI.  I&D was recommended and patient today underwent the following procedures:  Left foot incision and drainage below fascia, Calc exostectomy, debridement of subcutaneous tissue.  Surgery was done under general anesthesia, was tolerated well.     Seen in his room postop, doing well, no complaints of pain, no nausea, vomiting, or shortness of breath.     Reviewed with patient his past medical history and home medications.  He has chronic atrial fibrillation for which she is followed by Dr. Anderson.  He is on chronic anticoagulation with Eliquis which is on hold for the surgery.     After admit:    Patient was provided pain medications as needed .  Multimodal approach.    He was seen by PT and OT and has progressed well over his stay.    Patient used an IS for atelectasis prophylaxis and heparin subcutaneously along with mechanicals for DVT prophylaxis.  Heparin has since been discontinued and patient's Eliquis resumed after approved by orthopedic surgery.    His postoperative antibiotics were managed by infectious disease consultants.  Patient was seen by Dr. Jeffery Young and subsequently by   Wilfredo Abhijit patient had a PICC line placed during this admission and IV antibiotics which are currently Ceftriaxone have been arranged for home infusion.    A wound VAC has been placed on patient's surgical foot by PT and wound care, awaiting approval from insurance for home wound VAC and discharge at that point.    Home medications were resumed as appropriate, and labs were monitored and remained fairly stable.     With the progress he has made, patient is ready for DC home today.        Discussed with patient regarding plan and he shows understanding and agreement.            Procedures Performed  Procedure(s):  FOOT ABSCESS INCISION AND DRAINAGE, EXOSTECTOMY       Pertinent Test Results:    I reviewed the patient's new clinical results.   Results from last 7 days   Lab Units 06/05/25  1206 06/04/25  0657   WBC 10*3/mm3 16.53* 23.06*   HEMOGLOBIN g/dL 11.8* 11.8*   HEMATOCRIT % 37.9 36.7*   PLATELETS 10*3/mm3 186 177     Results from last 7 days   Lab Units 06/05/25  1206 06/04/25  1106   SODIUM mmol/L 140 136   POTASSIUM mmol/L 4.0 4.5   CHLORIDE mmol/L 106 100   CO2 mmol/L 24.0 23.0   BUN mg/dL 19.2 19.5   CREATININE mg/dL 1.14 0.98   CALCIUM mg/dL 9.0 8.6   BILIRUBIN mg/dL 0.4 0.6   ALK PHOS U/L 93 81   ALT (SGPT) U/L 12 8   AST (SGOT) U/L 25 18   GLUCOSE mg/dL 134* 176*     I reviewed the patient's new imaging including images and reports.      Physical therapy    Goal Outcome Evaluation:  Plan of Care Reviewed With: patient  Progress: no change  Outcome Evaluation: Plantar and heel ulcerations appear to be improving, with moist, pink/red granulation. Pt with small, red area to the anterior ankle (over the bony protrusion) that is sluggishly blanchable today. The lateral area is moist and has mixed tissues present, with bone palpable and some necrotic tissue visible. Pt will continue to benefit from NPWT to manage exudate and promote granulation tissue formation. Anticipate change in 2-3 days.        Discharge  "Assessment:       Visit Vitals  /77 (BP Location: Left arm, Patient Position: Lying)   Pulse 75   Temp 97.7 °F (36.5 °C) (Oral)   Resp 18   Ht 180.3 cm (71\")   Wt 97.5 kg (215 lb)   SpO2 96%   BMI 29.99 kg/m²     Temp (24hrs), Av.5 °F (36.4 °C), Min:96.8 °F (36 °C), Max:97.7 °F (36.5 °C)      General Appearance:    Alert, cooperative, in no acute distress   Lungs:     Clear to auscultation,respirations regular, even and                   unlabored    Heart:    Regular rhythm and normal rate, normal S1 and S2   Abdomen:     Normal bowel sounds, no masses, no organomegaly, soft        non-tender, non-distended, no guarding, no rebound                 tenderness   Extremities: Wound VAC: Left foot   Pulses:   Pulses palpable and equal bilaterally   Skin:   No bleeding, bruising or rash           Discharge Disposition: Home        Discharge Medications        New Medications        Instructions Start Date   cefTRIAXone 2,000 mg in sodium chloride 0.9 % 100 mL IVPB   2,000 mg, Intravenous, Every 24 Hours             Continue These Medications        Instructions Start Date   apixaban 5 MG tablet tablet  Commonly known as: Eliquis   5 mg, Oral, 2 Times Daily      atorvastatin 80 MG tablet  Commonly known as: LIPITOR   80 mg, Oral, Nightly, Hold while on Daptomycin      metoprolol tartrate 25 MG tablet  Commonly known as: LOPRESSOR   Take 1 tablet by mouth 2 (Two) Times a Day.      multivitamin with minerals tablet tablet   1 tablet, Oral, Daily      naproxen sodium 220 MG tablet  Commonly known as: ALEVE   220 mg, Oral, 2 Times Daily PRN      saccharomyces boulardii 250 MG capsule  Commonly known as: Florastor   250 mg, Oral, 2 Times Daily      sertraline 25 MG tablet  Commonly known as: ZOLOFT   1 tablet, Daily      vitamin B-12 100 MCG tablet  Commonly known as: CYANOCOBALAMIN   1,000 mcg, Oral, Daily             Stop These Medications      ibuprofen 200 MG tablet  Commonly known as: ADVIL,MOTRIN        "       Discharge Diet:   Diet Instructions    Regular             Activity Instructions    Non-weightbearing LLE  Wound vac to remain in place  Antibiotics through picc line                  Future Appointments   Date Time Provider Department Center   6/16/2025 10:00 AM Mao Mckay MD [facility]     Jeffery Young MD 6/11/2025    From Dr.Mark Leon's note (1.  Outpatient intravenous antibiotic therapy: Rocephin 2 g IV daily to be provided by [facility] home infusion  2.  Stat CBC CMP ESR and CRP at the visit with Dr. Young on 6/11  3.  Appointment to see Dr. Young on Wednesday 6/11 at 1215-this appointment has been made  4.  PICC line dressing changes with a Biopatch or CHG gel dressing every Monday or Tuesday by home health)    Dragon disclaimer:  Part of this encounter note is an electronic transcription/translation of spoken language to printed text. The electronic translation of spoken language may permit erroneous, or at times, nonsensical words or phrases to be inadvertently transcribed; Although I have reviewed the note for such errors, some may still exist.       Jose Antonio Coleman MD  06/09/25  12:11 EDT

## 2025-06-09 NOTE — PLAN OF CARE
Goal Outcome Evaluation:  Plan of Care Reviewed With: (P) patient        Progress: (P) no change  Outcome Evaluation: (P) Pt presents with dry intact wound vac with a sanguineous exudate of 150 ml in canister. No complaints of pain reported. Wound vac machine change to home unit with no complications or need to replace banaging. Pt is appropriate for home unit wound vac to increase ganulation and reepithelialization of wound bed and would have better outsomes than if the pt used basic advanced dressings.

## 2025-06-09 NOTE — DISCHARGE INSTR - ACTIVITY
Non-weightbearing LLE  Wound vac to remain in place  Antibiotics through picc line     From Dr.Mark Leon's note (1.  Outpatient intravenous antibiotic therapy: Rocephin 2 g IV daily to be provided by [facility] home infusion  2.  Stat CBC CMP ESR and CRP at the visit with Dr. Young on 6/11  3.  Appointment to see Dr. Young on Wednesday 6/11 at 1215-this appointment has been made  4.  PICC line dressing changes with a Biopatch or CHG gel dressing every Monday or Tuesday by home health)

## 2025-06-09 NOTE — PROGRESS NOTES
INFECTIOUS DISEASE Progress Note    Carlos Culver  1945  [id number]    Date of consult: 6/4/2025    Admit date: 6/3/2025    Requesting Provider: No Known Provider  Evaluating physician: Jeffery Young MD  Reason for Consultation: Left foot abscess in the setting of severe peripheral neuropathy  Chief Complaint: Above      Subjective   History of present illness:  6/4/25: Patient is a  79 y.o.  Yr old male with a history of severe peripheral neuropathy with previous foot infections related to ulcerations and worsening wound, hyperlipidemia, essential hypertension, hard of hearing, atrial fibrillation, who developed worsening left foot ulceration after wearing a new pair of shoes at the end of May 2025.  Patient developed ulceration with some drainage without high fevers or chills.  MRI of left foot on 5/24 was consistent with possible abscess.  The patient was admitted to [facility] on 6/3/2025 and underwent I&D of his left foot with purulence.  I was consulted on 6//25 for further evaluation and treatment.  Left foot wound cultures yielded MSSA and Pseudomonas aeruginosa.  Pseudomonas was sensitive to cefepime, levofloxacin, and MSSA was sensitive to piperacillin/tazobactam, sulfa, and tetracycline.  Surgical cultures from 6/3 negative to date.  The patient has no other localizing signs or symptoms of infection.    6/5/25:  He remains afebrile.   White blood cell count yesterday was 23.1.    Operative cultures from 6/3 are pending.   Left foot cultures from 5/24 growing Pseudomonas and MSSA.  He is on intravenous Zosyn.   He denies uncontrolled pain.  He denies nausea and vomiting.    Operative cultures from 6/3 are growing Staph aureus.    6/6/25: He remains afebrile.   Operative cultures from 6/3 are growing scant Staph aureus.  He denies uncontrolled pain.  He denies nausea and vomiting.  He has infused himself at home previously.  He primarily takes care of himself at home  and has little direct assistance at home.    6/9/2025 history reviewed.  Left foot cultures yielded Staphylococcus aureus sensitive to tetracycline and sulfa as well as oxacillin.  No high fever.  Tolerating ceftriaxone.    Past Medical History:   Diagnosis Date    A-fib     Acute kidney failure     Elevated cholesterol     Hard of hearing     Hyperlipidemia     Hypertension     Osteomyelitis     Renal disorder     Wears hearing aid in both ears     Wears reading eyeglasses        Past Surgical History:   Procedure Laterality Date    AMPUTATION DIGIT Left 01/07/2020    Procedure: AMPUTATION LEFT 2ND TOE;  Surgeon: Idania Osborn MD;  Location:  [facility];  Service: Orthopedics    AMPUTATION DIGIT Left 12/08/2020    Procedure: TRANSMETATARSAL AMPUTATION LEFT;  Surgeon: Idania Osborn MD;  Location:  [facility];  Service: Orthopedics;  Laterality: Left;    APPENDECTOMY      COLONOSCOPY  2006    FOOT SURGERY Left 12/09/2020    (L) transmetatarsal amputation 12/09/2020 - Dr. Idania Osborn    INCISION AND DRAINAGE LEG Left 10/23/2018    Procedure: INCISION AND DRAINAGE LEFT FOOT;  Surgeon: Idania Osborn MD;  Location:  [facility];  Service: Orthopedics    INCISION AND DRAINAGE LEG Left 07/21/2023    Procedure: FOOT INCISION AND DRAINAGE; REVISION AMPUTATION; ACHILLES LENGTHENING -LEFT;  Surgeon: Mao Mckay MD;  Location:  [facility];  Service: Orthopedics;  Laterality: Left;    INCISION AND DRAINAGE LEG Left 6/3/2025    Procedure: FOOT ABSCESS INCISION AND DRAINAGE, EXOSTECTOMY;  Surgeon: Mao Mckay MD;  Location:  [facility];  Service: Orthopedics;  Laterality: Left;    TOE AMPUTATION      TONSILLECTOMY      VENOUS ACCESS DEVICE (PORT) INSERTION N/A 10/23/2018    Procedure: GROSHONG CATHETER PLACEMENT;  Surgeon: Marquez Simons MD;  Location:  [facility];  Service: General    VENOUS ACCESS DEVICE (PORT) INSERTION N/A 12/09/2020    Procedure: GROSHONG INSERTION;  Surgeon: Antonio Bains MD;  Location:   [facility];  Service: General;  Laterality: N/A;    VENOUS ACCESS DEVICE (PORT) REMOVAL         Pediatric History   Patient Parents    Not on file     Other Topics Concern    Not on file   Social History Narrative    Not on file   No cigarettes, alcohol, drug use    family history includes Cancer in his father; No Known Problems in his mother.    Allergies   Allergen Reactions    Sulfa Antibiotics Unknown (See Comments) and Rash     Pt was told as child he had an allergy.  Has not taken and doesn't know the response       Immunization History   Administered Date(s) Administered    COVID-19 (MODERNA) 12YRS+ (SPIKEVAX) 10/27/2023    COVID-19 (PFIZER) 12YRS+ (COMIRNATY) 05/02/2025    COVID-19 (PFIZER) BIVALENT 12+YRS 10/19/2022    COVID-19 (PFIZER) Purple Cap Monovalent 01/29/2021, 02/25/2021, 10/02/2021    Covid-19 (Pfizer) Gray Cap Monovalent 04/14/2022       Medication:  @Scheduled Meds:apixaban, 5 mg, Oral, BID  atorvastatin, 80 mg, Oral, Nightly  cefTRIAXone, 2,000 mg, Intravenous, Q24H  folic acid, 1 mg, Oral, Daily  metoprolol tartrate, 25 mg, Oral, BID  multivitamin, 1 tablet, Oral, Daily  sodium chloride, 10 mL, Intravenous, Q12H  sodium chloride, 10 mL, Intravenous, Q12H  thiamine, 100 mg, Oral, Daily  vitamin B-12, 1,000 mcg, Oral, Daily      Continuous Infusions:     PRN Meds:.  acetaminophen **OR** acetaminophen    bisacodyl    diphenhydrAMINE    HYDROmorphone **AND** naloxone    labetalol    melatonin    ondansetron ODT **OR** ondansetron    oxyCODONE    polyethylene glycol    senna-docusate sodium    sodium chloride    sodium chloride    sodium chloride    sodium chloride    sodium chloride     Please refer to the medical record for a full medication list    Review of Systems:  See HPI    Physical Exam:   Vital Signs   Temp:  [96.8 °F (36 °C)-97.7 °F (36.5 °C)] 97.7 °F (36.5 °C)  Heart Rate:  [75] 75  Resp:  [16-18] 18  BP: ()/() 113/77    Blood pressure 113/77, pulse 75, temperature 97.7 °F  "(36.5 °C), temperature source Oral, resp. rate 18, height 180.3 cm (71\"), weight 97.5 kg (215 lb), SpO2 96%.  GENERAL: Awake and alert, in moderate distress.  Resting in bed.  HEENT:  Normocephalic, atraumatic.    No labial ulcers  EYES: PERRLA. No conjunctival injection. No icterus. EOM full.  HEART: No murmur, gallop, or pericardial friction rub. Reg rate rhythm,.  LUNGS: Clear to auscultation and percussion. No respiratory distress, no use of accessory muscles.  ABDOMEN: Soft, nontender, nondistended. No appreciable HSM.   Obese.  SKIN: Warm and dry without cutaneous eruptions.  No nodules.  Left foot surgical dressing in place.  PSYCHIATRIC: Mental status lucid. No confusion.  EXT: Left foot is in a postoperative dressing  NEURO: Oriented to name, sensory diminished lower extremity    Right arm PICC line in place with no exit site erythema or drainage    Results Review:   I reviewed the patient's new clinical results.  I reviewed the patient's new imaging results and agree with the interpretation.  I reviewed the patient's other test results and agree with the interpretation    Results from last 7 days   Lab Units 06/05/25  1206 06/04/25  0657   WBC 10*3/mm3 16.53* 23.06*   HEMOGLOBIN g/dL 11.8* 11.8*   HEMATOCRIT % 37.9 36.7*   PLATELETS 10*3/mm3 186 177     5/24/2025 laboratory review with sodium 136, potassium 5.1, chloride 102, CO2 24, calcium 9.5, ALT 10, AST 20, alkaline phosphatase 118, total bilirubin 1.0, creatinine 1.13, ESR 54, WBC 11, hemoglobin 14.    Results from last 7 days   Lab Units 06/05/25  1206   SODIUM mmol/L 140   POTASSIUM mmol/L 4.0   CHLORIDE mmol/L 106   CO2 mmol/L 24.0   BUN mg/dL 19.2   CREATININE mg/dL 1.14   GLUCOSE mg/dL 134*   CALCIUM mg/dL 9.0     Results from last 7 days   Lab Units 06/05/25  1206   ALK PHOS U/L 93   BILIRUBIN mg/dL 0.4   ALT (SGPT) U/L 12   AST (SGOT) U/L 25                     Estimated Creatinine Clearance: 61.5 mL/min (by C-G formula based on SCr of 1.14 " "mg/dL).  CPK          6/5/2025    12:06   Common Labs   Creatine Kinase 110       Procalitonin Results:       Brief Urine Lab Results       None           No results found for: \"SITE\", \"ALLENTEST\", \"PHART\", \"LQP2GUP\", \"PO2ART\", \"WQG3JTN\", \"BASEEXCESS\", \"J0LOSVZA\", \"HGBBG\", \"HCTABG\", \"OXYHEMOGLOBI\", \"METHHGBN\", \"CARBOXYHGB\", \"CO2CT\", \"BAROMETRIC\", \"MODALITY\", \"FIO2\"     Microbiology:      Results for orders placed or performed during the hospital encounter of 07/19/23   Blood Culture - Blood, Hand, Left    Specimen: Hand, Left; Blood   Result Value Ref Range    Blood Culture No growth at 5 days          Culture results from last 30 days   Lab 06/03/25  1345 05/24/25  1129   WOUNDCX  --  Scant growth (1+) Pseudomonas aeruginosa*  Light growth (2+) Staphylococcus aureus*   TISSUE CULTURE Scant growth (1+) Staphylococcus aureus*  --    AFBCX No AFB isolated at less than 1 week  --    ANACX No anaerobes isolated at 5 days  --    FUNGUSCX No fungus isolated at less than 1 week  --       Brief Urine Lab Results       None                Tissue Culture   Date Value Ref Range Status   06/03/2025 Growth present, too young to evaluate  Preliminary   (      Radiology:  Imaging Results (Last 72 Hours)       ** No results found for the last 72 hours. **            IMPRESSION:   MSSA left foot abscess-although he grew Pseudomonas and MSSA from his prior superficial cultures performed at wound care, his operative cultures have only grown MSSA.  This is the primary pathogen and the Pseudomonas was likely a wound colonizer.  Regimen was simplified from piperacillin/tazobactam to daily ceftriaxone which will be easier to manage at home.  Anemia, chronic disease related to above issues.  Severe peripheral neuropathy lower extremities of unclear etiology.  Contributing to recurrent ulcerations.  Status post mellitus left foot status post trans metatarsal amputation 2023.    RECOMMENDATIONS:  Diagnostically, continue to follow patient's " physical exam, CBC, CMP, CRP.    Therapeutically, continue Rocephin 2 g IV daily, duration to be determined but likely weeks depending upon his wound healing.  Follow-up with Dr. Young next week-Wednesday 6/11 at 1215-this appointment has been made  Discharge to home soon.    I discussed his complex situation in detail with him today.  I have called , And left a message today.  I am awaiting a callback.  I discussed his disposition with the pharmacist at [facility] today  I discussed his disposition with the [facility] staff today and arranged his follow-up appointment on 6/11.  I spent over 40 minutes on his complex care today.  I coordinated his care.      Outpatient orders:  1.  Outpatient intravenous antibiotic therapy: Rocephin 2 g IV daily to be provided by [facility]  2.  Stat CBC CMP ESR and CRP at the visit with Dr. Young on 6/11  3.  Appointment to see Dr. Young on Wednesday 6/11 at 1215-this appointment has been made  4.  PICC line dressing changes with a Biopatch or CHG gel dressing every Monday or Tuesday by home health    This visit included the following complex service elements:  Complex medical decision-making associated with antimicrobial prescribing.  In-depth chart review with high level synthesis for complex diagnoses.  Managed infection treatment protocol associated with transitions of care for this complex patient.  Jeffery Young MD  6/9/2025

## 2025-06-09 NOTE — PROGRESS NOTES
"          Orthopaedic Surgery Progress Note    LOS: 1 day   Patient Care Team:  Marcus Cheng MD as PCP - General  Marcus Cheng MD as PCP - Family Medicine  Jeffery Young MD as Consulting Physician (Infectious Diseases)  Hang Anderson MD as Consulting Physician (Cardiology)  Mao Mckay MD as Surgeon (Orthopedic Surgery)  Maricruz Romero PA-C as Physician Assistant (Cardiology)  6 Days Post-Op   Procedure(s):  FOOT ABSCESS INCISION AND DRAINAGE, EXOSTECTOMY  Subjective     Interval History:   Resting in bed.  No pain.  No new complaints.  Planning on home today with home wound VAC.    Objective     Vital Signs:  Temp (24hrs), Av.5 °F (36.4 °C), Min:96.8 °F (36 °C), Max:97.7 °F (36.5 °C)    BP 92/65 (BP Location: Left arm, Patient Position: Lying)   Pulse 75   Temp 96.8 °F (36 °C) (Axillary)   Resp 18   Ht 180.3 cm (71\")   Wt 97.5 kg (215 lb)   SpO2 96%   BMI 29.99 kg/m²   Body mass index is 29.99 kg/m².    Labs:  Lab Results (last 24 hours)       Procedure Component Value Units Date/Time    Fungus Culture - Tissue, Foot, Left [[id number]] Collected: 25    Specimen: Tissue from Foot, Left Updated: 25     Fungus Culture No fungus isolated at less than 1 week    AFB Culture - Tissue, Foot, Left [[id number]] Collected: 25 134    Specimen: Tissue from Foot, Left Updated: 25     AFB Culture No AFB isolated at less than 1 week     AFB Stain No acid fast bacilli seen on concentrated smear            Physical Exam:  left LE: Dressing clean dry and intact with no spotting left lower extremity, wound vacuum in place holding suction with scant serosanguineous drainage in cannister. Compartments leg soft and compressible.    Assessment/Plan:  6 Days Post-Op status post:  Procedure(s):  FOOT ABSCESS INCISION AND DRAINAGE, EXOSTECTOMY    -NWB operative extremity  -Advance diet as tolerated  -Keep dressing clean and dry  -DVT prophylaxis, mechanical " and home dose of eliquis  -ID consult placed, appreciate recs, antibiotic recommendations per infectious disease  -Pain control  -Follow-up Intra-Op cultures, cultures showing s. aureus, previous wound swab showed Pseudomonas and Staph aureus  -Elevate operative extremity  -Consult medicine, appreciate recs  - PT wound care managing wound VAC  -Dispo planning, likely home today with home wound vac and PT wound care follow up    Mao Mckay MD  06/09/25  08:00 EDT

## 2025-06-09 NOTE — THERAPY WOUND CARE TREATMENT
Acute Care - Wound/Debridement Treatment Note  [facility]     Patient Name: Carlos Culver  : 1945  MRN: [id number]  Today's Date: 2025                Admit Date: 6/3/2025    Visit Dx:    ICD-10-CM ICD-9-CM   1. Abscess of left foot, status post I&D, exostectomy  L02.612 682.7   2. Ulcer of left foot, unspecified ulcer stage  L97.529 707.15       Patient Active Problem List   Diagnosis    Cellulitis of left foot    GUCCI (acute kidney injury)    Essential hypertension    Leukocytosis    Osteomyelitis of left foot    Chronic anticoagulation    Neuropathy    Elevated transaminase level    CKD (chronic kidney disease) stage 2, GFR 60-89 ml/min    Skin ulcer of right heel    Chronic multifocal osteomyelitis of left foot    Contracture of joint of foot, left    MARTINEZ (dyspnea on exertion)    Neuropathic ulcer, limited to breakdown of skin    Hyperlipidemia LDL goal <100    A-fib    S/P transmetatarsal amputation of foot, left    Leukocytosis, likely reactive    Acute postoperative pain    Alcohol abuse    Staphylococcus aureus bacteremia    Acute osteomyelitis of left ankle or foot    History of Chopart amputation of left foot    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 evidence of necrosis    Ulcer of left foot    Left foot infection    Abscess of left foot, status post I&D, exostectomy        Past Medical History:   Diagnosis Date    A-fib     Acute kidney failure     Elevated cholesterol     Hard of hearing     Hyperlipidemia     Hypertension     Osteomyelitis     Renal disorder     Wears hearing aid in both ears     Wears reading eyeglasses         Past Surgical History:   Procedure Laterality Date    AMPUTATION DIGIT Left 2020    Procedure: AMPUTATION LEFT 2ND TOE;  Surgeon: Idania Osborn MD;  Location: [facility];  Service: Orthopedics    AMPUTATION DIGIT Left 2020    Procedure: TRANSMETATARSAL AMPUTATION LEFT;  Surgeon: Idania Osborn MD;   Location:  [facility];  Service: Orthopedics;  Laterality: Left;    APPENDECTOMY      COLONOSCOPY  2006    FOOT SURGERY Left 12/09/2020    (L) transmetatarsal amputation 12/09/2020 - Dr. Idania Osborn    INCISION AND DRAINAGE LEG Left 10/23/2018    Procedure: INCISION AND DRAINAGE LEFT FOOT;  Surgeon: Idania Osborn MD;  Location:  [facility];  Service: Orthopedics    INCISION AND DRAINAGE LEG Left 07/21/2023    Procedure: FOOT INCISION AND DRAINAGE; REVISION AMPUTATION; ACHILLES LENGTHENING -LEFT;  Surgeon: Mao Mckay MD;  Location:  [facility];  Service: Orthopedics;  Laterality: Left;    INCISION AND DRAINAGE LEG Left 6/3/2025    Procedure: FOOT ABSCESS INCISION AND DRAINAGE, EXOSTECTOMY;  Surgeon: Mao Mckay MD;  Location:  [facility];  Service: Orthopedics;  Laterality: Left;    TOE AMPUTATION      TONSILLECTOMY      VENOUS ACCESS DEVICE (PORT) INSERTION N/A 10/23/2018    Procedure: GROSHONG CATHETER PLACEMENT;  Surgeon: Marquez Simons MD;  Location:  [facility];  Service: General    VENOUS ACCESS DEVICE (PORT) INSERTION N/A 12/09/2020    Procedure: GROSHONG INSERTION;  Surgeon: Antonio Bains MD;  Location:  [facility];  Service: General;  Laterality: N/A;    VENOUS ACCESS DEVICE (PORT) REMOVAL            LDA Wound       Row Name               Wound 05/08/25 Left lateral foot Surgical    Wound - Properties Group Placement Date: 05/08/25  -KW Side: Left  -KW Orientation: lateral  -KW Location: foot  -KW Primary Wound Type: Surgical  -MF    Retired Wound - Properties Group Placement Date: 05/08/25  -KW Side: Left  -KW Orientation: lateral  -KW Location: foot  -KW    Retired Wound - Properties Group Placement Date: 05/08/25  -KW Side: Left  -KW Orientation: lateral  -KW Location: foot  -KW    Retired Wound - Properties Group Date first assessed: 05/08/25  -KW Side: Left  -KW Location: foot  -KW       Wound 06/04/25 1123 Right lower leg Other (Comments)    Wound - Properties Group Placement Date:  06/04/25  -ALISA Placement Time: 1123  -ALISA Present on Original Admission: Y  -ALISA Side: Right  -ALISA Orientation: lower  -ALISA Location: leg  -ALISA Primary Wound Type: Other  -ALISA, Black circular scab - pt reports being there for a long time     Retired Wound - Properties Group Placement Date: 06/04/25  -ALISA Placement Time: 1123  -ALSIA Present on Original Admission: Y  -ALISA Side: Right  -ALISA Orientation: lower  -ALISA Location: leg  -ALISA    Retired Wound - Properties Group Placement Date: 06/04/25  -ALISA Placement Time: 1123  -ALISA Present on Original Admission: Y  -ALISA Side: Right  -ALISA Orientation: lower  -ALISA Location: leg  -ALISA    Retired Wound - Properties Group Date first assessed: 06/04/25  -ALISA Time first assessed: 1123  -ALISA Present on Original Admission: Y  -ALISA Side: Right  -ALISA Location: leg  -ALISA       Wound 06/05/25 0830 Left heel    Wound - Properties Group Placement Date: 06/05/25  -MF Placement Time: 0830  -MF Present on Original Admission: Y  -MF Side: Left  -MF Location: heel  -MF    Retired Wound - Properties Group Placement Date: 06/05/25  -MF Placement Time: 0830  -MF Present on Original Admission: Y  -MF Side: Left  -MF Location: heel  -MF    Retired Wound - Properties Group Placement Date: 06/05/25  -MF Placement Time: 0830  -MF Present on Original Admission: Y  -MF Side: Left  -MF Location: heel  -MF    Retired Wound - Properties Group Date first assessed: 06/05/25  -MF Time first assessed: 0830  -MF Present on Original Admission: Y  -MF Side: Left  -MF Location: heel  -MF       Wound 06/05/25 0830 Left  foot Neuropathic Ulcer    Wound - Properties Group Placement Date: 06/05/25  -MF Placement Time: 0830  -MF Present on Original Admission: Y  -MF Side: Left  -MF Orientation: --  -MF, plantar  Location: foot  -MF Primary Wound Type: Neuropathic  -MF    Retired Wound - Properties Group Placement Date: 06/05/25  -MF Placement Time: 0830  -MF Present on Original Admission: Y  -MF Side: Left  -MF Orientation: --  -MF, plantar   Location: foot  -MF    Retired Wound - Properties Group Placement Date: 06/05/25  - Placement Time: 0830  -MF Present on Original Admission: Y  -MF Side: Left  -MF Orientation: --  -MF, plantar  Location: foot  -MF    Retired Wound - Properties Group Date first assessed: 06/05/25  - Time first assessed: 0830  -MF Present on Original Admission: Y  -MF Side: Left  -MF Location: foot  -MF       NPWT (Negative Pressure Wound Therapy) 06/06/25 0800    NPWT (Negative Pressure Wound Therapy) - Properties Group Placement Date: 06/06/25  - Placement Time: 0800  -MF    Retired NPWT (Negative Pressure Wound Therapy) - Properties Group Placement Date: 06/06/25  - Placement Time: 0800  -MF    Retired NPWT (Negative Pressure Wound Therapy) - Properties Group Placement Date: 06/06/25  - Placement Time: 0800  -MF    Retired NPWT (Negative Pressure Wound Therapy) - Properties Group Placement Date: 06/06/25  - Placement Time: 0800  -MF              User Key  (r) = Recorded By, (t) = Taken By, (c) = Cosigned By      Initials Name Provider Type    Koko Reynolds, PT Physical Therapist    Melissa Casiano, DORINDA Physical Therapist    Huy Perdue, RN Registered Nurse                  Wound 05/08/25 Left lateral foot Surgical (Active)   Dressing Appearance dry;intact;no drainage 06/09/25 1100   Closure Unable to assess 06/09/25 1100   Base unable to visualize 06/09/25 1100   Drainage Amount small 06/09/25 0400   Care, Wound negative pressure wound therapy 06/09/25 1100   Dressing Care elastic bandage 06/09/25 1100       Wound 06/04/25 1123 Right lower leg Other (Comments) (Active)   Dressing Appearance dry;intact 06/09/25 1415   Closure Adhesive bandage 06/09/25 0400   Base unable to visualize 06/09/25 0400   Drainage Characteristics/Odor sanguineous 06/09/25 1415   Drainage Amount moderate 06/09/25 1415   Care, Wound other (see comments) 06/09/25 1415   Dressing Care border dressing 06/09/25 0400   Wound  Output (mL) 150 06/09/25 1415       Wound 06/05/25 0830 Left heel (Active)   Dressing Appearance dry;intact;no drainage 06/09/25 0400   Closure Adhesive bandage;Unable to assess 06/09/25 0400   Base unable to visualize 06/09/25 0400   Drainage Amount scant 06/09/25 0400   Care, Wound negative pressure wound therapy 06/09/25 0400   Dressing Care elastic bandage 06/09/25 0400       Wound 06/05/25 0830 Left  foot Neuropathic Ulcer (Active)   Dressing Appearance dry;intact;no drainage 06/09/25 0400   Closure Unable to assess 06/09/25 0400   Base unable to visualize 06/09/25 0400   Drainage Amount scant 06/09/25 0400   Care, Wound negative pressure wound therapy 06/09/25 0400   Dressing Care elastic bandage 06/09/25 0400       NPWT (Negative Pressure Wound Therapy) 06/06/25 0800 (Active)   Therapy Setting continuous therapy 06/09/25 1415   Pressure Setting 125 mmHg 06/09/25 1415         WOUND DEBRIDEMENT                     PT Assessment (Last 12 Hours)       PT Evaluation and Treatment       Row Name 06/09/25 1415          Physical Therapy Time and Intention    Subjective Information no complaints (P)   -ST     Document Type therapy note (daily note);wound care (P)   -ST     Mode of Treatment individual therapy;physical therapy (P)   -ST     Patient Effort excellent (P)   -ST     Symptoms Noted During/After Treatment none (P)   -ST       Row Name 06/09/25 1415          General Information    Patient Profile Reviewed yes (P)   -ST     Patient Observations alert;cooperative (P)   -ST     Existing Precautions/Restrictions fall;left;non-weight bearing;other (see comments) (P)   s/p L foot I+D, LLE NWB, wound vac, has knee scooter in room  -ST     Equipment Issued to Patient other (see comments) (P)   negative pressure wound vac home unit  -       Row Name 06/09/25 1415          Pain    Pretreatment Pain Rating 0/10 - no pain (P)   -ST     Posttreatment Pain Rating 0/10 - no pain (P)   -ST       Row Name 06/09/25 1415           Cognition    Affect/Mental Status (Cognition) WNL (P)   -ST     Orientation Status (Cognition) oriented x 4 (P)   -ST     Cognitive Function (Cognition) WNL (P)   -ST       Row Name             Wound 05/08/25 Left lateral foot Surgical    Wound - Properties Group Placement Date: 05/08/25  -KW Side: Left  -KW Orientation: lateral  -KW Location: foot  -KW Primary Wound Type: Surgical  -MF    Retired Wound - Properties Group Placement Date: 05/08/25  -KW Side: Left  -KW Orientation: lateral  -KW Location: foot  -KW    Retired Wound - Properties Group Placement Date: 05/08/25  -KW Side: Left  -KW Orientation: lateral  -KW Location: foot  -KW    Retired Wound - Properties Group Date first assessed: 05/08/25  -KW Side: Left  -KW Location: foot  -KW      Row Name 06/09/25 1415          Wound 06/04/25 1123 Right lower leg Other (Comments)    Wound - Properties Group Placement Date: 06/04/25  -ALISA Placement Time: 1123  -ALISA Present on Original Admission: Y  -ALISA Side: Right  -ALISA Orientation: lower  -ALISA Location: leg  -ALISA Primary Wound Type: Other  -ALISA, Black circular scab - pt reports being there for a long time     Dressing Appearance dry;intact (P)   -ST     Drainage Characteristics/Odor sanguineous (P)   -ST     Drainage Amount moderate (P)   -ST     Care, Wound other (see comments) (P)   negative pressure wound vac home unit  -ST     Wound Output (mL) 150 (P)   -ST     Retired Wound - Properties Group Placement Date: 06/04/25  -ALISA Placement Time: 1123  -ALISA Present on Original Admission: Y  -ALISA Side: Right  -ALISA Orientation: lower  -ALISA Location: leg  -ALISA    Retired Wound - Properties Group Placement Date: 06/04/25  -ALISA Placement Time: 1123  -ALISA Present on Original Admission: Y  -ALISA Side: Right  -ALISA Orientation: lower  -ALISA Location: leg  -ALISA    Retired Wound - Properties Group Date first assessed: 06/04/25  -ALISA Time first assessed: 1123  -ALISA Present on Original Admission: Y  -ALISA Side: Right  -ALISA Location: leg  -ALISA       Row Name             Wound 06/05/25 0830 Left heel    Wound - Properties Group Placement Date: 06/05/25  -MF Placement Time: 0830  -MF Present on Original Admission: Y  -MF Side: Left  -MF Location: heel  -MF    Retired Wound - Properties Group Placement Date: 06/05/25  -MF Placement Time: 0830  -MF Present on Original Admission: Y  -MF Side: Left  -MF Location: heel  -MF    Retired Wound - Properties Group Placement Date: 06/05/25  -MF Placement Time: 0830  -MF Present on Original Admission: Y  -MF Side: Left  -MF Location: heel  -MF    Retired Wound - Properties Group Date first assessed: 06/05/25  -MF Time first assessed: 0830  -MF Present on Original Admission: Y  -MF Side: Left  -MF Location: heel  -MF      Row Name             Wound 06/05/25 0830 Left  foot Neuropathic Ulcer    Wound - Properties Group Placement Date: 06/05/25  -MF Placement Time: 0830  -MF Present on Original Admission: Y  -MF Side: Left  -MF Orientation: --  -MF, plantar  Location: foot  -MF Primary Wound Type: Neuropathic  -MF    Retired Wound - Properties Group Placement Date: 06/05/25  -MF Placement Time: 0830  -MF Present on Original Admission: Y  -MF Side: Left  -MF Orientation: --  -MF, plantar  Location: foot  -MF    Retired Wound - Properties Group Placement Date: 06/05/25  -MF Placement Time: 0830  -MF Present on Original Admission: Y  -MF Side: Left  -MF Orientation: --  -MF, plantar  Location: foot  -MF    Retired Wound - Properties Group Date first assessed: 06/05/25  -MF Time first assessed: 0830  -MF Present on Original Admission: Y  -MF Side: Left  -MF Location: foot  -MF      Row Name 06/09/25 1415          NPWT (Negative Pressure Wound Therapy) 06/06/25 0800    NPWT (Negative Pressure Wound Therapy) - Properties Group Placement Date: 06/06/25  -MF Placement Time: 0800  -MF    Therapy Setting continuous therapy (P)   -ST     Pressure Setting 125 mmHg (P)   -ST     Retired NPWT (Negative Pressure Wound Therapy) - Properties  Group Placement Date: 06/06/25  - Placement Time: 0800  -    Retired NPWT (Negative Pressure Wound Therapy) - Properties Group Placement Date: 06/06/25  - Placement Time: 0800  -    Retired NPWT (Negative Pressure Wound Therapy) - Properties Group Placement Date: 06/06/25  - Placement Time: 0800  -      Row Name 06/09/25 1415          Coping    Observed Emotional State calm;cooperative (P)   -ST     Verbalized Emotional State acceptance (P)   -ST     Trust Relationship/Rapport care explained (P)   -ST     Family/Support Persons patient (P)   -ST     Involvement in Care not present at bedside (P)   -ST     Family/Support System Care self-care encouraged (P)   -ST       Row Name 06/09/25 1415          Plan of Care Review    Plan of Care Reviewed With patient (P)   -ST     Progress no change (P)   -ST     Outcome Evaluation Pt presents with dry intact wound vac with a sanguineous exudate of 150 ml in canister. No complaints of pain reported. Wound vac machine change to home unit with no complications or need to replace banaging. Pt is appropriate for home unit wound vac to increase ganulation and reepithelialization of wound bed and would have better outsomes than if the pt used basic advanced dressings. (P)   -ST       Row Name 06/09/25 1415          Positioning and Restraints    Pre-Treatment Position in bed (P)   -ST     Post Treatment Position bed (P)   EOB  -ST     In Bed sitting EOB;encouraged to call for assist;call light within reach (P)   -ST       Row Name 06/09/25 1415          Therapy Plan Review/Discharge Plan (PT)    Therapy Plan Review (PT) patient (P)   -ST               User Key  (r) = Recorded By, (t) = Taken By, (c) = Cosigned By      Initials Name Provider Type    Koko Reynolds, PT Physical Therapist    Melissa Casiano, PT Physical Therapist    Huy Perdue, RN Registered Nurse    Chris Caldwell, PT Student PT Student                  Physical Therapy Education        Title: PT OT SLP Therapies (In Progress)       Topic: Physical Therapy (Done)       Point: Mobility training (Done)       Learning Progress Summary            Patient Acceptance, E, VU,DU,NR by  at 6/6/2025 1019    Acceptance, E, VU by CK at 6/5/2025 1642    Acceptance, E,D, VU,NR by ES at 6/4/2025 0922    Comment: NWB LLE  Demonstrated and practiced safe STS transfers with proper placement of knee scooter.    Acceptance, E, VU by ND at 6/3/2025 1706                      Point: Home exercise program (Done)       Learning Progress Summary            Patient Acceptance, E, VU,DU,NR by  at 6/6/2025 1019    Acceptance, E,D, VU,NR by ES at 6/4/2025 0922    Comment: NWB LLE  Demonstrated and practiced safe STS transfers with proper placement of knee scooter.                      Point: Body mechanics (Done)       Learning Progress Summary            Patient Acceptance, E, VU,DU,NR by  at 6/6/2025 1019    Acceptance, E, VU by CK at 6/5/2025 1642    Acceptance, E,D, VU,NR by ES at 6/4/2025 0922    Comment: NWB LLE  Demonstrated and practiced safe STS transfers with proper placement of knee scooter.    Acceptance, E, VU by ND at 6/3/2025 1706                      Point: Precautions (Done)       Learning Progress Summary            Patient Acceptance, E, VU,DU,NR by  at 6/6/2025 1019    Acceptance, E, VU by CK at 6/5/2025 1642    Acceptance, E,D, VU,NR by  at 6/4/2025 0922    Comment: NWB LLE  Demonstrated and practiced safe STS transfers with proper placement of knee scooter.    Acceptance, E, VU by ND at 6/3/2025 1706                                      User Key       Initials Effective Dates Name Provider Type Discipline    CK 02/06/24 -  Brisa Kwong, PT Physical Therapist PT     09/21/23 -  Valentina Miles, PT Physical Therapist PT    ND 11/16/23 -  Brooke Hager, PT Physical Therapist PT    ES 05/05/25 -  Key Cote, PT Student PT Student PT                    Recommendation and  Plan  Anticipated Discharge Disposition (PT): home with assist, home with home health  Planned Therapy Interventions (PT): wound care, patient/family education  Therapy Frequency (PT): daily  Plan of Care Reviewed With: (P) patient   Progress: (P) no change       Progress: (P) no change  Outcome Evaluation: (P) Pt presents with dry intact wound vac with a sanguineous exudate of 150 ml in canister. No complaints of pain reported. Wound vac machine change to home unit with no complications or need to replace banaging. Pt is appropriate for home unit wound vac to increase ganulation and reepithelialization of wound bed and would have better outsomes than if the pt used basic advanced dressings.  Plan of Care Reviewed With: (P) patient            Time Calculation   PT Charges       Row Name 06/09/25 1447             Time Calculation    Start Time 1415 (P)   -ST      PT Goal Re-Cert Due Date 06/13/25 (P)   -ST         Untimed Charges    17533-Tkj Pressure wound to 50 sqcm 45 (P)   -ST         Total Minutes    Untimed Charges Total Minutes 45 (P)   -ST       Total Minutes 45 (P)   -ST                User Key  (r) = Recorded By, (t) = Taken By, (c) = Cosigned By      Initials Name Provider Type    ST Chris Leon, PT Student PT Student                      Therapy Charges for Today       Code Description Service Date Service Provider Modifiers Qty    [id number]  PT NEG PRESS WOUND TO 50SQCM DME3 6/9/2025 Chris Leon, PT Student GP 1              PT G-Codes  Outcome Measure Options: AM-PAC 6 Clicks Basic Mobility (PT)  AM-PAC 6 Clicks Score (PT): 21  AM-PAC 6 Clicks Score (OT): 24       Chris Leon PT Student  6/9/2025

## 2025-06-09 NOTE — PLAN OF CARE
Problem: Adult Inpatient Plan of Care  Goal: Absence of Hospital-Acquired Illness or Injury  Intervention: Identify and Manage Fall Risk  Recent Flowsheet Documentation  Taken 6/9/2025 0400 by Lulu Salinas RN  Safety Promotion/Fall Prevention:   activity supervised   assistive device/personal items within reach   clutter free environment maintained   fall prevention program maintained   gait belt   lighting adjusted   mobility aid in reach   muscle strengthening facilitated   nonskid shoes/slippers when out of bed   room organization consistent   safety round/check completed  Taken 6/9/2025 0200 by Lulu Salinas RN  Safety Promotion/Fall Prevention:   activity supervised   assistive device/personal items within reach   clutter free environment maintained   fall prevention program maintained   gait belt   lighting adjusted   mobility aid in reach   muscle strengthening facilitated   nonskid shoes/slippers when out of bed   room organization consistent   safety round/check completed  Taken 6/9/2025 0000 by Lulu Salinas RN  Safety Promotion/Fall Prevention:   activity supervised   assistive device/personal items within reach   clutter free environment maintained   fall prevention program maintained   gait belt   lighting adjusted   mobility aid in reach   muscle strengthening facilitated   nonskid shoes/slippers when out of bed   room organization consistent   safety round/check completed  Taken 6/8/2025 2200 by Lulu Salinas RN  Safety Promotion/Fall Prevention:   activity supervised   assistive device/personal items within reach   clutter free environment maintained   fall prevention program maintained   gait belt   lighting adjusted   mobility aid in reach   muscle strengthening facilitated   nonskid shoes/slippers when out of bed   room organization consistent   safety round/check completed  Taken 6/8/2025 2000 by Lulu Salinas, RN  Safety Promotion/Fall Prevention:   activity supervised   assistive device/personal  items within reach   clutter free environment maintained   fall prevention program maintained   gait belt   lighting adjusted   mobility aid in reach   muscle strengthening facilitated   nonskid shoes/slippers when out of bed   room organization consistent   safety round/check completed  Intervention: Prevent Skin Injury  Recent Flowsheet Documentation  Taken 6/9/2025 0500 by Lulu Salinas RN  Body Position: position changed independently  Taken 6/9/2025 0400 by Lulu Salinas RN  Body Position: position changed independently  Skin Protection: incontinence pads utilized  Taken 6/9/2025 0200 by Lulu Salinas RN  Body Position: position changed independently  Skin Protection: incontinence pads utilized  Taken 6/9/2025 0000 by Lulu Salinas RN  Body Position: position changed independently  Skin Protection: incontinence pads utilized  Taken 6/8/2025 2200 by Lulu Salinas RN  Body Position: position changed independently  Skin Protection: incontinence pads utilized  Taken 6/8/2025 2000 by Lulu Salinas RN  Body Position: position changed independently  Skin Protection: incontinence pads utilized  Intervention: Prevent and Manage VTE (Venous Thromboembolism) Risk  Recent Flowsheet Documentation  Taken 6/9/2025 0400 by Lulu Salinas RN  VTE Prevention/Management: (eliquis) other (see comments)  Taken 6/9/2025 0000 by Lulu Salinas RN  VTE Prevention/Management: (eliquis) other (see comments)  Taken 6/8/2025 2000 by Lulu Salinas RN  VTE Prevention/Management: (eliquis) other (see comments)  Intervention: Prevent Infection  Recent Flowsheet Documentation  Taken 6/9/2025 0400 by Lulu Salinas RN  Infection Prevention:   environmental surveillance performed   equipment surfaces disinfected   hand hygiene promoted   rest/sleep promoted   single patient room provided   visitors restricted/screened  Taken 6/9/2025 0200 by Lulu Salinas RN  Infection Prevention:   environmental surveillance performed   equipment surfaces disinfected   hand  hygiene promoted   rest/sleep promoted   single patient room provided   visitors restricted/screened  Taken 6/9/2025 0000 by Lulu Salinas RN  Infection Prevention:   environmental surveillance performed   equipment surfaces disinfected   hand hygiene promoted   rest/sleep promoted   single patient room provided   visitors restricted/screened  Taken 6/8/2025 2200 by Lulu Salinas RN  Infection Prevention:   equipment surfaces disinfected   environmental surveillance performed   hand hygiene promoted   rest/sleep promoted   single patient room provided   visitors restricted/screened  Taken 6/8/2025 2000 by Lulu Salinas RN  Infection Prevention:   environmental surveillance performed   equipment surfaces disinfected   hand hygiene promoted   rest/sleep promoted   single patient room provided   visitors restricted/screened  Goal: Optimal Comfort and Wellbeing  Intervention: Monitor Pain and Promote Comfort  Recent Flowsheet Documentation  Taken 6/9/2025 0400 by Lulu Salinas RN  Pain Management Interventions:   pillow support provided   position adjusted   quiet environment facilitated   relaxation techniques promoted  Taken 6/9/2025 0200 by Lulu Salinas RN  Pain Management Interventions:   pillow support provided   position adjusted   quiet environment facilitated   relaxation techniques promoted  Taken 6/9/2025 0000 by Lulu Salinas RN  Pain Management Interventions:   pillow support provided   position adjusted   quiet environment facilitated   relaxation techniques promoted  Taken 6/8/2025 2200 by Lulu Salinas RN  Pain Management Interventions:   pillow support provided   position adjusted   quiet environment facilitated   relaxation techniques promoted  Taken 6/8/2025 2000 by Lulu Salinas RN  Pain Management Interventions:   pillow support provided   position adjusted   quiet environment facilitated   relaxation techniques promoted  Intervention: Provide Person-Centered Care  Recent Flowsheet Documentation  Taken  6/8/2025 2000 by Lulu Salinas RN  Trust Relationship/Rapport:   care explained   choices provided   questions answered   questions encouraged     Problem: Surgery Nonspecified  Goal: Absence of Bleeding  Intervention: Monitor and Manage Bleeding  Recent Flowsheet Documentation  Taken 6/8/2025 2000 by Lulu Salinas RN  Bleeding Management: dressing monitored  Goal: Anesthesia/Sedation Recovery  Intervention: Optimize Anesthesia Recovery  Recent Flowsheet Documentation  Taken 6/9/2025 0400 by Lulu Salinas, WILLA  Safety Promotion/Fall Prevention:   activity supervised   assistive device/personal items within reach   clutter free environment maintained   fall prevention program maintained   gait belt   lighting adjusted   mobility aid in reach   muscle strengthening facilitated   nonskid shoes/slippers when out of bed   room organization consistent   safety round/check completed  Taken 6/9/2025 0200 by Lulu Salinas RN  Safety Promotion/Fall Prevention:   activity supervised   assistive device/personal items within reach   clutter free environment maintained   fall prevention program maintained   gait belt   lighting adjusted   mobility aid in reach   muscle strengthening facilitated   nonskid shoes/slippers when out of bed   room organization consistent   safety round/check completed  Taken 6/9/2025 0000 by Lulu Salinas RN  Safety Promotion/Fall Prevention:   activity supervised   assistive device/personal items within reach   clutter free environment maintained   fall prevention program maintained   gait belt   lighting adjusted   mobility aid in reach   muscle strengthening facilitated   nonskid shoes/slippers when out of bed   room organization consistent   safety round/check completed  Taken 6/8/2025 2200 by Lulu Salinas RN  Safety Promotion/Fall Prevention:   activity supervised   assistive device/personal items within reach   clutter free environment maintained   fall prevention program maintained   gait belt    lighting adjusted   mobility aid in reach   muscle strengthening facilitated   nonskid shoes/slippers when out of bed   room organization consistent   safety round/check completed  Taken 6/8/2025 2000 by Lulu Salinas RN  Safety Promotion/Fall Prevention:   activity supervised   assistive device/personal items within reach   clutter free environment maintained   fall prevention program maintained   gait belt   lighting adjusted   mobility aid in reach   muscle strengthening facilitated   nonskid shoes/slippers when out of bed   room organization consistent   safety round/check completed  Goal: Optimal Pain Control and Function  Intervention: Prevent or Manage Pain  Recent Flowsheet Documentation  Taken 6/9/2025 0400 by Lulu Salinas, RN  Pain Management Interventions:   pillow support provided   position adjusted   quiet environment facilitated   relaxation techniques promoted  Taken 6/9/2025 0200 by Lulu Salinas RN  Pain Management Interventions:   pillow support provided   position adjusted   quiet environment facilitated   relaxation techniques promoted  Taken 6/9/2025 0000 by Lulu Salinas RN  Pain Management Interventions:   pillow support provided   position adjusted   quiet environment facilitated   relaxation techniques promoted  Taken 6/8/2025 2200 by Lulu Salinas RN  Pain Management Interventions:   pillow support provided   position adjusted   quiet environment facilitated   relaxation techniques promoted  Taken 6/8/2025 2000 by Lulu Salinas RN  Pain Management Interventions:   pillow support provided   position adjusted   quiet environment facilitated   relaxation techniques promoted  Diversional Activities: television  Goal: Effective Oxygenation and Ventilation  Intervention: Optimize Oxygenation and Ventilation  Recent Flowsheet Documentation  Taken 6/9/2025 0500 by Lulu Salinas, RN  Activity Management:   ambulated to bathroom   back to bed  Head of Bed (HOB) Positioning: HOB elevated  Taken 6/9/2025 0400  by Lulu Salinas, RN  Activity Management: activity encouraged  Head of Bed (HOB) Positioning: HOB elevated  Cough And Deep Breathing: done independently per patient  Taken 6/9/2025 0200 by Lulu Salinas RN  Activity Management: activity encouraged  Head of Bed (HOB) Positioning: HOB elevated  Cough And Deep Breathing: done independently per patient  Taken 6/9/2025 0000 by Lulu Salinas RN  Activity Management: activity encouraged  Head of Bed (HOB) Positioning: HOB elevated  Cough And Deep Breathing: done independently per patient  Taken 6/8/2025 2200 by Lulu Sailnas, RN  Activity Management: activity encouraged  Head of Bed (HOB) Positioning: HOB elevated  Cough And Deep Breathing: done independently per patient  Taken 6/8/2025 2000 by Lulu Salinas RN  Activity Management: activity encouraged  Head of Bed (HOB) Positioning: HOB elevated  Cough And Deep Breathing: done independently per patient     Problem: Fall Injury Risk  Goal: Absence of Fall and Fall-Related Injury  Intervention: Identify and Manage Contributors  Recent Flowsheet Documentation  Taken 6/8/2025 2000 by Lulu Salinas RN  Medication Review/Management: medications reviewed  Intervention: Promote Injury-Free Environment  Recent Flowsheet Documentation  Taken 6/9/2025 0400 by Lulu Salinas, RN  Safety Promotion/Fall Prevention:   activity supervised   assistive device/personal items within reach   clutter free environment maintained   fall prevention program maintained   gait belt   lighting adjusted   mobility aid in reach   muscle strengthening facilitated   nonskid shoes/slippers when out of bed   room organization consistent   safety round/check completed  Taken 6/9/2025 0200 by Lulu Salinas, RN  Safety Promotion/Fall Prevention:   activity supervised   assistive device/personal items within reach   clutter free environment maintained   fall prevention program maintained   gait belt   lighting adjusted   mobility aid in reach   muscle strengthening  facilitated   nonskid shoes/slippers when out of bed   room organization consistent   safety round/check completed  Taken 6/9/2025 0000 by Lulu Salinas, RN  Safety Promotion/Fall Prevention:   activity supervised   assistive device/personal items within reach   clutter free environment maintained   fall prevention program maintained   gait belt   lighting adjusted   mobility aid in reach   muscle strengthening facilitated   nonskid shoes/slippers when out of bed   room organization consistent   safety round/check completed  Taken 6/8/2025 2200 by Lulu Salinas, WILLA  Safety Promotion/Fall Prevention:   activity supervised   assistive device/personal items within reach   clutter free environment maintained   fall prevention program maintained   gait belt   lighting adjusted   mobility aid in reach   muscle strengthening facilitated   nonskid shoes/slippers when out of bed   room organization consistent   safety round/check completed  Taken 6/8/2025 2000 by Lulu Salinas, RN  Safety Promotion/Fall Prevention:   activity supervised   assistive device/personal items within reach   clutter free environment maintained   fall prevention program maintained   gait belt   lighting adjusted   mobility aid in reach   muscle strengthening facilitated   nonskid shoes/slippers when out of bed   room organization consistent   safety round/check completed   Goal Outcome Evaluation:   Pt alert and oriented x4 on room air. PICC line in right upper arm, saline locked. Wound vac to left foot incision in place and draining. Pt up to bathroom using knee scooter as needed, voids spontaneously using urinal. Pt complained of no pain throughout shift. Call light within reach.

## 2025-06-09 NOTE — CASE MANAGEMENT/SOCIAL WORK
Case Management Discharge Note      Final Note: Met with patient at the bedside to finalize discharge plan. Patient's plan is home today 6/9 with [facility] for skilled nursing services for wound care needs, PICC care, and infusion therapy. Gail, with [facility], notified of patient's discharge. PT wound care will place home wound vac on patient. [facility] will provide IV antibiotics and education at the bedside prior to discharge. Family will transport. No further discharge needs identified.         Selected Continued Care - Admitted Since 6/3/2025       Destination    No services have been selected for the patient.                Durable Medical Equipment    No services have been selected for the patient.                Dialysis/Infusion Coordination complete.      Service Provider Services Address Phone Fax Patient Preferred    [facility] [address], [place] [id number] [phone] --              Home Medical Care Coordination complete.      Service Provider Services Address Phone Fax Patient Preferred    [facility] [address], [place] [id number] [phone] --              Therapy    No services have been selected for the patient.                Community Resources    No services have been selected for the patient.                Community & DME    No services have been selected for the patient.                    Transportation Services  Private: Car    Final Discharge Disposition Code: 06 - home with home health care

## 2025-06-10 ENCOUNTER — APPOINTMENT (OUTPATIENT)
Dept: PHYSICAL THERAPY | Facility: HOSPITAL | Age: 80
End: 2025-06-10
Payer: MEDICARE

## 2025-06-10 LAB
FUNGUS WND CULT: NORMAL
MYCOBACTERIUM SPEC CULT: NORMAL
NIGHT BLUE STAIN TISS: NORMAL

## 2025-06-11 ENCOUNTER — TRANSCRIBE ORDERS (OUTPATIENT)
Dept: LAB | Facility: HOSPITAL | Age: 80
End: 2025-06-11
Payer: MEDICARE

## 2025-06-11 ENCOUNTER — LAB (OUTPATIENT)
Dept: LAB | Facility: HOSPITAL | Age: 80
End: 2025-06-11
Payer: MEDICARE

## 2025-06-11 DIAGNOSIS — L97.422 ULCER OF LEFT HEEL, WITH FAT LAYER EXPOSED: ICD-10-CM

## 2025-06-11 DIAGNOSIS — G62.9 PERIPHERAL NERVE DISORDER: Primary | ICD-10-CM

## 2025-06-11 DIAGNOSIS — G62.9 PERIPHERAL NERVE DISORDER: ICD-10-CM

## 2025-06-11 LAB
ALBUMIN SERPL-MCNC: 4 G/DL (ref 3.5–5.2)
ALBUMIN/GLOB SERPL: 1 G/DL
ALP SERPL-CCNC: 123 U/L (ref 39–117)
ALT SERPL W P-5'-P-CCNC: 16 U/L (ref 1–41)
ANION GAP SERPL CALCULATED.3IONS-SCNC: 12 MMOL/L (ref 5–15)
AST SERPL-CCNC: 22 U/L (ref 1–40)
BASOPHILS # BLD AUTO: 0.13 10*3/MM3 (ref 0–0.2)
BASOPHILS NFR BLD AUTO: 0.8 % (ref 0–1.5)
BILIRUB SERPL-MCNC: 0.9 MG/DL (ref 0–1.2)
BUN SERPL-MCNC: 22.4 MG/DL (ref 8–23)
BUN/CREAT SERPL: 19.3 (ref 7–25)
CALCIUM SPEC-SCNC: 9.8 MG/DL (ref 8.6–10.5)
CHLORIDE SERPL-SCNC: 102 MMOL/L (ref 98–107)
CO2 SERPL-SCNC: 23 MMOL/L (ref 22–29)
CREAT SERPL-MCNC: 1.16 MG/DL (ref 0.76–1.27)
CRP SERPL-MCNC: 7.39 MG/DL (ref 0–0.5)
DEPRECATED RDW RBC AUTO: 53.8 FL (ref 37–54)
EGFRCR SERPLBLD CKD-EPI 2021: 63.7 ML/MIN/1.73
EOSINOPHIL # BLD AUTO: 0.32 10*3/MM3 (ref 0–0.4)
EOSINOPHIL NFR BLD AUTO: 2 % (ref 0.3–6.2)
ERYTHROCYTE [DISTWIDTH] IN BLOOD BY AUTOMATED COUNT: 15.1 % (ref 12.3–15.4)
ERYTHROCYTE [SEDIMENTATION RATE] IN BLOOD: 94 MM/HR (ref 0–20)
GLOBULIN UR ELPH-MCNC: 4 GM/DL
GLUCOSE SERPL-MCNC: 110 MG/DL (ref 65–99)
HCT VFR BLD AUTO: 42 % (ref 37.5–51)
HGB BLD-MCNC: 13.3 G/DL (ref 13–17.7)
IMM GRANULOCYTES # BLD AUTO: 0.21 10*3/MM3 (ref 0–0.05)
IMM GRANULOCYTES NFR BLD AUTO: 1.3 % (ref 0–0.5)
LYMPHOCYTES # BLD AUTO: 2.7 10*3/MM3 (ref 0.7–3.1)
LYMPHOCYTES NFR BLD AUTO: 16.7 % (ref 19.6–45.3)
MCH RBC QN AUTO: 31.1 PG (ref 26.6–33)
MCHC RBC AUTO-ENTMCNC: 31.7 G/DL (ref 31.5–35.7)
MCV RBC AUTO: 98.4 FL (ref 79–97)
MONOCYTES # BLD AUTO: 1.77 10*3/MM3 (ref 0.1–0.9)
MONOCYTES NFR BLD AUTO: 11 % (ref 5–12)
NEUTROPHILS NFR BLD AUTO: 11 10*3/MM3 (ref 1.7–7)
NEUTROPHILS NFR BLD AUTO: 68.2 % (ref 42.7–76)
NRBC BLD AUTO-RTO: 0 /100 WBC (ref 0–0.2)
PLATELET # BLD AUTO: 230 10*3/MM3 (ref 140–450)
PMV BLD AUTO: 9.7 FL (ref 6–12)
POTASSIUM SERPL-SCNC: 4.2 MMOL/L (ref 3.5–5.2)
PROT SERPL-MCNC: 8 G/DL (ref 6–8.5)
RBC # BLD AUTO: 4.27 10*6/MM3 (ref 4.14–5.8)
SODIUM SERPL-SCNC: 137 MMOL/L (ref 136–145)
WBC NRBC COR # BLD AUTO: 16.13 10*3/MM3 (ref 3.4–10.8)

## 2025-06-11 PROCEDURE — 85025 COMPLETE CBC W/AUTO DIFF WBC: CPT

## 2025-06-11 PROCEDURE — 85652 RBC SED RATE AUTOMATED: CPT

## 2025-06-11 PROCEDURE — 36415 COLL VENOUS BLD VENIPUNCTURE: CPT

## 2025-06-11 PROCEDURE — 80053 COMPREHEN METABOLIC PANEL: CPT

## 2025-06-11 PROCEDURE — 86140 C-REACTIVE PROTEIN: CPT

## 2025-06-13 ENCOUNTER — APPOINTMENT (OUTPATIENT)
Dept: PHYSICAL THERAPY | Facility: HOSPITAL | Age: 80
End: 2025-06-13
Payer: MEDICARE

## 2025-06-17 ENCOUNTER — APPOINTMENT (OUTPATIENT)
Dept: PHYSICAL THERAPY | Facility: HOSPITAL | Age: 80
End: 2025-06-17
Payer: MEDICARE

## 2025-06-17 LAB
FUNGUS WND CULT: NORMAL
MYCOBACTERIUM SPEC CULT: NORMAL
NIGHT BLUE STAIN TISS: NORMAL

## 2025-06-18 ENCOUNTER — OFFICE VISIT (OUTPATIENT)
Dept: ORTHOPEDIC SURGERY | Facility: CLINIC | Age: 80
End: 2025-06-18
Payer: MEDICARE

## 2025-06-18 VITALS — TEMPERATURE: 97.3 F

## 2025-06-18 DIAGNOSIS — L08.9 LEFT FOOT INFECTION: Primary | ICD-10-CM

## 2025-06-18 NOTE — PROGRESS NOTES
[facility] Office Follow Up     Office Follow Up Visit     Date: 06/18/2025   Patient Name: Carlos Culver  MRN: [id number]  YOB: 1945  Chief Complaint:   Chief Complaint   Patient presents with    Post-op     2 weeks s/p FOOT ABSCESS INCISION AND DRAINAGE, EXOSTECTOMY (DOS 6/3/25)     History of Present Illness:   Carlos Culver is a 79 y.o. male who is here today for follow-up status post I&D with exostectomy left lower extremity.  Here today for routine follow-up.  [facility] has been coming to his house 3 times a week for wound vacuum changes.  [facility] is seeing infectious disease and has an appointment with them following today's appointment for further management of his IV antibiotics.  [facility] is coming out and changing his PICC dressing weekly.  No new complaints with left lower extremity.  [facility] left lower extremity feels a little bit more swollen than prior to surgery otherwise no other new complaints.    Subjective   I reviewed the patient's chief complaint, history of present illness, review of systems, past medical history, surgical history, family history, social history, medications and allergy list   Objective    Vital Signs:   Vitals:    06/18/25 1304   Temp: 97.3 °F (36.3 °C)     There is no height or weight on file to calculate BMI.    Ortho Exam:  left LE: Dressing clean dry and intact with no spotting left lower extremity, ACE removed for exam, wound vacuum in place holding suction with scant serosanguineous drainage in cannister. Compartments leg soft and compressible.  Swelling about the left lower extremity consistent with his recovery.  No areas of increased erythema.  No areas of tenderness.  No drainage visible.    Results Review:  Noted    Assessment / Plan    Assessment/Plan:   Diagnoses and all orders for this visit:    1. Left foot infection (Primary)      Returns to clinic for continued management of  his left hindfoot infection recently treated with I&D, exostectomy and wound vacuum placement.  Plan is for patient to continue wound care with regular wound vacuum changes.  Continue antibiotics per ID recommendations.  Will plan to see him back in 2 weeks for reevaluation.  I did discuss with patient that if possible I would like to coordinate transitioning his wound care/wound vacuum management to [facility].  Order placed for PT wound care at [facility].  Happy with his progress.  It was a pleasure seeing him today.    Follow Up:   Return in about 2 weeks (around 7/2/2025) for Recheck.      Mao Mckay MD  [facility]

## 2025-06-19 ENCOUNTER — TELEPHONE (OUTPATIENT)
Dept: PHYSICAL THERAPY | Facility: HOSPITAL | Age: 80
End: 2025-06-19

## 2025-06-19 NOTE — TELEPHONE ENCOUNTER
Thelma from Dr. Mckay's office left  to schedule patient for outpatient PT wound care.  PT returned call to [phone], spoke with office staff (Yoel) and PT noted that patient currently is active with home health services for both the wound vac and IV antibiotics.  PT stated that we can schedule patient for outpatient PT wound care if home health services are discontinued, so his IV antibiotics wound also have to be changed to outpatient services.  She states she will look further into patient situation and call us back.  Safia Arambula, PT 6/19/2025 15:54 EDT

## 2025-06-19 NOTE — TELEPHONE ENCOUNTER
My office is working on this. I would really like you guys to provide his wound care/wound vac mgmt because you guys do such a great job. Thanks for the message/update.

## 2025-06-20 ENCOUNTER — TELEPHONE (OUTPATIENT)
Dept: ORTHOPEDIC SURGERY | Facility: CLINIC | Age: 80
End: 2025-06-20

## 2025-06-20 ENCOUNTER — APPOINTMENT (OUTPATIENT)
Dept: PHYSICAL THERAPY | Facility: HOSPITAL | Age: 80
End: 2025-06-20
Payer: MEDICARE

## 2025-06-20 NOTE — TELEPHONE ENCOUNTER
JOSH WITH [facility] WANT REQUESTING TO SPEAK WITH SOMEONE CLINICAL ABOUT COORDINATING WOULD CARE FOR PATIENT.     THEY WERE TOLD THAT PATIENT WAS GOING TO COME IN FOR WOULD CARE AND IF THAT'S THE CASE, THEY WOULD HAVE TO DISCHARGE PATIENT AND WOULD NEED TO KNOW AS SOON AS POSSIBLE.     SHE ALSO STATED THAT THEY ARE SUPPOSED TO GO TO HIS HOUSE TODAY AND DO HIS WOUND CARE.

## 2025-06-23 ENCOUNTER — TELEPHONE (OUTPATIENT)
Dept: ORTHOPEDIC SURGERY | Facility: CLINIC | Age: 80
End: 2025-06-23
Payer: MEDICARE

## 2025-06-23 NOTE — TELEPHONE ENCOUNTER
Caller: Carlos Culver    Relationship to patient: Self    Best call back number: [phone]     Patient is needing: PATIENT RECEIVED A MESSAGE ON [employer] ABOUT SHORT TERM DISABILITY BUT HE IS RETIRED AND DOES NOT NEED SHORT TERM DISABILITY

## 2025-06-24 ENCOUNTER — APPOINTMENT (OUTPATIENT)
Dept: PHYSICAL THERAPY | Facility: HOSPITAL | Age: 80
End: 2025-06-24
Payer: MEDICARE

## 2025-06-24 LAB
FUNGUS WND CULT: NORMAL
MYCOBACTERIUM SPEC CULT: NORMAL
NIGHT BLUE STAIN TISS: NORMAL

## 2025-06-24 NOTE — TELEPHONE ENCOUNTER
Spoke with patient and explained the FMLA/ Short term disability paperwork he saw in his chart is for his daughter to be able to help him back and forth to appointments.

## 2025-06-25 ENCOUNTER — HOSPITAL ENCOUNTER (OUTPATIENT)
Dept: PHYSICAL THERAPY | Facility: HOSPITAL | Age: 80
Setting detail: THERAPIES SERIES
Discharge: HOME | End: 2025-06-25
Payer: MEDICARE

## 2025-06-25 ENCOUNTER — HOSPITAL ENCOUNTER (OUTPATIENT)
Dept: ONCOLOGY | Facility: HOSPITAL | Age: 80
Discharge: HOME | End: 2025-06-25
Admitting: INTERNAL MEDICINE
Payer: MEDICARE

## 2025-06-25 VITALS
SYSTOLIC BLOOD PRESSURE: 134 MMHG | DIASTOLIC BLOOD PRESSURE: 82 MMHG | TEMPERATURE: 97.4 F | HEART RATE: 99 BPM | RESPIRATION RATE: 18 BRPM

## 2025-06-25 DIAGNOSIS — L03.116 CELLULITIS OF LEFT FOOT: ICD-10-CM

## 2025-06-25 DIAGNOSIS — S81.802D MULTIPLE OPEN WOUNDS OF LOWER EXTREMITY, COMPLICATED, LEFT, SUBSEQUENT ENCOUNTER: ICD-10-CM

## 2025-06-25 DIAGNOSIS — L02.612 FOOT ABSCESS, LEFT: ICD-10-CM

## 2025-06-25 DIAGNOSIS — L98.491 ULCER OF SKIN, CHRONIC, LIMITED TO BREAKDOWN OF SKIN: Primary | ICD-10-CM

## 2025-06-25 DIAGNOSIS — I10 ESSENTIAL HYPERTENSION: ICD-10-CM

## 2025-06-25 DIAGNOSIS — L03.116 CELLULITIS OF LEFT FOOT: Primary | ICD-10-CM

## 2025-06-25 LAB
ALBUMIN SERPL-MCNC: 3.8 G/DL (ref 3.5–5.2)
ALBUMIN/GLOB SERPL: 1.1 G/DL
ALP SERPL-CCNC: 125 U/L (ref 39–117)
ALT SERPL W P-5'-P-CCNC: 24 U/L (ref 1–41)
ANION GAP SERPL CALCULATED.3IONS-SCNC: 13.4 MMOL/L (ref 5–15)
AST SERPL-CCNC: 34 U/L (ref 1–40)
BASOPHILS # BLD AUTO: 0.08 10*3/MM3 (ref 0–0.2)
BASOPHILS NFR BLD AUTO: 0.9 % (ref 0–1.5)
BILIRUB SERPL-MCNC: 0.6 MG/DL (ref 0–1.2)
BUN SERPL-MCNC: 16.4 MG/DL (ref 8–23)
BUN/CREAT SERPL: 15.5 (ref 7–25)
CALCIUM SPEC-SCNC: 9.6 MG/DL (ref 8.6–10.5)
CHLORIDE SERPL-SCNC: 104 MMOL/L (ref 98–107)
CO2 SERPL-SCNC: 23.6 MMOL/L (ref 22–29)
CREAT SERPL-MCNC: 1.06 MG/DL (ref 0.76–1.27)
CRP SERPL-MCNC: 0.71 MG/DL (ref 0–0.5)
DEPRECATED RDW RBC AUTO: 50.4 FL (ref 37–54)
EGFRCR SERPLBLD CKD-EPI 2021: 70.9 ML/MIN/1.73
EOSINOPHIL # BLD AUTO: 0.35 10*3/MM3 (ref 0–0.4)
EOSINOPHIL NFR BLD AUTO: 3.8 % (ref 0.3–6.2)
ERYTHROCYTE [DISTWIDTH] IN BLOOD BY AUTOMATED COUNT: 14.4 % (ref 12.3–15.4)
ERYTHROCYTE [SEDIMENTATION RATE] IN BLOOD: 53 MM/HR (ref 0–20)
GLOBULIN UR ELPH-MCNC: 3.5 GM/DL
GLUCOSE SERPL-MCNC: 89 MG/DL (ref 65–99)
HCT VFR BLD AUTO: 38.6 % (ref 37.5–51)
HGB BLD-MCNC: 12.4 G/DL (ref 13–17.7)
IMM GRANULOCYTES # BLD AUTO: 0.03 10*3/MM3 (ref 0–0.05)
IMM GRANULOCYTES NFR BLD AUTO: 0.3 % (ref 0–0.5)
LYMPHOCYTES # BLD AUTO: 1.96 10*3/MM3 (ref 0.7–3.1)
LYMPHOCYTES NFR BLD AUTO: 21 % (ref 19.6–45.3)
MCH RBC QN AUTO: 31.2 PG (ref 26.6–33)
MCHC RBC AUTO-ENTMCNC: 32.1 G/DL (ref 31.5–35.7)
MCV RBC AUTO: 97.2 FL (ref 79–97)
MONOCYTES # BLD AUTO: 1.02 10*3/MM3 (ref 0.1–0.9)
MONOCYTES NFR BLD AUTO: 10.9 % (ref 5–12)
NEUTROPHILS NFR BLD AUTO: 5.89 10*3/MM3 (ref 1.7–7)
NEUTROPHILS NFR BLD AUTO: 63.1 % (ref 42.7–76)
NRBC BLD AUTO-RTO: 0 /100 WBC (ref 0–0.2)
PLATELET # BLD AUTO: 221 10*3/MM3 (ref 140–450)
PMV BLD AUTO: 9.4 FL (ref 6–12)
POTASSIUM SERPL-SCNC: 4.7 MMOL/L (ref 3.5–5.2)
PROT SERPL-MCNC: 7.3 G/DL (ref 6–8.5)
RBC # BLD AUTO: 3.97 10*6/MM3 (ref 4.14–5.8)
SODIUM SERPL-SCNC: 141 MMOL/L (ref 136–145)
WBC NRBC COR # BLD AUTO: 9.33 10*3/MM3 (ref 3.4–10.8)

## 2025-06-25 PROCEDURE — 97605 NEG PRS WND THER DME<=50SQCM: CPT

## 2025-06-25 PROCEDURE — 80053 COMPREHEN METABOLIC PANEL: CPT | Performed by: INTERNAL MEDICINE

## 2025-06-25 PROCEDURE — 86140 C-REACTIVE PROTEIN: CPT | Performed by: INTERNAL MEDICINE

## 2025-06-25 PROCEDURE — 85025 COMPLETE CBC W/AUTO DIFF WBC: CPT | Performed by: INTERNAL MEDICINE

## 2025-06-25 PROCEDURE — 97162 PT EVAL MOD COMPLEX 30 MIN: CPT

## 2025-06-25 PROCEDURE — 36592 COLLECT BLOOD FROM PICC: CPT

## 2025-06-25 PROCEDURE — 97597 DBRDMT OPN WND 1ST 20 CM/<: CPT

## 2025-06-25 PROCEDURE — 85652 RBC SED RATE AUTOMATED: CPT | Performed by: INTERNAL MEDICINE

## 2025-06-25 NOTE — THERAPY EVALUATION
Outpatient Rehabilitation - Wound/Debridement Initial Eval  [facility]     Patient Name: Carlos Culver  : 1945  MRN: [id number]  Today's Date: 2025                  Admit Date: 2025    Visit Dx:    ICD-10-CM ICD-9-CM   1. Ulcer of skin, chronic, limited to breakdown of skin  L98.491 707.9   2. Multiple open wounds of lower extremity, complicated, left, subsequent encounter  S81.802D V58.89     894.1   3. Foot abscess, left  L02.612 682.7   4. Cellulitis of left foot  L03.116 682.7     L lateral foot      L plantar foot        Patient Active Problem List   Diagnosis    Cellulitis of left foot    GUCCI (acute kidney injury)    Essential hypertension    Leukocytosis    Osteomyelitis of left foot    Chronic anticoagulation    Neuropathy    Elevated transaminase level    CKD (chronic kidney disease) stage 2, GFR 60-89 ml/min    Skin ulcer of right heel    Chronic multifocal osteomyelitis of left foot    Contracture of joint of foot, left    MARTINEZ (dyspnea on exertion)    Neuropathic ulcer, limited to breakdown of skin    Hyperlipidemia LDL goal <100    A-fib    S/P transmetatarsal amputation of foot, left    Leukocytosis, likely reactive    Acute postoperative pain    Alcohol abuse    Staphylococcus aureus bacteremia    Acute osteomyelitis of left ankle or foot    History of [place]art amputation of left foot    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 evidence of necrosis    Ulcer of left foot    Left foot infection    Abscess of left foot, status post I&D, exostectomy        Past Medical History:   Diagnosis Date    A-fib     Acute kidney failure     Elevated cholesterol     Hard of hearing     Hyperlipidemia     Hypertension     Osteomyelitis     Renal disorder     Wears hearing aid in both ears     Wears reading eyeglasses         Past Surgical History:   Procedure Laterality Date    AMPUTATION DIGIT Left 2020    Procedure: AMPUTATION LEFT 2ND  TOE;  Surgeon: Idania Osborn MD;  Location:  [facility];  Service: Orthopedics    AMPUTATION DIGIT Left 12/08/2020    Procedure: TRANSMETATARSAL AMPUTATION LEFT;  Surgeon: Idania Osborn MD;  Location:  [facility] OR;  Service: Orthopedics;  Laterality: Left;    APPENDECTOMY      COLONOSCOPY  2006    FOOT SURGERY Left 12/09/2020    (L) transmetatarsal amputation 12/09/2020 - Dr. Idania Osborn    INCISION AND DRAINAGE LEG Left 10/23/2018    Procedure: INCISION AND DRAINAGE LEFT FOOT;  Surgeon: Idania Osborn MD;  Location:  [facility];  Service: Orthopedics    INCISION AND DRAINAGE LEG Left 07/21/2023    Procedure: FOOT INCISION AND DRAINAGE; REVISION AMPUTATION; ACHILLES LENGTHENING -LEFT;  Surgeon: Mao Mckay MD;  Location:  [facility] OR;  Service: Orthopedics;  Laterality: Left;    INCISION AND DRAINAGE LEG Left 6/3/2025    Procedure: FOOT ABSCESS INCISION AND DRAINAGE, EXOSTECTOMY;  Surgeon: Mao Mckay MD;  Location:  [facility] OR;  Service: Orthopedics;  Laterality: Left;    TOE AMPUTATION      TONSILLECTOMY      VENOUS ACCESS DEVICE (PORT) INSERTION N/A 10/23/2018    Procedure: GROSHONG CATHETER PLACEMENT;  Surgeon: Marquez Simons MD;  Location:  [facility];  Service: General    VENOUS ACCESS DEVICE (PORT) INSERTION N/A 12/09/2020    Procedure: GROSHONG INSERTION;  Surgeon: Antonio Bains MD;  Location:  [facility];  Service: General;  Laterality: N/A;    VENOUS ACCESS DEVICE (PORT) REMOVAL          Patient History       Row Name 06/25/25 1400             History    Chief Complaint Ulcer, wound or other skin conditions;Swelling  -      Date Current Problem(s) Began --  Chronic L foot ulcerations  -      Brief Description of Current Complaint Pt with history of multiple L toe amputations. Pt with multiple, chronic L foot ulcerations with recent L foot abscess formation requiring surgical I&D by Dr. Mckay with subsequent wound vac placement. Pt was DC'd with wound vac and HH for f/u dressing changes,  now presenting to OP for further wound care.  -      Previous treatment for THIS PROBLEM Surgery;Medication  IV abx  -      Surgery Date: 06/03/25  -      Patient/Caregiver Goals Heal wound  -      Patient/Caregiver Goals Comment Wound healing.  -      Patient's Rating of General Health Fair  -      Patient seeing anyone else for problem(s)? Dr. Mckay  -      Related/Recent Hospitalizations Yes  -      Date of Hospitalization 06/03/25  -      Location (Hospital Name) [facility]  -         Pain     Pain Location Foot  -      Pain at Present 0  -         Services    Are you currently receiving Home Health services No  -      Do you plan to receive Home Health services in the near future No  -         Daily Activities    Primary Language English  -      Are you able to read Yes  -      Are you able to write Yes  -      How does patient learn best? Listening;Reading;Demonstration  -      Teaching needs identified Management of Condition  -      Barriers to learning None  -      Pt Participated in POC and Goals Yes  -                User Key  (r) = Recorded By, (t) = Taken By, (c) = Cosigned By      Initials Name Provider Type     Cornelio Tripp, PT Physical Therapist                    EVALUATION   PT Ortho       Row Name 06/25/25 1400       Subjective    Subjective Comments Pt reports he has been having his wound vac dressing changed 3x/week by . Reports when they discharged him from  a couple days ago he thinks they might have taken all of his wound vac supplies/dressings. Reports his wound vac canister became full this morning about 1100 so he just turned the wound vac off. Has a follow up with Dr. Mckay on Wednesday who he thinks hasnt seen the wound since surgery. Continues on IV abx  -       Precautions and Contraindications    Precautions insensate BLE  -       Subjective Pain    Able to rate subjective pain? yes  -    Pre-Treatment Pain Level 0  -     Post-Treatment Pain Level 0  -       Transfers    [place] Level (Floor Transfer) modified independence  -    Assistive Device (Floor Transfer) walker, knee scooter  -    Comment, (Transfers) Long sitting for tx.  -       Gait/Stairs (Locomotion)    [place] Level (Gait) modified independence  -    Assistive Device (Gait) walker, knee scooter  -              User Key  (r) = Recorded By, (t) = Taken By, (c) = Cosigned By      Initials Name Provider Type     Cornelio Tripp, PT Physical Therapist                   LDA Wound       Row Name 06/25/25 1400             Wound 06/05/25 0830 Left  foot Neuropathic Ulcer    Wound - Properties Group Placement Date: 06/05/25  - Placement Time: 0830 - Present on Original Admission: Y  - Side: Left  - Orientation: --  -MF, plantar  Location: foot  - Primary Wound Type: Neuropathic  -    Wound Image Images linked: 1  -      Dressing Appearance intact;moist drainage  -      Dressing Removed Type gauze  4x4 gauze  -      Confirmed Empty Wound Bed Yes, visual inspection of wound bed  -      Base moist;pink;red;yellow;slough  pale pink/yellow  -      Periwound intact;dry;swelling  -      Periwound Temperature warm  -      Periwound Skin Turgor soft  -      Edges open  -      Wound Length (cm) 1.4 cm  -      Wound Width (cm) 1.3 cm  -      Wound Depth (cm) 0.1 cm  -      Wound Surface Area (cm^2) 1.43 cm^2  -      Wound Volume (cm^3) 0.095 cm^3  -      Drainage Characteristics/Odor yellow;green  slight green tint  -      Drainage Amount small  -      Care, Wound cleansed with;soap and water;wound cleanser;debrided  -      Dressing Care dressing changed;silver impregnated;low-adherent;foam;gauze  Mepilex Ag, Vac drape, kerlix ACE  -      Periwound Care cleansed with pH balanced cleanser;dry periwound area maintained  -      Retired Wound - Properties Group Placement Date: 06/05/25  - Placement Time: 0830  -  Present on Original Admission: Y  -MF Side: Left  -MF Orientation: --  -MF, plantar  Location: foot  -MF    Retired Wound - Properties Group Placement Date: 06/05/25  - Placement Time: 0830  -MF Present on Original Admission: Y  -MF Side: Left  -MF Orientation: --  -MF, plantar  Location: foot  -MF    Retired Wound - Properties Group Date first assessed: 06/05/25  - Time first assessed: 0830  -MF Present on Original Admission: Y  -MF Side: Left  -MF Location: foot  -MF       Wound 06/05/25 0830 Left heel    Wound - Properties Group Placement Date: 06/05/25  - Placement Time: 0830  -MF Present on Original Admission: Y  -MF Side: Left  -MF Location: heel  -MF    Dressing Appearance dry;intact  -LH      Dressing Removed Type gauze  4x4 gauze only  -      Confirmed Empty Wound Bed Yes, visual inspection of wound bed  -      Base necrotic;yellow;slough;red;other (see comments)  Fully necrotic prior to debridement,  -      Periwound intact;dry;swelling;warm  -      Periwound Temperature warm  -      Periwound Skin Turgor soft  -      Edges open  -      Wound Length (cm) 1.4 cm  -      Wound Width (cm) 1.5 cm  -      Wound Depth (cm) --  obscured  -      Wound Surface Area (cm^2) 1.65 cm^2  -      Drainage Characteristics/Odor yellow;green  slight greent tint  -      Drainage Amount small  -      Care, Wound cleansed with;soap and water;wound cleanser;debrided  -      Dressing Care dressing changed;antimicrobial agent applied;foam;silver impregnated;low-adherent  HFBt, Mepilex Ag, vac drape, kerlix, ACE  -      Periwound Care cleansed with pH balanced cleanser;dry periwound area maintained  -      Retired Wound - Properties Group Placement Date: 06/05/25  - Placement Time: 0830  -MF Present on Original Admission: Y  -MF Side: Left  -MF Location: heel  -MF    Retired Wound - Properties Group Placement Date: 06/05/25  - Placement Time: 0830  -MF Present on Original Admission: Y  -MF  Side: Left  -MF Location: heel  -MF    Retired Wound - Properties Group Date first assessed: 06/05/25  -MF Time first assessed: 0830  -MF Present on Original Admission: Y  -MF Side: Left  -MF Location: heel  -MF       Wound 05/08/25 Left lateral foot Surgical    Wound - Properties Group Placement Date: 05/08/25  -KW Side: Left  -KW Orientation: lateral  -KW Location: foot  -KW Primary Wound Type: Surgical  -MF    Wound Image Images linked: 1  -      Dressing Appearance intact;moist drainage  -      Dressing Removed Type foam  wound vac (Vac unit turned OFF)  -      Confirmed Empty Wound Bed Yes, visual inspection of wound bed;Yes, probed  -      Base moist;necrotic;yellow;slough;exposed structure;pink;red;white;other (see comments)   Loose, eroded bone fragments, pale yellow slough covering a majority of wound base.  -      Periwound intact;dry;other (see comments);swelling;redness;pale white;macerated  mild periwound erythema and maceration  -      Periwound Temperature warm  -      Periwound Skin Turgor soft  -      Edges callused  -      Wound Length (cm) 9 cm  -      Wound Width (cm) 1.1 cm  -      Wound Depth (cm) 5.6 cm  -      Wound Surface Area (cm^2) 7.78 cm^2  -      Wound Volume (cm^3) 29.028 cm^3  -      Drainage Characteristics/Odor yellow;serosanguineous  slightly viscous/gelatinous serous drainage similar to synovial fluid.  -      Drainage Amount large  -      Care, Wound cleansed with;soap and water;wound cleanser;debrided;negative pressure wound therapy  -      Dressing Care dressing changed  wound vac  -      Periwound Care cleansed with pH balanced cleanser;dry periwound area maintained;barrier film applied;other (see comments)  Nosting, stomapaste, border drape  -      Wound Output (mL) 300  canister changed  -      Retired Wound - Properties Group Placement Date: 05/08/25  -KW Side: Left  -KW Orientation: lateral  -KW Location: foot  -KW    Retired Wound  - Properties Group Placement Date: 05/08/25  -KW Side: Left  -KW Orientation: lateral  -KW Location: foot  -KW    Retired Wound - Properties Group Date first assessed: 05/08/25  -KW Side: Left  -KW Location: foot  -KW       NPWT (Negative Pressure Wound Therapy) 06/25/25 L lateral foot incision    NPWT (Negative Pressure Wound Therapy) - Properties Group Placement Date: 06/25/25  - Location: L lateral foot incision  -LH    Therapy Setting continuous therapy  -      Dressing foam, white;foam, black  -LH      Pressure Setting 150 mmHg  -LH      Sponges Inserted 2  1 white, 1 black  -LH      Sponges Removed 2  2 black  -LH      Finger sweep complete Yes  -LH      Retired NPWT (Negative Pressure Wound Therapy) - Properties Group Placement Date: 06/25/25  - Location: L lateral foot incision  -LH    Retired NPWT (Negative Pressure Wound Therapy) - Properties Group Placement Date: 06/25/25  - Location: L lateral foot incision  -LH    Retired NPWT (Negative Pressure Wound Therapy) - Properties Group Placement Date: 06/25/25  - Location: L lateral foot incision  -LH              User Key  (r) = Recorded By, (t) = Taken By, (c) = Cosigned By      Initials Name Provider Type    Koko Reynolds, PT Physical Therapist     Cornelio Tripp, PT Physical Therapist    KW Melissa Lang, DORINDA Physical Therapist                      WOUND DEBRIDEMENT  Total area of Debridement: 8cm2  Debridement Site 1  Location- Site 1: L plantar foot wound  Selective Debridement- Site 1: Wound Surface <20cmsq  Instruments- Site 1: tweezers  Excised Tissue Description- Site 1: minimum, slough  Bleeding- Site 1: none   Debridement Site 2  Location- Site 2: L lateral foot wound  Selective Debridement- Site 2: Wound Surface <20cmsq  Instruments- Site 2: tweezers, #15, scapel  Excised Tissue Description- Site 2: moderate, slough, other (comment) (loose, eroded bone fragments)  Bleeding- Site 2: scant   Debridement Site 3  Location-  Site 3: L heel wound  Selective Debridement- Site 3: Wound Surface <20cmsq  Instruments- Site 3: tweezers, #15, scapel  Excised Tissue Description- Site 3: moderate, slough  Bleeding- Site 3: scant      Therapy Education       Row Name 06/25/25 1500             Therapy Education    Education Details Reviewed signs/symptoms of infection and when to seek medical attention. Reviewed importance of keeping dressings dry and intact at all times, do not leave wound open to air and do not get wound wet in the shower. Reviewed current status of wound and potential course of care, will plan to assess wound with Dr. Mckay present next week.  -      Given Bandaging/dressing change;Symptoms/condition management  -      Program New  -      How Provided Verbal;Demonstration  -      Provided to Patient  -      Level of Understanding Teach back education performed;Verbalized  -                User Key  (r) = Recorded By, (t) = Taken By, (c) = Cosigned By      Initials Name Provider Type     Cornelio Tripp, PT Physical Therapist                    Recommendation and Plan   PT Assessment/Plan       Row Name 06/25/25 1500          PT Assessment    Functional Limitations Performance in self-care ADL;Other (comment)  wound management  -     Impairments Integumentary integrity;Sensation  -     Assessment Comments PT wound care initial evaluation complete. Pt with very complex LLE wounds, now s/p L foot abcess surgical I&D with wound vac placement and recent course of  wound care for dressing management. Pt presenting this date with wound vac turned OFF d/t full canister. Upon removal of wound vac dressing pt with mild periwound maceration and mild erythema. Pt with mild/moderate LLE edema. Pt's L lateral foot incision with notable wound depth along with considerable pale yellow slough covering nearly the entire wound base surface. Upon exploration of depth of wound PT noted several areas of loose, eroded bone  fragments at the base of the wound. PT packed full depth of the wound with white foam this date to help better promote granulation at depth. Pt's L heel wound nearly fully necrotic prior to debridement of thick layer of slough to reveal scattered granulation. Of note, pt also with slight green tint to the drainage from the L heel and L plantar foot wounds. PT planning to assess wound with MD present during wound vac dressing change next week. Pt would continue to benefit from further debridement, NPWT, and advanced woudn dressing to promote optimal wound healing potential.  -     Rehab Potential Fair  -     Patient/caregiver participated in establishment of treatment plan and goals Yes  -     Patient would benefit from skilled therapy intervention Yes  -        PT Plan    PT Frequency 2x/week  -     Predicted Duration of Therapy Intervention (PT) 24 visits  -     Planned CPT's? PT EVAL MOD COMPLELITY: 20878;PT SELF CARE/MGMT/TRAIN 15 MIN: 04663;PT NONSELECT DEBRIDE 15 MIN: 52372;PT NEG PRESS WOUND TO 50 SQCM 3: 24873;PT NLFU MIST: 74168;PT UNNA BOOT: 30111;PT MULTI LAYER COMP SYS LE  -     Physical Therapy Interventions (Optional Details) wound care;patient/family education  -     PT Plan Comments Debridement, dressings, wound vac  -               User Key  (r) = Recorded By, (t) = Taken By, (c) = Cosigned By      Initials Name Provider Type     Cornelio Tripp, PT Physical Therapist                      Goals   PT OP Goals       Row Name 25 1516 25 1500       PT Short Term Goals    STG 1 -- Pt will verbalize signs/symptoms of infection and when to seek medical attention.  -    STG 1 Progress -- New  -    STG 2 -- Pt will demonstrate 1cm decrease in wound depth as evidence of wound healing.  -    STG 2 Progress -- New  -    STG 3 -- Pt will demonstrate >50% granulation of wound base as evidence of wound healing.  -    STG 3 Progress -- New  -       Long Term Goals     LTG 1 -- Pt/family to demonstrate independence with home dressing management to promote return to PLOF.  -    LTG 1 Progress -- New  -    LTG 2 -- Pt will demonstrate 3cm decrease in wound depth as evidence of wound healing.  -    LTG 2 Progress -- New  -    LTG 3 -- Pt will demonstrate >90% granulation of wound base as evidence of wound healing.  -    LTG 3 Progress -- New  -       Time Calculation    PT Goal Re-Cert Due Date 09/23/25  - 09/23/25  -              User Key  (r) = Recorded By, (t) = Taken By, (c) = Cosigned By      Initials Name Provider Type     Cornelio Tripp, PT Physical Therapist                    Time Calculation: Start Time: 1300  Untimed Charges  PT Eval/Re-eval Minutes: 49  Wound Care: 97445 Selective debridement, 34324 Neg Press (DME) wound TO 50 sqcm  [id number] debridement: 15  [id number]-Glq Pressure wound to 50 sqcm: 25  Total Minutes  Untimed Charges Total Minutes: 89   Total Minutes: 89  Therapy Charges for Today       Code Description Service Date Service Provider Modifiers Qty    64919688224 HC PT EVAL MOD COMPLEXITY 4 6/25/2025 Cornelio Tripp, PT GP 1    96306396928 HC MARILYN DEBRIDE OPEN WOUND UP TO 20CM 6/25/2025 Cornelio Tripp, PT GP 1    05358081018 HC PT NEG PRESS WOUND TO 50SQCM DME2 6/25/2025 Cornelio Tripp, PT GP 1                  Cornelio Tripp PT  6/25/2025

## 2025-06-27 ENCOUNTER — APPOINTMENT (OUTPATIENT)
Dept: PHYSICAL THERAPY | Facility: HOSPITAL | Age: 80
End: 2025-06-27
Payer: MEDICARE

## 2025-06-28 ENCOUNTER — HOSPITAL ENCOUNTER (OUTPATIENT)
Dept: PHYSICAL THERAPY | Facility: HOSPITAL | Age: 80
Setting detail: THERAPIES SERIES
Discharge: HOME | End: 2025-06-28
Payer: MEDICARE

## 2025-06-28 DIAGNOSIS — L03.116 CELLULITIS OF LEFT FOOT: ICD-10-CM

## 2025-06-28 DIAGNOSIS — L98.491 ULCER OF SKIN, CHRONIC, LIMITED TO BREAKDOWN OF SKIN: ICD-10-CM

## 2025-06-28 DIAGNOSIS — L02.612 FOOT ABSCESS, LEFT: ICD-10-CM

## 2025-06-28 DIAGNOSIS — R60.0 EDEMA OF LEFT LOWER LEG: ICD-10-CM

## 2025-06-28 DIAGNOSIS — S81.802D MULTIPLE OPEN WOUNDS OF LOWER EXTREMITY, COMPLICATED, LEFT, SUBSEQUENT ENCOUNTER: Primary | ICD-10-CM

## 2025-06-28 PROCEDURE — 97597 DBRDMT OPN WND 1ST 20 CM/<: CPT

## 2025-06-28 NOTE — THERAPY WOUND CARE TREATMENT
Outpatient Rehabilitation - Wound/Debridement Treatment Note  [facility]     Patient Name: Carlos Culver  : 1945  MRN: [id number]  Today's Date: 2025                 Admit Date: 2025    Visit Dx:    ICD-10-CM ICD-9-CM   1. Multiple open wounds of lower extremity, complicated, left, subsequent encounter  S81.802D V58.89     894.1   2. Foot abscess, left  L02.612 682.7   3. Ulcer of skin, chronic, limited to breakdown of skin  L98.491 707.9   4. Cellulitis of left foot  L03.116 682.7   5. Edema of left lower leg  R60.0 782.3                     Patient Active Problem List   Diagnosis    Cellulitis of left foot    GUCCI (acute kidney injury)    Essential hypertension    Leukocytosis    Osteomyelitis of left foot    Chronic anticoagulation    Neuropathy    Elevated transaminase level    CKD (chronic kidney disease) stage 2, GFR 60-89 ml/min    Skin ulcer of right heel    Chronic multifocal osteomyelitis of left foot    Contracture of joint of foot, left    MARTINEZ (dyspnea on exertion)    Neuropathic ulcer, limited to breakdown of skin    Hyperlipidemia LDL goal <100    A-fib    S/P transmetatarsal amputation of foot, left    Leukocytosis, likely reactive    Acute postoperative pain    Alcohol abuse    Staphylococcus aureus bacteremia    Acute osteomyelitis of left ankle or foot    History of Chopart amputation of left foot    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 evidence of necrosis    Ulcer of left foot    Left foot infection    Abscess of left foot, status post I&D, exostectomy        Past Medical History:   Diagnosis Date    A-fib     Acute kidney failure     Elevated cholesterol     Hard of hearing     Hyperlipidemia     Hypertension     Osteomyelitis     Renal disorder     Wears hearing aid in both ears     Wears reading eyeglasses         Past Surgical History:   Procedure Laterality Date    AMPUTATION DIGIT Left 2020    Procedure:  AMPUTATION LEFT 2ND TOE;  Surgeon: Idania Osborn MD;  Location: [facility];  Service: Orthopedics    AMPUTATION DIGIT Left 12/08/2020    Procedure: TRANSMETATARSAL AMPUTATION LEFT;  Surgeon: Idania Osborn MD;  Location: [facility];  Service: Orthopedics;  Laterality: Left;    APPENDECTOMY      COLONOSCOPY  2006    FOOT SURGERY Left 12/09/2020    (L) transmetatarsal amputation 12/09/2020 - Dr. Idania Osborn    INCISION AND DRAINAGE LEG Left 10/23/2018    Procedure: INCISION AND DRAINAGE LEFT FOOT;  Surgeon: Idania Osborn MD;  Location: [facility];  Service: Orthopedics    INCISION AND DRAINAGE LEG Left 07/21/2023    Procedure: FOOT INCISION AND DRAINAGE; REVISION AMPUTATION; ACHILLES LENGTHENING -LEFT;  Surgeon: Mao Mckay MD;  Location: [facility];  Service: Orthopedics;  Laterality: Left;    INCISION AND DRAINAGE LEG Left 6/3/2025    Procedure: FOOT ABSCESS INCISION AND DRAINAGE, EXOSTECTOMY;  Surgeon: Mao Mckay MD;  Location: [facility];  Service: Orthopedics;  Laterality: Left;    TOE AMPUTATION      TONSILLECTOMY      VENOUS ACCESS DEVICE (PORT) INSERTION N/A 10/23/2018    Procedure: GROSHONG CATHETER PLACEMENT;  Surgeon: Marquez Simons MD;  Location:  [facility];  Service: General    VENOUS ACCESS DEVICE (PORT) INSERTION N/A 12/09/2020    Procedure: GROSHONG INSERTION;  Surgeon: Antonio Bains MD;  Location:  [facility];  Service: General;  Laterality: N/A;    VENOUS ACCESS DEVICE (PORT) REMOVAL           EVALUATION   PT Ortho       Row Name 06/28/25 1300       Subjective    Subjective Comments No issues with wound vac.  C/o ongoing LLE edema that has been ongoing for about 10 days.  Reports he also was having some chest congestion around the same time but it has improved, no fever.  Pt continues to administer IV abx at home daily, has follow-up with Dr. Young on Tuesday.  -JM       Precautions and Contraindications    Precautions insensate BLE  -JM       Subjective Pain    Able to rate  subjective pain? yes  -    Pre-Treatment Pain Level 0  -    Post-Treatment Pain Level 0  -JM       Transfers    [place] Level (Floor Transfer) modified independence  -    Assistive Device (Floor Transfer) walker, knee scooter  -    Comment, (Transfers) long sitting for tx  -       Gait/Stairs (Locomotion)    [place] Level (Gait) modified independence  -    Assistive Device (Gait) walker, knee scooter  -              User Key  (r) = Recorded By, (t) = Taken By, (c) = Cosigned By      Initials Name Provider Type    Safia Alexander, PT Physical Therapist                     LDA Wound       Row Name 06/28/25 1300             Wound 06/05/25 0830 Left heel    Wound - Properties Group Placement Date: 06/05/25  - Placement Time: 0830  - Present on Original Admission: Y  -MF Side: Left  - Location: heel  -    Wound Image Images linked: 1  -      Dressing Appearance intact;moist drainage  -      Dressing Removed Type foam;other (see comments);gauze;elastic bandage  HFB, ag foam, vac drape  -      Confirmed Empty Wound Bed Yes, visual inspection of wound bed  -      Base necrotic;yellow;slough;red;other (see comments);granulating  granulating with biofilm layer  -      Periwound excoriated;moist;redness;yeast;swelling  -      Periwound Temperature warm  -      Periwound Skin Turgor soft  -      Edges open  -      Drainage Characteristics/Odor yellow  -      Drainage Amount moderate  -      Care, Wound cleansed with;wound cleanser;debrided  -      Dressing Care dressing applied;collagen;silver impregnated;foam;gauze;elastic bandage  faiza, mepilex ag, kerlix, ace wrap  -      Periwound Care cleansed with pH balanced cleanser;dry periwound area maintained;other (see comments)  antifungal ointment to excoriation/yeast  -      Retired Wound - Properties Group Placement Date: 06/05/25  - Placement Time: 0830  - Present on Original Admission: Y  -MF Side: Left   -MF Location: heel  -MF    Retired Wound - Properties Group Placement Date: 06/05/25  - Placement Time: 0830  -MF Present on Original Admission: Y  -MF Side: Left  -MF Location: heel  -MF    Retired Wound - Properties Group Date first assessed: 06/05/25  - Time first assessed: 0830  -MF Present on Original Admission: Y  -MF Side: Left  -MF Location: heel  -MF       Wound 06/05/25 0830 Left  foot Neuropathic Ulcer    Wound - Properties Group Placement Date: 06/05/25  - Placement Time: 0830  -MF Present on Original Admission: Y  -MF Side: Left  -MF Orientation: --  -MF, plantar  Location: foot  -MF Primary Wound Type: Neuropathic  -    Wound Image Images linked: 1  -      Dressing Appearance intact;moist drainage  -      Dressing Removed Type foam;gauze;elastic bandage  -      Confirmed Empty Wound Bed Yes, visual inspection of wound bed  -      Base moist;granulating;red;yellow;slough  beefy granulation under biofilm layer  -      Periwound intact;macerated;pale white;swelling  mod maceration  -      Periwound Temperature warm  -      Periwound Skin Turgor soft  -      Edges open  -      Drainage Characteristics/Odor yellow;serosanguineous  -      Drainage Amount moderate  -      Care, Wound cleansed with;wound cleanser;debrided  -      Dressing Care dressing applied;collagen;silver impregnated;foam;abdominal pad;gauze;elastic bandage  faiza, mepilex ag, ABD kerlix, ace wrap  -      Periwound Care cleansed with pH balanced cleanser;dry periwound area maintained  -      Retired Wound - Properties Group Placement Date: 06/05/25  - Placement Time: 0830  -MF Present on Original Admission: Y  -MF Side: Left  -MF Orientation: --  -MF, plantar  Location: foot  -MF    Retired Wound - Properties Group Placement Date: 06/05/25  - Placement Time: 0830  -MF Present on Original Admission: Y  -MF Side: Left  -MF Orientation: --  -MF, plantar  Location: foot  -MF    Retired Wound -  Properties Group Date first assessed: 06/05/25  -MF Time first assessed: 0830  -MF Present on Original Admission: Y  -MF Side: Left  -MF Location: foot  -MF       Wound 05/08/25 Left lateral foot Surgical    Wound - Properties Group Placement Date: 05/08/25  -KW Side: Left  -KW Orientation: lateral  -KW Location: foot  -KW Primary Wound Type: Surgical  -MF    Wound Image Images linked: 2  -JM      Dressing Appearance intact;moist drainage  -JM      Dressing Removed Type foam;gauze;elastic bandage  vac, kerlix, ace wrap  -JM      Confirmed Empty Wound Bed Yes, finger sweep performed;Yes, visual inspection of wound bed;Yes, probed  -JM      Base moist;necrotic;yellow;slough;exposed structure;pink;red;white;other (see comments)  mixed pink with SQ and slough, no visible bone noted today  -JM      Periwound macerated;pale white;redness;swelling;yeast;excoriated  denuded periwound, moist rash possibly yeast at ankle  -JM      Periwound Temperature warm  -JM      Periwound Skin Turgor soft  -      Edges callused  -JM      Wound Length (cm) 8.5 cm  -JM      Wound Width (cm) 1.5 cm  -JM      Wound Depth (cm) 6 cm  -JM      Wound Surface Area (cm^2) 10.01 cm^2  -JM      Wound Volume (cm^3) 40.055 cm^3  -JM      Drainage Characteristics/Odor yellow;serosanguineous  -JM      Drainage Amount large  -JM      Care, Wound cleansed with;wound cleanser;debrided  -JM      Dressing Care dressing applied;collagen;antimicrobial agent applied;foam;silver impregnated;abdominal pad;gauze;elastic bandage  faiza and saline-moist HFBc to pack (x1 strip), mepilex ag, ABD, kerlix, ace wrap  -JM      Periwound Care cleansed with pH balanced cleanser;dry periwound area maintained;other (see comments)  antifungal ointment to rash  -JM      Wound Output (mL) 200  -JM      Retired Wound - Properties Group Placement Date: 05/08/25  -KW Side: Left  -KW Orientation: lateral  -KW Location: foot  -KW    Retired Wound - Properties Group Placement Date:  05/08/25  -KW Side: Left  -KW Orientation: lateral  -KW Location: foot  -KW    Retired Wound - Properties Group Date first assessed: 05/08/25  -KW Side: Left  -KW Location: foot  -KW       NPWT (Negative Pressure Wound Therapy) 06/25/25 L lateral foot incision    NPWT (Negative Pressure Wound Therapy) - Properties Group Placement Date: 06/25/25  -LH Location: L lateral foot incision  -LH    Therapy Setting vacuum off  on HOLD due to maceration  -      Sponges Removed 2  1 white, 1 black  -      Finger sweep complete Yes  -JM      Retired NPWT (Negative Pressure Wound Therapy) - Properties Group Placement Date: 06/25/25  - Location: L lateral foot incision  -LH    Retired NPWT (Negative Pressure Wound Therapy) - Properties Group Placement Date: 06/25/25  - Location: L lateral foot incision  -LH    Retired NPWT (Negative Pressure Wound Therapy) - Properties Group Placement Date: 06/25/25  - Location: L lateral foot incision  -LH              User Key  (r) = Recorded By, (t) = Taken By, (c) = Cosigned By      Initials Name Provider Type    Koko Reynolds, PT Physical Therapist    Safia Alexander, PT Physical Therapist    Cornelio Kasper, PT Physical Therapist    Melissa Casiano, PT Physical Therapist                      WOUND DEBRIDEMENT  Total area of Debridement: 8cmsq  Debridement Site 1  Location- Site 1: L plantar foot wound  Selective Debridement- Site 1: Wound Surface <20cmsq  Instruments- Site 1: tweezers  Excised Tissue Description- Site 1: maximum, slough, other (comment) (biofilm layer)  Bleeding- Site 1: scant   Debridement Site 2  Location- Site 2: L lateral foot wound  Selective Debridement- Site 2: Wound Surface <20cmsq  Instruments- Site 2: tweezers  Excised Tissue Description- Site 2: minimum, slough  Bleeding- Site 2: scant   Debridement Site 3  Location- Site 3: L heel wound  Selective Debridement- Site 3: Wound Surface <20cmsq  Instruments- Site 3:  curry  Excised Tissue Description- Site 3: moderate, slough  Bleeding- Site 3: scant      Therapy Education       Row Name 06/28/25 1300             Therapy Education    Education Details Discussed need to place vac on HOLD due to maceration and potential yeast rash.  Pt to keep dressing dry/intact, elevate leg to level of heart or higher and float heel with pillow under calf to reduce pressure to posterior heel wound.  Reinforced NWB L foot for wound healing.  -JM      Given Bandaging/dressing change;Symptoms/condition management  -      Program Modified;Reinforced  -      How Provided Verbal;Demonstration  -      Provided to Patient  -      Level of Understanding Teach back education performed;Verbalized  -                User Key  (r) = Recorded By, (t) = Taken By, (c) = Cosigned By      Initials Name Provider Type    Safia Alexander, PT Physical Therapist                    Recommendation and Plan   PT Assessment/Plan       Row Name 06/28/25 1300          PT Assessment    Functional Limitations Performance in self-care ADL;Other (comment)  wound management  -     Impairments Integumentary integrity;Sensation  -     Assessment Comments Pt's wounds with biofilm/slough buildup requiring debridement, but no visible green drainage or bone fragments noted in wound today.  However, pt with denuded macerated periwounds and new rash potentially yeast prompting vac to be HOLD to allow skin to dry.  PT packed surgical wound with faiza and HFBc to help manage exudate with absorptive layers and ace wrap to cover.  Reinforced leg elevation and NWB L foot.  Recommended pt return for tx in 1-2 days to change dressing and assess new maceration.  -     Rehab Potential Fair  -     Patient/caregiver participated in establishment of treatment plan and goals Yes  -     Patient would benefit from skilled therapy intervention Yes  -        PT Plan    PT Frequency 2x/week  -     Physical Therapy  Interventions (Optional Details) patient/family education;wound care  -     PT Plan Comments vac on HOLD, debridement, dressings  -               User Key  (r) = Recorded By, (t) = Taken By, (c) = Cosigned By      Initials Name Provider Type    Safia Alexander, PT Physical Therapist                    Goals   PT OP Goals       Row Name 06/28/25 1359          Time Calculation    PT Goal Re-Cert Due Date 09/23/25  -               User Key  (r) = Recorded By, (t) = Taken By, (c) = Cosigned By      Initials Name Provider Type    Safia Alexander, PT Physical Therapist                    PT Goal Re-Cert Due Date: 09/23/25            Time Calculation: Start Time: 1300  Untimed Charges  22079-Rplonxaeq debridement: 40  Total Minutes  Untimed Charges Total Minutes: 40   Total Minutes: 40  Therapy Charges for Today       Code Description Service Date Service Provider Modifiers Qty    [id number] HC MARILYN DEBRIDE OPEN WOUND UP TO 20CM 6/28/2025 Safia Arambula, PT GP 1                    Safia Arambula PT  6/28/2025

## 2025-06-29 ENCOUNTER — HOSPITAL ENCOUNTER (OUTPATIENT)
Dept: PHYSICAL THERAPY | Facility: HOSPITAL | Age: 80
Setting detail: THERAPIES SERIES
Discharge: HOME | End: 2025-06-29
Payer: MEDICARE

## 2025-06-29 DIAGNOSIS — L98.491 ULCER OF SKIN, CHRONIC, LIMITED TO BREAKDOWN OF SKIN: ICD-10-CM

## 2025-06-29 DIAGNOSIS — R60.0 EDEMA OF LEFT LOWER LEG: ICD-10-CM

## 2025-06-29 DIAGNOSIS — L02.612 FOOT ABSCESS, LEFT: ICD-10-CM

## 2025-06-29 DIAGNOSIS — L03.116 CELLULITIS OF LEFT FOOT: ICD-10-CM

## 2025-06-29 DIAGNOSIS — S81.802D MULTIPLE OPEN WOUNDS OF LOWER EXTREMITY, COMPLICATED, LEFT, SUBSEQUENT ENCOUNTER: Primary | ICD-10-CM

## 2025-06-29 PROCEDURE — 97597 DBRDMT OPN WND 1ST 20 CM/<: CPT

## 2025-06-29 NOTE — THERAPY WOUND CARE TREATMENT
Outpatient Rehabilitation - Wound/Debridement Treatment Note  [facility]     Patient Name: Carlos Culver  : 1945  MRN: [id number]  Today's Date: 2025                 Admit Date: 2025    Visit Dx:    ICD-10-CM ICD-9-CM   1. Multiple open wounds of lower extremity, complicated, left, subsequent encounter  S81.802D V58.89     894.1   2. Foot abscess, left  L02.612 682.7   3. Ulcer of skin, chronic, limited to breakdown of skin  L98.491 707.9   4. Cellulitis of left foot  L03.116 682.7   5. Edema of left lower leg  R60.0 782.3                         Patient Active Problem List   Diagnosis    Cellulitis of left foot    GUCCI (acute kidney injury)    Essential hypertension    Leukocytosis    Osteomyelitis of left foot    Chronic anticoagulation    Neuropathy    Elevated transaminase level    CKD (chronic kidney disease) stage 2, GFR 60-89 ml/min    Skin ulcer of right heel    Chronic multifocal osteomyelitis of left foot    Contracture of joint of foot, left    MARTINEZ (dyspnea on exertion)    Neuropathic ulcer, limited to breakdown of skin    Hyperlipidemia LDL goal <100    A-fib    S/P transmetatarsal amputation of foot, left    Leukocytosis, likely reactive    Acute postoperative pain    Alcohol abuse    Staphylococcus aureus bacteremia    Acute osteomyelitis of left ankle or foot    History of [place] amputation of left foot    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 evidence of necrosis    Ulcer of left foot    Left foot infection    Abscess of left foot, status post I&D, exostectomy        Past Medical History:   Diagnosis Date    A-fib     Acute kidney failure     Elevated cholesterol     Hard of hearing     Hyperlipidemia     Hypertension     Osteomyelitis     Renal disorder     Wears hearing aid in both ears     Wears reading eyeglasses         Past Surgical History:   Procedure Laterality Date    AMPUTATION DIGIT Left 2020    Procedure:  AMPUTATION LEFT 2ND TOE;  Surgeon: Idania Osborn MD;  Location: [facility] OR;  Service: Orthopedics    AMPUTATION DIGIT Left 12/08/2020    Procedure: TRANSMETATARSAL AMPUTATION LEFT;  Surgeon: Idania Osborn MD;  Location: [facility] OR;  Service: Orthopedics;  Laterality: Left;    APPENDECTOMY      COLONOSCOPY  2006    FOOT SURGERY Left 12/09/2020    (L) transmetatarsal amputation 12/09/2020 - Dr. Idania Osborn    INCISION AND DRAINAGE LEG Left 10/23/2018    Procedure: INCISION AND DRAINAGE LEFT FOOT;  Surgeon: Idania Osborn MD;  Location: [facility] OR;  Service: Orthopedics    INCISION AND DRAINAGE LEG Left 07/21/2023    Procedure: FOOT INCISION AND DRAINAGE; REVISION AMPUTATION; ACHILLES LENGTHENING -LEFT;  Surgeon: Mao Mckay MD;  Location: [facility] OR;  Service: Orthopedics;  Laterality: Left;    INCISION AND DRAINAGE LEG Left 6/3/2025    Procedure: FOOT ABSCESS INCISION AND DRAINAGE, EXOSTECTOMY;  Surgeon: Mao Mckay MD;  Location: [facility] OR;  Service: Orthopedics;  Laterality: Left;    TOE AMPUTATION      TONSILLECTOMY      VENOUS ACCESS DEVICE (PORT) INSERTION N/A 10/23/2018    Procedure: GROSHONG CATHETER PLACEMENT;  Surgeon: Marquez Simons MD;  Location: [facility] OR;  Service: General    VENOUS ACCESS DEVICE (PORT) INSERTION N/A 12/09/2020    Procedure: GROSHONG INSERTION;  Surgeon: Antonio Bains MD;  Location:  [facility] OR;  Service: General;  Laterality: N/A;    VENOUS ACCESS DEVICE (PORT) REMOVAL           EVALUATION   PT Ortho       Row Name 06/29/25 1300       Subjective    Subjective Comments No new complaints, states he should have his wound vac supplies delivered by Tuesday.  -JM       Precautions and Contraindications    Precautions insensate BLE  -JM       Subjective Pain    Able to rate subjective pain? yes  -JM    Pre-Treatment Pain Level 0  -JM    Post-Treatment Pain Level 0  -JM    Subjective Pain Comment PN  -JM       Transfers    [place] Level (Floor Transfer) modified  independence  -    Assistive Device (Floor Transfer) walker, knee scooter  -    Comment, (Transfers) long sitting for tx  -       Gait/Stairs (Locomotion)    [place] Level (Gait) modified independence  -    Assistive Device (Gait) walker, knee scooter  -              User Key  (r) = Recorded By, (t) = Taken By, (c) = Cosigned By      Initials Name Provider Type    Safia Alexander PT Physical Therapist                     [facility] Wound       Row Name 06/29/25 1300             Wound 06/05/25 0830 Left heel    Wound - Properties Group Placement Date: 06/05/25  - Placement Time: 0830  -MF Present on Original Admission: Y  -MF Side: Left  -MF Location: heel  -MF    Wound Image Images linked: 2  -      Dressing Appearance intact;moist drainage  -      Dressing Removed Type silver impregnated;foam;gauze;elastic bandage  -      Confirmed Empty Wound Bed Yes, visual inspection of wound bed  -      Base necrotic;yellow;slough;red;other (see comments);granulating  granulating with biofilm layer  -      Periwound excoriated;moist;redness;yeast;swelling;macerated  -      Periwound Temperature warm  -      Periwound Skin Turgor soft  -      Edges open  -      Drainage Characteristics/Odor yellow;serosanguineous  -      Drainage Amount moderate  -      Care, Wound cleansed with;wound cleanser;debrided  -      Dressing Care dressing applied;collagen;silver impregnated;foam  faiza, mepilex ag, ABD kerlix ace  -      Periwound Care cleansed with pH balanced cleanser;dry periwound area maintained;other (see comments)  antifungal ointment to yeast rash  -      Retired Wound - Properties Group Placement Date: 06/05/25  - Placement Time: 0830  -MF Present on Original Admission: Y  -MF Side: Left  -MF Location: heel  -MF    Retired Wound - Properties Group Placement Date: 06/05/25  - Placement Time: 0830  -MF Present on Original Admission: Y  -MF Side: Left  -MF Location: heel  -MF     Retired Wound - Properties Group Date first assessed: 06/05/25  - Time first assessed: 0830  -MF Present on Original Admission: Y  -MF Side: Left  -MF Location: heel  -MF       Wound 06/05/25 0830 Left  foot Neuropathic Ulcer    Wound - Properties Group Placement Date: 06/05/25  - Placement Time: 0830  -MF Present on Original Admission: Y  -MF Side: Left  -MF Orientation: --  -MF, plantar  Location: foot  -MF Primary Wound Type: Neuropathic  -    Wound Image Images linked: 1  -      Dressing Appearance intact;moist drainage  -      Dressing Removed Type foam;gauze;elastic bandage  -      Confirmed Empty Wound Bed Yes, visual inspection of wound bed  -      Base moist;granulating;red;yellow;slough  beefy granulation, scant biofilm  -      Periwound intact;macerated;pale white;swelling  min maceration  -      Periwound Temperature warm  -      Periwound Skin Turgor soft  -      Edges open  -      Drainage Characteristics/Odor yellow;serosanguineous  -      Drainage Amount small  -      Care, Wound cleansed with;wound cleanser;debrided  -      Dressing Care dressing applied;collagen;silver impregnated;foam;gauze;elastic bandage  -      Periwound Care cleansed with pH balanced cleanser;dry periwound area maintained  -      Retired Wound - Properties Group Placement Date: 06/05/25  - Placement Time: 0830  -MF Present on Original Admission: Y  -MF Side: Left  -MF Orientation: --  -MF, plantar  Location: foot  -MF    Retired Wound - Properties Group Placement Date: 06/05/25  - Placement Time: 0830  -MF Present on Original Admission: Y  -MF Side: Left  -MF Orientation: --  -MF, plantar  Location: foot  -MF    Retired Wound - Properties Group Date first assessed: 06/05/25  - Time first assessed: 0830  -MF Present on Original Admission: Y  -MF Side: Left  -MF Location: foot  -MF       Wound 05/08/25 Left lateral foot Surgical    Wound - Properties Group Placement Date: 05/08/25  -  Side: Left  -KW Orientation: lateral  -KW Location: foot  -KW Primary Wound Type: Surgical  -MF    Wound Image Images linked: 2  -JM      Dressing Appearance intact;moist drainage  -      Dressing Removed Type foam;gauze;elastic bandage  HFBc x1, mepilex ag, abd, kerlix, Ace  -      Confirmed Empty Wound Bed Yes, visual inspection of wound bed;Yes, finger sweep performed;Yes, probed  -      Base moist;necrotic;yellow;slough;exposed structure;pink;red;white;other (see comments)  mixed pink with SQ and slough, no visible bone noted today  -      Periwound macerated;pale white;redness;swelling;yeast;excoriated  denuded periwound, moist yeast rash/excoriation at ankle  -      Periwound Temperature warm  -      Periwound Skin Turgor soft  -      Edges callused  -      Drainage Characteristics/Odor yellow;serosanguineous  -      Drainage Amount large  -      Care, Wound cleansed with;wound cleanser;debrided  -      Dressing Care dressing applied;collagen;antimicrobial agent applied;foam;silver impregnated;abdominal pad;gauze;elastic bandage  faiza to depth, saline-moist HFBc x1 to pack, mepilex ag, ABD, kerlix, ace wrap  -      Periwound Care cleansed with pH balanced cleanser;dry periwound area maintained  -      Retired Wound - Properties Group Placement Date: 05/08/25  -KW Side: Left  -KW Orientation: lateral  -KW Location: foot  -KW    Retired Wound - Properties Group Placement Date: 05/08/25  -KW Side: Left  -KW Orientation: lateral  -KW Location: foot  -KW    Retired Wound - Properties Group Date first assessed: 05/08/25  -KW Side: Left  -KW Location: foot  -KW       NPWT (Negative Pressure Wound Therapy) 06/25/25 L lateral foot incision    NPWT (Negative Pressure Wound Therapy) - Properties Group Placement Date: 06/25/25  -LH Location: L lateral foot incision  -    Therapy Setting vacuum off  on HOLD  -      Retired NPWT (Negative Pressure Wound Therapy) - Properties Group Placement  Date: 06/25/25  - Location: L lateral foot incision  -LH    Retired NPWT (Negative Pressure Wound Therapy) - Properties Group Placement Date: 06/25/25  - Location: L lateral foot incision  -LH    Retired NPWT (Negative Pressure Wound Therapy) - Properties Group Placement Date: 06/25/25  - Location: L lateral foot incision  -LH              User Key  (r) = Recorded By, (t) = Taken By, (c) = Cosigned By      Initials Name Provider Type    Koko Reynolds, PT Physical Therapist    Safia Alexander, PT Physical Therapist    Cornelio Kasper, PT Physical Therapist    Melissa Casiano, PT Physical Therapist                      WOUND DEBRIDEMENT  Total area of Debridement: 6cmsq  Debridement Site 1  Location- Site 1: L plantar foot wound  Selective Debridement- Site 1: Wound Surface <20cmsq  Instruments- Site 1: tweezers  Excised Tissue Description- Site 1: moderate, slough  Bleeding- Site 1: scant   Debridement Site 2  Location- Site 2: L lateral foot wound  Selective Debridement- Site 2: Wound Surface <20cmsq  Instruments- Site 2: tweezers  Excised Tissue Description- Site 2: moderate, slough  Bleeding- Site 2: none   Debridement Site 3  Location- Site 3: L heel wound  Selective Debridement- Site 3: Wound Surface <20cmsq  Instruments- Site 3: tweezers  Excised Tissue Description- Site 3: maximum, slough, other (comment) (biofilm)  Bleeding- Site 3: none      Therapy Education       Row Name 06/29/25 1300             Therapy Education    Education Details Continue leg elevation to reduce edema and drainage.  Continue NWB/offloading  -JM      Given Bandaging/dressing change;Symptoms/condition management  -TRUDY      Program Reinforced  -TRUDY      How Provided Verbal;Demonstration  -JM      Provided to Patient  -JM      Level of Understanding Teach back education performed;Verbalized  -JM                User Key  (r) = Recorded By, (t) = Taken By, (c) = Cosigned By      Initials Name Provider Type     Safia Alexander, PT Physical Therapist                    Recommendation and Plan   PT Assessment/Plan       Row Name 06/29/25 1300          PT Assessment    Functional Limitations Performance in self-care ADL;Other (comment)  wound management  -     Impairments Integumentary integrity;Sensation  -     Assessment Comments Pt with ongoing maceration and LLE edema with heavy drainage from wound.  Periwound yeast rash stable, still not appropriate for wound vac yet due to maceration.  PT continued with debridement and advanced dressings, will recommend nystatin powder from [facility] office for yeast.  Pt to return after [facility] appt on Tuesday for additional dressing change and debridement due to maceration.  Still planning to see with Dr. Mckay on Wednesday PM.  -        PT Plan    PT Frequency 2x/week;3x/week  -     Physical Therapy Interventions (Optional Details) patient/family education;wound care  -     PT Plan Comments HOLD vac, debridement/dressings  -               User Key  (r) = Recorded By, (t) = Taken By, (c) = Cosigned By      Initials Name Provider Type    Safia Alexander, PT Physical Therapist                    Goals   PT OP Goals       Row Name 06/29/25 1354          Time Calculation    PT Goal Re-Cert Due Date 09/23/25  -               User Key  (r) = Recorded By, (t) = Taken By, (c) = Cosigned By      Initials Name Provider Type    Safia Alexander, PT Physical Therapist                    PT Goal Re-Cert Due Date: 09/23/25            Time Calculation: Start Time: 1245  Untimed Charges  06072-[place] debridement: 30  Total Minutes  Untimed Charges Total Minutes: 30   Total Minutes: 30  Therapy Charges for Today       Code Description Service Date Service Provider Modifiers Qty    [id number] [facility] MARILYN DEBRIDE OPEN WOUND UP TO 20CM 6/29/2025 Safia Arambula, PT GP 1                    Safia Arambula, PT  6/29/2025

## 2025-06-30 ENCOUNTER — TELEPHONE (OUTPATIENT)
Dept: ONCOLOGY | Facility: HOSPITAL | Age: 80
End: 2025-06-30

## 2025-06-30 ENCOUNTER — TELEPHONE (OUTPATIENT)
Dept: PHYSICAL THERAPY | Facility: HOSPITAL | Age: 80
End: 2025-06-30
Payer: MEDICARE

## 2025-06-30 NOTE — TELEPHONE ENCOUNTER
Spoke with Trish on the RN line for Dr Leon.     Explained that the patient has developed a rash, likely yeast, around the area we are placing the wound vac. PT asked if the MD is agreeable, if he would prescribe some nystatin powder to treat the yeast while still allowing use of NPWT.    Trihs confirmed that Dr. MIRIAN Leon is out of office today but she will give him the message tomorrow.    Melisa Dolan, PT 6/30/2025 09:36 EDT

## 2025-07-01 ENCOUNTER — TELEPHONE (OUTPATIENT)
Dept: ORTHOPEDIC SURGERY | Facility: CLINIC | Age: 80
End: 2025-07-01
Payer: MEDICARE

## 2025-07-01 ENCOUNTER — HOSPITAL ENCOUNTER (OUTPATIENT)
Dept: PHYSICAL THERAPY | Facility: HOSPITAL | Age: 80
Setting detail: THERAPIES SERIES
Discharge: HOME | End: 2025-07-01
Payer: MEDICARE

## 2025-07-01 ENCOUNTER — HOSPITAL ENCOUNTER (OUTPATIENT)
Dept: ONCOLOGY | Facility: HOSPITAL | Age: 80
Discharge: HOME | End: 2025-07-01
Admitting: INTERNAL MEDICINE
Payer: MEDICARE

## 2025-07-01 VITALS
WEIGHT: 215 LBS | DIASTOLIC BLOOD PRESSURE: 76 MMHG | RESPIRATION RATE: 18 BRPM | SYSTOLIC BLOOD PRESSURE: 151 MMHG | BODY MASS INDEX: 30.1 KG/M2 | TEMPERATURE: 97.7 F | HEART RATE: 64 BPM | HEIGHT: 71 IN

## 2025-07-01 DIAGNOSIS — R60.0 EDEMA OF LEFT LOWER LEG: ICD-10-CM

## 2025-07-01 DIAGNOSIS — L02.612 FOOT ABSCESS, LEFT: ICD-10-CM

## 2025-07-01 DIAGNOSIS — L98.491 ULCER OF SKIN, CHRONIC, LIMITED TO BREAKDOWN OF SKIN: ICD-10-CM

## 2025-07-01 DIAGNOSIS — L03.116 CELLULITIS OF LEFT FOOT: ICD-10-CM

## 2025-07-01 DIAGNOSIS — S81.802D MULTIPLE OPEN WOUNDS OF LOWER EXTREMITY, COMPLICATED, LEFT, SUBSEQUENT ENCOUNTER: Primary | ICD-10-CM

## 2025-07-01 LAB
ALBUMIN SERPL-MCNC: 3.8 G/DL (ref 3.5–5.2)
ALBUMIN/GLOB SERPL: 1.3 G/DL
ALP SERPL-CCNC: 112 U/L (ref 39–117)
ALT SERPL W P-5'-P-CCNC: 12 U/L (ref 1–41)
ANION GAP SERPL CALCULATED.3IONS-SCNC: 10 MMOL/L (ref 5–15)
AST SERPL-CCNC: 22 U/L (ref 1–40)
BASOPHILS # BLD AUTO: 0.03 10*3/MM3 (ref 0–0.2)
BASOPHILS NFR BLD AUTO: 0.4 % (ref 0–1.5)
BILIRUB SERPL-MCNC: 0.4 MG/DL (ref 0–1.2)
BUN SERPL-MCNC: 18 MG/DL (ref 8–23)
BUN/CREAT SERPL: 18.9 (ref 7–25)
CALCIUM SPEC-SCNC: 8.8 MG/DL (ref 8.6–10.5)
CHLORIDE SERPL-SCNC: 104 MMOL/L (ref 98–107)
CO2 SERPL-SCNC: 26 MMOL/L (ref 22–29)
CREAT SERPL-MCNC: 0.95 MG/DL (ref 0.76–1.27)
CRP SERPL-MCNC: 0.3 MG/DL (ref 0–0.5)
DEPRECATED RDW RBC AUTO: 53.4 FL (ref 37–54)
EGFRCR SERPLBLD CKD-EPI 2021: 80.9 ML/MIN/1.73
EOSINOPHIL # BLD AUTO: 0.57 10*3/MM3 (ref 0–0.4)
EOSINOPHIL NFR BLD AUTO: 7.4 % (ref 0.3–6.2)
ERYTHROCYTE [DISTWIDTH] IN BLOOD BY AUTOMATED COUNT: 14.6 % (ref 12.3–15.4)
ERYTHROCYTE [SEDIMENTATION RATE] IN BLOOD: 40 MM/HR (ref 0–20)
FUNGUS WND CULT: NORMAL
GLOBULIN UR ELPH-MCNC: 2.9 GM/DL
GLUCOSE SERPL-MCNC: 94 MG/DL (ref 65–99)
HCT VFR BLD AUTO: 36.4 % (ref 37.5–51)
HGB BLD-MCNC: 11.5 G/DL (ref 13–17.7)
IMM GRANULOCYTES # BLD AUTO: 0.02 10*3/MM3 (ref 0–0.05)
IMM GRANULOCYTES NFR BLD AUTO: 0.3 % (ref 0–0.5)
LYMPHOCYTES # BLD AUTO: 1.63 10*3/MM3 (ref 0.7–3.1)
LYMPHOCYTES NFR BLD AUTO: 21.1 % (ref 19.6–45.3)
MCH RBC QN AUTO: 31.3 PG (ref 26.6–33)
MCHC RBC AUTO-ENTMCNC: 31.6 G/DL (ref 31.5–35.7)
MCV RBC AUTO: 99.2 FL (ref 79–97)
MONOCYTES # BLD AUTO: 0.85 10*3/MM3 (ref 0.1–0.9)
MONOCYTES NFR BLD AUTO: 11 % (ref 5–12)
MYCOBACTERIUM SPEC CULT: NORMAL
NEUTROPHILS NFR BLD AUTO: 4.62 10*3/MM3 (ref 1.7–7)
NEUTROPHILS NFR BLD AUTO: 59.8 % (ref 42.7–76)
NIGHT BLUE STAIN TISS: NORMAL
PLATELET # BLD AUTO: 163 10*3/MM3 (ref 140–450)
PMV BLD AUTO: 9.7 FL (ref 6–12)
POTASSIUM SERPL-SCNC: 4.5 MMOL/L (ref 3.5–5.2)
PROT SERPL-MCNC: 6.7 G/DL (ref 6–8.5)
RBC # BLD AUTO: 3.67 10*6/MM3 (ref 4.14–5.8)
SODIUM SERPL-SCNC: 140 MMOL/L (ref 136–145)
WBC NRBC COR # BLD AUTO: 7.72 10*3/MM3 (ref 3.4–10.8)

## 2025-07-01 PROCEDURE — 85025 COMPLETE CBC W/AUTO DIFF WBC: CPT | Performed by: INTERNAL MEDICINE

## 2025-07-01 PROCEDURE — 36592 COLLECT BLOOD FROM PICC: CPT

## 2025-07-01 PROCEDURE — 80053 COMPREHEN METABOLIC PANEL: CPT | Performed by: INTERNAL MEDICINE

## 2025-07-01 PROCEDURE — 85652 RBC SED RATE AUTOMATED: CPT | Performed by: INTERNAL MEDICINE

## 2025-07-01 PROCEDURE — 97597 DBRDMT OPN WND 1ST 20 CM/<: CPT

## 2025-07-01 PROCEDURE — 86140 C-REACTIVE PROTEIN: CPT | Performed by: INTERNAL MEDICINE

## 2025-07-01 NOTE — TELEPHONE ENCOUNTER
Wound care is calling regarding a prescription that was sent in for the patient.    Please advise.     Jade  [phone] ext:[phone]

## 2025-07-01 NOTE — THERAPY WOUND CARE TREATMENT
Outpatient Rehabilitation - Wound/Debridement Treatment Note  [facility]     Patient Name: Carlos Culver  : 1945  MRN: [id number]  Today's Date: 2025                 Admit Date: 2025    Visit Dx:    ICD-10-CM ICD-9-CM   1. Multiple open wounds of lower extremity, complicated, left, subsequent encounter  S81.802D V58.89     894.1   2. Foot abscess, left  L02.612 682.7   3. Cellulitis of left foot  L03.116 682.7   4. Edema of left lower leg  R60.0 782.3   5. Ulcer of skin, chronic, limited to breakdown of skin  L98.491 707.9       Patient Active Problem List   Diagnosis    Cellulitis of left foot    GUCCI (acute kidney injury)    Essential hypertension    Leukocytosis    Osteomyelitis of left foot    Chronic anticoagulation    Neuropathy    Elevated transaminase level    CKD (chronic kidney disease) stage 2, GFR 60-89 ml/min    Skin ulcer of right heel    Chronic multifocal osteomyelitis of left foot    Contracture of joint of foot, left    MARTINEZ (dyspnea on exertion)    Neuropathic ulcer, limited to breakdown of skin    Hyperlipidemia LDL goal <100    A-fib    S/P transmetatarsal amputation of foot, left    Leukocytosis, likely reactive    Acute postoperative pain    Alcohol abuse    Staphylococcus aureus bacteremia    Acute osteomyelitis of left ankle or foot    History of Chopart amputation of left foot    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 evidence of necrosis    Ulcer of left foot    Left foot infection    Abscess of left foot, status post I&D, exostectomy        Past Medical History:   Diagnosis Date    A-fib     Acute kidney failure     Elevated cholesterol     Hard of hearing     Hyperlipidemia     Hypertension     Osteomyelitis     Renal disorder     Wears hearing aid in both ears     Wears reading eyeglasses         Past Surgical History:   Procedure Laterality Date    AMPUTATION DIGIT Left 2020    Procedure: AMPUTATION LEFT 2ND  TOE;  Surgeon: Idania Osborn MD;  Location: [facility];  Service: Orthopedics    AMPUTATION DIGIT Left 12/08/2020    Procedure: TRANSMETATARSAL AMPUTATION LEFT;  Surgeon: Idania Osborn MD;  Location: [facility];  Service: Orthopedics;  Laterality: Left;    APPENDECTOMY      COLONOSCOPY  2006    FOOT SURGERY Left 12/09/2020    (L) transmetatarsal amputation 12/09/2020 - Dr. Idania Osborn    INCISION AND DRAINAGE LEG Left 10/23/2018    Procedure: INCISION AND DRAINAGE LEFT FOOT;  Surgeon: Idania Osborn MD;  Location: [facility];  Service: Orthopedics    INCISION AND DRAINAGE LEG Left 07/21/2023    Procedure: FOOT INCISION AND DRAINAGE; REVISION AMPUTATION; ACHILLES LENGTHENING -LEFT;  Surgeon: Mao Mckay MD;  Location: [facility];  Service: Orthopedics;  Laterality: Left;    INCISION AND DRAINAGE LEG Left 6/3/2025    Procedure: FOOT ABSCESS INCISION AND DRAINAGE, EXOSTECTOMY;  Surgeon: Mao Mckay MD;  Location: [facility];  Service: Orthopedics;  Laterality: Left;    TOE AMPUTATION      TONSILLECTOMY      VENOUS ACCESS DEVICE (PORT) INSERTION N/A 10/23/2018    Procedure: GROSHONG CATHETER PLACEMENT;  Surgeon: Marquez Simons MD;  Location: [facility];  Service: General    VENOUS ACCESS DEVICE (PORT) INSERTION N/A 12/09/2020    Procedure: GROSHONG INSERTION;  Surgeon: Antonio Bains MD;  Location: [facility];  Service: General;  Laterality: N/A;    VENOUS ACCESS DEVICE (PORT) REMOVAL           EVALUATION   PT Ortho       Row Name 07/01/25 1030       Subjective    Subjective Comments Pt with no new issues  -MF       Precautions and Contraindications    Precautions insensate BLE  -MF       Subjective Pain    Able to rate subjective pain? yes  -MF    Pre-Treatment Pain Level 0  -MF    Post-Treatment Pain Level 0  -MF    Subjective Pain Comment PN  -MF       Transfers    [place] Level (Floor Transfer) modified independence  -MF    Assistive Device (Floor Transfer) walker, knee scooter  -MF     Comment, (Transfers) long sitting for tx  -MF       Gait/Stairs (Locomotion)    [place] Level (Gait) modified independence  -MF    Assistive Device (Gait) walker, knee scooter  -MF              User Key  (r) = Recorded By, (t) = Taken By, (c) = Cosigned By      Initials Name Provider Type    MF Koko Barraza, PT Physical Therapist                     LDA Wound       Row Name 07/01/25 1030             Wound 06/05/25 0830 Left heel    Wound - Properties Group Placement Date: 06/05/25  - Placement Time: 0830  -MF Present on Original Admission: Y  -MF Side: Left  -MF Location: heel  -MF    Dressing Appearance intact;moist drainage  -MF      Dressing Removed Type foam;low-adherent;silver impregnated  -MF      Confirmed Empty Wound Bed Yes, visual inspection of wound bed  -MF      Base necrotic;yellow;slough;red;granulating  -MF      Periwound excoriated;moist;redness;yeast;swelling;macerated  -MF      Periwound Temperature warm  -MF      Periwound Skin Turgor soft  -MF      Edges open  -MF      Drainage Characteristics/Odor serosanguineous  -MF      Drainage Amount small  -MF      Care, Wound irrigated with;wound cleanser;debrided  -MF      Dressing Care foam;low-adherent;silver impregnated  faiza Ag with mepilex Ag foam  -MF      Periwound Care dry periwound area maintained  -MF      Retired Wound - Properties Group Placement Date: 06/05/25  - Placement Time: 0830  -MF Present on Original Admission: Y  -MF Side: Left  -MF Location: heel  -MF    Retired Wound - Properties Group Placement Date: 06/05/25  - Placement Time: 0830  -MF Present on Original Admission: Y  -MF Side: Left  -MF Location: heel  -MF    Retired Wound - Properties Group Date first assessed: 06/05/25  -MF Time first assessed: 0830  -MF Present on Original Admission: Y  -MF Side: Left  -MF Location: heel  -MF       Wound 06/05/25 0830 Left  foot Neuropathic Ulcer    Wound - Properties Group Placement Date: 06/05/25  -MF Placement Time:  0830  -MF Present on Original Admission: Y  -MF Side: Left  -MF Orientation: --  -MF, plantar  Location: foot  -MF Primary Wound Type: Neuropathic  -MF    Dressing Appearance intact;moist drainage  -MF      Dressing Removed Type foam;silver impregnated  -MF      Confirmed Empty Wound Bed Yes, visual inspection of wound bed  -MF      Base moist;granulating;red;yellow;slough  -MF      Periwound intact;macerated;pale white;swelling  -MF      Periwound Temperature warm  -MF      Periwound Skin Turgor soft  -MF      Edges open  -MF      Drainage Characteristics/Odor serosanguineous  -MF      Drainage Amount small  -MF      Care, Wound irrigated with;wound cleanser;debrided  -MF      Dressing Care foam;low-adherent  faiza Ag mepilex Ag foam with ABD pad and kerlix  -MF      Periwound Care cleansed with pH balanced cleanser  -MF      Retired Wound - Properties Group Placement Date: 06/05/25  -MF Placement Time: 0830  -MF Present on Original Admission: Y  -MF Side: Left  -MF Orientation: --  -MF, plantar  Location: foot  -MF    Retired Wound - Properties Group Placement Date: 06/05/25  -MF Placement Time: 0830  -MF Present on Original Admission: Y  -MF Side: Left  -MF Orientation: --  -MF, plantar  Location: foot  -MF    Retired Wound - Properties Group Date first assessed: 06/05/25  -MF Time first assessed: 0830  -MF Present on Original Admission: Y  -MF Side: Left  -MF Location: foot  -MF       Wound 05/08/25 Left lateral foot Surgical    Wound - Properties Group Placement Date: 05/08/25  -KW Side: Left  -KW Orientation: lateral  -KW Location: foot  -KW Primary Wound Type: Surgical  -MF    Dressing Appearance intact;moist drainage  -MF      Dressing Removed Type foam;low-adherent;silver impregnated  -MF      Confirmed Empty Wound Bed Yes, visual inspection of wound bed;Yes, probed  -MF      Base moist;necrotic;yellow;slough;exposed structure;pink;red;white  -MF      Periwound indurated;macerated  -MF      Periwound  Temperature warm  -MF      Periwound Skin Turgor soft  -MF      Edges callused  -MF      Drainage Characteristics/Odor serosanguineous  -MF      Drainage Amount large  -MF      Care, Wound irrigated with;wound cleanser;debrided  -MF      Dressing Care foam;low-adherent  HFBc with mepilex Ag foam with ABD pad and kerlix / ACE to secure.  -MF      Periwound Care cleansed with pH balanced cleanser;dry periwound area maintained  -MF      Retired Wound - Properties Group Placement Date: 05/08/25  -KW Side: Left  -KW Orientation: lateral  -KW Location: foot  -KW    Retired Wound - Properties Group Placement Date: 05/08/25  -KW Side: Left  -KW Orientation: lateral  -KW Location: foot  -KW    Retired Wound - Properties Group Date first assessed: 05/08/25  -KW Side: Left  -KW Location: foot  -KW       NPWT (Negative Pressure Wound Therapy) 06/25/25 L lateral foot incision    NPWT (Negative Pressure Wound Therapy) - Properties Group Placement Date: 06/25/25  - Location: L lateral foot incision  -LH    Therapy Setting vacuum off  oh hold  -MF      Retired NPWT (Negative Pressure Wound Therapy) - Properties Group Placement Date: 06/25/25  - Location: L lateral foot incision  -LH    Retired NPWT (Negative Pressure Wound Therapy) - Properties Group Placement Date: 06/25/25  - Location: L lateral foot incision  -LH    Retired NPWT (Negative Pressure Wound Therapy) - Properties Group Placement Date: 06/25/25  - Location: L lateral foot incision  -LH              User Key  (r) = Recorded By, (t) = Taken By, (c) = Cosigned By      Initials Name Provider Type    Koko Reynolds, PT Physical Therapist    LH Cornelio Tripp, PT Physical Therapist    KW Melissa Lang, PT Physical Therapist                      WOUND DEBRIDEMENT  Total area of Debridement: ~4cm2  Debridement Site 1  Location- Site 1: L plantar foot wound  Selective Debridement- Site 1: Wound Surface <20cmsq  Instruments- Site 1: tweezers  Excised  Tissue Description- Site 1: minimum, slough  Bleeding- Site 1: scant   Debridement Site 2  Location- Site 2: L lateral foot wound  Selective Debridement- Site 2: Wound Surface <20cmsq  Instruments- Site 2: tweezers  Excised Tissue Description- Site 2: minimum, slough  Bleeding- Site 2: none   Debridement Site 3  Location- Site 3: L heel wound  Selective Debridement- Site 3: Wound Surface <20cmsq  Instruments- Site 3: tweezers  Excised Tissue Description- Site 3: minimum, slough  Bleeding- Site 3: none         Recommendation and Plan   PT Assessment/Plan       Row Name 07/01/25 1030          PT Assessment    Functional Limitations Performance in self-care ADL;Other (comment)  -     Impairments Integumentary integrity;Sensation  -     Assessment Comments Pt cont to have heavy drainage from wound and moderate erythema / skin irritation to periwound area.  Pt to discuss antifungal with Md tomorrow with ID visit.  -     Rehab Potential Fair  -     Patient/caregiver participated in establishment of treatment plan and goals Yes  -     Patient would benefit from skilled therapy intervention Yes  -        PT Plan    PT Frequency 2x/week;3x/week  -     Physical Therapy Interventions (Optional Details) wound care;patient/family education  -     PT Plan Comments HOLD vac, debridement/dressings  -               User Key  (r) = Recorded By, (t) = Taken By, (c) = Cosigned By      Initials Name Provider Type    Koko Reynolds, PT Physical Therapist                    Goals   PT OP Goals       Row Name 07/01/25 1030          Time Calculation    PT Goal Re-Cert Due Date 09/23/25  -               User Key  (r) = Recorded By, (t) = Taken By, (c) = Cosigned By      Initials Name Provider Type    Koko Reynolds, PT Physical Therapist                    PT Goal Re-Cert Due Date: 09/23/25            Time Calculation: Start Time: 1030  Untimed Charges  70496-[id number] debridement: 25  Total Minutes  Untimed  Charges Total Minutes: 25   Total Minutes: 25              Koko Barraza, PT  7/1/2025

## 2025-07-02 ENCOUNTER — HOSPITAL ENCOUNTER (OUTPATIENT)
Dept: PHYSICAL THERAPY | Facility: HOSPITAL | Age: 80
Setting detail: THERAPIES SERIES
Discharge: HOME | End: 2025-07-02
Payer: MEDICARE

## 2025-07-02 DIAGNOSIS — L08.9 LEFT FOOT INFECTION: Primary | ICD-10-CM

## 2025-07-02 DIAGNOSIS — S81.802D MULTIPLE OPEN WOUNDS OF LOWER EXTREMITY, COMPLICATED, LEFT, SUBSEQUENT ENCOUNTER: Primary | ICD-10-CM

## 2025-07-02 DIAGNOSIS — R60.0 EDEMA OF LEFT LOWER LEG: ICD-10-CM

## 2025-07-02 DIAGNOSIS — L02.612 FOOT ABSCESS, LEFT: ICD-10-CM

## 2025-07-02 DIAGNOSIS — L03.116 CELLULITIS OF LEFT FOOT: ICD-10-CM

## 2025-07-02 PROCEDURE — 97597 DBRDMT OPN WND 1ST 20 CM/<: CPT

## 2025-07-02 RX ORDER — NYSTATIN 100000 [USP'U]/G
POWDER TOPICAL DAILY PRN
Qty: 30 G | Refills: 0 | Status: SHIPPED | OUTPATIENT
Start: 2025-07-02

## 2025-07-02 NOTE — THERAPY WOUND CARE TREATMENT
Outpatient Rehabilitation - Wound/Debridement Treatment Note  [facility]     Patient Name: Carlos Culver  : 1945  MRN: [id number]  Today's Date: 2025                 Admit Date: 2025    Visit Dx:    ICD-10-CM ICD-9-CM   1. Multiple open wounds of lower extremity, complicated, left, subsequent encounter  S81.802D V58.89     894.1   2. Foot abscess, left  L02.612 682.7   3. Cellulitis of left foot  L03.116 682.7   4. Edema of left lower leg  R60.0 782.3     L lateral foot              L plantar foot      L heel        Patient Active Problem List   Diagnosis    Cellulitis of left foot    GUCCI (acute kidney injury)    Essential hypertension    Leukocytosis    Osteomyelitis of left foot    Chronic anticoagulation    Neuropathy    Elevated transaminase level    CKD (chronic kidney disease) stage 2, GFR 60-89 ml/min    Skin ulcer of right heel    Chronic multifocal osteomyelitis of left foot    Contracture of joint of foot, left    MARTINEZ (dyspnea on exertion)    Neuropathic ulcer, limited to breakdown of skin    Hyperlipidemia LDL goal <100    A-fib    S/P transmetatarsal amputation of foot, left    Leukocytosis, likely reactive    Acute postoperative pain    Alcohol abuse    Staphylococcus aureus bacteremia    Acute osteomyelitis of left ankle or foot    History of Chopart amputation of left foot    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 evidence of necrosis    Ulcer of left foot    Left foot infection    Abscess of left foot, status post I&D, exostectomy        Past Medical History:   Diagnosis Date    A-fib     Acute kidney failure     Elevated cholesterol     Hard of hearing     Hyperlipidemia     Hypertension     Osteomyelitis     Renal disorder     Wears hearing aid in both ears     Wears reading eyeglasses         Past Surgical History:   Procedure Laterality Date    AMPUTATION DIGIT Left 2020    Procedure: AMPUTATION LEFT 2ND TOE;   Surgeon: Idania Osborn MD;  Location:  [facility];  Service: Orthopedics    AMPUTATION DIGIT Left 12/08/2020    Procedure: TRANSMETATARSAL AMPUTATION LEFT;  Surgeon: Idania Osborn MD;  Location: [facility];  Service: Orthopedics;  Laterality: Left;    APPENDECTOMY      COLONOSCOPY  2006    FOOT SURGERY Left 12/09/2020    (L) transmetatarsal amputation 12/09/2020 - Dr. Idania Osborn    INCISION AND DRAINAGE LEG Left 10/23/2018    Procedure: INCISION AND DRAINAGE LEFT FOOT;  Surgeon: Idania Osborn MD;  Location: [facility];  Service: Orthopedics    INCISION AND DRAINAGE LEG Left 07/21/2023    Procedure: FOOT INCISION AND DRAINAGE; REVISION AMPUTATION; ACHILLES LENGTHENING -LEFT;  Surgeon: Mao Mckay MD;  Location: [facility];  Service: Orthopedics;  Laterality: Left;    INCISION AND DRAINAGE LEG Left 6/3/2025    Procedure: FOOT ABSCESS INCISION AND DRAINAGE, EXOSTECTOMY;  Surgeon: Mao Mckay MD;  Location: [facility];  Service: Orthopedics;  Laterality: Left;    TOE AMPUTATION      TONSILLECTOMY      VENOUS ACCESS DEVICE (PORT) INSERTION N/A 10/23/2018    Procedure: GROSHONG CATHETER PLACEMENT;  Surgeon: Marquez Simons MD;  Location:  [facility];  Service: General    VENOUS ACCESS DEVICE (PORT) INSERTION N/A 12/09/2020    Procedure: GROSHONG INSERTION;  Surgeon: Antonio Bains MD;  Location:  [facility];  Service: General;  Laterality: N/A;    VENOUS ACCESS DEVICE (PORT) REMOVAL           EVALUATION   PT Ortho       Row Name 07/02/25 1500       Subjective    Subjective Comments Reports the ACE wrap began to unravel and he had trouble getting it back on to secure the dressings. Reports he saw [facility] who he thinks is going to order Nystatin powder for hime. Reports his [facility] doctor thinkst he wound photos were not concerning at this time.  -LH       Precautions and Contraindications    Precautions insensate BLE  -LH       Subjective Pain    Able to rate subjective pain? yes  -LH    Pre-Treatment Pain  Level 0  -LH    Post-Treatment Pain Level 0  -    Subjective Pain Comment PN  -LH       Transfers    [place] Level (Floor Transfer) modified independence  -    Assistive Device (Floor Transfer) walker, knee scooter  -    Comment, (Transfers) long sitting for tx  -       Gait/Stairs (Locomotion)    [place] Level (Gait) modified independence  -    Assistive Device (Gait) walker, knee scooter  -              User Key  (r) = Recorded By, (t) = Taken By, (c) = Cosigned By      Initials Name Provider Type     Cornelio Tripp, PT Physical Therapist                     LDA Wound       Row Name 07/02/25 1500             Wound 06/05/25 0830 Left heel    Wound - Properties Group Placement Date: 06/05/25  - Placement Time: 0830  - Present on Original Admission: Y  -MF Side: Left  -MF Location: heel  -    Wound Image Images linked: 1  -LH      Dressing Appearance dressing loose;moist drainage  -      Dressing Removed Type silver impregnated;foam  Mepilex ag  -      Confirmed Empty Wound Bed Yes, visual inspection of wound bed  -      Base necrotic;yellow;slough;red;granulating  -      Periwound excoriated;moist;redness;yeast;swelling;macerated  -      Periwound Temperature warm  -      Periwound Skin Turgor soft  -      Edges open  -      Drainage Characteristics/Odor serosanguineous  -      Drainage Amount small  -      Care, Wound cleansed with;soap and water;wound cleanser;debrided  -      Dressing Care dressing changed;silver impregnated;collagen;low-adherent;foam;gauze  Stefanie Ag, Mepilex Ag, Qwick, kerlix, size 5 compressogrip  -      Periwound Care cleansed with pH balanced cleanser;dry periwound area maintained;barrier ointment applied;barrier film applied  Nosting, zguard  -      Retired Wound - Properties Group Placement Date: 06/05/25  - Placement Time: 0830  -MF Present on Original Admission: Y  -MF Side: Left  -MF Location: heel  -    Retired Wound -  Properties Group Placement Date: 06/05/25  - Placement Time: 0830  -MF Present on Original Admission: Y  -MF Side: Left  -MF Location: heel  -MF    Retired Wound - Properties Group Date first assessed: 06/05/25  - Time first assessed: 0830  -MF Present on Original Admission: Y  -MF Side: Left  -MF Location: heel  -MF       Wound 06/05/25 0830 Left  foot Neuropathic Ulcer    Wound - Properties Group Placement Date: 06/05/25  - Placement Time: 0830  -MF Present on Original Admission: Y  -MF Side: Left  -MF Orientation: --  -MF, plantar  Location: foot  -MF Primary Wound Type: Neuropathic  -    Wound Image Images linked: 1  -LH      Dressing Appearance intact;moist drainage  -      Dressing Removed Type silver impregnated;foam  Mepile xAg  -      Confirmed Empty Wound Bed Yes, visual inspection of wound bed  -      Base moist;granulating;red;yellow;slough  -      Periwound intact;macerated;pale white;swelling  -      Periwound Temperature warm  -      Periwound Skin Turgor soft  -      Edges open  -      Drainage Characteristics/Odor serosanguineous  -      Drainage Amount small  -LH      Care, Wound cleansed with;antimicrobial agent applied;wound cleanser;debrided  -      Dressing Care dressing changed;silver impregnated;collagen;low-adherent;foam;gauze  Stefanie Ag, Mepilex Ag, Qwick, kerlix, size 5 compressogrip  -      Periwound Care cleansed with pH balanced cleanser;dry periwound area maintained;barrier ointment applied;barrier film applied  Nosting, zguard  -      Retired Wound - Properties Group Placement Date: 06/05/25  - Placement Time: 0830  -MF Present on Original Admission: Y  -MF Side: Left  -MF Orientation: --  -MF, plantar  Location: foot  -MF    Retired Wound - Properties Group Placement Date: 06/05/25  - Placement Time: 0830  -MF Present on Original Admission: Y  -MF Side: Left  -MF Orientation: --  -MF, plantar  Location: foot  -MF    Retired Wound - Properties Group  Date first assessed: 06/05/25  -MF Time first assessed: 0830  -MF Present on Original Admission: Y  -MF Side: Left  -MF Location: foot  -MF       Wound 05/08/25 Left lateral foot Surgical    Wound - Properties Group Placement Date: 05/08/25  -KW Side: Left  -KW Orientation: lateral  -KW Location: foot  -KW Primary Wound Type: Surgical  -MF    Wound Image Images linked: 3  -LH      Dressing Appearance dressing loose;moist drainage  HFBc packing no longer packed in wound  -LH      Dressing Removed Type foam;silver impregnated  HFBc not packed in wound, Mepilex ag  -LH      Confirmed Empty Wound Bed Yes, visual inspection of wound bed;Yes, probed  -LH      Base moist;necrotic;yellow;slough;exposed structure;pink;red;white  -LH      Periwound indurated;macerated;excoriated;pale white;moist  moderate maceration, severe excoriation/yeast rash  -      Periwound Temperature warm  -      Periwound Skin Turgor soft  -      Edges callused  -      Drainage Characteristics/Odor serosanguineous  -      Drainage Amount large  -LH      Care, Wound cleansed with;soap and water;wound cleanser;debrided  -LH      Dressing Care dressing changed;antimicrobial agent applied;foam;gauze  HFBc packing, qwick, kerlix, size 5 compressogrip  -      Periwound Care cleansed with pH balanced cleanser;dry periwound area maintained;barrier ointment applied;barrier film applied  Nosting, zguard  -      Retired Wound - Properties Group Placement Date: 05/08/25  -KW Side: Left  -KW Orientation: lateral  -KW Location: foot  -KW    Retired Wound - Properties Group Placement Date: 05/08/25  -KW Side: Left  -KW Orientation: lateral  -KW Location: foot  -KW    Retired Wound - Properties Group Date first assessed: 05/08/25  -KW Side: Left  -KW Location: foot  -KW       NPWT (Negative Pressure Wound Therapy) 06/25/25 L lateral foot incision    NPWT (Negative Pressure Wound Therapy) - Properties Group Placement Date: 06/25/25  -LH Location: L  lateral foot incision  -LH    Therapy Setting vacuum off  Vac on HOLD  -      Retired NPWT (Negative Pressure Wound Therapy) - Properties Group Placement Date: 06/25/25  - Location: L lateral foot incision  -LH    Retired NPWT (Negative Pressure Wound Therapy) - Properties Group Placement Date: 06/25/25  - Location: L lateral foot incision  -LH    Retired NPWT (Negative Pressure Wound Therapy) - Properties Group Placement Date: 06/25/25  - Location: L lateral foot incision  -LH              User Key  (r) = Recorded By, (t) = Taken By, (c) = Cosigned By      Initials Name Provider Type    Koko Reynolds, PT Physical Therapist     Cornelio Tripp, PT Physical Therapist     Melissa Lang, PT Physical Therapist                      WOUND DEBRIDEMENT  Total area of Debridement: 8cm2  Debridement Site 1  Location- Site 1: L plantar foot wound  Selective Debridement- Site 1: Wound Surface <20cmsq  Instruments- Site 1: tweezers  Excised Tissue Description- Site 1: maximum, slough  Bleeding- Site 1: scant   Debridement Site 2  Location- Site 2: L lateral foot wound  Selective Debridement- Site 2: Wound Surface <20cmsq  Instruments- Site 2: tweezers  Excised Tissue Description- Site 2: moderate, slough  Bleeding- Site 2: scant   Debridement Site 3  Location- Site 3: L heel wound  Selective Debridement- Site 3: Wound Surface <20cmsq  Instruments- Site 3: tweezers  Excised Tissue Description- Site 3: moderate, slough  Bleeding- Site 3: none      Therapy Education       Row Name 07/02/25 1500             Therapy Education    Education Details may leave HFBc packing in place until next session, however must change the overlying superabsorbant pads, kerlix, and compressogrip daily d/t heavy drainage to reduce further skin breakdown. PT will plan to use nystatin powder next session, will plan to re-start NPWT once periwound skin integrity has improved.  -      Given Bandaging/dressing  change;Symptoms/condition management  -      Program Reinforced  -      How Provided Verbal;Demonstration  -      Provided to Patient  -      Level of Understanding Teach back education performed;Verbalized  -                User Key  (r) = Recorded By, (t) = Taken By, (c) = Cosigned By      Initials Name Provider Type     Cornelio Tripp, PT Physical Therapist                    Recommendation and Plan   PT Assessment/Plan       Row Name 07/02/25 1500          PT Assessment    Functional Limitations Performance in self-care ADL;Other (comment)  -     Impairments Integumentary integrity;Sensation  -     Assessment Comments Pt presenting this date with HFBc packing strip no longer packed in wound base as HFBc found in a scrunched up ball in between wound and Mepilex Ag dressing. Wound packing suspected to have worked its way out of the wound base during ambulation/weight bearing which pt reports he is unable to avoid d/t living along with no family support. Pt's L lateral foot wound base remains with mostly smooth/slimy yellow slough/biofilm requiring extensive debridement. Pt's periwound with moderate maceration and remains with severe excoriation/yeast rash so restarting wound vac would not be appropriate at this time. Pt now with Nystatin powder ordered, PT will plan to incorporate nystatin powder to red/rash areas to help prevent further skin breakdown and potentially allow for placement of wound vac in the next 1-2 sessions. Pt's L plantar foot and L heel wounds still with thikck layer of biofilm requiring debridement. Pt would continue to benefti from further debridmenet and advanced wound dressing management to promote wound healing.  -     Rehab Potential Fair  -     Patient/caregiver participated in establishment of treatment plan and goals Yes  -     Patient would benefit from skilled therapy intervention Yes  -        PT Plan    PT Frequency 2x/week;3x/week  -     Physical  Therapy Interventions (Optional Details) wound care;patient/family education  -     PT Plan Comments HOLD vac, debridement/dressings  -               User Key  (r) = Recorded By, (t) = Taken By, (c) = Cosigned By      Initials Name Provider Type     Cornelio Tripp, PT Physical Therapist                    Goals   PT OP Goals       Row Name 07/02/25 1600          Time Calculation    PT Goal Re-Cert Due Date 09/23/25  -               User Key  (r) = Recorded By, (t) = Taken By, (c) = Cosigned By      Initials Name Provider Type     Cornelio Tripp, PT Physical Therapist                    PT Goal Re-Cert Due Date: 09/23/25            Time Calculation: Start Time: 1515  Untimed Charges  43505-Beqstkphy debridement: 35  Total Minutes  Untimed Charges Total Minutes: 35   Total Minutes: 35  Therapy Charges for Today       Code Description Service Date Service Provider Modifiers Qty    [id number] HC MARILYN DEBRIDE OPEN WOUND UP TO 20CM 7/2/2025 Cornelio Tripp, PT GP 1                    Cornelio Tripp PT  7/2/2025

## 2025-07-03 ENCOUNTER — TELEPHONE (OUTPATIENT)
Dept: PHYSICAL THERAPY | Facility: HOSPITAL | Age: 80
End: 2025-07-03
Payer: MEDICARE

## 2025-07-03 NOTE — TELEPHONE ENCOUNTER
Patient left  requesting call back.  PT spoke with patient, who states he noticed drainage through all the layers of his bandage this morning, and states he was instructed to change the dressing if it was saturated.  Question about what to do since the packing came out when he went to change it this morning.  PT recommended he lightly pack wound with vashe-soaked gauze 4x4 and cover with other dressings as instructed by his therapist last treatment.  Patient verbalizes understanding, PT encouraged patient to call us for any further questions.  Safia Arambula, PT 7/3/2025 09:42 EDT

## 2025-07-03 NOTE — TELEPHONE ENCOUNTER
Jade never got back to me. If she calls back please document on this encounter and send back to clinic pool. Thanks!  Fiorella Suarez RT (R), ROT

## 2025-07-05 ENCOUNTER — HOSPITAL ENCOUNTER (OUTPATIENT)
Dept: PHYSICAL THERAPY | Facility: HOSPITAL | Age: 80
Setting detail: THERAPIES SERIES
Discharge: HOME | End: 2025-07-05
Payer: MEDICARE

## 2025-07-05 DIAGNOSIS — R60.0 EDEMA OF LEFT LOWER LEG: ICD-10-CM

## 2025-07-05 DIAGNOSIS — L02.612 FOOT ABSCESS, LEFT: ICD-10-CM

## 2025-07-05 DIAGNOSIS — S81.802D MULTIPLE OPEN WOUNDS OF LOWER EXTREMITY, COMPLICATED, LEFT, SUBSEQUENT ENCOUNTER: Primary | ICD-10-CM

## 2025-07-05 DIAGNOSIS — L03.116 CELLULITIS OF LEFT FOOT: ICD-10-CM

## 2025-07-05 PROCEDURE — 97605 NEG PRS WND THER DME<=50SQCM: CPT

## 2025-07-05 PROCEDURE — 97597 DBRDMT OPN WND 1ST 20 CM/<: CPT

## 2025-07-05 NOTE — THERAPY WOUND CARE TREATMENT
Outpatient Rehabilitation - Wound/Debridement Treatment Note  [facility]     Patient Name: Carlos Culver  : 1945  MRN: [id number]  Today's Date: 2025                 Admit Date: 2025    Visit Dx:    ICD-10-CM ICD-9-CM   1. Multiple open wounds of lower extremity, complicated, left, subsequent encounter  S81.802D V58.89     894.1   2. Cellulitis of left foot  L03.116 682.7   3. Foot abscess, left  L02.612 682.7   4. Edema of left lower leg  R60.0 782.3     L lateral foot      Dressings indicating bright blue/green drainage                  Patient Active Problem List   Diagnosis    Cellulitis of left foot    GUCCI (acute kidney injury)    Essential hypertension    Leukocytosis    Osteomyelitis of left foot    Chronic anticoagulation    Neuropathy    Elevated transaminase level    CKD (chronic kidney disease) stage 2, GFR 60-89 ml/min    Skin ulcer of right heel    Chronic multifocal osteomyelitis of left foot    Contracture of joint of foot, left    MARTINEZ (dyspnea on exertion)    Neuropathic ulcer, limited to breakdown of skin    Hyperlipidemia LDL goal <100    A-fib    S/P transmetatarsal amputation of foot, left    Leukocytosis, likely reactive    Acute postoperative pain    Alcohol abuse    Staphylococcus aureus bacteremia    Acute osteomyelitis of left ankle or foot    History of Chopart amputation of left foot    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 evidence of necrosis    Ulcer of left foot    Left foot infection    Abscess of left foot, status post I&D, exostectomy        Past Medical History:   Diagnosis Date    A-fib     Acute kidney failure     Elevated cholesterol     Hard of hearing     Hyperlipidemia     Hypertension     Osteomyelitis     Renal disorder     Wears hearing aid in both ears     Wears reading eyeglasses         Past Surgical History:   Procedure Laterality Date    AMPUTATION DIGIT Left 2020    Procedure: AMPUTATION  LEFT 2ND TOE;  Surgeon: Idania Osborn MD;  Location: [facility];  Service: Orthopedics    AMPUTATION DIGIT Left 12/08/2020    Procedure: TRANSMETATARSAL AMPUTATION LEFT;  Surgeon: Idania Osborn MD;  Location: [facility];  Service: Orthopedics;  Laterality: Left;    APPENDECTOMY      COLONOSCOPY  2006    FOOT SURGERY Left 12/09/2020    (L) transmetatarsal amputation 12/09/2020 - Dr. Idania Osborn    INCISION AND DRAINAGE LEG Left 10/23/2018    Procedure: INCISION AND DRAINAGE LEFT FOOT;  Surgeon: Idania Osborn MD;  Location: [facility];  Service: Orthopedics    INCISION AND DRAINAGE LEG Left 07/21/2023    Procedure: FOOT INCISION AND DRAINAGE; REVISION AMPUTATION; ACHILLES LENGTHENING -LEFT;  Surgeon: Mao Mckay MD;  Location: [facility];  Service: Orthopedics;  Laterality: Left;    INCISION AND DRAINAGE LEG Left 6/3/2025    Procedure: FOOT ABSCESS INCISION AND DRAINAGE, EXOSTECTOMY;  Surgeon: Mao Mckay MD;  Location: [facility];  Service: Orthopedics;  Laterality: Left;    TOE AMPUTATION      TONSILLECTOMY      VENOUS ACCESS DEVICE (PORT) INSERTION N/A 10/23/2018    Procedure: GROSHONG CATHETER PLACEMENT;  Surgeon: Marquez Simons MD;  Location:  [facility];  Service: General    VENOUS ACCESS DEVICE (PORT) INSERTION N/A 12/09/2020    Procedure: GROSHONG INSERTION;  Surgeon: Antonio Bains MD;  Location:  [facility];  Service: General;  Laterality: N/A;    VENOUS ACCESS DEVICE (PORT) REMOVAL           EVALUATION   PT Ortho       Row Name 07/05/25 1500       Subjective    Subjective Comments Pt reports he noticed a lot of drainage saturating through all of the layers of the bandage so he tried to change the absorbant layers, however the packing fell out and he could not get it back in. Reports he had his daughter come over to re-pack the area with vashe soaked gauze. Picked up his Nystatin prescription.  -LH       Precautions and Contraindications    Precautions insensate BLE  -LH       Subjective  Pain    Able to rate subjective pain? yes  -    Pre-Treatment Pain Level 0  -LH    Post-Treatment Pain Level 0  -    Subjective Pain Comment PN  -LH       Transfers    Reagan Level (Floor Transfer) modified independence  -    Assistive Device (Floor Transfer) walker, knee scooter  -    Comment, (Transfers) long sitting for tx  -       Gait/Stairs (Locomotion)    Reagan Level (Gait) modified independence  -    Assistive Device (Gait) walker, knee scooter  -              User Key  (r) = Recorded By, (t) = Taken By, (c) = Cosigned By      Initials Name Provider Type     Cornelio Tripp, PT Physical Therapist                     LDA Wound       Row Name 07/05/25 1500             Wound 06/05/25 0830 Left heel    Wound - Properties Group Placement Date: 06/05/25  - Placement Time: 0830 - Present on Original Admission: Y  -MF Side: Left  -MF Location: heel  -MF    Wound Image Images linked: 1  -LH      Dressing Appearance dressing loose;moist drainage  -      Dressing Removed Type gauze  -      Confirmed Empty Wound Bed Yes, visual inspection of wound bed  -      Base necrotic;yellow;slough;red;granulating  -      Periwound dry;redness;swelling;warm  -      Periwound Temperature warm  -      Periwound Skin Turgor soft  -      Edges open  -      Drainage Characteristics/Odor serosanguineous  -      Drainage Amount small  -      Care, Wound cleansed with;soap and water;wound cleanser;debrided  -      Dressing Care dressing changed;silver impregnated;low-adherent;foam;gauze  Mepilex Ag, kerlix, size 5 compressogrip  -      Periwound Care cleansed with pH balanced cleanser;dry periwound area maintained  -      Retired Wound - Properties Group Placement Date: 06/05/25  - Placement Time: 0830  - Present on Original Admission: Y  -MF Side: Left  -MF Location: heel  -MF    Retired Wound - Properties Group Placement Date: 06/05/25  - Placement Time: 0830 - Present  on Original Admission: Y  -MF Side: Left  -MF Location: heel  -MF    Retired Wound - Properties Group Date first assessed: 06/05/25  -MF Time first assessed: 0830  -MF Present on Original Admission: Y  -MF Side: Left  -MF Location: heel  -MF       Wound 06/05/25 0830 Left  foot Neuropathic Ulcer    Wound - Properties Group Placement Date: 06/05/25  - Placement Time: 0830  -MF Present on Original Admission: Y  -MF Side: Left  -MF Orientation: --  -MF, plantar  Location: foot  -MF Primary Wound Type: Neuropathic  -MF    Wound Image Images linked: 1  -LH      Dressing Appearance intact;moist drainage  -LH      Dressing Removed Type gauze  -      Confirmed Empty Wound Bed Yes, visual inspection of wound bed  -      Base moist;granulating;red;yellow;slough  -      Periwound intact;swelling;redness;warm  -      Periwound Temperature warm  -      Periwound Skin Turgor soft  -      Edges open  -      Drainage Characteristics/Odor serosanguineous  -      Drainage Amount small  -      Care, Wound cleansed with;soap and water;wound cleanser;debrided  -      Dressing Care dressing changed;silver impregnated;low-adherent;foam;gauze  Mepilex Ag, kerlix, size 5 compressogrip  -      Periwound Care cleansed with pH balanced cleanser;dry periwound area maintained  -      Retired Wound - Properties Group Placement Date: 06/05/25  - Placement Time: 0830  -MF Present on Original Admission: Y  -MF Side: Left  -MF Orientation: --  -MF, plantar  Location: foot  -MF    Retired Wound - Properties Group Placement Date: 06/05/25  - Placement Time: 0830  -MF Present on Original Admission: Y  -MF Side: Left  -MF Orientation: --  -MF, plantar  Location: foot  -MF    Retired Wound - Properties Group Date first assessed: 06/05/25  -MF Time first assessed: 0830  -MF Present on Original Admission: Y  -MF Side: Left  -MF Location: foot  -MF       Wound 05/08/25 Left lateral foot Surgical    Wound - Properties Group  Placement Date: 05/08/25  -KW Side: Left  -KW Orientation: lateral  -KW Location: foot  -KW Primary Wound Type: Surgical  -    Wound Image Images linked: 2  -LH      Dressing Appearance intact;moist drainage  -      Dressing Removed Type gauze  gauze packing, kerlix to secure  -      Confirmed Empty Wound Bed Yes, visual inspection of wound bed;Yes, probed  -      Base moist;necrotic;yellow;slough;exposed structure;pink;red;white  smooth, yellow, slimy slough covering entire wound base  -      Periwound indurated;macerated;excoriated;pale white;moist  mild maceration, mild excoriation/yeast rash immediately proximal to wound  -      Periwound Temperature warm  -      Periwound Skin Turgor soft  -      Edges callused  -      Wound Length (cm) 8 cm  -      Wound Width (cm) 1 cm  -      Wound Depth (cm) 5.7 cm  -      Wound Surface Area (cm^2) 6.28 cm^2  -      Wound Volume (cm^3) 23.876 cm^3  -      Drainage Characteristics/Odor green;yellow;serosanguineous;malodorous;other (see comments)   bright blue/green tint  -      Drainage Amount large  -      Care, Wound cleansed with;soap and water;wound cleanser;debrided;negative pressure wound therapy  -      Dressing Care dressing changed;foam  wound vac, kerlix, size 5 compressogrip to secure.  -      Periwound Care cleansed with pH balanced cleanser;dry periwound area maintained;barrier film applied;topical treatment applied;other (see comments)  Nosting/Nystatin crusting x2, border drape  -      Retired Wound - Properties Group Placement Date: 05/08/25  -KW Side: Left  -KW Orientation: lateral  -KW Location: foot  -KW    Retired Wound - Properties Group Placement Date: 05/08/25  -KW Side: Left  -KW Orientation: lateral  -KW Location: foot  -KW    Retired Wound - Properties Group Date first assessed: 05/08/25  -KW Side: Left  -KW Location: foot  -KW       NPWT (Negative Pressure Wound Therapy) 06/25/25 L lateral foot incision    NPWT  (Negative Pressure Wound Therapy) - Properties Group Placement Date: 06/25/25  - Location: L lateral foot incision  -LH    Therapy Setting continuous therapy  -      Dressing foam, black  -      Pressure Setting 150 mmHg  -      Sponges Inserted 1  1 black  -      Sponges Removed other (see comments)  gauze packing  -      Finger sweep complete Yes  -LH      Retired NPWT (Negative Pressure Wound Therapy) - Properties Group Placement Date: 06/25/25  - Location: L lateral foot incision  -LH    Retired NPWT (Negative Pressure Wound Therapy) - Properties Group Placement Date: 06/25/25  - Location: L lateral foot incision  -LH    Retired NPWT (Negative Pressure Wound Therapy) - Properties Group Placement Date: 06/25/25  - Location: L lateral foot incision  -LH              User Key  (r) = Recorded By, (t) = Taken By, (c) = Cosigned By      Initials Name Provider Type    Koko Reynolds, PT Physical Therapist     Cornelio Tripp, PT Physical Therapist     Melissa Lang, PT Physical Therapist                      WOUND DEBRIDEMENT  Total area of Debridement: 6cm2  Debridement Site 1  Location- Site 1: L plantar foot wound  Selective Debridement- Site 1: Wound Surface <20cmsq  Instruments- Site 1: tweezers  Excised Tissue Description- Site 1: moderate, slough  Bleeding- Site 1: none   Debridement Site 2  Location- Site 2: L lateral foot wound  Selective Debridement- Site 2: Wound Surface <20cmsq  Instruments- Site 2: tweezers  Excised Tissue Description- Site 2: moderate, slough  Bleeding- Site 2: scant   Debridement Site 3  Location- Site 3: L heel wound  Selective Debridement- Site 3: Wound Surface <20cmsq  Instruments- Site 3: tweezers  Excised Tissue Description- Site 3: moderate, slough  Bleeding- Site 3: minimum      Therapy Education       Row Name 07/05/25 1600             Therapy Education    Education Details Keep dressing dry and intact until next session. If dressing comes  loose or vac stops working remove all packing and replace with vashe soaked gauze and absorbant layers. Will need to monitor s/sx of infection very closely d/t blue/green drainage.  -      Given Bandaging/dressing change;Symptoms/condition management  -      Program Progressed;Reinforced  -      How Provided Verbal;Demonstration  -      Provided to Patient  -      Level of Understanding Teach back education performed;Verbalized  -                User Key  (r) = Recorded By, (t) = Taken By, (c) = Cosigned By      Initials Name Provider Type     Cornelio Tripp, PT Physical Therapist                    Recommendation and Plan   PT Assessment/Plan       Row Name 07/05/25 1500          PT Assessment    Functional Limitations Performance in self-care ADL;Other (comment)  -     Impairments Integumentary integrity;Sensation  -     Assessment Comments Pt presenting this date with good improvements in periwound skin integrity. Pt also with Nystating Rx this date, so PT re-applied wound vac to help manage copious drainage, improve granulation formation, reduce bacterial load, and promote wound closure. Of note, pt with bright blue/green heavy drainage with mild malodor noted which PT is suspicious may be related to pseudomonas. Pt also with very smooth/slimy yellow slough covering nearly the entire base of the L lateral foot wound base. Pt's L posterior heel and plantar foot wounds with small improvements in wound dimensions with granulation. Pt with small improvements in L foot/ankle edema this date.  -     Rehab Potential Fair  -     Patient/caregiver participated in establishment of treatment plan and goals Yes  -     Patient would benefit from skilled therapy intervention Yes  -        PT Plan    PT Frequency 2x/week;3x/week  -     Physical Therapy Interventions (Optional Details) wound care;patient/family education  -     PT Plan Comments Wound vac, dressings  -               User Key   (r) = Recorded By, (t) = Taken By, (c) = Cosigned By      Initials Name Provider Type     Cornelio Tripp, PT Physical Therapist                    Goals   PT OP Goals       Row Name 07/05/25 1602          Time Calculation    PT Goal Re-Cert Due Date 09/23/25  -               User Key  (r) = Recorded By, (t) = Taken By, (c) = Cosigned By      Initials Name Provider Type     Cornelio Tripp, PT Physical Therapist                    PT Goal Re-Cert Due Date: 09/23/25            Time Calculation: Start Time: 1300  Untimed Charges  87652-Gswostvqn debridement: 15  35879-Hlw Pressure wound to 50 sqcm: 25  Total Minutes  Untimed Charges Total Minutes: 40   Total Minutes: 40  Therapy Charges for Today       Code Description Service Date Service Provider Modifiers Qty    25609590013 HC MARILYN DEBRIDE OPEN WOUND UP TO 20CM 7/5/2025 Cornelio Tripp, PT GP 1    94686153238 HC PT NEG PRESS WOUND TO 50SQCM DME2 7/5/2025 Cornelio Tripp, PT GP 1                    Cornelio Tripp PT  7/5/2025

## 2025-07-07 ENCOUNTER — HOSPITAL ENCOUNTER (OUTPATIENT)
Dept: PHYSICAL THERAPY | Facility: HOSPITAL | Age: 80
Setting detail: THERAPIES SERIES
Discharge: HOME | End: 2025-07-07
Payer: MEDICARE

## 2025-07-07 DIAGNOSIS — L02.612 FOOT ABSCESS, LEFT: ICD-10-CM

## 2025-07-07 DIAGNOSIS — S81.802D MULTIPLE OPEN WOUNDS OF LOWER EXTREMITY, COMPLICATED, LEFT, SUBSEQUENT ENCOUNTER: Primary | ICD-10-CM

## 2025-07-07 DIAGNOSIS — L03.116 CELLULITIS OF LEFT FOOT: ICD-10-CM

## 2025-07-07 PROCEDURE — 97597 DBRDMT OPN WND 1ST 20 CM/<: CPT

## 2025-07-07 PROCEDURE — 97605 NEG PRS WND THER DME<=50SQCM: CPT

## 2025-07-07 NOTE — THERAPY WOUND CARE TREATMENT
Outpatient Rehabilitation - Wound/Debridement Treatment Note  [facility]     Patient Name: Carlos Culver  : 1945  MRN: [id number]  Today's Date: 2025                 Admit Date: 2025    Visit Dx:    ICD-10-CM ICD-9-CM   1. Multiple open wounds of lower extremity, complicated, left, subsequent encounter  S81.802D V58.89     894.1   2. Cellulitis of left foot  L03.116 682.7   3. Foot abscess, left  L02.612 682.7       Patient Active Problem List   Diagnosis    Cellulitis of left foot    GUCCI (acute kidney injury)    Essential hypertension    Leukocytosis    Osteomyelitis of left foot    Chronic anticoagulation    Neuropathy    Elevated transaminase level    CKD (chronic kidney disease) stage 2, GFR 60-89 ml/min    Skin ulcer of right heel    Chronic multifocal osteomyelitis of left foot    Contracture of joint of foot, left    MARTINEZ (dyspnea on exertion)    Neuropathic ulcer, limited to breakdown of skin    Hyperlipidemia LDL goal <100    A-fib    S/P transmetatarsal amputation of foot, left    Leukocytosis, likely reactive    Acute postoperative pain    Alcohol abuse    Staphylococcus aureus bacteremia    Acute osteomyelitis of left ankle or foot    History of Chopart amputation of left foot    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 evidence of necrosis    Ulcer of left foot    Left foot infection    Abscess of left foot, status post I&D, exostectomy        Past Medical History:   Diagnosis Date    A-fib     Acute kidney failure     Elevated cholesterol     Hard of hearing     Hyperlipidemia     Hypertension     Osteomyelitis     Renal disorder     Wears hearing aid in both ears     Wears reading eyeglasses         Past Surgical History:   Procedure Laterality Date    AMPUTATION DIGIT Left 2020    Procedure: AMPUTATION LEFT 2ND TOE;  Surgeon: Idania Osborn MD;  Location: [facility];  Service: Orthopedics    AMPUTATION DIGIT Left 2020     Procedure: TRANSMETATARSAL AMPUTATION LEFT;  Surgeon: Idania Osborn MD;  Location:  [facility];  Service: Orthopedics;  Laterality: Left;    APPENDECTOMY      COLONOSCOPY  2006    FOOT SURGERY Left 12/09/2020    (L) transmetatarsal amputation 12/09/2020 - Dr. Idania Osborn    INCISION AND DRAINAGE LEG Left 10/23/2018    Procedure: INCISION AND DRAINAGE LEFT FOOT;  Surgeon: Idania Osborn MD;  Location:  [facility];  Service: Orthopedics    INCISION AND DRAINAGE LEG Left 07/21/2023    Procedure: FOOT INCISION AND DRAINAGE; REVISION AMPUTATION; ACHILLES LENGTHENING -LEFT;  Surgeon: Mao Mckay MD;  Location: [facility];  Service: Orthopedics;  Laterality: Left;    INCISION AND DRAINAGE LEG Left 6/3/2025    Procedure: FOOT ABSCESS INCISION AND DRAINAGE, EXOSTECTOMY;  Surgeon: Mao Mckay MD;  Location:  [facility];  Service: Orthopedics;  Laterality: Left;    TOE AMPUTATION      TONSILLECTOMY      VENOUS ACCESS DEVICE (PORT) INSERTION N/A 10/23/2018    Procedure: GROSHONG CATHETER PLACEMENT;  Surgeon: Marquez Simons MD;  Location:  [facility];  Service: General    VENOUS ACCESS DEVICE (PORT) INSERTION N/A 12/09/2020    Procedure: GROSHONG INSERTION;  Surgeon: Antonio Bains MD;  Location:  [facility];  Service: General;  Laterality: N/A;    VENOUS ACCESS DEVICE (PORT) REMOVAL           EVALUATION   PT Ortho       Row Name 07/07/25 1400       Subjective    Subjective Comments No new complaints or changes. Forgot his nystatin - thinks he might have dropped it in his car  -       Precautions and Contraindications    Precautions insensate BLE  -       Subjective Pain    Able to rate subjective pain? yes  -MC    Pre-Treatment Pain Level 0  -MC    Post-Treatment Pain Level 0  -MC    Subjective Pain Comment PN  -MC       Transfers    Sit-Stand Thomas (Transfers) independent  -    Stand-Sit Thomas (Transfers) independent  -    Comment, (Transfers) long sitting for tx  -       Gait/Stairs  (Locomotion)    Levy Level (Gait) modified independence  -    Assistive Device (Gait) walker, knee scooter  -              User Key  (r) = Recorded By, (t) = Taken By, (c) = Cosigned By      Initials Name Provider Type    Melisa Lr PT Physical Therapist                     LDA Wound       Row Name 07/07/25 1400             Wound 06/05/25 0830 Left heel    Wound - Properties Group Placement Date: 06/05/25  - Placement Time: 0830  - Present on Original Admission: Y  -MF Side: Left  -MF Location: heel  -MF    Dressing Appearance intact;moist drainage  -      Dressing Removed Type silver impregnated;foam  -      Confirmed Empty Wound Bed Yes, visual inspection of wound bed  -      Base red;granulating;pink;epithelialization;yellow;slough  -      Periwound dry;redness;swelling;warm  -      Periwound Temperature warm  -      Periwound Skin Turgor soft  -      Edges open  -      Drainage Characteristics/Odor serosanguineous  -      Drainage Amount small  -      Care, Wound cleansed with;wound cleanser;debrided  -      Dressing Care dressing applied;silver impregnated;collagen;low-adherent;foam  faiza, mepilex Ag, kerlix, size 5 compressogrip  -      Periwound Care cleansed with pH balanced cleanser;dry periwound area maintained  -      Retired Wound - Properties Group Placement Date: 06/05/25  - Placement Time: 0830  -MF Present on Original Admission: Y  -MF Side: Left  -MF Location: heel  -MF    Retired Wound - Properties Group Placement Date: 06/05/25  - Placement Time: 0830  -MF Present on Original Admission: Y  -MF Side: Left  -MF Location: heel  -MF    Retired Wound - Properties Group Date first assessed: 06/05/25  - Time first assessed: 0830  - Present on Original Admission: Y  -MF Side: Left  -MF Location: heel  -MF       Wound 06/05/25 0830 Left  foot Neuropathic Ulcer    Wound - Properties Group Placement Date: 06/05/25  - Placement Time: 0830  -MF  Present on Original Admission: Y  -MF Side: Left  -MF Orientation: --  -MF, plantar  Location: foot  -MF Primary Wound Type: Neuropathic  -MF    Dressing Appearance intact;moist drainage  -      Dressing Removed Type silver impregnated;low-adherent;foam  -      Confirmed Empty Wound Bed Yes, visual inspection of wound bed  -      Base moist;granulating;red;pink  -      Periwound intact;swelling;redness;warm  -      Periwound Temperature warm  -      Periwound Skin Turgor soft  -      Edges open  -      Drainage Characteristics/Odor serosanguineous  -      Drainage Amount small  -      Care, Wound cleansed with;wound cleanser;debrided  -      Dressing Care dressing applied;silver impregnated;collagen;low-adherent;foam  faiza, mepilex Ag, kerlix, size 5 compressogrip  -      Periwound Care cleansed with pH balanced cleanser;dry periwound area maintained  -      Retired Wound - Properties Group Placement Date: 06/05/25  - Placement Time: 0830  -MF Present on Original Admission: Y  -MF Side: Left  -MF Orientation: --  -MF, plantar  Location: foot  -MF    Retired Wound - Properties Group Placement Date: 06/05/25  - Placement Time: 0830  -MF Present on Original Admission: Y  -MF Side: Left  -MF Orientation: --  -MF, plantar  Location: foot  -MF    Retired Wound - Properties Group Date first assessed: 06/05/25  - Time first assessed: 0830  -MF Present on Original Admission: Y  -MF Side: Left  -MF Location: foot  -MF       Wound 05/08/25 Left lateral foot Surgical    Wound - Properties Group Placement Date: 05/08/25  -KW Side: Left  -KW Orientation: lateral  -KW Location: foot  -KW Primary Wound Type: Surgical  -MF    Dressing Appearance intact;moist drainage  -      Dressing Removed Type foam  vac  -      Confirmed Empty Wound Bed Yes, visual inspection of wound bed;Yes, probed  -      Base moist;exposed structure;pink;red;yellow;white;slough;subcutaneous  smooth, yellow, slimy slough  covering entire wound base  -      Periwound macerated;excoriated;pale white;moist  mild/mod maceration immediate periwound  -      Periwound Temperature warm  -      Periwound Skin Turgor soft  -      Edges callused  -      Drainage Characteristics/Odor yellow;serosanguineous  discolored drainage/odor not present today  -      Drainage Amount moderate  -      Care, Wound cleansed with;wound cleanser;debrided;negative pressure wound therapy  -      Dressing Care dressing changed  vac  -      Periwound Care cleansed with pH balanced cleanser;barrier film applied;other (see comments)  NoSting, drape border  -      Wound Output (mL) 25  -MC      Retired Wound - Properties Group Placement Date: 05/08/25  -KW Side: Left  -KW Orientation: lateral  -KW Location: foot  -KW    Retired Wound - Properties Group Placement Date: 05/08/25  -KW Side: Left  -KW Orientation: lateral  -KW Location: foot  -KW    Retired Wound - Properties Group Date first assessed: 05/08/25  -KW Side: Left  -KW Location: foot  -KW       NPWT (Negative Pressure Wound Therapy) 06/25/25 L lateral foot incision    NPWT (Negative Pressure Wound Therapy) - Properties Group Placement Date: 06/25/25  - Location: L lateral foot incision  -LH    Therapy Setting continuous therapy  -      Dressing foam, black  -      Contact Layer other (see comments)  faiza Ag  -      Pressure Setting 150 mmHg  -      Sponges Inserted 1  -MC      Sponges Removed 1  -MC      Finger sweep complete Yes  -      Retired NPWT (Negative Pressure Wound Therapy) - Properties Group Placement Date: 06/25/25  - Location: L lateral foot incision  -LH    Retired NPWT (Negative Pressure Wound Therapy) - Properties Group Placement Date: 06/25/25  - Location: L lateral foot incision  -LH    Retired NPWT (Negative Pressure Wound Therapy) - Properties Group Placement Date: 06/25/25  - Location: L lateral foot incision  -LH              User Key  (r) =  Recorded By, (t) = Taken By, (c) = Cosigned By      Initials Name Provider Type    Koko Reynolds, PT Physical Therapist    Melisa Lr, PT Physical Therapist    Cornelio Kasper, PT Physical Therapist    Melissa Casiano, PT Physical Therapist                      WOUND DEBRIDEMENT  Total area of Debridement: 8 cm2  Debridement Site 1  Location- Site 1: L plantar foot wound  Selective Debridement- Site 1: Wound Surface <20cmsq  Instruments- Site 1: tweezers  Excised Tissue Description- Site 1: minimum, slough  Bleeding- Site 1: none   Debridement Site 2  Location- Site 2: L lateral foot wound  Selective Debridement- Site 2: Wound Surface <20cmsq  Instruments- Site 2: tweezers, #15, scapel  Excised Tissue Description- Site 2: moderate, slough  Bleeding- Site 2: scant   Debridement Site 3  Location- Site 3: L heel wound  Selective Debridement- Site 3: Wound Surface <20cmsq  Instruments- Site 3: tweezers  Excised Tissue Description- Site 3: minimum, slough  Bleeding- Site 3: none      Therapy Education       Row Name 07/07/25 1400             Therapy Education    Education Details Continue current POC.  -MC      Given Bandaging/dressing change;Symptoms/condition management  -MC      Program Reinforced  -MC      How Provided Verbal;Demonstration  -MC      Provided to Patient  -MC      Level of Understanding Teach back education performed;Verbalized  -MC                User Key  (r) = Recorded By, (t) = Taken By, (c) = Cosigned By      Initials Name Provider Type    Melisa Lr, PT Physical Therapist                    Recommendation and Plan   PT Assessment/Plan       Row Name 07/07/25 1400          PT Assessment    Functional Limitations Performance in self-care ADL;Other (comment)  -MC     Impairments Integumentary integrity;Sensation  -MC     Assessment Comments Pt with increasing granulation to all wound areas today, including the lateral surgical wound. The area is still quite  deep with exposed structures, but overall appears improved. Blue/green drainage and odor not noted today, perhaps due to better exudate management with wound vac. Pt forgot his nystatin to use for crusting today, so pt is at risk for further periwound yeast development and/or maceration. Pt will continue to benefit from skilled PT wound care to continue progress.  -     Rehab Potential Fair  -     Patient/caregiver participated in establishment of treatment plan and goals Yes  -     Patient would benefit from skilled therapy intervention Yes  -        PT Plan    PT Frequency 2x/week  -     Physical Therapy Interventions (Optional Details) wound care;patient/family education  -     PT Plan Comments debridement, vac  -               User Key  (r) = Recorded By, (t) = Taken By, (c) = Cosigned By      Initials Name Provider Type    Melisa Lr, PT Physical Therapist                    Goals   PT OP Goals       Row Name 07/07/25 1413          Time Calculation    PT Goal Re-Cert Due Date 09/23/25  -               User Key  (r) = Recorded By, (t) = Taken By, (c) = Cosigned By      Initials Name Provider Type    Melisa Lr, PT Physical Therapist                    PT Goal Re-Cert Due Date: 09/23/25            Time Calculation: Start Time: 0930  Untimed Charges  82037-Sznzotels debridement: 10  15573-Oer Pressure wound to 50 sqcm: 25  Total Minutes  Untimed Charges Total Minutes: 35   Total Minutes: 35              Melisa Dolan PT  7/7/2025

## 2025-07-08 ENCOUNTER — HOSPITAL ENCOUNTER (OUTPATIENT)
Dept: ONCOLOGY | Facility: HOSPITAL | Age: 80
Discharge: HOME | End: 2025-07-08
Admitting: INTERNAL MEDICINE
Payer: MEDICARE

## 2025-07-08 ENCOUNTER — TELEPHONE (OUTPATIENT)
Dept: PHYSICAL THERAPY | Facility: HOSPITAL | Age: 80
End: 2025-07-08
Payer: MEDICARE

## 2025-07-08 VITALS
HEIGHT: 71 IN | SYSTOLIC BLOOD PRESSURE: 148 MMHG | BODY MASS INDEX: 30.1 KG/M2 | RESPIRATION RATE: 16 BRPM | DIASTOLIC BLOOD PRESSURE: 59 MMHG | WEIGHT: 215 LBS | TEMPERATURE: 97.1 F | HEART RATE: 81 BPM

## 2025-07-08 DIAGNOSIS — L03.116 CELLULITIS OF LEFT FOOT: ICD-10-CM

## 2025-07-08 LAB
ALBUMIN SERPL-MCNC: 3.8 G/DL (ref 3.5–5.2)
ALBUMIN/GLOB SERPL: 1.4 G/DL
ALP SERPL-CCNC: 105 U/L (ref 39–117)
ALT SERPL W P-5'-P-CCNC: 13 U/L (ref 1–41)
ANION GAP SERPL CALCULATED.3IONS-SCNC: 10 MMOL/L (ref 5–15)
AST SERPL-CCNC: 26 U/L (ref 1–40)
BASOPHILS # BLD AUTO: 0.05 10*3/MM3 (ref 0–0.2)
BASOPHILS NFR BLD AUTO: 0.6 % (ref 0–1.5)
BILIRUB SERPL-MCNC: 0.7 MG/DL (ref 0–1.2)
BUN SERPL-MCNC: 21.3 MG/DL (ref 8–23)
BUN/CREAT SERPL: 17 (ref 7–25)
CALCIUM SPEC-SCNC: 8.8 MG/DL (ref 8.6–10.5)
CHLORIDE SERPL-SCNC: 105 MMOL/L (ref 98–107)
CO2 SERPL-SCNC: 26 MMOL/L (ref 22–29)
CREAT SERPL-MCNC: 1.25 MG/DL (ref 0.76–1.27)
CRP SERPL-MCNC: 1.25 MG/DL (ref 0–0.5)
DEPRECATED RDW RBC AUTO: 53.9 FL (ref 37–54)
EGFRCR SERPLBLD CKD-EPI 2021: 58.2 ML/MIN/1.73
EOSINOPHIL # BLD AUTO: 0.11 10*3/MM3 (ref 0–0.4)
EOSINOPHIL NFR BLD AUTO: 1.3 % (ref 0.3–6.2)
ERYTHROCYTE [DISTWIDTH] IN BLOOD BY AUTOMATED COUNT: 15.1 % (ref 12.3–15.4)
ERYTHROCYTE [SEDIMENTATION RATE] IN BLOOD: 24 MM/HR (ref 0–20)
FUNGUS WND CULT: NORMAL
GLOBULIN UR ELPH-MCNC: 2.7 GM/DL
GLUCOSE SERPL-MCNC: 131 MG/DL (ref 65–99)
HCT VFR BLD AUTO: 35.7 % (ref 37.5–51)
HGB BLD-MCNC: 11.3 G/DL (ref 13–17.7)
IMM GRANULOCYTES # BLD AUTO: 0.02 10*3/MM3 (ref 0–0.05)
IMM GRANULOCYTES NFR BLD AUTO: 0.2 % (ref 0–0.5)
LYMPHOCYTES # BLD AUTO: 1.39 10*3/MM3 (ref 0.7–3.1)
LYMPHOCYTES NFR BLD AUTO: 16.9 % (ref 19.6–45.3)
MCH RBC QN AUTO: 31.7 PG (ref 26.6–33)
MCHC RBC AUTO-ENTMCNC: 31.7 G/DL (ref 31.5–35.7)
MCV RBC AUTO: 100.3 FL (ref 79–97)
MONOCYTES # BLD AUTO: 1.02 10*3/MM3 (ref 0.1–0.9)
MONOCYTES NFR BLD AUTO: 12.4 % (ref 5–12)
MYCOBACTERIUM SPEC CULT: NORMAL
NEUTROPHILS NFR BLD AUTO: 5.65 10*3/MM3 (ref 1.7–7)
NEUTROPHILS NFR BLD AUTO: 68.6 % (ref 42.7–76)
NIGHT BLUE STAIN TISS: NORMAL
PLATELET # BLD AUTO: 126 10*3/MM3 (ref 140–450)
PMV BLD AUTO: 10.3 FL (ref 6–12)
POTASSIUM SERPL-SCNC: 4.5 MMOL/L (ref 3.5–5.2)
PROT SERPL-MCNC: 6.5 G/DL (ref 6–8.5)
RBC # BLD AUTO: 3.56 10*6/MM3 (ref 4.14–5.8)
SODIUM SERPL-SCNC: 141 MMOL/L (ref 136–145)
WBC NRBC COR # BLD AUTO: 8.24 10*3/MM3 (ref 3.4–10.8)

## 2025-07-08 PROCEDURE — 36592 COLLECT BLOOD FROM PICC: CPT

## 2025-07-08 PROCEDURE — 80053 COMPREHEN METABOLIC PANEL: CPT | Performed by: INTERNAL MEDICINE

## 2025-07-08 PROCEDURE — 85652 RBC SED RATE AUTOMATED: CPT | Performed by: INTERNAL MEDICINE

## 2025-07-08 PROCEDURE — 86140 C-REACTIVE PROTEIN: CPT | Performed by: INTERNAL MEDICINE

## 2025-07-08 PROCEDURE — 85025 COMPLETE CBC W/AUTO DIFF WBC: CPT | Performed by: INTERNAL MEDICINE

## 2025-07-08 NOTE — TELEPHONE ENCOUNTER
Pt called this date reporting wound vac alarm sounding indicating loss of suction/leak seeking advice to fix the issue. Pt explained to pt how to attempt adding more wound vac tape to reinforce dressing to help regain suction. If that does not work PT suggested removing entire wound vac dressing including black foam packing and re-packing wound as previously recommended with Vashe soaked gauze with absorbant layers and kerlix and compressogrip to cover. If pt unable to fix issue with wound vac PT recommended PT call PT wound care back to try to re-schedule Thursdays wound care appointment to tomorrow Wednesday July 9th at 1515 instead to replace wound vac dressing.

## 2025-07-09 ENCOUNTER — HOSPITAL ENCOUNTER (OUTPATIENT)
Dept: PHYSICAL THERAPY | Facility: HOSPITAL | Age: 80
Setting detail: THERAPIES SERIES
Discharge: HOME | End: 2025-07-09
Payer: MEDICARE

## 2025-07-09 DIAGNOSIS — S81.802D MULTIPLE OPEN WOUNDS OF LOWER EXTREMITY, COMPLICATED, LEFT, SUBSEQUENT ENCOUNTER: Primary | ICD-10-CM

## 2025-07-09 DIAGNOSIS — R60.0 EDEMA OF LEFT LOWER LEG: ICD-10-CM

## 2025-07-09 DIAGNOSIS — L02.612 FOOT ABSCESS, LEFT: ICD-10-CM

## 2025-07-09 DIAGNOSIS — L03.116 CELLULITIS OF LEFT FOOT: ICD-10-CM

## 2025-07-09 DIAGNOSIS — L08.9 LEFT FOOT INFECTION: ICD-10-CM

## 2025-07-09 PROCEDURE — 97597 DBRDMT OPN WND 1ST 20 CM/<: CPT

## 2025-07-09 PROCEDURE — 97605 NEG PRS WND THER DME<=50SQCM: CPT

## 2025-07-09 RX ORDER — NYSTATIN 100000 [USP'U]/G
POWDER TOPICAL DAILY PRN
Qty: 30 G | Refills: 0 | Status: SHIPPED | OUTPATIENT
Start: 2025-07-09

## 2025-07-09 NOTE — THERAPY WOUND CARE TREATMENT
Outpatient Rehabilitation - Wound/Debridement Treatment Note  [facility]     Patient Name: Carlos Culver  : 1945  MRN: [id number]  Today's Date: 2025                 Admit Date: 2025    Visit Dx:    ICD-10-CM ICD-9-CM   1. Multiple open wounds of lower extremity, complicated, left, subsequent encounter  S81.802D V58.89     894.1   2. Cellulitis of left foot  L03.116 682.7   3. Foot abscess, left  L02.612 682.7   4. Edema of left lower leg  R60.0 782.3         Patient Active Problem List   Diagnosis    Cellulitis of left foot    GUCCI (acute kidney injury)    Essential hypertension    Leukocytosis    Osteomyelitis of left foot    Chronic anticoagulation    Neuropathy    Elevated transaminase level    CKD (chronic kidney disease) stage 2, GFR 60-89 ml/min    Skin ulcer of right heel    Chronic multifocal osteomyelitis of left foot    Contracture of joint of foot, left    MARTINEZ (dyspnea on exertion)    Neuropathic ulcer, limited to breakdown of skin    Hyperlipidemia LDL goal <100    A-fib    S/P transmetatarsal amputation of foot, left    Leukocytosis, likely reactive    Acute postoperative pain    Alcohol abuse    Staphylococcus aureus bacteremia    Acute osteomyelitis of left ankle or foot    History of [place] amputation of left foot    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 evidence of necrosis    Ulcer of left foot    Left foot infection    Abscess of left foot, status post I&D, exostectomy        Past Medical History:   Diagnosis Date    A-fib     Acute kidney failure     Elevated cholesterol     Hard of hearing     Hyperlipidemia     Hypertension     Osteomyelitis     Renal disorder     Wears hearing aid in both ears     Wears reading eyeglasses         Past Surgical History:   Procedure Laterality Date    AMPUTATION DIGIT Left 2020    Procedure: AMPUTATION LEFT 2ND TOE;  Surgeon: Idania Osborn MD;  Location: [facility];  Service:  Orthopedics    AMPUTATION DIGIT Left 12/08/2020    Procedure: TRANSMETATARSAL AMPUTATION LEFT;  Surgeon: Idania Osborn MD;  Location:  [facility];  Service: Orthopedics;  Laterality: Left;    APPENDECTOMY      COLONOSCOPY  2006    FOOT SURGERY Left 12/09/2020    (L) transmetatarsal amputation 12/09/2020 - Dr. Idania Osborn    INCISION AND DRAINAGE LEG Left 10/23/2018    Procedure: INCISION AND DRAINAGE LEFT FOOT;  Surgeon: Idania Osborn MD;  Location:  [facility];  Service: Orthopedics    INCISION AND DRAINAGE LEG Left 07/21/2023    Procedure: FOOT INCISION AND DRAINAGE; REVISION AMPUTATION; ACHILLES LENGTHENING -LEFT;  Surgeon: Mao Mckay MD;  Location:  [facility];  Service: Orthopedics;  Laterality: Left;    INCISION AND DRAINAGE LEG Left 6/3/2025    Procedure: FOOT ABSCESS INCISION AND DRAINAGE, EXOSTECTOMY;  Surgeon: Mao Mckay MD;  Location:  [facility];  Service: Orthopedics;  Laterality: Left;    TOE AMPUTATION      TONSILLECTOMY      VENOUS ACCESS DEVICE (PORT) INSERTION N/A 10/23/2018    Procedure: GROSHONG CATHETER PLACEMENT;  Surgeon: Marquez Simons MD;  Location:  [facility];  Service: General    VENOUS ACCESS DEVICE (PORT) INSERTION N/A 12/09/2020    Procedure: GROSHONG INSERTION;  Surgeon: Antonio Bains MD;  Location:  [facility];  Service: General;  Laterality: N/A;    VENOUS ACCESS DEVICE (PORT) REMOVAL           EVALUATION   PT Ortho       Row Name 07/09/25 1600       Subjective    Subjective Comments Pt reports his wound vac lost proper seal and stopped working yesterday. Reports he tried to use more vac tape to reinforce the dressing and regain seal as instructed however was not able to get it to work so he removed all of the wound vac dressing/packing and replaced the dressing with vashe soaked gauze and kerlix wrapping as recommended. Presenting for replacement of woudn vac. Reports he thinks he lost his Nystatin powder.  -LH       Precautions and Contraindications     Precautions insensate BLE  -       Subjective Pain    Able to rate subjective pain? yes  -LH    Pre-Treatment Pain Level 0  -LH    Post-Treatment Pain Level 0  -    Subjective Pain Comment PN  -LH       Transfers    Sit-Stand [place] (Transfers) independent  -LH    Stand-Sit [place] (Transfers) independent  -    Comment, (Transfers) long sitting for tx  -LH       Gait/Stairs (Locomotion)    [place] Level (Gait) modified independence  -    Assistive Device (Gait) walker, knee scooter  -              User Key  (r) = Recorded By, (t) = Taken By, (c) = Cosigned By      Initials Name Provider Type     Cornelio Tripp, PT Physical Therapist                     LDA Wound       Row Name 07/09/25 1600             Wound 06/05/25 0830 Left heel    Wound - Properties Group Placement Date: 06/05/25  - Placement Time: 0830 - Present on Original Admission: Y  -MF Side: Left  -MF Location: heel  -MF    Dressing Appearance intact;moist drainage  -      Dressing Removed Type gauze  -      Confirmed Empty Wound Bed Yes, visual inspection of wound bed  -      Base red;granulating;pink;epithelialization;yellow;slough  -      Periwound dry;redness;swelling;warm  -      Periwound Temperature warm  -      Periwound Skin Turgor soft  -      Edges open  -      Drainage Characteristics/Odor serosanguineous  -      Drainage Amount small  -      Care, Wound cleansed with;soap and water;wound cleanser;debrided  -      Dressing Care dressing applied;silver impregnated;collagen;low-adherent;foam  faiza, mepilex Ag, kerlix, size 4 compressogrip  -      Periwound Care cleansed with pH balanced cleanser;dry periwound area maintained  -      Retired Wound - Properties Group Placement Date: 06/05/25  - Placement Time: 0830  - Present on Original Admission: Y  -MF Side: Left  -MF Location: heel  -    Retired Wound - Properties Group Placement Date: 06/05/25  - Placement Time: 0830 -  Present on Original Admission: Y  -MF Side: Left  -MF Location: heel  -MF    Retired Wound - Properties Group Date first assessed: 06/05/25  -MF Time first assessed: 0830  -MF Present on Original Admission: Y  -MF Side: Left  -MF Location: heel  -MF       Wound 06/05/25 0830 Left  foot Neuropathic Ulcer    Wound - Properties Group Placement Date: 06/05/25  -MF Placement Time: 0830  -MF Present on Original Admission: Y  -MF Side: Left  -MF Orientation: --  -MF, plantar  Location: foot  -MF Primary Wound Type: Neuropathic  -MF    Dressing Appearance intact;moist drainage  -LH      Dressing Removed Type gauze  -      Confirmed Empty Wound Bed Yes, visual inspection of wound bed  -      Base moist;granulating;red;pink  -      Periwound intact;swelling;redness;warm  -      Periwound Temperature warm  -      Periwound Skin Turgor soft  -      Edges open  -      Drainage Characteristics/Odor serosanguineous  -      Drainage Amount small  -      Care, Wound cleansed with;wound cleanser;debrided  -      Dressing Care dressing applied;silver impregnated;collagen;low-adherent;foam  faiza, mepilex Ag, kerlix, size 4 compressogrip  -      Periwound Care cleansed with pH balanced cleanser;dry periwound area maintained  -      Retired Wound - Properties Group Placement Date: 06/05/25  -MF Placement Time: 0830  -MF Present on Original Admission: Y  -MF Side: Left  -MF Orientation: --  -MF, plantar  Location: foot  -MF    Retired Wound - Properties Group Placement Date: 06/05/25  -MF Placement Time: 0830  -MF Present on Original Admission: Y  -MF Side: Left  -MF Orientation: --  -MF, plantar  Location: foot  -MF    Retired Wound - Properties Group Date first assessed: 06/05/25  -MF Time first assessed: 0830  -MF Present on Original Admission: Y  -MF Side: Left  -MF Location: foot  -MF       Wound 05/08/25 Left lateral foot Surgical    Wound - Properties Group Placement Date: 05/08/25  -KW Side: Left  -KW  Orientation: lateral  -KW Location: foot  -KW Primary Wound Type: Surgical  -    Wound Image Images linked: 1  -LH      Dressing Appearance intact;moist drainage  -      Dressing Removed Type gauze  4x4 gauze, optifoam Ag, kerlix, size 5 compressogrip  -      Confirmed Empty Wound Bed Yes, visual inspection of wound bed  -      Base moist;exposed structure;pink;red;yellow;white;slough;subcutaneous  smooth, yellow, slimy slough covering entire wound base  -      Periwound macerated;excoriated;pale white;moist  mild/mod maceration immediate periwound, trace redness/rash  -      Periwound Temperature warm  -      Periwound Skin Turgor soft  -      Edges callused  -      Drainage Characteristics/Odor yellow;serosanguineous;green;other (see comments)  blue tint  -      Drainage Amount moderate  -      Care, Wound cleansed with;soap and water;wound cleanser;debrided;negative pressure wound therapy  -      Dressing Care silver impregnated;collagen;foam  wound vac  -      Periwound Care cleansed with pH balanced cleanser;dry periwound area maintained;barrier film applied  NoSting/Stoma powder crusting x2, stomapaste, border drape  -      Wound Output (mL) 0  pt changed canister yesterday  -      Retired Wound - Properties Group Placement Date: 05/08/25  -KW Side: Left  -KW Orientation: lateral  -KW Location: foot  -KW    Retired Wound - Properties Group Placement Date: 05/08/25  -KW Side: Left  -KW Orientation: lateral  -KW Location: foot  -KW    Retired Wound - Properties Group Date first assessed: 05/08/25  -KW Side: Left  -KW Location: foot  -KW       NPWT (Negative Pressure Wound Therapy) 06/25/25 L lateral foot incision    NPWT (Negative Pressure Wound Therapy) - Properties Group Placement Date: 06/25/25  - Location: L lateral foot incision  -    Therapy Setting continuous therapy  -      Dressing foam, black  -      Contact Layer other (see comments)  Stefanie Ag  -      Pressure  Setting 150 mmHg  -      Sponges Inserted 1  1 black  -      Sponges Removed other (see comments)  4x4 gauze  -      Finger sweep complete Yes  -LH      Retired NPWT (Negative Pressure Wound Therapy) - Properties Group Placement Date: 06/25/25  - Location: L lateral foot incision  -LH    Retired NPWT (Negative Pressure Wound Therapy) - Properties Group Placement Date: 06/25/25  - Location: L lateral foot incision  -LH    Retired NPWT (Negative Pressure Wound Therapy) - Properties Group Placement Date: 06/25/25  - Location: L lateral foot incision  -LH              User Key  (r) = Recorded By, (t) = Taken By, (c) = Cosigned By      Initials Name Provider Type    MF Koko Barraza, PT Physical Therapist     Cornelio Tripp, PT Physical Therapist     Melissa Lang, PT Physical Therapist                      WOUND DEBRIDEMENT  Total area of Debridement: 6cm2  Debridement Site 1  Location- Site 1: L plantar foot wound  Selective Debridement- Site 1: Wound Surface <20cmsq  Instruments- Site 1: tweezers  Excised Tissue Description- Site 1: minimum, slough  Bleeding- Site 1: none   Debridement Site 2  Location- Site 2: L lateral foot wound  Selective Debridement- Site 2: Wound Surface <20cmsq  Instruments- Site 2: tweezers, #15, scapel  Excised Tissue Description- Site 2: moderate, slough  Bleeding- Site 2: scant   Debridement Site 3  Location- Site 3: L heel wound  Selective Debridement- Site 3: Wound Surface <20cmsq  Instruments- Site 3: tweezers  Excised Tissue Description- Site 3: minimum, slough  Bleeding- Site 3: none      Therapy Education       Row Name 07/09/25 1600             Therapy Education    Education Details Continue current POC. Cancel tomorrow morning appointment if no new issues over night. PT will contact MD about refill of Nystatin powder. May need to trial 3x/week changes pending proper maintenence of wound vac seal.  -      Given Bandaging/dressing  change;Symptoms/condition management  -      Program Reinforced  -      How Provided Verbal;Demonstration  -      Provided to Patient  -      Level of Understanding Teach back education performed;Verbalized  -                User Key  (r) = Recorded By, (t) = Taken By, (c) = Cosigned By      Initials Name Provider Type     Cornelio Tripp, PT Physical Therapist                    Recommendation and Plan   PT Assessment/Plan       Row Name 07/09/25 1600          PT Assessment    Functional Limitations Performance in self-care ADL;Other (comment)  -     Impairments Integumentary integrity;Sensation  -     Assessment Comments Pt presenting this date with no packing in wound and with 4x4 gauze covering wound with Optifoam ag and kerlix to secure. Pt again with bright blue/green and thick yellow drainage noted on gauze dressing as indicated in photos. Pt with good improvements in periwound redness/rash this date, however with mild/moderate maceration beneath the area of the optifoam ag dressing. Pt's plantar and heel wounds both with good improvements in wound dimensions. PT re-applied NPWT to help promote granulation formation, reduce bacterial load, manage drainage, and promote wound healing. PT applied stomapowder crusting to periwound as pt lost his Nystatin powder Rx.  -     Rehab Potential Fair  -     Patient/caregiver participated in establishment of treatment plan and goals Yes  -     Patient would benefit from skilled therapy intervention Yes  -        PT Plan    PT Frequency 2x/week;3x/week  -     Physical Therapy Interventions (Optional Details) wound care;patient/family education  -     PT Plan Comments debridement, vac  -               User Key  (r) = Recorded By, (t) = Taken By, (c) = Cosigned By      Initials Name Provider Type     Cornelio Tripp PT Physical Therapist                    Goals   PT OP Goals       Row Name 07/09/25 1625          Time Calculation    PT Goal  Re-Cert Due Date 09/23/25  -               User Key  (r) = Recorded By, (t) = Taken By, (c) = Cosigned By      Initials Name Provider Type     Cornelio Tripp, PT Physical Therapist                    PT Goal Re-Cert Due Date: 09/23/25            Time Calculation: Start Time: 1515  Untimed Charges  13150-Lelqclivg debridement: 15  64256-Hrf Pressure wound to 50 sqcm: 25  Total Minutes  Untimed Charges Total Minutes: 40   Total Minutes: 40  Therapy Charges for Today       Code Description Service Date Service Provider Modifiers Qty    80415250271 HC MARILYN DEBRIDE OPEN WOUND UP TO 20CM 7/9/2025 Cornelio Tripp, PT GP 1    [id number] HC PT NEG PRESS WOUND TO 50SQCM DME2 7/9/2025 Cornelio Tripp, PT GP 1                    Cornelio Tripp, PT  7/9/2025

## 2025-07-10 ENCOUNTER — APPOINTMENT (OUTPATIENT)
Dept: PHYSICAL THERAPY | Facility: HOSPITAL | Age: 80
End: 2025-07-10
Payer: MEDICARE

## 2025-07-12 ENCOUNTER — HOSPITAL ENCOUNTER (OUTPATIENT)
Dept: PHYSICAL THERAPY | Facility: HOSPITAL | Age: 80
Setting detail: THERAPIES SERIES
Discharge: HOME | End: 2025-07-12
Payer: MEDICARE

## 2025-07-12 DIAGNOSIS — S81.802D MULTIPLE OPEN WOUNDS OF LOWER EXTREMITY, COMPLICATED, LEFT, SUBSEQUENT ENCOUNTER: Primary | ICD-10-CM

## 2025-07-12 DIAGNOSIS — L03.116 CELLULITIS OF LEFT FOOT: ICD-10-CM

## 2025-07-12 DIAGNOSIS — R60.0 EDEMA OF LEFT LOWER LEG: ICD-10-CM

## 2025-07-12 DIAGNOSIS — L02.612 FOOT ABSCESS, LEFT: ICD-10-CM

## 2025-07-12 PROCEDURE — 97597 DBRDMT OPN WND 1ST 20 CM/<: CPT

## 2025-07-12 PROCEDURE — 97605 NEG PRS WND THER DME<=50SQCM: CPT

## 2025-07-12 NOTE — THERAPY WOUND CARE TREATMENT
Outpatient Rehabilitation - Wound/Debridement Treatment Note  [facility]     Patient Name: Carlos Culver  : 1945  MRN: [id number]  Today's Date: 2025                 Admit Date: 2025    Visit Dx:    ICD-10-CM ICD-9-CM   1. Multiple open wounds of lower extremity, complicated, left, subsequent encounter  S81.802D V58.89     894.1   2. Cellulitis of left foot  L03.116 682.7   3. Foot abscess, left  L02.612 682.7   4. Edema of left lower leg  R60.0 782.3       Patient Active Problem List   Diagnosis    Cellulitis of left foot    GUCCI (acute kidney injury)    Essential hypertension    Leukocytosis    Osteomyelitis of left foot    Chronic anticoagulation    Neuropathy    Elevated transaminase level    CKD (chronic kidney disease) stage 2, GFR 60-89 ml/min    Skin ulcer of right heel    Chronic multifocal osteomyelitis of left foot    Contracture of joint of foot, left    MARTINEZ (dyspnea on exertion)    Neuropathic ulcer, limited to breakdown of skin    Hyperlipidemia LDL goal <100    A-fib    S/P transmetatarsal amputation of foot, left    Leukocytosis, likely reactive    Acute postoperative pain    Alcohol abuse    Staphylococcus aureus bacteremia    Acute osteomyelitis of left ankle or foot    History of [place] amputation of left foot    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 evidence of necrosis    Ulcer of left foot    Left foot infection    Abscess of left foot, status post I&D, exostectomy        Past Medical History:   Diagnosis Date    A-fib     Acute kidney failure     Elevated cholesterol     Hard of hearing     Hyperlipidemia     Hypertension     Osteomyelitis     Renal disorder     Wears hearing aid in both ears     Wears reading eyeglasses         Past Surgical History:   Procedure Laterality Date    AMPUTATION DIGIT Left 2020    Procedure: AMPUTATION LEFT 2ND TOE;  Surgeon: Idania Osborn MD;  Location: [facility];  Service:  Orthopedics    AMPUTATION DIGIT Left 12/08/2020    Procedure: TRANSMETATARSAL AMPUTATION LEFT;  Surgeon: Idania Osborn MD;  Location: [facility];  Service: Orthopedics;  Laterality: Left;    APPENDECTOMY      COLONOSCOPY  2006    FOOT SURGERY Left 12/09/2020    (L) transmetatarsal amputation 12/09/2020 - Dr. Idania Osborn    INCISION AND DRAINAGE LEG Left 10/23/2018    Procedure: INCISION AND DRAINAGE LEFT FOOT;  Surgeon: Idania Osborn MD;  Location: [facility];  Service: Orthopedics    INCISION AND DRAINAGE LEG Left 07/21/2023    Procedure: FOOT INCISION AND DRAINAGE; REVISION AMPUTATION; ACHILLES LENGTHENING -LEFT;  Surgeon: Mao Mckay MD;  Location: [facility];  Service: Orthopedics;  Laterality: Left;    INCISION AND DRAINAGE LEG Left 6/3/2025    Procedure: FOOT ABSCESS INCISION AND DRAINAGE, EXOSTECTOMY;  Surgeon: Mao Mckay MD;  Location: [facility];  Service: Orthopedics;  Laterality: Left;    TOE AMPUTATION      TONSILLECTOMY      VENOUS ACCESS DEVICE (PORT) INSERTION N/A 10/23/2018    Procedure: GROSHONG CATHETER PLACEMENT;  Surgeon: Marquez Simons MD;  Location: [facility];  Service: General    VENOUS ACCESS DEVICE (PORT) INSERTION N/A 12/09/2020    Procedure: GROSHONG INSERTION;  Surgeon: Antonio Bains MD;  Location: [facility];  Service: General;  Laterality: N/A;    VENOUS ACCESS DEVICE (PORT) REMOVAL           EVALUATION   PT Ortho       Row Name 07/12/25 1200       Subjective    Subjective Comments No complaints or changes since last tx  -MC       Precautions and Contraindications    Precautions insensate BLE  -MC       Subjective Pain    Able to rate subjective pain? yes  -MC    Pre-Treatment Pain Level 0  -MC    Post-Treatment Pain Level 0  -MC    Subjective Pain Comment PN  -MC       Transfers    Sit-Stand [place] (Transfers) independent  -MC    Stand-Sit [place] (Transfers) independent  -MC    Comment, (Transfers) long sitting for tx  -MC       Gait/Stairs  (Locomotion)    [place] Level (Gait) modified independence  -    Assistive Device (Gait) walker, knee scooter  -              User Key  (r) = Recorded By, (t) = Taken By, (c) = Cosigned By      Initials Name Provider Type    Melisa Lr PT Physical Therapist                     LDA Wound       Row Name 07/12/25 1200             Wound 06/05/25 0830 Left heel    Wound - Properties Group Placement Date: 06/05/25  - Placement Time: 0830  -MF Present on Original Admission: Y  -MF Side: Left  -MF Location: heel  -MF    Dressing Appearance intact;moist drainage  -      Dressing Removed Type foam;silver impregnated;low-adherent  -      Confirmed Empty Wound Bed Yes, visual inspection of wound bed  -      Base red;granulating;pink;epithelialization;yellow;slough  -      Periwound dry;redness;swelling;warm  -      Periwound Temperature warm  -      Periwound Skin Turgor soft  -      Edges open  -      Drainage Characteristics/Odor serosanguineous  -      Drainage Amount small  -      Care, Wound cleansed with;wound cleanser;debrided  -      Dressing Care dressing applied;silver impregnated;collagen;low-adherent;foam;gauze  faiza, mepilex Ag, kerlix, compressogrip  -      Periwound Care cleansed with pH balanced cleanser;dry periwound area maintained  -      Retired Wound - Properties Group Placement Date: 06/05/25  - Placement Time: 0830  -MF Present on Original Admission: Y  -MF Side: Left  -MF Location: heel  -MF    Retired Wound - Properties Group Placement Date: 06/05/25  - Placement Time: 0830  -MF Present on Original Admission: Y  -MF Side: Left  -MF Location: heel  -MF    Retired Wound - Properties Group Date first assessed: 06/05/25  - Time first assessed: 0830  -MF Present on Original Admission: Y  -MF Side: Left  -MF Location: heel  -MF       Wound 06/05/25 0830 Left  foot Neuropathic Ulcer    Wound - Properties Group Placement Date: 06/05/25  - Placement  Time: 0830  -MF Present on Original Admission: Y  -MF Side: Left  -MF Orientation: --  -MF, plantar  Location: foot  -MF Primary Wound Type: Neuropathic  -MF    Dressing Appearance intact;moist drainage  -      Dressing Removed Type foam;low-adherent;silver impregnated  -      Confirmed Empty Wound Bed Yes, visual inspection of wound bed  -      Base moist;granulating;red;pink  -      Periwound intact;swelling;redness;warm  -      Periwound Temperature warm  -      Periwound Skin Turgor soft  -      Edges open  -      Drainage Characteristics/Odor serosanguineous  -      Drainage Amount small  -      Care, Wound cleansed with;wound cleanser;debrided  -      Dressing Care dressing applied;silver impregnated;collagen;low-adherent;foam  faiza, mepilex Ag, kerlix, compressogrip  -      Periwound Care cleansed with pH balanced cleanser;dry periwound area maintained  -      Retired Wound - Properties Group Placement Date: 06/05/25  - Placement Time: 0830 -MF Present on Original Admission: Y  -MF Side: Left  -MF Orientation: --  -MF, plantar  Location: foot  -MF    Retired Wound - Properties Group Placement Date: 06/05/25  - Placement Time: 0830  -MF Present on Original Admission: Y  -MF Side: Left  -MF Orientation: --  -MF, plantar  Location: foot  -MF    Retired Wound - Properties Group Date first assessed: 06/05/25  - Time first assessed: 0830  -MF Present on Original Admission: Y  -MF Side: Left  -MF Location: foot  -MF       Wound 05/08/25 Left lateral foot Surgical    Wound - Properties Group Placement Date: 05/08/25  -KW Side: Left  -KW Orientation: lateral  -KW Location: foot  -KW Primary Wound Type: Surgical  -MF    Dressing Appearance intact;moist drainage  -      Dressing Removed Type foam  vac  -      Confirmed Empty Wound Bed Yes, visual inspection of wound bed;Yes, probed  -      Base moist;exposed structure;pink;red;yellow;white;slough;subcutaneous  increased  granulation, some slick slough remaining distally  -      Periwound macerated;excoriated;pale white;moist  mild/mod maceration immediate periwound, trace redness/rash  -      Periwound Temperature warm  -      Periwound Skin Turgor soft  -      Edges callused  -      Drainage Characteristics/Odor yellow;serosanguineous  -      Drainage Amount moderate  -      Care, Wound cleansed with;wound cleanser;debrided;negative pressure wound therapy  -      Dressing Care dressing changed  vac, kerlix, size 4 compressogrip  -      Periwound Care cleansed with pH balanced cleanser;barrier film applied;topical treatment applied  NoSting/Nystatin crusting x2  -      Wound Output (mL) 50  -MC      Retired Wound - Properties Group Placement Date: 05/08/25  -KW Side: Left  -KW Orientation: lateral  -KW Location: foot  -KW    Retired Wound - Properties Group Placement Date: 05/08/25  -KW Side: Left  -KW Orientation: lateral  -KW Location: foot  -KW    Retired Wound - Properties Group Date first assessed: 05/08/25  -KW Side: Left  -KW Location: foot  -KW       NPWT (Negative Pressure Wound Therapy) 06/25/25 L lateral foot incision    NPWT (Negative Pressure Wound Therapy) - Properties Group Placement Date: 06/25/25  - Location: L lateral foot incision  -    Therapy Setting continuous therapy  -      Dressing foam, black  -      Contact Layer other (see comments)  faiza Ag  -      Pressure Setting 150 mmHg  -      Sponges Inserted 1  -MC      Sponges Removed 1  -MC      Finger sweep complete Yes  -      Retired NPWT (Negative Pressure Wound Therapy) - Properties Group Placement Date: 06/25/25  - Location: L lateral foot incision  -LH    Retired NPWT (Negative Pressure Wound Therapy) - Properties Group Placement Date: 06/25/25  - Location: L lateral foot incision  -LH    Retired NPWT (Negative Pressure Wound Therapy) - Properties Group Placement Date: 06/25/25  - Location: L lateral foot incision   -              User Key  (r) = Recorded By, (t) = Taken By, (c) = Cosigned By      Initials Name Provider Type    Koko Reynolds, PT Physical Therapist    Melisa Lr, PT Physical Therapist    Cornelio Kasper, PT Physical Therapist    Melissa Casiano, PT Physical Therapist                      WOUND DEBRIDEMENT  Total area of Debridement: 8 cm2  Debridement Site 1  Location- Site 1: L plantar foot wound  Selective Debridement- Site 1: Wound Surface <20cmsq  Instruments- Site 1: tweezers  Excised Tissue Description- Site 1: minimum, slough   Debridement Site 2  Location- Site 2: L lateral foot wound  Selective Debridement- Site 2: Wound Surface <20cmsq  Instruments- Site 2: tweezers, #15, scapel  Excised Tissue Description- Site 2: moderate, slough, other (comment) (periwound MASD)  Bleeding- Site 2: scant, held pressure, 1 minute   Debridement Site 3  Location- Site 3: L heel wound  Selective Debridement- Site 3: Wound Surface <20cmsq  Instruments- Site 3: tweezers  Excised Tissue Description- Site 3: minimum, slough  Bleeding- Site 3: none      Therapy Education       Row Name 07/12/25 1200             Therapy Education    Education Details Continue current POC  -MC      Given Bandaging/dressing change;Symptoms/condition management  -MC      Program Reinforced  -MC      How Provided Verbal;Demonstration  -MC      Provided to Patient  -MC      Level of Understanding Teach back education performed;Verbalized  -MC                User Key  (r) = Recorded By, (t) = Taken By, (c) = Cosigned By      Initials Name Provider Type    Melisa Lr, PT Physical Therapist                    Recommendation and Plan   PT Assessment/Plan       Row Name 07/12/25 1200          PT Assessment    Functional Limitations Performance in self-care ADL;Other (comment)  -MC     Impairments Integumentary integrity;Sensation  -MC     Assessment Comments Pt with improved overall wound status, with more  visible granulation and no blue/green drainage present in the wound vac canister. Pt with mild to moderate periwound maceration and redness, but was able to bring nystatin today to allow for periwound crusting. Pt will continue to benefit from skilled PT wound care to continue progress.  -     Rehab Potential Fair  -     Patient/caregiver participated in establishment of treatment plan and goals Yes  -     Patient would benefit from skilled therapy intervention Yes  -        PT Plan    PT Frequency 2x/week;3x/week  -     Physical Therapy Interventions (Optional Details) wound care;patient/family education  -     PT Plan Comments debridement, vac  -               User Key  (r) = Recorded By, (t) = Taken By, (c) = Cosigned By      Initials Name Provider Type    Melisa Lr, PT Physical Therapist                    Goals   PT OP Goals       Row Name 07/12/25 1242          Time Calculation    PT Goal Re-Cert Due Date 09/23/25  -               User Key  (r) = Recorded By, (t) = Taken By, (c) = Cosigned By      Initials Name Provider Type    Melisa Lr, PT Physical Therapist                    PT Goal Re-Cert Due Date: 09/23/25            Time Calculation: Start Time: 1030  Untimed Charges  70883-Vbefayzml debridement: 10  99247-Zhf Pressure wound to 50 sqcm: 20  Total Minutes  Untimed Charges Total Minutes: 30   Total Minutes: 30              Melisa Dolan PT  7/12/2025

## 2025-07-14 ENCOUNTER — HOSPITAL ENCOUNTER (OUTPATIENT)
Dept: PHYSICAL THERAPY | Facility: HOSPITAL | Age: 80
Setting detail: THERAPIES SERIES
Discharge: HOME | End: 2025-07-14
Payer: MEDICARE

## 2025-07-14 DIAGNOSIS — R60.0 EDEMA OF LEFT LOWER LEG: ICD-10-CM

## 2025-07-14 DIAGNOSIS — L02.612 FOOT ABSCESS, LEFT: ICD-10-CM

## 2025-07-14 DIAGNOSIS — S81.802D MULTIPLE OPEN WOUNDS OF LOWER EXTREMITY, COMPLICATED, LEFT, SUBSEQUENT ENCOUNTER: Primary | ICD-10-CM

## 2025-07-14 DIAGNOSIS — L03.116 CELLULITIS OF LEFT FOOT: ICD-10-CM

## 2025-07-14 DIAGNOSIS — L98.491 ULCER OF SKIN, CHRONIC, LIMITED TO BREAKDOWN OF SKIN: ICD-10-CM

## 2025-07-14 PROCEDURE — 97605 NEG PRS WND THER DME<=50SQCM: CPT

## 2025-07-14 PROCEDURE — 97597 DBRDMT OPN WND 1ST 20 CM/<: CPT

## 2025-07-14 NOTE — THERAPY WOUND CARE TREATMENT
Outpatient Rehabilitation - Wound/Debridement Treatment Note  [facility]     Patient Name: Carlos Culver  : 1945  MRN: [id number]  Today's Date: 2025                 Admit Date: 2025    Visit Dx:    ICD-10-CM ICD-9-CM   1. Multiple open wounds of lower extremity, complicated, left, subsequent encounter  S81.802D V58.89     894.1   2. Cellulitis of left foot  L03.116 682.7   3. Foot abscess, left  L02.612 682.7   4. Edema of left lower leg  R60.0 782.3   5. Ulcer of skin, chronic, limited to breakdown of skin  L98.491 707.9                     Patient Active Problem List   Diagnosis    Cellulitis of left foot    GUCCI (acute kidney injury)    Essential hypertension    Leukocytosis    Osteomyelitis of left foot    Chronic anticoagulation    Neuropathy    Elevated transaminase level    CKD (chronic kidney disease) stage 2, GFR 60-89 ml/min    Skin ulcer of right heel    Chronic multifocal osteomyelitis of left foot    Contracture of joint of foot, left    MARTINEZ (dyspnea on exertion)    Neuropathic ulcer, limited to breakdown of skin    Hyperlipidemia LDL goal <100    A-fib    S/P transmetatarsal amputation of foot, left    Leukocytosis, likely reactive    Acute postoperative pain    Alcohol abuse    Staphylococcus aureus bacteremia    Acute osteomyelitis of left ankle or foot    History of [place] amputation of left foot    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 evidence of necrosis    Ulcer of left foot    Left foot infection    Abscess of left foot, status post I&D, exostectomy        Past Medical History:   Diagnosis Date    A-fib     Acute kidney failure     Elevated cholesterol     Hard of hearing     Hyperlipidemia     Hypertension     Osteomyelitis     Renal disorder     Wears hearing aid in both ears     Wears reading eyeglasses         Past Surgical History:   Procedure Laterality Date    AMPUTATION DIGIT Left 2020    Procedure:  AMPUTATION LEFT 2ND TOE;  Surgeon: Idania Osborn MD;  Location: [facility];  Service: Orthopedics    AMPUTATION DIGIT Left 12/08/2020    Procedure: TRANSMETATARSAL AMPUTATION LEFT;  Surgeon: Idania sOborn MD;  Location: [facility];  Service: Orthopedics;  Laterality: Left;    APPENDECTOMY      COLONOSCOPY  2006    FOOT SURGERY Left 12/09/2020    (L) transmetatarsal amputation 12/09/2020 - Dr. Idania Osborn    INCISION AND DRAINAGE LEG Left 10/23/2018    Procedure: INCISION AND DRAINAGE LEFT FOOT;  Surgeon: Idania Osborn MD;  Location: [facility];  Service: Orthopedics    INCISION AND DRAINAGE LEG Left 07/21/2023    Procedure: FOOT INCISION AND DRAINAGE; REVISION AMPUTATION; ACHILLES LENGTHENING -LEFT;  Surgeon: Mao Mckay MD;  Location: [facility];  Service: Orthopedics;  Laterality: Left;    INCISION AND DRAINAGE LEG Left 6/3/2025    Procedure: FOOT ABSCESS INCISION AND DRAINAGE, EXOSTECTOMY;  Surgeon: Mao Mckay MD;  Location: [facility];  Service: Orthopedics;  Laterality: Left;    TOE AMPUTATION      TONSILLECTOMY      VENOUS ACCESS DEVICE (PORT) INSERTION N/A 10/23/2018    Procedure: GROSHONG CATHETER PLACEMENT;  Surgeon: Marquez Simons MD;  Location: [facility];  Service: General    VENOUS ACCESS DEVICE (PORT) INSERTION N/A 12/09/2020    Procedure: GROSHONG INSERTION;  Surgeon: Antonio Bains MD;  Location:  [facility];  Service: General;  Laterality: N/A;    VENOUS ACCESS DEVICE (PORT) REMOVAL           EVALUATION   PT Ortho       Row Name 07/14/25 4167       Subjective    Subjective Comments No complaints, states leg swelling is better.  Reports he is still on oral abx.  -JM       Precautions and Contraindications    Precautions insensate BLE  -       Subjective Pain    Able to rate subjective pain? yes  -JM    Pre-Treatment Pain Level 0  -JM    Post-Treatment Pain Level 0  -JM    Subjective Pain Comment PN  -JM       Transfers    Sit-Stand [place] (Transfers) independent  -     Stand-Sit [place] (Transfers) independent  -    Comment, (Transfers) long sitting for tx  -       Gait/Stairs (Locomotion)    [place] Level (Gait) modified independence  -    Assistive Device (Gait) walker, knee scooter  -              User Key  (r) = Recorded By, (t) = Taken By, (c) = Cosigned By      Initials Name Provider Type    Safia Alexander PT Physical Therapist                     LDA Wound       Row Name 07/14/25 0845             Wound 06/05/25 0830 Left heel    Wound - Properties Group Placement Date: 06/05/25  - Placement Time: 0830  - Present on Original Admission: Y  -MF Side: Left  -MF Location: heel  -    Wound Image Images linked: 1  -      Dressing Appearance intact;moist drainage  -      Dressing Removed Type foam;silver impregnated  -      Confirmed Empty Wound Bed Yes, visual inspection of wound bed  -      Base red;granulating;pink;epithelialization;yellow;slough  -      Periwound dry;redness;swelling;warm  -      Periwound Temperature warm  -      Periwound Skin Turgor soft  -      Edges open  -      Wound Length (cm) 0.5 cm  -      Wound Width (cm) 0.5 cm  -      Wound Depth (cm) 0.1 cm  -      Wound Surface Area (cm^2) 0.2 cm^2  -      Wound Volume (cm^3) 0.013 cm^3  -      Drainage Characteristics/Odor serosanguineous  -      Drainage Amount small  -      Care, Wound cleansed with;wound cleanser;debrided  -      Dressing Care dressing applied;collagen;silver impregnated;foam;gauze  faiza, mepilex ag (under vac drape)  -      Periwound Care cleansed with pH balanced cleanser;dry periwound area maintained  -      Retired Wound - Properties Group Placement Date: 06/05/25  - Placement Time: 0830  - Present on Original Admission: Y  -MF Side: Left  - Location: heel  -    Retired Wound - Properties Group Placement Date: 06/05/25  - Placement Time: 0830  - Present on Original Admission: Y  -MF Side: Left  -MF  Location: heel  -MF    Retired Wound - Properties Group Date first assessed: 06/05/25  - Time first assessed: 0830  -MF Present on Original Admission: Y  -MF Side: Left  -MF Location: heel  -MF       Wound 06/05/25 0830 Left  foot Neuropathic Ulcer    Wound - Properties Group Placement Date: 06/05/25  - Placement Time: 0830  -MF Present on Original Admission: Y  -MF Side: Left  -MF Orientation: --  -MF, plantar  Location: foot  -MF Primary Wound Type: Neuropathic  -    Wound Image Images linked: 2  -JM      Dressing Appearance intact;moist drainage  -      Dressing Removed Type foam;silver impregnated  -      Confirmed Empty Wound Bed Yes, visual inspection of wound bed  -      Base moist;granulating;red;pink;yellow;slough  -      Periwound intact;swelling;redness;warm;other (see comments)  small crust over ant/med bony prominence  -      Periwound Temperature warm  -      Periwound Skin Turgor soft  -      Edges open  -      Wound Length (cm) 0.3 cm  -      Wound Width (cm) 0.3 cm  -      Wound Depth (cm) 0.1 cm  -      Wound Surface Area (cm^2) 0.07 cm^2  -JM      Wound Volume (cm^3) 0.005 cm^3  -JM      Drainage Characteristics/Odor serosanguineous  -      Drainage Amount scant  -      Care, Wound cleansed with;wound cleanser;debrided  -      Dressing Care dressing applied;collagen;silver impregnated;foam;gauze  faiza, mepilex ag, (also ag foam to pad ant/med crust) kerlix, size 5 compressogrip  -      Periwound Care cleansed with pH balanced cleanser;dry periwound area maintained  -      Retired Wound - Properties Group Placement Date: 06/05/25  - Placement Time: 0830  -MF Present on Original Admission: Y  -MF Side: Left  -MF Orientation: --  -MF, plantar  Location: foot  -MF    Retired Wound - Properties Group Placement Date: 06/05/25  - Placement Time: 0830  -MF Present on Original Admission: Y  -MF Side: Left  -MF Orientation: --  -MF, plantar  Location: foot  -MF     Retired Wound - Properties Group Date first assessed: 06/05/25  -MF Time first assessed: 0830  -MF Present on Original Admission: Y  -MF Side: Left  -MF Location: foot  -MF       Wound 05/08/25 Left lateral foot Surgical    Wound - Properties Group Placement Date: 05/08/25  -KW Side: Left  -KW Orientation: lateral  -KW Location: foot  -KW Primary Wound Type: Surgical  -MF    Wound Image Images linked: 1  -JM      Dressing Appearance intact;moist drainage  min pooling of drainage under drape but seal intact  -      Dressing Removed Type foam;gauze  vac  -      Confirmed Empty Wound Bed Yes, visual inspection of wound bed;Yes, probed  -      Base moist;exposed structure;pink;red;yellow;white;slough;subcutaneous  increased granulation, min slough posteriorly, SQ tissues  -      Periwound macerated;excoriated;pale white;moist;yeast  mod maceration immediate periwound, trace redness/rash  -      Periwound Temperature warm  -      Periwound Skin Turgor soft  -      Edges callused  -      Wound Length (cm) 7 cm  -      Wound Width (cm) 0.9 cm  -      Wound Depth (cm) 4.7 cm  -      Wound Surface Area (cm^2) 4.95 cm^2  -JM      Wound Volume (cm^3) 15.504 cm^3  -JM      Drainage Characteristics/Odor yellow;serosanguineous;green;other (see comments)  min blue-green  -JM      Drainage Amount moderate  -      Care, Wound cleansed with;wound cleanser;debrided;negative pressure wound therapy  -      Dressing Care dressing changed  vac, kerlix, size 5 compressogrip  -      Periwound Care cleansed with pH balanced cleanser;dry periwound area maintained;other (see comments)  nystatin powder/nosting crusting x2 layers, stomapaste/drape border  -      Wound Output (mL) 200  changed canister  -      Retired Wound - Properties Group Placement Date: 05/08/25  -KW Side: Left  -KW Orientation: lateral  -KW Location: foot  -KW    Retired Wound - Properties Group Placement Date: 05/08/25  -KW Side: Left  -KW  Orientation: lateral  -KW Location: foot  -KW    Retired Wound - Properties Group Date first assessed: 05/08/25  -KW Side: Left  -KW Location: foot  -KW       NPWT (Negative Pressure Wound Therapy) 06/25/25 L lateral foot incision    NPWT (Negative Pressure Wound Therapy) - Properties Group Placement Date: 06/25/25  - Location: L lateral foot incision  -LH    Therapy Setting continuous therapy  -JM      Dressing foam, black  -JM      Contact Layer other (see comments)  faiza ag  -JM      Pressure Setting 150 mmHg  -JM      Sponges Inserted 1  -JM      Sponges Removed 1  -JM      Finger sweep complete Yes  -JM      Retired NPWT (Negative Pressure Wound Therapy) - Properties Group Placement Date: 06/25/25  - Location: L lateral foot incision  -LH    Retired NPWT (Negative Pressure Wound Therapy) - Properties Group Placement Date: 06/25/25  - Location: L lateral foot incision  -LH    Retired NPWT (Negative Pressure Wound Therapy) - Properties Group Placement Date: 06/25/25  - Location: L lateral foot incision  -LH              User Key  (r) = Recorded By, (t) = Taken By, (c) = Cosigned By      Initials Name Provider Type    Koko Reynolds, PT Physical Therapist    Safia Alexander, PT Physical Therapist    Cornelio Kasper, PT Physical Therapist    Melissa Casiano, PT Physical Therapist                      WOUND DEBRIDEMENT  Total area of Debridement: 8cmsq  Debridement Site 1  Location- Site 1: L plantar foot wound  Selective Debridement- Site 1: Wound Surface <20cmsq  Instruments- Site 1: tweezers  Excised Tissue Description- Site 1: minimum, slough  Bleeding- Site 1: none   Debridement Site 2  Location- Site 2: L lateral foot wound  Selective Debridement- Site 2: Wound Surface <20cmsq  Instruments- Site 2: tweezers  Excised Tissue Description- Site 2: moderate, slough  Bleeding- Site 2: scant   Debridement Site 3  Location- Site 3: L heel wound  Selective Debridement- Site 3: Wound  "Surface <20cmsq  Instruments- Site 3: tweezers, scissors  Excised Tissue Description- Site 3: minimum, slough, moderate, other (comment) (periwound crust)  Bleeding- Site 3: scant, held pressure, 1 minute      Therapy Education       Row Name 07/14/25 0845             Therapy Education    Education Details Continue offloading and leg elevation, nutrition for wound healing, maintaining vac dressing.  -      Given Bandaging/dressing change;Symptoms/condition management  -      Program Reinforced  -      How Provided Verbal;Demonstration  -      Provided to Patient  -      Level of Understanding Teach back education performed;Verbalized  -                User Key  (r) = Recorded By, (t) = Taken By, (c) = Cosigned By      Initials Name Provider Type    Safia Alexander, PT Physical Therapist                    Recommendation and Plan   PT Assessment/Plan       Row Name 07/14/25 0845          PT Assessment    Functional Limitations Performance in self-care ADL;Other (comment)  -     Impairments Integumentary integrity;Sensation  -     Assessment Comments Pt with improving granulation of surgical wound and small decrease in depth today, still having moderate drainage with min blue-green color c/w pseudomonas, pt continues on oral abx.  Plantar and posterior heel wounds with increased epithelialization.  Small dry crust noted over bony prominence ant/med foot so PT added ag foam to this area and also increased compressogrip size to 5\" to reduce potential pressure injury.  Pt continues to benefit from wound vac and debridement.  -     Rehab Potential Fair  -     Patient/caregiver participated in establishment of treatment plan and goals Yes  -     Patient would benefit from skilled therapy intervention Yes  -        PT Plan    PT Frequency 2x/week;3x/week  -     Physical Therapy Interventions (Optional Details) patient/family education;wound care  -     PT Plan Comments vac, debridement  - "               User Key  (r) = Recorded By, (t) = Taken By, (c) = Cosigned By      Initials Name Provider Type    Safia Alexander, PT Physical Therapist                    Goals   PT OP Goals       Row Name 07/14/25 0948          Time Calculation    PT Goal Re-Cert Due Date 09/23/25  -TRUDY               User Key  (r) = Recorded By, (t) = Taken By, (c) = Cosigned By      Initials Name Provider Type    Safia Alexander, PT Physical Therapist                    PT Goal Re-Cert Due Date: 09/23/25            Time Calculation: Start Time: 0845  Untimed Charges  76937-Tklmkxcpo debridement: 10  69652-Lip Pressure wound to 50 sqcm: 40  Total Minutes  Untimed Charges Total Minutes: 50   Total Minutes: 50  Therapy Charges for Today       Code Description Service Date Service Provider Modifiers Qty    67974092122 HC PT NEG PRESS WOUND TO 50SQCM DME3 7/14/2025 Safia Arambula, PT GP 1    [id number] HC MARILYN DEBRIDE OPEN WOUND UP TO 20CM 7/14/2025 Safia Arambula, PT GP 1                    Safia Arambula, PT  7/14/2025

## 2025-07-15 LAB
FUNGUS WND CULT: NORMAL
MYCOBACTERIUM SPEC CULT: NORMAL
NIGHT BLUE STAIN TISS: NORMAL

## 2025-07-17 ENCOUNTER — OFFICE VISIT (OUTPATIENT)
Dept: ORTHOPEDIC SURGERY | Facility: CLINIC | Age: 80
End: 2025-07-17
Payer: MEDICARE

## 2025-07-17 ENCOUNTER — HOSPITAL ENCOUNTER (OUTPATIENT)
Dept: PHYSICAL THERAPY | Facility: HOSPITAL | Age: 80
Setting detail: THERAPIES SERIES
Discharge: HOME | End: 2025-07-17
Payer: MEDICARE

## 2025-07-17 DIAGNOSIS — L08.9 LEFT FOOT INFECTION: Primary | ICD-10-CM

## 2025-07-17 DIAGNOSIS — L02.612 FOOT ABSCESS, LEFT: ICD-10-CM

## 2025-07-17 DIAGNOSIS — R60.0 EDEMA OF LEFT LOWER LEG: ICD-10-CM

## 2025-07-17 DIAGNOSIS — S81.802D MULTIPLE OPEN WOUNDS OF LOWER EXTREMITY, COMPLICATED, LEFT, SUBSEQUENT ENCOUNTER: Primary | ICD-10-CM

## 2025-07-17 DIAGNOSIS — L03.116 CELLULITIS OF LEFT FOOT: ICD-10-CM

## 2025-07-17 PROCEDURE — 97597 DBRDMT OPN WND 1ST 20 CM/<: CPT

## 2025-07-17 PROCEDURE — 97605 NEG PRS WND THER DME<=50SQCM: CPT

## 2025-07-17 NOTE — PROGRESS NOTES
[facility] Follow Up     Office Follow Up Visit     Date: 07/17/2025   Patient Name: Carlos Culver  MRN: [id number]  YOB: 1945  Chief Complaint: No chief complaint on file.    History of Present Illness:   Carlos Culver is a 79 y.o. male who is with seen today at wound care clinic status post I&D with exostectomy left hindfoot Abigail 3.  Is now been about 6 weeks since his surgery.  Today erythema and maceration improved.  Patient denies any new symptoms.  States continues to be nonweightbearing primarily with use of knee scooter.    Subjective   I reviewed the patient's chief complaint, history of present illness, review of systems, past medical history, surgical history, family history, social history, medications and allergy list   Objective    Vital Signs: There were no vitals filed for this visit.  There is no height or weight on file to calculate BMI.    Ortho Exam:  left LE: Wound vacuum removed for exam, serosanguineous drainage in canister.  Compartments leg soft and compressible.  Swelling about the left lower extremity consistent with his recovery.  No areas of tenderness.  No active drainage on exam.  Erythema and excoriation much improved.  Maceration much improved from previous as well.  Visible healthy bleeding granulation tissue visible at base of wound.  Once again no catalino signs of infection.  See updated pictures in chart.        Results Review:  No new imaging    Assessment / Plan    Assessment/Plan:   Diagnoses and all orders for this visit:    1. Left foot infection (Primary)      Patient seen at wound care clinic for follow-up.  Wound vacuum removed for exam today.  Areas of previous excoriation and erythema improved.  Base of wound with healthy appearing granulation tissue.  No catalino signs of infection.  Plan for continued wound care with biweekly wound vacuum changes.  Will continue to plan for close follow-up to  monitor improvement.  Recommend patient continue to closely follow-up with infectious disease for antibiotic management.  Recommend continued nonweightbearing with use of assistive devices as needed.  I once again did discuss with patient the importance of being nonweightbearing so his wound site can heal.  We also discussed the risk of recurrent infection and if this were to happen likely recommendation would be below-knee amputation.  Patient expressed understanding.    Follow Up:   Return in about 2 weeks (around 7/31/2025) for Recheck.      Mao Mckay MD  [facility]

## 2025-07-17 NOTE — THERAPY WOUND CARE TREATMENT
Outpatient Rehabilitation - Wound/Debridement Treatment Note   [place]     Patient Name: Carlos Culver  : 1945  MRN: [id number]  Today's Date: 2025                 Admit Date: 2025    Visit Dx:    ICD-10-CM ICD-9-CM   1. Multiple open wounds of lower extremity, complicated, left, subsequent encounter  S81.802D V58.89     894.1   2. Cellulitis of left foot  L03.116 682.7   3. Foot abscess, left  L02.612 682.7   4. Edema of left lower leg  R60.0 782.3                         Patient Active Problem List   Diagnosis    Cellulitis of left foot    GUCCI (acute kidney injury)    Essential hypertension    Leukocytosis    Osteomyelitis of left foot    Chronic anticoagulation    Neuropathy    Elevated transaminase level    CKD (chronic kidney disease) stage 2, GFR 60-89 ml/min    Skin ulcer of right heel    Chronic multifocal osteomyelitis of left foot    Contracture of joint of foot, left    MARTINEZ (dyspnea on exertion)    Neuropathic ulcer, limited to breakdown of skin    Hyperlipidemia LDL goal <100    A-fib    S/P transmetatarsal amputation of foot, left    Leukocytosis, likely reactive    Acute postoperative pain    Alcohol abuse    Staphylococcus aureus bacteremia    Acute osteomyelitis of left ankle or foot    History of [place] amputation of left foot    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 evidence of necrosis    Ulcer of left foot    Left foot infection    Abscess of left foot, status post I&D, exostectomy        Past Medical History:   Diagnosis Date    A-fib     Acute kidney failure     Elevated cholesterol     Hard of hearing     Hyperlipidemia     Hypertension     Osteomyelitis     Renal disorder     Wears hearing aid in both ears     Wears reading eyeglasses         Past Surgical History:   Procedure Laterality Date    AMPUTATION DIGIT Left 2020    Procedure: AMPUTATION LEFT 2ND TOE;  Surgeon: Idania Osborn MD;  Location: [facility]  OR;  Service: Orthopedics    AMPUTATION DIGIT Left 12/08/2020    Procedure: TRANSMETATARSAL AMPUTATION LEFT;  Surgeon: Idania Osborn MD;  Location: [facility] OR;  Service: Orthopedics;  Laterality: Left;    APPENDECTOMY      COLONOSCOPY  2006    FOOT SURGERY Left 12/09/2020    (L) transmetatarsal amputation 12/09/2020 - Dr. Idania Osborn    INCISION AND DRAINAGE LEG Left 10/23/2018    Procedure: INCISION AND DRAINAGE LEFT FOOT;  Surgeon: Idania Osborn MD;  Location: [facility] OR;  Service: Orthopedics    INCISION AND DRAINAGE LEG Left 07/21/2023    Procedure: FOOT INCISION AND DRAINAGE; REVISION AMPUTATION; ACHILLES LENGTHENING -LEFT;  Surgeon: Mao Mckay MD;  Location: [facility] OR;  Service: Orthopedics;  Laterality: Left;    INCISION AND DRAINAGE LEG Left 6/3/2025    Procedure: FOOT ABSCESS INCISION AND DRAINAGE, EXOSTECTOMY;  Surgeon: Mao Mckay MD;  Location: [facility] OR;  Service: Orthopedics;  Laterality: Left;    TOE AMPUTATION      TONSILLECTOMY      VENOUS ACCESS DEVICE (PORT) INSERTION N/A 10/23/2018    Procedure: GROSHONG CATHETER PLACEMENT;  Surgeon: Marquez Simons MD;  Location: [facility] OR;  Service: General    VENOUS ACCESS DEVICE (PORT) INSERTION N/A 12/09/2020    Procedure: GROSHONG INSERTION;  Surgeon: Antonio Bains MD;  Location: [facility] OR;  Service: General;  Laterality: N/A;    VENOUS ACCESS DEVICE (PORT) REMOVAL           EVALUATION   PT Ortho       Row Name 07/17/25 0900       Subjective    Subjective Comments No issues or complaints. Reports he is continuing on his oral abx at least until the 30th when he goes back to see [facility].  -LH       Precautions and Contraindications    Precautions insensate BLE  -LH       Subjective Pain    Able to rate subjective pain? yes  -LH    Pre-Treatment Pain Level 0  -LH    Post-Treatment Pain Level 0  -LH    Subjective Pain Comment PN  -LH       Transfers    Sit-Stand [place] (Transfers) independent  -LH    Stand-Sit [place]  (Transfers) independent  -    Comment, (Transfers) long sitting for tx  -       Gait/Stairs (Locomotion)    [place] Level (Gait) modified independence  -    Assistive Device (Gait) walker, knee scooter  -              User Key  (r) = Recorded By, (t) = Taken By, (c) = Cosigned By      Initials Name Provider Type     Cornelio Tripp, PT Physical Therapist                     LDA Wound       Row Name 07/17/25 0900             Wound 06/05/25 0830 Left heel    Wound - Properties Group Placement Date: 06/05/25  - Placement Time: 0830  -MF Present on Original Admission: Y  -MF Side: Left  -MF Location: heel  -    Wound Image Images linked: 1  -      Dressing Appearance intact;moist drainage  -      Dressing Removed Type silver impregnated;foam  -      Confirmed Empty Wound Bed Yes, visual inspection of wound bed  -      Base red;granulating;pink;epithelialization;yellow;slough  -      Periwound dry;redness;swelling;warm;pale white;macerated  mild maceration  -      Periwound Temperature warm  -      Periwound Skin Turgor soft  -      Edges open  -      Drainage Characteristics/Odor serosanguineous  -      Drainage Amount small  -      Care, Wound cleansed with;wound cleanser;debrided  -      Dressing Care dressing changed;silver impregnated;low-adherent;foam;gauze  Mepilex Ag, kerlix, size 5 compressogrip  -      Periwound Care cleansed with pH balanced cleanser;dry periwound area maintained  -      Retired Wound - Properties Group Placement Date: 06/05/25  - Placement Time: 0830  -MF Present on Original Admission: Y  -MF Side: Left  -MF Location: heel  -MF    Retired Wound - Properties Group Placement Date: 06/05/25  - Placement Time: 0830  -MF Present on Original Admission: Y  -MF Side: Left  -MF Location: heel  -MF    Retired Wound - Properties Group Date first assessed: 06/05/25  - Time first assessed: 0830  -MF Present on Original Admission: Y  -MF Side: Left  -MF  Location: heel  -MF       Wound 06/05/25 0830 Left  foot Neuropathic Ulcer    Wound - Properties Group Placement Date: 06/05/25  -MF Placement Time: 0830  -MF Present on Original Admission: Y  -MF Side: Left  -MF Orientation: --  -MF, plantar  Location: foot  -MF Primary Wound Type: Neuropathic  -MF    Wound Image Images linked: 1  -      Dressing Appearance dry;intact  -      Dressing Removed Type silver impregnated;foam  Mepilex ag  -      Confirmed Empty Wound Bed Yes, visual inspection of wound bed  -      Base moist;pink;dry;scab  -      Periwound intact;swelling;redness;warm;other (see comments)  small crust over ant/med bony prominence  -      Periwound Temperature warm  -      Periwound Skin Turgor soft  -      Edges open  -      Drainage Characteristics/Odor serosanguineous  -      Drainage Amount scant  -      Care, Wound cleansed with;soap and water;wound cleanser  -      Dressing Care dressing changed;silver impregnated;low-adherent;foam;gauze  Mepilex Ag, kerlix, size 5 compressogrip  -      Periwound Care cleansed with pH balanced cleanser;dry periwound area maintained  -      Retired Wound - Properties Group Placement Date: 06/05/25  -MF Placement Time: 0830  -MF Present on Original Admission: Y  -MF Side: Left  -MF Orientation: --  -MF, plantar  Location: foot  -MF    Retired Wound - Properties Group Placement Date: 06/05/25  -MF Placement Time: 0830  -MF Present on Original Admission: Y  -MF Side: Left  -MF Orientation: --  -MF, plantar  Location: foot  -MF    Retired Wound - Properties Group Date first assessed: 06/05/25  -MF Time first assessed: 0830  -MF Present on Original Admission: Y  -MF Side: Left  -MF Location: foot  -MF       Wound 05/08/25 Left lateral foot Surgical    Wound - Properties Group Placement Date: 05/08/25  -KW Side: Left  -KW Orientation: lateral  -KW Location: foot  -KW Primary Wound Type: Surgical  -MF    Wound Image Images linked: 1  -       Dressing Appearance intact;moist drainage  -      Dressing Removed Type other (see comments)  wound vac  -      Confirmed Empty Wound Bed Yes, visual inspection of wound bed;Yes, probed  -      Base moist;exposed structure;pink;red;yellow;white;slough;subcutaneous  increased granulation, min slough posteriorly, SQ tissues  -      Periwound macerated;excoriated;pale white;moist;yeast  mild maceration immediate periwound, trace redness/rash  -      Periwound Temperature warm  -      Periwound Skin Turgor soft  -      Edges callused  -      Wound Length (cm) 6.9 cm  -      Wound Width (cm) 1.1 cm  -      Wound Depth (cm) 5.5 cm  -      Wound Surface Area (cm^2) 5.96 cm^2  -      Wound Volume (cm^3) 21.858 cm^3  -      Drainage Characteristics/Odor yellow;serosanguineous;other (see comments);malodorous  mild malodor, no blue/green today  -      Drainage Amount moderate  -      Care, Wound cleansed with;soap and water;wound cleanser;debrided;negative pressure wound therapy  -      Dressing Care dressing changed;foam;silver impregnated;collagen  wound vac  -      Periwound Care cleansed with pH balanced cleanser;dry periwound area maintained;barrier ointment applied;barrier film applied;topical treatment applied;other (see comments)  nystatin powder/nosting crusting x2 layers, stomapaste/drape border  -      Wound Output (mL) 50  canister changed  -      Retired Wound - Properties Group Placement Date: 05/08/25  -KW Side: Left  -KW Orientation: lateral  -KW Location: foot  -KW    Retired Wound - Properties Group Placement Date: 05/08/25  -KW Side: Left  -KW Orientation: lateral  -KW Location: foot  -KW    Retired Wound - Properties Group Date first assessed: 05/08/25  -KW Side: Left  -KW Location: foot  -KW       NPWT (Negative Pressure Wound Therapy) 06/25/25 L lateral foot incision    NPWT (Negative Pressure Wound Therapy) - Properties Group Placement Date: 06/25/25  - Location: L  lateral foot incision  -LH    Therapy Setting continuous therapy  -      Dressing foam, black  -LH      Contact Layer other (see comments)  Stefanie Ag  -LH      Pressure Setting 150 mmHg  -LH      Sponges Inserted 1  1 black  -LH      Sponges Removed 1  1 black  -LH      Finger sweep complete Yes  -LH      Retired NPWT (Negative Pressure Wound Therapy) - Properties Group Placement Date: 06/25/25  - Location: L lateral foot incision  -LH    Retired NPWT (Negative Pressure Wound Therapy) - Properties Group Placement Date: 06/25/25  - Location: L lateral foot incision  -LH    Retired NPWT (Negative Pressure Wound Therapy) - Properties Group Placement Date: 06/25/25  - Location: L lateral foot incision  -LH              User Key  (r) = Recorded By, (t) = Taken By, (c) = Cosigned By      Initials Name Provider Type    Koko Reynolds, PT Physical Therapist     Cornelio Tripp, PT Physical Therapist     Melissa Lang, PT Physical Therapist                      WOUND DEBRIDEMENT  Total area of Debridement: 6cm2  Debridement Site 1  Location- Site 1: L plantar foot wound  Selective Debridement- Site 1: Wound Surface <20cmsq  Instruments- Site 1: tweezers  Excised Tissue Description- Site 1: scant, slough, other (comment) (crusts)  Bleeding- Site 1: none   Debridement Site 2  Location- Site 2: L lateral foot wound  Selective Debridement- Site 2: Wound Surface <20cmsq  Instruments- Site 2: tweezers  Excised Tissue Description- Site 2: minimum, slough  Bleeding- Site 2: scant   Debridement Site 3  Location- Site 3: L heel wound  Selective Debridement- Site 3: Wound Surface <20cmsq  Instruments- Site 3: tweezers  Excised Tissue Description- Site 3: minimum, slough  Bleeding- Site 3: scant      Therapy Education       Row Name 07/17/25 0900             Therapy Education    Education Details Continue current POC.  -      Given Bandaging/dressing change;Symptoms/condition management  -      Program  Reinforced  -      How Provided Verbal;Demonstration  -      Provided to Patient  -      Level of Understanding Teach back education performed;Verbalized  -                User Key  (r) = Recorded By, (t) = Taken By, (c) = Cosigned By      Initials Name Provider Type     Cornelio Tripp, PT Physical Therapist                    Recommendation and Plan   PT Assessment/Plan       Row Name 07/17/25 0900          PT Assessment    Functional Limitations Performance in self-care ADL;Other (comment)  -     Impairments Integumentary integrity;Sensation  -     Assessment Comments Pt presenting this date with no blue/green draiange noted from the L lateral foot wound. Pt does have mild malodor to the drainage today, however pt with improving periwound skin integrity with only mild periwound maceration and erythema. Pt with improving granulation of wound base with use of NPWT. Pt's L plantar and heel wounds both with good improvements in epithelialization of wound edges. Pt would continue to benefit from further debridement and NPWT to promote optimal wound healing.  -     Rehab Potential Fair  -     Patient/caregiver participated in establishment of treatment plan and goals Yes  -     Patient would benefit from skilled therapy intervention Yes  -        PT Plan    PT Frequency 2x/week;3x/week  -     Physical Therapy Interventions (Optional Details) wound care;patient/family education  -     PT Plan Comments vac, debridement  -               User Key  (r) = Recorded By, (t) = Taken By, (c) = Cosigned By      Initials Name Provider Type     Cornelio Tripp, PT Physical Therapist                    Goals   PT OP Goals       Row Name 07/17/25 0934          Time Calculation    PT Goal Re-Cert Due Date 09/23/25  -               User Key  (r) = Recorded By, (t) = Taken By, (c) = Cosigned By      Initials Name Provider Type     Cornelio Tripp, PT Physical Therapist                    PT Goal  Re-Cert Due Date: 09/23/25            Time Calculation: Start Time: 0845  Untimed Charges  88484-[id number] debridement: 15  01160-Dqn Pressure wound to 50 sqcm: 30  Total Minutes  Untimed Charges Total Minutes: 45   Total Minutes: 45  Therapy Charges for Today       Code Description Service Date Service Provider Modifiers Qty    50104733582 HC MARILYN DEBRIDE OPEN WOUND UP TO 20CM 7/17/2025 Cornelio Tripp, PT GP 1    [id number] HC PT NEG PRESS WOUND TO 50SQCM DME2 7/17/2025 Cornelio Trpip, PT GP 1                    Cornelio Tripp PT  7/17/2025

## 2025-07-21 ENCOUNTER — HOSPITAL ENCOUNTER (OUTPATIENT)
Dept: PHYSICAL THERAPY | Facility: HOSPITAL | Age: 80
Setting detail: THERAPIES SERIES
Discharge: HOME | End: 2025-07-21
Payer: MEDICARE

## 2025-07-21 DIAGNOSIS — R60.0 EDEMA OF LEFT LOWER LEG: ICD-10-CM

## 2025-07-21 DIAGNOSIS — L02.612 FOOT ABSCESS, LEFT: ICD-10-CM

## 2025-07-21 DIAGNOSIS — S81.802D MULTIPLE OPEN WOUNDS OF LOWER EXTREMITY, COMPLICATED, LEFT, SUBSEQUENT ENCOUNTER: Primary | ICD-10-CM

## 2025-07-21 DIAGNOSIS — L03.116 CELLULITIS OF LEFT FOOT: ICD-10-CM

## 2025-07-21 DIAGNOSIS — L98.491 ULCER OF SKIN, CHRONIC, LIMITED TO BREAKDOWN OF SKIN: ICD-10-CM

## 2025-07-21 PROCEDURE — 97598 DBRDMT OPN WND ADDL 20CM/<: CPT

## 2025-07-21 PROCEDURE — 97605 NEG PRS WND THER DME<=50SQCM: CPT

## 2025-07-21 PROCEDURE — 97597 DBRDMT OPN WND 1ST 20 CM/<: CPT

## 2025-07-21 NOTE — THERAPY PROGRESS REPORT/RE-CERT
Outpatient Rehabilitation - Wound/Debridement Progress Note  [facility]     Patient Name: Carlos Culver  : 1945  MRN: [id number]  Today's Date: 2025                 Admit Date: 2025    Visit Dx:    ICD-10-CM ICD-9-CM   1. Multiple open wounds of lower extremity, complicated, left, subsequent encounter  S81.802D V58.89     894.1   2. Cellulitis of left foot  L03.116 682.7   3. Foot abscess, left  L02.612 682.7   4. Edema of left lower leg  R60.0 782.3   5. Ulcer of skin, chronic, limited to breakdown of skin  L98.491 707.9                     Patient Active Problem List   Diagnosis    Cellulitis of left foot    GUCCI (acute kidney injury)    Essential hypertension    Leukocytosis    Osteomyelitis of left foot    Chronic anticoagulation    Neuropathy    Elevated transaminase level    CKD (chronic kidney disease) stage 2, GFR 60-89 ml/min    Skin ulcer of right heel    Chronic multifocal osteomyelitis of left foot    Contracture of joint of foot, left    MARTINEZ (dyspnea on exertion)    Neuropathic ulcer, limited to breakdown of skin    Hyperlipidemia LDL goal <100    A-fib    S/P transmetatarsal amputation of foot, left    Leukocytosis, likely reactive    Acute postoperative pain    Alcohol abuse    Staphylococcus aureus bacteremia    Acute osteomyelitis of left ankle or foot    History of [place] amputation of left foot    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 evidence of necrosis    Ulcer of left foot    Left foot infection    Abscess of left foot, status post I&D, exostectomy        Past Medical History:   Diagnosis Date    A-fib     Acute kidney failure     Elevated cholesterol     Hard of hearing     Hyperlipidemia     Hypertension     Osteomyelitis     Renal disorder     Wears hearing aid in both ears     Wears reading eyeglasses         Past Surgical History:   Procedure Laterality Date    AMPUTATION DIGIT Left 2020    Procedure:  "AMPUTATION LEFT 2ND TOE;  Surgeon: Idania Osborn MD;  Location: [facility];  Service: Orthopedics    AMPUTATION DIGIT Left 12/08/2020    Procedure: TRANSMETATARSAL AMPUTATION LEFT;  Surgeon: Idania Osborn MD;  Location: [facility];  Service: Orthopedics;  Laterality: Left;    APPENDECTOMY      COLONOSCOPY  2006    FOOT SURGERY Left 12/09/2020    (L) transmetatarsal amputation 12/09/2020 - Dr. Idania Osborn    INCISION AND DRAINAGE LEG Left 10/23/2018    Procedure: INCISION AND DRAINAGE LEFT FOOT;  Surgeon: Idania Osborn MD;  Location: [facility];  Service: Orthopedics    INCISION AND DRAINAGE LEG Left 07/21/2023    Procedure: FOOT INCISION AND DRAINAGE; REVISION AMPUTATION; ACHILLES LENGTHENING -LEFT;  Surgeon: Mao Mckay MD;  Location: [facility];  Service: Orthopedics;  Laterality: Left;    INCISION AND DRAINAGE LEG Left 6/3/2025    Procedure: FOOT ABSCESS INCISION AND DRAINAGE, EXOSTECTOMY;  Surgeon: Mao Mckay MD;  Location: [facility];  Service: Orthopedics;  Laterality: Left;    TOE AMPUTATION      TONSILLECTOMY      VENOUS ACCESS DEVICE (PORT) INSERTION N/A 10/23/2018    Procedure: GROSHONG CATHETER PLACEMENT;  Surgeon: Marquez Simons MD;  Location: [facility];  Service: General    VENOUS ACCESS DEVICE (PORT) INSERTION N/A 12/09/2020    Procedure: GROSHONG INSERTION;  Surgeon: Antonio Bains MD;  Location: [facility];  Service: General;  Laterality: N/A;    VENOUS ACCESS DEVICE (PORT) REMOVAL           EVALUATION   PT Ortho       Row Name 07/21/25 0900       Subjective    Subjective Comments No changes, but states he had a \"24-hr bug\" over the weekend with nausea and vomiting, no fever, now resolved.  -JM       Precautions and Contraindications    Precautions insensate BLE  -JM       Subjective Pain    Able to rate subjective pain? yes  -JM    Pre-Treatment Pain Level 0  -JM    Post-Treatment Pain Level 0  -JM    Subjective Pain Comment PN  -JM       Transfers    Sit-Stand [place] " (Transfers) independent  -    Stand-Sit [place] (Transfers) independent  -    Comment, (Transfers) long sitting for tx  -       Gait/Stairs (Locomotion)    [place] Level (Gait) modified independence  -    Assistive Device (Gait) walker, knee scooter  -              User Key  (r) = Recorded By, (t) = Taken By, (c) = Cosigned By      Initials Name Provider Type    Safia Alexander PT Physical Therapist                     [facility] Wound       Row Name 07/21/25 0900             Wound 06/05/25 0830 Left heel    Wound - Properties Group Placement Date: 06/05/25  - Placement Time: 0830  - Present on Original Admission: Y  -MF Side: Left  -MF Location: heel  -    Wound Image Images linked: 1  -      Dressing Appearance intact;moist drainage  -      Dressing Removed Type silver impregnated;foam;gauze  -      Confirmed Empty Wound Bed Yes, visual inspection of wound bed  -      Base red;granulating;pink;epithelialization;yellow;slough  -      Periwound dry;redness;swelling;warm;pale white;macerated  mild maceration  -      Periwound Temperature warm  -      Periwound Skin Turgor soft  -      Edges open  -      Wound Length (cm) 0.4 cm  -      Wound Width (cm) 0.3 cm  -      Wound Depth (cm) 0.1 cm  -      Wound Surface Area (cm^2) 0.09 cm^2  -      Wound Volume (cm^3) 0.006 cm^3  -      Drainage Characteristics/Odor serosanguineous  -      Drainage Amount small  -      Care, Wound cleansed with;wound cleanser;debrided  -      Dressing Care dressing changed;collagen;silver impregnated;foam;gauze  faiza, mepilex ag, kerlix, spandage  -      Periwound Care cleansed with pH balanced cleanser;dry periwound area maintained  -      Retired Wound - Properties Group Placement Date: 06/05/25  - Placement Time: 0830  - Present on Original Admission: Y  -MF Side: Left  -MF Location: heel  -    Retired Wound - Properties Group Placement Date: 06/05/25  - Placement  Time: 0830  -MF Present on Original Admission: Y  -MF Side: Left  -MF Location: heel  -MF    Retired Wound - Properties Group Date first assessed: 06/05/25  - Time first assessed: 0830  -MF Present on Original Admission: Y  -MF Side: Left  -MF Location: heel  -MF       Wound 06/05/25 0830 Left  foot Neuropathic Ulcer    Wound - Properties Group Placement Date: 06/05/25  - Placement Time: 0830  -MF Present on Original Admission: Y  -MF Side: Left  -MF Orientation: --  -MF, plantar  Location: foot  -MF Primary Wound Type: Neuropathic  -MF    Wound Image Images linked: 2  -      Dressing Appearance intact;dried drainage  -      Dressing Removed Type silver impregnated;foam;gauze  -      Confirmed Empty Wound Bed Yes, visual inspection of wound bed  -      Base moist;pink  -      Periwound intact;swelling;redness;warm;other (see comments)  small crust over ant/med bony prominence  -      Periwound Temperature warm  -      Periwound Skin Turgor soft  -      Edges open  -      Wound Length (cm) 0.4 cm  -      Wound Width (cm) 0.5 cm  -      Wound Depth (cm) 0.1 cm  -      Wound Surface Area (cm^2) 0.16 cm^2  -JM      Wound Volume (cm^3) 0.01 cm^3  -JM      Drainage Characteristics/Odor serosanguineous  -      Drainage Amount scant  -      Care, Wound cleansed with;wound cleanser;debrided  -      Dressing Care dressing applied;collagen;silver impregnated;foam;gauze  faiza, mepilex ag, kerlix, spandage  -      Periwound Care cleansed with pH balanced cleanser;dry periwound area maintained  -      Retired Wound - Properties Group Placement Date: 06/05/25  - Placement Time: 0830  -MF Present on Original Admission: Y  -MF Side: Left  -MF Orientation: --  -MF, plantar  Location: foot  -MF    Retired Wound - Properties Group Placement Date: 06/05/25  - Placement Time: 0830  -MF Present on Original Admission: Y  -MF Side: Left  -MF Orientation: --  -MF, plantar  Location: foot  -MF     Retired Wound - Properties Group Date first assessed: 06/05/25  -MF Time first assessed: 0830  -MF Present on Original Admission: Y  -MF Side: Left  -MF Location: foot  -MF       Wound 05/08/25 Left lateral foot Surgical    Wound - Properties Group Placement Date: 05/08/25  -KW Side: Left  -KW Orientation: lateral  -KW Location: foot  -KW Primary Wound Type: Surgical  -MF    Wound Image Images linked: 1  -JM      Dressing Appearance intact;moist drainage  -JM      Dressing Removed Type other (see comments)  vac  -JM      Confirmed Empty Wound Bed Yes, visual inspection of wound bed;Yes, probed  -JM      Base moist;exposed structure;pink;red;yellow;white;slough;subcutaneous  increased granulation, min slough posteriorly, SQ tissues  -JM      Periwound macerated;excoriated;pale white;moist;yeast  mild maceration immediate periwound, trace redness/rash  -JM      Periwound Temperature warm  -      Periwound Skin Turgor soft  -      Edges callused  -JM      Wound Length (cm) 7 cm  -JM      Wound Width (cm) 1.2 cm  -JM      Wound Depth (cm) 4.5 cm  -JM      Wound Surface Area (cm^2) 6.6 cm^2  -JM      Wound Volume (cm^3) 19.792 cm^3  -JM      Drainage Characteristics/Odor yellow;serosanguineous;other (see comments);malodorous;green  mild malodor, min green drainage noted in canister  -JM      Drainage Amount moderate  -JM      Care, Wound cleansed with;wound cleanser;debrided;negative pressure wound therapy  -      Dressing Care dressing changed  vac, kerlix, size 6 spandage  -      Periwound Care cleansed with pH balanced cleanser;dry periwound area maintained;other (see comments)  nystatin powder/nosting spray crusting x2 layers, stomapaste/drape border  -      Wound Output (mL) 150  changed canister  -JM      Retired Wound - Properties Group Placement Date: 05/08/25  -KW Side: Left  -KW Orientation: lateral  -KW Location: foot  -KW    Retired Wound - Properties Group Placement Date: 05/08/25  -KW Side: Left   -KW Orientation: lateral  -KW Location: foot  -KW    Retired Wound - Properties Group Date first assessed: 05/08/25  -KW Side: Left  -KW Location: foot  -KW       NPWT (Negative Pressure Wound Therapy) 06/25/25 L lateral foot incision    NPWT (Negative Pressure Wound Therapy) - Properties Group Placement Date: 06/25/25  - Location: L lateral foot incision  -LH    Therapy Setting continuous therapy  -JM      Dressing foam, black  -JM      Contact Layer other (see comments)  faiza ag  -JM      Pressure Setting 150 mmHg  -JM      Sponges Inserted 1  -JM      Sponges Removed 1  -JM      Finger sweep complete Yes  -JM      Retired NPWT (Negative Pressure Wound Therapy) - Properties Group Placement Date: 06/25/25  - Location: L lateral foot incision  -LH    Retired NPWT (Negative Pressure Wound Therapy) - Properties Group Placement Date: 06/25/25  - Location: L lateral foot incision  -LH    Retired NPWT (Negative Pressure Wound Therapy) - Properties Group Placement Date: 06/25/25  - Location: L lateral foot incision  -LH              User Key  (r) = Recorded By, (t) = Taken By, (c) = Cosigned By      Initials Name Provider Type    Koko Reynolds, PT Physical Therapist    Safia Alexander, PT Physical Therapist    Cornelio Kasper, PT Physical Therapist    Melissa Casiano, PT Physical Therapist                      WOUND DEBRIDEMENT  Total area of Debridement: 6cmsq  Debridement Site 1  Location- Site 1: L plantar foot wound  Selective Debridement- Site 1: Wound Surface <20cmsq  Instruments- Site 1: tweezers  Excised Tissue Description- Site 1: scant, slough, other (comment) (crusts)  Bleeding- Site 1: none   Debridement Site 2  Location- Site 2: L lateral foot wound  Selective Debridement- Site 2: Wound Surface <20cmsq  Instruments- Site 2: tweezers  Excised Tissue Description- Site 2: minimum, moderate, slough  Bleeding- Site 2: scant   Debridement Site 3  Location- Site 3: L heel  wound  Selective Debridement- Site 3: Wound Surface <20cmsq  Instruments- Site 3: tweezers  Excised Tissue Description- Site 3: moderate, slough  Bleeding- Site 3: scant      Therapy Education       Row Name 25 09             Therapy Education    Education Details Continue NWB LLE and monitoring for S&S of infection  -      Given Bandaging/dressing change;Symptoms/condition management  -      Program Reinforced  -      How Provided Verbal;Demonstration  -      Provided to Patient  -      Level of Understanding Teach back education performed;Verbalized  -                User Key  (r) = Recorded By, (t) = Taken By, (c) = Cosigned By      Initials Name Provider Type    Safia Alexander, PT Physical Therapist                    Recommendation and Plan   PT Assessment/Plan       Row Name 25          PT Assessment    Functional Limitations Performance in self-care ADL;Other (comment)  -     Impairments Integumentary integrity;Sensation  -     Assessment Comments Pt has met STGs and is progressing toward LTGs with improving granulation of lateral surgical wound and decreasing dimensions of other foot wounds.  Pt continues to require NPWT and skilled PT for debridement and dressings/vac management.  Continue with POC.  -     Rehab Potential Fair  -     Patient/caregiver participated in establishment of treatment plan and goals Yes  -     Patient would benefit from skilled therapy intervention Yes  -        PT Plan    PT Frequency 2x/week;3x/week  -     Predicted Duration of Therapy Intervention (PT) 20 visits  -     Planned CPT's? PT MARILYN DEBRIDE OPEN WOUND UP TO 20 CM: 10470;PT NEG PRESS WOUND TO 50 SQCM 3: 25553;PT NONSELECT DEBRIDE 15 MIN: 80339;PT SELF CARE/MGMT/TRAIN 15 MIN: 13617;PT NLFU MIST: 57633;PT MULTI LAYER COMP SYS LE  -     Physical Therapy Interventions (Optional Details) patient/family education;wound care  -     PT Plan Comments vac,  debridement  -               User Key  (r) = Recorded By, (t) = Taken By, (c) = Cosigned By      Initials Name Provider Type    Safia Alexander PT Physical Therapist                    Goals   PT OP Goals       Row Name 07/21/25 0947 07/21/25 0900       PT Short Term Goals    STG 1 -- Pt will verbalize signs/symptoms of infection and when to seek medical attention.  -    STG 1 Progress -- Met  -    STG 2 -- Pt will demonstrate 1cm decrease in wound depth as evidence of wound healing.  -    STG 2 Progress -- Met  -    STG 3 -- Pt will demonstrate >50% granulation of wound base as evidence of wound healing.  -    STG 3 Progress -- Met  -       Long Term Goals    LTG 1 -- Pt/family to demonstrate independence with home dressing management to promote return to PLOF.  -    LTG 1 Progress -- Ongoing  -    LTG 2 -- Pt will demonstrate 3cm decrease in wound depth as evidence of wound healing.  -    LTG 2 Progress -- Ongoing  -    LTG 3 -- Pt will demonstrate >90% granulation of wound base as evidence of wound healing.  -    LTG 3 Progress -- Ongoing;Progressing  -       Time Calculation    PT Goal Re-Cert Due Date 09/23/25  - 09/23/25  -              User Key  (r) = Recorded By, (t) = Taken By, (c) = Cosigned By      Initials Name Provider Type    Safia Alexander PT Physical Therapist                    PT Goal Re-Cert Due Date: 09/23/25  PT Short Term Goals  STG 1: Pt will verbalize signs/symptoms of infection and when to seek medical attention.  STG 1 Progress: Met  STG 2: Pt will demonstrate 1cm decrease in wound depth as evidence of wound healing.  STG 2 Progress: Met  STG 3: Pt will demonstrate >50% granulation of wound base as evidence of wound healing.  STG 3 Progress: Met  Long Term Goals  LTG 1: Pt/family to demonstrate independence with home dressing management to promote return to PLOF.  LTG 1 Progress: Ongoing  LTG 2: Pt will demonstrate 3cm decrease in wound depth as  evidence of wound healing.  LTG 2 Progress: Ongoing  LTG 3: Pt will demonstrate >90% granulation of wound base as evidence of wound healing.  LTG 3 Progress: Ongoing, Progressing      Time Calculation: Start Time: 0900  Untimed Charges  25400-Mlspopjbc debridement: 10  27451-Yzk Pressure wound to 50 sqcm: 30  Total Minutes  Untimed Charges Total Minutes: 40   Total Minutes: 40  Therapy Charges for Today       Code Description Service Date Service Provider Modifiers Qty    41962813235 HC PT NEG PRESS WOUND TO 50SQCM DME2 7/21/2025 Safia Arambula, PT GP 1    58920871428 HC PT MARILYN DEBRIDE OPEN WOUND EA ADD 20CM 7/21/2025 Safia Arambula, PT GP 1                    Safia Arambula, PT  7/21/2025

## 2025-07-24 ENCOUNTER — HOSPITAL ENCOUNTER (OUTPATIENT)
Dept: PHYSICAL THERAPY | Facility: HOSPITAL | Age: 80
Setting detail: THERAPIES SERIES
Discharge: HOME | End: 2025-07-24
Payer: MEDICARE

## 2025-07-24 DIAGNOSIS — S81.802D MULTIPLE OPEN WOUNDS OF LOWER EXTREMITY, COMPLICATED, LEFT, SUBSEQUENT ENCOUNTER: Primary | ICD-10-CM

## 2025-07-24 DIAGNOSIS — R60.0 EDEMA OF LEFT LOWER LEG: ICD-10-CM

## 2025-07-24 DIAGNOSIS — L03.116 CELLULITIS OF LEFT FOOT: ICD-10-CM

## 2025-07-24 DIAGNOSIS — L98.491 ULCER OF SKIN, CHRONIC, LIMITED TO BREAKDOWN OF SKIN: ICD-10-CM

## 2025-07-24 DIAGNOSIS — L02.612 FOOT ABSCESS, LEFT: ICD-10-CM

## 2025-07-24 PROCEDURE — 97605 NEG PRS WND THER DME<=50SQCM: CPT

## 2025-07-24 PROCEDURE — 97597 DBRDMT OPN WND 1ST 20 CM/<: CPT

## 2025-07-24 NOTE — THERAPY WOUND CARE TREATMENT
Outpatient Rehabilitation - Wound/Debridement Treatment Note  [facility]     Patient Name: Carlos Culver  : 1945  MRN: [id number]  Today's Date: 2025                 Admit Date: 2025    Visit Dx:    ICD-10-CM ICD-9-CM   1. Multiple open wounds of lower extremity, complicated, left, subsequent encounter  S81.802D V58.89     894.1   2. Cellulitis of left foot  L03.116 682.7   3. Foot abscess, left  L02.612 682.7   4. Edema of left lower leg  R60.0 782.3   5. Ulcer of skin, chronic, limited to breakdown of skin  L98.491 707.9       Patient Active Problem List   Diagnosis    Cellulitis of left foot    GUCCI (acute kidney injury)    Essential hypertension    Leukocytosis    Osteomyelitis of left foot    Chronic anticoagulation    Neuropathy    Elevated transaminase level    CKD (chronic kidney disease) stage 2, GFR 60-89 ml/min    Skin ulcer of right heel    Chronic multifocal osteomyelitis of left foot    Contracture of joint of foot, left    MARTINEZ (dyspnea on exertion)    Neuropathic ulcer, limited to breakdown of skin    Hyperlipidemia LDL goal <100    A-fib    S/P transmetatarsal amputation of foot, left    Leukocytosis, likely reactive    Acute postoperative pain    Alcohol abuse    Staphylococcus aureus bacteremia    Acute osteomyelitis of left ankle or foot    History of Chopart amputation of left foot    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 evidence of necrosis    Ulcer of left foot    Left foot infection    Abscess of left foot, status post I&D, exostectomy        Past Medical History:   Diagnosis Date    A-fib     Acute kidney failure     Elevated cholesterol     Hard of hearing     Hyperlipidemia     Hypertension     Osteomyelitis     Renal disorder     Wears hearing aid in both ears     Wears reading eyeglasses         Past Surgical History:   Procedure Laterality Date    AMPUTATION DIGIT Left 2020    Procedure: AMPUTATION LEFT  2ND TOE;  Surgeon: Idaina Osborn MD;  Location: [facility] OR;  Service: Orthopedics    AMPUTATION DIGIT Left 12/08/2020    Procedure: TRANSMETATARSAL AMPUTATION LEFT;  Surgeon: Idania Osborn MD;  Location: [facility] OR;  Service: Orthopedics;  Laterality: Left;    APPENDECTOMY      COLONOSCOPY  2006    FOOT SURGERY Left 12/09/2020    (L) transmetatarsal amputation 12/09/2020 - Dr. Idania Osborn    INCISION AND DRAINAGE LEG Left 10/23/2018    Procedure: INCISION AND DRAINAGE LEFT FOOT;  Surgeon: Idania Osborn MD;  Location: [facility] OR;  Service: Orthopedics    INCISION AND DRAINAGE LEG Left 07/21/2023    Procedure: FOOT INCISION AND DRAINAGE; REVISION AMPUTATION; ACHILLES LENGTHENING -LEFT;  Surgeon: Mao Mckay MD;  Location: [facility] OR;  Service: Orthopedics;  Laterality: Left;    INCISION AND DRAINAGE LEG Left 6/3/2025    Procedure: FOOT ABSCESS INCISION AND DRAINAGE, EXOSTECTOMY;  Surgeon: Mao Mckay MD;  Location: [facility] OR;  Service: Orthopedics;  Laterality: Left;    TOE AMPUTATION      TONSILLECTOMY      VENOUS ACCESS DEVICE (PORT) INSERTION N/A 10/23/2018    Procedure: GROSHONG CATHETER PLACEMENT;  Surgeon: Marquez Simons MD;  Location: [facility] OR;  Service: General    VENOUS ACCESS DEVICE (PORT) INSERTION N/A 12/09/2020    Procedure: GROSHONG INSERTION;  Surgeon: Antonio Baisn MD;  Location: [facility] OR;  Service: General;  Laterality: N/A;    VENOUS ACCESS DEVICE (PORT) REMOVAL           EVALUATION   PT Ortho       Row Name 07/24/25 0900       Subjective    Subjective Comments no c/o pain or issues  -MF       Precautions and Contraindications    Precautions insensate BLE  -MF       Subjective Pain    Able to rate subjective pain? yes  -MF    Pre-Treatment Pain Level 0  -MF    Post-Treatment Pain Level 0  -MF    Subjective Pain Comment PN  -MF       Transfers    Sit-Stand Ashley (Transfers) independent  -MF    Stand-Sit Ashley (Transfers) independent  -MF    Comment,  (Transfers) long sitting for tx  -MF       Gait/Stairs (Locomotion)    Routt Level (Gait) modified independence  -MF    Assistive Device (Gait) walker, knee scooter  -MF              User Key  (r) = Recorded By, (t) = Taken By, (c) = Cosigned By      Initials Name Provider Type    Koko Reynolds, PT Physical Therapist                     LDA Wound       Row Name 07/24/25 0900             Wound 06/05/25 0830 Left heel    Wound - Properties Group Placement Date: 06/05/25  - Placement Time: 0830  -MF Present on Original Admission: Y  -MF Side: Left  -MF Location: heel  -MF    Dressing Appearance intact  -MF      Dressing Removed Type silver impregnated  -MF      Confirmed Empty Wound Bed Yes, visual inspection of wound bed  -MF      Base epithelialization  -MF      Periwound pink;dry  -MF      Periwound Temperature warm  -MF      Periwound Skin Turgor soft  -MF      Edges irregular  -MF      Drainage Amount none  -MF      Care, Wound irrigated with;wound cleanser;debrided  -MF      Dressing Care foam;low-adherent;silver impregnated  -MF      Periwound Care cleansed with pH balanced cleanser  -MF      Retired Wound - Properties Group Placement Date: 06/05/25  - Placement Time: 0830  -MF Present on Original Admission: Y  -MF Side: Left  -MF Location: heel  -MF    Retired Wound - Properties Group Placement Date: 06/05/25  - Placement Time: 0830  -MF Present on Original Admission: Y  -MF Side: Left  -MF Location: heel  -MF    Retired Wound - Properties Group Date first assessed: 06/05/25  -MF Time first assessed: 0830  -MF Present on Original Admission: Y  -MF Side: Left  -MF Location: heel  -MF       Wound 06/05/25 0830 Left  foot Neuropathic Ulcer    Wound - Properties Group Placement Date: 06/05/25  - Placement Time: 0830  -MF Present on Original Admission: Y  -MF Side: Left  -MF Orientation: --  -MF, plantar  Location: foot  -MF Primary Wound Type: Neuropathic  -MF    Dressing Appearance intact   -MF      Dressing Removed Type silver impregnated  -MF      Confirmed Empty Wound Bed Yes, visual inspection of wound bed  -MF      Base closed/resurfaced;epithelialization  -MF      Periwound intact;swelling;redness;warm;other (see comments)  -MF      Periwound Temperature warm  -MF      Periwound Skin Turgor soft  -MF      Edges open  -MF      Drainage Amount none  -MF      Care, Wound irrigated with;wound cleanser;debrided  -MF      Dressing Care foam;low-adherent;silver impregnated  -MF      Periwound Care cleansed with pH balanced cleanser  -MF      Retired Wound - Properties Group Placement Date: 06/05/25  -MF Placement Time: 0830  -MF Present on Original Admission: Y  -MF Side: Left  -MF Orientation: --  -MF, plantar  Location: foot  -MF    Retired Wound - Properties Group Placement Date: 06/05/25  -MF Placement Time: 0830  -MF Present on Original Admission: Y  -MF Side: Left  -MF Orientation: --  -MF, plantar  Location: foot  -MF    Retired Wound - Properties Group Date first assessed: 06/05/25  -MF Time first assessed: 0830  -MF Present on Original Admission: Y  -MF Side: Left  -MF Location: foot  -MF       Wound 05/08/25 Left lateral foot Surgical    Wound - Properties Group Placement Date: 05/08/25  -KW Side: Left  -KW Orientation: lateral  -KW Location: foot  -KW Primary Wound Type: Surgical  -MF    Dressing Appearance moist drainage;intact  -MF      Dressing Removed Type other (see comments)  -MF      Confirmed Empty Wound Bed Yes, visual inspection of wound bed  -MF      Base moist;exposed structure;pink;red;yellow;white;slough;subcutaneous  -MF      Periwound macerated;moist;pink;pale white  -MF      Periwound Temperature warm  -MF      Periwound Skin Turgor soft  -MF      Edges irregular  -MF      Drainage Characteristics/Odor serosanguineous  -MF      Drainage Amount moderate  -MF      Care, Wound irrigated with;wound cleanser;debrided;negative pressure wound therapy  -MF      Dressing Care dressing  changed  -MF      Periwound Care cleansed with pH balanced cleanser  -MF      Wound Output (mL) 75  canister changed  -MF      Retired Wound - Properties Group Placement Date: 05/08/25  -KW Side: Left  -KW Orientation: lateral  -KW Location: foot  -KW    Retired Wound - Properties Group Placement Date: 05/08/25  -KW Side: Left  -KW Orientation: lateral  -KW Location: foot  -KW    Retired Wound - Properties Group Date first assessed: 05/08/25  -KW Side: Left  -KW Location: foot  -KW       NPWT (Negative Pressure Wound Therapy) 06/25/25 L lateral foot incision    NPWT (Negative Pressure Wound Therapy) - Properties Group Placement Date: 06/25/25  - Location: L lateral foot incision  -LH    Therapy Setting continuous therapy  -MF      Dressing foam, black  -MF      Pressure Setting 150 mmHg  -MF      Sponges Inserted 1  -MF      Sponges Removed 1  -MF      Finger sweep complete Yes  -MF      Retired NPWT (Negative Pressure Wound Therapy) - Properties Group Placement Date: 06/25/25  - Location: L lateral foot incision  -LH    Retired NPWT (Negative Pressure Wound Therapy) - Properties Group Placement Date: 06/25/25  - Location: L lateral foot incision  -LH    Retired NPWT (Negative Pressure Wound Therapy) - Properties Group Placement Date: 06/25/25  - Location: L lateral foot incision  -LH              User Key  (r) = Recorded By, (t) = Taken By, (c) = Cosigned By      Initials Name Provider Type    Koko Reynolds, PT Physical Therapist     Cornelio Tripp, PT Physical Therapist     Melissa Lang, PT Physical Therapist                      WOUND DEBRIDEMENT  Total area of Debridement: ~6cm2  Debridement Site 1  Location- Site 1: L plantar foot wound  Selective Debridement- Site 1: Wound Surface <20cmsq  Instruments- Site 1: tweezers, scapel, #15  Excised Tissue Description- Site 1: moderate, other (comment) (callus)  Bleeding- Site 1: none   Debridement Site 2  Location- Site 2: L lateral foot  wound  Selective Debridement- Site 2: Wound Surface <20cmsq  Instruments- Site 2: tweezers  Excised Tissue Description- Site 2: minimum, slough  Bleeding- Site 2: none             Recommendation and Plan   PT Assessment/Plan       Row Name 07/24/25 0900          PT Assessment    Functional Limitations Performance in self-care ADL;Other (comment)  -     Impairments Integumentary integrity;Sensation  -     Assessment Comments PT able to debride a moderate amount of callus from Plantar heel wound today with good intact skin noted underneath. PT will cont with wound vac tohelp decrease depth of lateral foot wound and improve granulation.  -     Rehab Potential Fair  -     Patient/caregiver participated in establishment of treatment plan and goals Yes  -     Patient would benefit from skilled therapy intervention Yes  -        PT Plan    PT Frequency 2x/week;3x/week  -     Physical Therapy Interventions (Optional Details) wound care;patient/family education  -     PT Plan Comments vac, debridement  -               User Key  (r) = Recorded By, (t) = Taken By, (c) = Cosigned By      Initials Name Provider Type    Koko Reynolds, PT Physical Therapist                    Goals   PT OP Goals       Row Name 07/24/25 0900          Time Calculation    PT Goal Re-Cert Due Date 09/23/25  -               User Key  (r) = Recorded By, (t) = Taken By, (c) = Cosigned By      Initials Name Provider Type    Koko Reynolds, PT Physical Therapist                    PT Goal Re-Cert Due Date: 09/23/25            Time Calculation: Start Time: 0900  Untimed Charges  07609-Vfvebczwo debridement: 15  63471-Pru Pressure wound to 50 sqcm: 25  Total Minutes  Untimed Charges Total Minutes: 40   Total Minutes: 40              Koko Barraza, PT  7/24/2025

## 2025-07-28 ENCOUNTER — HOSPITAL ENCOUNTER (OUTPATIENT)
Dept: PHYSICAL THERAPY | Facility: HOSPITAL | Age: 80
Setting detail: THERAPIES SERIES
Discharge: HOME | End: 2025-07-28
Payer: MEDICARE

## 2025-07-28 DIAGNOSIS — S81.802D MULTIPLE OPEN WOUNDS OF LOWER EXTREMITY, COMPLICATED, LEFT, SUBSEQUENT ENCOUNTER: Primary | ICD-10-CM

## 2025-07-28 DIAGNOSIS — L02.612 FOOT ABSCESS, LEFT: ICD-10-CM

## 2025-07-28 DIAGNOSIS — L03.116 CELLULITIS OF LEFT FOOT: ICD-10-CM

## 2025-07-28 DIAGNOSIS — R60.0 EDEMA OF LEFT LOWER LEG: ICD-10-CM

## 2025-07-28 DIAGNOSIS — L98.491 ULCER OF SKIN, CHRONIC, LIMITED TO BREAKDOWN OF SKIN: ICD-10-CM

## 2025-07-28 PROCEDURE — 97605 NEG PRS WND THER DME<=50SQCM: CPT

## 2025-07-28 PROCEDURE — 97597 DBRDMT OPN WND 1ST 20 CM/<: CPT

## 2025-07-28 NOTE — THERAPY WOUND CARE TREATMENT
Outpatient Rehabilitation - Wound/Debridement Treatment Note  [facility]     Patient Name: Carlos Culver  : 1945  MRN: [id number]  Today's Date: 2025                 Admit Date: 2025    Visit Dx:    ICD-10-CM ICD-9-CM   1. Multiple open wounds of lower extremity, complicated, left, subsequent encounter  S81.802D V58.89     894.1   2. Cellulitis of left foot  L03.116 682.7   3. Foot abscess, left  L02.612 682.7   4. Edema of left lower leg  R60.0 782.3   5. Ulcer of skin, chronic, limited to breakdown of skin  L98.491 707.9   L lat foot wound:    L posterior heel:    L plantar wound (closed):    Medial LLE rash/yeast:      Patient Active Problem List   Diagnosis    Cellulitis of left foot    GUCCI (acute kidney injury)    Essential hypertension    Leukocytosis    Osteomyelitis of left foot    Chronic anticoagulation    Neuropathy    Elevated transaminase level    CKD (chronic kidney disease) stage 2, GFR 60-89 ml/min    Skin ulcer of right heel    Chronic multifocal osteomyelitis of left foot    Contracture of joint of foot, left    MARTINEZ (dyspnea on exertion)    Neuropathic ulcer, limited to breakdown of skin    Hyperlipidemia LDL goal <100    A-fib    S/P transmetatarsal amputation of foot, left    Leukocytosis, likely reactive    Acute postoperative pain    Alcohol abuse    Staphylococcus aureus bacteremia    Acute osteomyelitis of left ankle or foot    History of [place] amputation of left foot    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 evidence of necrosis    Ulcer of left foot    Left foot infection    Abscess of left foot, status post I&D, exostectomy        Past Medical History:   Diagnosis Date    A-fib     Acute kidney failure     Elevated cholesterol     Hard of hearing     Hyperlipidemia     Hypertension     Osteomyelitis     Renal disorder     Wears hearing aid in both ears     Wears reading eyeglasses         Past Surgical History:    Procedure Laterality Date    AMPUTATION DIGIT Left 01/07/2020    Procedure: AMPUTATION LEFT 2ND TOE;  Surgeon: Idania Osborn MD;  Location: [facility] OR;  Service: Orthopedics    AMPUTATION DIGIT Left 12/08/2020    Procedure: TRANSMETATARSAL AMPUTATION LEFT;  Surgeon: Idania Osborn MD;  Location: [facility] OR;  Service: Orthopedics;  Laterality: Left;    APPENDECTOMY      COLONOSCOPY  2006    FOOT SURGERY Left 12/09/2020    (L) transmetatarsal amputation 12/09/2020 - Dr. Idania Osborn    INCISION AND DRAINAGE LEG Left 10/23/2018    Procedure: INCISION AND DRAINAGE LEFT FOOT;  Surgeon: Idania Osborn MD;  Location: [facility] OR;  Service: Orthopedics    INCISION AND DRAINAGE LEG Left 07/21/2023    Procedure: FOOT INCISION AND DRAINAGE; REVISION AMPUTATION; ACHILLES LENGTHENING -LEFT;  Surgeon: Mao Mckay MD;  Location: [facility] OR;  Service: Orthopedics;  Laterality: Left;    INCISION AND DRAINAGE LEG Left 6/3/2025    Procedure: FOOT ABSCESS INCISION AND DRAINAGE, EXOSTECTOMY;  Surgeon: Mao Mckay MD;  Location: [facility] OR;  Service: Orthopedics;  Laterality: Left;    TOE AMPUTATION      TONSILLECTOMY      VENOUS ACCESS DEVICE (PORT) INSERTION N/A 10/23/2018    Procedure: GROSHONG CATHETER PLACEMENT;  Surgeon: Marquez Simons MD;  Location:  [facility] OR;  Service: General    VENOUS ACCESS DEVICE (PORT) INSERTION N/A 12/09/2020    Procedure: GROSHONG INSERTION;  Surgeon: Antonio Bains MD;  Location:  [facility] OR;  Service: General;  Laterality: N/A;    VENOUS ACCESS DEVICE (PORT) REMOVAL           EVALUATION   PT Ortho       Row Name 07/28/25 1504       Subjective    Subjective Comments No complaints or changes.  -JM       Precautions and Contraindications    Precautions insensate BLE  -JM       Subjective Pain    Able to rate subjective pain? yes  -JM    Pre-Treatment Pain Level 0  -JM    Post-Treatment Pain Level 0  -JM    Subjective Pain Comment PN  -JM       Transfers    Sit-Stand [place]  (Transfers) independent  -    Stand-Sit [place] (Transfers) independent  -    Comment, (Transfers) long sitting for tx  -       Gait/Stairs (Locomotion)    [place] Level (Gait) modified independence  -    Assistive Device (Gait) walker, knee scooter  -              User Key  (r) = Recorded By, (t) = Taken By, (c) = Cosigned By      Initials Name Provider Type    Safia Alexander PT Physical Therapist                     [facility] Wound       Row Name 07/28/25 0930             Wound 06/05/25 0830 Left heel    Wound - Properties Group Placement Date: 06/05/25  - Placement Time: 0830  -MF Present on Original Admission: Y  -MF Side: Left  -MF Location: heel  -MF    Wound Image Images linked: 1  -      Dressing Appearance dry;intact;no drainage  -      Dressing Removed Type silver impregnated;foam  -      Confirmed Empty Wound Bed Yes, visual inspection of wound bed  -      Base epithelialization;yellow  dry yellow crust  -      Periwound pink;dry  -      Periwound Temperature warm  -      Periwound Skin Turgor soft  -      Edges irregular  -      Drainage Amount none  -      Care, Wound cleansed with;wound cleanser  -      Dressing Care transparent film;gauze  vac drape, kerlix only  -      Periwound Care cleansed with pH balanced cleanser;dry periwound area maintained  -      Retired Wound - Properties Group Placement Date: 06/05/25  - Placement Time: 0830  -MF Present on Original Admission: Y  -MF Side: Left  -MF Location: heel  -MF    Retired Wound - Properties Group Placement Date: 06/05/25  - Placement Time: 0830  -MF Present on Original Admission: Y  -MF Side: Left  -MF Location: heel  -MF    Retired Wound - Properties Group Date first assessed: 06/05/25  - Time first assessed: 0830  -MF Present on Original Admission: Y  -MF Side: Left  -MF Location: heel  -MF       Wound 06/05/25 0830 Left  foot Neuropathic Ulcer    Wound - Properties Group Placement Date:  06/05/25  - Placement Time: 0830  -MF Present on Original Admission: Y  -MF Side: Left  -MF Orientation: --  -MF, plantar  Location: foot  -MF Primary Wound Type: Neuropathic  -MF    Wound Image Images linked: 1  -      Dressing Appearance dry;intact;no drainage  -      Dressing Removed Type silver impregnated;foam  -      Confirmed Empty Wound Bed Yes, visual inspection of wound bed  -      Base closed/resurfaced;epithelialization  -      Periwound intact;swelling;pink  -      Periwound Temperature warm  -      Periwound Skin Turgor soft  -      Edges open  -      Drainage Amount none  -      Care, Wound cleansed with;wound cleanser  -      Dressing Care silver impregnated;foam  -      Periwound Care cleansed with pH balanced cleanser;dry periwound area maintained  -      Retired Wound - Properties Group Placement Date: 06/05/25  - Placement Time: 0830  -MF Present on Original Admission: Y  -MF Side: Left  -MF Orientation: --  -MF, plantar  Location: foot  -MF    Retired Wound - Properties Group Placement Date: 06/05/25  - Placement Time: 0830  -MF Present on Original Admission: Y  -MF Side: Left  -MF Orientation: --  -MF, plantar  Location: foot  -MF    Retired Wound - Properties Group Date first assessed: 06/05/25  - Time first assessed: 0830  -MF Present on Original Admission: Y  -MF Side: Left  -MF Location: foot  -MF       Wound 05/08/25 Left lateral foot Surgical    Wound - Properties Group Placement Date: 05/08/25  -KW Side: Left  -KW Orientation: lateral  -KW Location: foot  -KW Primary Wound Type: Surgical  -MF    Wound Image Images linked: 2  -JM      Dressing Appearance intact;moist drainage  -      Dressing Removed Type other (see comments)  vac  -      Confirmed Empty Wound Bed Yes, visual inspection of wound bed;Yes, probed  -      Base granulating;moist;red;subcutaneous;yellow;slough  -      Periwound macerated;moist;pink;pale white  -      Periwound  Temperature warm  -      Periwound Skin Turgor soft  -JM      Edges irregular  -JM      Wound Length (cm) 6.3 cm  -JM      Wound Width (cm) 1.1 cm  -JM      Wound Depth (cm) 4.2 cm  -JM      Wound Surface Area (cm^2) 5.44 cm^2  -JM      Wound Volume (cm^3) 15.24 cm^3  -JM      Drainage Characteristics/Odor serosanguineous  -JM      Drainage Amount moderate  -JM      Care, Wound cleansed with;wound cleanser;debrided;negative pressure wound therapy  -      Dressing Care dressing changed  vac, kerlix, size 5 spandage  -      Periwound Care cleansed with pH balanced cleanser;dry periwound area maintained  -      Wound Output (mL) 50  -JM      Retired Wound - Properties Group Placement Date: 05/08/25  -KW Side: Left  -KW Orientation: lateral  -KW Location: foot  -KW    Retired Wound - Properties Group Placement Date: 05/08/25  -KW Side: Left  -KW Orientation: lateral  -KW Location: foot  -KW    Retired Wound - Properties Group Date first assessed: 05/08/25  -KW Side: Left  -KW Location: foot  -KW       NPWT (Negative Pressure Wound Therapy) 06/25/25 L lateral foot incision    NPWT (Negative Pressure Wound Therapy) - Properties Group Placement Date: 06/25/25  - Location: L lateral foot incision  -    Therapy Setting continuous therapy  -JM      Dressing foam, black  -JM      Contact Layer other (see comments)  faiza  -JM      Pressure Setting 150 mmHg  -JM      Sponges Inserted 1  -JM      Sponges Removed 1  -JM      Finger sweep complete Yes  -      Retired NPWT (Negative Pressure Wound Therapy) - Properties Group Placement Date: 06/25/25  - Location: L lateral foot incision  -LH    Retired NPWT (Negative Pressure Wound Therapy) - Properties Group Placement Date: 06/25/25  - Location: L lateral foot incision  -LH    Retired NPWT (Negative Pressure Wound Therapy) - Properties Group Placement Date: 06/25/25  - Location: L lateral foot incision  -LH              User Key  (r) = Recorded By, (t) = Taken  By, (c) = Cosigned By      Initials Name Provider Type    Koko Reynolds, PT Physical Therapist    Safia Alexander, PT Physical Therapist    Cornelio Kasper, PT Physical Therapist    Melissa Casiano, PT Physical Therapist                      WOUND DEBRIDEMENT  Total area of Debridement: 4cmsq  Debridement Site 1  Location- Site 1: L plantar foot wound  Selective Debridement- Site 1: Wound Surface <20cmsq  Instruments- Site 1: tweezers  Excised Tissue Description- Site 1: scant, other (comment) (crust)  Bleeding- Site 1: none   Debridement Site 2  Location- Site 2: L lateral foot wound  Selective Debridement- Site 2: Wound Surface <20cmsq  Instruments- Site 2: tweezers  Excised Tissue Description- Site 2: minimum, slough  Bleeding- Site 2: none          Therapy Education       Row Name 07/28/25 0965             Therapy Education    Education Details Reinforced NWB, continuation of POC  -JM      Given Bandaging/dressing change;Symptoms/condition management  -      Program Reinforced  -JM      How Provided Verbal;Demonstration  -JM      Provided to Patient  -JM      Level of Understanding Teach back education performed;Verbalized  -                User Key  (r) = Recorded By, (t) = Taken By, (c) = Cosigned By      Initials Name Provider Type    Safia Alexander, PT Physical Therapist                    Recommendation and Plan   PT Assessment/Plan       Row Name 07/28/25 9214          PT Assessment    Functional Limitations Performance in self-care ADL;Other (comment)  -     Impairments Integumentary integrity;Sensation  -     Assessment Comments Pt with improving granulation of L foot wound, still with min periwound maceration and yeast rash.  Smaller wounds to plantar and posterior heel now closed and crusted without drainage.  Pt observed to be weight-bearing on LLE when transferring in dept, so PT continues to reinforce need for strict NWB L foot for wound healing.  Pt  continues to benefit from NPWT and debridement.  -     Rehab Potential Fair  -     Patient/caregiver participated in establishment of treatment plan and goals Yes  -     Patient would benefit from skilled therapy intervention Yes  -        PT Plan    PT Frequency 2x/week;3x/week  -     Physical Therapy Interventions (Optional Details) patient/family education;wound care  -     PT Plan Comments VAC, debridement  -               User Key  (r) = Recorded By, (t) = Taken By, (c) = Cosigned By      Initials Name Provider Type    Safia Alexander, PT Physical Therapist                    Goals   PT OP Goals       Row Name 07/28/25 1012          Time Calculation    PT Goal Re-Cert Due Date 09/23/25  -               User Key  (r) = Recorded By, (t) = Taken By, (c) = Cosigned By      Initials Name Provider Type    Safia Alexander, PT Physical Therapist                    PT Goal Re-Cert Due Date: 09/23/25            Time Calculation: Start Time: 0930  Untimed Charges  22696-Fwqovimso debridement: 15  71265-Kmj Pressure wound to 50 sqcm: 25  Total Minutes  Untimed Charges Total Minutes: 40   Total Minutes: 40  Therapy Charges for Today       Code Description Service Date Service Provider Modifiers Qty    98930761127 HC PT NEG PRESS WOUND TO 50SQCM DME2 7/28/2025 Safia Arambula, PT GP 1    [id number] HC MARILYN DEBRIDE OPEN WOUND UP TO 20CM 7/28/2025 Safia Arambula, PT GP 1                    Safia Arambula, PT  7/28/2025

## 2025-07-31 ENCOUNTER — HOSPITAL ENCOUNTER (OUTPATIENT)
Dept: PHYSICAL THERAPY | Facility: HOSPITAL | Age: 80
Setting detail: THERAPIES SERIES
Discharge: HOME | End: 2025-07-31
Payer: MEDICARE

## 2025-07-31 DIAGNOSIS — R60.0 EDEMA OF LEFT LOWER LEG: ICD-10-CM

## 2025-07-31 DIAGNOSIS — L03.116 CELLULITIS OF LEFT FOOT: ICD-10-CM

## 2025-07-31 DIAGNOSIS — L98.491 ULCER OF SKIN, CHRONIC, LIMITED TO BREAKDOWN OF SKIN: ICD-10-CM

## 2025-07-31 DIAGNOSIS — L02.612 FOOT ABSCESS, LEFT: ICD-10-CM

## 2025-07-31 DIAGNOSIS — S81.802D MULTIPLE OPEN WOUNDS OF LOWER EXTREMITY, COMPLICATED, LEFT, SUBSEQUENT ENCOUNTER: Primary | ICD-10-CM

## 2025-07-31 PROCEDURE — 97597 DBRDMT OPN WND 1ST 20 CM/<: CPT

## 2025-07-31 PROCEDURE — 29580 STRAPPING UNNA BOOT: CPT

## 2025-07-31 PROCEDURE — 97605 NEG PRS WND THER DME<=50SQCM: CPT

## 2025-07-31 NOTE — THERAPY WOUND CARE TREATMENT
Outpatient Rehabilitation - Wound/Debridement Treatment Note  [facility]     Patient Name: Carlos Culver  : 1945  MRN: [id number]  Today's Date: 2025                 Admit Date: 2025    Visit Dx:    ICD-10-CM ICD-9-CM   1. Multiple open wounds of lower extremity, complicated, left, subsequent encounter  S81.802D V58.89     894.1   2. Cellulitis of left foot  L03.116 682.7   3. Foot abscess, left  L02.612 682.7   4. Edema of left lower leg  R60.0 782.3   5. Ulcer of skin, chronic, limited to breakdown of skin  L98.491 707.9       Patient Active Problem List   Diagnosis    Cellulitis of left foot    GUCCI (acute kidney injury)    Essential hypertension    Leukocytosis    Osteomyelitis of left foot    Chronic anticoagulation    Neuropathy    Elevated transaminase level    CKD (chronic kidney disease) stage 2, GFR 60-89 ml/min    Skin ulcer of right heel    Chronic multifocal osteomyelitis of left foot    Contracture of joint of foot, left    MARTINEZ (dyspnea on exertion)    Neuropathic ulcer, limited to breakdown of skin    Hyperlipidemia LDL goal <100    A-fib    S/P transmetatarsal amputation of foot, left    Leukocytosis, likely reactive    Acute postoperative pain    Alcohol abuse    Staphylococcus aureus bacteremia    Acute osteomyelitis of left ankle or foot    History of [place] amputation of left foot    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 evidence of necrosis    Ulcer of left foot    Left foot infection    Abscess of left foot, status post I&D, exostectomy        Past Medical History:   Diagnosis Date    A-fib     Acute kidney failure     Elevated cholesterol     Hard of hearing     Hyperlipidemia     Hypertension     Osteomyelitis     Renal disorder     Wears hearing aid in both ears     Wears reading eyeglasses         Past Surgical History:   Procedure Laterality Date    AMPUTATION DIGIT Left 2020    Procedure: AMPUTATION LEFT  2ND TOE;  Surgeon: Idania Osborn MD;  Location: [facility];  Service: Orthopedics    AMPUTATION DIGIT Left 12/08/2020    Procedure: TRANSMETATARSAL AMPUTATION LEFT;  Surgeon: Idania Osborn MD;  Location: [facility];  Service: Orthopedics;  Laterality: Left;    APPENDECTOMY      COLONOSCOPY  2006    FOOT SURGERY Left 12/09/2020    (L) transmetatarsal amputation 12/09/2020 - Dr. Idania Osborn    INCISION AND DRAINAGE LEG Left 10/23/2018    Procedure: INCISION AND DRAINAGE LEFT FOOT;  Surgeon: Idania Osborn MD;  Location: [facility];  Service: Orthopedics    INCISION AND DRAINAGE LEG Left 07/21/2023    Procedure: FOOT INCISION AND DRAINAGE; REVISION AMPUTATION; ACHILLES LENGTHENING -LEFT;  Surgeon: Mao Mckay MD;  Location: [facility];  Service: Orthopedics;  Laterality: Left;    INCISION AND DRAINAGE LEG Left 6/3/2025    Procedure: FOOT ABSCESS INCISION AND DRAINAGE, EXOSTECTOMY;  Surgeon: Mao Mckay MD;  Location: [facility];  Service: Orthopedics;  Laterality: Left;    TOE AMPUTATION      TONSILLECTOMY      VENOUS ACCESS DEVICE (PORT) INSERTION N/A 10/23/2018    Procedure: GROSHONG CATHETER PLACEMENT;  Surgeon: Marquez Simons MD;  Location: [facility];  Service: General    VENOUS ACCESS DEVICE (PORT) INSERTION N/A 12/09/2020    Procedure: GROSHONG INSERTION;  Surgeon: Antonio Bains MD;  Location: [facility];  Service: General;  Laterality: N/A;    VENOUS ACCESS DEVICE (PORT) REMOVAL           EVALUATION   PT Ortho       Row Name 07/31/25 7876       Subjective    Subjective Comments no new issues / complaints  -MF       Precautions and Contraindications    Precautions insensate BLE  -MF       Subjective Pain    Able to rate subjective pain? yes  -MF    Pre-Treatment Pain Level 0  -MF    Post-Treatment Pain Level 0  -MF    Subjective Pain Comment PN  -MF       Transfers    Sit-Stand [place] (Transfers) independent  -MF    Stand-Sit [place] (Transfers) independent  -MF    Comment,  (Transfers) long sitting for tx  -MF       Gait/Stairs (Locomotion)    [place] Level (Gait) modified independence  -MF    Assistive Device (Gait) walker, knee scooter  -MF              User Key  (r) = Recorded By, (t) = Taken By, (c) = Cosigned By      Initials Name Provider Type    MF Koko Barraza, PT Physical Therapist                     LDA Wound       Row Name 07/31/25 0930             Wound 05/08/25 Left lateral foot Surgical    Wound - Properties Group Placement Date: 05/08/25  -KW Side: Left  -KW Orientation: lateral  -KW Location: foot  -KW Primary Wound Type: Surgical  -MF    Dressing Appearance intact;moist drainage  -MF      Dressing Removed Type other (see comments)  -MF      Confirmed Empty Wound Bed Yes, visual inspection of wound bed  -MF      Base granulating;moist;red;subcutaneous;yellow;slough  -MF      Periwound macerated;moist;pink;pale white  -MF      Periwound Temperature warm  -MF      Periwound Skin Turgor soft  -MF      Edges irregular  -MF      Wound Length (cm) 6.8 cm  -MF      Wound Width (cm) 1.8 cm  -MF      Wound Depth (cm) 4.2 cm  -MF      Wound Surface Area (cm^2) 9.61 cm^2  -MF      Wound Volume (cm^3) 26.917 cm^3  -MF      Drainage Characteristics/Odor serosanguineous  -MF      Drainage Amount moderate  -MF      Care, Wound irrigated with;wound cleanser;debrided  -MF      Dressing Care dressing changed  -MF      Periwound Care cleansed with pH balanced cleanser  -MF      Wound Output (mL) 75  -MF      Retired Wound - Properties Group Placement Date: 05/08/25  -KW Side: Left  -KW Orientation: lateral  -KW Location: foot  -KW    Retired Wound - Properties Group Placement Date: 05/08/25  -KW Side: Left  -KW Orientation: lateral  -KW Location: foot  -KW    Retired Wound - Properties Group Date first assessed: 05/08/25  -KW Side: Left  -KW Location: foot  -KW       Wound 06/05/25 0830 Left  foot Neuropathic Ulcer    Wound - Properties Group Placement Date: 06/05/25  -  Placement Time: 0830  -MF Present on Original Admission: Y  -MF Side: Left  -MF Orientation: --  -MF, plantar  Location: foot  -MF Primary Wound Type: Neuropathic  -MF    Dressing Appearance intact  -MF      Dressing Removed Type silver impregnated  -MF      Confirmed Empty Wound Bed Yes, visual inspection of wound bed  -MF      Base closed/resurfaced  -MF      Periwound intact;swelling;pink  -MF      Periwound Temperature warm  -MF      Periwound Skin Turgor soft  -MF      Edges open  -MF      Drainage Amount none  -MF      Care, Wound cleansed with;wound cleanser  -MF      Dressing Care --  unna boot  -MF      Periwound Care cleansed with pH balanced cleanser  -MF      Retired Wound - Properties Group Placement Date: 06/05/25  - Placement Time: 0830  -MF Present on Original Admission: Y  -MF Side: Left  -MF Orientation: --  -MF, plantar  Location: foot  -MF    Retired Wound - Properties Group Placement Date: 06/05/25  - Placement Time: 0830  -MF Present on Original Admission: Y  -MF Side: Left  -MF Orientation: --  -MF, plantar  Location: foot  -MF    Retired Wound - Properties Group Date first assessed: 06/05/25  - Time first assessed: 0830  -MF Present on Original Admission: Y  -MF Side: Left  -MF Location: foot  -MF       Wound 06/05/25 0830 Left heel    Wound - Properties Group Placement Date: 06/05/25  - Placement Time: 0830  -MF Present on Original Admission: Y  -MF Side: Left  -MF Location: heel  -MF    Base epithelialization  -MF      Drainage Amount none  -MF      Care, Wound cleansed with;soap and water  -MF      Dressing Care dressing changed  -MF      Retired Wound - Properties Group Placement Date: 06/05/25  - Placement Time: 0830  -MF Present on Original Admission: Y  -MF Side: Left  -MF Location: heel  -MF    Retired Wound - Properties Group Placement Date: 06/05/25  - Placement Time: 0830  -MF Present on Original Admission: Y  -MF Side: Left  -MF Location: heel  -MF    Retired Wound -  Properties Group Date first assessed: 06/05/25  - Time first assessed: 0830  -MF Present on Original Admission: Y  -MF Side: Left  -MF Location: heel  -MF       NPWT (Negative Pressure Wound Therapy) 06/25/25 L lateral foot incision    NPWT (Negative Pressure Wound Therapy) - Properties Group Placement Date: 06/25/25  - Location: L lateral foot incision  -LH    Therapy Setting continuous therapy  -MF      Dressing foam, black  -MF      Contact Layer other (see comments)  faiza Ag  -MF      Pressure Setting 150 mmHg  -MF      Sponges Inserted 1  -MF      Sponges Removed 1  -MF      Finger sweep complete Yes  -MF      Retired NPWT (Negative Pressure Wound Therapy) - Properties Group Placement Date: 06/25/25  - Location: L lateral foot incision  -LH    Retired NPWT (Negative Pressure Wound Therapy) - Properties Group Placement Date: 06/25/25  - Location: L lateral foot incision  -LH    Retired NPWT (Negative Pressure Wound Therapy) - Properties Group Placement Date: 06/25/25  - Location: L lateral foot incision  -LH              User Key  (r) = Recorded By, (t) = Taken By, (c) = Cosigned By      Initials Name Provider Type     Koko Barraza, PT Physical Therapist     Cornelio Tripp, PT Physical Therapist     Melissa Lang, PT Physical Therapist                   Lymphedema       Row Name 07/31/25 0930             Lymphedema Edema Assessment    Ptting Edema Category By severity  -      Pitting Edema Mild  -         Skin Changes/Observations    Location/Assessment Lower Extremity  -      Lower Extremity Conditions right:;shiny;scaly;crust;inflamed  -      Lower Extremity Color/Pigment right:;erythema  -MF         Compression/Skin Care    Compression/Skin Care skin care;wrapping location;bandaging  -      Skin Care washed/dried;lotion applied  -MF      Wrapping Location lower extremity  -MF      Wrapping Location LE right:  -MF      Wrapping Comments unna boot applied to mid calf  with kerlix and [employer] to secure.  -                User Key  (r) = Recorded By, (t) = Taken By, (c) = Cosigned By      Initials Name Provider Type    Koko Reynolds, PT Physical Therapist                    WOUND DEBRIDEMENT  Total area of Debridement: ~4cm2  Debridement Site 1  Location- Site 1: L plantar foot wound  Selective Debridement- Site 1: Wound Surface <20cmsq  Instruments- Site 1: tweezers, scissors  Excised Tissue Description- Site 1: scant, other (comment) (dry, flaking, skn)  Bleeding- Site 1: none   Debridement Site 2  Location- Site 2: L lateral foot wound  Selective Debridement- Site 2: Wound Surface <20cmsq  Instruments- Site 2: tweezers  Excised Tissue Description- Site 2: minimum, slough  Bleeding- Site 2: none             Recommendation and Plan   PT Assessment/Plan       Row Name 07/31/25 0930          PT Assessment    Functional Limitations Performance in self-care ADL;Other (comment)  -     Impairments Integumentary integrity;Sensation  -     Rehab Potential Fair  -     Patient/caregiver participated in establishment of treatment plan and goals Yes  -     Patient would benefit from skilled therapy intervention Yes  -        PT Plan    PT Frequency 2x/week;3x/week  -     Physical Therapy Interventions (Optional Details) wound care;patient/family education  -     PT Plan Comments VAC, debridement  -               User Key  (r) = Recorded By, (t) = Taken By, (c) = Cosigned By      Initials Name Provider Type    Koko Reynolds, PT Physical Therapist                    Goals   PT OP Goals       Row Name 07/31/25 0930          Time Calculation    PT Goal Re-Cert Due Date 09/23/25  -               User Key  (r) = Recorded By, (t) = Taken By, (c) = Cosigned By      Initials Name Provider Type    Koko Reynolds, PT Physical Therapist                    PT Goal Re-Cert Due Date: 09/23/25            Time Calculation: Start Time: 0930  Untimed Charges  Wound  Care: 49281 Unna boot  59293-Akdz Boot: 10  40417-Tcnzcvkfv debridement: 15  71880-Fkq Pressure wound to 50 sqcm: 25  Total Minutes  Untimed Charges Total Minutes: 50   Total Minutes: 50              Koko Barraza, PT  7/31/2025

## 2025-08-01 ENCOUNTER — TRANSCRIBE ORDERS (OUTPATIENT)
Dept: ADMINISTRATIVE | Facility: HOSPITAL | Age: 80
End: 2025-08-01
Payer: MEDICARE

## 2025-08-01 DIAGNOSIS — N20.0 KIDNEY STONE: Primary | ICD-10-CM

## 2025-08-02 ENCOUNTER — HOSPITAL ENCOUNTER (OUTPATIENT)
Dept: PHYSICAL THERAPY | Facility: HOSPITAL | Age: 80
Setting detail: THERAPIES SERIES
Discharge: HOME | End: 2025-08-02
Payer: MEDICARE

## 2025-08-02 DIAGNOSIS — S81.802D MULTIPLE OPEN WOUNDS OF LOWER EXTREMITY, COMPLICATED, LEFT, SUBSEQUENT ENCOUNTER: Primary | ICD-10-CM

## 2025-08-02 DIAGNOSIS — R60.0 EDEMA OF LEFT LOWER LEG: ICD-10-CM

## 2025-08-02 DIAGNOSIS — L02.612 FOOT ABSCESS, LEFT: ICD-10-CM

## 2025-08-02 DIAGNOSIS — L03.116 CELLULITIS OF LEFT FOOT: ICD-10-CM

## 2025-08-02 DIAGNOSIS — L98.491 ULCER OF SKIN, CHRONIC, LIMITED TO BREAKDOWN OF SKIN: ICD-10-CM

## 2025-08-02 PROCEDURE — 97605 NEG PRS WND THER DME<=50SQCM: CPT

## 2025-08-02 PROCEDURE — 97597 DBRDMT OPN WND 1ST 20 CM/<: CPT

## 2025-08-02 PROCEDURE — 29580 STRAPPING UNNA BOOT: CPT

## 2025-08-02 NOTE — THERAPY WOUND CARE TREATMENT
Outpatient Rehabilitation - Wound/Debridement Treatment Note  [facility]     Patient Name: Carlos Culver  : 1945  MRN: [id number]  Today's Date: 2025                 Admit Date: 2025    Visit Dx:    ICD-10-CM ICD-9-CM   1. Multiple open wounds of lower extremity, complicated, left, subsequent encounter  S81.802D V58.89     894.1   2. Cellulitis of left foot  L03.116 682.7   3. Foot abscess, left  L02.612 682.7   4. Edema of left lower leg  R60.0 782.3   5. Ulcer of skin, chronic, limited to breakdown of skin  L98.491 707.9       Patient Active Problem List   Diagnosis    Cellulitis of left foot    GUCCI (acute kidney injury)    Essential hypertension    Leukocytosis    Osteomyelitis of left foot    Chronic anticoagulation    Neuropathy    Elevated transaminase level    CKD (chronic kidney disease) stage 2, GFR 60-89 ml/min    Skin ulcer of right heel    Chronic multifocal osteomyelitis of left foot    Contracture of joint of foot, left    MARTINEZ (dyspnea on exertion)    Neuropathic ulcer, limited to breakdown of skin    Hyperlipidemia LDL goal <100    A-fib    S/P transmetatarsal amputation of foot, left    Leukocytosis, likely reactive    Acute postoperative pain    Alcohol abuse    Staphylococcus aureus bacteremia    Acute osteomyelitis of left ankle or foot    History of [place] amputation of left foot    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 evidence of necrosis    Ulcer of left foot    Left foot infection    Abscess of left foot, status post I&D, exostectomy        Past Medical History:   Diagnosis Date    A-fib     Acute kidney failure     Elevated cholesterol     Hard of hearing     Hyperlipidemia     Hypertension     Osteomyelitis     Renal disorder     Wears hearing aid in both ears     Wears reading eyeglasses         Past Surgical History:   Procedure Laterality Date    AMPUTATION DIGIT Left 2020    Procedure: AMPUTATION LEFT 2ND  TOE;  Surgeon: Idania Osborn MD;  Location:  [email or web address] OR;  Service: Orthopedics    AMPUTATION DIGIT Left 2020    Procedure: TRANSMETATARSAL AMPUTATION LEFT;  Surgeon: Idania Osborn MD;  Location: [facility] OR;  Service: Orthopedics;  Laterality: Left;    APPENDECTOMY      COLONOSCOPY  2006    FOOT SURGERY Left 2020    (L) transmetatarsal amputation 2020 - Dr. Idania Osborn    INCISION AND DRAINAGE LEG Left 10/23/2018    Procedure: INCISION AND DRAINAGE LEFT FOOT;  Surgeon: Idania Osborn MD;  Location: [facility] OR;  Service: Orthopedics    INCISION AND DRAINAGE LEG Left 2023    Procedure: FOOT INCISION AND DRAINAGE; REVISION AMPUTATION; ACHILLES LENGTHENING -LEFT;  Surgeon: Mao Mckay MD;  Location: [facility] OR;  Service: Orthopedics;  Laterality: Left;    INCISION AND DRAINAGE LEG Left 6/3/2025    Procedure: FOOT ABSCESS INCISION AND DRAINAGE, EXOSTECTOMY;  Surgeon: Mao Mckay MD;  Location:  [email or web address] OR;  Service: Orthopedics;  Laterality: Left;    TOE AMPUTATION      TONSILLECTOMY      VENOUS ACCESS DEVICE (PORT) INSERTION N/A 10/23/2018    Procedure: GROSHONG CATHETER PLACEMENT;  Surgeon: Marquez Simons MD;  Location:  [email or web address] OR;  Service: General    VENOUS ACCESS DEVICE (PORT) INSERTION N/A 2020    Procedure: GROSHONG INSERTION;  Surgeon: Antonio Bains MD;  Location:  [email or web address] OR;  Service: General;  Laterality: N/A;    VENOUS ACCESS DEVICE (PORT) REMOVAL           EVALUATION   PT Ortho       Row Name 25 1145       Subjective    Subjective Comments Pt called in AM stating wound vac pump not charging and battery has , was instructed to come for tx today.  Pt states pump has been off since last night.  -JM       Precautions and Contraindications    Precautions insensate BLE  -JM       Subjective Pain    Able to rate subjective pain? yes  -JM    Pre-Treatment Pain Level 0  -JM    Post-Treatment Pain Level 0  -JM    Subjective Pain Comment PN  -JM       Transfers     Sit-Stand [place] (Transfers) independent  -    Stand-Sit [place] (Transfers) independent  -    Comment, (Transfers) long sitting for tx  -       Gait/Stairs (Locomotion)    [place] Level (Gait) modified independence  -    Assistive Device (Gait) walker, knee scooter  -              User Key  (r) = Recorded By, (t) = Taken By, (c) = Cosigned By      Initials Name Provider Type    Safia Alexander PT Physical Therapist                     [facility] Wound       Row Name 08/02/25 1145             Wound 06/05/25 0830 Left heel    Wound - Properties Group Placement Date: 06/05/25  - Placement Time: 0830  -MF Present on Original Admission: Y  -MF Side: Left  -MF Location: heel  -MF    Base epithelialization;clean;closed/resurfaced;pink  -      Drainage Amount none  -      Care, Wound cleansed with;wound cleanser  -JM      Retired Wound - Properties Group Placement Date: 06/05/25  - Placement Time: 0830  -MF Present on Original Admission: Y  -MF Side: Left  -MF Location: heel  -MF    Retired Wound - Properties Group Placement Date: 06/05/25  - Placement Time: 0830  -MF Present on Original Admission: Y  -MF Side: Left  -MF Location: heel  -MF    Retired Wound - Properties Group Date first assessed: 06/05/25  - Time first assessed: 0830  -MF Present on Original Admission: Y  -MF Side: Left  -MF Location: heel  -MF       Wound 06/05/25 0830 Left  foot Neuropathic Ulcer    Wound - Properties Group Placement Date: 06/05/25  - Placement Time: 0830  -MF Present on Original Admission: Y  -MF Side: Left  -MF Orientation: --  -MF, plantar  Location: foot  -MF Primary Wound Type: Neuropathic  -MF    Base closed/resurfaced  -      Periwound intact;swelling;pink  -      Periwound Temperature warm  -      Periwound Skin Turgor soft  -      Edges open  -      Drainage Amount none  -      Retired Wound - Properties Group Placement Date: 06/05/25  - Placement Time: 0830  -MF Present  on Original Admission: Y  -MF Side: Left  -MF Orientation: --  -MF, plantar  Location: foot  -MF    Retired Wound - Properties Group Placement Date: 06/05/25  -MF Placement Time: 0830 -MF Present on Original Admission: Y  -MF Side: Left  -MF Orientation: --  -MF, plantar  Location: foot  -MF    Retired Wound - Properties Group Date first assessed: 06/05/25  -MF Time first assessed: 0830  -MF Present on Original Admission: Y  -MF Side: Left  -MF Location: foot  -MF       Wound 05/08/25 Left lateral foot Surgical    Wound - Properties Group Placement Date: 05/08/25  -KW Side: Left  -KW Orientation: lateral  -KW Location: foot  -KW Primary Wound Type: Surgical  -    Dressing Appearance moist drainage;other (see comments)  dressing intact but drape loose and vac pump not operating  -      Dressing Removed Type other (see comments)  vac, UB  -JM      Confirmed Empty Wound Bed Yes, visual inspection of wound bed;Yes, probed  -      Base granulating;moist;red;subcutaneous;yellow;slough  -      Periwound macerated;moist;pink;pale white  -      Periwound Temperature warm  -      Periwound Skin Turgor soft  -      Edges irregular  -      Drainage Characteristics/Odor serosanguineous  -      Drainage Amount moderate  -      Care, Wound cleansed with;wound cleanser;debrided;negative pressure wound therapy;sharon boot  -      Dressing Care dressing changed  -      Periwound Care cleansed with pH balanced cleanser;dry periwound area maintained;barrier film applied  nosting, drape border  -      Wound Output (mL) 100  -JM      Retired Wound - Properties Group Placement Date: 05/08/25  -KW Side: Left  -KW Orientation: lateral  -KW Location: foot  -KW    Retired Wound - Properties Group Placement Date: 05/08/25  -KW Side: Left  -KW Orientation: lateral  -KW Location: foot  -KW    Retired Wound - Properties Group Date first assessed: 05/08/25  -KW Side: Left  -KW Location: foot  -KW       NPWT (Negative  Pressure Wound Therapy) 06/25/25 L lateral foot incision    NPWT (Negative Pressure Wound Therapy) - Properties Group Placement Date: 06/25/25  - Location: L lateral foot incision  -LH    Therapy Setting continuous therapy  -JM      Dressing foam, black  -JM      Contact Layer other (see comments)  faiza ag  -JM      Pressure Setting 125 mmHg  -JM      Sponges Inserted 1  -JM      Sponges Removed 1  -JM      Finger sweep complete Yes  -JM      Retired NPWT (Negative Pressure Wound Therapy) - Properties Group Placement Date: 06/25/25  - Location: L lateral foot incision  -LH    Retired NPWT (Negative Pressure Wound Therapy) - Properties Group Placement Date: 06/25/25  - Location: L lateral foot incision  -LH    Retired NPWT (Negative Pressure Wound Therapy) - Properties Group Placement Date: 06/25/25  - Location: L lateral foot incision  -LH              User Key  (r) = Recorded By, (t) = Taken By, (c) = Cosigned By      Initials Name Provider Type    Koko Reynolds, PT Physical Therapist    Safia Alexander, PT Physical Therapist    Cornelio Kasper, PT Physical Therapist    Melissa Casiano, PT Physical Therapist                   Lymphedema       Row Name 08/02/25 1143             Lymphedema Edema Assessment    Pitting Edema Mild  -JM         Skin Changes/Observations    Lower Extremity Conditions left:;dry;shiny;scaly;inflamed  -      Lower Extremity Color/Pigment left:;red;blanchable  -JM         Compression/Skin Care    Skin Care washed/dried  -JM      Wrapping Location lower extremity  -JM      Wrapping Location LE left:;foot;ankle;calf  -      Wrapping Comments unna boot in clamshell to foot ankle and distal calf with kerlix and spandage to secure for light gradient compression  -                User Key  (r) = Recorded By, (t) = Taken By, (c) = Cosigned By      Initials Name Provider Type    Safia Alexander, PT Physical Therapist                    WOUND  DEBRIDEMENT  Total area of Debridement: 4cmsq  Debridement Site 1  Location- Site 1: L lateral foot wound  Selective Debridement- Site 1: Wound Surface <20cmsq  Instruments- Site 1: tweezers  Excised Tissue Description- Site 1: minimum, slough  Bleeding- Site 1: none              Therapy Education       Row Name 08/02/25 4445             Therapy Education    Education Details Continue NWB, leg elevation.  PT will keep old pump at rehab dept for  to .  -      Given Bandaging/dressing change;Symptoms/condition management  -      Program Reinforced  -      How Provided Verbal;Demonstration  -      Provided to Patient  -      Level of Understanding Teach back education performed;Verbalized  -                User Key  (r) = Recorded By, (t) = Taken By, (c) = Cosigned By      Initials Name Provider Type    Safia Alexander, PT Physical Therapist                    Recommendation and Plan   PT Assessment/Plan       Row Name 08/02/25 1812          PT Assessment    Functional Limitations Performance in self-care ADL;Other (comment)  -     Impairments Integumentary integrity;Sensation  -     Assessment Comments Pt presenting with non-operative vac pump with NPWT interrupted for several hours so PT changed vac dressing and switched out vac pump (old pump in soiled utility room for pickup).  Continued with UB to LLE for mild excoriation and LLE edema.  Continue with vac, UB PRN  -     Rehab Potential Fair  -     Patient/caregiver participated in establishment of treatment plan and goals Yes  -     Patient would benefit from skilled therapy intervention Yes  -        PT Plan    PT Frequency 2x/week;3x/week  -     Physical Therapy Interventions (Optional Details) wound care;patient/family education  -     PT Plan Comments VAC, debridement, UB/compression PRN  -               User Key  (r) = Recorded By, (t) = Taken By, (c) = Cosigned By      Initials Name Provider Type    TRUDY  Safia Arambula, PT Physical Therapist                    Goals   PT OP Goals       Row Name 08/02/25 1356          Time Calculation    PT Goal Re-Cert Due Date 09/23/25  -TRUDY               User Key  (r) = Recorded By, (t) = Taken By, (c) = Cosigned By      Initials Name Provider Type    Safia Alexander, PT Physical Therapist                    PT Goal Re-Cert Due Date: 09/23/25            Time Calculation: Start Time: 1145  Untimed Charges  54569-Jkvc Boot: 10  41523-Uqfpcmaos debridement: 15  86728-Dyk Pressure wound to 50 sqcm: 60  Total Minutes  Untimed Charges Total Minutes: 85   Total Minutes: 85  Therapy Charges for Today       Code Description Service Date Service Provider Modifiers Qty    39634794225 HC PT NEG PRESS WOUND TO 50SQCM DME4 8/2/2025 Safia Arambula, PT  1    71515088696 HC MARILYN DEBRIDE OPEN WOUND UP TO 20CM 8/2/2025 Safia Arambula, PT GP 1    20028046190 HC PT STAPPING UNNA BOOT 8/2/2025 Safia Arambula, PT GP, LT 1                    Safia Arambula, PT  8/2/2025

## 2025-08-04 ENCOUNTER — HOSPITAL ENCOUNTER (OUTPATIENT)
Dept: PHYSICAL THERAPY | Facility: HOSPITAL | Age: 80
Setting detail: THERAPIES SERIES
Discharge: HOME | End: 2025-08-04
Payer: MEDICARE

## 2025-08-04 ENCOUNTER — TRANSCRIBE ORDERS (OUTPATIENT)
Dept: LAB | Facility: HOSPITAL | Age: 80
End: 2025-08-04
Payer: MEDICARE

## 2025-08-04 ENCOUNTER — LAB (OUTPATIENT)
Dept: LAB | Facility: HOSPITAL | Age: 80
End: 2025-08-04
Payer: MEDICARE

## 2025-08-04 ENCOUNTER — HOSPITAL ENCOUNTER (OUTPATIENT)
Facility: HOSPITAL | Age: 80
Discharge: HOME | End: 2025-08-04
Payer: MEDICARE

## 2025-08-04 DIAGNOSIS — S81.802D MULTIPLE OPEN WOUNDS OF LOWER EXTREMITY, COMPLICATED, LEFT, SUBSEQUENT ENCOUNTER: Primary | ICD-10-CM

## 2025-08-04 DIAGNOSIS — L02.612 FOOT ABSCESS, LEFT: ICD-10-CM

## 2025-08-04 DIAGNOSIS — N20.0 KIDNEY STONE: ICD-10-CM

## 2025-08-04 DIAGNOSIS — R78.81 MSSA BACTEREMIA: ICD-10-CM

## 2025-08-04 DIAGNOSIS — B95.61 MSSA BACTEREMIA: ICD-10-CM

## 2025-08-04 DIAGNOSIS — L03.116 CELLULITIS OF LEFT FOOT: ICD-10-CM

## 2025-08-04 DIAGNOSIS — R60.0 EDEMA OF LEFT LOWER LEG: ICD-10-CM

## 2025-08-04 DIAGNOSIS — L03.116 CELLULITIS OF LEFT FOOT: Primary | ICD-10-CM

## 2025-08-04 DIAGNOSIS — L02.612 ABSCESS OF LEFT FOOT: ICD-10-CM

## 2025-08-04 LAB
ALBUMIN SERPL-MCNC: 4.1 G/DL (ref 3.5–5.2)
ALBUMIN/GLOB SERPL: 1.3 G/DL
ALP SERPL-CCNC: 119 U/L (ref 39–117)
ALT SERPL W P-5'-P-CCNC: 27 U/L (ref 1–41)
ANION GAP SERPL CALCULATED.3IONS-SCNC: 11.5 MMOL/L (ref 5–15)
AST SERPL-CCNC: 36 U/L (ref 1–40)
BASOPHILS # BLD AUTO: 0.1 10*3/MM3 (ref 0–0.2)
BASOPHILS NFR BLD AUTO: 1 % (ref 0–1.5)
BILIRUB SERPL-MCNC: 0.5 MG/DL (ref 0–1.2)
BUN SERPL-MCNC: 22.1 MG/DL (ref 8–23)
BUN/CREAT SERPL: 19.7 (ref 7–25)
CALCIUM SPEC-SCNC: 9.9 MG/DL (ref 8.6–10.5)
CHLORIDE SERPL-SCNC: 103 MMOL/L (ref 98–107)
CO2 SERPL-SCNC: 24.5 MMOL/L (ref 22–29)
CREAT SERPL-MCNC: 1.12 MG/DL (ref 0.76–1.27)
CRP SERPL-MCNC: 1.01 MG/DL (ref 0–0.5)
DEPRECATED RDW RBC AUTO: 49.8 FL (ref 37–54)
EGFRCR SERPLBLD CKD-EPI 2021: 66.4 ML/MIN/1.73
EOSINOPHIL # BLD AUTO: 0.43 10*3/MM3 (ref 0–0.4)
EOSINOPHIL NFR BLD AUTO: 4.3 % (ref 0.3–6.2)
ERYTHROCYTE [DISTWIDTH] IN BLOOD BY AUTOMATED COUNT: 14.1 % (ref 12.3–15.4)
GLOBULIN UR ELPH-MCNC: 3.2 GM/DL
GLUCOSE SERPL-MCNC: 90 MG/DL (ref 65–99)
HCT VFR BLD AUTO: 44.9 % (ref 37.5–51)
HGB BLD-MCNC: 14.9 G/DL (ref 13–17.7)
IMM GRANULOCYTES # BLD AUTO: 0.02 10*3/MM3 (ref 0–0.05)
IMM GRANULOCYTES NFR BLD AUTO: 0.2 % (ref 0–0.5)
LYMPHOCYTES # BLD AUTO: 1.95 10*3/MM3 (ref 0.7–3.1)
LYMPHOCYTES NFR BLD AUTO: 19.5 % (ref 19.6–45.3)
MCH RBC QN AUTO: 31.7 PG (ref 26.6–33)
MCHC RBC AUTO-ENTMCNC: 33.2 G/DL (ref 31.5–35.7)
MCV RBC AUTO: 95.5 FL (ref 79–97)
MONOCYTES # BLD AUTO: 0.97 10*3/MM3 (ref 0.1–0.9)
MONOCYTES NFR BLD AUTO: 9.7 % (ref 5–12)
NEUTROPHILS NFR BLD AUTO: 6.54 10*3/MM3 (ref 1.7–7)
NEUTROPHILS NFR BLD AUTO: 65.3 % (ref 42.7–76)
NRBC BLD AUTO-RTO: 0 /100 WBC (ref 0–0.2)
PLATELET # BLD AUTO: 191 10*3/MM3 (ref 140–450)
PMV BLD AUTO: 9.5 FL (ref 6–12)
POTASSIUM SERPL-SCNC: 4.8 MMOL/L (ref 3.5–5.2)
PROT SERPL-MCNC: 7.3 G/DL (ref 6–8.5)
RBC # BLD AUTO: 4.7 10*6/MM3 (ref 4.14–5.8)
SODIUM SERPL-SCNC: 139 MMOL/L (ref 136–145)
WBC NRBC COR # BLD AUTO: 10.01 10*3/MM3 (ref 3.4–10.8)

## 2025-08-04 PROCEDURE — 29580 STRAPPING UNNA BOOT: CPT

## 2025-08-04 PROCEDURE — 85025 COMPLETE CBC W/AUTO DIFF WBC: CPT

## 2025-08-04 PROCEDURE — 74176 CT ABD & PELVIS W/O CONTRAST: CPT

## 2025-08-04 PROCEDURE — 36415 COLL VENOUS BLD VENIPUNCTURE: CPT

## 2025-08-04 PROCEDURE — 86140 C-REACTIVE PROTEIN: CPT

## 2025-08-04 PROCEDURE — 97605 NEG PRS WND THER DME<=50SQCM: CPT

## 2025-08-04 PROCEDURE — 97597 DBRDMT OPN WND 1ST 20 CM/<: CPT

## 2025-08-04 PROCEDURE — 80053 COMPREHEN METABOLIC PANEL: CPT

## 2025-08-07 ENCOUNTER — HOSPITAL ENCOUNTER (OUTPATIENT)
Dept: PHYSICAL THERAPY | Facility: HOSPITAL | Age: 80
Setting detail: THERAPIES SERIES
Discharge: HOME | End: 2025-08-07
Payer: MEDICARE

## 2025-08-07 DIAGNOSIS — L03.116 CELLULITIS OF LEFT FOOT: ICD-10-CM

## 2025-08-07 DIAGNOSIS — R60.0 EDEMA OF LEFT LOWER LEG: ICD-10-CM

## 2025-08-07 DIAGNOSIS — L98.491 ULCER OF SKIN, CHRONIC, LIMITED TO BREAKDOWN OF SKIN: ICD-10-CM

## 2025-08-07 DIAGNOSIS — S81.802D MULTIPLE OPEN WOUNDS OF LOWER EXTREMITY, COMPLICATED, LEFT, SUBSEQUENT ENCOUNTER: Primary | ICD-10-CM

## 2025-08-07 DIAGNOSIS — L02.612 FOOT ABSCESS, LEFT: ICD-10-CM

## 2025-08-07 PROCEDURE — 97597 DBRDMT OPN WND 1ST 20 CM/<: CPT

## 2025-08-07 PROCEDURE — 97605 NEG PRS WND THER DME<=50SQCM: CPT

## 2025-08-07 PROCEDURE — 29580 STRAPPING UNNA BOOT: CPT

## 2025-08-11 ENCOUNTER — HOSPITAL ENCOUNTER (OUTPATIENT)
Dept: PHYSICAL THERAPY | Facility: HOSPITAL | Age: 80
Setting detail: THERAPIES SERIES
Discharge: HOME | End: 2025-08-11
Payer: MEDICARE

## 2025-08-11 DIAGNOSIS — L98.491 ULCER OF SKIN, CHRONIC, LIMITED TO BREAKDOWN OF SKIN: ICD-10-CM

## 2025-08-11 DIAGNOSIS — L02.612 FOOT ABSCESS, LEFT: ICD-10-CM

## 2025-08-11 DIAGNOSIS — S81.802D MULTIPLE OPEN WOUNDS OF LOWER EXTREMITY, COMPLICATED, LEFT, SUBSEQUENT ENCOUNTER: Primary | ICD-10-CM

## 2025-08-11 DIAGNOSIS — L03.116 CELLULITIS OF LEFT FOOT: ICD-10-CM

## 2025-08-11 DIAGNOSIS — R60.0 EDEMA OF LEFT LOWER LEG: ICD-10-CM

## 2025-08-11 PROCEDURE — 97605 NEG PRS WND THER DME<=50SQCM: CPT

## 2025-08-11 PROCEDURE — 97597 DBRDMT OPN WND 1ST 20 CM/<: CPT

## 2025-08-14 ENCOUNTER — HOSPITAL ENCOUNTER (OUTPATIENT)
Dept: PHYSICAL THERAPY | Facility: HOSPITAL | Age: 80
Setting detail: THERAPIES SERIES
Discharge: HOME | End: 2025-08-14
Payer: MEDICARE

## 2025-08-14 DIAGNOSIS — R60.0 EDEMA OF LEFT LOWER LEG: ICD-10-CM

## 2025-08-14 DIAGNOSIS — L98.491 ULCER OF SKIN, CHRONIC, LIMITED TO BREAKDOWN OF SKIN: ICD-10-CM

## 2025-08-14 DIAGNOSIS — L03.116 CELLULITIS OF LEFT FOOT: ICD-10-CM

## 2025-08-14 DIAGNOSIS — S81.802D MULTIPLE OPEN WOUNDS OF LOWER EXTREMITY, COMPLICATED, LEFT, SUBSEQUENT ENCOUNTER: Primary | ICD-10-CM

## 2025-08-14 DIAGNOSIS — L02.612 FOOT ABSCESS, LEFT: ICD-10-CM

## 2025-08-14 PROCEDURE — 97605 NEG PRS WND THER DME<=50SQCM: CPT

## 2025-08-14 PROCEDURE — 97597 DBRDMT OPN WND 1ST 20 CM/<: CPT

## 2025-08-18 ENCOUNTER — HOSPITAL ENCOUNTER (OUTPATIENT)
Dept: PHYSICAL THERAPY | Facility: HOSPITAL | Age: 80
Setting detail: THERAPIES SERIES
Discharge: HOME | End: 2025-08-18
Payer: MEDICARE

## 2025-08-18 DIAGNOSIS — L02.612 FOOT ABSCESS, LEFT: ICD-10-CM

## 2025-08-18 DIAGNOSIS — S81.802D MULTIPLE OPEN WOUNDS OF LOWER EXTREMITY, COMPLICATED, LEFT, SUBSEQUENT ENCOUNTER: Primary | ICD-10-CM

## 2025-08-18 DIAGNOSIS — R60.0 EDEMA OF LEFT LOWER LEG: ICD-10-CM

## 2025-08-18 DIAGNOSIS — L03.116 CELLULITIS OF LEFT FOOT: ICD-10-CM

## 2025-08-18 PROCEDURE — 97605 NEG PRS WND THER DME<=50SQCM: CPT

## 2025-08-18 PROCEDURE — 97597 DBRDMT OPN WND 1ST 20 CM/<: CPT

## 2025-08-21 ENCOUNTER — HOSPITAL ENCOUNTER (OUTPATIENT)
Dept: PHYSICAL THERAPY | Facility: HOSPITAL | Age: 80
Setting detail: THERAPIES SERIES
Discharge: HOME | End: 2025-08-21
Payer: MEDICARE

## 2025-08-21 DIAGNOSIS — R60.0 EDEMA OF LEFT LOWER LEG: ICD-10-CM

## 2025-08-21 DIAGNOSIS — S81.802D MULTIPLE OPEN WOUNDS OF LOWER EXTREMITY, COMPLICATED, LEFT, SUBSEQUENT ENCOUNTER: Primary | ICD-10-CM

## 2025-08-21 DIAGNOSIS — L02.612 FOOT ABSCESS, LEFT: ICD-10-CM

## 2025-08-21 DIAGNOSIS — L03.116 CELLULITIS OF LEFT FOOT: ICD-10-CM

## 2025-08-21 PROCEDURE — 97605 NEG PRS WND THER DME<=50SQCM: CPT

## 2025-08-21 PROCEDURE — 97597 DBRDMT OPN WND 1ST 20 CM/<: CPT

## 2025-08-22 ENCOUNTER — TELEPHONE (OUTPATIENT)
Dept: PHYSICAL THERAPY | Facility: HOSPITAL | Age: 80
End: 2025-08-22
Payer: MEDICARE

## 2025-08-25 ENCOUNTER — HOSPITAL ENCOUNTER (OUTPATIENT)
Dept: PHYSICAL THERAPY | Facility: HOSPITAL | Age: 80
Setting detail: THERAPIES SERIES
Discharge: HOME | End: 2025-08-25
Payer: MEDICARE

## 2025-08-25 DIAGNOSIS — L02.612 FOOT ABSCESS, LEFT: ICD-10-CM

## 2025-08-25 DIAGNOSIS — S81.802D MULTIPLE OPEN WOUNDS OF LOWER EXTREMITY, COMPLICATED, LEFT, SUBSEQUENT ENCOUNTER: Primary | ICD-10-CM

## 2025-08-25 DIAGNOSIS — L03.116 CELLULITIS OF LEFT FOOT: ICD-10-CM

## 2025-08-25 DIAGNOSIS — R60.0 EDEMA OF LEFT LOWER LEG: ICD-10-CM

## 2025-08-25 PROCEDURE — 97597 DBRDMT OPN WND 1ST 20 CM/<: CPT

## 2025-08-25 PROCEDURE — 97605 NEG PRS WND THER DME<=50SQCM: CPT

## 2025-08-28 ENCOUNTER — HOSPITAL ENCOUNTER (OUTPATIENT)
Dept: PHYSICAL THERAPY | Facility: HOSPITAL | Age: 80
Setting detail: THERAPIES SERIES
Discharge: HOME | End: 2025-08-28
Payer: MEDICARE

## 2025-08-28 DIAGNOSIS — L98.491 ULCER OF SKIN, CHRONIC, LIMITED TO BREAKDOWN OF SKIN: ICD-10-CM

## 2025-08-28 DIAGNOSIS — R60.0 EDEMA OF LEFT LOWER LEG: ICD-10-CM

## 2025-08-28 DIAGNOSIS — L03.116 CELLULITIS OF LEFT FOOT: ICD-10-CM

## 2025-08-28 DIAGNOSIS — L02.612 FOOT ABSCESS, LEFT: ICD-10-CM

## 2025-08-28 DIAGNOSIS — S81.802D MULTIPLE OPEN WOUNDS OF LOWER EXTREMITY, COMPLICATED, LEFT, SUBSEQUENT ENCOUNTER: Primary | ICD-10-CM

## 2025-08-28 PROCEDURE — 97597 DBRDMT OPN WND 1ST 20 CM/<: CPT

## 2025-08-28 PROCEDURE — 29580 STRAPPING UNNA BOOT: CPT

## (undated) DEVICE — CANNULA,OXY,ADULT,SUPERSOFT,W/7'TUB,UC: Brand: MEDLINE

## (undated) DEVICE — CVR HNDL LT SURG ACCSSRY BLU STRL

## (undated) DEVICE — GUARDIAN LVC: Brand: GUARDIAN

## (undated) DEVICE — APPL CHLORAPREP W/TINT 26ML BLU

## (undated) DEVICE — CONTAINER,SPECIMEN,OR STERILE,4OZ: Brand: MEDLINE

## (undated) DEVICE — GLV SURG SIGNATURE TOUCH PF LTX 7 STRL

## (undated) DEVICE — PK EXTREM LOWR 10

## (undated) DEVICE — DECANT BG O JET

## (undated) DEVICE — INTENDED FOR TISSUE SEPARATION, AND OTHER PROCEDURES THAT REQUIRE A SHARP SURGICAL BLADE TO PUNCTURE OR CUT.: Brand: BARD-PARKER ® STAINLESS STEEL BLADES

## (undated) DEVICE — PK MINOR SPLT 10

## (undated) DEVICE — BNDG ELAS CO-FLEX SLF ADHR 4IN5YD LF STRL

## (undated) DEVICE — TBG PENCL TELESCP MEGADYNE SMOKE EVAC 10FT

## (undated) DEVICE — INTENDED FOR TISSUE SEPARATION, AND OTHER PROCEDURES THAT REQUIRE A SHARP SURGICAL BLADE TO PUNCTURE OR CUT.: Brand: BARD-PARKER ® SAFETYLOCK CARBON RIB-BACK BLADES

## (undated) DEVICE — SUT PROLN 0 CT2 MONO 30IN 8412H

## (undated) DEVICE — UNDERGLV SURG BIOGEL INDICAT PF 61/2 GRN

## (undated) DEVICE — BNDG ELAS ELITE V/CLOSE 4IN 5YD LF STRL

## (undated) DEVICE — SPNG GZ WOVN 4X4IN 12PLY 10/BX STRL

## (undated) DEVICE — SYR LUERLOK 20CC BX/50

## (undated) DEVICE — CVR HNDL LIGHT RIGID

## (undated) DEVICE — UNDERCAST PADDING: Brand: DEROYAL

## (undated) DEVICE — NDL HYPO ECLPS SFTY 22G 1 1/2IN

## (undated) DEVICE — SUT PROLN 2/0 PC3 8833H

## (undated) DEVICE — GROSHONG 9.5F DUAL-LUMEN CV CATHETER WITH SURECUFF TISSUE INGROWTH CUFF INTERMEDIATE TRAY: Brand: GROSHONG

## (undated) DEVICE — DRAPE,TOP,102X53,STERILE: Brand: MEDLINE

## (undated) DEVICE — HANDPIECE SET WITH HIGH FLOW TIP AND SUCTION TUBE: Brand: INTERPULSE

## (undated) DEVICE — SUT MNCRYL PLS ANTIB UD 4/0 PS2 18IN

## (undated) DEVICE — SUT MNCRYL 2/0 SH 27IN UD MCP417H

## (undated) DEVICE — SUT ETHLN 2/0 PS 18IN 585H

## (undated) DEVICE — DRSNG PAD ABD 8X10IN STRL

## (undated) DEVICE — COVER,LIGHT HANDLE,FLX,1/PK: Brand: MEDLINE INDUSTRIES, INC.

## (undated) DEVICE — DELFI MATCHING LIMB PROTECTION SLEEVES (MLPS) HELP PROTECT THE PATIENT’S LIMB FROM POSSIBLE WRINKLING, PINCHING AND SHEARING OF SKIN AND SOFT TISSUES OF THE LIMB.EACH DELFI MATCHING LIMB PROTECTION SLEEVE IS INTENDED FOR USE WITH A SPECIFIC DELFI TOURNIQUET CUFF. THIS SLEEVE IS SPECIFICALLY FOR THIGH.: Brand: MATCHING LIMB PROTECTION SLEEVES - VARI-FIT

## (undated) DEVICE — GOWN,PREVENTION PLUS,XXLARGE,STERILE: Brand: MEDLINE

## (undated) DEVICE — ANTIBACTERIAL UNDYED BRAIDED (POLYGLACTIN 910), SYNTHETIC ABSORBABLE SUTURE: Brand: COATED VICRYL

## (undated) DEVICE — STRAP POSTN KN/BDY FM 5X72IN DISP

## (undated) DEVICE — SUT ETHLN 4/0 PS2 18IN 1667H

## (undated) DEVICE — 1010 S-DRAPE TOWEL DRAPE 10/BX: Brand: STERI-DRAPE™

## (undated) DEVICE — GLV SURG SENSICARE PI LF PF 6.5

## (undated) DEVICE — SUT PROLN 3/0 SH D/A 36IN 8522H

## (undated) DEVICE — SUT PROLN 2/0 V7 36IN 8977H

## (undated) DEVICE — 3M™ TEGADERM™ IV TRANSPARENT FILM DRESSING WITH BORDER 1610: Brand: 3M™ TEGADERM™

## (undated) DEVICE — SYR CONTRL LUERLOK 10CC

## (undated) DEVICE — PAD,ABDOMINAL,5"X9",STERILE,LF,1/PK: Brand: MEDLINE INDUSTRIES, INC.

## (undated) DEVICE — ENCORE® LATEX MICRO SIZE 8, STERILE LATEX POWDER-FREE SURGICAL GLOVE: Brand: ENCORE

## (undated) DEVICE — PENCL SMOKE/EVAC MEGADYNE TELESCP 10FT

## (undated) DEVICE — APPL CHLORAPREP TINTED 26ML TEAL

## (undated) DEVICE — MEDI-VAC YANKAUER SUCTION HANDLE W/BULBOUS TIP: Brand: CARDINAL HEALTH

## (undated) DEVICE — SUT SILK 3/0 TIES 18IN A184H

## (undated) DEVICE — GOWN,NON-REINFORCED,SIRUS,SET IN SLV,XL: Brand: MEDLINE

## (undated) DEVICE — SYR LUERLOK 30CC

## (undated) DEVICE — 3M™ IOBAN™ 2 ANTIMICROBIAL INCISE DRAPE 6650EZ: Brand: IOBAN™ 2

## (undated) DEVICE — TRAP FLD MINIVAC MEGADYNE 100ML

## (undated) DEVICE — SPNG LAP PREWSH SFTPK 18X18IN STRL PK/5

## (undated) DEVICE — BLANKT WARM UPPR/BDY ARM/OUT 57X196CM

## (undated) DEVICE — DRSNG TELFA PAD NONADH STR 1S 3X8IN

## (undated) DEVICE — GLV SURG PREMIERPRO MIC LTX PF SZ7.5 BRN

## (undated) DEVICE — PATIENT RETURN ELECTRODE, SINGLE-USE, CONTACT QUALITY MONITORING, ADULT, WITH 9FT CORD, FOR PATIENTS WEIGING OVER 33LBS. (15KG): Brand: MEGADYNE

## (undated) DEVICE — SNAP KOVER: Brand: UNBRANDED

## (undated) DEVICE — GOWN,SIRUS,NONRNF,SETINSLV,XL,20/CS: Brand: MEDLINE

## (undated) DEVICE — GLV SURG BIOGEL LTX PF 7 1/2

## (undated) DEVICE — GLV SURG SENSICARE PI ORTHO SZ7.5 LF STRL

## (undated) DEVICE — GLV SURG SENSICARE PI MIC PF SZ7 LF STRL

## (undated) DEVICE — SOL NACL 0.9PCT 1000ML

## (undated) DEVICE — SUT SILK 2/0 TIES 18IN A185H

## (undated) DEVICE — UNDERGLV SURG BIOGEL INDICATOR LF PF 7.5

## (undated) DEVICE — 5360 PACKING SPG STR-1'S: Brand: KERLIX

## (undated) DEVICE — SPNG GZ STRL 2S 4X4 12PLY

## (undated) DEVICE — MEDI-VAC NON-CONDUCTIVE SUCTION TUBING: Brand: CARDINAL HEALTH

## (undated) DEVICE — CANN NASL CO2 DIVIDED A/

## (undated) DEVICE — SKIN AFFIX SURG ADHESIVE 72/CS 0.55ML: Brand: MEDLINE

## (undated) DEVICE — GLV SURG SENSICARE PI LF PF 7.0

## (undated) DEVICE — TP SILK DURAPORE 3IN

## (undated) DEVICE — AIRWY SZ11

## (undated) DEVICE — SUT PDS MONO 0 36 CT1 VIL PDP346H

## (undated) DEVICE — GLV SURG SENSICARE PI LF PF 7.5

## (undated) DEVICE — DRSNG SURESITE WNDW 2.38X2.75

## (undated) DEVICE — GLV SURG SENSICARE PI MIC PF SZ7.5 LF STRL

## (undated) DEVICE — SYR LL TP 10ML STRL

## (undated) DEVICE — SYR LL 3CC

## (undated) DEVICE — SUCTION CANISTER, 2500CC, RIGID: Brand: DEROYAL

## (undated) DEVICE — TAPE,CLOTH/SILK,CURAD,3"X10YD,LF,40/CS: Brand: CURAD

## (undated) DEVICE — C-ARM DRAPE: Brand: DEROYAL

## (undated) DEVICE — DISPOSABLE TOURNIQUET CUFF SINGLE BLADDER, DUAL PORT AND QUICK CONNECT CONNECTOR: Brand: COLOR CUFF

## (undated) DEVICE — BNDG ELAS ELITE V/CLOSE 6IN 5YD LF STRL

## (undated) DEVICE — PAD ARMBRD SURG CONVOL 7.5X20X2IN

## (undated) DEVICE — GLV SURG PREMIERPRO MIC LTX PF SZ8 BRN

## (undated) DEVICE — BNDG ELAS CO-FLEX SLF ADHR 6IN 5YD LF STRL

## (undated) DEVICE — GLV SURG PREMIERPRO MIC LTX PF SZ7 BRN

## (undated) DEVICE — PAD,ARMBOARD,CONV,FOAM,2X8X20",12PR/CS: Brand: MEDLINE

## (undated) DEVICE — SUT PDS 2/0 CT2 27IN VIL PDP333H